# Patient Record
Sex: MALE | Race: WHITE | Employment: OTHER | URBAN - METROPOLITAN AREA
[De-identification: names, ages, dates, MRNs, and addresses within clinical notes are randomized per-mention and may not be internally consistent; named-entity substitution may affect disease eponyms.]

---

## 2017-01-04 ENCOUNTER — ANTICOAGULATION THERAPY VISIT (OUTPATIENT)
Dept: NURSING | Facility: CLINIC | Age: 72
End: 2017-01-04
Payer: COMMERCIAL

## 2017-01-04 LAB — INR POINT OF CARE: 2.7 (ref 2–2.5)

## 2017-01-04 PROCEDURE — 36416 COLLJ CAPILLARY BLOOD SPEC: CPT

## 2017-01-04 PROCEDURE — 85610 PROTHROMBIN TIME: CPT | Mod: QW

## 2017-01-04 PROCEDURE — 99207 ZZC NO CHARGE NURSE ONLY: CPT

## 2017-01-04 NOTE — PROGRESS NOTES
ANTICOAGULATION FOLLOW-UP CLINIC VISIT    Patient Name:  Tyrel Rene  Date:  1/4/2017  Contact Type:  Face to Face    SUBJECTIVE:     Patient Findings     Positives No Problem Findings           OBJECTIVE    INR PROTIME   Date Value Ref Range Status   01/04/2017 2.7* 2.0 - 2.5 Final       ASSESSMENT / PLAN  INR assessment SUPRA    Recheck INR In: 3 WEEKS    INR Location Clinic      Anticoagulation Summary as of 1/4/2017     INR goal 2.0-2.5   Selected INR 2.7! (1/4/2017)   Maintenance plan 2.5 mg (2.5 mg x 1) every day   Full instructions 1/9: 3.75 mg; 1/16: 3.75 mg; Otherwise 2.5 mg every day   Weekly total 17.5 mg   Plan last modified Caridad Cunningham RN (3/9/2016)   Next INR check 1/23/2017   Target end date Indefinite    Indications   Long-term (current) use of anticoagulants [Z79.01] [Z79.01]  Cerebral artery occlusion with cerebral infarction (HCC) [I63.50] [I63.50]  Cerebral infarction (H) [I63.9]         Anticoagulation Episode Summary     INR check location [facility]    Preferred lab     Send INR reminders to [facility] INR/PROTIME    Comments       Anticoagulation Care Providers     Provider Role Specialty Phone number    Dejon Downs MD [facility] [phone]            See the Encounter Report to view Anticoagulation Flowsheet and Dosing Calendar (Go to Encounters tab in chart review, and find the Anticoagulation Therapy Visit)        Caridad Cunningham RN

## 2017-01-04 NOTE — MR AVS SNAPSHOT
Tyrel Rene   1/4/2017 3:00 PM   Anticoagulation Therapy Visit    Description:  71 year old male   Provider:   ANTICOAGULATION CLINIC   Department:  Bx Nurse           INR as of 1/4/2017     Selected INR 2.7! (1/4/2017)      Anticoagulation Summary as of 1/4/2017     INR goal 2.0-2.5   Selected INR 2.7! (1/4/2017)   Full instructions 1/9: 3.75 mg; 1/16: 3.75 mg; Otherwise 2.5 mg every day   Next INR check 1/23/2017    Indications   Long-term (current) use of anticoagulants [Z79.01] [Z79.01]  Cerebral artery occlusion with cerebral infarction (HCC) [I63.50] [I63.50]  Cerebral infarction (H) [I63.9]         Your next Anticoagulation Clinic appointment(s)     Jan 23, 2017  2:00 PM   Anticoagulation Visit with  ANTICOAGULATION CLINIC   [facility]    [address] [place] [address] [phone]              Contact Numbers     [facility]  Please call  [phone] to cancel and/or reschedule your appointment   The direct line to the anticoagulant nurse is [phone] on Monday, Wednesday, and Friday. On Thursday, the anticoagulant nurse can be reached directly at [phone].         January 2017 Details    Sun Mon Tue Wed Thu Fri Sat     1               2               3               4      2.5 mg   See details      5      2.5 mg         6      2.5 mg         7      2.5 mg           8      2.5 mg         9      3.75 mg         10      2.5 mg         11      2.5 mg         12      2.5 mg         13      2.5 mg         14      2.5 mg           15      2.5 mg         16      3.75 mg         17      2.5 mg         18      2.5 mg         19      2.5 mg         20      2.5 mg         21      2.5 mg           22      2.5 mg         23            24               25               26               27               28                 29               30               31                    Date Details    01/04 This INR check       Date of next INR:  1/23/2017         How to take your warfarin dose     To take:  2.5 mg Take 1 of the 2.5 mg tablets.    To take:  3.75 mg Take 1.5 of the 2.5 mg tablets.

## 2017-01-17 ENCOUNTER — HOSPITAL ENCOUNTER (OUTPATIENT)
Dept: CARDIOLOGY | Facility: CLINIC | Age: 72
Discharge: HOME | End: 2017-01-17
Attending: INTERNAL MEDICINE | Admitting: INTERNAL MEDICINE
Payer: COMMERCIAL

## 2017-01-17 DIAGNOSIS — E78.00 HYPERCHOLESTEROLEMIA: ICD-10-CM

## 2017-01-17 DIAGNOSIS — I50.22 CHRONIC SYSTOLIC CONGESTIVE HEART FAILURE (H): ICD-10-CM

## 2017-01-17 LAB
ALT SERPL W P-5'-P-CCNC: <5 U/L (ref 5–30)
ANION GAP SERPL CALCULATED.3IONS-SCNC: 8.3 MMOL/L (ref 6–17)
BUN SERPL-MCNC: 20 MG/DL (ref 7–30)
CALCIUM SERPL-MCNC: 8.1 MG/DL (ref 8.5–10.5)
CHLORIDE SERPL-SCNC: 104 MMOL/L (ref 98–107)
CHOLEST SERPL-MCNC: 138 MG/DL
CO2 SERPL-SCNC: 29 MMOL/L (ref 23–29)
CREAT SERPL-MCNC: 1.46 MG/DL (ref 0.7–1.3)
GFR SERPL CREATININE-BSD FRML MDRD: 47 ML/MIN/1.7M2
GLUCOSE SERPL-MCNC: 98 MG/DL (ref 70–105)
HDLC SERPL-MCNC: 48 MG/DL
LDLC SERPL CALC-MCNC: 64 MG/DL
NONHDLC SERPL-MCNC: 90 MG/DL
POTASSIUM SERPL-SCNC: 4.3 MMOL/L (ref 3.5–5.1)
SODIUM SERPL-SCNC: 137 MMOL/L (ref 136–145)
TRIGL SERPL-MCNC: 129 MG/DL

## 2017-01-17 PROCEDURE — 36415 COLL VENOUS BLD VENIPUNCTURE: CPT | Performed by: INTERNAL MEDICINE

## 2017-01-17 PROCEDURE — 80048 BASIC METABOLIC PNL TOTAL CA: CPT | Performed by: INTERNAL MEDICINE

## 2017-01-17 PROCEDURE — 80061 LIPID PANEL: CPT | Performed by: INTERNAL MEDICINE

## 2017-01-17 PROCEDURE — 93306 TTE W/DOPPLER COMPLETE: CPT | Mod: 26 | Performed by: INTERNAL MEDICINE

## 2017-01-17 PROCEDURE — 84460 ALANINE AMINO (ALT) (SGPT): CPT | Performed by: INTERNAL MEDICINE

## 2017-01-17 PROCEDURE — 25500064 ZZH RX 255 OP 636: Performed by: INTERNAL MEDICINE

## 2017-01-17 PROCEDURE — 40000264 ECHO COMPLETE WITH LUMASON

## 2017-01-17 RX ADMIN — SULFUR HEXAFLUORIDE 2 ML: KIT at 10:13

## 2017-01-19 ENCOUNTER — OFFICE VISIT (OUTPATIENT)
Dept: CARDIOLOGY | Facility: CLINIC | Age: 72
End: 2017-01-19
Attending: INTERNAL MEDICINE
Payer: COMMERCIAL

## 2017-01-19 VITALS
HEART RATE: 65 BPM | WEIGHT: 183.1 LBS | HEIGHT: 73 IN | SYSTOLIC BLOOD PRESSURE: 124 MMHG | DIASTOLIC BLOOD PRESSURE: 72 MMHG | BODY MASS INDEX: 24.27 KG/M2

## 2017-01-19 DIAGNOSIS — I50.22 CHRONIC SYSTOLIC CONGESTIVE HEART FAILURE (H): Primary | ICD-10-CM

## 2017-01-19 DIAGNOSIS — E78.00 HYPERCHOLESTEROLEMIA: ICD-10-CM

## 2017-01-19 DIAGNOSIS — I47.10 SVT (SUPRAVENTRICULAR TACHYCARDIA) (H): ICD-10-CM

## 2017-01-19 DIAGNOSIS — I10 ESSENTIAL HYPERTENSION: ICD-10-CM

## 2017-01-19 DIAGNOSIS — I42.9 CARDIOMYOPATHY (H): ICD-10-CM

## 2017-01-19 DIAGNOSIS — Z95.810 ICD (IMPLANTABLE CARDIOVERTER-DEFIBRILLATOR) IN PLACE: ICD-10-CM

## 2017-01-19 DIAGNOSIS — I48.20 CHRONIC ATRIAL FIBRILLATION (H): ICD-10-CM

## 2017-01-19 PROCEDURE — 99214 OFFICE O/P EST MOD 30 MIN: CPT | Performed by: INTERNAL MEDICINE

## 2017-01-19 RX ORDER — ATORVASTATIN CALCIUM 80 MG/1
40 TABLET, FILM COATED ORAL DAILY
Qty: 90 TABLET | Refills: 3 | Status: SHIPPED | OUTPATIENT
Start: 2017-01-19 | End: 2017-02-08 | Stop reason: DRUGHIGH

## 2017-01-19 NOTE — PROGRESS NOTES
2017      Dejon Downs MD   [facility]   [address] [place]  [address]      RE: Tyrel Rene   MRN: [id number]   : 1945      Dear Dejon:      I had the opportunity to see Mr. Tyrel Rene in Cardiology Clinic today at the [facility] in [place] for reevaluation of chronic ischemic cardiomyopathy and congestive heart failure.  As you may recall, Mr. Rene has a stable ejection fraction of 30%-35% with chronic atrial fibrillation and a biventricular ICD.  He also has chronic kidney disease which has stabilized with a creatinine in the range of 1.4 to 1.5.  He has had some problems with worsening kidney failure when he has developed volume overload issues and required additional diuretic therapy.  Fortunately, since an exacerbation of heart failure last summer, his kidney function has stabilized and we have been able to eliminate the need for daily diuretic usage by eliminating sodium from his diet.  He last took a dose of torsemide in 2016 and has not developed any problems with worsening edema or shortness of breath since then.  He watches his weight carefully and has not had any problems with weight gain.  His breathing is stable with only very mild shortness of breath with more strenuous exertion.  He has occasional mild lightheadedness when he stands up too quickly, but no problems with syncope or near-syncope since his episode several years ago when he became dehydrated and fell while walking down the stairs resulting in a traumatic head injury.  Fortunately, he recovered from that issue.      Last summer, he was able to play golf without too much shortness of breath and has been walking in the mall recently without any difficulty.      On examination today, his blood pressure is 124/72, heart rate 65 and weight 183 pounds, which is unchanged since 2016.  His lungs are clear.  His heart rhythm is regular.  He has no  cardiac murmurs or carotid bruits and no edema.      IMPRESSION:  Mr. Artem Rene is a 71-year-old gentleman with chronic ischemic cardiomyopathy with an echocardiogram this year showing a stable ejection fraction of 30%-35%.  He has stage III chronic kidney disease with a stable creatinine of 1.46 today as well.  He has chronic atrial fibrillation and is on warfarin for stroke prevention.  His cholesterol numbers are excellent despite the fact that he did not tolerate atorvastatin 80 mg a day and required a dose reduction to 40 mg a day due to problems with loose stools.  He has occasional orthostatic mild lightheadedness indicating that his medications are appropriately adjusted.  I do not feel like I can be more aggressive with those at this time, especially considering his history of falling due to near-syncope.  He continues to do well without the use of torsemide on a regular basis.  In fact, the last dose he took was in September.  His ICD checks look good.  He has a biventricular device.      I will have him follow up with us again in 6 months in the [facility] and I will plan to have him see me again in 1 year with a repeat echocardiogram laboratory testing again at that time.      Sincerely,      Lynne Newman MD, FACC         LYNNE NEWMAN MD, FACC             D: 2017 09:38   T: 2017 13:02   MT: LAYNE      Name:     ARTEM RENE   MRN:      [id number]        Account:      [id number]   :      1945           Service Date: 2017      Document: [id number]

## 2017-01-19 NOTE — MR AVS SNAPSHOT
After Visit Summary   1/19/2017    Tyrel Rene    MRN: [id number]           Patient Information     Date Of Birth          1945        Visit Information        Provider Department      1/19/2017 8:45 AM Parish Newman MD [facility] [place]        Today's Diagnoses     Chronic systolic congestive heart failure (H)    -  1     SVT (supraventricular tachycardia) (H)         Cardiomyopathy (H)         ICD (implantable cardioverter-defibrillator) in place         Chronic atrial fibrillation (H)         Hypercholesterolemia         Essential hypertension            Follow-ups after your visit        Additional Services     Follow-Up with Cardiac Advanced Practice Provider           Follow-Up with Cardiologist                 Your next 10 appointments already scheduled     Jan 23, 2017  2:00 PM   Anticoagulation Visit with [facility] ANTICOAGULATION CLINIC   [facility]    [address]  [facility] [address]   [phone]            Mar 20, 2017  4:30 PM   Remote ICD Check with MARQUEZ DCR2   [facility] [place] ([facility])    [address]  [facility] [id number] [phone]           This appointment is for a remote check of your debrillator.  This is not an appointment at the office.              Future tests that were ordered for you today     Open Future Orders        Priority Expected Expires Ordered    Basic metabolic panel Routine 1/19/2018 2/23/2018 1/19/2017    Lipid Profile Routine 1/19/2018 2/23/2018 1/19/2017    ALT Routine 1/19/2018 2/23/2018 1/19/2017    Echocardiogram Routine 1/19/2018 2/23/2018 1/19/2017    Follow-Up with Cardiologist Routine 1/19/2018 6/3/2018 1/19/2017    Follow-Up with Cardiac Advanced Practice Provider Routine 7/19/2017 6/3/2018 1/19/2017            Who to contact     If you have  "questions or need follow up information about today's clinic visit or your schedule please contact [facility] [place] directly at [phone].  Normal or non-critical lab and imaging results will be communicated to you by MyChart, letter or phone within 4 business days after the clinic has received the results. If you do not hear from us within 7 days, please contact the clinic through TapTrackhart or phone. If you have a critical or abnormal lab result, we will notify you by phone as soon as possible.  Submit refill requests through TripFab or call your pharmacy and they will forward the refill request to us. Please allow 3 business days for your refill to be completed.          Additional Information About Your Visit        TapTrackharCuris Information     T[employer]Fab gives you secure access to your electronic health record. If you see a primary care provider, you can also send messages to your care team and make appointments. If you have questions, please call your primary care clinic.  If you do not have a primary care provider, please call [phone] and they will assist you.        Care EveryWhere ID     This is your Care EveryWhere ID. This could be used by other organizations to access your [place] medical records  [id number]        Your Vitals Were     Pulse Height BMI (Body Mass Index)             65 1.854 m (6' 1\") 24.16 kg/m2          Blood Pressure from Last 3 Encounters:   01/19/17 124/72   11/16/16 102/58   08/05/16 124/72    Weight from Last 3 Encounters:   01/19/17 83.054 kg (183 lb 1.6 oz)   11/16/16 82.781 kg (182 lb 8 oz)   08/05/16 84.369 kg (186 lb)              We Performed the Following     Follow-Up with Cardiologist          Today's Medication Changes          These changes are accurate as of: 1/19/17  9:33 AM.  If you have any questions, ask your nurse or doctor.               These medicines have changed or have updated prescriptions.        Dose/Directions    " atorvastatin 80 MG tablet   Commonly known as:  LIPITOR   This may have changed:  how much to take   Used for:  Hypercholesterolemia        Dose:  80 mg   Take 1 tablet (80 mg) by mouth daily   Quantity:  90 tablet   Refills:  3                Primary Care Provider Office Phone # Fax #    Dejon Downs [phone]       [facility] [address]  [facility] [address]        Thank you!     Thank you for choosing [facility] [place]  for your care. Our goal is always to provide you with excellent care. Hearing back from our patients is one way we can continue to improve our services. Please take a few minutes to complete the written survey that you may receive in the mail after your visit with us. Thank you!             Your Updated Medication List - Protect others around you: Learn how to safely use, store and throw away your medicines at www.disposemymeds.org.          This list is accurate as of: 1/19/17  9:33 AM.  Always use your most recent med list.                   Brand Name Dispense Instructions for use    ASPIRIN NOT PRESCRIBED    INTENTIONAL    0 each    Antiplatelet medication not prescribed intentionally due to {CHOOSE REASON BEFORE SIGNING!!!:022496}       atorvastatin 80 MG tablet    LIPITOR    90 tablet    Take 1 tablet (80 mg) by mouth daily       carvedilol 6.25 MG tablet    COREG    180 tablet    Take 1 tablet (6.25 mg) by mouth 2 times daily (with meals)       digoxin 125 MCG tablet    LANOXIN    90 tablet    Take 1 tablet (125 mcg) by mouth daily       lisinopril 5 MG tablet    PRINIVIL/ZESTRIL    180 tablet    Take 1 tablet (5 mg) by mouth 2 times daily       NITROSTAT 0.4 MG sublingual tablet   Generic drug:  nitroglycerin     75 tablet    Dissolve one tablet under tongue every 5 minutes as needed for chest pain       ranitidine 150 MG tablet    ZANTAC    180 tablet    Take 1 tablet (150 mg) by mouth 2 times daily        sildenafil 100 MG cap/tab    VIAGRA    16 tablet    Take 1 tablet (100 mg) by mouth daily as needed for erectile dysfunction Take 30 min to 4 hours before intercourse.  Never use with nitroglycerin, terazosin or doxazosin.       torsemide 20 MG tablet    DEMADEX     Take 10 mg by mouth daily As needed for wt >177#       warfarin 2.5 MG tablet    COUMADIN    60 tablet    TAKE ONE TABLET BY MOUTH ONE TIME DAILY

## 2017-01-19 NOTE — Clinical Note
2017      Dejon Downs MD   [facility]   [address] [place]  [address]      RE: Tyrel Rene   MRN: [id number]   : 1945      Dear Dejon:      I had the opportunity to see Mr. Tyrel Rene in Cardiology Clinic today at the [facility] in [place] for reevaluation of chronic ischemic cardiomyopathy and congestive heart failure.  As you may recall, Mr. Rene has a stable ejection fraction of 30%-35% with chronic atrial fibrillation and a biventricular ICD.  He also has chronic kidney disease which has stabilized with a creatinine in the range of 1.4 to 1.5.  He has had some problems with worsening kidney failure when he has developed volume overload issues and required additional diuretic therapy.  Fortunately, since an exacerbation of heart failure last summer, his kidney function has stabilized and we have been able to eliminate the need for daily diuretic usage by eliminating sodium from his diet.  He last took a dose of torsemide in 2016 and has not developed any problems with worsening edema or shortness of breath since then.  He watches his weight carefully and has not had any problems with weight gain.  His breathing is stable with only very mild shortness of breath with more strenuous exertion.  He has occasional mild lightheadedness when he stands up too quickly, but no problems with syncope or near-syncope since his episode several years ago when he became dehydrated and fell while walking down the stairs resulting in a traumatic head injury.  Fortunately, he recovered from that issue.      Last summer, he was able to play golf without too much shortness of breath and has been walking in the mall recently without any difficulty.      On examination today, his blood pressure is 124/72, heart rate 65 and weight 183 pounds, which is unchanged since 2016.  His lungs are clear.  His heart rhythm is regular.  He has no  cardiac murmurs or carotid bruits and no edema.      IMPRESSION:  Mr. Tyrel Rene is a 71-year-old gentleman with chronic ischemic cardiomyopathy with an echocardiogram this year showing a stable ejection fraction of 30%-35%.  He has stage III chronic kidney disease with a stable creatinine of 1.46 today as well.  He has chronic atrial fibrillation and is on warfarin for stroke prevention.  His cholesterol numbers are excellent despite the fact that he did not tolerate atorvastatin 80 mg a day and required a dose reduction to 40 mg a day due to problems with loose stools.  He has occasional orthostatic mild lightheadedness indicating that his medications are appropriately adjusted.  I do not feel like I can be more aggressive with those at this time, especially considering his history of falling due to near-syncope.  He continues to do well without the use of torsemide on a regular basis.  In fact, the last dose he took was in September.  His ICD checks look good.  He has a biventricular device.      I will have him follow up with us again in 6 months in the [facility] and I will plan to have him see me again in 1 year with a repeat echocardiogram laboratory testing again at that time.      Sincerely,      Parish Newman MD, [facility]

## 2017-01-19 NOTE — PROGRESS NOTES
HPI and Plan:   See dictation    Orders Placed This Encounter   Procedures     Basic metabolic panel     Lipid Profile     ALT     Follow-Up with Cardiologist     Follow-Up with Cardiac Advanced Practice Provider     Echocardiogram       Orders Placed This Encounter   Medications     atorvastatin (LIPITOR) 80 MG tablet     Sig: Take 0.5 tablets (40 mg) by mouth daily     Dispense:  90 tablet     Refill:  3       Medications Discontinued During This Encounter   Medication Reason     atorvastatin (LIPITOR) 80 MG tablet Reorder         Encounter Diagnoses   Name Primary?     Chronic systolic congestive heart failure (H) Yes     SVT (supraventricular tachycardia) (H)      Cardiomyopathy (H)      ICD (implantable cardioverter-defibrillator) in place      Chronic atrial fibrillation (H)      Hypercholesterolemia      Essential hypertension        CURRENT MEDICATIONS:  Current Outpatient Prescriptions   Medication Sig Dispense Refill     atorvastatin (LIPITOR) 80 MG tablet Take 0.5 tablets (40 mg) by mouth daily 90 tablet 3     carvedilol (COREG) 6.25 MG tablet Take 1 tablet (6.25 mg) by mouth 2 times daily (with meals) 180 tablet 3     digoxin (LANOXIN) 125 MCG tablet Take 1 tablet (125 mcg) by mouth daily 90 tablet 3     lisinopril (PRINIVIL,ZESTRIL) 5 MG tablet Take 1 tablet (5 mg) by mouth 2 times daily 180 tablet 3     warfarin (COUMADIN) 2.5 MG tablet TAKE ONE TABLET BY MOUTH ONE TIME DAILY 60 tablet 2     sildenafil (VIAGRA) 100 MG tablet Take 1 tablet (100 mg) by mouth daily as needed for erectile dysfunction Take 30 min to 4 hours before intercourse.  Never use with nitroglycerin, terazosin or doxazosin. 16 tablet 3     NITROSTAT 0.4 MG SL tablet Dissolve one tablet under tongue every 5 minutes as needed for chest pain 75 tablet 1     ranitidine (ZANTAC) 150 MG tablet Take 1 tablet (150 mg) by mouth 2 times daily 180 tablet      torsemide (DEMADEX) 20 MG tablet Take 10 mg by mouth daily As needed for wt >177#        [DISCONTINUED] atorvastatin (LIPITOR) 80 MG tablet Take 1 tablet (80 mg) by mouth daily (Patient taking differently: Take 40 mg by mouth daily ) 90 tablet 3     ASPIRIN NOT PRESCRIBED, INTENTIONAL, Antiplatelet medication not prescribed intentionally due to Current anticoagulant therapy (warfarin/enoxaparin) 0 each 0       ALLERGIES     Allergies   Allergen Reactions     Nystatin Other (See Comments)     Make his mouth numb & swelling       PAST MEDICAL HISTORY:  Past Medical History   Diagnosis Date     Atrial fibrillation (H)      s/p Cardioversion 3/14/2013     Hypertension      CVA (cerebral infarction)      residual right hand numbness     Hyperlipidemia      Palpitations      Atrial flutter (H)      S/p Aflutter ablation 1/11/2011     Syncope      CHF (congestive heart failure) (H)      Cardiomyopathy (H)      Neuropathy (H)      ED (erectile dysfunction)      CAD (coronary artery disease)      CABG x3 10/2012- LIMA to distal LAD, SVG to OM1 & OM3; cath 10/2012- PTCA to second diagonal and mid LAD, BMS to mid LAD     Cardiogenic shock (H)      SVT (supraventricular tachycardia) (H)      S/p dual chamber ICD 10/11/12- upgraded to BIV ICD 6/2013       PAST SURGICAL HISTORY:  Past Surgical History   Procedure Laterality Date     Hernia repair       Hand surgery       Bypass graft artery coronary  10/2/2012     Procedure: BYPASS GRAFT ARTERY CORONARY;  Coronary Artery Bypass Graft x3 (LAD, Diag, OM) with Endovein [place] (On-Pump);  Surgeon: Yeyo Lyman MD;  Location: [facility] OR     Relocate generator icd/pacemaker       Coronary artery bypass  10/2/12     LIMA to LAD, SVG to OM1 and OM3     Coronary angiography adult order  10/2/12     Heart cath, angioplasty  10/2/12     PTCA to second diagonal and mid LAD, BMS to mid LAD     H ablation atrial flutter  1/11/2011     Cardioversion  3/14/2013     Orthopedic surgery Right 2006     cut on table saw       FAMILY HISTORY:  Family History   Problem Relation  "Age of Onset     Alcohol/Drug Father      HEART DISEASE Father 71     Alzheimer Disease Sister      Alcohol/Drug Sister      Alcohol/Drug Sister      GASTROINTESTINAL DISEASE Sister      Obesity Sister      Hypertension Sister      HEART DISEASE Sister      Hypertension Sister      HEART DISEASE Daughter      afib     HEART DISEASE Daughter      afib     CANCER Daughter      lymphoma     Aneurysm Mother        SOCIAL HISTORY:  Social History     Social History     Marital Status:      Spouse Name: N/A     Number of Children: N/A     Years of Education: N/A     Social History Main Topics     Smoking status: Former Smoker     Quit date: 07/10/1972     Smokeless tobacco: Never Used     Alcohol Use: No     Drug Use: No     Sexual Activity: Not Asked     Other Topics Concern     Parent/Sibling W/ Cabg, Mi Or Angioplasty Before 65f 55m? No     Caffeine Concern Yes     4 cups coffee daily     Sleep Concern No     Stress Concern No     Weight Concern Yes     gain 2lbs     Special Diet Yes     low sodium     Exercise Yes     walking, doing sittups, played pickle ball in summer     Seat Belt Yes     Social History Narrative       Review of Systems:  Skin:  Negative       Eyes:  Positive for glasses    ENT:  Negative      Respiratory:  Negative       Cardiovascular:  Negative      Gastroenterology: Negative      Genitourinary:  not assessed      Musculoskeletal:  Negative      Neurologic:  Negative      Psychiatric:  Negative      Heme/Lymph/Imm:  Negative      Endocrine:  Negative        Physical Exam:  Vitals: /72 mmHg  Pulse 65  Ht 1.854 m (6' 1\")  Wt 83.054 kg (183 lb 1.6 oz)  BMI 24.16 kg/m2    Constitutional:  cooperative, alert and oriented, well developed, well nourished, in no acute distress        Skin:  warm and dry to the touch, no apparent skin lesions or masses noted        Head:  normocephalic, no masses or lesions        Eyes:  pupils equal and round, conjunctivae and lids unremarkable, sclera " white, no xanthalasma, EOMS intact, no nystagmus        ENT:  no pallor or cyanosis        Neck:  carotid pulses are full and equal bilaterally   JVP 6cm    Chest:  clear to auscultation   ICD incision in the left infraclavicular area was well-healed      Cardiac:   irregularly irregular rhythm                Abdomen:  abdomen soft        Vascular:                                          Extremities and Back:  no deformities, clubbing, cyanosis, erythema observed   bilateral LE edema;1+          Neurological:  affect appropriate, oriented to time, person and place;no gross motor deficits              CC  Parish Newman MD   [facility]  [address]  [place] [address]

## 2017-01-23 ENCOUNTER — ANTICOAGULATION THERAPY VISIT (OUTPATIENT)
Dept: NURSING | Facility: CLINIC | Age: 72
End: 2017-01-23
Payer: COMMERCIAL

## 2017-01-23 DIAGNOSIS — I63.50 CEREBRAL ARTERY OCCLUSION WITH CEREBRAL INFARCTION (H): ICD-10-CM

## 2017-01-23 DIAGNOSIS — Z79.01 LONG-TERM (CURRENT) USE OF ANTICOAGULANTS: Primary | ICD-10-CM

## 2017-01-23 LAB — INR POINT OF CARE: 2 (ref 0.86–1.14)

## 2017-01-23 PROCEDURE — 99207 ZZC NO CHARGE NURSE ONLY: CPT

## 2017-01-23 PROCEDURE — 36416 COLLJ CAPILLARY BLOOD SPEC: CPT

## 2017-01-23 PROCEDURE — 85610 PROTHROMBIN TIME: CPT | Mod: QW

## 2017-01-23 NOTE — PROGRESS NOTES
ANTICOAGULATION FOLLOW-UP CLINIC VISIT    Patient Name:  Tyrel Rene  Date:  1/23/2017  Contact Type:  Face to Face    SUBJECTIVE:     Patient Findings     Positives Other Complaints (pain in back)           OBJECTIVE    INR PROTIME   Date Value Ref Range Status   01/23/2017 2.0* 0.86 - 1.14 Final       ASSESSMENT / PLAN  INR assessment THER    Recheck INR In: 3 WEEKS    INR Location Clinic      Anticoagulation Summary as of 1/23/2017     INR goal 2.0-2.5   Selected INR 2.0 (1/23/2017)   Maintenance plan 3.75 mg (2.5 mg x 1.5) on Mon, Fri; 2.5 mg (2.5 mg x 1) all other days   Full instructions 3.75 mg on Mon, Fri; 2.5 mg all other days   Weekly total 20 mg   Plan last modified Doreen Finley RN (1/23/2017)   Next INR check 2/13/2017   Target end date Indefinite    Indications   Long-term (current) use of anticoagulants [Z79.01] [Z79.01]  Cerebral artery occlusion with cerebral infarction (HCC) [I63.50] [I63.50]  Cerebral infarction (H) [I63.9]         Anticoagulation Episode Summary     INR check location [facility]    Preferred lab     Send INR reminders to [facility] INR/PROTIME    Comments       Anticoagulation Care Providers     Provider Role Specialty Phone number    Dejon Downs MD [facility] [phone]            See the Encounter Report to view Anticoagulation Flowsheet and Dosing Calendar (Go to Encounters tab in chart review, and find the Anticoagulation Therapy Visit)        Doreen Finley RN

## 2017-01-23 NOTE — MR AVS SNAPSHOT
Tyrel Rene   1/23/2017 2:00 PM   Anticoagulation Therapy Visit    Description:  71 year old male   Provider:   ANTICOAGULATION CLINIC   Department:  Bx Nurse           INR as of 1/23/2017     Selected INR 2.0 (1/23/2017)      Anticoagulation Summary as of 1/23/2017     INR goal 2.0-2.5   Selected INR 2.0 (1/23/2017)   Full instructions 3.75 mg on Mon, Fri; 2.5 mg all other days   Next INR check 2/13/2017    Indications   Long-term (current) use of anticoagulants [Z79.01] [Z79.01]  Cerebral artery occlusion with cerebral infarction (HCC) [I63.50] [I63.50]  Cerebral infarction (H) [I63.9]         Your next Anticoagulation Clinic appointment(s)     Feb 13, 2017  2:15 PM   Anticoagulation Visit with  ANTICOAGULATION CLINIC   [facility] ([facility]    [address] [place] [address] [phone]              Contact Numbers     [facility]  Please call  [phone] to cancel and/or reschedule your appointment   The direct line to the anticoagulant nurse is [phone] on Monday, Wednesday, and Friday. On Thursday, the anticoagulant nurse can be reached directly at [phone].         January 2017 Details    Sun Mon Tue Wed Thu Fri Sat     1               2               3               4               5               6               7                 8               9               10               11               12               13               14                 15               16               17               18               19               20               21                 22               23      3.75 mg   See details      24      2.5 mg         25      2.5 mg         26      2.5 mg         27      3.75 mg         28      2.5 mg           29      2.5 mg         30      3.75 mg         31      2.5 mg              Date Details   01/23 This INR check               How to take your warfarin dose      To take:  2.5 mg Take 1 of the 2.5 mg tablets.    To take:  3.75 mg Take 1.5 of the 2.5 mg tablets.           February 2017 Details    Sun Mon Tue Wed Thu Fri Sat        1      2.5 mg         2      2.5 mg         3      3.75 mg         4      2.5 mg           5      2.5 mg         6      3.75 mg         7      2.5 mg         8      2.5 mg         9      2.5 mg         10      3.75 mg         11      2.5 mg           12      2.5 mg         13            14               15               16               17               18                 19               20               21               22               23               24               25                 26               27               28                    Date Details   No additional details    Date of next INR:  2/13/2017         How to take your warfarin dose     To take:  2.5 mg Take 1 of the 2.5 mg tablets.    To take:  3.75 mg Take 1.5 of the 2.5 mg tablets.

## 2017-02-08 DIAGNOSIS — I25.10 CAD (CORONARY ARTERY DISEASE): Primary | ICD-10-CM

## 2017-02-08 DIAGNOSIS — E78.5 HYPERLIPIDEMIA: ICD-10-CM

## 2017-02-08 RX ORDER — ATORVASTATIN CALCIUM 40 MG/1
40 TABLET, FILM COATED ORAL DAILY
Qty: 90 TABLET | Refills: 3 | Status: SHIPPED | OUTPATIENT
Start: 2017-02-08 | End: 2017-07-12

## 2017-02-13 ENCOUNTER — ANTICOAGULATION THERAPY VISIT (OUTPATIENT)
Dept: NURSING | Facility: CLINIC | Age: 72
End: 2017-02-13
Payer: COMMERCIAL

## 2017-02-13 DIAGNOSIS — I63.50 CEREBRAL ARTERY OCCLUSION WITH CEREBRAL INFARCTION (H): ICD-10-CM

## 2017-02-13 DIAGNOSIS — I63.9 CEREBRAL INFARCTION (H): ICD-10-CM

## 2017-02-13 DIAGNOSIS — Z79.01 LONG-TERM (CURRENT) USE OF ANTICOAGULANTS: ICD-10-CM

## 2017-02-13 LAB — INR POINT OF CARE: 2.6 (ref 0.86–1.14)

## 2017-02-13 PROCEDURE — 85610 PROTHROMBIN TIME: CPT | Mod: QW

## 2017-02-13 PROCEDURE — 36416 COLLJ CAPILLARY BLOOD SPEC: CPT

## 2017-02-13 PROCEDURE — 99207 ZZC NO CHARGE NURSE ONLY: CPT

## 2017-02-13 NOTE — PROGRESS NOTES
ANTICOAGULATION FOLLOW-UP CLINIC VISIT    Patient Name:  Tyrel Rene  Date:  2/13/2017  Contact Type:  Face to Face    SUBJECTIVE:     Patient Findings     Positives No Problem Findings           OBJECTIVE    INR Protime   Date Value Ref Range Status   02/13/2017 2.6 (A) 0.86 - 1.14 Final       ASSESSMENT / PLAN  INR assessment THER    Recheck INR In: 4 WEEKS    INR Location Clinic      Anticoagulation Summary as of 2/13/2017     INR goal 2.0-2.5   Today's INR 2.6!   Maintenance plan 3.75 mg (2.5 mg x 1.5) on Mon, Fri; 2.5 mg (2.5 mg x 1) all other days   Full instructions 3.75 mg on Mon, Fri; 2.5 mg all other days   Weekly total 20 mg   Plan last modified Doreen Finley RN (1/23/2017)   Next INR check 3/13/2017   Target end date Indefinite    Indications   Long-term (current) use of anticoagulants [Z79.01] [Z79.01]  Cerebral artery occlusion with cerebral infarction (HCC) [I63.50] [I63.50]  Cerebral infarction (H) [I63.9]         Anticoagulation Episode Summary     INR check location [facility]    Preferred lab     Send INR reminders to [facility] INR/PROTIME    Comments       Anticoagulation Care Providers     Provider Role Specialty Phone number    Dejon Downs MD [facility] [phone]            See the Encounter Report to view Anticoagulation Flowsheet and Dosing Calendar (Go to Encounters tab in chart review, and find the Anticoagulation Therapy Visit)        Doreen Finley RN

## 2017-02-13 NOTE — MR AVS SNAPSHOT
Tyrel Rene   2/13/2017 2:15 PM   Anticoagulation Therapy Visit    Description:  71 year old male   Provider:  [facility]   Department:  Bx Nurse           INR as of 2/13/2017     Today's INR 2.6!      Anticoagulation Summary as of 2/13/2017     INR goal 2.0-2.5   Today's INR 2.6!   Full instructions 3.75 mg on Mon, Fri; 2.5 mg all other days   Next INR check 3/13/2017    Indications   Long-term (current) use of anticoagulants [Z79.01] [Z79.01]  Cerebral artery occlusion with cerebral infarction (HCC) [I63.50] [I63.50]  Cerebral infarction (H) [I63.9]         Your next Anticoagulation Clinic appointment(s)     Mar 13, 2017  2:00 PM CDT   Anticoagulation Visit with  ANTICOAGULATION CLINIC   [facility] ([facility])    [address] [place] [id number] [phone]              Contact Numbers     [facility]  Please call  [phone] to cancel and/or reschedule your appointment   The direct line to the anticoagulant nurse is [phone] on Monday, Wednesday, and Friday. On Thursday, the anticoagulant nurse can be reached directly at [phone].         February 2017 Details    Sun Mon Tue Wed Thu Fri Sat        1               2               3               4                 5               6               7               8               9               10               11                 12               13      3.75 mg   See details      14      2.5 mg         15      2.5 mg         16      2.5 mg         17      3.75 mg         18      2.5 mg           19      2.5 mg         20      3.75 mg         21      2.5 mg         22      2.5 mg         23      2.5 mg         24      3.75 mg         25      2.5 mg           26      2.5 mg         27      3.75 mg         28      2.5 mg              Date Details   02/13 This INR check               How to take your warfarin dose     To take:  2.5 mg  Take 1 of the 2.5 mg tablets.    To take:  3.75 mg Take 1.5 of the 2.5 mg tablets.           March 2017 Details    Sun Mon Tue Wed Thu Fri Sat        1      2.5 mg         2      2.5 mg         3      3.75 mg         4      2.5 mg           5      2.5 mg         6      3.75 mg         7      2.5 mg         8      2.5 mg         9      2.5 mg         10      3.75 mg         11      2.5 mg           12      2.5 mg         13            14               15               16               17               18                 19               20               21               22               23               24               25                 26               27               28               29               30               31                 Date Details   No additional details    Date of next INR:  3/13/2017         How to take your warfarin dose     To take:  2.5 mg Take 1 of the 2.5 mg tablets.    To take:  3.75 mg Take 1.5 of the 2.5 mg tablets.

## 2017-03-13 ENCOUNTER — ANTICOAGULATION THERAPY VISIT (OUTPATIENT)
Dept: NURSING | Facility: CLINIC | Age: 72
End: 2017-03-13
Payer: COMMERCIAL

## 2017-03-13 DIAGNOSIS — I63.9 CEREBRAL INFARCTION (H): ICD-10-CM

## 2017-03-13 DIAGNOSIS — I63.50 CEREBRAL ARTERY OCCLUSION WITH CEREBRAL INFARCTION (H): ICD-10-CM

## 2017-03-13 DIAGNOSIS — Z79.01 LONG-TERM (CURRENT) USE OF ANTICOAGULANTS: ICD-10-CM

## 2017-03-13 LAB — INR POINT OF CARE: 2.5 (ref 0.86–1.14)

## 2017-03-13 PROCEDURE — 99207 ZZC NO CHARGE NURSE ONLY: CPT

## 2017-03-13 PROCEDURE — 85610 PROTHROMBIN TIME: CPT | Mod: QW

## 2017-03-13 PROCEDURE — 36416 COLLJ CAPILLARY BLOOD SPEC: CPT

## 2017-03-13 NOTE — PROGRESS NOTES
ANTICOAGULATION FOLLOW-UP CLINIC VISIT    Patient Name:  Tyrel Rene  Date:  3/13/2017  Contact Type:  Face to Face    SUBJECTIVE:     Patient Findings     Positives No Problem Findings           OBJECTIVE    INR Protime   Date Value Ref Range Status   03/13/2017 2.5 (A) 0.86 - 1.14 Final       ASSESSMENT / PLAN  INR assessment THER    Recheck INR In: 4 WEEKS    INR Location Clinic      Anticoagulation Summary as of 3/13/2017     INR goal 2.0-2.5   Today's INR 2.5   Maintenance plan 3.75 mg (2.5 mg x 1.5) on Mon, Fri; 2.5 mg (2.5 mg x 1) all other days   Full instructions 3.75 mg on Mon, Fri; 2.5 mg all other days   Weekly total 20 mg   Plan last modified Doreen Finley RN (1/23/2017)   Next INR check 4/10/2017   Target end date Indefinite    Indications   Long-term (current) use of anticoagulants [Z79.01] [Z79.01]  Cerebral artery occlusion with cerebral infarction (HCC) [I63.50] [I63.50]  Cerebral infarction (H) [I63.9]         Anticoagulation Episode Summary     INR check location [facility]    Preferred lab     Send INR reminders to [facility] INR/PROTIME    Comments       Anticoagulation Care Providers     Provider Role Specialty Phone number    Dejon Downs MD [facility] [phone]            See the Encounter Report to view Anticoagulation Flowsheet and Dosing Calendar (Go to Encounters tab in chart review, and find the Anticoagulation Therapy Visit)        Doreen Finley RN

## 2017-03-13 NOTE — MR AVS SNAPSHOT
Tyrel Rene   3/13/2017 2:00 PM   Anticoagulation Therapy Visit    Description:  71 year old male   Provider:  [facility]   Department:  Bx Nurse           INR as of 3/13/2017     Today's INR 2.5      Anticoagulation Summary as of 3/13/2017     INR goal 2.0-2.5   Today's INR 2.5   Full instructions 3.75 mg on Mon, Fri; 2.5 mg all other days   Next INR check 4/10/2017    Indications   Long-term (current) use of anticoagulants [Z79.01] [Z79.01]  Cerebral artery occlusion with cerebral infarction (HCC) [I63.50] [I63.50]  Cerebral infarction (H) [I63.9]         Your next Anticoagulation Clinic appointment(s)     Apr 10, 2017  2:00 PM CDT   Anticoagulation Visit with  ANTICOAGULATION CLINIC   [facility] ([facility])    [address] [place] [id number] [phone]              Contact Numbers     [facility]  Please call  [phone] to cancel and/or reschedule your appointment   The direct line to the anticoagulant nurse is [phone] on Monday, Wednesday, and Friday. On Thursday, the anticoagulant nurse can be reached directly at [phone].         March 2017 Details    Sun Mon Tue Wed Thu Fri Sat        1               2               3               4                 5               6               7               8               9               10               11                 12               13      3.75 mg   See details      14      2.5 mg         15      2.5 mg         16      2.5 mg         17      3.75 mg         18      2.5 mg           19      2.5 mg         20      3.75 mg         21      2.5 mg         22      2.5 mg         23      2.5 mg         24      3.75 mg         25      2.5 mg           26      2.5 mg         27      3.75 mg         28      2.5 mg         29      2.5 mg         30      2.5 mg         31      3.75 mg           Date Details   03/13 This INR check                How to take your warfarin dose     To take:  2.5 mg Take 1 of the 2.5 mg tablets.    To take:  3.75 mg Take 1.5 of the 2.5 mg tablets.           April 2017 Details    Sun Mon Tue Wed Thu Fri Sat           1      2.5 mg           2      2.5 mg         3      3.75 mg         4      2.5 mg         5      2.5 mg         6      2.5 mg         7      3.75 mg         8      2.5 mg           9      2.5 mg         10            11               12               13               14               15                 16               17               18               19               20               21               22                 23               24               25               26               27               28               29                 30                      Date Details   No additional details    Date of next INR:  4/10/2017         How to take your warfarin dose     To take:  2.5 mg Take 1 of the 2.5 mg tablets.    To take:  3.75 mg Take 1.5 of the 2.5 mg tablets.

## 2017-03-20 ENCOUNTER — ALLIED HEALTH/NURSE VISIT (OUTPATIENT)
Dept: CARDIOLOGY | Facility: CLINIC | Age: 72
End: 2017-03-20
Payer: COMMERCIAL

## 2017-03-20 DIAGNOSIS — Z95.810 ICD (IMPLANTABLE CARDIOVERTER-DEFIBRILLATOR) IN PLACE: Primary | ICD-10-CM

## 2017-03-20 DIAGNOSIS — I42.9 CARDIOMYOPATHY (H): ICD-10-CM

## 2017-03-20 PROCEDURE — 93296 REM INTERROG EVL PM/IDS: CPT | Performed by: INTERNAL MEDICINE

## 2017-03-20 PROCEDURE — 93295 DEV INTERROG REMOTE 1/2/MLT: CPT | Performed by: INTERNAL MEDICINE

## 2017-03-20 NOTE — MR AVS SNAPSHOT
After Visit Summary   3/20/2017    Tyrel Rene    MRN: [id number]           Patient Information     Date Of Birth          1945        Visit Information        Provider Department      3/20/2017 4:30 PM MAR[facility] DCR2 [facility]        Today's Diagnoses     ICD (implantable cardioverter-defibrillator) in place    -  1    Cardiomyopathy (H)           Follow-ups after your visit        Your next 10 appointments already scheduled     Mar 20, 2017  4:30 PM CDT   Remote ICD Check with MARQUEZ DCR2   [facility] ([facility])    [address]  [facility] [place] [address] [phone]           This appointment is for a remote check of your debrillator.  This is not an appointment at the office.            Apr 10, 2017  2:00 PM CDT   Anticoagulation [facility] with [facility]   [facility]    [address]  [place] [facility] [facility] [address]   [phone]            Jun 21, 2017  4:30 PM CDT   Remote ICD Check with MARQUEZ DCR2   [facility] ([facility])    [address]  [facility] [place] [address] [phone]           This appointment is for a remote check of your debrillator.  This is not an appointment at the office.              Who to contact     If you have questions or need follow up information about today's clinic visit or your schedule please contact [facility] directly at [phone].  Normal or non-critical lab and imaging results will be communicated to you by MyChart, letter or phone within 4 business days after the clinic has received the results. If you do not hear from us within 7 days, please contact the clinic through MyChart or phone. If you have a critical or abnormal lab result, we will notify  you by phone as soon as possible.  Submit refill requests through [employer] or call your pharmacy and they will forward the refill request to us. Please allow 3 business days for your refill to be completed.          Additional Information About Your Visit        [employer] [employer] [facility] Information     [employer] gives you secure access to your electronic health record. If you see a primary care provider, you can also send messages to your care team and make appointments. If you have questions, please call your primary care clinic.  If you do not have a primary care provider, please call [phone] and they will assist you.        Care EveryWhere ID     This is your Care EveryWhere ID. This could be used by other organizations to access your [facility] medical records  [id number]         Blood Pressure from Last 3 Encounters:   01/19/17 124/72   11/16/16 102/58   08/05/16 124/72    Weight from Last 3 Encounters:   01/19/17 83.1 kg (183 lb 1.6 oz)   11/16/16 82.8 kg (182 lb 8 oz)   08/05/16 84.4 kg (186 lb)              We Performed the Following     ICD DEVICE INTERROGAT REMOTE (30079)     INTERROGATION DEVICE EVAL REMOTE, PACER/ICD (79809)          Today's Medication Changes          These changes are accurate as of: 3/20/17  3:16 PM.  If you have any questions, ask your nurse or doctor.               These medicines have changed or have updated prescriptions.        Dose/Directions    warfarin 2.5 MG tablet   Commonly known as:  COUMADIN   This may have changed:  See the new instructions.   Used for:  Long-term (current) use of anticoagulants        TAKE ONE TABLET BY MOUTH ONE TIME DAILY   Quantity:  60 tablet   Refills:  2                Primary Care Provider Office Phone # Fax #    Dejon Downs [phone]       [facility] [address] [address]  [place] [address]        Thank you!     Thank you for choosing [facility]  for your care. Our goal is  always to provide you with excellent care. Hearing back from our patients is one way we can continue to improve our services. Please take a few minutes to complete the written survey that you may receive in the mail after your visit with us. Thank you!             Your Updated Medication List - Protect others around you: Learn how to safely use, store and throw away your medicines at www.disposemymeds.org.          This list is accurate as of: 3/20/17  3:16 PM.  Always use your most recent med list.                   Brand Name Dispense Instructions for use    ASPIRIN NOT PRESCRIBED    INTENTIONAL    0 each    Antiplatelet medication not prescribed intentionally due to {CHOOSE REASON BEFORE SIGNING!!!:960126}       atorvastatin 40 MG tablet    LIPITOR    90 tablet    Take 1 tablet (40 mg) by mouth daily       carvedilol 6.25 MG tablet    COREG    180 tablet    Take 1 tablet (6.25 mg) by mouth 2 times daily (with meals)       digoxin 125 MCG tablet    LANOXIN    90 tablet    Take 1 tablet (125 mcg) by mouth daily       lisinopril 5 MG tablet    PRINIVIL/ZESTRIL    180 tablet    Take 1 tablet (5 mg) by mouth 2 times daily       NITROSTAT 0.4 MG sublingual tablet   Generic drug:  nitroglycerin     75 tablet    Dissolve one tablet under tongue every 5 minutes as needed for chest pain       ranitidine 150 MG tablet    ZANTAC    180 tablet    Take 1 tablet (150 mg) by mouth 2 times daily       sildenafil 100 MG cap/tab    VIAGRA    16 tablet    Take 1 tablet (100 mg) by mouth daily as needed for erectile dysfunction Take 30 min to 4 hours before intercourse.  Never use with nitroglycerin, terazosin or doxazosin.       torsemide 20 MG tablet    DEMADEX     Take 10 mg by mouth daily As needed for wt >177#       warfarin 2.5 MG tablet    COUMADIN    60 tablet    TAKE ONE TABLET BY MOUTH ONE TIME DAILY

## 2017-03-20 NOTE — PROGRESS NOTES
[employer] CRT-D ICD Remote Device Check  AP: 0 % BVP: 97 %  Mode: VVIR         Presenting Rhythm: afib, BVP  Heart Rate: adequate variability   Sensing: WNL in A (dependant in V)    Pacing Threshold: not on auto capture    Impedance: WNL  Battery Status: 4.5 yrs estimated longevity, charge time of 9.9 seconds  Atrial Arrhythmia: chronic afib, on warfarin  Ventricular Arrhythmia: 77 events saved since last check 12/14/2016, 28 with EGMs. 21 EGMs show afib with RVR, irregular VS with morphology similar to underlying, longest RVR 1 minute, rates 134-189 bpm. 7 EGMs show NSVT, 4-11 beats, more regular VS with change in far field morphology, 144-186 bpm. Similar results to previous checks.   ATP: none    Shocks: none    Care Plan: remote in 3 months, scheduled.   Called pt with results and plan.   Radha

## 2017-04-04 DIAGNOSIS — Z79.01 LONG-TERM (CURRENT) USE OF ANTICOAGULANTS: ICD-10-CM

## 2017-04-04 NOTE — TELEPHONE ENCOUNTER
warfarin (COUMADIN) 2.5 MG tablet    Last Written Prescription Date: 10/24/2016  Last Fill Qty: 60, # refills: 2  Last Office Visit with G, P or [facility] prescribing provider: 2/20/2016       Date and Result of Last PT/INR:   Lab Results   Component Value Date    INR 2.5 03/13/2017    INR 2.6 02/13/2017    INR 1.05 12/24/2014    INR 1.10 12/23/2014

## 2017-04-05 ENCOUNTER — ANTICOAGULATION THERAPY VISIT (OUTPATIENT)
Dept: NURSING | Facility: CLINIC | Age: 72
End: 2017-04-05
Payer: COMMERCIAL

## 2017-04-05 DIAGNOSIS — I63.9 CEREBRAL INFARCTION (H): ICD-10-CM

## 2017-04-05 DIAGNOSIS — Z79.01 LONG-TERM (CURRENT) USE OF ANTICOAGULANTS: ICD-10-CM

## 2017-04-05 DIAGNOSIS — I63.50 CEREBRAL ARTERY OCCLUSION WITH CEREBRAL INFARCTION (H): ICD-10-CM

## 2017-04-05 LAB — INR POINT OF CARE: 2.3 (ref 0.86–1.14)

## 2017-04-05 PROCEDURE — 99207 ZZC NO CHARGE NURSE ONLY: CPT

## 2017-04-05 PROCEDURE — 36416 COLLJ CAPILLARY BLOOD SPEC: CPT

## 2017-04-05 PROCEDURE — 85610 PROTHROMBIN TIME: CPT | Mod: QW

## 2017-04-05 RX ORDER — WARFARIN SODIUM 2.5 MG/1
2.5 TABLET ORAL DAILY
Qty: 102 TABLET | Refills: 3 | Status: SHIPPED | OUTPATIENT
Start: 2017-04-05 | End: 2017-12-03

## 2017-04-05 NOTE — MR AVS SNAPSHOT
Tyrel Rene   4/5/2017 1:15 PM   Anticoagulation Therapy Visit    Description:  71 year old male   Provider:  [facility]   Department:  Bx Nurse           INR as of 4/5/2017     Today's INR 2.3      Anticoagulation Summary as of 4/5/2017     INR goal 2.0-2.5   Today's INR 2.3   Full instructions 3.75 mg on Mon, Fri; 2.5 mg all other days   Next INR check 5/5/2017    Indications   Long-term (current) use of anticoagulants [Z79.01] [Z79.01]  Cerebral artery occlusion with cerebral infarction (HCC) [I63.50] [I63.50]  Cerebral infarction (H) [I63.9]         Your next Anticoagulation Clinic appointment(s)     May 05, 2017  1:15 PM CDT   Anticoagulation Visit with  ANTICOAGULATION CLINIC   [facility] ([facility])    [address] [place] [id number] [phone]              Contact Numbers     [facility]  Please call  [phone] to cancel and/or reschedule your appointment   The direct line to the anticoagulant nurse is [phone] on Monday, Wednesday, and Friday. On Thursday, the anticoagulant nurse can be reached directly at [phone].         April 2017 Details    Sun Mon Tue Wed Thu Fri Sat           1                 2               3               4               5      2.5 mg   See details      6      2.5 mg         7      3.75 mg         8      2.5 mg           9      2.5 mg         10      3.75 mg         11      2.5 mg         12      2.5 mg         13      2.5 mg         14      3.75 mg         15      2.5 mg           16      2.5 mg         17      3.75 mg         18      2.5 mg         19      2.5 mg         20      2.5 mg         21      3.75 mg         22      2.5 mg           23      2.5 mg         24      3.75 mg         25      2.5 mg         26      2.5 mg         27      2.5 mg         28      3.75 mg         29      2.5 mg           30      2.5 mg                Date  Details   04/05 This INR check               How to take your warfarin dose     To take:  2.5 mg Take 1 of the 2.5 mg tablets.    To take:  3.75 mg Take 1.5 of the 2.5 mg tablets.           May 2017 Details    Sun Mon Tue Wed Thu Fri Sat      1      3.75 mg         2      2.5 mg         3      2.5 mg         4      2.5 mg         5            6                 7               8               9               10               11               12               13                 14               15               16               17               18               19               20                 21               22               23               24               25               26               27                 28               29               30               31                   Date Details   No additional details    Date of next INR:  5/5/2017         How to take your warfarin dose     To take:  2.5 mg Take 1 of the 2.5 mg tablets.    To take:  3.75 mg Take 1.5 of the 2.5 mg tablets.

## 2017-04-05 NOTE — PROGRESS NOTES
ANTICOAGULATION FOLLOW-UP CLINIC VISIT    Patient Name:  Tyrel Rene  Date:  4/5/2017  Contact Type:  Face to Face    SUBJECTIVE:     Patient Findings     Positives No Problem Findings           OBJECTIVE    INR Protime   Date Value Ref Range Status   04/05/2017 2.3 (A) 0.86 - 1.14 Final       ASSESSMENT / PLAN  INR assessment THER    Recheck INR In: 4 WEEKS    INR Location Clinic      Anticoagulation Summary as of 4/5/2017     INR goal 2.0-2.5   Today's INR 2.3   Maintenance plan 3.75 mg (2.5 mg x 1.5) on Mon, Fri; 2.5 mg (2.5 mg x 1) all other days   Full instructions 3.75 mg on Mon, Fri; 2.5 mg all other days   Weekly total 20 mg   Plan last modified Doreen Finley RN (1/23/2017)   Next INR check 5/5/2017   Target end date Indefinite    Indications   Long-term (current) use of anticoagulants [Z79.01] [Z79.01]  Cerebral artery occlusion with cerebral infarction (HCC) [I63.50] [I63.50]  Cerebral infarction (H) [I63.9]         Anticoagulation Episode Summary     IN[facility] check [facility]    Preferred lab     Send INR reminders to [facility] INR/PROTIME    Comments       Anticoagulation Care Providers     Provider Role Specialty Phone number    Dejon Downs MD [facility] [phone]            See the Encounter Report to view Anticoagulation Flowsheet and Dosing Calendar (Go to Encounters tab in chart review, and find the Anticoagulation Therapy Visit)        Doreen Finley RN

## 2017-05-05 ENCOUNTER — ANTICOAGULATION THERAPY VISIT (OUTPATIENT)
Dept: NURSING | Facility: CLINIC | Age: 72
End: 2017-05-05
Payer: COMMERCIAL

## 2017-05-05 DIAGNOSIS — I63.50 CEREBRAL ARTERY OCCLUSION WITH CEREBRAL INFARCTION (H): ICD-10-CM

## 2017-05-05 DIAGNOSIS — I63.9 CEREBRAL INFARCTION (H): ICD-10-CM

## 2017-05-05 DIAGNOSIS — Z79.01 LONG-TERM (CURRENT) USE OF ANTICOAGULANTS: ICD-10-CM

## 2017-05-05 LAB — INR POINT OF CARE: 3.9 (ref 0.86–1.14)

## 2017-05-05 PROCEDURE — 99207 ZZC NO CHARGE NURSE ONLY: CPT

## 2017-05-05 PROCEDURE — 85610 PROTHROMBIN TIME: CPT | Mod: QW

## 2017-05-05 PROCEDURE — 36416 COLLJ CAPILLARY BLOOD SPEC: CPT

## 2017-05-05 NOTE — MR AVS SNAPSHOT
Tyrel Rene   5/5/2017 1:15 PM   Anticoagulation Therapy Visit    Description:  71 year old male   Provider:  [facility]   Department:  Bx Nurse           INR as of 5/5/2017     Today's INR 3.9!      Anticoagulation Summary as of 5/5/2017     INR goal 2.0-2.5   Today's INR 3.9!   Full instructions 5/5: Hold; 5/8: 2.5 mg; 5/12: 2.5 mg; 5/15: 2.5 mg; Otherwise 3.75 mg on Mon, Fri; 2.5 mg all other days   Next INR check 5/19/2017    Indications   Long-term (current) use of anticoagulants [Z79.01] [Z79.01]  Cerebral artery occlusion with cerebral infarction (HCC) [I63.50] [I63.50]  Cerebral infarction (H) [I63.9]         Your next Anticoagulation Clinic appointment(s)     May 19, 2017  1:15 PM CDT   Anticoagulation Visit with  ANTICOAGULATION CLINIC   [facility] ([facility])    [address] [place] [id number] [phone]              Contact Numbers     [facility]  Please call  [phone] to cancel and/or reschedule your appointment   The direct line to the anticoagulant nurse is [phone] on Monday, Wednesday, and Friday. On Thursday, the anticoagulant nurse can be reached directly at [phone].         May 2017 Details    Sun Mon Tue Wed Thu Fri Sat      1               2               3               4               5      Hold   See details      6      2.5 mg           7      2.5 mg         8      2.5 mg         9      2.5 mg         10      2.5 mg         11      2.5 mg         12      2.5 mg         13      2.5 mg           14      2.5 mg         15      2.5 mg         16      2.5 mg         17      2.5 mg         18      2.5 mg         19            20                 21               22               23               24               25               26               27                 28               29               30               31                   Date Details   05/05  This INR check       Date of next INR:  5/19/2017         How to take your warfarin dose     To take:  2.5 mg Take 1 of the 2.5 mg tablets.    To take:  3.75 mg Take 1.5 of the 2.5 mg tablets.    Hold Do not take your warfarin dose. See the Details table to the right for additional instructions.

## 2017-05-05 NOTE — PROGRESS NOTES
ANTICOAGULATION FOLLOW-UP CLINIC VISIT    Patient Name:  Tyrel Rene  Date:  5/5/2017  Contact Type:  Face to Face    SUBJECTIVE:     Patient Findings     Positives Other complaints (cold)           OBJECTIVE    INR Protime   Date Value Ref Range Status   05/05/2017 3.9 (A) 0.86 - 1.14 Final       ASSESSMENT / PLAN  INR assessment SUPRA    Recheck INR In: 2 WEEKS    INR Location Clinic      Anticoagulation Summary as of 5/5/2017     INR goal 2.0-2.5   Today's INR 3.9!   Maintenance plan 3.75 mg (2.5 mg x 1.5) on Mon, Fri; 2.5 mg (2.5 mg x 1) all other days   Full instructions 5/5: Hold; 5/8: 2.5 mg; 5/12: 2.5 mg; 5/15: 2.5 mg; Otherwise 3.75 mg on Mon, Fri; 2.5 mg all other days   Weekly total 20 mg   Plan last modified Doreen Finley RN (1/23/2017)   Next INR check 5/19/2017   Target end date Indefinite    Indications   Long-term (current) use of anticoagulants [Z79.01] [Z79.01]  Cerebral artery occlusion with cerebral infarction (HCC) [I63.50] [I63.50]  Cerebral infarction (H) [I63.9]         Anticoagulation Episode Summary     INR check [facility]    Preferred lab     Send INR reminders to [facility] INR/PROTIME    Comments       Anticoagulation Care Providers     Provider Role Specialty Phone number    Dejon Downs MD [facility] [phone]            See the Encounter Report to view Anticoagulation Flowsheet and Dosing Calendar (Go to Encounters tab in chart review, and find the Anticoagulation Therapy Visit)        Doreen Finley, RN

## 2017-05-11 ENCOUNTER — CARE COORDINATION (OUTPATIENT)
Dept: CARDIOLOGY | Facility: CLINIC | Age: 72
End: 2017-05-11

## 2017-05-11 DIAGNOSIS — I50.22 CHRONIC SYSTOLIC CONGESTIVE HEART FAILURE (H): Primary | ICD-10-CM

## 2017-05-11 NOTE — PROGRESS NOTES
Pt's wife calls to make f/u appt w/ [facility]. She wonders if he needs f/u labs. Looks like we've been monitoring his BMP-will arrange.

## 2017-05-19 ENCOUNTER — ANTICOAGULATION THERAPY VISIT (OUTPATIENT)
Dept: NURSING | Facility: CLINIC | Age: 72
End: 2017-05-19
Payer: COMMERCIAL

## 2017-05-19 DIAGNOSIS — I63.9 CEREBRAL INFARCTION (H): ICD-10-CM

## 2017-05-19 DIAGNOSIS — I63.50 CEREBRAL ARTERY OCCLUSION WITH CEREBRAL INFARCTION (H): ICD-10-CM

## 2017-05-19 DIAGNOSIS — Z79.01 LONG-TERM (CURRENT) USE OF ANTICOAGULANTS: ICD-10-CM

## 2017-05-19 LAB — INR POINT OF CARE: 2.2 (ref 2–3)

## 2017-05-19 PROCEDURE — 85610 PROTHROMBIN TIME: CPT | Mod: QW

## 2017-05-19 PROCEDURE — 36416 COLLJ CAPILLARY BLOOD SPEC: CPT

## 2017-05-19 PROCEDURE — 99207 ZZC NO CHARGE NURSE ONLY: CPT

## 2017-05-19 NOTE — MR AVS SNAPSHOT
Tyrel Rene   5/19/2017 1:15 PM   Anticoagulation Therapy Visit    Description:  71 year old male   Provider:  [facility]   Department:  Bx Nurse           INR as of 5/19/2017     Today's INR 2.2      Anticoagulation Summary as of 5/19/2017     INR goal 2.0-2.5   Today's INR 2.2   Full instructions 3.75 mg on Mon, Fri; 2.5 mg all other days   Next INR check 6/2/2017    Indications   Long-term (current) use of anticoagulants [Z79.01] [Z79.01]  Cerebral artery occlusion with cerebral infarction (HCC) [I63.50] [I63.50]  Cerebral infarction (H) [I63.9]         Your next Anticoagulation Clinic appointment(s)     Jun 02, 2017  2:00 PM CDT   Anticoagulation Visit with  ANTICOAGULATION CLINIC   [facility] ([facility])    [address] [place] [id number] [phone]              Contact Numbers     [facility]  Please call  [phone] to cancel and/or reschedule your appointment   The direct line to the anticoagulant nurse is [phone] on Monday, Wednesday, and Friday. On Thursday, the anticoagulant nurse can be reached directly at [phone].         May 2017 Details    Sun Mon Tue Wed Thu Fri Sat      1               2               3               4               5               6                 7               8               9               10               11               12               13                 14               15               16               17               18               19      3.75 mg   See details      20      2.5 mg           21      2.5 mg         22      3.75 mg         23      2.5 mg         24      2.5 mg         25      2.5 mg         26      3.75 mg         27      2.5 mg           28      2.5 mg         29      3.75 mg         30      2.5 mg         31      2.5 mg             Date Details   05/19 This INR check               How to take your warfarin dose      To take:  2.5 mg Take 1 of the 2.5 mg tablets.    To take:  3.75 mg Take 1.5 of the 2.5 mg tablets.           June 2017 Details    Sun Mon Tue Wed Thu Fri Sat         1      2.5 mg         2            3                 4               5               6               7               8               9               10                 11               12               13               14               15               16               17                 18               19               20               21               22               23               24                 25               26               27               28               29               30                 Date Details   No additional details    Date of next INR:  6/2/2017         How to take your warfarin dose     To take:  2.5 mg Take 1 of the 2.5 mg tablets.    To take:  3.75 mg Take 1.5 of the 2.5 mg tablets.

## 2017-05-19 NOTE — PROGRESS NOTES
ANTICOAGULATION FOLLOW-UP CLINIC VISIT    Patient Name:  Tyrel Rene  Date:  5/19/2017  Contact Type:  Face to Face    SUBJECTIVE:     Patient Findings     Positives Missed doses, No Problem Findings    Comments Just getting over a cold.           OBJECTIVE    INR Protime   Date Value Ref Range Status   05/19/2017 2.2 2.0 - 3.0 Final       ASSESSMENT / PLAN  INR assessment THER    Recheck INR In: 2 WEEKS    INR Location Clinic      Anticoagulation Summary as of 5/19/2017     INR goal 2.0-2.5   Today's INR 2.2   Maintenance plan 3.75 mg (2.5 mg x 1.5) on Mon, Fri; 2.5 mg (2.5 mg x 1) all other days   Full instructions 3.75 mg on Mon, Fri; 2.5 mg all other days   Weekly total 20 mg   No change documented Caridad Cunningham RN   Plan last modified Doreen Finley RN (1/23/2017)   Next INR check 6/2/2017   Target end date Indefinite    Indications   Long-term (current) use of anticoagulants [Z79.01] [Z79.01]  Cerebral artery occlusion with cerebral infarction (HCC) [I63.50] [I63.50]  Cerebral infarction (H) [I63.9]         Anticoagulation Episode Summary     INR check location [facility]    Preferred lab     Send INR reminders to [facility] INR/PROTIME    Comments       Anticoagulation Care Providers     Provider Role Specialty Phone number    Dejon Downs MD [facility] [phone]            See the Encounter Report to view Anticoagulation Flowsheet and Dosing Calendar (Go to Encounters tab in chart review, and find the Anticoagulation Therapy Visit)        Caridad Cunningham RN

## 2017-06-09 ENCOUNTER — ANTICOAGULATION THERAPY VISIT (OUTPATIENT)
Dept: NURSING | Facility: CLINIC | Age: 72
End: 2017-06-09
Payer: COMMERCIAL

## 2017-06-09 DIAGNOSIS — Z79.01 LONG-TERM (CURRENT) USE OF ANTICOAGULANTS: ICD-10-CM

## 2017-06-09 DIAGNOSIS — I63.50 CEREBRAL ARTERY OCCLUSION WITH CEREBRAL INFARCTION (H): ICD-10-CM

## 2017-06-09 DIAGNOSIS — I63.9 CEREBRAL INFARCTION (H): ICD-10-CM

## 2017-06-09 LAB — INR POINT OF CARE: 2.8 (ref 0.86–1.14)

## 2017-06-09 PROCEDURE — 99207 ZZC NO CHARGE NURSE ONLY: CPT

## 2017-06-09 PROCEDURE — 85610 PROTHROMBIN TIME: CPT | Mod: QW

## 2017-06-09 PROCEDURE — 36416 COLLJ CAPILLARY BLOOD SPEC: CPT

## 2017-06-09 NOTE — PROGRESS NOTES
ANTICOAGULATION FOLLOW-UP CLINIC VISIT    Patient Name:  Tyrel Rene  Date:  6/9/2017  Contact Type:  Face to Face    SUBJECTIVE:     Patient Findings     Positives No Problem Findings           OBJECTIVE    INR Protime   Date Value Ref Range Status   06/09/2017 2.8 (A) 0.86 - 1.14 Final       ASSESSMENT / PLAN  INR assessment THER    Recheck INR In: 4 WEEKS    INR Location Clinic      Anticoagulation Summary as of 6/9/2017     INR goal 2.0-2.5   Today's INR 2.8!   Maintenance plan 3.75 mg (2.5 mg x 1.5) on Mon, Fri; 2.5 mg (2.5 mg x 1) all other days   Full instructions 3.75 mg on Mon, Fri; 2.5 mg all other days   Weekly total 20 mg   Plan last modified Doreen Finley RN (1/23/2017)   Next INR check 7/7/2017   Target end date Indefinite    Indications   Long-term (current) use of anticoagulants [Z79.01] [Z79.01]  Cerebral artery occlusion with cerebral infarction (HCC) [I63.50] [I63.50]  Cerebral infarction (H) [I63.9]         Anticoagulation Episode Summary     INR check location [facility]    Preferred lab     Send INR reminders to [facility] INR/PROTIME    Comments       Anticoagulation Care Providers     Provider Role Specialty Phone number    Dejon Downs MD [facility] [phone]            See the Encounter Report to view Anticoagulation Flowsheet and Dosing Calendar (Go to Encounters tab in chart review, and find the Anticoagulation Therapy Visit)        Doreen Finley RN

## 2017-06-09 NOTE — MR AVS SNAPSHOT
Tyrel Rene   6/9/2017 2:00 PM   Anticoagulation Therapy Visit    Description:  71 year old male   Provider:  [facility]   Department:  Bx Nurse           INR as of 6/9/2017     Today's INR 2.8!      Anticoagulation Summary as of 6/9/2017     INR goal 2.0-2.5   Today's INR 2.8!   Full instructions 3.75 mg on Mon, Fri; 2.5 mg all other days   Next INR check 7/7/2017    Indications   Long-term (current) use of anticoagulants [Z79.01] [Z79.01]  Cerebral artery occlusion with cerebral infarction (HCC) [I63.50] [I63.50]  Cerebral infarction (H) [I63.9]         Your next Anticoagulation Clinic appointment(s)     Jul 07, 2017  2:00 PM CDT   Anticoagulation Visit with  [facility]ULATION CLINIC   [facility]    [address] [place] [address] [id number] [phone]              Contact Numbers     [facility]  Please call  [phone] to cancel and/or reschedule your appointment   The direct line to the anticoagulant nurse is [phone] on Monday, Wednesday, and Friday. On Thursday, the anticoagulant nurse can be reached directly at [phone].         June 2017 Details    Sun Mon Tue Wed Thu Fri Sat         1               2               3                 4               5               6               7               8               9      3.75 mg   See details      10      2.5 mg           11      2.5 mg         12      3.75 mg         13      2.5 mg         14      2.5 mg         15      2.5 mg         16      3.75 mg         17      2.5 mg           18      2.5 mg         19      3.75 mg         20      2.5 mg         21      2.5 mg         22      2.5 mg         23      3.75 mg         24      2.5 mg           25      2.5 mg         26      3.75 mg         27      2.5 mg         28      2.5 mg         29      2.5 mg         30      3.75 mg           Date Details   06/09 This INR check                How to take your warfarin dose     To take:  2.5 mg Take 1 of the 2.5 mg tablets.    To take:  3.75 mg Take 1.5 of the 2.5 mg tablets.           July 2017 Details    Sun Mon Tue Wed Thu Fri Sat           1      2.5 mg           2      2.5 mg         3      3.75 mg         4      2.5 mg         5      2.5 mg         6      2.5 mg         7            8                 9               10               11               12               13               14               15                 16               17               18               19               20               21               22                 23               24               25               26               27               28               29                 30               31                     Date Details   No additional details    Date of next INR:  7/7/2017         How to take your warfarin dose     To take:  2.5 mg Take 1 of the 2.5 mg tablets.    To take:  3.75 mg Take 1.5 of the 2.5 mg tablets.

## 2017-06-12 ENCOUNTER — DOCUMENTATION ONLY (OUTPATIENT)
Dept: CARDIOLOGY | Facility: CLINIC | Age: 72
End: 2017-06-12

## 2017-06-12 NOTE — PROGRESS NOTES
Received call from pt's wife, Pamela, who reports that pt's eye doctor has prescribed a multi-vitamin, Preservision Areds 2, and would like to make sure pt's med list is updated with this information.  Med list updated in Epic.  AGAPITO Fernandez 4:42 PM 6/12/2017

## 2017-06-21 ENCOUNTER — ALLIED HEALTH/NURSE VISIT (OUTPATIENT)
Dept: CARDIOLOGY | Facility: CLINIC | Age: 72
End: 2017-06-21
Payer: COMMERCIAL

## 2017-06-21 DIAGNOSIS — I25.5 ISCHEMIC CARDIOMYOPATHY: ICD-10-CM

## 2017-06-21 DIAGNOSIS — Z95.810 ICD (IMPLANTABLE CARDIOVERTER-DEFIBRILLATOR) IN PLACE: Primary | ICD-10-CM

## 2017-06-21 PROCEDURE — 93296 REM INTERROG EVL PM/IDS: CPT | Performed by: INTERNAL MEDICINE

## 2017-06-21 PROCEDURE — 93295 DEV INTERROG REMOTE 1/2/MLT: CPT | Performed by: INTERNAL MEDICINE

## 2017-06-21 NOTE — PROGRESS NOTES
[employer] Energen CRT ICD Remote Device Check  AP: NA % RVP: 97 % LVP: 96 %  Mode: VVIR         Presenting Rhythm: BVP, pt in chronic afib  Heart Rate: stable, adequate variability  Sensing: WNL    Pacing Threshold: not obtained    Impedance: WNL  Battery Status: 4.5 yrs estimated longevity, charge time of 10.0 seconds.   Atrial Arrhythmia: NA  Ventricular Arrhythmia: 83 episodes saved since last remote, 27 with EGMs for review. 23 EGMs show afib with RVR, no change in far field morphology compared to underlying on previous clinic checks, irregular VS, -208 bpm, durations of RVR 8 seconds - 1 minute 17 seconds. 3 EGMs show NSVT, change in morphology, 4-6 beats in duration, -175 bpm. 1 EGM shows VT treated successfully with ATP on 5/6/2017 at 11am (more details: EGM starts out with afib with RVR at , morphology at baseline; then morphology changes for 30 beats with HR faster at 260bpm, then ATP x1 with capture; then returning to afib with RVR at -150 bpm)  ATP: 1    Shocks: none    Care Plan: remote in 3 months, scheduled.   OV with Prince NP scheduled in July   Called pt with results and plan. He does not remember having any symptoms of VT on 5/6/2017. Will continue to monitor.   EC RN

## 2017-06-21 NOTE — MR AVS SNAPSHOT
After Visit Summary   6/21/2017    Tyrel Rene    MRN: [id number]           Patient Information     Date Of Birth          1945        Visit Information        Provider Department      6/21/2017 4:30 PM [facility] DCR2 [facility]        Today's Diagnoses     ICD (implantable cardioverter-defibrillator) in place    -  1    Ischemic cardiomyopathy           Follow-ups after your visit        Your next 10 appointments already scheduled     Jun 21, 2017  4:30 PM CDT   Remote ICD Check with MARQUEZ DCR2   [facility] ([facility])    [address] [place] [address] [address]  [facility] [address]   [phone]           This appointment is for a remote check of your debrillator.  This is not an appointment at the office.            Jul 07, 2017  2:00 PM CDT   Anticoagulation Visit with [facility]IC   [facility] ([facility])    [address]  [facility] [address]   [phone]            Jul 12, 2017 12:50 PM CDT   LAB with MARQUEZ LAB   [facility] ([facility])    [address] [place] [address] [address]  [facility] [address]   [phone]           Patient must bring picture ID.  Patient should be prepared to give a urine specimen  Please do not eat 10-12 hours before your appointment if you are coming in fasting for labs on lipids, cholesterol, or glucose (sugar).  Pregnant women should follow their Care Team instructions. Water with medications is okay. Do not drink coffee or other fluids.   If you have concerns about taking  your medications, please ask at office or if scheduling via [employer], send a message by clicking on Secure Messaging, Message Your Care Team.            Jul 12, 2017  1:50 PM CDT   Core Return with JAMAL Flores CNP   [facility]  [place] ([facility])    [address]  [place] [address] [phone]            Oct 03, 2017  4:30 PM CDT   Remote ICD Check with MARQUEZ DCR2   [facility] [place] ([facility])    [address]  [place] [address] [phone]           This appointment is for a remote check of your debrillator.  This is not an appointment at the office.              Who to contact     If you have questions or need follow up information about today's clinic visit or your schedule please contact [facility] [place] directly at [phone].  Normal or non-critical lab and imaging results will be communicated to you by Every[employer], letter or phone within 4 business days after the clinic has received the results. If you do not hear from us within 7 days, please contact the clinic through [employer] or phone. If you have a critical or abnormal lab result, we will notify you by phone as soon as possible.  Submit refill requests through [employer] or call your pharmacy and they will forward the refill request to us. Please allow 3 business days for your refill to be completed.          Additional Information About Your Visit        EverySignal[employer]Ide[employer]ric Information     [employer] gives you secure access to your electronic health record. If you see a primary care provider, you can also send messages to your care team and make appointments. If you have questions, please call your primary care clinic.  If you do not have a primary care provider, please call [phone] and they will assist you.        Care EveryWhere ID     This is your Care EveryWhere ID. This could be used by other organizations to access your [place] medical records  [id number]         Blood Pressure from Last 3 Encounters:   01/19/17 124/72   11/16/16 102/58   08/05/16 124/72    Weight from Last 3 Encounters:   01/19/17 83.1 kg (183 lb 1.6 oz)   11/16/16 82.8 kg (182 lb  8 oz)   08/05/16 84.4 kg (186 lb)              We Performed the Following     ICD DEVICE INTERROGAT REMOTE (66768)     INTERROGATION DEVICE EVAL REMOTE, PACER/ICD (33505)        Primary Care Provider Office Phone # Fax #    Dejon Downs [phone]       [facility] [address]  [place] [address]        Equal Access to Services     JAVI OTERO : Hadii aad ku hadasho Soomaali, waaxda luqadaha, qaybta kaalmada adeegyada, waxay idiin hayaan adeeg kharash la'aan ah. So [facility] [phone].    ATENCIÓN: Si allyla espdelvin, tiene a hagen disposición servicios gratuitos de asistencia lingüística. Llame al [phone].    We comply with applicable federal civil rights laws and [place] laws. We do not discriminate on the basis of race, color, national origin, age, disability sex, sexual orientation or gender identity.            Thank you!     Thank you for choosing [facility] [place]  for your care. Our goal is always to provide you with excellent care. Hearing back from our patients is one way we can continue to improve our services. Please take a few minutes to complete the written survey that you may receive in the mail after your visit with us. Thank you!             Your Updated Medication List - Protect others around you: Learn how to safely use, store and throw away your medicines at www.disposemymeds.org.          This list is accurate as of: 6/21/17  1:56 PM.  Always use your most recent med list.                   Brand Name Dispense Instructions for use Diagnosis    ASPIRIN NOT PRESCRIBED    INTENTIONAL    0 each    Antiplatelet medication not prescribed intentionally due to {CHOOSE REASON BEFORE SIGNING!!!:553311}    ASHD (arteriosclerotic heart disease)       atorvastatin 40 MG tablet    LIPITOR    90 tablet    Take 1 tablet (40 mg) by mouth daily    CAD (coronary artery disease), Hyperlipidemia       carvedilol 6.25 MG tablet     COREG    180 tablet    Take 1 tablet (6.25 mg) by mouth 2 times daily (with meals)    Ventricular fibrillation and flutter (H)       digoxin 125 MCG tablet    LANOXIN    90 tablet    Take 1 tablet (125 mcg) by mouth daily    Atrial fibrillation (H)       lisinopril 5 MG tablet    PRINIVIL/ZESTRIL    180 tablet    Take 1 tablet (5 mg) by mouth 2 times daily    CHF (congestive heart failure) (H)       NITROSTAT 0.4 MG sublingual tablet   Generic drug:  nitroglycerin     75 tablet    Dissolve one tablet under tongue every 5 minutes as needed for chest pain    Postsurgical aortocoronary bypass status       PRESERVISION AREDS 2 PO      Take 1 capsule by mouth 2 times daily        ranitidine 150 MG tablet    ZANTAC    180 tablet    Take 1 tablet (150 mg) by mouth 2 times daily    S/P CABG (coronary artery bypass graft)       sildenafil 100 MG cap/tab    VIAGRA    16 tablet    Take 1 tablet (100 mg) by mouth daily as needed for erectile dysfunction Take 30 min to 4 hours before intercourse.  Never use with nitroglycerin, terazosin or doxazosin.    Erectile dysfunction, unspecified erectile dysfunction type       torsemide 20 MG tablet    DEMADEX     Take 10 mg by mouth daily As needed for wt >177#        warfarin 2.5 MG tablet    COUMADIN    102 tablet    Take 1 tablet (2.5 mg) by mouth daily Taking 3.75 mon, fri and 2.5 mg row    Long-term (current) use of anticoagulants

## 2017-07-07 ENCOUNTER — ANTICOAGULATION THERAPY VISIT (OUTPATIENT)
Dept: NURSING | Facility: CLINIC | Age: 72
End: 2017-07-07
Payer: COMMERCIAL

## 2017-07-07 DIAGNOSIS — Z79.01 LONG-TERM (CURRENT) USE OF ANTICOAGULANTS: ICD-10-CM

## 2017-07-07 DIAGNOSIS — I63.9 CEREBRAL INFARCTION (H): ICD-10-CM

## 2017-07-07 DIAGNOSIS — I63.50 CEREBRAL ARTERY OCCLUSION WITH CEREBRAL INFARCTION (H): ICD-10-CM

## 2017-07-07 LAB — INR POINT OF CARE: 2.3 (ref 0.86–1.14)

## 2017-07-07 PROCEDURE — 85610 PROTHROMBIN TIME: CPT | Mod: QW

## 2017-07-07 PROCEDURE — 99207 ZZC NO CHARGE NURSE ONLY: CPT

## 2017-07-07 PROCEDURE — 36416 COLLJ CAPILLARY BLOOD SPEC: CPT

## 2017-07-07 NOTE — MR AVS SNAPSHOT
Tyrel Rene   7/7/2017 2:00 PM   Anticoagulation Therapy Visit    Description:  71 year old male   Provider:  [facility]   Department:  Bx Nurse           INR as of 7/7/2017     Today's INR 2.3      Anticoagulation Summary as of 7/7/2017     INR goal 2.0-2.5   Today's INR 2.3   Full instructions 3.75 mg on Mon, Fri; 2.5 mg all other days   Next INR check 8/4/2017    Indications   Long-term (current) use of anticoagulants [Z79.01] [Z79.01]  Cerebral artery occlusion with cerebral infarction (HCC) [I63.50] [I63.50]  Cerebral infarction (H) [I63.9]         Your next Anticoagulation Clinic appointment(s)     Aug 11, 2017  2:00 PM CDT   Anticoagulation Visit with  ANTICOAGULATION CLINIC   [facility] ([facility])    [address] [place] [id number] [phone]              Contact Numbers     [facility]  Please call  [phone] to cancel and/or reschedule your appointment   The direct line to the anticoagulant nurse is [phone] on Monday, Wednesday, and Friday. On Thursday, the anticoagulant nurse can be reached directly at [phone].         July 2017 Details    Sun Mon Tue Wed Thu Fri Sat           1                 2               3               4               5               6               7      3.75 mg   See details      8      2.5 mg           9      2.5 mg         10      3.75 mg         11      2.5 mg         12      2.5 mg         13      2.5 mg         14      3.75 mg         15      2.5 mg           16      2.5 mg         17      3.75 mg         18      2.5 mg         19      2.5 mg         20      2.5 mg         21      3.75 mg         22      2.5 mg           23      2.5 mg         24      3.75 mg         25      2.5 mg         26      2.5 mg         27      2.5 mg         28      3.75 mg         29      2.5 mg           30      2.5 mg         31      3.75 mg                Date Details   07/07 This INR check               How to take your warfarin dose     To take:  2.5 mg Take 1 of the 2.5 mg tablets.    To take:  3.75 mg Take 1.5 of the 2.5 mg tablets.           August 2017 Details    Sun Mon Tue Wed Thu Fri Sat       1      2.5 mg         2      2.5 mg         3      2.5 mg         4            5                 6               7               8               9               10               11               12                 13               14               15               16               17               18               19                 20               21               22               23               24               25               26                 27               28               29               30               31                  Date Details   No additional details    Date of next INR:  8/4/2017         How to take your warfarin dose     To take:  2.5 mg Take 1 of the 2.5 mg tablets.    To take:  3.75 mg Take 1.5 of the 2.5 mg tablets.

## 2017-07-07 NOTE — PROGRESS NOTES
ANTICOAGULATION FOLLOW-UP CLINIC VISIT    Patient Name:  Tyrel Rene  Date:  7/7/2017  Contact Type:  Face to Face    SUBJECTIVE:     Patient Findings     Positives No Problem Findings           OBJECTIVE    INR Protime   Date Value Ref Range Status   07/07/2017 2.3 (A) 0.86 - 1.14 Final       ASSESSMENT / PLAN  INR assessment THER    Recheck INR In: 4 WEEKS    INR Location Clinic      Anticoagulation Summary as of 7/7/2017     INR goal 2.0-2.5   Today's INR 2.3   Maintenance plan 3.75 mg (2.5 mg x 1.5) on Mon, Fri; 2.5 mg (2.5 mg x 1) all other days   Full instructions 3.75 mg on Mon, Fri; 2.5 mg all other days   Weekly total 20 mg   No change documented Doreen Finley RN   Plan last modified Doreen Finley RN (1/23/2017)   Next INR check 8/11/2017   Target end date Indefinite    Indications   Long-term (current) use of anticoagulants [Z79.01] [Z79.01]  Cerebral artery occlusion with cerebral infarction (HCC) [I63.50] [I63.50]  Cerebral infarction (H) [I63.9]         Anticoagulation Episode Summary     INR check location [facility]    Preferred lab     Send INR reminders to [facility] INR/PROTIME    Comments       Anticoagulation Care Providers     Provider Role Specialty Phone number    Dejon Downs MD [facility] [phone]            See the Encounter Report to view Anticoagulation Flowsheet and Dosing Calendar (Go to Encounters tab in chart review, and find the Anticoagulation Therapy Visit)        Doreen Finley RN

## 2017-07-12 ENCOUNTER — OFFICE VISIT (OUTPATIENT)
Dept: CARDIOLOGY | Facility: CLINIC | Age: 72
End: 2017-07-12
Payer: COMMERCIAL

## 2017-07-12 VITALS
HEIGHT: 73 IN | SYSTOLIC BLOOD PRESSURE: 113 MMHG | HEART RATE: 66 BPM | DIASTOLIC BLOOD PRESSURE: 70 MMHG | WEIGHT: 178.8 LBS | BODY MASS INDEX: 23.7 KG/M2 | OXYGEN SATURATION: 98 %

## 2017-07-12 DIAGNOSIS — I50.22 CHRONIC SYSTOLIC CONGESTIVE HEART FAILURE (H): ICD-10-CM

## 2017-07-12 LAB
ANION GAP SERPL CALCULATED.3IONS-SCNC: 7.7 MMOL/L (ref 6–17)
BUN SERPL-MCNC: 20 MG/DL (ref 7–30)
CALCIUM SERPL-MCNC: 8.8 MG/DL (ref 8.5–10.5)
CHLORIDE SERPL-SCNC: 106 MMOL/L (ref 98–107)
CHOLEST SERPL-MCNC: 148 MG/DL
CO2 SERPL-SCNC: 29 MMOL/L (ref 23–29)
CREAT SERPL-MCNC: 1.43 MG/DL (ref 0.7–1.3)
GFR SERPL CREATININE-BSD FRML MDRD: 48 ML/MIN/1.7M2
GLUCOSE SERPL-MCNC: 90 MG/DL (ref 70–105)
HDLC SERPL-MCNC: 45 MG/DL
LDLC SERPL CALC-MCNC: 83 MG/DL
NONHDLC SERPL-MCNC: 103 MG/DL
POTASSIUM SERPL-SCNC: 4.7 MMOL/L (ref 3.5–5.1)
SODIUM SERPL-SCNC: 138 MMOL/L (ref 136–145)
TRIGL SERPL-MCNC: 100 MG/DL

## 2017-07-12 PROCEDURE — 80061 LIPID PANEL: CPT | Performed by: NURSE PRACTITIONER

## 2017-07-12 PROCEDURE — 80048 BASIC METABOLIC PNL TOTAL CA: CPT | Performed by: NURSE PRACTITIONER

## 2017-07-12 PROCEDURE — 36415 COLL VENOUS BLD VENIPUNCTURE: CPT | Performed by: NURSE PRACTITIONER

## 2017-07-12 PROCEDURE — 99214 OFFICE O/P EST MOD 30 MIN: CPT | Performed by: NURSE PRACTITIONER

## 2017-07-12 RX ORDER — MULTIVIT-MIN/IRON/FOLIC ACID/K 18-600-40
1000 CAPSULE ORAL DAILY
COMMUNITY
End: 2021-01-01

## 2017-07-12 NOTE — PATIENT INSTRUCTIONS
Call CORE nurse for any questions or concerns:  [phone]   *If you have concerns after hours, please call [phone], option 2 to speak with on call Cardiologist.    1. Medication changes from today:  No changes. Await lipid test. We'll call with the next plan.      2. Weigh yourself daily and write it down.     3. Call CORE nurse if your weight is up more than 2 pounds in one day or 5 pounds in one week.     4. Call CORE nurse if you feel more short of breath, have more abdominal bloating, or leg swelling.     5. Continue low sodium diet (less than 2000 mg daily). If you eat less salt, you will retain less fluid.     6. Alcohol can weaken your heart further. You should avoid alcohol or limit its use to special times, such as a holiday or birthday.      7. Do NOT take Aleve or ibuprofen without talking to your doctor first.      8. Lab Results:   Component      Latest Ref Rng & Units 7/12/2017   Sodium      136 - 145 mmol/L 138   Potassium      3.5 - 5.1 mmol/L 4.7   Chloride      98 - 107 mmol/L 106   Carbon Dioxide      23 - 29 mmol/L 29   Anion Gap      6 - 17 mmol/L 7.7   Glucose      70 - 105 mg/dL 90   Urea Nitrogen      7 - 30 mg/dL 20   Creatinine      0.70 - 1.30 mg/dL 1.43 (H)   GFR Estimate      >60 mL/min/1.7m2 48 (L)   GFR Estimate If Black      >60 mL/min/1.7m2 59 (L)   Calcium      8.5 - 10.5 mg/dL 8.8        [facility]: Cardiomyopathy, Optimization, Rehabilitation, Education  The [facility] is a heart failure specialty clinic within the [facility] where you will work with specialized nurse practitioners, physician assistants, doctors, and registered nurses. They are dedicated to helping patients with heart failure to carefully adjust medications, receive education, and learn who and when to call if symptoms develop. They specialize in helping you better understand your condition, slow the progression of your disease, improve the length and quality of your life, help you detect  future heart problems before they become life threatening, and avoid hospitalizations.

## 2017-07-12 NOTE — PROGRESS NOTES
I had the opportunity to see Mr. yTrel Rene in Cardiology Clinic today at the [facility] in [place] for reevaluation of chronic ischemic cardiomyopathy and congestive heart failure.      As you may recall, Mr. Rene has a stable ejection fraction of 30%-35% with chronic atrial fibrillation and a biventricular ICD.  He also has chronic kidney disease which has stabilized with a creatinine in the range of 1.4 to 1.5.  He has had some problems with worsening kidney failure when he has developed volume overload issues and required additional diuretic therapy.    Patient was last seen by Dr. Newman in January 2017. He has suffered with diarrhea for quite some time. In January he cut back his age atorvastatin to half a tablet every other day. He continue with diarrhea for days that he took atorvastatin. On the nights that he did not take a atorvastatin he did not have diarrhea. He stopped a atorvastatin last week and he has not had diarrhea since.    Prior to his bypass surgery he was on simvastatin with no difficulties. His last dose of torsemide was in April. Shortness of breath is about the same. It has not increased. He is fairly active. Plays [employer] quite often without any problems. A vacation to [place] a few times and did well symptom-wise.    His weight has been stable at around 174 pounds at home.     He denies any defibrillator shocks.  His last device check 6/21/2017 showed 83 episodes saved since last remote, 27 with EGM was for review. 23 EGM showed atrial fibrillation with RVR, no change infarct field morphology. 3 EGM's of an SVT. One EGM of a VT  treated successfully with ATP on 5/6/2017 at 11 AM.      On examination today, his blood pressure is 113/70, heart rate 66 and weight 178 pounds, which is unchanged since 1/2017.  His lungs are clear.  His heart rhythm is regular.  He has no cardiac murmurs or carotid bruits and no edema.     Laboratory values: Sodium 138, potassium 4.7,  BUN 20, creatinine 1.43, GFR 48      IMPRESSION:  Mr. Tyrel Rene is a 71-year-old gentleman with chronic ischemic cardiomyopathy with an echocardiogram this year showing a stable ejection fraction of 30%-35%.  He has stage III chronic kidney disease with a stable creatinine of 1.43 today as well.  He has chronic atrial fibrillation and is on warfarin for stroke prevention.  His cholesterol numbers were excellent on atorvastatin 80 mg a day, however the dose needed to be decreased to 40 mg a day due to loose stools. His loose stools progressed to diarrhea and he cut back on his atorvastatin to a half a tablet every other day. He stopped a atorvastatin completely one week ago. He asked for a fasting lipid profile today.     He has not had diarrhea for a week since stopping a atorvastatin. He does state that he was on simvastatin prior to his heart attack and he had no difficulties with that. I added on a fasting lipid profile to his blood work today therefore I had told him I would contact him tomorrow with the lab values and discussed the next plan of care.One thought would be to restart simvastatin.    The patient will follow-up with Dr. Newman in January 2018 with an echocardiogram, base metabolic panel and fasting lipid profile.    B-V -ICD checks as directed. Device check shows episodes of atrial fibrillation with RVR, SVT and one episode of VT successively treated with ATP. He is currently on warfarin therapy and his INRs have been excellent.      He will follow up with us again in 6 months in [facility] and I will plan to see him again in 1 year.     Hayde Galvez, CNP

## 2017-07-12 NOTE — MR AVS SNAPSHOT
After Visit Summary   7/12/2017    Tyrel Rene    MRN: [id number]           Patient Information     Date Of Birth          1945        Visit Information        Provider Department      7/12/2017 1:50 PM Hayde Galvez APRN CNP [facility] [place]        Today's Diagnoses     Chronic systolic congestive heart failure (H)          Care Instructions    Call CORE nurse for any questions or concerns:  [phone]   *If you have concerns after hours, please call [phone], option 2 to speak with on call Cardiologist.    1. Medication changes from today:  No changes. Await lipid test. We'll call with the next plan.      2. Weigh yourself daily and write it down.     3. Call CORE nurse if your weight is up more than 2 pounds in one day or 5 pounds in one week.     4. Call CORE nurse if you feel more short of breath, have more abdominal bloating, or leg swelling.     5. Continue low sodium diet (less than 2000 mg daily). If you eat less salt, you will retain less fluid.     6. Alcohol can weaken your heart further. You should avoid alcohol or limit its use to special times, such as a holiday or birthday.      7. Do NOT take Aleve or ibuprofen without talking to your doctor first.      8. Lab Results:   Component      Latest Ref Rng & Units 7/12/2017   Sodium      136 - 145 mmol/L 138   Potassium      3.5 - 5.1 mmol/L 4.7   Chloride      98 - 107 mmol/L 106   Carbon Dioxide      23 - 29 mmol/L 29   Anion Gap      6 - 17 mmol/L 7.7   Glucose      70 - 105 mg/dL 90   Urea Nitrogen      7 - 30 mg/dL 20   Creatinine      0.70 - 1.30 mg/dL 1.43 (H)   GFR Estimate      >60 mL/min/1.7m2 48 (L)   GFR Estimate If Black      >60 mL/min/1.7m2 59 (L)   Calcium      8.5 - 10.5 mg/dL 8.8        [facility]: Cardiomyopathy, Optimization, Rehabilitation, Education  The [facility] is a heart failure specialty clinic within the [facility] [place] [facility] where you will work with  specialized nurse practitioners, physician assistants, doctors, and registered nurses. They are dedicated to helping patients with heart failure to carefully adjust medications, receive education, and learn who and when to call if symptoms develop. They specialize in helping you better understand your condition, slow the progression of your disease, improve the length and quality of your life, help you detect future heart problems before they become life threatening, and avoid hospitalizations.                Follow-ups after your visit        Your next 10 appointments already scheduled     Aug 04, 2017  2:00 PM CDT   Anticoagulation Visit with [facility] ANTICOAGULATION CLINIC   [facility] ([facility])    [address]  [place] [facility] [address]   [phone]            Oct 03, 2017  4:30 PM CDT   Remote ICD Check with MARQUEZ DCR2   [facility] [place] ([facility])    [address]  [place] [id number] [phone]           This appointment is for a remote check of your debrillator.  This is not an appointment at the office.              Who to contact     If you have questions or need follow up information about today's clinic visit or your schedule please contact [facility] [place] directly at [phone].  Normal or non-critical lab and imaging results will be communicated to you by MyChart, letter or phone within 4 business days after the clinic has received the results. If you do not hear from us within 7 days, please contact the clinic through MyChart or phone. If you have a critical or abnormal lab result, we will notify you by phone as soon as possible.  Submit refill requests through [employer] or call your pharmacy and they will forward the refill request to us. Please allow 3 business days for your refill to be completed.          Additional  "Information About Your Visit        MyChart Information     [employer] gives you secure access to your electronic health record. If you see a primary care provider, you can also send messages to your care team and make appointments. If you have questions, please call your primary care clinic.  If you do not have a primary care provider, please call [phone] and they will assist you.        Care EveryWhere ID     This is your Care EveryWhere ID. This could be used by other organizations to access your [place] medical records  [id number]        Your Vitals Were     Pulse Height Pulse Oximetry BMI (Body Mass Index)          66 1.854 m (6' 1\") 98% 23.59 kg/m2         Blood Pressure from Last 3 Encounters:   07/12/17 113/70   01/19/17 124/72   11/16/16 102/58    Weight from Last 3 Encounters:   07/12/17 81.1 kg (178 lb 12.8 oz)   01/19/17 83.1 kg (183 lb 1.6 oz)   11/16/16 82.8 kg (182 lb 8 oz)              We Performed the Following     Follow-Up with Cardiac Advanced Practice Provider        Primary Care Provider Office Phone # Fax #    Dejon Downs [phone]       [facility] [address] [facility] [address]        Equal Access to Services     JAVI OTERO AH: Hadii aad ku hadasho Soomaali, waaxda luqadaha, qaybta kaalmada adeegyada, waxay idiin hayaan adesheron khchristy bird. So [facility] [phone].    ATENCIÓN: Si habla español, tiene a hagen disposición servicios gratuitos de asistencia lingüística. Llame al [phone].    We comply with applicable federal civil rights laws and [place] laws. We do not discriminate on the basis of race, color, national origin, age, disability sex, sexual orientation or gender identity.            Thank you!     Thank you for choosing [facility] [place]  for your care. Our goal is always to provide you with excellent care. Hearing back from our patients is one way we can continue to improve our " services. Please take a few minutes to complete the written survey that you may receive in the mail after your visit with us. Thank you!             Your Updated Medication List - Protect others around you: Learn how to safely use, store and throw away your medicines at www.disposemymeds.org.          This list is accurate as of: 7/12/17  2:26 PM.  Always use your most recent med list.                   Brand Name Dispense Instructions for use Diagnosis    ASPIRIN NOT PRESCRIBED    INTENTIONAL    0 each    Antiplatelet medication not prescribed intentionally due to {CHOOSE REASON BEFORE SIGNING!!!:559437}    ASHD (arteriosclerotic heart disease)       carvedilol 6.25 MG tablet    COREG    180 tablet    Take 1 tablet (6.25 mg) by mouth 2 times daily (with meals)    Ventricular fibrillation and flutter (H)       digoxin 125 MCG tablet    LANOXIN    90 tablet    Take 1 tablet (125 mcg) by mouth daily    Atrial fibrillation (H)       lisinopril 5 MG tablet    PRINIVIL/ZESTRIL    180 tablet    Take 1 tablet (5 mg) by mouth 2 times daily    CHF (congestive heart failure) (H)       NITROSTAT 0.4 MG sublingual tablet   Generic drug:  nitroGLYcerin     75 tablet    Dissolve one tablet under tongue every 5 minutes as needed for chest pain    Postsurgical aortocoronary bypass status       PRESERVISION AREDS 2 PO      Take 1 capsule by mouth 2 times daily        ranitidine 150 MG tablet    ZANTAC    180 tablet    Take 1 tablet (150 mg) by mouth 2 times daily    S/P CABG (coronary artery bypass graft)       sildenafil 100 MG cap/tab    VIAGRA    16 tablet    Take 1 tablet (100 mg) by mouth daily as needed for erectile dysfunction Take 30 min to 4 hours before intercourse.  Never use with nitroglycerin, terazosin or doxazosin.    Erectile dysfunction, unspecified erectile dysfunction type       torsemide 20 MG tablet    DEMADEX     Take 10 mg by mouth daily As needed for wt >177#        Vitamin D (Cholecalciferol) 1000 UNITS Tabs       Take 1,000 mg by mouth        warfarin 2.5 MG tablet    COUMADIN    102 tablet    Take 1 tablet (2.5 mg) by mouth daily Taking 3.75 mon, fri and 2.5 mg row    Long-term (current) use of anticoagulants

## 2017-07-13 ENCOUNTER — CARE COORDINATION (OUTPATIENT)
Dept: CARDIOLOGY | Facility: CLINIC | Age: 72
End: 2017-07-13

## 2017-07-13 DIAGNOSIS — E78.1 PURE HYPERGLYCERIDEMIA: Primary | ICD-10-CM

## 2017-07-13 RX ORDER — SIMVASTATIN 40 MG
40 TABLET ORAL AT BEDTIME
Qty: 90 TABLET | Refills: 3 | Status: SHIPPED | OUTPATIENT
Start: 2017-07-13 | End: 2017-10-17

## 2017-07-13 NOTE — PROGRESS NOTES
Called and spoke with pt. Reviewed Fernando's recommendations to continue to hold statin for another week to confirm that diarrhea is gone. Then start simvastatin 40mg daily at bedtime.  Pt verbalized understanding and requests simvastatin RX be sent to [facility] in [facility] in [place].  Rx sent as requested.  Also reviewed that FLP will need to be repeated in Jan. 2018, orders already in Epic.  Advised pt to call CORE if diarrhea returns with simvastatin. Pt verbalized understanding and agrees with this plan. AGAPITO Fernandez 3:50 PM 7/13/2017

## 2017-07-14 NOTE — PROGRESS NOTES
Received VM from Randi Almeida at [facility]. She needs to document that provider ok w/ pt being on simvastatin 40mg QHS, while also on digoxin 125 mcg and warfarin as simvastatin can alter those meds' levels.     Pt's next INR visit is 8/4. Last dig level as below, no recheck ordered. Called Randi, we reviewed pt has been on statin before, and gave ok to dispense med and let her know pt has INR f/u and will review w/ KMannchen re: dig level f/u. Pt's next f/u is planned for 1/2018.

## 2017-08-04 ENCOUNTER — ANTICOAGULATION THERAPY VISIT (OUTPATIENT)
Dept: NURSING | Facility: CLINIC | Age: 72
End: 2017-08-04
Payer: COMMERCIAL

## 2017-08-04 LAB — INR POINT OF CARE: 2.8 (ref 2–2.5)

## 2017-08-04 PROCEDURE — 36416 COLLJ CAPILLARY BLOOD SPEC: CPT

## 2017-08-04 PROCEDURE — 85610 PROTHROMBIN TIME: CPT | Mod: QW

## 2017-08-04 PROCEDURE — 99207 ZZC NO CHARGE NURSE ONLY: CPT

## 2017-09-01 ENCOUNTER — ANTICOAGULATION THERAPY VISIT (OUTPATIENT)
Dept: NURSING | Facility: CLINIC | Age: 72
End: 2017-09-01
Payer: COMMERCIAL

## 2017-09-01 DIAGNOSIS — I63.9 CEREBRAL INFARCTION (H): ICD-10-CM

## 2017-09-01 DIAGNOSIS — Z79.01 LONG-TERM (CURRENT) USE OF ANTICOAGULANTS: ICD-10-CM

## 2017-09-01 DIAGNOSIS — I63.50 CEREBRAL ARTERY OCCLUSION WITH CEREBRAL INFARCTION (H): ICD-10-CM

## 2017-09-01 LAB — INR POINT OF CARE: 2.6 (ref 0.86–1.14)

## 2017-09-01 PROCEDURE — 85610 PROTHROMBIN TIME: CPT | Mod: QW

## 2017-09-01 PROCEDURE — 36416 COLLJ CAPILLARY BLOOD SPEC: CPT

## 2017-09-01 PROCEDURE — 99207 ZZC NO CHARGE NURSE ONLY: CPT

## 2017-09-01 NOTE — MR AVS SNAPSHOT
Tyrel Rene   9/1/2017 2:00 PM   Anticoagulation Therapy Visit    Description:  71 year old male   Provider:  [facility]   Department:  Bx Nurse           INR as of 9/1/2017     Today's INR 2.6!      Anticoagulation Summary as of 9/1/2017     INR goal 2.0-2.5   Today's INR 2.6!   Full instructions 3.75 mg on Mon, Fri; 2.5 mg all other days   Next INR check 10/4/2017    Indications   Long-term (current) use of anticoagulants [Z79.01] [Z79.01]  Cerebral artery occlusion with cerebral infarction (HCC) [I63.50] [I63.50]  Cerebral infarction (H) [I63.9]         Your next Anticoagulation Clinic appointment(s)     Oct 04, 2017  1:30 PM CDT   Anticoagulation Visit with  ANTICOAGULATION CLINIC   [facility] ([facility])    [address] [id number] [phone]              Contact Numbers     [facility]  Please call  [phone] to cancel and/or reschedule your appointment   The direct line to the anticoagulant nurse is [phone] on Monday, Wednesday, and Friday. On Thursday, the anticoagulant nurse can be reached directly at [phone].         September 2017 Details    Sun Mon Tue Wed Thu Fri Sat          1      3.75 mg   See details      2      2.5 mg           3      2.5 mg         4      3.75 mg         5      2.5 mg         6      2.5 mg         7      2.5 mg         8      3.75 mg         9      2.5 mg           10      2.5 mg         11      3.75 mg         12      2.5 mg         13      2.5 mg         14      2.5 mg         15      3.75 mg         16      2.5 mg           17      2.5 mg         18      3.75 mg         19      2.5 mg         20      2.5 mg         21      2.5 mg         22      3.75 mg         23      2.5 mg           24      2.5 mg         25      3.75 mg         26      2.5 mg         27      2.5 mg         28      2.5 mg         29      3.75 mg         30       2.5 mg          Date Details   09/01 This INR check               How to take your warfarin dose     To take:  2.5 mg Take 1 of the 2.5 mg tablets.    To take:  3.75 mg Take 1.5 of the 2.5 mg tablets.           October 2017 Details    Sun Mon Tue Wed Thu Fri Sat     1      2.5 mg         2      3.75 mg         3      2.5 mg         4            5               6               7                 8               9               10               11               12               13               14                 15               16               17               18               19               20               21                 22               23               24               25               26               27               28                 29               30               31                    Date Details   No additional details    Date of next INR:  10/4/2017         How to take your warfarin dose     To take:  2.5 mg Take 1 of the 2.5 mg tablets.    To take:  3.75 mg Take 1.5 of the 2.5 mg tablets.

## 2017-09-01 NOTE — PROGRESS NOTES
ANTICOAGULATION FOLLOW-UP CLINIC VISIT    Patient Name:  Tyrel Rene  Date:  9/1/2017  Contact Type:  Face to Face    SUBJECTIVE:     Patient Findings     Positives No Problem Findings           OBJECTIVE    INR Protime   Date Value Ref Range Status   09/01/2017 2.6 (A) 0.86 - 1.14 Final       ASSESSMENT / PLAN  INR assessment THER    Recheck INR In: 4 WEEKS    INR Location Clinic      Anticoagulation Summary as of 9/1/2017     INR goal 2.0-2.5   Today's INR 2.6!   Maintenance plan 3.75 mg (2.5 mg x 1.5) on Mon, Fri; 2.5 mg (2.5 mg x 1) all other days   Full instructions 3.75 mg on Mon, Fri; 2.5 mg all other days   Weekly total 20 mg   Plan last modified Doreen Finley RN (1/23/2017)   Next INR check 10/4/2017   Target end date Indefinite    Indications   Long-term (current) use of anticoagulants [Z79.01] [Z79.01]  Cerebral artery occlusion with cerebral infarction (HCC) [I63.50] [I63.50]  Cerebral infarction (H) [I63.9]         Anticoagulation Episode Summary     INR check location [facility]    Preferred lab     Send INR reminders to [facility] INR/PROTIME    Comments       Anticoagulation Care Providers     Provider Role Specialty Phone number    Dejon Downs MD [facility] [phone]            See the Encounter Report to view Anticoagulation Flowsheet and Dosing Calendar (Go to Encounters tab in chart review, and find the Anticoagulation Therapy Visit)        Doreen Finley RN

## 2017-10-03 ENCOUNTER — ALLIED HEALTH/NURSE VISIT (OUTPATIENT)
Dept: CARDIOLOGY | Facility: CLINIC | Age: 72
End: 2017-10-03
Payer: COMMERCIAL

## 2017-10-03 DIAGNOSIS — Z95.810 ICD (IMPLANTABLE CARDIOVERTER-DEFIBRILLATOR) IN PLACE: Primary | ICD-10-CM

## 2017-10-03 PROCEDURE — 93296 REM INTERROG EVL PM/IDS: CPT | Performed by: INTERNAL MEDICINE

## 2017-10-03 PROCEDURE — 93295 DEV INTERROG REMOTE 1/2/MLT: CPT | Performed by: INTERNAL MEDICINE

## 2017-10-03 NOTE — MR AVS SNAPSHOT
After Visit Summary   10/3/2017    Tyrel Rene    MRN: [id number]           Patient Information     Date Of Birth          1945        Visit Information        Provider Department      10/3/2017 4:30 PM MARQUEZ ANDREW2 [facility]        Today's Diagnoses     ICD (implantable cardioverter-defibrillator) in place    -  1       Follow-ups after your visit        Your next 10 appointments already scheduled     Oct 03, 2017  4:30 PM CDT   Remote ICD Check with MARQUEZ ANDREW2   [facility] ([facility])    [address]  [place] [address] [phone]           This appointment is for a remote check of your debrillator.  This is not an appointment at the office.            Oct 04, 2017  1:30 PM CDT   Anticoagulation Visit with  ANTICOAGULATION CLINIC   [facility]    [address]  [place] [address]   [phone]            Oct 17, 2017  2:15 PM CDT   [facility] EP RETURN with Suellen Costello MD   [facility] ([facility])    [address]  [place] [address] [phone]            Jan 04, 2018  1:00 PM CST   ICD Check with MARQUEZ ANDREWN   [facility] ([facility])    [address]  [place] [address] [phone]              Who to contact     If you have questions or need follow up information about today's clinic visit or your schedule please contact [facility] directly at [phone].  Normal or non-critical lab and imaging results will be communicated to you by MyChart, letter or phone within 4 business days after the clinic has received the results. If you do not hear from us within 7 days, please contact the  clinic through [employer] or phone. If you have a critical or abnormal lab result, we will notify you by phone as soon as possible.  Submit refill requests through [employer] or call your pharmacy and they will forward the refill request to us. Please allow 3 business days for your refill to be completed.          Additional Information About Your Visit        [employer] Information     [employer] gives you secure access to your electronic health record. If you see a primary care provider, you can also send messages to your care team and make appointments. If you have questions, please call your primary care clinic.  If you do not have a primary care provider, please call [phone] and they will assist you.        Care EveryWhere ID     This is your Care EveryWhere ID. This could be used by other organizations to access your [place] medical records  [id number]         Blood Pressure from Last 3 Encounters:   07/12/17 113/70   01/19/17 124/72   11/16/16 102/58    Weight from Last 3 Encounters:   07/12/17 81.1 kg (178 lb 12.8 oz)   01/19/17 83.1 kg (183 lb 1.6 oz)   11/16/16 82.8 kg (182 lb 8 oz)              We Performed the Following     ICD DEVICE INTERROGAT REMOTE (58276)     INTERROGATION DEVICE EVAL REMOTE, PACER/ICD (37945)        Primary Care Provider Office Phone # Fax #    Dejon Ajay Downs [phone] [address] [address]        Equal Access to Services     JAVI OTERO : Hadii aad ku hadasho Soomaali, waaxda luqadaha, qaybta kaalmada adeegyada, deb fierro adesheron bird. So [facility] [phone].    ATENCIÓN: Si habla español, tiene a hagen disposición servicios gratuitos de asistencia lingüística. Llame al [phone].    We comply with applicable federal civil rights laws and [place] laws. We do not discriminate on the basis of race, color, national origin, age, disability, sex, sexual orientation, or gender identity.            Thank you!     Thank  you for choosing [facility]  for your care. Our goal is always to provide you with excellent care. Hearing back from our patients is one way we can continue to improve our services. Please take a few minutes to complete the written survey that you may receive in the mail after your visit with us. Thank you!             Your Updated Medication List - Protect others around you: Learn how to safely use, store and throw away your medicines at www.disposemymeds.org.          This list is accurate as of: 10/3/17 12:54 PM.  Always use your most recent med list.                   Brand Name Dispense Instructions for use Diagnosis    ASPIRIN NOT PRESCRIBED    INTENTIONAL    0 each    Antiplatelet medication not prescribed intentionally due to {CHOOSE REASON BEFORE SIGNING!!!:643826}    ASHD (arteriosclerotic heart disease)       carvedilol 6.25 MG tablet    COREG    180 tablet    Take 1 tablet (6.25 mg) by mouth 2 times daily (with meals)    Ventricular fibrillation and flutter (H)       digoxin 125 MCG tablet    LANOXIN    90 tablet    Take 1 tablet (125 mcg) by mouth daily    Atrial fibrillation (H)       lisinopril 5 MG tablet    PRINIVIL/ZESTRIL    180 tablet    Take 1 tablet (5 mg) by mouth 2 times daily    CHF (congestive heart failure) (H)       NITROSTAT 0.4 MG sublingual tablet   Generic drug:  nitroGLYcerin     25 tablet    DISSOLVE 1 TABLET UNDER THE TONGUE EVERY 5 MINUTES AS NEEDED FOR CHEST PAIN    Postsurgical aortocoronary bypass status       PRESERVISION AREDS 2 PO      Take 1 capsule by mouth 2 times daily        ranitidine 150 MG tablet    ZANTAC    180 tablet    Take 1 tablet (150 mg) by mouth 2 times daily    S/P CABG (coronary artery bypass graft)       sildenafil 100 MG tablet    VIAGRA    16 tablet    Take 1 tablet (100 mg) by mouth daily as needed for erectile dysfunction Take 30 min to 4 hours before intercourse.  Never use with nitroglycerin, terazosin or  doxazosin.    Erectile dysfunction, unspecified erectile dysfunction type       simvastatin 40 MG tablet    ZOCOR    90 tablet    Take 1 tablet (40 mg) by mouth At Bedtime    Pure hyperglyceridemia       torsemide 20 MG tablet    DEMADEX     Take 10 mg by mouth daily As needed for wt >177#        Vitamin D (Cholecalciferol) 1000 UNITS Tabs      Take 1,000 mg by mouth        warfarin 2.5 MG tablet    COUMADIN    102 tablet    Take 1 tablet (2.5 mg) by mouth daily Taking 3.75 mon, fri and 2.5 mg row    Long-term (current) use of anticoagulants

## 2017-10-03 NOTE — PROGRESS NOTES
"[employer] CRT-D Remote Device Check  AP: 0 % BIV Paced: 96 %  Mode: VVIR 65        Presenting Rhythm: Chronic A. Fib with BIV Pacing-takes Coumadin  Heart Rate: Stable with adequate variability  Sensing: Stable    Pacing Threshold: NA    Impedance: Stable  Battery Status: 4 yrs with charge time of 9.7 seconds  Atrial Arrhythmia: Chronic A. Fib  Ventricular Arrhythmia: 78 episodes detected and logged as NSVT. 25 EGM's for review and some show RVR, others NSVT lasting 4-16 beats, rates 140-160's bpm. One episode logged landed in VF zone. EGM reviewed and shows episode logged as lasting 2 minutes 19 seconds on 6/23/17 at 1225, rates 188 bpm with polymorphic morphology. One shock diverted secondary to \"unable to reconfirm.\" Followed by ATP x one burst, which slowed rhythm down to A. Fib with RVR, rates 100-110's bpm.  ATP: x 1    Shocks: None    Care Plan: Writer called pt and he has no known symptoms related to episode as above. States has been taking all medication as prescribed without missed dosages.  Annual office threshold scheduled for Jan. Reviewed with Dr. Costello and would like pt to see ISMAEL MARIN-agrees appears to be polymorphic VT Scheduled for Dr. Costello OV on 10-17/17.. JOSE Hoover RN.          "

## 2017-10-04 ENCOUNTER — ANTICOAGULATION THERAPY VISIT (OUTPATIENT)
Dept: NURSING | Facility: CLINIC | Age: 72
End: 2017-10-04
Payer: COMMERCIAL

## 2017-10-04 DIAGNOSIS — I63.9 CEREBRAL INFARCTION (H): ICD-10-CM

## 2017-10-04 DIAGNOSIS — Z79.01 LONG-TERM (CURRENT) USE OF ANTICOAGULANTS: ICD-10-CM

## 2017-10-04 DIAGNOSIS — I63.50 CEREBRAL ARTERY OCCLUSION WITH CEREBRAL INFARCTION (H): ICD-10-CM

## 2017-10-04 LAB — INR POINT OF CARE: 2.7 (ref 2–3)

## 2017-10-04 PROCEDURE — 99207 ZZC NO CHARGE NURSE ONLY: CPT

## 2017-10-04 PROCEDURE — 85610 PROTHROMBIN TIME: CPT | Mod: QW

## 2017-10-04 PROCEDURE — 36416 COLLJ CAPILLARY BLOOD SPEC: CPT

## 2017-10-04 NOTE — PROGRESS NOTES
ANTICOAGULATION FOLLOW-UP CLINIC VISIT    Patient Name:  Tyrel Rene  Date:  10/4/2017  Contact Type:  Face to Face    SUBJECTIVE:     Patient Findings     Positives No Problem Findings           OBJECTIVE    INR Protime   Date Value Ref Range Status   10/04/2017 2.7 2.0 - 3.0 Final       ASSESSMENT / PLAN  INR assessment THER    Recheck INR In: 4 WEEKS    INR Location Clinic      Anticoagulation Summary as of 10/4/2017     INR goal 2.0-2.5   Today's INR 2.7!   Maintenance plan 3.75 mg (2.5 mg x 1.5) on Mon, Fri; 2.5 mg (2.5 mg x 1) all other days   Full instructions 3.75 mg on Mon, Fri; 2.5 mg all other days   Weekly total 20 mg   No change documented Caridad Cunningham RN   Plan last modified Doreen Finley RN (1/23/2017)   Next INR check 11/1/2017   Target end date Indefinite    Indications   Long-term (current) use of anticoagulants [Z79.01] [Z79.01]  Cerebral artery occlusion with cerebral infarction (HCC) [I63.50] [I63.50]  Cerebral infarction (H) [I63.9]         Anticoagulation Episode Summary     INR check location [facility]    Preferred lab     Send INR reminders to [facility] INR/PROTIME    Comments       Anticoagulation Care Providers     Provider Role Specialty Phone number    Dejon Downs MD [facility] [phone]            See the Encounter Report to view Anticoagulation Flowsheet and Dosing Calendar (Go to Encounters tab in chart review, and find the Anticoagulation Therapy Visit)        Caridad Cunningham RN

## 2017-10-04 NOTE — MR AVS SNAPSHOT
Tyrel Rene   10/4/2017 1:30 PM   Anticoagulation Therapy Visit    Description:  71 year old male   Provider:  [facility]   Department:  Bx Nurse           INR as of 10/4/2017     Today's INR 2.7!      Anticoagulation Summary as of 10/4/2017     INR goal 2.0-2.5   Today's INR 2.7!   Full instructions 3.75 mg on Mon, Fri; 2.5 mg all other days   Next INR check 11/1/2017    Indications   Long-term (current) use of anticoagulants [Z79.01] [Z79.01]  Cerebral artery occlusion with cerebral infarction (HCC) [I63.50] [I63.50]  Cerebral infarction (H) [I63.9]         Your next Anticoagulation Clinic appointment(s)     Nov 01, 2017  2:00 PM CDT   Anticoagulation Visit with  ANTICOAGULATION CLINIC   [facility] ([facility])    [address] [place] [id number] [phone]              Contact Numbers     [facility]  Please call  [phone] to cancel and/or reschedule your appointment   The direct line to the anticoagulant nurse is [phone] on Monday, Wednesday, and Friday. On Thursday, the anticoagulant nurse can be reached directly at [phone].         October 2017 Details    Sun Mon Tue Wed Thu Fri Sat     1               2               3               4      2.5 mg   See details      5      2.5 mg         6      3.75 mg         7      2.5 mg           8      2.5 mg         9      3.75 mg         10      2.5 mg         11      2.5 mg         12      2.5 mg         13      3.75 mg         14      2.5 mg           15      2.5 mg         16      3.75 mg         17      2.5 mg         18      2.5 mg         19      2.5 mg         20      3.75 mg         21      2.5 mg           22      2.5 mg         23      3.75 mg         24      2.5 mg         25      2.5 mg         26      2.5 mg         27      3.75 mg         28      2.5 mg           29      2.5 mg         30      3.75 mg         31       2.5 mg              Date Details   10/04 This INR check               How to take your warfarin dose     To take:  2.5 mg Take 1 of the 2.5 mg tablets.    To take:  3.75 mg Take 1.5 of the 2.5 mg tablets.           November 2017 Details    Sun Mon Tue Wed Thu Fri Sat        1            2               3               4                 5               6               7               8               9               10               11                 12               13               14               15               16               17               18                 19               20               21               22               23               24               25                 26               27               28               29               30                  Date Details   No additional details    Date of next INR:  11/1/2017         How to take your warfarin dose     To take:  2.5 mg Take 1 of the 2.5 mg tablets.

## 2017-10-17 ENCOUNTER — TELEPHONE (OUTPATIENT)
Dept: CARDIOLOGY | Facility: CLINIC | Age: 72
End: 2017-10-17

## 2017-10-17 ENCOUNTER — OFFICE VISIT (OUTPATIENT)
Dept: CARDIOLOGY | Facility: CLINIC | Age: 72
End: 2017-10-17
Payer: COMMERCIAL

## 2017-10-17 VITALS
HEART RATE: 60 BPM | DIASTOLIC BLOOD PRESSURE: 60 MMHG | SYSTOLIC BLOOD PRESSURE: 106 MMHG | BODY MASS INDEX: 24.39 KG/M2 | WEIGHT: 184 LBS | HEIGHT: 73 IN

## 2017-10-17 DIAGNOSIS — I48.20 CHRONIC ATRIAL FIBRILLATION (H): ICD-10-CM

## 2017-10-17 DIAGNOSIS — I50.22 CHRONIC SYSTOLIC CONGESTIVE HEART FAILURE (H): Primary | ICD-10-CM

## 2017-10-17 DIAGNOSIS — I47.29 PAROXYSMAL VENTRICULAR TACHYCARDIA (H): ICD-10-CM

## 2017-10-17 DIAGNOSIS — I25.10 ASHD (ARTERIOSCLEROTIC HEART DISEASE): ICD-10-CM

## 2017-10-17 DIAGNOSIS — I47.29 PAROXYSMAL VENTRICULAR TACHYCARDIA (H): Primary | ICD-10-CM

## 2017-10-17 LAB
ANION GAP SERPL CALCULATED.3IONS-SCNC: 10.2 MMOL/L (ref 6–17)
BUN SERPL-MCNC: 27 MG/DL (ref 7–30)
CALCIUM SERPL-MCNC: 8.7 MG/DL (ref 8.5–10.5)
CHLORIDE SERPL-SCNC: 105 MMOL/L (ref 98–107)
CO2 SERPL-SCNC: 30 MMOL/L (ref 23–29)
CREAT SERPL-MCNC: 1.41 MG/DL (ref 0.7–1.3)
GFR SERPL CREATININE-BSD FRML MDRD: 49 ML/MIN/1.7M2
GLUCOSE SERPL-MCNC: 93 MG/DL (ref 70–105)
POTASSIUM SERPL-SCNC: 5.2 MMOL/L (ref 3.5–5.1)
SODIUM SERPL-SCNC: 140 MMOL/L (ref 136–145)
TSH SERPL DL<=0.005 MIU/L-ACNC: 1.03 MU/L (ref 0.4–4)

## 2017-10-17 PROCEDURE — 93000 ELECTROCARDIOGRAM COMPLETE: CPT | Performed by: INTERNAL MEDICINE

## 2017-10-17 PROCEDURE — 80048 BASIC METABOLIC PNL TOTAL CA: CPT | Performed by: INTERNAL MEDICINE

## 2017-10-17 PROCEDURE — 99215 OFFICE O/P EST HI 40 MIN: CPT | Performed by: INTERNAL MEDICINE

## 2017-10-17 PROCEDURE — 36415 COLL VENOUS BLD VENIPUNCTURE: CPT | Performed by: INTERNAL MEDICINE

## 2017-10-17 PROCEDURE — 84443 ASSAY THYROID STIM HORMONE: CPT | Performed by: INTERNAL MEDICINE

## 2017-10-17 RX ORDER — AMIODARONE HYDROCHLORIDE 200 MG/1
TABLET ORAL
Qty: 50 TABLET | Refills: 11 | Status: ON HOLD | OUTPATIENT
Start: 2017-10-17 | End: 2017-12-05

## 2017-10-17 RX ORDER — ROSUVASTATIN CALCIUM 20 MG/1
20 TABLET, COATED ORAL DAILY
Qty: 30 TABLET | Refills: 11 | Status: SHIPPED | OUTPATIENT
Start: 2017-10-17 | End: 2018-08-27

## 2017-10-17 NOTE — PROGRESS NOTES
HISTORY OF PRESENT ILLNESS:    It was my pleasure seeing Mr. Tyrel Rene, a delightful 72-year-old gentleman, for evaluation of ventricular tachycardia.  Marko has severe ischemic cardiomyopathy as a complication of a large myocardial infarction he suffered in 2012.  At that time he tells me he had a 58-day hospitalization.  He had episodes of ventricular tachycardia and was briefly treated with amiodarone.  However, we have been able to keep him off amiodarone for 5 years and he has had no shocks from his ICD during this period of time.  He did have an ICD upgrade to a biventricular device in 2014.  His EF currently is 30%-35%.  He sees Dr. Newman and Prince Galvez NP at the [facility].      The reason I am seeing him today is because of recent episodes of polymorphic ventricular tachycardia recorded by his device.  These were seen on his most recent remote download on 10/03/2017.  There were multiple episodes of nonsustained VT between 4 and 16 beats, but there was 1 episode of a much faster ventricular tachycardia that was polymorphic and went up to 2 minutes.  Because of rate irregularity and occasional undersensing with a few RR intervals that fell below VF detection, the patient did not receive treatment.  Interestingly, at one point the device charged, but the arrhythmia slowed down enough to prevent shock delivery.  On another occasion, the patient received ATP with resolution of arrhythmia.      In coming in today, Mr. Rene tells me he has had absolutely no episodes of sudden onset lightheadedness, syncope or near-syncope.  He confirms that has not received any shocks from his device.  He has had chronic issues with volume overload and runs a narrow margin between hypervolemia and orthostatic dizziness due to low BP.  He watches his salt intake carefully.      He has not had any chest pain with exertion.  He has not had orthopnea or PND lately      PHYSICAL EXAMINATION:   VITAL SIGNS:  Blood pressure  106/60, pulse 60 and regular, weight 83 kg.  Height 185 cm.   GENERAL:  He is a delightful gentleman who is accompanied by his very attentive spouse.   HEENT:  Normocephalic, atraumatic.   NECK:  Supple, without carotid bruits.   LUNGS:  Completely clear.  I cannot appreciate crackles or wheezes.   CARDIOVASCULAR:  Nicely healed left pectoral incision.  The rhythm is regular with no clear gallop.  There is a 1/6 systolic murmur at the left lower sternal border.   ABDOMEN:  Soft, nontender.  Negative HJR.   EXTREMITIES:  No edema.   SKIN:  No rash.      DIAGNOSTIC STUDIES:    ICD interrogation as above.      His 12-lead ECG today showed a ventricularly paced rhythm with a QRS duration of 131 milliseconds.  Underlying atrial rhythm is fibrillation.  QRS morphology is consistent with biventricular pacing.      Most recent echocardiogram was in 01/2017 showing an EF of 30%-35% with regional wall motion abnormalities and 1 to 2+ mitral regurgitation.        IMPRESSION:    1.  Episodes of polymorphic ventricular tachycardia with aborted ICD shock.  These arrhythmias are concerning, not only because the patient very nearly received an ICD shock, but because they appear capable of causing hemodynamic instability with syncope or near-syncope.  The patient is anticoagulated because of chronic atrial fibrillation and is at risk for injury, including intracerebral hemorrhage, should he have a fainting spell.       I had a lengthy discussion with Marko and his spouse in the clinic today and we made the following plan.      RECOMMENDATIONS:   1.  Start amiodarone 200 mg b.i.d. for 2 weeks followed by 200 mg daily.   2.  Stop simvastatin (because of interaction with amiodarone).   3.  Start rosuvastatin 20 mg daily.  I am aware that the patient developed diarrhea on atorvastatin and I asked him to call us if he developed diarrhea on the rosuvastatin.   4.  Because of the potential interaction of amiodarone and warfarin, Marko will  make arrangements for INR check in 1 week.   5.  A nuclear stress test has been requested to look for a large area of ischemia given the polymorphic nature of the VT.   6.  Chemistry panel, AST, ALT, and thyroid panel today as baseline for amiodarone.  In addition, we will look at the potassium level to make sure hypokalemia is not a culprit for his VT.   7.  Baseline spirometry with DLCO next week (baseline for amiodarone).   8.  Follow-up in the clinic with device interrogation in 3 months.      It was my pleasure seeing Mr. Elizalde.  Time spent 40 minutes with greater than 50% of the time spent in discussion and coordination of care.        ANN-MARIE FRANK MD, FACC          cc:      Dejon Downs MD    [facility]   [address] [place] [address]          D: 10/17/2017 16:06   T: 10/17/2017 17:13   MT: BERRY      Name:     ARTEM ELIZALDE   MRN:      [id number]        Account:      [id number]   :      1945           Service Date: 10/17/2017      Document: [id number]

## 2017-10-17 NOTE — PROGRESS NOTES
HPI and Plan:   See dictation    Orders Placed This Encounter   Procedures     NM Lexiscan stress test (nuc card)     Basic metabolic panel     TSH     Follow-Up with Cardiac Advanced Practice Provider     EKG 12-lead complete w/read - Clinics (performed today)     Pulmonary Function Test       Orders Placed This Encounter   Medications     amiodarone (PACERONE/CODARONE) 200 MG tablet     Sig: Start with 1 tab bid x 2 weeks and thereafter take 1 tab daily     Dispense:  50 tablet     Refill:  11     rosuvastatin (CRESTOR) 20 MG tablet     Sig: Take 1 tablet (20 mg) by mouth daily     Dispense:  30 tablet     Refill:  11     ASPIRIN NOT PRESCRIBED (INTENTIONAL)     Sig: Antiplatelet medication not prescribed intentionally due to Current anticoagulant therapy (warfarin/enoxaparin)     Dispense:  0 each     Refill:  0     ASPIRIN NOT PRESCRIBED (INTENTIONAL)     Sig: Please choose reason not prescribed, below     Dispense:  0 each     Refill:  0     Order Specific Question:   Reason ASA was Not Prescribed     Answer:   Current anticoagulant therapy (warfarin/enoxaparin)       Medications Discontinued During This Encounter   Medication Reason     simvastatin (ZOCOR) 40 MG tablet      ASPIRIN NOT PRESCRIBED, INTENTIONAL, Reorder     ASPIRIN NOT PRESCRIBED, INTENTIONAL, Reorder         Encounter Diagnoses   Name Primary?     Paroxysmal ventricular tachycardia (H)      Chronic systolic congestive heart failure (H) Yes     Chronic atrial fibrillation (H)      ASHD (arteriosclerotic heart disease)        CURRENT MEDICATIONS:      ALLERGIES     Allergies   Allergen Reactions     Nystatin Other (See Comments)     Make his mouth numb & swelling       PAST MEDICAL HISTORY:  Past Medical History:   Diagnosis Date     Atrial fibrillation (H)     s/p Cardioversion 3/14/2013     Atrial flutter (H)     S/p Aflutter ablation 1/11/2011     CAD (coronary artery disease)     CABG x3 10/2012- LIMA to distal LAD, SVG to OM1 & OM3; cath  10/2012- PTCA to second diagonal and mid LAD, BMS to mid LAD     Cardiogenic shock (H)      Cardiomyopathy (H)      CHF (congestive heart failure) (H)      CVA (cerebral infarction)     residual right hand numbness     ED (erectile dysfunction)      Hyperlipidemia      Hypertension      Neuropathy      Palpitations      SVT (supraventricular tachycardia) (H)     S/p dual chamber ICD 10/11/12- upgraded to BIV ICD 6/2013     Syncope        PAST SURGICAL HISTORY:  Past Surgical History:   Procedure Laterality Date     BYPASS GRAFT ARTERY CORONARY  10/2/2012    Procedure: BYPASS GRAFT ARTERY CORONARY;  Coronary Artery Bypass Graft x3 (LAD, Diag, OM) with Endovein [place] (On-Pump);  Surgeon: Yeyo Lyman MD;  Location: [facility] OR     CARDIOVERSION  3/14/2013     CORONARY ANGIOGRAPHY ADULT ORDER  10/2/12     CORONARY ARTERY BYPASS  10/2/12    LIMA to LAD, SVG to OM1 and OM3     H ABLATION ATRIAL FLUTTER  1/11/2011     HAND SURGERY       HEART CATH, ANGIOPLASTY  10/2/12    PTCA to second diagonal and mid LAD, BMS to mid LAD     HERNIA REPAIR       ORTHOPEDIC SURGERY Right 2006    cut on table saw     RELOCATE GENERATOR ICD/PACEMAKER         FAMILY HISTORY:  Family History   Problem Relation Age of Onset     Alcohol/Drug Father      HEART DISEASE Father 71     Alzheimer Disease Sister      Alcohol/Drug Sister      Alcohol/Drug Sister      GASTROINTESTINAL DISEASE Sister      Obesity Sister      Hypertension Sister      HEART DISEASE Sister      Hypertension Sister      HEART DISEASE Daughter      afib     HEART DISEASE Daughter      afib     CANCER Daughter      lymphoma     Aneurysm Mother        SOCIAL HISTORY:  Social History     Social History     Marital status:      Spouse name: N/A     Number of children: N/A     Years of education: N/A     Social History Main Topics     Smoking status: Former Smoker     Quit date: 7/10/1972     Smokeless tobacco: Never Used     Alcohol use No     Drug use: No     Sexual  activity: Not Asked     Other Topics Concern     Parent/Sibling W/ Cabg, Mi Or Angioplasty Before 65f 55m? No     Caffeine Concern Yes     4 cups coffee daily     Sleep Concern No     Stress Concern No     Weight Concern Yes     gain 2lbs     Special Diet Yes     low sodium     Exercise Yes     walking, doing sittups, played pickle ball in summer     Seat Belt Yes     Social History Narrative       Review of Systems:  Skin:  Negative       Eyes:  Positive for glasses    ENT:  Negative      Respiratory:  Positive for dyspnea on exertion     Cardiovascular:  Negative      Gastroenterology: Positive for heartburn;diarrhea heartburn managed with medication, diarrhea resolved after stopping statin   Genitourinary:  Negative      Musculoskeletal:  Negative      Neurologic:  Negative      Psychiatric:  Negative      Heme/Lymph/Imm:  Negative      Endocrine:  Negative        [id number]

## 2017-10-17 NOTE — TELEPHONE ENCOUNTER
Pharmacy calling to make sure Dr Costello is aware of drug interactions with amiodarone, simvastatin and comadin. Recalled and informed pharmacist that he is aware. Simvastatin changed to crestor and INR will be checked in one week.

## 2017-10-17 NOTE — MR AVS SNAPSHOT
After Visit Summary   10/17/2017    Tyrel Rene    MRN: [id number]           Patient Information     Date Of Birth          1945        Visit Information        Provider Department      10/17/2017 2:15 PM Suellen Costello MD [facility] [place]        Today's Diagnoses     SVT (supraventricular tachycardia) (H)    -  1    Paroxysmal ventricular tachycardia (H)          Care Instructions    1.  Stop simva-  2.  Check INR in 1 week  3.  Start amiodarone to suppress the 'bad rhythm' (VT).  4.  Nuclear stress test.  5.  Blood tests today.  6.  Spirometry next week (lung test).            Follow-ups after your visit        Additional Services     Follow-Up with Cardiac Advanced Practice Provider                 Your next 10 appointments already scheduled     Oct 18, 2017  2:15 PM CDT   [facility] Dejon Downs MD   [facility]    [address] [place] [address] [address]  [facility] [address]   [phone]            Oct 30, 2017  2:00 PM CDT   Anticoagulation Visit with [facility]   [facility]    [address] [place] [address] [address]  [facility] [address]   [phone]            Jan 04, 2018  1:00 PM CST   ICD Check with ALMA RUBIN   [facility] ([facility])    [address]  [facility] [address]   [phone]              Future tests that were ordered for you today     Open Future Orders        Priority Expected Expires Ordered    Follow-Up with Cardiac Advanced Practice Provider Routine 1/15/2018 10/17/2018 10/17/2017    Pulmonary Function Test Routine 10/24/2017 1/3/2027 10/17/2017    TSH Routine 10/17/2017 10/17/2018 10/17/2017    NM Lexiscan stress test (nuc card) Routine 10/24/2017 10/17/2018 10/17/2017    Basic  "metabolic panel Routine 10/17/2017 10/17/2018 10/17/2017            Who to contact     If you have questions or need follow up information about today's clinic visit or your schedule please contact [facility] [place] directly at [phone].  Normal or non-critical lab and imaging results will be communicated to you by MyChart, letter or phone within 4 business days after the clinic has received the results. If you do not hear from us within 7 days, please contact the clinic through [employer] or phone. If you have a critical or abnormal lab result, we will notify you by phone as soon as possible.  Submit refill requests through [employer] or call your pharmacy and they will forward the refill request to us. Please allow 3 business days for your refill to be completed.          Additional Information About Your Visit        [employer] Information     [employer] gives you secure access to your electronic health record. If you see a primary care provider, you can also send messages to your care team and make appointments. If you have questions, please call your primary care clinic.  If you do not have a primary care provider, please call [phone] and they will assist you.        Care EveryWhere ID     This is your Care EveryWhere ID. This could be used by other organizations to access your [facility] medical records  [id number]        Your Vitals Were     Pulse Height BMI (Body Mass Index)             60 1.854 m (6' 1\") 24.28 kg/m2          Blood Pressure from Last 3 Encounters:   10/17/17 106/60   07/12/17 113/70   01/19/17 124/72    Weight from Last 3 Encounters:   10/17/17 83.5 kg (184 lb)   07/12/17 81.1 kg (178 lb 12.8 oz)   01/19/17 83.1 kg (183 lb 1.6 oz)              We Performed the Following     EKG 12-lead complete w/read - Clinics (performed today)          Today's Medication Changes          These changes are accurate as of: 10/17/17  2:59 PM.  If you have any questions, ask " your nurse or doctor.               Start taking these medicines.        Dose/Directions    amiodarone 200 MG tablet   Commonly known as:  PACERONE/CODARONE   Used for:  Paroxysmal ventricular tachycardia (H)   Started by:  Suellen Costello MD        Start with 1 tab bid x 2 weeks and thereafter take 1 tab daily   Quantity:  50 tablet   Refills:  11       rosuvastatin 20 MG tablet   Commonly known as:  CRESTOR   Used for:  Paroxysmal ventricular tachycardia (H)   Started by:  Suellen Costello MD        Dose:  20 mg   Take 1 tablet (20 mg) by mouth daily   Quantity:  30 tablet   Refills:  11         Stop taking these medicines if you haven't already. Please contact your care team if you have questions.     simvastatin 40 MG tablet   Commonly known as:  ZOCOR   Stopped by:  Suellen Costello MD                Where to get your medicines      These medications were sent to [facility] [address] [address] [facility] [address]  [address]   [address] [facility] [address]     Phone:  [phone]     amiodarone 200 MG tablet    rosuvastatin 20 MG tablet                Primary Care Provider Office Phone # Fax #    Dejon Downs [phone] [address] [facility] [address]        Equal Access to Services     JAVI OTERO AH: Hadii aad ku hadasho Soomaali, waaxda luqadaha, qaybta kaalmada adeegyada, waxay idiin hayaan adesheron khchristy bird. So [facility] [phone].    ATENCIÓN: Si habla español, tiene a hagen disposición servicios gratuitos de asistencia lingüística. Llame al [phone].    We comply with applicable federal civil rights laws and [place] laws. We do not discriminate on the basis of race, color, national origin, age, disability, sex, sexual orientation, or gender identity.            Thank you!     Thank you for choosing [facility] [place]  for your care. Our goal is always to provide you with excellent care.  Hearing back from our patients is one way we can continue to improve our services. Please take a few minutes to complete the written survey that you may receive in the mail after your visit with us. Thank you!             Your Updated Medication List - Protect others around you: Learn how to safely use, store and throw away your medicines at www.disposemymeds.org.          This list is accurate as of: 10/17/17  2:59 PM.  Always use your most recent med list.                   Brand Name Dispense Instructions for use Diagnosis    amiodarone 200 MG tablet    PACERONE/CODARONE    50 tablet    Start with 1 tab bid x 2 weeks and thereafter take 1 tab daily    Paroxysmal ventricular tachycardia (H)       ASPIRIN NOT PRESCRIBED    INTENTIONAL    0 each    Antiplatelet medication not prescribed intentionally due to {CHOOSE REASON BEFORE SIGNING!!!:980532}    ASHD (arteriosclerotic heart disease)       carvedilol 6.25 MG tablet    COREG    180 tablet    Take 1 tablet (6.25 mg) by mouth 2 times daily (with meals)    Ventricular fibrillation and flutter (H)       digoxin 125 MCG tablet    LANOXIN    90 tablet    Take 1 tablet (125 mcg) by mouth daily    Atrial fibrillation (H)       lisinopril 5 MG tablet    PRINIVIL/ZESTRIL    180 tablet    Take 1 tablet (5 mg) by mouth 2 times daily    CHF (congestive heart failure) (H)       NITROSTAT 0.4 MG sublingual tablet   Generic drug:  nitroGLYcerin     25 tablet    DISSOLVE 1 TABLET UNDER THE TONGUE EVERY 5 MINUTES AS NEEDED FOR CHEST PAIN    Postsurgical aortocoronary bypass status       PRESERVISION AREDS 2 PO      Take 1 capsule by mouth 2 times daily        ranitidine 150 MG tablet    ZANTAC    180 tablet    Take 1 tablet (150 mg) by mouth 2 times daily    S/P CABG (coronary artery bypass graft)       rosuvastatin 20 MG tablet    CRESTOR    30 tablet    Take 1 tablet (20 mg) by mouth daily    Paroxysmal ventricular tachycardia (H)       sildenafil 100 MG tablet    VIAGRA    16  tablet    Take 1 tablet (100 mg) by mouth daily as needed for erectile dysfunction Take 30 min to 4 hours before intercourse.  Never use with nitroglycerin, terazosin or doxazosin.    Erectile dysfunction, unspecified erectile dysfunction type       torsemide 20 MG tablet    DEMADEX     Take 10 mg by mouth daily As needed for wt >177#        Vitamin D (Cholecalciferol) 1000 UNITS Tabs      Take 1,000 mg by mouth        warfarin 2.5 MG tablet    COUMADIN    102 tablet    Take 1 tablet (2.5 mg) by mouth daily Taking 3.75 mon, fri and 2.5 mg row    Long-term (current) use of anticoagulants

## 2017-10-17 NOTE — LETTER
10/17/2017    Dejon Downs MD  [address]  [facility] [address]    RE: Tyrel Rene       Dear Colleague,    I had the pleasure of seeing Tyrel Rene in the [facility].    HISTORY OF PRESENT ILLNESS:    It was my pleasure seeing Mr. Tyrel Rene, a delightful 72-year-old gentleman, for evaluation of ventricular tachycardia.  Marko has severe ischemic cardiomyopathy as a complication of a large myocardial infarction he suffered in 2012.  At that time he tells me he had a 58-day hospitalization.  He had episodes of ventricular tachycardia and was briefly treated with amiodarone.  However, we have been able to keep him off amiodarone for 5 years and he has had no shocks from his ICD during this period of time.  He did have an ICD upgrade to a biventricular device in 2014.  His EF currently is 30%-35%.  He sees Dr. Newman and Prince Galvez NP at [facility].      The reason I am seeing him today is because of recent episodes of polymorphic ventricular tachycardia recorded by his device.  These were seen on his most recent remote download on 10/03/2017.  There were multiple episodes of nonsustained VT between 4 and 16 beats, but there was 1 episode of a much faster ventricular tachycardia that was polymorphic and went up to 2 minutes.  Because of rate irregularity and occasional undersensing with a few RR intervals that fell below VF detection, the patient did not receive treatment.  Interestingly, at one point the device charged, but the arrhythmia slowed down enough to prevent shock delivery.  On another occasion, the patient received ATP with resolution of arrhythmia.      In coming in today, Mr. Rene tells me he has had absolutely no episodes of sudden onset lightheadedness, syncope or near-syncope.  He confirms that has not received any shocks from his device.  He has had chronic issues with volume overload and runs a narrow margin between hypervolemia and  orthostatic dizziness due to low BP.  He watches his salt intake carefully.      He has not had any chest pain with exertion.  He has not had orthopnea or PND lately      PHYSICAL EXAMINATION:   VITAL SIGNS:  Blood pressure 106/60, pulse 60 and regular, weight 83 kg.  Height 185 cm.   GENERAL:  He is a delightful gentleman who is accompanied by his very attentive spouse.   HEENT:  Normocephalic, atraumatic.   NECK:  Supple, without carotid bruits.   LUNGS:  Completely clear.  I cannot appreciate crackles or wheezes.   CARDIOVASCULAR:  Nicely healed left pectoral incision.  The rhythm is regular with no clear gallop.  There is a 1/6 systolic murmur at the left lower sternal border.   ABDOMEN:  Soft, nontender.  Negative HJR.   EXTREMITIES:  No edema.   SKIN:  No rash.      DIAGNOSTIC STUDIES:    ICD interrogation as above.      His 12-lead ECG today showed a ventricularly paced rhythm with a QRS duration of 131 milliseconds.  Underlying atrial rhythm is fibrillation.  QRS morphology is consistent with biventricular pacing.      Most recent echocardiogram was in 01/2017 showing an EF of 30%-35% with regional wall motion abnormalities and 1 to 2+ mitral regurgitation.      IMPRESSION:    1.  Episodes of polymorphic ventricular tachycardia with aborted ICD shock.  These arrhythmias are concerning, not only because the patient very nearly received an ICD shock, but because they appear capable of causing hemodynamic instability with syncope or near-syncope.  The patient is anticoagulated because of chronic atrial fibrillation and is at risk for injury, including intracerebral hemorrhage, should he have a fainting spell.       I had a lengthy discussion with Marko and his spouse in the clinic today and we made the following plan.      RECOMMENDATIONS:   1.  Start amiodarone 200 mg b.i.d. for 2 weeks followed by 200 mg daily.   2.  Stop simvastatin (because of interaction with amiodarone).   3.  Start rosuvastatin 20 mg  daily.  I am aware that the patient developed diarrhea on atorvastatin and I asked him to call us if he developed diarrhea on the rosuvastatin.   4.  Because of the potential interaction of amiodarone and warfarin, Marko will make arrangements for INR check in 1 week.   5.  A nuclear stress test has been requested to look for a large area of ischemia given the polymorphic nature of the VT.   6.  Chemistry panel, AST, ALT, and thyroid panel today as baseline for amiodarone.  In addition, we will look at the potassium level to make sure hypokalemia is not a culprit for his VT.   7.  Baseline spirometry with DLCO next week (baseline for amiodarone).   8.  Follow-up in the clinic with device interrogation in 3 months.      It was my pleasure seeing Mr. Rene.  Time spent 40 minutes with greater than 50% of the time spent in discussion and coordination of care.     Sincerely,    Suellen Costello MD     [facility]

## 2017-10-18 ENCOUNTER — OFFICE VISIT (OUTPATIENT)
Dept: FAMILY MEDICINE | Facility: CLINIC | Age: 72
End: 2017-10-18
Payer: COMMERCIAL

## 2017-10-18 ENCOUNTER — TELEPHONE (OUTPATIENT)
Dept: CARDIOLOGY | Facility: CLINIC | Age: 72
End: 2017-10-18

## 2017-10-18 VITALS
WEIGHT: 184 LBS | DIASTOLIC BLOOD PRESSURE: 70 MMHG | HEART RATE: 66 BPM | RESPIRATION RATE: 14 BRPM | OXYGEN SATURATION: 98 % | HEIGHT: 73 IN | BODY MASS INDEX: 24.39 KG/M2 | SYSTOLIC BLOOD PRESSURE: 110 MMHG | TEMPERATURE: 97 F

## 2017-10-18 DIAGNOSIS — I25.5 ISCHEMIC CARDIOMYOPATHY: ICD-10-CM

## 2017-10-18 DIAGNOSIS — E78.2 MIXED HYPERLIPIDEMIA: ICD-10-CM

## 2017-10-18 DIAGNOSIS — R94.31 ABNORMAL ELECTROCARDIOGRAM: ICD-10-CM

## 2017-10-18 DIAGNOSIS — I47.29 PAROXYSMAL VENTRICULAR TACHYCARDIA (H): Primary | ICD-10-CM

## 2017-10-18 DIAGNOSIS — I25.9 CHRONIC ISCHEMIC HEART DISEASE: ICD-10-CM

## 2017-10-18 DIAGNOSIS — I10 ESSENTIAL HYPERTENSION: Primary | ICD-10-CM

## 2017-10-18 DIAGNOSIS — I25.10 CORONARY ARTERY DISEASE INVOLVING NATIVE HEART WITHOUT ANGINA PECTORIS, UNSPECIFIED VESSEL OR LESION TYPE: ICD-10-CM

## 2017-10-18 DIAGNOSIS — I47.29 PAROXYSMAL VENTRICULAR TACHYCARDIA (H): ICD-10-CM

## 2017-10-18 LAB
BASOPHILS # BLD AUTO: 0 10E9/L (ref 0–0.2)
BASOPHILS NFR BLD AUTO: 0.6 %
DIFFERENTIAL METHOD BLD: ABNORMAL
EOSINOPHIL # BLD AUTO: 0.2 10E9/L (ref 0–0.7)
EOSINOPHIL NFR BLD AUTO: 2.8 %
ERYTHROCYTE [DISTWIDTH] IN BLOOD BY AUTOMATED COUNT: 13.5 % (ref 10–15)
HCT VFR BLD AUTO: 39.1 % (ref 40–53)
HGB BLD-MCNC: 13.2 G/DL (ref 13.3–17.7)
LYMPHOCYTES # BLD AUTO: 2.5 10E9/L (ref 0.8–5.3)
LYMPHOCYTES NFR BLD AUTO: 36.4 %
MCH RBC QN AUTO: 31.4 PG (ref 26.5–33)
MCHC RBC AUTO-ENTMCNC: 33.8 G/DL (ref 31.5–36.5)
MCV RBC AUTO: 93 FL (ref 78–100)
MONOCYTES # BLD AUTO: 0.9 10E9/L (ref 0–1.3)
MONOCYTES NFR BLD AUTO: 13.3 %
NEUTROPHILS # BLD AUTO: 3.2 10E9/L (ref 1.6–8.3)
NEUTROPHILS NFR BLD AUTO: 46.9 %
PLATELET # BLD AUTO: 153 10E9/L (ref 150–450)
RBC # BLD AUTO: 4.21 10E12/L (ref 4.4–5.9)
WBC # BLD AUTO: 6.8 10E9/L (ref 4–11)

## 2017-10-18 PROCEDURE — 36415 COLL VENOUS BLD VENIPUNCTURE: CPT | Performed by: FAMILY MEDICINE

## 2017-10-18 PROCEDURE — 99213 OFFICE O/P EST LOW 20 MIN: CPT | Performed by: FAMILY MEDICINE

## 2017-10-18 PROCEDURE — 85025 COMPLETE CBC W/AUTO DIFF WBC: CPT | Performed by: FAMILY MEDICINE

## 2017-10-18 NOTE — PROGRESS NOTES
SUBJECTIVE:   Tyrel Rene is a 72 year old male who presents to clinic today for the following health issues:      Hyperlipidemia Follow-Up      Rate your low fat/cholesterol diet?: good    Taking statin?  Yes, no muscle aches from statin    Other lipid medications/supplements?:  none    Hypertension Follow-up      Outpatient blood pressures are being checked at home.  Results are 110-120/70'S.    Low Salt Diet: no added salt          Amount of exercise or physical activity: 2-3 days/week for an average of 15-30 minutes    Problems taking medications regularly: No    Medication side effects: none  Diet: low salt          Problem list and histories reviewed & adjusted, as indicated.  Additional history: as documented    Patient Active Problem List   Diagnosis     STEMI (ST elevation myocardial infarction) (H)     Anemia     Health Care Home     Atrial fibrillation (H)     Hypertension     Cerebral infarction (H)     SVT (supraventricular tachycardia) (H)     Cardiogenic shock (H)     CAD (coronary artery disease)     Cardiomyopathy (H)     Atrial flutter (H)     Hyperlipidemia     Subdural hematoma (H)     Diplopia     Long-term (current) use of anticoagulants [Z79.01]     Cerebral artery occlusion with cerebral infarction (HCC) [I63.50]     Chronic systolic congestive heart failure (H)     Mixed hyperlipidemia     Past Surgical History:   Procedure Laterality Date     BYPASS GRAFT ARTERY CORONARY  10/2/2012    Procedure: BYPASS GRAFT ARTERY CORONARY;  Coronary Artery Bypass Graft x3 (LAD, Diag, OM) with Endovein [place] (On-Pump);  Surgeon: Yeyo Lyman MD;  Location: [facility] OR     CARDIOVERSION  3/14/2013     CORONARY ANGIOGRAPHY ADULT ORDER  10/2/12     CORONARY ARTERY BYPASS  10/2/12    LIMA to LAD, SVG to OM1 and OM3     H ABLATION ATRIAL FLUTTER  1/11/2011     HAND SURGERY       HEART CATH, ANGIOPLASTY  10/2/12    PTCA to second diagonal and mid LAD, BMS to mid LAD     HERNIA REPAIR       ORTHOPEDIC  "SURGERY Right 2006    cut on table saw     RELOCATE GENERATOR ICD/PACEMAKER         Social History   Substance Use Topics     Smoking status: Former Smoker     Quit date: 7/10/1972     Smokeless tobacco: Never Used     Alcohol use No     Family History   Problem Relation Age of Onset     Alcohol/Drug Father      HEART DISEASE Father 71     Alzheimer Disease Sister      Alcohol/Drug Sister      Alcohol/Drug Sister      GASTROINTESTINAL DISEASE Sister      Obesity Sister      Hypertension Sister      HEART DISEASE Sister      Hypertension Sister      HEART DISEASE Daughter      afib     HEART DISEASE Daughter      afib     CANCER Daughter      lymphoma     Aneurysm Mother              Reviewed and updated as needed this visit by clinical staffTobacco  Allergies  Meds  Med Hx  Surg Hx  Fam Hx  Soc Hx      Reviewed and updated as needed this visit by Provider         ROS:  Constitutional, HEENT, cardiovascular, pulmonary, gi and gu systems are negative, except as otherwise noted.  RESP:NEGATIVE for significant cough or SOB  CV: NEGATIVE for chest pain, palpitations or peripheral edema      OBJECTIVE:                                                    /70  Pulse 66  Temp 97  F (36.1  C) (Tympanic)  Resp 14  Ht 6' 1\" (1.854 m)  Wt 184 lb (83.5 kg)  SpO2 98%  BMI 24.28 kg/m2  Body mass index is 24.28 kg/(m^2).  GENERAL APPEARANCE: healthy, alert and no distress  RESP: lungs clear to auscultation - no rales, rhonchi or wheezes  CV: regular rates and rhythm, normal S1 S2, no S3 or S4 and no murmur, click or rub         ASSESSMENT/PLAN:                                                        ICD-10-CM    1. Essential hypertension I10 PAF COMPLETED     CBC with platelets differential   2. Mixed hyperlipidemia E78.2        Patient Instructions   Will see back in December for a full physical.      Dejon Downs MD  [facility]    "

## 2017-10-18 NOTE — TELEPHONE ENCOUNTER
otes Recorded by Suellen Costello MD on 10/17/2017 at 7:01 PM  Borderline elevation of K.  In the setting of recent VT, I think borderline K elevation is acceptable.  But should be rechecked in 2-3 weeks.  Please let him know.  DI  Called pt and informed him of lab results and need to have BMP rechecked in 3 weeks.

## 2017-10-18 NOTE — MR AVS SNAPSHOT
After Visit Summary   10/18/2017    Tyrel Rene    MRN: [id number]           Patient Information     Date Of Birth          1945        Visit Information        Provider Department      10/18/2017 2:15 PM Dejon Downs MD [facility]        Today's Diagnoses     Essential hypertension    -  1    Mixed hyperlipidemia          Care Instructions    Will see back in December for a full physical.          Follow-ups after your visit        Your next 10 appointments already scheduled     Oct 19, 2017  9:30 AM CDT   NM SH CV MPI WITH UNRULY 1 DAY with SCINM1   [facility] [place] [facility] (Cardiovascular Imaging at [facility])    [address]  [address]  [place] [address] [phone]           For a ONE day exam: Allow 3-4 hours for test. For a TWO day exam: Allow 2 hours PER day for test.  You may need to stop some medicines before the test. Follow your doctor s orders. - If you take a beta blocker: Follow your doctor s specific instructions on taking it prior to and on the day of your exam. - If you take Aggrenox or dipyridamole (Persantine, Permole), stop taking it 48 hours before your test. - If you take Viagra, Cialis or Levitra, stop taking it 48 hours before your test. - If you take theophylline or aminophylline, stop taking it 12 hours before your test.  For patients with diabetes: - If you take insulin, call your diabetes care team. Ask if you should take a 1/2 dose the morning of your test. - If you take diabetes medicine by mouth, don t take it on the morning of your test. Bring it with you to take after the test. (If you have questions, call your diabetes care team.)  Do not take nitrates on the day of your test. Do not wear your Nitro-Patch.  Stop all caffeine 12 hours before the test. This includes coffee, tea, soda pop, chocolate and certain medicines (such as Anacin, Excedrin and NoDoz). Also avoid decaf  coffee and tea, as these contain small amounts of caffeine.  No alcohol, smoking or other tobacco for 12 hours before the test.  Stop eating 3 hours before the test. You may drink water.  Please wear a loose two-piece outfit. If you will have an exercise test, bring rubber-soled walking shoes.  When you arrive, please tell us if you: - Have diabetes - Are breastfeeding - May be pregnant - Have a pacemaker of ICD (implantable defibrillator).  Please call your Imaging Department at your exam site with any questions.            Oct 24, 2017  2:00 PM CDT   Pulmonary Function with PFT LAB IN [facility] ([facility])    [address] [place] [address] [address]  [facility] [address] [phone]           No Inhalers for 6 hours prior to test No Smoking 2 hours prior to test            Oct 25, 2017  2:15 PM CDT   Anticoagulation Visit with [facility]   [facility]    [address]  [address]  [place] [address]   [phone]            Nov 08, 2017  1:20 PM CST   LAB with MARQUEZ LAB   [facility] [place] ([facility])    [address]  [facility] [address]   [phone]           Patient must bring picture ID. Patient should be prepared to give a urine specimen  Please do not eat 10-12 hours before your appointment if you are coming in fasting for labs on lipids, cholesterol, or glucose (sugar). Pregnant women should follow their Care Team instructions. Water with medications is okay. Do not drink coffee or other fluids. If you have concerns about taking  your medications, please ask at office or if scheduling via [email or web address], send a message by clicking on Secure Messaging, Message Your Care Team.            Dec 19, 2017 10:00 AM CST   PHYSICAL with Dejon Downs MD   [facility] ([facility])    [address]  [address]  [place] [address]   [phone]            Jan 04, 2018  1:00 PM CST   ICD Check with MARQUEZ ANDREWN   [facility] HEART AT [place] ([facility])    [address]  [place] [place] [facility] [address]   [phone]              Future tests that were ordered for you today     Open Future Orders        Priority Expected Expires Ordered    NM Lexiscan stress test (nuc card) Routine 10/25/2017 10/18/2018 10/18/2017    Basic metabolic panel Routine 11/8/2017 10/18/2018 10/18/2017    Follow-Up with [facility] Provider Routine 1/15/2018 10/17/2018 10/17/2017    Pulmonary Function Test Routine 10/24/2017 1/3/2027 10/17/2017    NM Lexiscan stress test (nuc card) Routine 10/24/2017 10/17/2018 10/17/2017            Who to contact     If you have questions or need follow up information about today's clinic visit or your schedule please contact [facility] directly at [phone].  Normal or non-critical lab and imaging results will be communicated to you by MyChart, letter or phone within 4 business days after the clinic has received the results. If you do not hear from us within 7 days, please contact the clinic through MyChart or phone. If you have a critical or abnormal lab result, we will notify you by phone as soon as possible.  Submit refill requests through [employer] or call your pharmacy and they will forward the refill request to us. Please allow 3 business days for your refill to be completed.          Additional Information About Your Visit        [employer] Information     [employer] gives you secure access to your electronic health record. If you see a primary care provider, you can also send messages to your care team and make appointments. If you have questions, please call your primary care clinic.  If you do not have a primary care provider, please call [phone]  "and they will assist you.        Care EveryWhere ID     This is your Care EveryWhere ID. This could be used by other organizations to access your [facility] medical records  [id number]        Your Vitals Were     Pulse Temperature Respirations Height Pulse Oximetry BMI (Body Mass Index)    66 97  F (36.1  C) (Tympanic) 14 6' 1\" (1.854 m) 98% 24.28 kg/m2       Blood Pressure from Last 3 Encounters:   10/18/17 110/70   10/17/17 106/60   07/12/17 113/70    Weight from Last 3 Encounters:   10/18/17 184 lb (83.5 kg)   10/17/17 184 lb (83.5 kg)   07/12/17 178 lb 12.8 oz (81.1 kg)              We Performed the Following     CBC with platelets differential     PAF COMPLETED        Primary Care Provider Office Phone # Fax #    Dejon Downs [phone]       [address] [address]        Equal Access to Services     SANDY OTERO : Hadii aad ku hadasho Soomaali, waaxda luqadaha, qaybta kaalmada adeegyada, waxay idiin hayaan mirellaeg connie starks . So [facility] [phone].    ATENCIÓN: Si habla español, tiene a hagen disposición servicios gratuitos de asistencia lingüística. Llame al [phone].    We comply with applicable federal civil rights laws and [place] laws. We do not discriminate on the basis of race, color, national origin, age, disability, sex, sexual orientation, or gender identity.            Thank you!     Thank you for choosing [facility]  for your care. Our goal is always to provide you with excellent care. Hearing back from our patients is one way we can continue to improve our services. Please take a few minutes to complete the written survey that you may receive in the mail after your visit with us. Thank you!             Your Updated Medication List - Protect others around you: Learn how to safely use, store and throw away your medicines at www.disposemymeds.org.          This list is accurate as of: 10/18/17  5:30 PM.  Always use your " most recent med list.                   Brand Name Dispense Instructions for use Diagnosis    amiodarone 200 MG tablet    PACERONE/CODARONE    50 tablet    Start with 1 tab bid x 2 weeks and thereafter take 1 tab daily        ASPIRIN NOT PRESCRIBED    INTENTIONAL    0 each    Antiplatelet medication not prescribed intentionally due to Current anticoagulant therapy (warfarin/enoxaparin)    ASHD (arteriosclerotic heart disease)       carvedilol 6.25 MG tablet    COREG    180 tablet    Take 1 tablet (6.25 mg) by mouth 2 times daily (with meals)    Ventricular fibrillation and flutter (H)       digoxin 125 MCG tablet    LANOXIN    90 tablet    Take 1 tablet (125 mcg) by mouth daily    Atrial fibrillation (H)       lisinopril 5 MG tablet    PRINIVIL/ZESTRIL    180 tablet    Take 1 tablet (5 mg) by mouth 2 times daily    CHF (congestive heart failure) (H)       NITROSTAT 0.4 MG sublingual tablet   Generic drug:  nitroGLYcerin     25 tablet    DISSOLVE 1 TABLET UNDER THE TONGUE EVERY 5 MINUTES AS NEEDED FOR CHEST PAIN    Postsurgical aortocoronary bypass status       PRESERVISION AREDS 2 PO      Take 1 capsule by mouth 2 times daily        ranitidine 150 MG tablet    ZANTAC    180 tablet    Take 1 tablet (150 mg) by mouth 2 times daily    S/P CABG (coronary artery bypass graft)       rosuvastatin 20 MG tablet    CRESTOR    30 tablet    Take 1 tablet (20 mg) by mouth daily        sildenafil 100 MG tablet    VIAGRA    16 tablet    Take 1 tablet (100 mg) by mouth daily as needed for erectile dysfunction Take 30 min to 4 hours before intercourse.  Never use with nitroglycerin, terazosin or doxazosin.    Erectile dysfunction, unspecified erectile dysfunction type       torsemide 20 MG tablet    DEMADEX     Take 10 mg by mouth daily As needed for wt >177#        Vitamin D (Cholecalciferol) 1000 UNITS Tabs      Take 1,000 mg by mouth        warfarin 2.5 MG tablet    COUMADIN    102 tablet    Take 1 tablet (2.5 mg) by mouth daily  Taking 3.75 mon, fri and 2.5 mg row    Long-term (current) use of anticoagulants

## 2017-10-18 NOTE — TELEPHONE ENCOUNTER
Patient's wife Pamela called. She said pt was in to see Dr. Costello yesterday and he started him on a new medication for VT - amiodarone. Pt and wife remember some more details after they got home and wanted to make sure it didn't change any thing. She said that pt played pickle ball 2-3 times a week in June and July, but then they got busy in August and he hurt his wrist, so he didn't play the rest of the summer. She wanted to know if playing pickle ball could have been too much for his heart since Dr. Costello mentioned VT happening in June and July.     Discussed with wife Pamela that VT is different from pt getting his heart rate up from exercise, and that exercise is good for his heart. They day pt had ATP for VT was 6/23/2017, and Pamela knows that he didn't actually play that day. Also told Pamela that pt has had non-sustained VT since then in August and September even though he wasn't playing pickle ball during these months. Told Pamela this would not change the treatment plan. Pamela was reassured to hear this.

## 2017-10-18 NOTE — NURSING NOTE
"Chief Complaint   Patient presents with     Recheck Medication       Initial /70  Pulse 66  Temp 97  F (36.1  C) (Tympanic)  Resp 14  Ht 6' 1\" (1.854 m)  Wt 184 lb (83.5 kg)  SpO2 98%  BMI 24.28 kg/m2 Estimated body mass index is 24.28 kg/(m^2) as calculated from the following:    Height as of this encounter: 6' 1\" (1.854 m).    Weight as of this encounter: 184 lb (83.5 kg).  Medication Reconciliation: complete     Jessica Bhatti CMA      "

## 2017-10-19 ENCOUNTER — HOSPITAL ENCOUNTER (OUTPATIENT)
Dept: CARDIOLOGY | Facility: CLINIC | Age: 72
Discharge: HOME | End: 2017-10-19
Attending: INTERNAL MEDICINE | Admitting: INTERNAL MEDICINE
Payer: COMMERCIAL

## 2017-10-19 PROCEDURE — 93018 CV STRESS TEST I&R ONLY: CPT | Performed by: INTERNAL MEDICINE

## 2017-10-19 PROCEDURE — 34300033 ZZH RX 343: Performed by: INTERNAL MEDICINE

## 2017-10-19 PROCEDURE — 93016 CV STRESS TEST SUPVJ ONLY: CPT | Performed by: INTERNAL MEDICINE

## 2017-10-19 PROCEDURE — 78452 HT MUSCLE IMAGE SPECT MULT: CPT | Mod: 26 | Performed by: INTERNAL MEDICINE

## 2017-10-19 PROCEDURE — A9502 TC99M TETROFOSMIN: HCPCS | Performed by: INTERNAL MEDICINE

## 2017-10-19 PROCEDURE — 25000128 H RX IP 250 OP 636: Performed by: INTERNAL MEDICINE

## 2017-10-19 PROCEDURE — 93017 CV STRESS TEST TRACING ONLY: CPT

## 2017-10-19 RX ORDER — ALBUTEROL SULFATE 90 UG/1
2 AEROSOL, METERED RESPIRATORY (INHALATION) EVERY 5 MIN PRN
Status: DISCONTINUED | OUTPATIENT
Start: 2017-10-19 | End: 2017-10-20 | Stop reason: HOSPADM

## 2017-10-19 RX ORDER — REGADENOSON 0.08 MG/ML
0.4 INJECTION, SOLUTION INTRAVENOUS ONCE
Status: COMPLETED | OUTPATIENT
Start: 2017-10-19 | End: 2017-10-19

## 2017-10-19 RX ORDER — ACYCLOVIR 200 MG/1
0-1 CAPSULE ORAL
Status: DISCONTINUED | OUTPATIENT
Start: 2017-10-19 | End: 2017-10-20 | Stop reason: HOSPADM

## 2017-10-19 RX ORDER — AMINOPHYLLINE 25 MG/ML
50-100 INJECTION, SOLUTION INTRAVENOUS
Status: DISCONTINUED | OUTPATIENT
Start: 2017-10-19 | End: 2017-10-20 | Stop reason: HOSPADM

## 2017-10-19 RX ADMIN — REGADENOSON 0.4 MG: 0.08 INJECTION, SOLUTION INTRAVENOUS at 11:01

## 2017-10-19 RX ADMIN — TETROFOSMIN 9.5 MCI.: 1.38 INJECTION, POWDER, LYOPHILIZED, FOR SOLUTION INTRAVENOUS at 10:52

## 2017-10-19 RX ADMIN — TETROFOSMIN 3.6 MCI.: 1.38 INJECTION, POWDER, LYOPHILIZED, FOR SOLUTION INTRAVENOUS at 09:32

## 2017-10-19 NOTE — PROGRESS NOTES
Dear Tyrel,    Your tests were all normal. A copy of your tests are available in My Chart.    Glad to see you at your appointment.  If you have any questions feel free to call.      Sincerely,      NAVDEEP Palmer.

## 2017-10-23 ENCOUNTER — TELEPHONE (OUTPATIENT)
Dept: CARDIOLOGY | Facility: CLINIC | Age: 72
End: 2017-10-23

## 2017-10-23 NOTE — TELEPHONE ENCOUNTER
Message  Received: Yesterday       Suellen Costello MD  P Saenz [facility] Heart Ep Nurse                     Mostly fixed defects (infarcts) and little ischemia.   Please let him know.  No need for cor angio at this point.   VIOLETTE Koenig       Writer called pt with results as above and pt verbalized understanding. JOSE Hoover RN.

## 2017-10-24 ENCOUNTER — HOSPITAL ENCOUNTER (OUTPATIENT)
Dept: RESPIRATORY THERAPY | Facility: CLINIC | Age: 72
End: 2017-10-24
Attending: INTERNAL MEDICINE
Payer: COMMERCIAL

## 2017-10-24 PROCEDURE — 94726 PLETHYSMOGRAPHY LUNG VOLUMES: CPT

## 2017-10-24 PROCEDURE — 94010 BREATHING CAPACITY TEST: CPT

## 2017-10-24 PROCEDURE — 94729 DIFFUSING CAPACITY: CPT

## 2017-10-25 ENCOUNTER — ANTICOAGULATION THERAPY VISIT (OUTPATIENT)
Dept: NURSING | Facility: CLINIC | Age: 72
End: 2017-10-25
Payer: COMMERCIAL

## 2017-10-25 DIAGNOSIS — Z79.01 LONG-TERM (CURRENT) USE OF ANTICOAGULANTS: ICD-10-CM

## 2017-10-25 DIAGNOSIS — I63.9 CEREBRAL INFARCTION (H): ICD-10-CM

## 2017-10-25 DIAGNOSIS — I63.50 CEREBRAL ARTERY OCCLUSION WITH CEREBRAL INFARCTION (H): ICD-10-CM

## 2017-10-25 LAB — INR POINT OF CARE: 3.8 (ref 2–2.5)

## 2017-10-25 PROCEDURE — 85610 PROTHROMBIN TIME: CPT | Mod: QW

## 2017-10-25 PROCEDURE — 99207 ZZC NO CHARGE NURSE ONLY: CPT

## 2017-10-25 PROCEDURE — 36416 COLLJ CAPILLARY BLOOD SPEC: CPT

## 2017-10-25 NOTE — MR AVS SNAPSHOT
Tyrel Rene   10/25/2017 2:15 PM   Anticoagulation Therapy Visit    Description:  72 year old male   Provider:  [facility]   Department:  Bx Nurse           INR as of 10/25/2017     Today's INR 3.8!      Anticoagulation Summary as of 10/25/2017     INR goal 2.0-2.5   Today's INR 3.8!   Full instructions 10/25: Hold; 10/27: 2.5 mg; Otherwise 3.75 mg on Mon, Fri; 2.5 mg all other days   Next INR check 11/1/2017    Indications   Long-term (current) use of anticoagulants [Z79.01] [Z79.01]  Cerebral artery occlusion with cerebral infarction (HCC) [I63.50] [I63.50]  Cerebral infarction (H) [I63.9]         Your next Anticoagulation Clinic appointment(s)     Oct 31, 2017  2:00 PM CDT   Anticoagulation Visit with  ANTICOAGULATION CLINIC   [facility]    [address] [place] [id number] [phone]              Contact Numbers     [facility]  Please call  [phone] to cancel and/or reschedule your appointment   The direct line to the anticoagulant nurse is [phone] on Monday, Wednesday, and Friday. On Thursday, the anticoagulant nurse can be reached directly at [phone].         October 2017 Details    Sun Mon Tue Wed Thu Fri Sat     1               2               3               4               5               6               7                 8               9               10               11               12               13               14                 15               16               17               18               19               20               21                 22               23               24               25      Hold   See details      26      2.5 mg         27      2.5 mg         28      2.5 mg           29      2.5 mg         30      3.75 mg         31      2.5 mg              Date Details   10/25 This INR check               How to take your warfarin  dose     To take:  2.5 mg Take 1 of the 2.5 mg tablets.    To take:  3.75 mg Take 1.5 of the 2.5 mg tablets.    Hold Do not take your warfarin dose. See the Details table to the right for additional instructions.                November 2017 Details    Sun Mon Tue Wed Thu Fri Sat        1            2               3               4                 5               6               7               8               9               10               11                 12               13               14               15               16               17               18                 19               20               21               22               23               24               25                 26               27               28               29               30                  Date Details   No additional details    Date of next INR:  11/1/2017         How to take your warfarin dose     To take:  2.5 mg Take 1 of the 2.5 mg tablets.

## 2017-10-25 NOTE — PROGRESS NOTES
ANTICOAGULATION FOLLOW-UP CLINIC VISIT    Patient Name:  Tyrel Rene  Date:  10/25/2017  Contact Type:  Face to Face    SUBJECTIVE:     Patient Findings     Positives Change in medications    Comments Patient now on amiodarone and will need INRs weekly.           OBJECTIVE    INR Protime   Date Value Ref Range Status   10/25/2017 3.8 (A) 2.0 - 2.5 Final       ASSESSMENT / PLAN  INR assessment SUPRA    Recheck INR In: 1 WEEK    INR Location Clinic      Anticoagulation Summary as of 10/25/2017     INR goal 2.0-2.5   Today's INR 3.8!   Maintenance plan 3.75 mg (2.5 mg x 1.5) on Mon, Fri; 2.5 mg (2.5 mg x 1) all other days   Full instructions 10/25: Hold; 10/27: 2.5 mg; Otherwise 3.75 mg on Mon, Fri; 2.5 mg all other days   Weekly total 20 mg   Plan last modified Doreen Finley RN (1/23/2017)   Next INR check 11/1/2017   Target end date Indefinite    Indications   Long-term (current) use of anticoagulants [Z79.01] [Z79.01]  Cerebral artery occlusion with cerebral infarction (HCC) [I63.50] [I63.50]  Cerebral infarction (H) [I63.9]         Anticoagulation Episode Summary     INR check location [facility]    Preferred lab     Send INR reminders to [facility] INR/PROTIME    Comments       Anticoagulation Care Providers     Provider Role Specialty Phone number    Dejon Downs MD [facility] [phone]            See the Encounter Report to view Anticoagulation Flowsheet and Dosing Calendar (Go to Encounters tab in chart review, and find the Anticoagulation Therapy Visit)        Caridad Cunningham RN

## 2017-10-31 ENCOUNTER — ANTICOAGULATION THERAPY VISIT (OUTPATIENT)
Dept: NURSING | Facility: CLINIC | Age: 72
End: 2017-10-31
Payer: COMMERCIAL

## 2017-10-31 DIAGNOSIS — I63.9 CEREBRAL INFARCTION (H): ICD-10-CM

## 2017-10-31 DIAGNOSIS — Z79.01 LONG-TERM (CURRENT) USE OF ANTICOAGULANTS: ICD-10-CM

## 2017-10-31 DIAGNOSIS — I63.50 CEREBRAL ARTERY OCCLUSION WITH CEREBRAL INFARCTION (H): ICD-10-CM

## 2017-10-31 LAB — INR POINT OF CARE: 4 (ref 0.86–1.14)

## 2017-10-31 PROCEDURE — 85610 PROTHROMBIN TIME: CPT | Mod: QW

## 2017-10-31 PROCEDURE — 99207 ZZC NO CHARGE NURSE ONLY: CPT

## 2017-10-31 PROCEDURE — 36416 COLLJ CAPILLARY BLOOD SPEC: CPT

## 2017-10-31 NOTE — PROGRESS NOTES
ANTICOAGULATION FOLLOW-UP CLINIC VISIT    Patient Name:  Tyrel Rene  Date:  10/31/2017  Contact Type:  Face to Face    SUBJECTIVE:     Patient Findings     Positives Change in medications (On amiodarone)           OBJECTIVE    INR Protime   Date Value Ref Range Status   10/31/2017 4.0 (A) 0.86 - 1.14 Final       ASSESSMENT / PLAN  INR assessment SUPRA    Recheck INR In: 5 DAYS    INR Location Clinic      Anticoagulation Summary as of 10/31/2017     INR goal 2.0-2.5   Today's INR 4.0!   Maintenance plan 3.75 mg (2.5 mg x 1.5) on Mon, Fri; 2.5 mg (2.5 mg x 1) all other days   Full instructions 10/31: Hold; 11/3: 2.5 mg; 11/10: 2.5 mg; Otherwise 3.75 mg on Mon, Fri; 2.5 mg all other days   Weekly total 20 mg   Plan last modified Doreen Finley RN (1/23/2017)   Next INR check 11/6/2017   Target end date Indefinite    Indications   Long-term (current) use of anticoagulants [Z79.01] [Z79.01]  Cerebral artery occlusion with cerebral infarction (HCC) [I63.50] [I63.50]  Cerebral infarction (H) [I63.9]         Anticoagulation Episode Summary     INR check location [facility]    Preferred lab     Send INR reminders to [facility] INR/PROTIME    Comments       Anticoagulation Care Providers     Provider Role Specialty Phone number    Dejon Downs MD [facility] [phone]            See the Encounter Report to view Anticoagulation Flowsheet and Dosing Calendar (Go to Encounters tab in chart review, and find the Anticoagulation Therapy Visit)        Doreen Finley, RN

## 2017-10-31 NOTE — MR AVS SNAPSHOT
Tyrel Rene   10/31/2017 2:00 PM   Anticoagulation Therapy Visit    Description:  72 year old male   Provider:   ANTICOAGULATION CLINIC   Department:  Bx Nurse           INR as of 10/31/2017     Today's INR 4.0!      Anticoagulation Summary as of 10/31/2017     INR goal 2.0-2.5   Today's INR 4.0!   Full instructions 11/10: 2.5 mg; Otherwise 3.75 mg on Mon, Fri; 2.5 mg all other days   Next INR check 11/6/2017    Indications   Long-term (current) use of anticoagulants [Z79.01] [Z79.01]  Cerebral artery occlusion with cerebral infarction (HCC) [I63.50] [I63.50]  Cerebral infarction (H) [I63.9]         Description     On amniodarone      Your next Anticoagulation Clinic appointment(s)     Nov 06, 2017  4:30 PM CST   Anticoagulation Visit with  ANTICOAGULATION CLINIC   [facility]    [address] [place] [address] [phone]              Contact Numbers     [facility]  Please call  [phone] to cancel and/or reschedule your appointment   The direct line to the anticoagulant nurse is [phone] on Monday, Wednesday, and Friday. On Thursday, the anticoagulant nurse can be reached directly at [phone].         October 2017 Details    Sun Mon Tue Wed Thu Fri Sat     1               2               3               4               5               6               7                 8               9               10               11               12               13               14                 15               16               17               18               19               20               21                 22               23               24               25               26               27               28                 29               30               31      2.5 mg   See details           Date Details   10/31 This INR check               How to take your warfarin dose     To  take:  2.5 mg Take 1 of the 2.5 mg tablets.           November 2017 Details    Sun Mon Tue Wed Thu Fri Sat        1      2.5 mg         2      2.5 mg         3      3.75 mg         4      2.5 mg           5      2.5 mg         6            7               8               9               10               11                 12               13               14               15               16               17               18                 19               20               21               22               23               24               25                 26               27               28               29               30                  Date Details   No additional details    Date of next INR:  11/6/2017         How to take your warfarin dose     To take:  2.5 mg Take 1 of the 2.5 mg tablets.    To take:  3.75 mg Take 1.5 of the 2.5 mg tablets.

## 2017-11-01 LAB
DLCOCOR-%PRED-PRE: 92 %
DLCOCOR-PRE: 24.14 ML/MIN/MMHG
DLCOUNC-%PRED-PRE: 88 %
DLCOUNC-PRE: 23.13 ML/MIN/MMHG
DLCOUNC-PRED: 26.05 ML/MIN/MMHG
ERV-%PRED-PRE: 177 %
ERV-PRE: 2.25 L
ERV-PRED: 1.27 L
EXPTIME-PRE: 9.47 SEC
FEF2575-%PRED-PRE: 105 %
FEF2575-PRE: 2.38 L/SEC
FEF2575-PRED: 2.26 L/SEC
FEFMAX-%PRED-PRE: 117 %
FEFMAX-PRE: 9.84 L/SEC
FEFMAX-PRED: 8.39 L/SEC
FEV1-%PRED-PRE: 128 %
FEV1-PRE: 3.92 L
FEV1FEV6-PRE: 74 %
FEV1FEV6-PRED: 77 %
FEV1FVC-PRE: 70 %
FEV1FVC-PRED: 76 %
FEV1SVC-PRE: 71 %
FEV1SVC-PRED: 61 %
FIFMAX-PRE: 7.14 L/SEC
FRCPLETH-%PRED-PRE: 127 %
FRCPLETH-PRE: 4.92 L
FRCPLETH-PRED: 3.86 L
FVC-%PRED-PRE: 138 %
FVC-PRE: 5.6 L
FVC-PRED: 4.04 L
IC-%PRED-PRE: 88 %
IC-PRE: 3.3 L
IC-PRED: 3.75 L
RVPLETH-%PRED-PRE: 95 %
RVPLETH-PRE: 2.67 L
RVPLETH-PRED: 2.79 L
TLCPLETH-%PRED-PRE: 109 %
TLCPLETH-PRE: 8.23 L
TLCPLETH-PRED: 7.53 L
VA-%PRED-PRE: 111 %
VA-PRE: 8.02 L
VC-%PRED-PRE: 110 %
VC-PRE: 5.55 L
VC-PRED: 5.02 L

## 2017-11-06 ENCOUNTER — ANTICOAGULATION THERAPY VISIT (OUTPATIENT)
Dept: NURSING | Facility: CLINIC | Age: 72
End: 2017-11-06
Payer: COMMERCIAL

## 2017-11-06 DIAGNOSIS — I63.50 CEREBRAL ARTERY OCCLUSION WITH CEREBRAL INFARCTION (H): ICD-10-CM

## 2017-11-06 DIAGNOSIS — I63.9 CEREBRAL INFARCTION (H): ICD-10-CM

## 2017-11-06 DIAGNOSIS — Z79.01 LONG-TERM (CURRENT) USE OF ANTICOAGULANTS: ICD-10-CM

## 2017-11-06 LAB — INR POINT OF CARE: 3.3 (ref 0.86–1.14)

## 2017-11-06 PROCEDURE — 99207 ZZC NO CHARGE NURSE ONLY: CPT

## 2017-11-06 PROCEDURE — 85610 PROTHROMBIN TIME: CPT | Mod: QW

## 2017-11-06 PROCEDURE — 36416 COLLJ CAPILLARY BLOOD SPEC: CPT

## 2017-11-06 NOTE — PROGRESS NOTES
ANTICOAGULATION FOLLOW-UP CLINIC VISIT    Patient Name:  Tyrel Rene  Date:  11/6/2017  Contact Type:  Face to Face    SUBJECTIVE:     Patient Findings     Positives No Problem Findings           OBJECTIVE    INR Protime   Date Value Ref Range Status   11/06/2017 3.3 (A) 0.86 - 1.14 Final       ASSESSMENT / PLAN  INR assessment SUPRA    Recheck INR In: 1 WEEK    INR Location Clinic      Anticoagulation Summary as of 11/6/2017     INR goal 2.0-2.5   Today's INR 3.3!   Maintenance plan 3.75 mg (2.5 mg x 1.5) on Mon, Fri; 2.5 mg (2.5 mg x 1) all other days   Full instructions 11/6: 2.5 mg; 11/10: 2.5 mg; Otherwise 3.75 mg on Mon, Fri; 2.5 mg all other days   Weekly total 20 mg   Plan last modified Doreen Finley RN (1/23/2017)   Next INR check 11/13/2017   Target end date Indefinite    Indications   Long-term (current) use of anticoagulants [Z79.01] [Z79.01]  Cerebral artery occlusion with cerebral infarction (HCC) [I63.50] [I63.50]  Cerebral infarction (H) [I63.9]         Anticoagulation Episode Summary     INR check location [facility]    Preferred lab     Send INR reminders to [facility] INR/PROTIME    Comments       Anticoagulation Care Providers     Provider Role Specialty Phone number    Dejon Downs MD [facility] [phone]            See the Encounter Report to view Anticoagulation Flowsheet and Dosing Calendar (Go to Encounters tab in chart review, and find the Anticoagulation Therapy Visit)        Doreen Finley, RN

## 2017-11-06 NOTE — MR AVS SNAPSHOT
Tyrel Rene   11/6/2017 4:30 PM   Anticoagulation Therapy Visit    Description:  72 year old male   Provider:  [facility]   Department:  Bx Nurse           INR as of 11/6/2017     Today's INR 3.3!      Anticoagulation Summary as of 11/6/2017     INR goal 2.0-2.5   Today's INR 3.3!   Full instructions 11/6: 2.5 mg; 11/10: 2.5 mg; Otherwise 3.75 mg on Mon, Fri; 2.5 mg all other days   Next INR check 11/13/2017    Indications   Long-term (current) use of anticoagulants [Z79.01] [Z79.01]  Cerebral artery occlusion with cerebral infarction (HCC) [I63.50] [I63.50]  Cerebral infarction (H) [I63.9]         Your next Anticoagulation Clinic appointment(s)     Nov 13, 2017  4:00 PM CST   Anticoagulation Visit with  [facility]ULATION CLINIC   [facility]    [address] [place] [id number] [phone]              Contact Numbers     [facility]  Please call  [phone] to cancel and/or reschedule your appointment   The direct line to the anticoagulant nurse is [phone] on Monday, Wednesday, and Friday. On Thursday, the anticoagulant nurse can be reached directly at [phone].         November 2017 Details    Sun Mon Tue Wed Thu Fri Sat        1               2               3               4                 5               6      2.5 mg   See details      7      2.5 mg         8      2.5 mg         9      2.5 mg         10      2.5 mg         11      2.5 mg           12      2.5 mg         13            14               15               16               17               18                 19               20               21               22               23               24               25                 26               27               28               29               30                  Date Details   11/06 This INR check       Date of next INR:  11/13/2017         How to take  your warfarin dose     To take:  2.5 mg Take 1 of the 2.5 mg tablets.    To take:  3.75 mg Take 1.5 of the 2.5 mg tablets.

## 2017-11-07 DIAGNOSIS — I47.29 PAROXYSMAL VENTRICULAR TACHYCARDIA (H): ICD-10-CM

## 2017-11-07 LAB
ANION GAP SERPL CALCULATED.3IONS-SCNC: 8.8 MMOL/L (ref 6–17)
BUN SERPL-MCNC: 20 MG/DL (ref 7–30)
CALCIUM SERPL-MCNC: 9 MG/DL (ref 8.5–10.5)
CHLORIDE SERPL-SCNC: 103 MMOL/L (ref 98–107)
CO2 SERPL-SCNC: 29 MMOL/L (ref 23–29)
CREAT SERPL-MCNC: 1.6 MG/DL (ref 0.7–1.3)
GFR SERPL CREATININE-BSD FRML MDRD: 42 ML/MIN/1.7M2
GLUCOSE SERPL-MCNC: 89 MG/DL (ref 70–105)
POTASSIUM SERPL-SCNC: 4.8 MMOL/L (ref 3.5–5.1)
SODIUM SERPL-SCNC: 136 MMOL/L (ref 136–145)

## 2017-11-07 PROCEDURE — 80048 BASIC METABOLIC PNL TOTAL CA: CPT | Performed by: INTERNAL MEDICINE

## 2017-11-07 PROCEDURE — 36415 COLL VENOUS BLD VENIPUNCTURE: CPT | Performed by: INTERNAL MEDICINE

## 2017-11-13 ENCOUNTER — ANTICOAGULATION THERAPY VISIT (OUTPATIENT)
Dept: NURSING | Facility: CLINIC | Age: 72
End: 2017-11-13
Payer: COMMERCIAL

## 2017-11-13 LAB — INR POINT OF CARE: 4.1 (ref 0.86–1.14)

## 2017-11-13 PROCEDURE — 36416 COLLJ CAPILLARY BLOOD SPEC: CPT

## 2017-11-13 PROCEDURE — 99207 ZZC NO CHARGE NURSE ONLY: CPT

## 2017-11-13 PROCEDURE — 85610 PROTHROMBIN TIME: CPT | Mod: QW

## 2017-11-13 NOTE — MR AVS SNAPSHOT
Tyrel Rene   11/13/2017 4:00 PM   Anticoagulation Therapy Visit    Description:  72 year old male   Provider:   [facility]   Department:  Bx Nurse           INR as of 11/13/2017     Today's INR 4.1!      Anticoagulation Summary as of 11/13/2017     INR goal 2.0-2.5   Today's INR 4.1!   Full instructions 11/13: Hold; 11/16: 1.25 mg; 11/17: 2.5 mg; Otherwise 3.75 mg on Mon, Fri; 2.5 mg all other days   Next INR check 11/20/2017    Indications   Long-term (current) use of anticoagulants [Z79.01] [Z79.01]  Cerebral artery occlusion with cerebral infarction (HCC) [I63.50] [I63.50]  Cerebral infarction (H) [I63.9]         Your next Anticoagulation Clinic appointment(s)     Nov 20, 2017  4:00 PM CST   Anticoagulation Visit with  ANTICOAGULATION CLINIC   [facility] ([facility])    [address] [place] [id number] [phone]              Contact Numbers     [facility]  Please call  [phone] to cancel and/or reschedule your appointment   The direct line to the anticoagulant nurse is [phone] on Monday, Wednesday, and Friday. On Thursday, the anticoagulant nurse can be reached directly at [phone].         November 2017 Details    Sun Mon Tue Wed Thu Fri Sat        1               2               3               4                 5               6               7               8               9               10               11                 12               13      Hold   See details      14      2.5 mg         15      2.5 mg         16      1.25 mg         17      2.5 mg         18      2.5 mg           19      2.5 mg         20            21               22               23               24               25                 26               27               28               29               30                  Date Details   11/13 This INR check       Date of next INR:  11/20/2017          How to take your warfarin dose     To take:  1.25 mg Take 0.5 of a 2.5 mg tablet.    To take:  2.5 mg Take 1 of the 2.5 mg tablets.    To take:  3.75 mg Take 1.5 of the 2.5 mg tablets.    Hold Do not take your warfarin dose. See the Details table to the right for additional instructions.

## 2017-11-13 NOTE — PROGRESS NOTES
ANTICOAGULATION FOLLOW-UP CLINIC VISIT    Patient Name:  Tyrel Rene  Date:  11/13/2017  Contact Type:  Face to Face    SUBJECTIVE:     Patient Findings     Positives Change in medications (amiodarone), Unexplained INR or factor level change           OBJECTIVE    INR Protime   Date Value Ref Range Status   11/13/2017 4.1 (A) 0.86 - 1.14 Final       ASSESSMENT / PLAN  INR assessment SUPRA    Recheck INR In: 1 WEEK    INR Location Clinic      Anticoagulation Summary as of 11/13/2017     INR goal 2.0-2.5   Today's INR 4.1!   Maintenance plan 3.75 mg (2.5 mg x 1.5) on Mon, Fri; 2.5 mg (2.5 mg x 1) all other days   Full instructions 11/13: Hold; 11/16: 1.25 mg; 11/17: 2.5 mg; Otherwise 3.75 mg on Mon, Fri; 2.5 mg all other days   Weekly total 20 mg   Plan last modified Doreen Finley RN (1/23/2017)   Next INR check 11/20/2017   Target end date Indefinite    Indications   Long-term (current) use of anticoagulants [Z79.01] [Z79.01]  Cerebral artery occlusion with cerebral infarction (HCC) [I63.50] [I63.50]  Cerebral infarction (H) [I63.9]         Anticoagulation Episode Summary     [facility] check [facility]    Preferred lab     Send INR reminders to [facility] INR/PROTIME    Comments       Anticoagulation Care Providers     Provider Role Specialty Phone number    Dejon Downs MD [facility] [phone]            See the Encounter Report to view Anticoagulation Flowsheet and Dosing Calendar (Go to Encounters tab in chart review, and find the Anticoagulation Therapy Visit)    Dosage adjustment made based on physician directed care plan. patient aware if signs of clotting (pain, tenderness, swelling, color change in leg or arm, SOB) and bleeding occur (blood in stool, urine, large bruising, bleeding gums, nosebleeds) to have INR check sooner. If sx severe report to ER or concerned for stroke call 911. If general questions or concerns arise, call clinic.         Lynn Alves,  RN

## 2017-11-20 ENCOUNTER — ANTICOAGULATION THERAPY VISIT (OUTPATIENT)
Dept: NURSING | Facility: CLINIC | Age: 72
End: 2017-11-20
Payer: COMMERCIAL

## 2017-11-20 DIAGNOSIS — I63.9 CEREBRAL INFARCTION (H): ICD-10-CM

## 2017-11-20 DIAGNOSIS — I63.50 CEREBRAL ARTERY OCCLUSION WITH CEREBRAL INFARCTION (H): ICD-10-CM

## 2017-11-20 DIAGNOSIS — Z79.01 LONG-TERM (CURRENT) USE OF ANTICOAGULANTS: ICD-10-CM

## 2017-11-20 LAB — INR POINT OF CARE: 3.1 (ref 0.86–1.14)

## 2017-11-20 PROCEDURE — 85610 PROTHROMBIN TIME: CPT | Mod: QW

## 2017-11-20 PROCEDURE — 36416 COLLJ CAPILLARY BLOOD SPEC: CPT

## 2017-11-20 PROCEDURE — 99207 ZZC NO CHARGE NURSE ONLY: CPT

## 2017-11-20 NOTE — PROGRESS NOTES
ANTICOAGULATION FOLLOW-UP CLINIC VISIT    Patient Name:  Tyrel Rene  Date:  11/20/2017  Contact Type:  Face to Face    SUBJECTIVE:     Patient Findings     Positives No Problem Findings           OBJECTIVE    INR Protime   Date Value Ref Range Status   11/20/2017 3.1 (A) 0.86 - 1.14 Final       ASSESSMENT / PLAN  INR assessment THER    Recheck INR In: 1 WEEK    INR Location Clinic      Anticoagulation Summary as of 11/20/2017     INR goal 2.0-2.5   Today's INR 3.1!   Maintenance plan 3.75 mg (2.5 mg x 1.5) on Mon, Fri; 2.5 mg (2.5 mg x 1) all other days   Full instructions 11/20: 1.25 mg; 11/24: 1.25 mg; Otherwise 3.75 mg on Mon, Fri; 2.5 mg all other days   Weekly total 20 mg   Plan last modified Doreen Finley RN (1/23/2017)   Next INR check 11/27/2017   Target end date Indefinite    Indications   Long-term (current) use of anticoagulants [Z79.01] [Z79.01]  Cerebral artery occlusion with cerebral infarction (HCC) [I63.50] [I63.50]  Cerebral infarction (H) [I63.9]         Anticoagulation Episode Summary     [facility]    Preferred lab     Send INR reminders to [facility] INR/PROTIME    Comments       Anticoagulation Care Providers     Provider Role Specialty Phone number    Dejon Downs MD [facility] [phone]            See the Encounter Report to view Anticoagulation Flowsheet and Dosing Calendar (Go to Encounters tab in chart review, and find the Anticoagulation Therapy Visit)        Doreen Finley, RN

## 2017-11-20 NOTE — MR AVS SNAPSHOT
Tyrel Rene   11/20/2017 4:00 PM   Anticoagulation Therapy Visit    Description:  72 year old male   Provider:  [facility]   Department:  Bx Nurse           INR as of 11/20/2017     Today's INR 3.1!      Anticoagulation Summary as of 11/20/2017     INR goal 2.0-2.5   Today's INR 3.1!   Full instructions 11/20: 1.25 mg; 11/24: 1.25 mg; Otherwise 3.75 mg on Mon, Fri; 2.5 mg all other days   Next INR check 11/27/2017    Indications   Long-term (current) use of anticoagulants [Z79.01] [Z79.01]  Cerebral artery occlusion with cerebral infarction (HCC) [I63.50] [I63.50]  Cerebral infarction (H) [I63.9]         Contact Numbers     [facility]  Please call  [phone] to cancel and/or reschedule your appointment   The direct line to the anticoagulant nurse is [phone] on Monday, Wednesday, and Friday. On Thursday, the anticoagulant nurse can be reached directly at [phone].         November 2017 Details    Sun Mon Tue Wed Thu Fri Sat        1               2               3               4                 5               6               7               8               9               10               11                 12               13               14               15               16               17               18                 19               20      1.25 mg   See details      21      2.5 mg         22      2.5 mg         23      2.5 mg         24      1.25 mg         25      2.5 mg           26      2.5 mg         27            28               29               30                  Date Details   11/20 This INR check       Date of next INR:  11/27/2017         How to take your warfarin dose     To take:  1.25 mg Take 0.5 of a 2.5 mg tablet.    To take:  2.5 mg Take 1 of the 2.5 mg tablets.    To take:  3.75 mg Take 1.5 of the 2.5 mg tablets.

## 2017-11-22 DIAGNOSIS — I48.91 ATRIAL FIBRILLATION (H): ICD-10-CM

## 2017-11-22 RX ORDER — DIGOXIN 125 MCG
125 TABLET ORAL DAILY
Qty: 90 TABLET | Refills: 3 | Status: SHIPPED | OUTPATIENT
Start: 2017-11-22 | End: 2018-11-19

## 2017-11-27 ENCOUNTER — ANTICOAGULATION THERAPY VISIT (OUTPATIENT)
Dept: NURSING | Facility: CLINIC | Age: 72
End: 2017-11-27
Payer: COMMERCIAL

## 2017-11-27 DIAGNOSIS — Z79.01 LONG-TERM (CURRENT) USE OF ANTICOAGULANTS: ICD-10-CM

## 2017-11-27 DIAGNOSIS — I63.9 CEREBRAL INFARCTION (H): ICD-10-CM

## 2017-11-27 DIAGNOSIS — I63.50 CEREBRAL ARTERY OCCLUSION WITH CEREBRAL INFARCTION (H): ICD-10-CM

## 2017-11-27 LAB — INR POINT OF CARE: 3.1 (ref 0.86–1.14)

## 2017-11-27 PROCEDURE — 85610 PROTHROMBIN TIME: CPT | Mod: QW

## 2017-11-27 PROCEDURE — 99207 ZZC NO CHARGE NURSE ONLY: CPT

## 2017-11-27 PROCEDURE — 36416 COLLJ CAPILLARY BLOOD SPEC: CPT

## 2017-11-27 NOTE — PROGRESS NOTES
ANTICOAGULATION FOLLOW-UP CLINIC VISIT    Patient Name:  Tyrel Rene  Date:  11/27/2017  Contact Type:  Face to Face    SUBJECTIVE:     Patient Findings     Positives No Problem Findings           OBJECTIVE    INR Protime   Date Value Ref Range Status   11/27/2017 3.1 (A) 0.86 - 1.14 Final       ASSESSMENT / PLAN  INR assessment THER    Recheck INR In: 2 WEEKS    INR Location Clinic      Anticoagulation Summary as of 11/27/2017     INR goal 2.0-2.5   Today's INR 3.1!   Maintenance plan 1.25 mg (2.5 mg x 0.5) on Mon, Fri; 2.5 mg (2.5 mg x 1) all other days   Full instructions 1.25 mg on Mon, Fri; 2.5 mg all other days   Weekly total 15 mg   Plan last modified Doreen Finley RN (11/27/2017)   Next INR check 12/11/2017   Target end date Indefinite    Indications   Long-term (current) use of anticoagulants [Z79.01] [Z79.01]  Cerebral artery occlusion with cerebral infarction (HCC) [I63.50] [I63.50]  Cerebral infarction (H) [I63.9]         Anticoagulation Episode Summary     [facility]    Preferred lab     Send INR reminders to [facility] INR/PROTIME    Comments       Anticoagulation Care Providers     Provider Role Specialty Phone number    Dejon Downs MD [facility] [phone]            See the Encounter Report to view Anticoagulation Flowsheet and Dosing Calendar (Go to Encounters tab in chart review, and find the Anticoagulation Therapy Visit)        Doreen Finley RN

## 2017-11-27 NOTE — MR AVS SNAPSHOT
Tyrel Rene   11/27/2017 3:45 PM   Anticoagulation Therapy Visit    Description:  72 year old male   Provider:  [facility]   Department:  Bx Nurse           INR as of 11/27/2017     Today's INR 3.1!      Anticoagulation Summary as of 11/27/2017     INR goal 2.0-2.5   Today's INR 3.1!   Full instructions 1.25 mg on Mon, Fri; 2.5 mg all other days   Next INR check 12/11/2017    Indications   Long-term (current) use of anticoagulants [Z79.01] [Z79.01]  Cerebral artery occlusion with cerebral infarction (HCC) [I63.50] [I63.50]  Cerebral infarction (H) [I63.9]         Your next Anticoagulation Clinic appointment(s)     Dec 11, 2017  4:00 PM CST   Anticoagulation Visit with  ANTICOAGULATION CLINIC   [facility] ([facility]    [address] [place] [id number] [phone]              Contact Numbers     [facility]  Please call  [phone] to cancel and/or reschedule your appointment   The direct line to the anticoagulant nurse is [phone] on Monday, Wednesday, and Friday. On Thursday, the anticoagulant nurse can be reached directly at [phone].         November 2017 Details    Sun Mon Tue Wed Thu Fri Sat        1               2               3               4                 5               6               7               8               9               10               11                 12               13               14               15               16               17               18                 19               20               21               22               23               24               25                 26               27      1.25 mg   See details      28      2.5 mg         29      2.5 mg         30      2.5 mg            Date Details   11/27 This INR check               How to take your warfarin dose     To take:  1.25 mg Take 0.5 of a 2.5 mg tablet.    To take:   2.5 mg Take 1 of the 2.5 mg tablets.           December 2017 Details    Sun Mon Tue Wed Thu Fri Sat          1      1.25 mg         2      2.5 mg           3      2.5 mg         4      1.25 mg         5      2.5 mg         6      2.5 mg         7      2.5 mg         8      1.25 mg         9      2.5 mg           10      2.5 mg         11            12               13               14               15               16                 17               18               19               20               21               22               23                 24               25               26               27               28               29               30                 31                      Date Details   No additional details    Date of next INR:  12/11/2017         How to take your warfarin dose     To take:  1.25 mg Take 0.5 of a 2.5 mg tablet.    To take:  2.5 mg Take 1 of the 2.5 mg tablets.

## 2017-12-01 DIAGNOSIS — I49.02 VENTRICULAR FIBRILLATION AND FLUTTER (H): ICD-10-CM

## 2017-12-01 DIAGNOSIS — I49.01 VENTRICULAR FIBRILLATION AND FLUTTER (H): ICD-10-CM

## 2017-12-01 RX ORDER — CARVEDILOL 6.25 MG/1
6.25 TABLET ORAL 2 TIMES DAILY WITH MEALS
Qty: 180 TABLET | Refills: 3 | Status: ON HOLD | OUTPATIENT
Start: 2017-12-01 | End: 2017-12-05

## 2017-12-03 ENCOUNTER — APPOINTMENT (OUTPATIENT)
Dept: CT IMAGING | Facility: CLINIC | Age: 72
DRG: 309 | End: 2017-12-03
Attending: EMERGENCY MEDICINE
Payer: COMMERCIAL

## 2017-12-03 ENCOUNTER — HOSPITAL ENCOUNTER (INPATIENT)
Facility: CLINIC | Age: 72
LOS: 2 days | Discharge: HOME | DRG: 309 | End: 2017-12-05
Attending: EMERGENCY MEDICINE | Admitting: INTERNAL MEDICINE
Payer: COMMERCIAL

## 2017-12-03 DIAGNOSIS — I47.20 VENTRICULAR TACHYCARDIA (H): ICD-10-CM

## 2017-12-03 LAB
ALBUMIN SERPL-MCNC: 3.1 G/DL (ref 3.4–5)
ALP SERPL-CCNC: 59 U/L (ref 40–150)
ALT SERPL W P-5'-P-CCNC: 27 U/L (ref 0–70)
ANION GAP SERPL CALCULATED.3IONS-SCNC: 7 MMOL/L (ref 3–14)
AST SERPL W P-5'-P-CCNC: 31 U/L (ref 0–45)
BASOPHILS # BLD AUTO: 0 10E9/L (ref 0–0.2)
BASOPHILS NFR BLD AUTO: 0.6 %
BILIRUB SERPL-MCNC: 0.4 MG/DL (ref 0.2–1.3)
BUN SERPL-MCNC: 25 MG/DL (ref 7–30)
CALCIUM SERPL-MCNC: 8 MG/DL (ref 8.5–10.1)
CHLORIDE SERPL-SCNC: 107 MMOL/L (ref 94–109)
CO2 SERPL-SCNC: 24 MMOL/L (ref 20–32)
CREAT SERPL-MCNC: 1.32 MG/DL (ref 0.66–1.25)
DIFFERENTIAL METHOD BLD: ABNORMAL
EOSINOPHIL # BLD AUTO: 0.2 10E9/L (ref 0–0.7)
EOSINOPHIL NFR BLD AUTO: 3.5 %
ERYTHROCYTE [DISTWIDTH] IN BLOOD BY AUTOMATED COUNT: 14.1 % (ref 10–15)
GFR SERPL CREATININE-BSD FRML MDRD: 53 ML/MIN/1.7M2
GLUCOSE SERPL-MCNC: 92 MG/DL (ref 70–99)
HCT VFR BLD AUTO: 37.2 % (ref 40–53)
HGB BLD-MCNC: 12.9 G/DL (ref 13.3–17.7)
IMM GRANULOCYTES # BLD: 0 10E9/L (ref 0–0.4)
IMM GRANULOCYTES NFR BLD: 0.3 %
INR PPP: 2.93 (ref 0.86–1.14)
LYMPHOCYTES # BLD AUTO: 2.3 10E9/L (ref 0.8–5.3)
LYMPHOCYTES NFR BLD AUTO: 35.4 %
MCH RBC QN AUTO: 31.2 PG (ref 26.5–33)
MCHC RBC AUTO-ENTMCNC: 34.7 G/DL (ref 31.5–36.5)
MCV RBC AUTO: 90 FL (ref 78–100)
MONOCYTES # BLD AUTO: 1 10E9/L (ref 0–1.3)
MONOCYTES NFR BLD AUTO: 15.7 %
NEUTROPHILS # BLD AUTO: 2.9 10E9/L (ref 1.6–8.3)
NEUTROPHILS NFR BLD AUTO: 44.5 %
NRBC # BLD AUTO: 0 10*3/UL
NRBC BLD AUTO-RTO: 0 /100
PLATELET # BLD AUTO: 155 10E9/L (ref 150–450)
POTASSIUM SERPL-SCNC: 4.6 MMOL/L (ref 3.4–5.3)
PROT SERPL-MCNC: 6.7 G/DL (ref 6.8–8.8)
RBC # BLD AUTO: 4.14 10E12/L (ref 4.4–5.9)
SODIUM SERPL-SCNC: 138 MMOL/L (ref 133–144)
TROPONIN I SERPL-MCNC: 0.05 UG/L (ref 0–0.04)
WBC # BLD AUTO: 6.6 10E9/L (ref 4–11)

## 2017-12-03 PROCEDURE — 70450 CT HEAD/BRAIN W/O DYE: CPT

## 2017-12-03 PROCEDURE — 96360 HYDRATION IV INFUSION INIT: CPT

## 2017-12-03 PROCEDURE — 99285 EMERGENCY DEPT VISIT HI MDM: CPT | Mod: 25

## 2017-12-03 PROCEDURE — 85610 PROTHROMBIN TIME: CPT | Performed by: EMERGENCY MEDICINE

## 2017-12-03 PROCEDURE — 25000128 H RX IP 250 OP 636: Performed by: EMERGENCY MEDICINE

## 2017-12-03 PROCEDURE — 85025 COMPLETE CBC W/AUTO DIFF WBC: CPT | Performed by: EMERGENCY MEDICINE

## 2017-12-03 PROCEDURE — 21000001 ZZH R&B HEART CARE

## 2017-12-03 PROCEDURE — 25000132 ZZH RX MED GY IP 250 OP 250 PS 637: Performed by: EMERGENCY MEDICINE

## 2017-12-03 PROCEDURE — 99223 1ST HOSP IP/OBS HIGH 75: CPT | Mod: AI | Performed by: INTERNAL MEDICINE

## 2017-12-03 PROCEDURE — 84484 ASSAY OF TROPONIN QUANT: CPT | Performed by: EMERGENCY MEDICINE

## 2017-12-03 PROCEDURE — 25000132 ZZH RX MED GY IP 250 OP 250 PS 637: Performed by: INTERNAL MEDICINE

## 2017-12-03 PROCEDURE — 96361 HYDRATE IV INFUSION ADD-ON: CPT

## 2017-12-03 PROCEDURE — 80053 COMPREHEN METABOLIC PANEL: CPT | Performed by: EMERGENCY MEDICINE

## 2017-12-03 PROCEDURE — 36415 COLL VENOUS BLD VENIPUNCTURE: CPT | Performed by: INTERNAL MEDICINE

## 2017-12-03 PROCEDURE — 84484 ASSAY OF TROPONIN QUANT: CPT | Performed by: INTERNAL MEDICINE

## 2017-12-03 PROCEDURE — 93005 ELECTROCARDIOGRAM TRACING: CPT

## 2017-12-03 RX ORDER — CARVEDILOL 6.25 MG/1
6.25 TABLET ORAL 2 TIMES DAILY WITH MEALS
Status: DISCONTINUED | OUTPATIENT
Start: 2017-12-04 | End: 2017-12-03

## 2017-12-03 RX ORDER — DIGOXIN 125 MCG
125 TABLET ORAL DAILY
Status: DISCONTINUED | OUTPATIENT
Start: 2017-12-04 | End: 2017-12-05 | Stop reason: HOSPADM

## 2017-12-03 RX ORDER — ONDANSETRON 2 MG/ML
4 INJECTION INTRAMUSCULAR; INTRAVENOUS EVERY 6 HOURS PRN
Status: DISCONTINUED | OUTPATIENT
Start: 2017-12-03 | End: 2017-12-05 | Stop reason: HOSPADM

## 2017-12-03 RX ORDER — WARFARIN SODIUM 2.5 MG/1
2.5 TABLET ORAL
Status: COMPLETED | OUTPATIENT
Start: 2017-12-03 | End: 2017-12-03

## 2017-12-03 RX ORDER — NALOXONE HYDROCHLORIDE 0.4 MG/ML
.1-.4 INJECTION, SOLUTION INTRAMUSCULAR; INTRAVENOUS; SUBCUTANEOUS
Status: DISCONTINUED | OUTPATIENT
Start: 2017-12-03 | End: 2017-12-05 | Stop reason: HOSPADM

## 2017-12-03 RX ORDER — ACETAMINOPHEN 325 MG/1
650 TABLET ORAL EVERY 4 HOURS PRN
Status: DISCONTINUED | OUTPATIENT
Start: 2017-12-03 | End: 2017-12-05 | Stop reason: HOSPADM

## 2017-12-03 RX ORDER — ROSUVASTATIN CALCIUM 20 MG/1
20 TABLET, COATED ORAL EVERY EVENING
Status: DISCONTINUED | OUTPATIENT
Start: 2017-12-03 | End: 2017-12-05 | Stop reason: HOSPADM

## 2017-12-03 RX ORDER — ONDANSETRON 4 MG/1
4 TABLET, ORALLY DISINTEGRATING ORAL EVERY 6 HOURS PRN
Status: DISCONTINUED | OUTPATIENT
Start: 2017-12-03 | End: 2017-12-05 | Stop reason: HOSPADM

## 2017-12-03 RX ORDER — TORSEMIDE 10 MG/1
10 TABLET ORAL DAILY
Status: DISCONTINUED | OUTPATIENT
Start: 2017-12-04 | End: 2017-12-05 | Stop reason: HOSPADM

## 2017-12-03 RX ORDER — ASPIRIN 325 MG
325 TABLET ORAL ONCE
Status: COMPLETED | OUTPATIENT
Start: 2017-12-03 | End: 2017-12-03

## 2017-12-03 RX ORDER — POTASSIUM CHLORIDE 29.8 MG/ML
20 INJECTION INTRAVENOUS
Status: DISCONTINUED | OUTPATIENT
Start: 2017-12-03 | End: 2017-12-03

## 2017-12-03 RX ORDER — AMIODARONE HYDROCHLORIDE 200 MG/1
200 TABLET ORAL EVERY EVENING
Status: DISCONTINUED | OUTPATIENT
Start: 2017-12-03 | End: 2017-12-04

## 2017-12-03 RX ORDER — LISINOPRIL 5 MG/1
5 TABLET ORAL
Status: DISCONTINUED | OUTPATIENT
Start: 2017-12-04 | End: 2017-12-03

## 2017-12-03 RX ORDER — AMIODARONE HYDROCHLORIDE 200 MG/1
200 TABLET ORAL DAILY
Status: DISCONTINUED | OUTPATIENT
Start: 2017-12-04 | End: 2017-12-03

## 2017-12-03 RX ORDER — POTASSIUM CL/LIDO/0.9 % NACL 10MEQ/0.1L
10 INTRAVENOUS SOLUTION, PIGGYBACK (ML) INTRAVENOUS
Status: DISCONTINUED | OUTPATIENT
Start: 2017-12-03 | End: 2017-12-05 | Stop reason: HOSPADM

## 2017-12-03 RX ORDER — CARVEDILOL 6.25 MG/1
6.25 TABLET ORAL 2 TIMES DAILY WITH MEALS
Status: DISCONTINUED | OUTPATIENT
Start: 2017-12-03 | End: 2017-12-04

## 2017-12-03 RX ORDER — POTASSIUM CHLORIDE 1500 MG/1
20-40 TABLET, EXTENDED RELEASE ORAL
Status: DISCONTINUED | OUTPATIENT
Start: 2017-12-03 | End: 2017-12-05 | Stop reason: HOSPADM

## 2017-12-03 RX ORDER — POTASSIUM CHLORIDE 1.5 G/1.58G
20-40 POWDER, FOR SOLUTION ORAL
Status: DISCONTINUED | OUTPATIENT
Start: 2017-12-03 | End: 2017-12-05 | Stop reason: HOSPADM

## 2017-12-03 RX ORDER — LISINOPRIL 5 MG/1
5 TABLET ORAL
Status: DISCONTINUED | OUTPATIENT
Start: 2017-12-03 | End: 2017-12-05 | Stop reason: HOSPADM

## 2017-12-03 RX ORDER — POTASSIUM CHLORIDE 7.45 MG/ML
10 INJECTION INTRAVENOUS
Status: DISCONTINUED | OUTPATIENT
Start: 2017-12-03 | End: 2017-12-05 | Stop reason: HOSPADM

## 2017-12-03 RX ADMIN — SODIUM CHLORIDE 500 ML: 9 INJECTION, SOLUTION INTRAVENOUS at 19:22

## 2017-12-03 RX ADMIN — ROSUVASTATIN CALCIUM 20 MG: 20 TABLET, FILM COATED ORAL at 23:07

## 2017-12-03 RX ADMIN — LISINOPRIL 5 MG: 5 TABLET ORAL at 23:07

## 2017-12-03 RX ADMIN — ASPIRIN 325 MG ORAL TABLET 325 MG: 325 PILL ORAL at 21:02

## 2017-12-03 RX ADMIN — RANITIDINE 150 MG: 150 TABLET ORAL at 23:07

## 2017-12-03 RX ADMIN — AMIODARONE HYDROCHLORIDE 200 MG: 200 TABLET ORAL at 23:07

## 2017-12-03 RX ADMIN — CARVEDILOL 6.25 MG: 6.25 TABLET, FILM COATED ORAL at 23:07

## 2017-12-03 RX ADMIN — WARFARIN SODIUM 2.5 MG: 2.5 TABLET ORAL at 23:07

## 2017-12-03 ASSESSMENT — ENCOUNTER SYMPTOMS
BACK PAIN: 0
NECK PAIN: 0
SHORTNESS OF BREATH: 0
LIGHT-HEADEDNESS: 0

## 2017-12-03 NOTE — IP AVS SNAPSHOT
MRN:[id number]                      After Visit Summary   12/3/2017    Tyrel Rene    MRN: [id number]           Thank you!     Thank you for choosing [facility] for your care. Our goal is always to provide you with excellent care. Hearing back from our patients is one way we can continue to improve our services. Please take a few minutes to complete the written survey that you may receive in the mail after you visit with us. Thank you!        Patient Information     Date Of Birth          1945        Designated Caregiver       Most Recent Value    Caregiver    Will someone help with your care after discharge? yes    Name of designated caregiver Noris Rene    Phone number of caregiver -    Caregiver address -      About your hospital stay     You were admitted on:  December 3, 2017 You last received care in the:  [facility]    You were discharged on:  December 5, 2017        Reason for your hospital stay       You were admitted with syncope and vt and being reloaded on amiodarone and coreg dose increased                  Who to Call     For medical emergencies, please call 911.  For non-urgent questions about your medical care, please call your primary care provider or clinic, [phone]          Attending Provider     Provider Specialty    Jacob Condon MD Emergency Medicine    Harinder Rosa MD Emergency Medicine    AxelDar rasheed MD Internal Medicine       Primary Care Provider Office Phone # Fax #    Dejon Downs [phone]      After Care Instructions     Activity       Your activity upon discharge: activity as tolerated, no driving for 3 months as per EP            Diet       Follow this diet upon discharge: Orders Placed This Encounter      Combination Diet Regular Diet Adult                  Follow-up Appointments     Follow-up and recommended labs and tests        Make INR appt for  Thursday or Friday of this week as you're now back on a higher dose of amiodarone  Get ICD interrogated on Thursday 12/14 @ 11 AM. See RENNY Moreno @ [facility] @ noon that day  Keep Dr. Downs's appt            Follow-up and recommended labs and tests        Follow up with PCP as directed, with INR at end of this week  Follow up with cardiology ANP on 12/14 as directed  Follow up with CORE clinic as directed                  Your next 10 appointments already scheduled     Dec 08, 2017  2:00 PM CST   Anticoagulation Visit with [facility]ULATION CLINIC   [facility] ([facility])    [address]  [facility] [address]   [phone]            Dec 11, 2017  4:00 PM CST   Anticoagulation Visit with BX ANTICOAGULATION CLINIC   [facility] ([facility])    [address] [place] [address] [address]  [facility] [address]   [phone]            Dec 14, 2017 11:00 AM CST   ICD Check with MARQUEZ DCR2   [facility] ([facility])    [address] [address]   [phone]            Dec 14, 2017 12:00 PM CST   Return Discharge with Joelle Rodríguez PA-C   [facility] ([facility])    [address] [address]   [phone]            Dec 20, 2017 11:30 AM CST   PHYSICAL with Dejon Downs MD   [facility] ([facility])    [address]  [address]  [facility] [address]   [phone]            Jan 04, 2018  1:00 PM CST   ICD Check with MARQUEZ ANDREWN   [facility] ([facility])    [address] [address]   [phone]            Aguilar 15, 2018  9:00 AM CST   LAB with MARQUEZ LAB    [facility] [place] ([facility])    [address]  [facility] [address]   [phone]           Please do not eat 10-12 hours before your appointment if you are coming in fasting for labs on lipids, cholesterol, or glucose (sugar). This does not apply to pregnant women. Water, hot tea and black coffee (with nothing added) are okay. Do not drink other fluids, diet soda or chew gum.            Aguilar 15, 2018  9:30 AM CST   Ech Complete with SHCVECHR2   [facility] CV Echocardiography ([facility]iovascular Imaging at [facility])    [address] [place] [address]  [address]  [facility] [address] [phone]           1. Please bring or wear a comfortable two-piece outfit. 2. You may eat, drink and take your normal medicines. 3. For any questions that cannot be answered, please contact the ordering physician            Jan 19, 2018  2:15 PM CST   Core MD Return with Parish Newman MD   [facility] ([facility])    [address] [place] [address] [address]  [facility] [address]   [phone]              Additional Services     Follow-Up with Cardiac Advanced Practice Provider           Follow-Up with Device Clinic                 Future tests that were ordered for you     Basic metabolic panel                 Warfarin Instruction     You have started taking a medicine called warfarin. This is a blood-thinning medicine (anticoagulant). It helps prevent and treat blood clots.      Before leaving the hospital, make sure you know how much to take and how long to take it.      You will need regular blood tests to make sure your blood is clotting safely. It is very important to see your doctor for regular blood tests.    Talk to your doctor before taking any new medicine (this includes over-the-counter drugs and herbal products). Many medicines can interact with warfarin. This may cause more bleeding or too much  "clotting.     Eating a lot of vitamin K--found in green, leafy vegetables--can change the way warfarin works in your body. Do NOT avoid these foods. Instead, try to eat the same amount each day.     Bleeding is the most common side-effect of warfarin. You may notice bleeding gums, a bloody nose, bruises and bleeding longer when you cut yourself. See a doctor at once if:   o You cough up blood  o You find blood in your stool (poop)  o You have a deep cut, or a cut that bleeds longer than 10 minutes   o You have a bad cut, hard fall, accident or hit your head (go to urgent care or the emergency room).    For women who can get pregnant: This medicine can harm an unborn baby. Be very careful not to get pregnant while taking this medicine. If you think you might be pregnant, call your doctor right away.    For more information, read \"Guide to Warfarin Therapy,  the booklet you received in the hospital.        Pending Results     No orders found from 12/1/2017 to 12/4/2017.            Statement of Approval     Ordered          12/05/17 1322  I have reviewed and agree with all the recommendations and orders detailed in this document.  EFFECTIVE NOW     Approved and electronically signed by:  Demetrio New MD             Admission Information     Date & Time Provider Department Dept. Phone    12/3/2017 Dar Reyna MD [facility] [phone]      Your Vitals Were     Blood Pressure Pulse Temperature Respirations Height Weight    122/71 (BP Location: Left arm) 65 97.6  F (36.4  C) (Oral) 16 1.854 m (6' 1\") 81.1 kg (178 lb 12.8 oz)    Pulse Oximetry BMI (Body Mass Index)                97% 23.59 kg/m2          MyChart Information     [facility] gives you secure access to your electronic health record. If you see a primary care provider, you can also send messages to your care team and make appointments. If you have questions, please call your primary care clinic.  If you do not have a " primary care provider, please call [phone] and they will assist you.        Care EveryWhere ID     This is your Care EveryWhere ID. This could be used by other organizations to access your [facility] medical records  [id number]        Equal Access to Services     JAVI OTERO : Hadnathalie cornelius garcia martin De Anda, wadonnada luqadaha, qadomenicota kaalmada aaliyah, deb geniein hayaamiesha uvsheron rosales tereza bird. So [facility] [phone].    ATENCIÓN: Si habla español, tiene a hagen disposición servicios gratuitos de asistencia lingüística. Llame al [phone].    We comply with applicable federal civil rights laws and [place] laws. We do not discriminate on the basis of race, color, national origin, age, disability, sex, sexual orientation, or gender identity.               Review of your medicines      CONTINUE these medicines which may have CHANGED, or have new prescriptions. If we are uncertain of the size of tablets/capsules you have at home, strength may be listed as something that might have changed.        Dose / Directions    * amiodarone 200 MG tablet   Commonly known as:  PACERONE/CODARONE   This may have changed:    - how much to take  - how to take this  - when to take this  - additional instructions        Dose:  200 mg   Take 1 tablet (200 mg) by mouth 2 times daily x 3 weeks, then 200 mg daily   Quantity:  42 tablet   Refills:  0       * amiodarone 200 MG tablet   Commonly known as:  PACERONE/CODARONE   This may have changed:  You were already taking a medication with the same name, and this prescription was added. Make sure you understand how and when to take each.        Dose:  200 mg   Start taking on:  12/26/2017   Take 1 tablet (200 mg) by mouth daily   Quantity:  30 tablet   Refills:  0       carvedilol 3.125 MG tablet   Commonly known as:  COREG   This may have changed:    - medication strength  - how much to take        Dose:  9.375 mg   Take 3 tablets (9.375 mg) by mouth 2 times daily (with meals)   Quantity:   180 tablet   Refills:  0       * Notice:  This list has 2 medication(s) that are the same as other medications prescribed for you. Read the directions carefully, and ask your doctor or other care provider to review them with you.      CONTINUE these medicines which have NOT CHANGED        Dose / Directions    ASPIRIN NOT PRESCRIBED   Commonly known as:  INTENTIONAL   Used for:  ASHD (arteriosclerotic heart disease)        Antiplatelet medication not prescribed intentionally due to Current anticoagulant therapy (warfarin/enoxaparin)   Quantity:  0 each   Refills:  0       digoxin 125 MCG tablet   Commonly known as:  LANOXIN   Used for:  Atrial fibrillation (H)        Dose:  125 mcg   Take 1 tablet (125 mcg) by mouth daily   Quantity:  90 tablet   Refills:  3       lisinopril 5 MG tablet   Commonly known as:  PRINIVIL/ZESTRIL   Used for:  CHF (congestive heart failure) (H)        Dose:  5 mg   Take 1 tablet (5 mg) by mouth 2 times daily   Quantity:  180 tablet   Refills:  3       NITROSTAT 0.4 MG sublingual tablet   Used for:  Postsurgical aortocoronary bypass status   Generic drug:  nitroGLYcerin        DISSOLVE 1 TABLET UNDER THE TONGUE EVERY 5 MINUTES AS NEEDED FOR CHEST PAIN   Quantity:  25 tablet   Refills:  0       PRESERVISION AREDS 2 PO        Dose:  1 capsule   Take 1 capsule by mouth 2 times daily   Refills:  0       ranitidine 150 MG tablet   Commonly known as:  ZANTAC   Used for:  S/P CABG (coronary artery bypass graft)        Dose:  150 mg   Take 1 tablet (150 mg) by mouth 2 times daily   Quantity:  180 tablet   Refills:  0       rosuvastatin 20 MG tablet   Commonly known as:  CRESTOR        Dose:  20 mg   Take 1 tablet (20 mg) by mouth daily   Quantity:  30 tablet   Refills:  11       sildenafil 100 MG tablet   Commonly known as:  VIAGRA   Used for:  Erectile dysfunction, unspecified erectile dysfunction type        Dose:  100 mg   Take 1 tablet (100 mg) by mouth daily as needed for erectile dysfunction  Take 30 min to 4 hours before intercourse.  Never use with nitroglycerin, terazosin or doxazosin.   Quantity:  16 tablet   Refills:  3       Vitamin D (Cholecalciferol) 1000 UNITS Tabs        Dose:  1000 mg   Take 1,000 mg by mouth daily   Refills:  0       * WARFARIN SODIUM PO        Dose:  2.5 mg   Take 2.5 mg by mouth daily On Sun, Tue, Wed, Thu, and Sat   Refills:  0       * WARFARIN SODIUM PO        Dose:  1.25 mg   Take 1.25 mg by mouth daily On Mon and Fri.   Refills:  0       * Notice:  This list has 2 medication(s) that are the same as other medications prescribed for you. Read the directions carefully, and ask your doctor or other care provider to review them with you.      STOP taking     torsemide 20 MG tablet   Commonly known as:  DEMADEX                Where to get your medicines      These medications were sent to [facility] [place] - [address]  [address], [facility] [address]     Phone:  [phone]     amiodarone 200 MG tablet    amiodarone 200 MG tablet    carvedilol 3.125 MG tablet                Protect others around you: Learn how to safely use, store and throw away your medicines at www.disposemymeds.org.             Medication List: This is a list of all your medications and when to take them. Check marks below indicate your daily home schedule. Keep this list as a reference.      Medications           Morning Afternoon Evening Bedtime As Needed    * amiodarone 200 MG tablet   Commonly known as:  PACERONE/CODARONE   Take 1 tablet (200 mg) by mouth 2 times daily x 3 weeks, then 200 mg daily   Last time this was given:  200 mg on 12/5/2017 10:55 AM                                      * amiodarone 200 MG tablet   Commonly known as:  PACERONE/CODARONE   Take 1 tablet (200 mg) by mouth daily   Start taking on:  12/26/2017   Last time this was given:  200 mg on 12/5/2017 10:55 AM                                ASPIRIN NOT PRESCRIBED   Commonly known as:   INTENTIONAL   Antiplatelet medication not prescribed intentionally due to Current anticoagulant therapy (warfarin/enoxaparin)                                carvedilol 3.125 MG tablet   Commonly known as:  COREG   Take 3 tablets (9.375 mg) by mouth 2 times daily (with meals)   Last time this was given:  9.375 mg on 12/5/2017  8:50 AM                                digoxin 125 MCG tablet   Commonly known as:  LANOXIN   Take 1 tablet (125 mcg) by mouth daily   Last time this was given:  125 mcg on 12/5/2017  8:49 AM                                   lisinopril 5 MG tablet   Commonly known as:  PRINIVIL/ZESTRIL   Take 1 tablet (5 mg) by mouth 2 times daily   Last time this was given:  5 mg on 12/5/2017  8:49 AM                                      NITROSTAT 0.4 MG sublingual tablet   DISSOLVE 1 TABLET UNDER THE TONGUE EVERY 5 MINUTES AS NEEDED FOR CHEST PAIN   Generic drug:  nitroGLYcerin                                PRESERVISION AREDS 2 PO   Take 1 capsule by mouth 2 times daily                                      ranitidine 150 MG tablet   Commonly known as:  ZANTAC   Take 1 tablet (150 mg) by mouth 2 times daily   Last time this was given:  150 mg on 12/5/2017  8:49 AM                                      rosuvastatin 20 MG tablet   Commonly known as:  CRESTOR   Take 1 tablet (20 mg) by mouth daily   Last time this was given:  20 mg on 12/4/2017  9:54 PM                                   sildenafil 100 MG tablet   Commonly known as:  VIAGRA   Take 1 tablet (100 mg) by mouth daily as needed for erectile dysfunction Take 30 min to 4 hours before intercourse.  Never use with nitroglycerin, terazosin or doxazosin.                                Vitamin D (Cholecalciferol) 1000 UNITS Tabs   Take 1,000 mg by mouth daily                                   * WARFARIN SODIUM PO   Take 2.5 mg by mouth daily On Sun, Tue, Wed, Thu, and Sat   Last time this was given:  1 mg on 12/4/2017  6:16 PM                                    * WARFARIN SODIUM PO   Take 1.25 mg by mouth daily On Mon and Fri.   Last time this was given:  1 mg on 12/4/2017  6:16 PM                                   * Notice:  This list has 4 medication(s) that are the same as other medications prescribed for you. Read the directions carefully, and ask your doctor or other care provider to review them with you.

## 2017-12-03 NOTE — IP AVS SNAPSHOT
[facility]    [address] [place] [address]., [address]    [place] [address]    Phone:  [phone]                                       After Visit Summary   12/3/2017    Tyrel Rene    MRN: [id number]           After Visit Summary Signature Page     I have received my discharge instructions, and my questions have been answered. I have discussed any challenges I see with this plan with the nurse or doctor.    ..........................................................................................................................................  Patient/Patient Representative Signature      ..........................................................................................................................................  Patient Representative Print Name and Relationship to Patient    ..................................................               ................................................  Date                                            Time    ..........................................................................................................................................  Reviewed by Signature/Title    ...................................................              ..............................................  Date                                                            Time

## 2017-12-04 ENCOUNTER — DOCUMENTATION ONLY (OUTPATIENT)
Dept: CARDIOLOGY | Facility: CLINIC | Age: 72
End: 2017-12-04

## 2017-12-04 LAB
ANION GAP SERPL CALCULATED.3IONS-SCNC: 10 MMOL/L (ref 3–14)
BASOPHILS # BLD AUTO: 0 10E9/L (ref 0–0.2)
BASOPHILS NFR BLD AUTO: 0.5 %
BUN SERPL-MCNC: 23 MG/DL (ref 7–30)
CALCIUM SERPL-MCNC: 8.2 MG/DL (ref 8.5–10.1)
CHLORIDE SERPL-SCNC: 108 MMOL/L (ref 94–109)
CO2 SERPL-SCNC: 23 MMOL/L (ref 20–32)
CREAT SERPL-MCNC: 1.25 MG/DL (ref 0.66–1.25)
DIFFERENTIAL METHOD BLD: ABNORMAL
EOSINOPHIL # BLD AUTO: 0.1 10E9/L (ref 0–0.7)
EOSINOPHIL NFR BLD AUTO: 1.5 %
ERYTHROCYTE [DISTWIDTH] IN BLOOD BY AUTOMATED COUNT: 14.2 % (ref 10–15)
GFR SERPL CREATININE-BSD FRML MDRD: 56 ML/MIN/1.7M2
GLUCOSE SERPL-MCNC: 91 MG/DL (ref 70–99)
HCT VFR BLD AUTO: 36.8 % (ref 40–53)
HGB BLD-MCNC: 12.7 G/DL (ref 13.3–17.7)
IMM GRANULOCYTES # BLD: 0 10E9/L (ref 0–0.4)
IMM GRANULOCYTES NFR BLD: 0.3 %
INR PPP: 3.04 (ref 0.86–1.14)
INTERPRETATION ECG - MUSE: NORMAL
LYMPHOCYTES # BLD AUTO: 2.3 10E9/L (ref 0.8–5.3)
LYMPHOCYTES NFR BLD AUTO: 30.7 %
MCH RBC QN AUTO: 31 PG (ref 26.5–33)
MCHC RBC AUTO-ENTMCNC: 34.5 G/DL (ref 31.5–36.5)
MCV RBC AUTO: 90 FL (ref 78–100)
MONOCYTES # BLD AUTO: 1 10E9/L (ref 0–1.3)
MONOCYTES NFR BLD AUTO: 13.4 %
NEUTROPHILS # BLD AUTO: 3.9 10E9/L (ref 1.6–8.3)
NEUTROPHILS NFR BLD AUTO: 53.6 %
NRBC # BLD AUTO: 0 10*3/UL
NRBC BLD AUTO-RTO: 0 /100
PLATELET # BLD AUTO: 147 10E9/L (ref 150–450)
POTASSIUM SERPL-SCNC: 4.3 MMOL/L (ref 3.4–5.3)
RBC # BLD AUTO: 4.1 10E12/L (ref 4.4–5.9)
SODIUM SERPL-SCNC: 141 MMOL/L (ref 133–144)
TROPONIN I SERPL-MCNC: 0.28 UG/L (ref 0–0.04)
TROPONIN I SERPL-MCNC: 0.31 UG/L (ref 0–0.04)
WBC # BLD AUTO: 7.3 10E9/L (ref 4–11)

## 2017-12-04 PROCEDURE — 25000132 ZZH RX MED GY IP 250 OP 250 PS 637: Performed by: INTERNAL MEDICINE

## 2017-12-04 PROCEDURE — 85610 PROTHROMBIN TIME: CPT | Performed by: INTERNAL MEDICINE

## 2017-12-04 PROCEDURE — 99232 SBSQ HOSP IP/OBS MODERATE 35: CPT | Performed by: INTERNAL MEDICINE

## 2017-12-04 PROCEDURE — 25000128 H RX IP 250 OP 636: Performed by: INTERNAL MEDICINE

## 2017-12-04 PROCEDURE — 85025 COMPLETE CBC W/AUTO DIFF WBC: CPT | Performed by: INTERNAL MEDICINE

## 2017-12-04 PROCEDURE — 80048 BASIC METABOLIC PNL TOTAL CA: CPT | Performed by: INTERNAL MEDICINE

## 2017-12-04 PROCEDURE — 21000001 ZZH R&B HEART CARE

## 2017-12-04 PROCEDURE — 99222 1ST HOSP IP/OBS MODERATE 55: CPT | Performed by: INTERNAL MEDICINE

## 2017-12-04 PROCEDURE — 36415 COLL VENOUS BLD VENIPUNCTURE: CPT | Performed by: INTERNAL MEDICINE

## 2017-12-04 PROCEDURE — 84484 ASSAY OF TROPONIN QUANT: CPT | Performed by: INTERNAL MEDICINE

## 2017-12-04 RX ORDER — WARFARIN SODIUM 1 MG/1
1 TABLET ORAL
Status: COMPLETED | OUTPATIENT
Start: 2017-12-04 | End: 2017-12-04

## 2017-12-04 RX ADMIN — AMIODARONE HYDROCHLORIDE 0.5 MG/MIN: 50 INJECTION, SOLUTION INTRAVENOUS at 15:54

## 2017-12-04 RX ADMIN — LISINOPRIL 5 MG: 5 TABLET ORAL at 21:54

## 2017-12-04 RX ADMIN — CARVEDILOL 9.38 MG: 6.25 TABLET, FILM COATED ORAL at 18:16

## 2017-12-04 RX ADMIN — LISINOPRIL 5 MG: 5 TABLET ORAL at 10:36

## 2017-12-04 RX ADMIN — CARVEDILOL 9.38 MG: 6.25 TABLET, FILM COATED ORAL at 10:47

## 2017-12-04 RX ADMIN — ROSUVASTATIN CALCIUM 20 MG: 20 TABLET, FILM COATED ORAL at 21:54

## 2017-12-04 RX ADMIN — RANITIDINE 150 MG: 150 TABLET ORAL at 10:37

## 2017-12-04 RX ADMIN — DIGOXIN 125 MCG: 125 TABLET ORAL at 10:36

## 2017-12-04 RX ADMIN — AMIODARONE HYDROCHLORIDE 1 MG/MIN: 50 INJECTION, SOLUTION INTRAVENOUS at 11:27

## 2017-12-04 RX ADMIN — TORSEMIDE 10 MG: 10 TABLET ORAL at 10:37

## 2017-12-04 RX ADMIN — WARFARIN SODIUM 1 MG: 1 TABLET ORAL at 18:16

## 2017-12-04 RX ADMIN — RANITIDINE 150 MG: 150 TABLET ORAL at 21:55

## 2017-12-04 NOTE — H&P
PRIMARY CARE PROVIDER:  Dejon Downs MD      CHIEF COMPLAINT:  Syncope.      HISTORY OF PRESENT ILLNESS:  Mr. Tyrel Rene is a pleasant 72-year-old male with history of severe ischemic cardiomyopathy, coronary artery disease, atrial fibrillation, status post ablation, AICD placement, ventricular tachycardia, hypertension, hyperlipidemia, chronic kidney disease stage III, subdural hematoma, who presents to the emergency room after he had an episode of syncope at home.  Reportedly, the patient has recently started exercising on his elliptical.  He was back on his elliptical around 7:00 p.m. this evening with a goal of 5 minutes.  Towards the end, he started getting tired.  His wife was also together with him at that time.  She was talking to him, concerned about him getting tired, and the next thing she saw was the patient passing out.  Per the wife, the patient got in between the wall and the elliptical, and also scraped his right forehead, and gently lowered to his knee in the process.  He did not slump over or hit his head.  Per the wife, he passed out for about 3 minutes.  EMS was called and he was brought to the emergency room.  On questioning now, the patient remembers getting tired, but he did not have any lightheadedness or dizziness.  No chest pain, shortness of breath or palpitations.  He does not remember ICD shocking him.  Denies any diarrhea or vomiting.  No recent illness.  No dysuria, urinary urgency or frequency.  His blood sugar at the scene was 102, per EMS.  He has been pretty active and in his normal state of health, and did not have any issues with presyncope or syncope lately.      The patient's electrophysiologist is Dr. Costello, whom he last saw on 10/17/2017.  At that time, he was found to have episodes of polymorphic ventricular tachycardia with aborted ICD shock.  He was started on amiodarone, which he is still taking.      In the emergency room, the patient was evaluated by Dr. Rosa.   Basic lab work showed a creatinine of 1.32 which is about his baseline, troponin was trivially elevated at 0.052, and INR was therapeutic at 2.93.      CT head was negative for acute process.      ICD was interrogated, and reportedly he had an episode of ventricular tachycardia right around the time when he syncopized.  The details of the report are not available to me at this time.  This was verbally relayed by the emergency room physician.      PAST MEDICAL HISTORY:   1.  Coronary artery disease with severe ischemic cardiomyopathy.   2.  Chronic anemia.   3.  Atrial fibrillation, status post ablation.   4.  Long-term anticoagulation use for atrial fibrillation.   5.  Hypertension.   6.  Cerebrovascular accident.   7.  Hyperlipidemia.      ALLERGIES:  Nystatin.      SOCIAL AND PERSONAL HISTORY:  Lives with his wife.  No tobacco, alcohol or recreational drug use.      FAMILY HISTORY:  Father with heart disease, and sister with Alzheimer's disease.      HOME MEDICATIONS:    Prior to Admission Medications   Prescriptions Last Dose Informant Patient Reported? Taking?   ASPIRIN NOT PRESCRIBED (INTENTIONAL)  Spouse/Significant Other No No   Sig: Antiplatelet medication not prescribed intentionally due to Current anticoagulant therapy (warfarin/enoxaparin)   Multiple Vitamins-Minerals (PRESERVISION AREDS 2 PO) 12/3/2017 at am Spouse/Significant Other Yes Yes   Sig: Take 1 capsule by mouth 2 times daily   NITROSTAT 0.4 MG sublingual tablet  at prn Spouse/Significant Other No Yes   Sig: DISSOLVE 1 TABLET UNDER THE TONGUE EVERY 5 MINUTES AS NEEDED FOR CHEST PAIN   Vitamin D, Cholecalciferol, 1000 UNITS TABS 12/3/2017 at am Spouse/Significant Other Yes Yes   Sig: Take 1,000 mg by mouth daily    WARFARIN SODIUM PO 12/2/2017 at pm Spouse/Significant Other Yes Yes   Sig: Take 2.5 mg by mouth daily On Sun, Tue, Wed, Thu, and Sat   WARFARIN SODIUM PO Past Week at Unknown time Spouse/Significant Other Yes Yes   Sig: Take 1.25 mg by  mouth daily On Mon and Fri.   amiodarone (PACERONE/CODARONE) 200 MG tablet 12/2/2017 at pm Spouse/Significant Other No No   Sig: Start with 1 tab bid x 2 weeks and thereafter take 1 tab daily   Patient taking differently: Take 200 mg by mouth daily    carvedilol (COREG) 6.25 MG tablet 12/3/2017 at am Spouse/Significant Other No No   Sig: Take 1 tablet (6.25 mg) by mouth 2 times daily (with meals)   digoxin (LANOXIN) 125 MCG tablet 12/3/2017 at am Spouse/Significant Other No Yes   Sig: Take 1 tablet (125 mcg) by mouth daily   lisinopril (PRINIVIL,ZESTRIL) 5 MG tablet 12/3/2017 at am Spouse/Significant Other No Yes   Sig: Take 1 tablet (5 mg) by mouth 2 times daily   ranitidine (ZANTAC) 150 MG tablet 12/3/2017 at am Spouse/Significant Other No Yes   Sig: Take 1 tablet (150 mg) by mouth 2 times daily   rosuvastatin (CRESTOR) 20 MG tablet 12/2/2017 at pm Spouse/Significant Other No Yes   Sig: Take 1 tablet (20 mg) by mouth daily   sildenafil (VIAGRA) 100 MG tablet  at PRN Spouse/Significant Other No Yes   Sig: Take 1 tablet (100 mg) by mouth daily as needed for erectile dysfunction Take 30 min to 4 hours before intercourse.  Never use with nitroglycerin, terazosin or doxazosin.   torsemide (DEMADEX) 20 MG tablet  at prn Spouse/Significant Other Yes No   Sig: Take 10 mg by mouth daily As needed for wt >177 lbs. Used for fluid overload.      Facility-Administered Medications: None        REVIEW OF SYSTEMS:  A complete review of systems was done and was negative for anything else other than that mentioned in the HPI.      PHYSICAL EXAM:   GENERAL:  Mr. Rene is a 72-year-old male.  He is not in any overt distress.   VITAL SIGNS:  Temperature 97.5, blood pressure 140/91, heart rate 78, respiratory rate 16, O2 sats 99% on room air.   HEENT:  Atraumatic except for minimal bruising on the right forehead area.   NECK:  Supple with good range of motion.   RESPIRATORY:  Good air entry bilaterally with normal effort of breathing.    CARDIOVASCULAR:  Regular rate and rhythm.  There is no murmur.   ABDOMEN:  Soft, nontender.   EXTREMITIES:  There is no edema, cyanosis or clubbing.   SKIN:  Warm and dry.   NEURO:  Awake, alert, oriented.  Cranial nerves II-XII intact.  Moving all four extremities without any problem.      LAB AND DIAGNOSTIC DATA:  Creatinine is 1.32; baseline creatinine appears to be between 1.4-1.6.  Troponin is 0.052.  Hemoglobin is 12.9.  INR is 2.93.      Twelve-lead EKG shows a ventricular paced rhythm.      CT head is negative for acute process.      ASSESSMENT AND PLAN:  Mr. Rene is a 72-year-old male with a complex cardiac history including coronary artery disease, ischemic cardiomyopathy, status post ICD placement, atrial fibrillation, status post pacemaker placement, on chronic anticoagulation, who presents to the emergency room with syncope.         1.  Syncope, most likely due to ventricular tachycardia:  The patient presents with syncope while he was on his elliptical today.  ICD interrogation is consistent with ventricular tachycardia, per verbal reports; I do not have the official report yet.  In any case, he was recently seen by Dr. Costello in October, and had concerns about polymorphic ventricular tachycardia at that time.  He was started on amiodarone, and reportedly has not had any problem in the interim.  At this time, Dr. Rosa from the emergency room spoke with Cardiology fellow on-call who recommended monitoring him, and EP evaluation in the morning; if he has more recurrence of ventricular tachycardia, then we will start him on IV amiodarone.  Otherwise, we will continue his oral amiodarone.  He had a minimal troponin elevation.  I will do serial troponins, although the patient denies any chest pain at this time.   2.  Atrial fibrillation:  On chronic anticoagulation.  The patient is on Coreg 6.25 mg twice a day, and digoxin 125 mcg daily, along with amiodarone.  He is also anticoagulated with Coumadin.   INR is therapeutic.  We will ask pharmacy to dose his Coumadin.   3.  Severe ischemic cardiomyopathy:  The patient's last echocardiogram was in 2017, and his ejection fraction was 30-35% at that time.  The patient normally takes torsemide 10 mg daily as needed, based on his weight.  He appears to be euvolemic at the moment.  Monitor his volume status closely while he is in the hospital.   4.  Hyperlipidemia:  Continue prior to admission Crestor 20 mg daily.   5.  Chronic kidney disease, stage III:  This appears to be at baseline.  Monitor intermittently.   6.  Chronic anemia:  Baseline hemoglobin appears to be between 11-12.  He appears at baseline at the moment.   7.  Anticipated length of stay more than two midnights.   8.  CODE STATUS:  FULL CODE.         RANJIT MONREAL MD             D: 2017 21:35   T: 2017 23:56   MT: EM#[id number]      Name:     ARTEM ELIZALDE   MRN:      [id number]        Account:      [id number]   :      1945           Admitted:     [id number]      Document: [id number]       cc: Suellen Downs MD
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
## 2017-12-04 NOTE — PROGRESS NOTES
[facility]    Hospitalist Progress Note  Kelton Hickman MD    Assessment & Plan     72-year-old male with PmHx of coronary artery disease, ischemic cardiomyopathy, status post ICD placement, atrial fibrillation, status post pacemaker placement, on chronic anticoagulation, who presented to the emergency room on 12/3/17 due to a syncopal episode while working out on his elliptical. Work up done on 12/3/17 revealed, CMP significant for creat 1.32, Alb 3.1. CBC revealed, Hb 12.9, WBC 6.6 and Plts 155.  INR is 2.93. CT Head on 12/3/17 revealed, no bleed or fractures are identified. Age related changes including diffuse brain atrophy.  White matter changes consistent with small vessel ischemic disease.  Multiple small chronic infarcts. Interrogation of his ICD revealed, an episode of VTach prior to his syncope.    1.  Syncope, due to ICD shock for ventricular tachycardia: For review by EP Cardiology. Pt has evidence of demand ischemia with Serial troponin was 0.052-->0.278-->0.309 on 12/4/17.     2.  Chronic Atrial fibrillation: Continue oral Amiodarone, digoxin, coreg and warfarin. INR was 3.04 on 12/4/17.    3.  Severe ischemic cardiomyopathy: ECHO done on 1/17/17 revealed, LVEF 30-35%, there is anterior, septal and apical wall akinesis. There is mild to mod MR. There is no thrombus in the LV.       4.  Hyperlipidemia: Continue crestor 20 mg daily.     5.  Chronic kidney disease, stage III: Creat was 1.32-->1.25 on 12/4/17.      6.  Anemia of chronic disorder: Hb was 12.7g/dl on 12/4/17.         DVT Prophylaxis: Warfarin  Code Status: Full Code    Disposition: Expected discharge in 1 day.      Interval History   The pt is asymptomatic since admission. He was commenced on IV Amiodarone drip.    -Data reviewed today: I reviewed all new labs and imaging results over the last 24 hours. I personally reviewed no images or EKG's today.    Physical Exam   Temp: 98  F (36.7  C) Temp src: Oral BP:  123/72 Pulse: 70 Heart Rate: 65 Resp: 18 SpO2: 96 % O2 Device: None (Room air)    Vitals:    12/03/17 2154 12/04/17 0200   Weight: 83.6 kg (184 lb 6.4 oz) 82.7 kg (182 lb 6.4 oz)     Vital Signs with Ranges  Temp:  [97.5  F (36.4  C)-98.2  F (36.8  C)] 98  F (36.7  C)  Pulse:  [70] 70  Heart Rate:  [65] 65  Resp:  [14-18] 18  BP: (123-149)/(72-91) 123/72  SpO2:  [95 %-99 %] 96 %  I/O last 3 completed shifts:  In: 123 [P.O.:120; I.V.:3]  Out: 750 [Urine:750]    Constitutional: Elderly white male, awake, cooperative, no apparent distress, O2 Sats 97% on RA  Respiratory: BS vesicular bilaterally, no crackles or wheezing  Cardiovascular: S1 and S2, reg, no murmur noted, ICD in situ+  GI: Soft, non-distended, non-tender, no masses, BS present+  Skin/Integumen: No rashes  Extremities: No pedal edema    Medications     Warfarin Therapy Reminder         digoxin  125 mcg Oral Daily     ranitidine  150 mg Oral BID     rosuvastatin  20 mg Oral QPM     torsemide  10 mg Oral Daily     lisinopril  5 mg Oral BID     amiodarone  200 mg Oral QPM     carvedilol  6.25 mg Oral BID w/meals     sodium chloride (PF)  3 mL Intracatheter Q8H       Data     Recent Labs  Lab 12/04/17  0405 12/03/17  2340 12/03/17  1910   WBC 7.3  --  6.6   HGB 12.7*  --  12.9*   MCV 90  --  90   *  --  155   INR 3.04*  --  2.93*     --  138   POTASSIUM 4.3  --  4.6   CHLORIDE 108  --  107   CO2 23  --  24   BUN 23  --  25   CR 1.25  --  1.32*   ANIONGAP 10  --  7   LORI 8.2*  --  8.0*   GLC 91  --  92   ALBUMIN  --   --  3.1*   PROTTOTAL  --   --  6.7*   BILITOTAL  --   --  0.4   ALKPHOS  --   --  59   ALT  --   --  27   AST  --   --  31   TROPI 0.309* 0.278* 0.052*       Recent Results (from the past 24 hour(s))   CT Head w/o Contrast    Narrative    CT HEAD W/O CONTRAST   12/3/2017 8:38 PM     HISTORY: Trauma, evaluate fracture, bleed, on coumadin;     TECHNIQUE: Axial images of the head without IV contrast material.  Radiation dose for this  scan was reduced using automated exposure  control, adjustment of the mA and/or kV according to patient size, or  iterative reconstruction technique.    COMPARISON: CT dated 4/20/2015    FINDINGS:  There is generalized atrophy of the brain.  Areas of low  attenuation are present in the white matter of the cerebral  hemispheres that are consistent with small vessel ischemic disease in  this age patient. Chronic cortical infarcts are seen in both the right  and left frontal regions and left parietal lobe. There is also a  chronic infarct in the left occipital region.. The frontal and  parietal infarcts were present on the previous CT. The left occipital  infarct is new since 2015. There is no evidence of intracranial  hemorrhage, mass, acute infarct or anomaly. The visualized portions of  the sinuses and mastoids appear normal. No intracranial hemorrhage or  skull fractures..      Impression    IMPRESSION:   1. No bleed or fractures are identified.  2. Age related changes including diffuse brain atrophy.  White matter  changes consistent with small vessel ischemic disease.   3. Multiple small chronic infarcts.    EMILY GILL MD

## 2017-12-04 NOTE — ED NOTES
Bed: ED24  Expected date:   Expected time:   Means of arrival:   Comments:  bertha [id number] - syngemini haider [id number]

## 2017-12-04 NOTE — PROVIDER NOTIFICATION
MD Notification    Notified Persons Name: Dr. Lyman, 12/4/17 0024    Purpose of Notification: Critical troponin 0.052-->0.278    Orders Received: No new orders.    Comments: VSS. Pt denies CP.

## 2017-12-04 NOTE — PHARMACY-ANTICOAGULATION SERVICE
Clinical Pharmacy - Warfarin Dosing Consult     Pharmacy has been consulted to manage this patient s warfarin therapy.  Indication: Atrial Fibrillation  Therapy Goal: INR 2-3  Warfarin Prior to Admission: Yes  Warfarin PTA Regimen: 1.25 mg M/F; 2.5 mg rest of the week.     INR   Date Value Ref Range Status   12/03/2017 2.93 (H) 0.86 - 1.14 Final     INR Protime   Date Value Ref Range Status   11/27/2017 3.1 (A) 0.86 - 1.14 Final       Recommend warfarin 2.5 mg today.  Pharmacy will monitor Tyrel RUSTY Sanchezla daily and order warfarin doses to achieve specified goal.      Please contact pharmacy as soon as possible if the warfarin needs to be held for a procedure or if the warfarin goals change.

## 2017-12-04 NOTE — ED PROVIDER NOTES
History     Chief Complaint:  Syncope    HPI   Tyrel Rene is a 72 year old male with a history of CHF and a has a pacemaker who is on Coumadin who presents to the emergency department today for evaluation of syncope. EMS reports the patient's wife witnessed the syncopal episode after the patient was working out and felt lightheaded and as he was trying to sit down, he fell down and he hit the side of his head on a nearby wall and lost consciousness. The wife reports the patient took a while to regain consciousness. The patient reports he was on the elliptical for a little more than 4 minutes, with a goal of 5 minutes, when he lost consciousness. He reports today is the second time he is working out in a long time, and 2 days ago he was on the elliptical for 4 minutes with no symptoms. He reports today he did not overexert himself and felt fine prior to syncope. EMS reports the basement was 78 degrees Farenheit. The patient's pacemaker was working en route, with sugar of 102 for EMS. The patient reports he does not drink a lot of water, but had coffee this morning and sprite in the afternoon. He reports he ate bunch at noon and had multiple fruits in the afternoon. He reports he has been decreasing his sodium intake with today being the third day of a new dietary regimen. He notes mild nausea en route. The patient had a similar syncopal episode several years ago, and since has been symptomatic. He reports in October of 2012, he had a heart attack while in the hospital for being sick, which require emergent surgery. He reports no chest pain with heart attack. His wife reports the patient is in constant atrial fibrillation so his pacemaker is always working, but does not signal to him when he is overexerting himself. She reports the patient had a skill fracture in 2014. The patient denies neck pain, chest pain, shortness of breath, back pain, or current lightheadedness.     Allergies:  Nystatin    Medications:   "  Coreg   Lanoxin   Amiodarone   Crestor  Aspirin   Nitrostat  Coumadin   Demadex  Lisinopril  Viagra   Zantac     Past Medical History:    Atrial fibrillation   Atrial flutter   CAD (coronary artery disease)   Cardiogenic shock   Cardiomyopathy   CHF (congestive heart failure)   CVA (cerebral infarction)   ED (erectile dysfunction)   Hyperlipidemia   Hypertension   Neuropathy   Palpitations   SVT (supraventricular tachycardia)    Syncope     Past Surgical History:    Bypass graft artery coronary   Cardioversion   Coronary angiography  Coronary artery bypass  Ablation atrial flutter  Hand surgery   Heart  Catheter, angioplasty  Hernia repair  Orthopedic surgery   Relocate generator ICD/Pacemaker    Family History:    Father: Alcohol/Drug, Heart Disease  Sister: Alzheimer Disease, Alcohol/Drug, Gastrointestinal Disease, Obesity, Hypertension, Heart Disease  Daughter: Heart Disease, Cancer  Mother: Aneurysm     Social History:  The patient was accompanied to the ED by wife.  Smoking Status: Former Smoker, Quit: 7/10/1972  Smokeless Tobacco: Never Used  Alcohol Use: Negative   Marital Status:   [2]     Review of Systems   Constitutional:        Dehydration   Respiratory: Negative for shortness of breath.    Cardiovascular: Negative for chest pain.   Musculoskeletal: Negative for back pain and neck pain.   Neurological: Positive for syncope (with mild head injury). Negative for light-headedness.   All other systems reviewed and are negative.    Physical Exam     Patient Vitals for the past 24 hrs:   BP Temp Temp src Pulse Resp SpO2 Height   12/03/17 2030 (!) 140/91 - - - - 99 % -   12/03/17 1903 - - - - - - 1.854 m (6' 1\")   12/03/17 1901 149/87 97.5  F (36.4  C) Oral 70 16 96 % -     Physical Exam  Eyes:  The pupils are equal and round    Conjunctivae and sclerae are normal  ENT:    The nose is normal    Pinnae are normal    The oropharynx is normal    Mouth is slightly dry   Neck:  Normal range of " motion    There is no midline cervical spine tenderness    Trachea is in the midline    No neck tenderness  CV:  Regular rate and rhythm     Midline sternotomy scar      Trace edema in the lower extremities     Resp:  Lungs are clear    Non-labored    No rales    No wheezing   GI:  Abdomen is soft, there is no rigidity    No distension    No rebound tenderness   MS:  Normal muscular tone    No asymmetric leg swelling  Skin:  No rash or acute skin lesions noted  Neuro:   Awake, alert.      Speech is normal and fluent.    Face is symmetric.     Moves all extremities    Emergency Department Course     ECG:  ECG taken at 1908, ECG read at 1914  Ventricular-paced rhythm  Abnormal ECG  Rate 65 bpm. DC interval * ms. QRS duration 160 ms. QT/QTc 440/457 ms. P-R-T axes 86 -74 97.    Imaging:  Radiology findings were communicated with the patient who voiced understanding of the findings.    CT Head w/o Contrast  1. No bleed or fractures are identified.  2. Age related changes including diffuse brain atrophy.  White matter changes consistent with small vessel ischemic disease.   3. Multiple small chronic infarcts.  EMILY ELENA MD  Reading per radiology    Laboratory:  Laboratory findings were communicated with the patient who voiced understanding of the findings.    CBC: WBC 6.6, HGB 12.9 (L),   CMP: Calcium: 8.0 (L), Albumin: 3.1 (L), Protein Total: 6.7 (L), GFR: 53 (L), Creatinine 1.32 (H)  Troponin (Collected 1910): 0.052 (H)  INR: 2.93 (H)    Interventions:  1922  ml IV  2102 Aspirin 325 mg PO    Emergency Department Course:    1901 Nursing notes and vitals reviewed.    1901 I performed an exam of the patient as documented above.     2005 I rechecked the patient.     2016 I reviewed laboratory findings with the patient.    2016 The patient was sent for a CT Head w/o Contrast while in the emergency department, results above.     2023 I consulted with Dr. Tripathi from Cardiology regarding the patient's presentation  and findings.     2045 I discussed the patient with Dr. Dar Reyna, who will admit the patient to a monitored bed for further evaluation and treatment.    2048 I personally reviewed the imaging, laboratory, and ECG results with the patient and answered all related questions prior to admission. I discussed the treatment plan with the patient. They expressed understanding of this plan and consented to admission.    Impression & Plan      Medical Decision Making:  Tyrel Rene is a 72 year old male who presents to the emergency department today for evaluation after syncopal event. He was on the elliptical when he became unconscious after nearing about 4 minutes of exercise. He was on the ground for a period of time and his wife noticed that he eventually regained consciousness. He did hit his head against the wall on the way down. He does not have a headache or neck pain. He is on blood thinners, Coumadin. He, here, has no complaints, but says he was exerting himself more today than he had in the past. He has a history of coronary artery disease, status post bypass surgery, as well as an ICD in place. Laboratory work up was obtained as well as a CMP, CBC, Troponin, and INR. INR is therapeutic and Troponin is elevated, but seems to be somewhat in line with prior levels. Creatinine is slightly less than baseline. CBC with normal white count and hemoglobin in line with previous. A ICD check was obtained through [facility] and did show an episode of ventricular tachycardia. We are waiting for a report to be faxed here. Given this, I did discuss the patient with Dr. Tripathi of Cardiology who recommended that he stay in the hospital for EP evaluation. No other antiarrhythmics are necessary at this time. CT scan of his head, again returned negative for any findings. I discussed the patient with Dr. Reyna, who agrees to accept him as admission in the cardiac special care unit.     Diagnosis:    ICD-10-CM    1.  Ventricular tachycardia (H) I47.2      Disposition:   The patient is admitted into the care of Dr. Dar Reyna.    Scribe Disclosure:  I, Júnior Wright, am serving as a scribe at 6:58 PM on 12/3/2017 to document services personally performed by Harinder Rosa MD based on my observations and the provider's statements to me.     EMERGENCY DEPARTMENT       Harinder Rosa MD  12/03/17 3347

## 2017-12-04 NOTE — PLAN OF CARE
Problem: Patient Care Overview  Goal: Plan of Care/Patient Progress Review  Outcome: No Change  A/O, VSS, O2sats 99% on RA. Tele 100% v paced. Denies pain, dizziness, lightheadedness. SBA, steady. Plan to trend troponins, NPO pending cardiology consult in AM.

## 2017-12-04 NOTE — PROGRESS NOTES
[facility] requested to go to hospital bedside to re program device from VVIR to DDDR after pt received a shock that converted him both out of VT and A fib. He has a device follow up coming in January- may need to put back in VVIR if he has converted back to A fib. No other changes made. AGAPITO Valiente

## 2017-12-04 NOTE — CONSULTS
DATE OF CONSULTATION:  12/04/2017      REASON FOR CONSULTATION:  Evaluation of monomorphic VT and ICD shocks.      HISTORY OF PRESENT ILLNESS:  Mr. Tyrel Rene is a pleasant 72-year-old gentleman with history of hypertension, hyperlipidemia, chronic kidney disease, persistent atrial fibrillation and heart failure secondary to ischemic cardiomyopathy (MI 2012, ICD implantation; BiV upgrade 2014) who was admitted with syncope in the setting of ICD shock.  EP was consulted to help with management.      The patient is followed by Dr. Costello and has been noted to have increased ventricular burden in the last couple months.  He had about 10 episodes of nonsustained VT between October and November.  One of the episodes requiring ATP therapy and he was almost shocked by the defibrillator.  At the time, a stress test done and showed a fixed anterior defect but no signs of ischemia.  Dr. Costello started him on amiodarone therapy.      He was admitted at this time due to an episode of syncope associated with ICD shock.  Apparently he was doing exercise on an elliptical machine and it was a strenuous for him.  During the exercise, he developed lightheadedness and passed out.  Device was interrogated and confirmed he had an episode of monomorphic VT with heart rate around 200 beats per minute.      At the moment he is back to normal sinus rhythm which is also unusual for him as he was in fibrillation for 5 years.  He denies any symptoms of chest pain, lightheadedness, near-syncope or syncopal episode.      EKG done yesterday showed sinus rhythm well with biventricular pacing.  He was in the asynchronous mode.  Of note, the device was programmed today to DDD.      Echocardiogram obtained in 01/2017 showed EF of 30% to 35% with regional wall motion abnormalities.      PLAN:   1.  Monomorphic VT.  In the setting of ischemic cardiomyopathy.  This is likely scar related VT.  He has no signs of ischemia and nuclear stress test was  "negative.  At this time, I recommend continued observation for 24 hours, initiation of re-bolus with amiodarone IV.  He probably will go home tomorrow with oral amiodarone.   2.  Persistent atrial fibrillation.  He is back in normal rhythm.  Device was reprogrammed to DDD.  We will continue therapy with amiodarone and Coreg.  We may go up on the Coreg as tolerated.   3.  Embolic prevention.  CHADS-VASc score of 3.  Continuation of Coumadin indefinitely, INR is therapeutic today (3).   4.  Hypertension.  Increase Coreg as tolerated.  Continue lisinopril at current dose.   5.  Hypertension.  Blood pressure is well controlled.     Taz Uriostegui MD    Physical Exam:  Vitals: /71 (BP Location: Left arm)  Pulse 65  Temp 97.6  F (36.4  C) (Oral)  Resp 16  Ht 1.854 m (6' 1\")  Wt 81.1 kg (178 lb 12.8 oz)  SpO2 97%  BMI 23.59 kg/m2      Intake/Output Summary (Last 24 hours) at 12/06/17 0848  Last data filed at 12/05/17 1300   Gross per 24 hour   Intake                0 ml   Output              925 ml   Net             -925 ml     Vitals:    12/03/17 2154 12/04/17 0200 12/05/17 0354   Weight: 83.6 kg (184 lb 6.4 oz) 82.7 kg (182 lb 6.4 oz) 81.1 kg (178 lb 12.8 oz)       Constitutional:  AAO x3.  Pt is in NAD.  HEAD: Normocephalic.  SKIN: Skin normal color, texture and turgor with no lesions or eruptions.  Eyes: PERRL, EOMI.  ENT:  Supple, normal JVP. No lymphadenopathy or thyroid enlargement.  Chest:  CTAB.  Cardiac:  RRR, normal  S1 and S2.  No murmurs rubs or gallop.   Abdomen:  Normal BS.  Soft, non-tender and non-distended.  No rebound or guarding.    Extremities:  Pedious pulses palpable B/L.  No LE edema noticed.   Neurological: Strength and sensation grossly symmetric and intact throughout.         Review of Systems:  Complete review of system is otherwise negative with the exception of what was described above.     CURRENT MEDICATIONS:    PRN Meds:     ALLERGIES     Allergies   Allergen Reactions     " Nystatin Other (See Comments)     Make his mouth numb & swelling       PAST MEDICAL HISTORY:  Past Medical History:   Diagnosis Date     Atrial fibrillation (H)     s/p Cardioversion 3/14/2013     Atrial flutter (H)     S/p Aflutter ablation 1/11/2011     CAD (coronary artery disease)     CABG x3 10/2012- LIMA to distal LAD, SVG to OM1 & OM3; cath 10/2012- PTCA to second diagonal and mid LAD, BMS to mid LAD     Cardiogenic shock (H)      Cardiomyopathy (H)      CHF (congestive heart failure) (H)      CVA (cerebral infarction)     residual right hand numbness     ED (erectile dysfunction)      Hyperlipidemia      Hypertension      Neuropathy      Palpitations      SVT (supraventricular tachycardia) (H)     S/p dual chamber ICD 10/11/12- upgraded to BIV ICD 6/2013     Syncope        PAST SURGICAL HISTORY:  Past Surgical History:   Procedure Laterality Date     BYPASS GRAFT ARTERY CORONARY  10/2/2012    Procedure: BYPASS GRAFT ARTERY CORONARY;  Coronary Artery Bypass Graft x3 (LAD, Diag, OM) with Endovein [place] (On-Pump);  Surgeon: Yeyo Lyman MD;  Location: [facility] OR     CARDIOVERSION  3/14/2013     CORONARY ANGIOGRAPHY ADULT ORDER  10/2/12     CORONARY ARTERY BYPASS  10/2/12    LIMA to LAD, SVG to OM1 and OM3     H ABLATION ATRIAL FLUTTER  1/11/2011     HAND SURGERY       HEART CATH, ANGIOPLASTY  10/2/12    PTCA to second diagonal and mid LAD, BMS to mid LAD     HERNIA REPAIR       ORTHOPEDIC SURGERY Right 2006    cut on table saw     RELOCATE GENERATOR ICD/PACEMAKER         FAMILY HISTORY:  Family History   Problem Relation Age of Onset     Alcohol/Drug Father      HEART DISEASE Father 71     Alzheimer Disease Sister      Alcohol/Drug Sister      Alcohol/Drug Sister      GASTROINTESTINAL DISEASE Sister      Obesity Sister      Hypertension Sister      HEART DISEASE Sister      Hypertension Sister      HEART DISEASE Daughter      afib     HEART DISEASE Daughter      afib     CANCER Daughter      lymphoma      Aneurysm Mother        SOCIAL HISTORY:  Social History     Social History     Marital status:      Spouse name: N/A     Number of children: N/A     Years of education: N/A     Social History Main Topics     Smoking status: Former Smoker     Quit date: 7/10/1972     Smokeless tobacco: Never Used     Alcohol use No     Drug use: No     Sexual activity: Not on file     Other Topics Concern     Parent/Sibling W/ Cabg, Mi Or Angioplasty Before 65f 55m? No     Caffeine Concern Yes     4 cups coffee daily     Sleep Concern No     Stress Concern No     Weight Concern Yes     gain 2lbs     Special Diet Yes     low sodium     Exercise Yes     walking, doing sittups, played pickle ball in summer     Seat Belt Yes     Social History Narrative         Recent Lab Results:    Recent Labs  Lab 17  0610 17  0405 17  2340 17  1910   WBC  --  7.3  --  6.6   HGB  --  12.7*  --  12.9*   MCV  --  90  --  90   PLT  --  147*  --  155   INR 3.26* 3.04*  --  2.93*   NA  --  141  --  138   POTASSIUM  --  4.3  --  4.6   CHLORIDE  --  108  --  107   CO2  --  23  --  24   BUN  --  23  --  25   CR  --  1.25  --  1.32*   ANIONGAP  --  10  --  7   LORI  --  8.2*  --  8.0*   GLC  --  91  --  92   ALBUMIN  --   --   --  3.1*   PROTTOTAL  --   --   --  6.7*   BILITOTAL  --   --   --  0.4   ALKPHOS  --   --   --  59   ALT  --   --   --  27   AST  --   --   --  31   TROPI  --  0.309* 0.278* 0.052*                 LU FORRESTER MD             D: 2017 09:58   T: 2017 10:28   MT: CD      Name:     ARTEM ELIZALDE   MRN:      [id number]        Account:       [id number]   :      1945           Consult Date:  2017      Document: [id number]

## 2017-12-04 NOTE — PLAN OF CARE
Problem: Arrhythmia/Dysrhythmia (Symptomatic) (Adult)  Goal: Signs and Symptoms of Listed Potential Problems Will be Absent, Minimized or Managed (Arrhythmia/Dysrhythmia)  Signs and symptoms of listed potential problems will be absent, minimized or managed by discharge/transition of care (reference Arrhythmia/Dysrhythmia (Symptomatic) (Adult) CPG).   Outcome: No Change  V-paced rhythm. No ectopy. Device interrogated and reprogrammed to DDD.  Pt. Is back in sinus rhythm.  Amiodarone gtt started at 1130.  VSS on Amio gtt  Coreg increased to 9.37 mg BID today.   No syncope today but up in room with assist.   Probably home tomorrow according to MD notes

## 2017-12-04 NOTE — ED NOTES
Brought in by EMS for a near syncopal episode at home while exercising.  Wife witnessed the event and said that hit head on wall prior to going to ground.  Did have a similar event several years ago.  Denies any chest pain/dyspnea.

## 2017-12-04 NOTE — PROGRESS NOTES
"[employer] CRT-D Remote transmission while in ED for Syncope (shocked for VT)  Patient presented to ED after a syncopal episode shortly after \"working out\", (4 minutes into routine) event was witnessed by his wife.   0% A-paced; 100 % BiV paced; rhythm at time of transmission shows AS/BiV pacing. Programmed VVIR 65 due to chronic AF. Device logged an episode of VT with ATP X 4 and One 41 J shock delivered. EGM showed AF with VT at 197 BPM, which ramped up to 222 BPM after ATP X 4; the shock converted patient out of AF as well as out of VT. (Still programmed VVIR) Pt has scheduled device check on 1-4-18. If he remains in sinus we will re program this to DDDR. (most likely he will return to persistent AF.) Patient is on amiodarone as well as warfarin. He was admitted to hospital. Will continue to follow. AGAPITO Valiente    "

## 2017-12-04 NOTE — PLAN OF CARE
Problem: Patient Care Overview  Goal: Plan of Care/Patient Progress Review  Outcome: No Change  A/O. VSS on RA. Tele: 100% v-paced, HR 60's. Pt denies pain/SOB. Plan for cards consult today.

## 2017-12-04 NOTE — ED NOTES
Interrogated pt pace maker ([employer] pace maker). Called [phone] to page Kunal Chan the rep for our hospital.

## 2017-12-04 NOTE — PROVIDER NOTIFICATION
Brief update:    Paged re: elevated trop (0.2 range)    Pt with ICD discharge 2/2 VT.     Anticoagulated for A fib, on ASA, CP free currently.    No changes to management at this time.     Jarred [facility]  12:25 AM

## 2017-12-04 NOTE — PROGRESS NOTES
Full consult dictated.  In brief, VT/ ICD shock.    Plan:  - Reload Amio IV  - Increase coreg  -Check digoxin levels tomorrow.    May go home tomorrow AM.    Taz Uriostegui MD

## 2017-12-05 VITALS
WEIGHT: 178.8 LBS | TEMPERATURE: 97.6 F | BODY MASS INDEX: 23.7 KG/M2 | HEIGHT: 73 IN | SYSTOLIC BLOOD PRESSURE: 122 MMHG | HEART RATE: 65 BPM | RESPIRATION RATE: 16 BRPM | DIASTOLIC BLOOD PRESSURE: 71 MMHG | OXYGEN SATURATION: 97 %

## 2017-12-05 LAB
DIGOXIN SERPL-MCNC: 1.2 UG/L (ref 0.5–2)
INR PPP: 3.26 (ref 0.86–1.14)

## 2017-12-05 PROCEDURE — 80162 ASSAY OF DIGOXIN TOTAL: CPT | Performed by: INTERNAL MEDICINE

## 2017-12-05 PROCEDURE — 36415 COLL VENOUS BLD VENIPUNCTURE: CPT | Performed by: INTERNAL MEDICINE

## 2017-12-05 PROCEDURE — 99232 SBSQ HOSP IP/OBS MODERATE 35: CPT | Performed by: INTERNAL MEDICINE

## 2017-12-05 PROCEDURE — 85610 PROTHROMBIN TIME: CPT | Performed by: INTERNAL MEDICINE

## 2017-12-05 PROCEDURE — 25000132 ZZH RX MED GY IP 250 OP 250 PS 637: Performed by: INTERNAL MEDICINE

## 2017-12-05 PROCEDURE — 25000132 ZZH RX MED GY IP 250 OP 250 PS 637: Performed by: PHYSICIAN ASSISTANT

## 2017-12-05 PROCEDURE — 99207 ZZC NON-BILLABLE SERV PER CHARTING: CPT | Performed by: INTERNAL MEDICINE

## 2017-12-05 PROCEDURE — 25000128 H RX IP 250 OP 636: Performed by: INTERNAL MEDICINE

## 2017-12-05 RX ORDER — CARVEDILOL 3.12 MG/1
9.38 TABLET ORAL 2 TIMES DAILY WITH MEALS
Qty: 180 TABLET | Refills: 0 | Status: SHIPPED | OUTPATIENT
Start: 2017-12-05 | End: 2017-12-14

## 2017-12-05 RX ORDER — AMIODARONE HYDROCHLORIDE 200 MG/1
200 TABLET ORAL 2 TIMES DAILY
Status: DISCONTINUED | OUTPATIENT
Start: 2017-12-26 | End: 2017-12-05

## 2017-12-05 RX ORDER — AMIODARONE HYDROCHLORIDE 200 MG/1
200 TABLET ORAL 2 TIMES DAILY
Qty: 42 TABLET | Refills: 0 | Status: SHIPPED | OUTPATIENT
Start: 2017-12-05 | End: 2017-12-22 | Stop reason: DRUGHIGH

## 2017-12-05 RX ORDER — AMIODARONE HYDROCHLORIDE 200 MG/1
200 TABLET ORAL 2 TIMES DAILY
Status: DISCONTINUED | OUTPATIENT
Start: 2017-12-05 | End: 2017-12-05 | Stop reason: HOSPADM

## 2017-12-05 RX ORDER — AMIODARONE HYDROCHLORIDE 200 MG/1
200 TABLET ORAL DAILY
Status: DISCONTINUED | OUTPATIENT
Start: 2017-12-27 | End: 2017-12-05

## 2017-12-05 RX ORDER — AMIODARONE HYDROCHLORIDE 200 MG/1
200 TABLET ORAL DAILY
Status: DISCONTINUED | OUTPATIENT
Start: 2017-12-26 | End: 2017-12-05 | Stop reason: HOSPADM

## 2017-12-05 RX ORDER — AMIODARONE HYDROCHLORIDE 200 MG/1
200 TABLET ORAL DAILY
Qty: 30 TABLET | Refills: 0 | Status: SHIPPED | OUTPATIENT
Start: 2017-12-26 | End: 2018-02-06

## 2017-12-05 RX ADMIN — RANITIDINE 150 MG: 150 TABLET ORAL at 08:49

## 2017-12-05 RX ADMIN — AMIODARONE HYDROCHLORIDE 200 MG: 200 TABLET ORAL at 10:55

## 2017-12-05 RX ADMIN — DIGOXIN 125 MCG: 125 TABLET ORAL at 08:49

## 2017-12-05 RX ADMIN — CARVEDILOL 9.38 MG: 6.25 TABLET, FILM COATED ORAL at 08:50

## 2017-12-05 RX ADMIN — TORSEMIDE 10 MG: 10 TABLET ORAL at 08:49

## 2017-12-05 RX ADMIN — LISINOPRIL 5 MG: 5 TABLET ORAL at 08:49

## 2017-12-05 RX ADMIN — AMIODARONE HYDROCHLORIDE 0.5 MG/MIN: 50 INJECTION, SOLUTION INTRAVENOUS at 00:07

## 2017-12-05 NOTE — PROGRESS NOTES
A follow-up appointment has been made for this patient.     Dec 08, 2017  2:00 PM CST   Anticoagulation Visit with [facility]   [facility] ([facility]    [address] [id number]   [phone]

## 2017-12-05 NOTE — DISCHARGE SUMMARY
[facility]  Discharge Summary        Tyrel Rene MRN# [id number]   YOB: 1945 Age: 72 year old     Date of Admission:  12/3/2017  Date of Discharge:  12/5/2017  Admitting Physician:  Dar Reyna MD  Discharge Physician: Demetrio New MD  Discharging Service: Hospitalist     Primary Provider:  Dejon Downs  Primary Care Physician Phone Number: [phone]         Discharge Diagnoses/Problem Oriented Hospital Course (Providers):    Tyrel Rene was admitted on 12/3/2017 by Dar Reyna MD and I would refer you to their history and physical.  The following problems were addressed during his hospitalization:    72-year-old male with PmHx of coronary artery disease, ischemic cardiomyopathy, status post ICD placement, atrial fibrillation, status post pacemaker placement, on chronic anticoagulation, who presented to the emergency room on 12/3/17 due to a syncopal episode while working out on his elliptical. Work up done on 12/3/17 revealed, CMP significant for creat 1.32, Alb 3.1. CBC revealed, Hb 12.9, WBC 6.6 and Plts 155.  INR is 2.93. CT Head on 12/3/17 revealed, no bleed or fractures are identified. Age related changes including diffuse brain atrophy.  White matter changes consistent with small vessel ischemic disease.  Multiple small chronic infarcts. Interrogation of his ICD revealed, an episode of VTach prior to his syncope.     1.  Syncope, due to ICD shock for ventricular tachycardia:   - seen by EP Cardiology. Being loaded with amiodarone.  - [facility] in 1 month so as to not interrupt anticoagulation.   - Serial troponin was 0.052-->0.278-->0.309 likely as a result of shock.  Will need future Left heart cath.  - device interrogation as outpatient.      2.  Chronic Atrial fibrillation:   - noted being reloaded with Amiodarone  - continue digoxin, coreg dose increased.   - continue warfarin. INR recheck with amiodarone dose increased.     3.  Severe ischemic  cardiomyopathy:   - ECHO done on 1/17/17 revealed, LVEF 30-35%, there is anterior, septal and apical wall akinesis. There is mild to mod MR. There is no thrombus in the LV.     - LHC in 1 month  - coreg dose increased.  - torsemide dose discontinued  - daily weights with call to [facility] clinic.     4.  Hyperlipidemia:   - Continue crestor 20 mg daily.      5.  Chronic kidney disease, stage III:   - at baseline Creat was 1.32-->1.25 on 12/4/17.       6.  Anemia of chronic disorder:   - Hb was 12.7g/dl on 12/4/17.             Code Status:      Full Code         Important Results:      See below         Pending Results:        Unresulted Labs Ordered in the Past 30 Days of this Admission     No orders found from 10/4/2017 to 12/4/2017.               Discharge Instructions and Follow-Up:      Follow-up Appointments     Follow-up and recommended labs and tests        Make INR appt for Thursday or Friday of this week as you're now back on a   higher dose of amiodarone  Get ICD interrogated on Thursday 12/14 @ 11 AM. See RENNY Moreno @ Heart   Clinic @ noon that day  Keep Dr. Downs's appt            Follow-up and recommended labs and tests        Follow up with PCP as directed, with INR at end of this week  Follow up with cardiology ANP on 12/14 as directed  Follow up with [facility] clinic as directed                         Discharge Disposition:      Discharged to home         Discharge Medications:        Current Discharge Medication List      CONTINUE these medications which have CHANGED    Details   !! amiodarone (PACERONE/CODARONE) 200 MG tablet Take 1 tablet (200 mg) by mouth 2 times daily x 3 weeks, then 200 mg daily  Qty: 42 tablet, Refills: 0    Associated Diagnoses: Ventricular tachycardia (H)      !! amiodarone (PACERONE/CODARONE) 200 MG tablet Take 1 tablet (200 mg) by mouth daily  Qty: 30 tablet, Refills: 0    Associated Diagnoses: Ventricular tachycardia (H)      carvedilol (COREG) 3.125 MG tablet Take 3  tablets (9.375 mg) by mouth 2 times daily (with meals)  Qty: 180 tablet, Refills: 0    Associated Diagnoses: Ventricular tachycardia (H)       !! - Potential duplicate medications found. Please discuss with provider.      CONTINUE these medications which have NOT CHANGED    Details   !! WARFARIN SODIUM PO Take 2.5 mg by mouth daily On Sun, Tue, Wed, Thu, and Sat      !! WARFARIN SODIUM PO Take 1.25 mg by mouth daily On Mon and Fri.      digoxin (LANOXIN) 125 MCG tablet Take 1 tablet (125 mcg) by mouth daily  Qty: 90 tablet, Refills: 3    Associated Diagnoses: Atrial fibrillation (H)      rosuvastatin (CRESTOR) 20 MG tablet Take 1 tablet (20 mg) by mouth daily  Qty: 30 tablet, Refills: 11      NITROSTAT 0.4 MG sublingual tablet DISSOLVE 1 TABLET UNDER THE TONGUE EVERY 5 MINUTES AS NEEDED FOR CHEST PAIN  Qty: 25 tablet, Refills: 0    Comments: Office visit due before more refills.  Associated Diagnoses: Postsurgical aortocoronary bypass status      Vitamin D, Cholecalciferol, 1000 UNITS TABS Take 1,000 mg by mouth daily       Multiple Vitamins-Minerals (PRESERVISION AREDS 2 PO) Take 1 capsule by mouth 2 times daily      lisinopril (PRINIVIL,ZESTRIL) 5 MG tablet Take 1 tablet (5 mg) by mouth 2 times daily  Qty: 180 tablet, Refills: 3    Associated Diagnoses: CHF (congestive heart failure) (H)      sildenafil (VIAGRA) 100 MG tablet Take 1 tablet (100 mg) by mouth daily as needed for erectile dysfunction Take 30 min to 4 hours before intercourse.  Never use with nitroglycerin, terazosin or doxazosin.  Qty: 16 tablet, Refills: 3    Comments: [facility]. #[phone]  Order Processing Center   [facility], [facility]  [id number], [place]  Associated Diagnoses: Erectile dysfunction, unspecified erectile dysfunction type      ranitidine (ZANTAC) 150 MG tablet Take 1 tablet (150 mg) by mouth 2 times daily  Qty: 180 tablet    Associated Diagnoses: S/P CABG (coronary artery bypass graft)      ASPIRIN  NOT PRESCRIBED (INTENTIONAL) Antiplatelet medication not prescribed intentionally due to Current anticoagulant therapy (warfarin/enoxaparin)  Qty: 0 each, Refills: 0    Associated Diagnoses: ASHD (arteriosclerotic heart disease)       !! - Potential duplicate medications found. Please discuss with provider.      STOP taking these medications       torsemide (DEMADEX) 20 MG tablet Comments:   Reason for Stopping:                    Allergies:         Allergies   Allergen Reactions     Nystatin Other (See Comments)     Make his mouth numb & swelling            Consultations This Hospital Stay:      Consultation during this admission received from cardiology          Discharge Orders for Skilled Facility (from Discharge Orders):        After Care Instructions     Activity       Your activity upon discharge: activity as tolerated, no driving for 3 months as per EP            Diet       Follow this diet upon discharge: Orders Placed This Encounter      Combination Diet Regular Diet Adult                    Future tests that were ordered for you     Basic metabolic panel                        Rehab orders for Skilled Facility (from Discharge Orders):      Referrals     Future Labs/Procedures    Follow-Up with Cardiac Advanced Practice Provider     Follow-Up with Device Clinic              Discharge Time:       Greater than 30 minutes.        Image Results From This Hospital Stay (For Non-EPIC Providers):        Results for orders placed or performed during the hospital encounter of 12/03/17   CT Head w/o Contrast    Narrative    CT HEAD W/O CONTRAST   12/3/2017 8:38 PM     HISTORY: Trauma, evaluate fracture, bleed, on coumadin;     TECHNIQUE: Axial images of the head without IV contrast material.  Radiation dose for this scan was reduced using automated exposure  control, adjustment of the mA and/or kV according to patient size, or  iterative reconstruction technique.    COMPARISON: CT dated 4/20/2015    FINDINGS:  There  is generalized atrophy of the brain.  Areas of low  attenuation are present in the white matter of the cerebral  hemispheres that are consistent with small vessel ischemic disease in  this age patient. Chronic cortical infarcts are seen in both the right  and left frontal regions and left parietal lobe. There is also a  chronic infarct in the left occipital region.. The frontal and  parietal infarcts were present on the previous CT. The left occipital  infarct is new since 2015. There is no evidence of intracranial  hemorrhage, mass, acute infarct or anomaly. The visualized portions of  the sinuses and mastoids appear normal. No intracranial hemorrhage or  skull fractures..      Impression    IMPRESSION:   1. No bleed or fractures are identified.  2. Age related changes including diffuse brain atrophy.  White matter  changes consistent with small vessel ischemic disease.   3. Multiple small chronic infarcts.    EMILY GILL MD           Most Recent Lab Results In EPIC (For Non-EPIC Providers):    Most Recent 3 CBC's:  Recent Labs   Lab Test  12/04/17   0405  12/03/17   1910  10/18/17   1448   WBC  7.3  6.6  6.8   HGB  12.7*  12.9*  13.2*   MCV  90  90  93   PLT  147*  155  153      Most Recent 3 BMP's:  Recent Labs   Lab Test  12/04/17   0405  12/03/17 1910 11/07/17   1312   NA  141  138  136   POTASSIUM  4.3  4.6  4.8   CHLORIDE  108  107  103   CO2  23  24  29   BUN  23  25  20   CR  1.25  1.32*  1.60*   ANIONGAP  10  7  8.8   LORI  8.2*  8.0*  9.0   GLC  91  92  89     Most Recent 3 Troponin's:  Recent Labs   Lab Test  12/04/17   0405  12/03/17   2340  12/03/17 1910   TROPI  0.309*  0.278*  0.052*     Most Recent 3 INR's:  Recent Labs   Lab Test  12/05/17   0610  12/04/17   0405  12/03/17 1910   INR  3.26*  3.04*  2.93*     Most Recent 2 LFT's:  Recent Labs   Lab Test  12/03/17 1910 01/17/17   0856  12/20/14   2129   AST  31   --   25   ALT  27  <5*  22   ALKPHOS  59   --   53   BILITOTAL  0.4   --   0.6      Most Recent Cholesterol Panel:  Recent Labs   Lab Test  07/12/17   1234   CHOL  148   LDL  83   HDL  45   TRIG  100     Most Recent 6 Bacteria Isolates From Any Culture (See EPIC Reports for Culture Details):  Recent Labs   Lab Test  10/17/12   1355  10/15/12   0845  10/14/12   1220  10/14/12   1130  10/14/12   1049  10/14/12   1043   CULT  No growth  Heavy growth Klebsiella pneumoniae Heavy growth Coagulase negative Staphylococcus Susceptibility testing not  routinely done  No growth  No growth  Duplicate request Charge credited RN DRAW  No growth     Most Recent TSH, T4 and HgbA1c:  Recent Labs   Lab Test  10/17/17   1509   10/09/12   0450   TSH  1.03   < >   --    A1C   --    --   5.7    < > = values in this interval not displayed.

## 2017-12-05 NOTE — PLAN OF CARE
Problem: Patient Care Overview  Goal: Plan of Care/Patient Progress Review  Outcome: No Change  AVSS; tele AV paced; pt denied pain overnight; continues on Amiodarone drip at 0.5 mg/min; no V-tach noted; voiding well; continue to monitor.

## 2017-12-05 NOTE — PROGRESS NOTES
[facility]    EP Progress Note    Date of Service (when I saw the patient): 12/05/2017     Assessment & Plan   Tyrel Rene is a 72 year old male with h/o HTN, dyslipidmeia, CAD s/p MI 2012, persistent AFb and ischemic CM s/p ICD with BiV upgrade 2014  who was admitted on 12/3/2017 after he received an appropriate ICD shock associated with syncope.    1. Recurrent VT -   * Dr. Costello saw him 10/2017 and noted increasing polymorphic VT burden, with one episode requiring ATP. He started amiodarone with load (200 mg BID x 10/17-10/31, then 200 mg daily starting early 11/1).    * Despite this, sustained recurrent episodes of VT and had ATP x 4 followed by 41J shock  12/3 @ 1908 for VT @ 200 bpm. He had a syncopal episode associated with using the elliptical at that time.    * Dr. Uriostegui started Amio gtt. We will re-load po Amiodarone therapy at this time.    * Concern for ischemia as a cause of this, especially as occurred with exercise.  Recent stress test to evaluate for this in 10/2017 showed no significant ischemia but as arrhythmia has persisted, would proceed with angiogram. INR >3.0 today AND device shock converted him from AFib to SR.  Therefore, would NOT reverse AC and he should continue with therapeutic INRs x 1 month after conversion to SR.    * Electrolytes stable.    * Coreg increased to 9.375 BId     PLAN:   * Reload amiodarone - 200 mg BID 12/5-12/26, then 200 mg daily 12/27.   * Device interrogation at FU appt   * Cath after 1/3 (1 month therapeutic INRs following conversion to SR).   * Continue higher dose of Coreg    2. AFib - had been believed to be permanent, but ICD shock converted him to SR 12/3.  * Remains on AC and should continue x 1 month before holding it for angiogram  * INR today 3.26 today  * Tele continues to show AV Pacing with underlying SR.  * Last echo 1/2017 showed EF 30-35% with WMA. Severe dilation of LA noted at that time.     PLAN:   * Continue AC.    * INR to be  "drawn end of this week given re-load of amiodarone. Pt knows to call to re-schedule INR appt at Dr. Downs's office.     3. Ischemic CM - EF 30/35% on last echo.   * Has BiV ICD ([employer])  * PTA on Coreg 6.25 BID, digoxin 125 mcg, lisinopril 5 BID and torsemide 10 PRN for weight >177#  * Coreg increased and placed on torsemide daily. Admit weight was 184#. At home, he was 178# that day and did NOT feel fluid overloaded.     PLAN:   * Continue increased Coreg 9.375 BID   * Continue CM meds.   * Would not send home on daily torsemide at this point, and have him watch weights at home   * I will alert [facility] clinic to his recent admission.    OK to DC home on amiodarone load and increased Coreg. Would not send home on torsemide.  Reschedule INR to Thursday/Friday (pt will do)  See me with device interrogation Thursday 12/14  Keep Dr. Downs's appt  I will contact [facility] clinic        Joelle Rodríguez PA-C    Interval History   Feeling \"good\" without lightheadedness, palpitations, chest pain, shortness of breathing    Physical Exam   Temp: 97.5  F (36.4  C) Temp src: Oral BP: 126/82 Pulse: 65 Heart Rate: 65 Resp: 16 SpO2: 97 % O2 Device: None (Room air)    Vitals:    12/03/17 2154 12/04/17 0200 12/05/17 0354   Weight: 83.6 kg (184 lb 6.4 oz) 82.7 kg (182 lb 6.4 oz) 81.1 kg (178 lb 12.8 oz)     Vital Signs with Ranges  Temp:  [97.5  F (36.4  C)-98.8  F (37.1  C)] 97.5  F (36.4  C)  Pulse:  [64-66] 65  Heart Rate:  [64-65] 65  Resp:  [16-18] 16  BP: ()/(48-82) 126/82  SpO2:  [95 %-97 %] 97 %  I/O last 3 completed shifts:  In: 2080 [P.O.:1140; I.V.:940]  Out: 3750 [Urine:3750]    Telemetry: AV Paced underlying Sr    Constitutional: awake, alert, cooperative, no apparent distress, and appears stated age  Respiratory: CTA B  Cardiovascular: Regular. No MRG  Musculoskeletal: No edema    Medications     amiodarone 0.5 mg/min (12/05/17 0007)     Warfarin Therapy Reminder         carvedilol  9.375 mg " Oral BID w/meals     digoxin  125 mcg Oral Daily     ranitidine  150 mg Oral BID     rosuvastatin  20 mg Oral QPM     torsemide  10 mg Oral Daily     lisinopril  5 mg Oral BID     sodium chloride (PF)  3 mL Intracatheter Q8H       Data   I personally reviewed no images or EKG's today.  Results for orders placed or performed during the hospital encounter of 12/03/17 (from the past 24 hour(s))   INR   Result Value Ref Range    INR 3.26 (H) 0.86 - 1.14   Digoxin level   Result Value Ref Range    Digoxin Level 1.2 0.5 - 2.0 ug/L

## 2017-12-05 NOTE — PLAN OF CARE
Problem: Patient Care Overview  Goal: Plan of Care/Patient Progress Review  Outcome: Adequate for Discharge Date Met: 12/05/17  Pt. Admitted with Vtach.  Today is % paced.  No VTach.  Pacer interrogated and post hospital appointment made.   Coumadin for a history of AFib.   Pt. Will go home on Amio and Coreg.with followup next week.   Discharge instructions and meds reviewed and understood with wife and patient.   Discharge to home with wife driving at 1645

## 2017-12-05 NOTE — PLAN OF CARE
Problem: Patient Care Overview  Goal: Plan of Care/Patient Progress Review  Outcome: Improving  VSS, Amiodarone drip now at 0.5, 100% A-V paced [employer]; intact. No pain A + O x 4. Wife here most of evening. Uses urinal, calls appropriately.

## 2017-12-06 ENCOUNTER — CARE COORDINATION (OUTPATIENT)
Dept: CARDIOLOGY | Facility: CLINIC | Age: 72
End: 2017-12-06

## 2017-12-06 ENCOUNTER — TELEPHONE (OUTPATIENT)
Dept: FAMILY MEDICINE | Facility: CLINIC | Age: 72
End: 2017-12-06

## 2017-12-06 NOTE — TELEPHONE ENCOUNTER
"Hospital/TCU/ED for chronic condition Discharge Protocol    \"Hi, my name is Elias Samson, a registered nurse, and I am calling from [facility].  I am calling to follow up and see how things are going for you after your recent emergency visit/hospital/TCU stay.\"    Tell me how you are doing now that you are home?\" fine      Discharge Instructions    \"Let's review your discharge instructions.  What is/are the follow-up recommendations?  Pt. Response: Patient has f/u up with cardiology, INR clinic and PCP    \"Has an appointment with your primary care provider been scheduled?\"   Yes. (confirm)    \"When you see the provider, I would recommend that you bring your medications with you.\"    Medications    \"Tell me what changed about your medicines when you discharged?\"    Changes to chronic meds?    2 or more - Epic MTM referral needed    \"What questions do you have about your medications?\"    None     New diagnoses of heart failure, COPD, diabetes, or MI?      Ventricular Tachycardia (H)                  Medication reconciliation completed? Yes  Was MTM referral placed (*Make sure to put transitions as reason for referral)?   No    Call Summary    \"What questions or concerns do you have about your recent visit and your follow-up care?\"     none    \"If you have questions or things don't continue to improve, we encourage you contact us through the main clinic number (give number).  Even if the clinic is not open, triage nurses are available 24/7 to help you.     We would like you to know that our clinic has extended hours (provide information).  We also have urgent care (provide details on closest location and hours/contact info)\"      \"Thank you for your time and take care!\"           "

## 2017-12-06 NOTE — PROGRESS NOTES
Patient was evaluated by cardiology while inpatient for appropriate ICD shock associated with syncope. Called patient to discuss any post hospital d/c questions laura have, review medication changes, and confirm f/u appts.Patient denied any questions regarding new medications or changes to some of his current medications that he was taking prior to admission. Patient denied any SOB, chest pain, or light headedness. RN confirmed with patient that he has an apt scheduled on 12/14/17 with DAVE Lina Rodríguez (11am device check and 12pm with DAVE). Patient advised to call clinic with any cardiac related questions or concerns prior to his apt on 12/14/17. Patient verbalized understanding and agreed with plan.

## 2017-12-08 ENCOUNTER — ANTICOAGULATION THERAPY VISIT (OUTPATIENT)
Dept: NURSING | Facility: CLINIC | Age: 72
End: 2017-12-08
Payer: COMMERCIAL

## 2017-12-08 DIAGNOSIS — I63.50 CEREBRAL ARTERY OCCLUSION WITH CEREBRAL INFARCTION (H): ICD-10-CM

## 2017-12-08 DIAGNOSIS — Z79.01 LONG-TERM (CURRENT) USE OF ANTICOAGULANTS: ICD-10-CM

## 2017-12-08 DIAGNOSIS — I63.9 CEREBRAL INFARCTION (H): ICD-10-CM

## 2017-12-08 LAB — INR POINT OF CARE: 3.2 (ref 0.86–1.14)

## 2017-12-08 PROCEDURE — 99207 ZZC NO CHARGE NURSE ONLY: CPT

## 2017-12-08 PROCEDURE — 36416 COLLJ CAPILLARY BLOOD SPEC: CPT

## 2017-12-08 PROCEDURE — 85610 PROTHROMBIN TIME: CPT | Mod: QW

## 2017-12-08 NOTE — PROGRESS NOTES
ANTICOAGULATION FOLLOW-UP CLINIC VISIT    Patient Name:  Tyrel Rene  Date:  12/8/2017  Contact Type:  Face to Face    SUBJECTIVE:     Patient Findings     Positives Change in medications           OBJECTIVE    INR Protime   Date Value Ref Range Status   12/08/2017 3.2 (A) 0.86 - 1.14 Final       ASSESSMENT / PLAN  INR assessment SUPRA    Recheck INR In: 1 WEEK    INR Location Clinic      Anticoagulation Summary as of 12/8/2017     INR goal 2.0-2.5   Today's INR 3.2!   Maintenance plan 1.25 mg (2.5 mg x 0.5) on Mon, Wed, Fri; 2.5 mg (2.5 mg x 1) all other days   Full instructions 1.25 mg on Mon, Wed, Fri; 2.5 mg all other days   Weekly total 13.75 mg   Plan last modified Doreen Finley RN (12/8/2017)   Next INR check 12/15/2017   Target end date Indefinite    Indications   Long-term (current) use of anticoagulants [Z79.01] [Z79.01]  Cerebral artery occlusion with cerebral infarction (HCC) [I63.50] [I63.50]  Cerebral infarction (H) [I63.9]         Anticoagulation Episode Summary     INR check location [facility]    Preferred lab     Send INR reminders to [facility] INR/PROTIME    Comments       Anticoagulation Care Providers     Provider Role Specialty Phone number    Dejon Downs MD [facility] [phone]            See the Encounter Report to view Anticoagulation Flowsheet and Dosing Calendar (Go to Encounters tab in chart review, and find the Anticoagulation Therapy Visit)        Doreen Finley RN

## 2017-12-08 NOTE — MR AVS SNAPSHOT
Tyrel OJEDA Anju   12/8/2017 2:00 PM   Anticoagulation Therapy Visit    Description:  72 year old male   Provider:   ANTICOAGULATION CLINIC   Department:  Bx Nurse           INR as of 12/8/2017     Today's INR 3.2!      Anticoagulation Summary as of 12/8/2017     INR goal 2.0-2.5   Today's INR 3.2!   Full instructions 1.25 mg on Mon, Wed, Fri; 2.5 mg all other days   Next INR check 12/15/2017    Indications   Long-term (current) use of anticoagulants [Z79.01] [Z79.01]  Cerebral artery occlusion with cerebral infarction (HCC) [I63.50] [I63.50]  Cerebral infarction (H) [I63.9]         Your next Anticoagulation Clinic appointment(s)     Dec 11, 2017  4:00 PM CST   Anticoagulation Visit with  [facility]ATION CLINIC   [facility]    [address] [place] [id number] [phone]            Dec 15, 2017  2:00 PM CST   Anticoagulation Visit with  ANTICOAGULATION CLINIC   [facility]    [address] [id number] [phone]              Contact Numbers     [facility]  Please call  [phone] to cancel and/or reschedule your appointment   The direct line to the anticoagulant nurse is [phone] on Monday, Wednesday, and Friday. On Thursday, the anticoagulant nurse can be reached directly at [phone].         December 2017 Details    Sun Mon Tue Wed Thu Fri Sat          1               2                 3               4               5               6               7               8      1.25 mg   See details      9      2.5 mg           10      2.5 mg         11      1.25 mg         12      2.5 mg         13      1.25 mg         14      2.5 mg         15            16                 17               18               19               20               21               22               23                  24               25               26               27               28               29               30                 31                      Date Details   12/08 This INR check       Date of next INR:  12/15/2017         How to take your warfarin dose     To take:  1.25 mg Take 0.5 of a 2.5 mg tablet.    To take:  2.5 mg Take 1 of the 2.5 mg tablets.

## 2017-12-14 ENCOUNTER — OFFICE VISIT (OUTPATIENT)
Dept: CARDIOLOGY | Facility: CLINIC | Age: 72
End: 2017-12-14
Payer: COMMERCIAL

## 2017-12-14 ENCOUNTER — ALLIED HEALTH/NURSE VISIT (OUTPATIENT)
Dept: CARDIOLOGY | Facility: CLINIC | Age: 72
End: 2017-12-14
Payer: COMMERCIAL

## 2017-12-14 VITALS
SYSTOLIC BLOOD PRESSURE: 110 MMHG | HEIGHT: 73 IN | DIASTOLIC BLOOD PRESSURE: 66 MMHG | WEIGHT: 184 LBS | HEART RATE: 65 BPM | BODY MASS INDEX: 24.39 KG/M2

## 2017-12-14 DIAGNOSIS — I47.20 VENTRICULAR TACHYCARDIA (H): ICD-10-CM

## 2017-12-14 DIAGNOSIS — I25.10 CORONARY ARTERY DISEASE INVOLVING NATIVE CORONARY ARTERY OF NATIVE HEART WITHOUT ANGINA PECTORIS: Primary | ICD-10-CM

## 2017-12-14 DIAGNOSIS — Z95.810 ICD (IMPLANTABLE CARDIOVERTER-DEFIBRILLATOR) IN PLACE: Primary | ICD-10-CM

## 2017-12-14 PROCEDURE — 93000 ELECTROCARDIOGRAM COMPLETE: CPT | Performed by: PHYSICIAN ASSISTANT

## 2017-12-14 PROCEDURE — 93284 PRGRMG EVAL IMPLANTABLE DFB: CPT | Performed by: INTERNAL MEDICINE

## 2017-12-14 PROCEDURE — 99215 OFFICE O/P EST HI 40 MIN: CPT | Mod: 25 | Performed by: PHYSICIAN ASSISTANT

## 2017-12-14 RX ORDER — CARVEDILOL 3.12 MG/1
6.25 TABLET ORAL 2 TIMES DAILY WITH MEALS
Start: 2017-12-14 | End: 2018-01-08

## 2017-12-14 NOTE — MR AVS SNAPSHOT
After Visit Summary   12/14/2017    Tyrel Rene    MRN: [id number]           Patient Information     Date Of Birth          1945        Visit Information        Provider Department      12/14/2017 11:00 AM [facility] D[facility]        Today's Diagnoses     ICD (implantable cardioverter-defibrillator) in place    -  1    Ventricular tachycardia (H)           Follow-ups after your visit        Your next 10 appointments already scheduled     Dec 15, 2017  2:00 PM CST   Antico[facility]    [address] [place] [address] [address]  [facility] [address]   [phone]            Dec 20, 2017 11:30 AM CST   PHYSICAL with Dejon Downs MD   [facility]    [address] [place] [address] [address]  [facility] [address]   [phone]            Aguilar 15, 2018  9:00 AM CST   LAB with [facility]   [facility] ([facility])    [address] [place] [address] [address]  [facility] [address]   [phone]           Please do not eat 10-12 hours before your appointment if you are coming in fasting for labs on lipids, cholesterol, or glucose (sugar). This does not apply to pregnant women. Water, hot tea and black coffee (with nothing added) are okay. Do not drink other fluids, diet soda or chew gum.            Aguilar 15, 2018  9:30 AM CST   Ech Complete with [id number]   [facility] CV [facility]chocardiography ([facility])    [address] [place] [address]  [address]  [facility] [address] [phone]           1. Please bring or wear a comfortable two-piece outfit. 2. You may eat, drink and take your normal medicines. 3. For any questions that cannot be answered, please contact the ordering physician            Aguilar  19, 2018  2:15 PM CST   Core MD Return with Parish Newman MD   [facility] ([facility])    [address]  [facility] [address] [phone]            Apr 05, 2018  4:30 PM CDT   Remote ICD Check with MARQUEZ DCR2   [facility] ([facility] [place])    [address]  [facility] [id number] [phone]           This appointment is for a remote check of your debrillator.  This is not an appointment at the office.              Who to contact     If you have questions or need follow up information about today's clinic visit or your schedule please contact [facility] directly at [phone].  Normal or non-critical lab and imaging results will be communicated to you by MyScreenhart, letter or phone within 4 business days after the clinic has received the results. If you do not hear from us within 7 days, please contact the clinic through Via Novust or phone. If you have a critical or abnormal lab result, we will notify you by phone as soon as possible.  Submit refill requests through minicabit or call your pharmacy and they will forward the refill request to us. Please allow 3 business days for your refill to be completed.          Additional Information About Your Visit        minicabit Information     minicabit gives you secure access to your electronic health record. If you see a primary care provider, you can also send messages to your care team and make appointments. If you have questions, please call your primary care clinic.  If you do not have a primary care provider, please call [phone] and they will assist you.        Care EveryWhere ID     This is your Care EveryWhere ID. This could be used by other organizations to access your [facility] medical records  [id number]         Blood Pressure from Last 3 Encounters:   12/14/17 110/66   12/05/17 122/71   10/18/17  110/70    Weight from Last 3 Encounters:   12/14/17 83.5 kg (184 lb)   12/05/17 81.1 kg (178 lb 12.8 oz)   10/18/17 83.5 kg (184 lb)              We Performed the Following     Follow-Up with Device Clinic     ICD DEVICE PROGRAMMING EVAL, MULTI LEAD ICD (16190)        Primary Care Provider Office Phone # Fax #    Dejon Downs [phone] [address] [facility] [address]        Equal Access to Services     [facility]: Hadii aad ku hadasho Soomaali, waaxda luqadaha, qaybta kaalmada adeegyada, waxay idiin hayaan adeeg kharash labhupinder . So [facility] [phone].    ATENCIÓN: Si habla español, tiene a hagen disposición servicios gratuitos de asistencia lingüística. Ll[facility] [phone].    We comply with applicable federal civil rights laws and [place] laws. We do not discriminate on the basis of race, color, national origin, age, disability, sex, sexual orientation, or gender identity.            Thank you!     Thank you for choosing [facility]  for your care. Our goal is always to provide you with excellent care. Hearing back from our patients is one way we can continue to improve our services. Please take a few minutes to complete the written survey that you may receive in the mail after your visit with us. Thank you!             Your Updated Medication List - Protect others around you: Learn how to safely use, store and throw away your medicines at www.disposemymeds.org.          This list is accurate as of: 12/14/17 12:01 PM.  Always use your most recent med list.                   Brand Name Dispense Instructions for use Diagnosis    * amiodarone 200 MG tablet    PACERONE/CODARONE    42 tablet    Take 1 tablet (200 mg) by mouth 2 times daily x 3 weeks, then 200 mg daily    Ventricular tachycardia (H)       * amiodarone 200 MG tablet   Start taking on:  12/26/2017    PACERONE/CODARONE    30 tablet    Take 1 tablet (200 mg) by mouth daily     Ventricular tachycardia (H)       ASPIRIN NOT PRESCRIBED    INTENTIONAL    0 each    Antiplatelet medication not prescribed intentionally due to Current anticoagulant therapy (warfarin/enoxaparin)    ASHD (arteriosclerotic heart disease)       carvedilol 3.125 MG tablet    COREG    180 tablet    Take 3 tablets (9.375 mg) by mouth 2 times daily (with meals)    Ventricular tachycardia (H)       digoxin 125 MCG tablet    LANOXIN    90 tablet    Take 1 tablet (125 mcg) by mouth daily    Atrial fibrillation (H)       lisinopril 5 MG tablet    PRINIVIL/ZESTRIL    180 tablet    Take 1 tablet (5 mg) by mouth 2 times daily    CHF (congestive heart failure) (H)       NITROSTAT 0.4 MG sublingual tablet   Generic drug:  nitroGLYcerin     25 tablet    DISSOLVE 1 TABLET UNDER THE TONGUE EVERY 5 MINUTES AS NEEDED FOR CHEST PAIN    Postsurgical aortocoronary bypass status       PRESERVISION AREDS 2 PO      Take 1 capsule by mouth 2 times daily        ranitidine 150 MG tablet    ZANTAC    180 tablet    Take 1 tablet (150 mg) by mouth 2 times daily    S/P CABG (coronary artery bypass graft)       rosuvastatin 20 MG tablet    CRESTOR    30 tablet    Take 1 tablet (20 mg) by mouth daily        sildenafil 100 MG tablet    VIAGRA    16 tablet    Take 1 tablet (100 mg) by mouth daily as needed for erectile dysfunction Take 30 min to 4 hours before intercourse.  Never use with nitroglycerin, terazosin or doxazosin.    Erectile dysfunction, unspecified erectile dysfunction type       Vitamin D (Cholecalciferol) 1000 UNITS Tabs      Take 1,000 mg by mouth daily        * WARFARIN SODIUM PO      Take 2.5 mg by mouth daily On Sun, Tue, Wed, Thu, and Sat        * WARFARIN SODIUM PO      Take 1.25 mg by mouth daily On Mon and Fri.        * Notice:  This list has 4 medication(s) that are the same as other medications prescribed for you. Read the directions carefully, and ask your doctor or other care provider to review them with you.

## 2017-12-14 NOTE — PROGRESS NOTES
[employer] (D) ICD Device Check  AP: 32 % BVP: 99 %  Mode: DDDR  (changed to VVIR today)        Underlying Rhythm: AF with V response in the 40's  Heart Rate: minimal variability, approx 90% of beats at LRL 65.   Sensing: WNL    Pacing Threshold: WNL    Impedance: WNL  Battery Status: 4 yrs estimated longevity, 9.7 second charge time  Device Site: [facility]  Atrial Arrhythmia: pt back in AF since 12/11/2017. Pt has history of permanent AF, on warfarin. When he got a shock on 12/3/2017 it converted him back to sinus, but he is back in AF now. Pt denies symptoms of AF.   Ventricular Arrhythmia: 2 episodes since shock on 12/3/2017. 1st EGM shows 7 seconds of VS beats while pt was still in sinus on 12/7/2017, HR started at 130 bpm and ramped up to 198 bpm. 2nd EGM from 12/11/2017 when pt was back in AF shows 4 beats of NSVT at  bpm followed by 10 seconds of VS beats at  bpm.   ATP: none    Shocks: none  Setting Change: changed Accelerometer Rate Response Factor from 10 to 11 due to flat histogram and reports of activity intolerance (SOB). Changed juan carlos mode from DDDR to VVIR to conserve battery as pt is now back in AF.     Care Plan: remote in 3 months, scheduled. OV with Lina LAWSON today.   TANG RN

## 2017-12-14 NOTE — PROGRESS NOTES
HPI and Plan:   See dictation #[id number]    Orders Placed This Encounter   Procedures     EKG 12-lead complete w/read - Clinics (performed today)       Orders Placed This Encounter   Medications     carvedilol (COREG) 3.125 MG tablet     Sig: Take 2 tablets (6.25 mg) by mouth 2 times daily (with meals)       Medications Discontinued During This Encounter   Medication Reason     carvedilol (COREG) 3.125 MG tablet Reorder         Encounter Diagnoses   Name Primary?     Ventricular tachycardia (H)      Coronary artery disease involving native coronary artery of native heart without angina pectoris Yes       CURRENT MEDICATIONS:  Current Outpatient Prescriptions   Medication Sig Dispense Refill     carvedilol (COREG) 3.125 MG tablet Take 2 tablets (6.25 mg) by mouth 2 times daily (with meals)       amiodarone (PACERONE/CODARONE) 200 MG tablet Take 1 tablet (200 mg) by mouth 2 times daily x 3 weeks, then 200 mg daily 42 tablet 0     [START ON 12/26/2017] amiodarone (PACERONE/CODARONE) 200 MG tablet Take 1 tablet (200 mg) by mouth daily 30 tablet 0     WARFARIN SODIUM PO Take 2.5 mg by mouth daily On Sun, Tue, Wed, Thu, and Sat       WARFARIN SODIUM PO Take 1.25 mg by mouth daily On Mon and Fri.       digoxin (LANOXIN) 125 MCG tablet Take 1 tablet (125 mcg) by mouth daily 90 tablet 3     rosuvastatin (CRESTOR) 20 MG tablet Take 1 tablet (20 mg) by mouth daily 30 tablet 11     ASPIRIN NOT PRESCRIBED (INTENTIONAL) Antiplatelet medication not prescribed intentionally due to Current anticoagulant therapy (warfarin/enoxaparin) 0 each 0     NITROSTAT 0.4 MG sublingual tablet DISSOLVE 1 TABLET UNDER THE TONGUE EVERY 5 MINUTES AS NEEDED FOR CHEST PAIN 25 tablet 0     Vitamin D, Cholecalciferol, 1000 UNITS TABS Take 1,000 mg by mouth daily        Multiple Vitamins-Minerals (PRESERVISION AREDS 2 PO) Take 1 capsule by mouth 2 times daily       lisinopril (PRINIVIL,ZESTRIL) 5 MG tablet Take 1 tablet (5 mg) by mouth 2 times daily 180  tablet 3     sildenafil (VIAGRA) 100 MG tablet Take 1 tablet (100 mg) by mouth daily as needed for erectile dysfunction Take 30 min to 4 hours before intercourse.  Never use with nitroglycerin, terazosin or doxazosin. 16 tablet 3     ranitidine (ZANTAC) 150 MG tablet Take 1 tablet (150 mg) by mouth 2 times daily 180 tablet      [DISCONTINUED] carvedilol (COREG) 3.125 MG tablet Take 3 tablets (9.375 mg) by mouth 2 times daily (with meals) 180 tablet 0       ALLERGIES     Allergies   Allergen Reactions     Nystatin Other (See Comments)     Make his mouth numb & swelling       PAST MEDICAL HISTORY:  Past Medical History:   Diagnosis Date     Atrial fibrillation (H)     s/p Cardioversion 3/14/2013     Atrial flutter (H)     S/p Aflutter ablation 1/11/2011     CAD (coronary artery disease)     CABG x3 10/2012- LIMA to distal LAD, SVG to OM1 & OM3; cath 10/2012- PTCA to second diagonal and mid LAD, BMS to mid LAD     Cardiogenic shock (H)      Cardiomyopathy (H)      CHF (congestive heart failure) (H)      CVA (cerebral infarction)     residual right hand numbness     ED (erectile dysfunction)      Hyperlipidemia      Hypertension      Neuropathy      Palpitations      SVT (supraventricular tachycardia) (H)     S/p dual chamber ICD 10/11/12- upgraded to BIV ICD 6/2013     Syncope        PAST SURGICAL HISTORY:  Past Surgical History:   Procedure Laterality Date     BYPASS GRAFT ARTERY CORONARY  10/2/2012    Procedure: BYPASS GRAFT ARTERY CORONARY;  Coronary Artery Bypass Graft x3 (LAD, Diag, OM) with Endovein [place] (On-Pump);  Surgeon: Yeyo Lyman MD;  Location: [facility] OR     CARDIOVERSION  3/14/2013     CORONARY ANGIOGRAPHY ADULT ORDER  10/2/12     CORONARY ARTERY BYPASS  10/2/12    LIMA to LAD, SVG to OM1 and OM3     H ABLATION ATRIAL FLUTTER  1/11/2011     HAND SURGERY       HEART CATH, ANGIOPLASTY  10/2/12    PTCA to second diagonal and mid LAD, BMS to mid LAD     HERNIA REPAIR       ORTHOPEDIC SURGERY Right  "2006    cut on table saw     RELOCATE GENERATOR ICD/PACEMAKER         FAMILY HISTORY:  Family History   Problem Relation Age of Onset     Alcohol/Drug Father      HEART DISEASE Father 71     Alzheimer Disease Sister      Alcohol/Drug Sister      Alcohol/Drug Sister      GASTROINTESTINAL DISEASE Sister      Obesity Sister      Hypertension Sister      HEART DISEASE Sister      Hypertension Sister      HEART DISEASE Daughter      afib     HEART DISEASE Daughter      afib     CANCER Daughter      lymphoma     Aneurysm Mother        SOCIAL HISTORY:  Social History     Social History     Marital status:      Spouse name: N/A     Number of children: N/A     Years of education: N/A     Social History Main Topics     Smoking status: Former Smoker     Quit date: 7/10/1972     Smokeless tobacco: Never Used     Alcohol use No     Drug use: No     Sexual activity: Not Asked     Other Topics Concern     Parent/Sibling W/ Cabg, Mi Or Angioplasty Before 65f 55m? No     Caffeine Concern Yes     4 cups coffee daily     Sleep Concern No     Stress Concern No     Weight Concern Yes     gain 2lbs     Special Diet Yes     low sodium     Exercise Yes     walking, doing sittups, played pickle ball in summer     Seat Belt Yes     Social History Narrative       Review of Systems:  Skin:  Negative       Eyes:  Positive for glasses    ENT:  Negative      Respiratory:  Positive for dyspnea on exertion     Cardiovascular:  Negative for;palpitations;chest pain;edema;syncope or near-syncope Positive for;lightheadedness since discharge  Gastroenterology: Negative for melena;hematochezia    Genitourinary:  Negative      Musculoskeletal:  Negative      Neurologic:  Negative      Psychiatric:  Negative      Heme/Lymph/Imm:  Negative      Endocrine:  Negative        Physical Exam:  Vitals: /66  Pulse 65  Ht 1.854 m (6' 1\")  Wt 83.5 kg (184 lb)  BMI 24.28 kg/m2    Constitutional:  cooperative, alert and oriented, well developed, well " nourished, in no acute distress        Skin:  warm and dry to the touch, no apparent skin lesions or masses noted   ICD incision in the left infraclavicular area was well-healed      Head:  normocephalic, no masses or lesions        Eyes:  pupils equal and round;conjunctivae and lids unremarkable;sclera white;no xanthalasma        Lymph:      ENT:  no pallor or cyanosis        Neck:  carotid pulses are full and equal bilaterally;JVP normal        Respiratory:  clear to auscultation;healed median sternotomy scar         Cardiac:   irregularly irregular rhythm              pulses full and equal                                        GI:  abdomen soft        Extremities and Muscular Skeletal:  no deformities, clubbing, cyanosis, erythema observed;no edema              Neurological:  no gross motor deficits        Psych:  Alert and Oriented x 3

## 2017-12-14 NOTE — LETTER
12/14/2017    Dejon Downs MD  [address]  [facility] [address]      RE: Tyrel OJEDA Anju       Dear Colleague,    I had the pleasure of seeing Tyrel Rene in the [facility].    HISTORY OF PRESENT ILLNESS:  I had the pleasure of seeing Marko today when he came accompanied by his wife for followup of his recent hospitalization.  He is a pleasant 72-year-old patient who sees Dr. Newman and Dr. Costello as well as Prince Galvez in the [facility].  He has a history of dyslipidemia, hypertension as well as coronary disease, sustaining an anterior wall myocardial infarction in 10/2012.  Unfortunately, stent in the LAD was unable to be deployed, and he underwent 3-vessel bypass surgery with a LIMA to the LAD, vein graft to the first obtuse marginal and vein graft to the third obtuse marginal.  He had multiple episodes of ventricular arrhythmias requiring defibrillation.  He had a resultant ischemic cardiomyopathy with an ejection fraction initially of 30%.  He underwent placement of a dual-chamber [employer] ICD and underwent upgrade to BiV device in 2014. He had a >50 day hospitalization.     He saw Dr. Costello 10/17/2017 after device interrogation indicated evidence of polymorphic ventricular tachycardia.  He was started on amiodarone 200 mg twice daily for 2 weeks, followed by 200 mg daily. A stress test was ordered to look for ischemic causes of VT and was negative for significant ischemia (10/2017).     Unfortunately, he was hospitalized 12/03-12/05 secondary to another appropriate ICD shock.  He had been on the elliptical machine and had a syncopal episode.  He was found to have an ICD shock for ventricular tachycardia.  He had ATP x4, followed by a 41-joule shock on 12/03 for ventricular tachycardia at 200 beats per minute.  His carvedilol was increased, and he was started back on amiodarone drip, and we reloaded oral amiodarone with 200 mg twice daily for 3 weeks  "(12/05-12/26), and he is to start amiodarone 200 mg daily on 12/27.  Interestingly, his shock also converted him from atrial fibrillation (which has been chronic and for which he has been maintained on warfarin) to sinus rhythm.  Given his conversion to sinus rhythm, we did not want to interrupt his warfarin therapy for angiogram, though Dr. Costello thought this should be done given the fact this happened while on the elliptical with exertion.  He was given torsemide 10 mg daily while in the hospital, though he typically only took it PRN for weights >177 pounds.     He comes back today telling me that overall he thinks he has done \"okay\" but does note some increased lightheadedness. He notices this when he first stands up but also when he's been standing for a period of time.  He feels he might be a little more short of breath than he previously had been but does not think it is \"too bad.\"  He denies edema, orthopnea or PND.  His weights at home have been stable.  He was not discharged home on daily torsemide but continues it just PRN for weight gain.     He has not had chest pain, pressure or tightness.  He has not had edema, orthopnea or PND.     He denies ryan syncope or palpitations, but does note lightheadedness when he is up.  He states that previously he only noticed this when he first stood up; however, he states that he is now lightheaded for a longer period of time.  He does still feel steady on his feet.      He is tolerating his medications otherwise without any issues.  He does not check his blood pressure routinely at home.      Device interrogation today revealed he was 32% A paced and 99% BiV paced.  He was back in atrial fibrillation since 12/11.  His heart rate was controlled with 90% of his heart rates being at lower rate of 65 beats per minute.  He has had 2 further episodes of ventricular tachycardia which have been short and not required treatment.  The first episode was 12/07 lasting 7 seconds. "  The second episode was 12/11 lasting 4 beats.  His device was reprogrammed to VVIR given that he was back in atrial fibrillation, and rate response was adjusted given his feeling a bit lightheaded.      Last echocardiogram in 01/2017 showed an EF of 30%-35%.  Last stress test in 10/2017 to work him up for an ischemic cause of VT was negative for significant ischemia.        ASSESSMENT AND PLAN:     1.  Recurrent ventricular tachycardia.  As above, he underwent successful ATP and shock on 12/03.  He was   hospitalized and reloaded on amiodarone.  He has been encouraged not to exercise until we can rule out an ischemic cause of this. He is not to drive x 3 months.     His lightheadedness may certainly be due to the increase in his amiodarone dosing, but given that I would like to continue this for ventricular tachycardia, I will start by decreasing his carvedilol back to 6.25 mg twice daily.      He will continue routine device checks through our office.  He is not to drive for at least 3 months (until 03/03/2018).      2.  Coronary artery disease.  As above, we need to check an angiogram given the fact that he had this ventricular tachycardia that was successfully resuscitated while on the elliptical.  He has had no chest pain, pressure or tightness, and prior to his heart attack in 2012 he was symptomatic.      At this time, we will plan to proceed with coronary angiography.  We discussed the risks, benefits and indications of this including but not limited to peripheral vessel injury, heart attack, death, stroke tachy or bradyarrhythmias, potential need for stent deployment (which would likely be done ad hoc), risk of dye nephropathy and dye allergy or even need for recurrent emergent bypass surgery.  He voiced understanding and is willing to proceed.      I will determine from the interventionalist when he needs to come off of anticoagulation with warfarin prior to his procedure.        He understands he will be  getting conscious sedation and will likely be discharged home the same day and would require a .      3.  Atrial fibrillation.  This has been permanent but sinus rhythm was briefly restored after his ICD successfully defibrillated him from ventricular tachycardia on 12/03.  He was in sinus rhythm until 12/11.  He remains oral anticoagulation with warfarin but will have him come off of this a few days prior to his angiogram.      4.  Ischemic cardiomyopathy.  Ejection fraction in January was 30%-35%.  He does not demonstrate evidence of fluid overload on exam despite his weight being a bit higher on the scale today.  He states at home his scale weights of less than 177 pounds, and therefore he has not taken any torsemide.  He himself does not feel fluid overloaded at all.      He will continue beta blocker (with dose reduction as above), as well as ACE inhibitor and p.r.n. torsemide.  [facility] routinely and is actually due to see Dr. Newman in January with a repeat echocardiogram and labs.      It has been a pleasure to see Marko in clinic today.       Outpatient Encounter Prescriptions as of 12/14/2017   Medication Sig Dispense Refill     carvedilol (COREG) 3.125 MG tablet Take 2 tablets (6.25 mg) by mouth 2 times daily (with meals)       amiodarone (PACERONE/CODARONE) 200 MG tablet Take 1 tablet (200 mg) by mouth 2 times daily x 3 weeks, then 200 mg daily 42 tablet 0     [START ON 12/26/2017] amiodarone (PACERONE/CODARONE) 200 MG tablet Take 1 tablet (200 mg) by mouth daily 30 tablet 0     WARFARIN SODIUM PO Take 2.5 mg by mouth daily On Sun, Tue, Wed, Thu, and Sat       WARFARIN SODIUM PO Take 1.25 mg by mouth daily On Mon and Fri.       digoxin (LANOXIN) 125 MCG tablet Take 1 tablet (125 mcg) by mouth daily 90 tablet 3     rosuvastatin (CRESTOR) 20 MG tablet Take 1 tablet (20 mg) by mouth daily 30 tablet 11     ASPIRIN NOT PRESCRIBED (INTENTIONAL) Antiplatelet medication not prescribed  intentionally due to Current anticoagulant therapy (warfarin/enoxaparin) 0 each 0     NITROSTAT 0.4 MG sublingual tablet DISSOLVE 1 TABLET UNDER THE TONGUE EVERY 5 MINUTES AS NEEDED FOR CHEST PAIN 25 tablet 0     Vitamin D, Cholecalciferol, 1000 UNITS TABS Take 1,000 mg by mouth daily        Multiple Vitamins-Minerals (PRESERVISION AREDS 2 PO) Take 1 capsule by mouth 2 times daily       lisinopril (PRINIVIL,ZESTRIL) 5 MG tablet Take 1 tablet (5 mg) by mouth 2 times daily 180 tablet 3     sildenafil (VIAGRA) 100 MG tablet Take 1 tablet (100 mg) by mouth daily as needed for erectile dysfunction Take 30 min to 4 hours before intercourse.  Never use with nitroglycerin, terazosin or doxazosin. 16 tablet 3     ranitidine (ZANTAC) 150 MG tablet Take 1 tablet (150 mg) by mouth 2 times daily 180 tablet      [DISCONTINUED] carvedilol (COREG) 3.125 MG tablet Take 3 tablets (9.375 mg) by mouth 2 times daily (with meals) 180 tablet 0     No facility-administered encounter medications on file as of 12/14/2017.        Again, thank you for allowing me to participate in the care of your patient.      Sincerely,    Joelle Rodríguez PA-C     [facility]

## 2017-12-14 NOTE — PATIENT INSTRUCTIONS
1. Reviewed device interrogation - back in AFib since 12/11 :-(  Heart rate is controlled in AFib.  No more shocks/ATP since hospital.    2. Due to lightheadedness that persists even after you're standing for awhile, DECREASE carvedilol back to 6.25 mg twice a day (take 2 tabs twice a day)    3. On Friday 12/22 call me in AM with update - my nurses are @ [phone]    4. Discussed proceeding with angiogram (looking at blood supply to heart) in case the bad rhythm was due to blockage.  You'll have to hold your warfarin beforehand (we will let you know when to start holding it) so needed to wait 1 month after the normalization of rhythm.

## 2017-12-14 NOTE — MR AVS SNAPSHOT
After Visit Summary   12/14/2017    Tyrel Rene    MRN: [id number]           Patient Information     Date Of Birth          1945        Visit Information        Provider Department      12/14/2017 12:00 PM Joelle Rodríguez PA-C [facility]   [place]        Today's Diagnoses     Coronary artery disease involving native coronary artery of native heart without angina pectoris    -  1    Ventricular tachycardia (H)          Care Instructions    1. Reviewed device interrogation - back in AFib since 12/11 :-(  Heart rate is controlled in AFib.  No more shocks/ATP since hospital.    2. Due to lightheadedness that persists even after you're standing for awhile, DECREASE carvedilol back to 6.25 mg twice a day (take 2 tabs twice a day)    3. On Friday 12/22 call me in AM with update - my nurses are @ [phone]    4. Discussed proceeding with angiogram (looking at blood supply to heart) in case the bad rhythm was due to blockage.  You'll have to hold your warfarin beforehand (we will let you know when to start holding it) so needed to wait 1 month after the normalization of rhythm.               Follow-ups after your visit        Your next 10 appointments already scheduled     Dec 15, 2017  2:00 PM CST   Anticoagulation Visit with [facility]   [facility]    [address] [place] [id number]   [phone]            Dec 20, 2017 11:30 AM CST   PHYSICAL with Dejon Downs MD   [facility]    [address] [place] [address] [address]  [facility] [address]   [phone]            Aguilar 15, 2018  9:00 AM CST   LAB with [facility]   [facility] [place] ([facility])    [address]  [facility] [address]   [phone]            Please do not eat 10-12 hours before your appointment if you are coming in fasting for labs on lipids, cholesterol, or glucose (sugar). This does not apply to pregnant women. Water, hot tea and black coffee (with nothing added) are okay. Do not drink other fluids, diet soda or chew gum.            Aguilar 15, 2018  9:30 AM CST   Ech Complete with [id number]   [facility] CV Echocardiography ([facility] at [facility])    [address]  [facility] [address] [phone]           1. Please bring or wear a comfortable two-piece outfit. 2. You may eat, drink and take your normal medicines. 3. For any questions that cannot be answered, please contact the ordering physician            Jan 19, 2018  2:15 PM CST   Core MD Return with Parish Newman MD   [facility] ([facility])    [address]  [facility] [address] [phone]            Apr 05, 2018  4:30 PM CDT   Remote ICD Check with MARQUEZ DCR2   [facility] ([facility])    [address]  [facility] [address] [phone]           This appointment is for a remote check of your debrillator.  This is not an appointment at the office.              Future tests that were ordered for you today     Open Future Orders        Priority Expected Expires Ordered    Cardiac Cath: Coronary Angiography Adult Order Routine 1/2/2018 12/14/2018 12/14/2017            Who to contact     If you have questions or need follow up information about today's clinic visit or your schedule please contact [facility] directly at [phone].  Normal or non-critical lab and imaging results will be communicated to you by MyChart, letter or phone within 4 business days after the clinic has received the results. If you do not hear from us within 7 days, please contact the clinic  "through [employer] or phone. If you have a critical or abnormal lab result, we will notify you by phone as soon as possible.  Submit refill requests through [employer] or call your pharmacy and they will forward the refill request to us. Please allow 3 business days for your refill to be completed.          Additional Information About Your Visit        [employer] Information     [employer] gives you secure access to your electronic health record. If you see a primary care provider, you can also send messages to your care team and make appointments. If you have questions, please call your primary care clinic.  If you do not have a primary care provider, please call [phone] and they will assist you.        Care EveryWhere ID     This is your Care EveryWhere ID. This could be used by other organizations to access your [facility] medical records  [id number]        Your Vitals Were     Pulse Height BMI (Body Mass Index)             65 1.854 m (6' 1\") 24.28 kg/m2          Blood Pressure from Last 3 Encounters:   12/14/17 110/66   12/05/17 122/71   10/18/17 110/70    Weight from Last 3 Encounters:   12/14/17 83.5 kg (184 lb)   12/05/17 81.1 kg (178 lb 12.8 oz)   10/18/17 83.5 kg (184 lb)              We Performed the Following     EKG 12-lead complete w/read - Clinics (performed today)     Follow-Up with Cardiac Advanced Practice Provider     Follow-Up with Cardiac Advanced Practice Provider          Today's Medication Changes          These changes are accurate as of: 12/14/17 12:28 PM.  If you have any questions, ask your nurse or doctor.               These medicines have changed or have updated prescriptions.        Dose/Directions    carvedilol 3.125 MG tablet   Commonly known as:  COREG   This may have changed:  how much to take   Used for:  Ventricular tachycardia (H)   Changed by:  Joelle Rodríguez PA-C        Dose:  6.25 mg   Take 2 tablets (6.25 mg) by mouth 2 times daily (with meals)   Refills:  0          "   Where to get your medicines      Some of these will need a paper prescription and others can be bought over the counter.  Ask your nurse if you have questions.     You don't need a prescription for these medications     carvedilol 3.125 MG tablet                Primary Care Provider Office Phone # Fax #    Dejon Downs [phone]       [address]  [facility] [address]        Equal Access to Services     JAVI OTERO : Hadii aad ku hadasho Soomaali, waaxda luqadaha, qaybta kaalmada adeegyada, waxay idiin hayaan adeeg kharash la'see . So [facility] [phone].    ATENCIÓN: Si habla español, tiene a hagen disposición servicios gratuitos de asistencia lingüística. Patienceame al [phone].    We comply with applicable federal civil rights laws and [place] laws. We do not discriminate on the basis of race, color, national origin, age, disability, sex, sexual orientation, or gender identity.            Thank you!     Thank you for choosing [facility]  for your care. Our goal is always to provide you with excellent care. Hearing back from our patients is one way we can continue to improve our services. Please take a few minutes to complete the written survey that you may receive in the mail after your visit with us. Thank you!             Your Updated Medication List - Protect others around you: Learn how to safely use, store and throw away your medicines at www.disposemymeds.org.          This list is accurate as of: 12/14/17 12:28 PM.  Always use your most recent med list.                   Brand Name Dispense Instructions for use Diagnosis    * amiodarone 200 MG tablet    PACERONE/CODARONE    42 tablet    Take 1 tablet (200 mg) by mouth 2 times daily x 3 weeks, then 200 mg daily    Ventricular tachycardia (H)       * amiodarone 200 MG tablet   Start taking on:  12/26/2017    PACERONE/CODARONE    30 tablet    Take 1 tablet (200 mg) by mouth daily     Ventricular tachycardia (H)       ASPIRIN NOT PRESCRIBED    INTENTIONAL    0 each    Antiplatelet medication not prescribed intentionally due to Current anticoagulant therapy (warfarin/enoxaparin)    ASHD (arteriosclerotic heart disease)       carvedilol 3.125 MG tablet    COREG     Take 2 tablets (6.25 mg) by mouth 2 times daily (with meals)    Ventricular tachycardia (H)       digoxin 125 MCG tablet    LANOXIN    90 tablet    Take 1 tablet (125 mcg) by mouth daily    Atrial fibrillation (H)       lisinopril 5 MG tablet    PRINIVIL/ZESTRIL    180 tablet    Take 1 tablet (5 mg) by mouth 2 times daily    CHF (congestive heart failure) (H)       NITROSTAT 0.4 MG sublingual tablet   Generic drug:  nitroGLYcerin     25 tablet    DISSOLVE 1 TABLET UNDER THE TONGUE EVERY 5 MINUTES AS NEEDED FOR CHEST PAIN    Postsurgical aortocoronary bypass status       PRESERVISION AREDS 2 PO      Take 1 capsule by mouth 2 times daily        ranitidine 150 MG tablet    ZANTAC    180 tablet    Take 1 tablet (150 mg) by mouth 2 times daily    S/P CABG (coronary artery bypass graft)       rosuvastatin 20 MG tablet    CRESTOR    30 tablet    Take 1 tablet (20 mg) by mouth daily        sildenafil 100 MG tablet    VIAGRA    16 tablet    Take 1 tablet (100 mg) by mouth daily as needed for erectile dysfunction Take 30 min to 4 hours before intercourse.  Never use with nitroglycerin, terazosin or doxazosin.    Erectile dysfunction, unspecified erectile dysfunction type       Vitamin D (Cholecalciferol) 1000 UNITS Tabs      Take 1,000 mg by mouth daily        * WARFARIN SODIUM PO      Take 2.5 mg by mouth daily On Sun, Tue, Wed, Thu, and Sat        * WARFARIN SODIUM PO      Take 1.25 mg by mouth daily On Mon and Fri.        * Notice:  This list has 4 medication(s) that are the same as other medications prescribed for you. Read the directions carefully, and ask your doctor or other care provider to review them with you.

## 2017-12-15 ENCOUNTER — DOCUMENTATION ONLY (OUTPATIENT)
Dept: CARDIOLOGY | Facility: CLINIC | Age: 72
End: 2017-12-15
Payer: COMMERCIAL

## 2017-12-15 ENCOUNTER — ANTICOAGULATION THERAPY VISIT (OUTPATIENT)
Dept: NURSING | Facility: CLINIC | Age: 72
End: 2017-12-15
Payer: COMMERCIAL

## 2017-12-15 DIAGNOSIS — I63.50 CEREBRAL ARTERY OCCLUSION WITH CEREBRAL INFARCTION (H): ICD-10-CM

## 2017-12-15 DIAGNOSIS — I63.9 CEREBRAL INFARCTION (H): ICD-10-CM

## 2017-12-15 DIAGNOSIS — Z79.01 LONG-TERM (CURRENT) USE OF ANTICOAGULANTS: ICD-10-CM

## 2017-12-15 DIAGNOSIS — Z53.9 ERRONEOUS ENCOUNTER--DISREGARD: Primary | ICD-10-CM

## 2017-12-15 LAB — INR POINT OF CARE: 3.1 (ref 0.86–1.14)

## 2017-12-15 PROCEDURE — 85610 PROTHROMBIN TIME: CPT | Mod: QW

## 2017-12-15 PROCEDURE — 36416 COLLJ CAPILLARY BLOOD SPEC: CPT

## 2017-12-15 PROCEDURE — 99207 ZZC NO CHARGE NURSE ONLY: CPT

## 2017-12-15 NOTE — PROGRESS NOTES
HISTORY OF PRESENT ILLNESS:  I had the pleasure of seeing Marko today when he came accompanied by his wife for followup of his recent hospitalization.  He is a pleasant 72-year-old patient who sees Dr. Newman and Dr. Costello as well as Prince Galvez in [facility].  He has a history of dyslipidemia, hypertension as well as coronary disease, sustaining an anterior wall myocardial infarction in 10/2012.  Unfortunately, stent in the LAD was unable to be deployed, and he underwent 3-vessel bypass surgery with a LIMA to the LAD, vein graft to the first obtuse marginal and vein graft to the third obtuse marginal.  He had multiple episodes of ventricular arrhythmias requiring defibrillation.  He had a resultant ischemic cardiomyopathy with an ejection fraction initially of 30%.  He underwent placement of a dual-chamber [employer] ICD and underwent upgrade to BiV device in 2014. He had a >50 day hospitalization.     He saw Dr. Costello 10/17/2017 after device interrogation indicated evidence of polymorphic ventricular tachycardia.  He was started on amiodarone 200 mg twice daily for 2 weeks, followed by 200 mg daily. A stress test was ordered to look for ischemic causes of VT and was negative for significant ischemia (10/2017).     Unfortunately, he was hospitalized 12/03-12/05 secondary to another appropriate ICD shock.  He had been on the elliptical machine and had a syncopal episode.  He was found to have an ICD shock for ventricular tachycardia.  He had ATP x4, followed by a 41-joule shock on 12/03 for ventricular tachycardia at 200 beats per minute.  His carvedilol was increased, and he was started back on amiodarone drip, and we reloaded oral amiodarone with 200 mg twice daily for 3 weeks (12/05-12/26), and he is to start amiodarone 200 mg daily on 12/27.  Interestingly, his shock also converted him from atrial fibrillation (which has been chronic and for which he has been maintained on warfarin) to sinus  "rhythm.  Given his conversion to sinus rhythm, we did not want to interrupt his warfarin therapy for angiogram, though Dr. Costello thought this should be done given the fact this happened while on the elliptical with exertion.  He was given torsemide 10 mg daily while in the hospital, though he typically only took it PRN for weights >177 pounds.     He comes back today telling me that overall he thinks he has done \"okay\" but does note some increased lightheadedness. He notices this when he first stands up but also when he's been standing for a period of time.  He feels he might be a little more short of breath than he previously had been but does not think it is \"too bad.\"  He denies edema, orthopnea or PND.  His weights at home have been stable.  He was not discharged home on daily torsemide but continues it just PRN for weight gain.     He has not had chest pain, pressure or tightness.  He has not had edema, orthopnea or PND.     He denies ryan syncope or palpitations, but does note lightheadedness when he is up.  He states that previously he only noticed this when he first stood up; however, he states that he is now lightheaded for a longer period of time.  He does still feel steady on his feet.      He is tolerating his medications otherwise without any issues.  He does not check his blood pressure routinely at home.      Device interrogation today revealed he was 32% A paced and 99% BiV paced.  He was back in atrial fibrillation since 12/11.  His heart rate was controlled with 90% of his heart rates being at lower rate of 65 beats per minute.  He has had 2 further episodes of ventricular tachycardia which have been short and not required treatment.  The first episode was 12/07 lasting 7 seconds.  The second episode was 12/11 lasting 4 beats.  His device was reprogrammed to VVIR given that he was back in atrial fibrillation, and rate response was adjusted given his feeling a bit lightheaded.      Last " echocardiogram in 01/2017 showed an EF of 30%-35%.  Last stress test in 10/2017 to work him up for an ischemic cause of VT was negative for significant ischemia.        ASSESSMENT AND PLAN:     1.  Recurrent ventricular tachycardia.  As above, he underwent successful ATP and shock on 12/03.  He was   hospitalized and reloaded on amiodarone.  He has been encouraged not to exercise until we can rule out an ischemic cause of this. He is not to drive x 3 months.     His lightheadedness may certainly be due to the increase in his amiodarone dosing, but given that I would like to continue this for ventricular tachycardia, I will start by decreasing his carvedilol back to 6.25 mg twice daily.      He will continue routine device checks through our office.  He is not to drive for at least 3 months (until 03/03/2018).      2.  Coronary artery disease.  As above, we need to check an angiogram given the fact that he had this ventricular tachycardia that was successfully resuscitated while on the elliptical.  He has had no chest pain, pressure or tightness, and prior to his heart attack in 2012 he was symptomatic.      At this time, we will plan to proceed with coronary angiography.  We discussed the risks, benefits and indications of this including but not limited to peripheral vessel injury, heart attack, death, stroke tachy or bradyarrhythmias, potential need for stent deployment (which would likely be done ad hoc), risk of dye nephropathy and dye allergy or even need for recurrent emergent bypass surgery.  He voiced understanding and is willing to proceed.      I will determine from the interventionalist when he needs to come off of anticoagulation with warfarin prior to his procedure.        He understands he will be getting conscious sedation and will likely be discharged home the same day and would require a .      3.  Atrial fibrillation.  This has been permanent but sinus rhythm was briefly restored after his ICD  successfully defibrillated him from ventricular tachycardia on .  He was in sinus rhythm until .  He remains oral anticoagulation with warfarin but will have him come off of this a few days prior to his angiogram.      4.  Ischemic cardiomyopathy.  Ejection fraction in January was 30%-35%.  He does not demonstrate evidence of fluid overload on exam despite his weight being a bit higher on the scale today.  He states at home his scale weights of less than 177 pounds, and therefore he has not taken any torsemide.  He himself does not feel fluid overloaded at all.      He will continue beta blocker (with dose reduction as above), as well as ACE inhibitor and p.r.n. torsemide.  [facility] and is actually due to see Dr. Newman in January with a repeat echocardiogram and labs.      It has been a pleasure to see Marko in clinic today.         NANCY WOODRUFF PA-C             D: 2017 16:26   T: 2017 20:40   MT: BRENT      Name:     ARTEM ELIZALDE   MRN:      [id number]        Account:      [id number]   :      1945           Service Date: 2017      Document: [id number]

## 2017-12-15 NOTE — MR AVS SNAPSHOT
Tyrel Rene   12/15/2017 2:00 PM   Anticoagulation Therapy Visit    Description:  72 year old male   Provider:  [facility]   Department:  Bx Nurse           INR as of 12/15/2017     Today's INR 3.1!      Anticoagulation Summary as of 12/15/2017     INR goal 2.0-2.5   Today's INR 3.1!   Full instructions 1.25 mg on Mon, Wed, Fri; 2.5 mg all other days   Next INR check 12/22/2017    Indications   Long-term (current) use of anticoagulants [Z79.01] [Z79.01]  Cerebral artery occlusion with cerebral infarction (HCC) [I63.50] [I63.50]  Cerebral infarction (H) [I63.9]         Your next Anticoagulation Clinic appointment(s)     Dec 22, 2017  2:00 PM CST   Anticoagulation Visit with  ANTICOAGULATION CLINIC   [facility]    [address] [place] [id number] [phone]              Contact Numbers     [facility]  Please call  [phone] to cancel and/or reschedule your appointment   The direct line to the anticoagulant nurse is [phone] on Monday, Wednesday, and Friday. On Thursday, the anticoagulant nurse can be reached directly at [phone].         December 2017 Details    Sun Mon Tue Wed Thu Fri Sat          1               2                 3               4               5               6               7               8               9                 10               11               12               13               14               15      1.25 mg   See details      16      2.5 mg           17      2.5 mg         18      1.25 mg         19      2.5 mg         20      1.25 mg         21      2.5 mg         22            23                 24               25               26               27               28               29               30                 31                      Date Details   12/15 This INR check       Date of next INR:  12/22/2017         How to take your  warfarin dose     To take:  1.25 mg Take 0.5 of a 2.5 mg tablet.    To take:  2.5 mg Take 1 of the 2.5 mg tablets.

## 2017-12-15 NOTE — PROGRESS NOTES
ANTICOAGULATION FOLLOW-UP CLINIC VISIT    Patient Name:  Tyrel Rene  Date:  12/15/2017  Contact Type:  Face to Face    SUBJECTIVE:     Patient Findings     Positives No Problem Findings           OBJECTIVE    INR Protime   Date Value Ref Range Status   12/15/2017 3.1 (A) 0.86 - 1.14 Final       ASSESSMENT / PLAN  INR assessment THER    Recheck INR In: 1 WEEK    INR Location Clinic      Anticoagulation Summary as of 12/15/2017     INR goal 2.0-2.5   Today's INR 3.1!   Maintenance plan 1.25 mg (2.5 mg x 0.5) on Mon, Wed, Fri; 2.5 mg (2.5 mg x 1) all other days   Full instructions 1.25 mg on Mon, Wed, Fri; 2.5 mg all other days   Weekly total 13.75 mg   Plan last modified Doreen Finley RN (12/8/2017)   Next INR check 12/22/2017   Target end date Indefinite    Indications   Long-term (current) use of anticoagulants [Z79.01] [Z79.01]  Cerebral artery occlusion with cerebral infarction (HCC) [I63.50] [I63.50]  Cerebral infarction (H) [I63.9]         Anticoagulation Episode Summary     INR check location [facility]    Preferred lab     Send INR reminders to [facility] INR/PROTIME    Comments       Anticoagulation Care Providers     Provider Role Specialty Phone number    Dejon Downs MD [facility] [phone]            See the Encounter Report to view Anticoagulation Flowsheet and Dosing Calendar (Go to Encounters tab in chart review, and find the Anticoagulation Therapy Visit)        Doreen Finley RN

## 2017-12-19 ENCOUNTER — TELEPHONE (OUTPATIENT)
Dept: FAMILY MEDICINE | Facility: CLINIC | Age: 72
End: 2017-12-19

## 2017-12-19 ENCOUNTER — TRANSFERRED RECORDS (OUTPATIENT)
Dept: HEALTH INFORMATION MANAGEMENT | Facility: CLINIC | Age: 72
End: 2017-12-19

## 2017-12-20 ENCOUNTER — OFFICE VISIT (OUTPATIENT)
Dept: FAMILY MEDICINE | Facility: CLINIC | Age: 72
End: 2017-12-20
Payer: COMMERCIAL

## 2017-12-20 VITALS
HEIGHT: 73 IN | BODY MASS INDEX: 24.52 KG/M2 | DIASTOLIC BLOOD PRESSURE: 84 MMHG | HEART RATE: 65 BPM | TEMPERATURE: 97 F | WEIGHT: 185 LBS | SYSTOLIC BLOOD PRESSURE: 128 MMHG | RESPIRATION RATE: 16 BRPM

## 2017-12-20 DIAGNOSIS — Z12.5 SCREENING FOR PROSTATE CANCER: ICD-10-CM

## 2017-12-20 DIAGNOSIS — D64.9 ANEMIA, UNSPECIFIED TYPE: ICD-10-CM

## 2017-12-20 DIAGNOSIS — E78.2 MIXED HYPERLIPIDEMIA: ICD-10-CM

## 2017-12-20 DIAGNOSIS — I47.10 SVT (SUPRAVENTRICULAR TACHYCARDIA) (H): ICD-10-CM

## 2017-12-20 DIAGNOSIS — Z00.00 ROUTINE MEDICAL EXAM: Primary | ICD-10-CM

## 2017-12-20 DIAGNOSIS — Z23 NEED FOR PROPHYLACTIC VACCINATION AGAINST STREPTOCOCCUS PNEUMONIAE (PNEUMOCOCCUS): ICD-10-CM

## 2017-12-20 DIAGNOSIS — I50.22 CHRONIC SYSTOLIC CONGESTIVE HEART FAILURE (H): ICD-10-CM

## 2017-12-20 DIAGNOSIS — Z79.01 LONG-TERM (CURRENT) USE OF ANTICOAGULANTS: ICD-10-CM

## 2017-12-20 DIAGNOSIS — I42.9 CARDIOMYOPATHY, UNSPECIFIED TYPE (H): ICD-10-CM

## 2017-12-20 DIAGNOSIS — I48.0 PAROXYSMAL ATRIAL FIBRILLATION (H): ICD-10-CM

## 2017-12-20 DIAGNOSIS — I10 ESSENTIAL HYPERTENSION: ICD-10-CM

## 2017-12-20 LAB
ALBUMIN UR-MCNC: 100 MG/DL
APPEARANCE UR: CLEAR
BASOPHILS # BLD AUTO: 0.1 10E9/L (ref 0–0.2)
BASOPHILS NFR BLD AUTO: 0.7 %
BILIRUB UR QL STRIP: NEGATIVE
COLOR UR AUTO: YELLOW
DIFFERENTIAL METHOD BLD: NORMAL
EOSINOPHIL # BLD AUTO: 0.2 10E9/L (ref 0–0.7)
EOSINOPHIL NFR BLD AUTO: 2.6 %
ERYTHROCYTE [DISTWIDTH] IN BLOOD BY AUTOMATED COUNT: 14.4 % (ref 10–15)
GLUCOSE UR STRIP-MCNC: NEGATIVE MG/DL
HCT VFR BLD AUTO: 41.6 % (ref 40–53)
HGB BLD-MCNC: 14.2 G/DL (ref 13.3–17.7)
HGB UR QL STRIP: ABNORMAL
KETONES UR STRIP-MCNC: NEGATIVE MG/DL
LEUKOCYTE ESTERASE UR QL STRIP: NEGATIVE
LYMPHOCYTES # BLD AUTO: 2.2 10E9/L (ref 0.8–5.3)
LYMPHOCYTES NFR BLD AUTO: 29.1 %
MCH RBC QN AUTO: 31.3 PG (ref 26.5–33)
MCHC RBC AUTO-ENTMCNC: 34.1 G/DL (ref 31.5–36.5)
MCV RBC AUTO: 92 FL (ref 78–100)
MONOCYTES # BLD AUTO: 1.1 10E9/L (ref 0–1.3)
MONOCYTES NFR BLD AUTO: 14.9 %
NEUTROPHILS # BLD AUTO: 4 10E9/L (ref 1.6–8.3)
NEUTROPHILS NFR BLD AUTO: 52.7 %
NITRATE UR QL: NEGATIVE
PH UR STRIP: 5.5 PH (ref 5–7)
PLATELET # BLD AUTO: 175 10E9/L (ref 150–450)
RBC # BLD AUTO: 4.54 10E12/L (ref 4.4–5.9)
RBC #/AREA URNS AUTO: ABNORMAL /HPF
SOURCE: ABNORMAL
SP GR UR STRIP: 1.01 (ref 1–1.03)
UROBILINOGEN UR STRIP-ACNC: 0.2 EU/DL (ref 0.2–1)
WBC # BLD AUTO: 7.6 10E9/L (ref 4–11)
WBC #/AREA URNS AUTO: ABNORMAL /HPF

## 2017-12-20 PROCEDURE — 80061 LIPID PANEL: CPT | Performed by: FAMILY MEDICINE

## 2017-12-20 PROCEDURE — 36415 COLL VENOUS BLD VENIPUNCTURE: CPT | Performed by: FAMILY MEDICINE

## 2017-12-20 PROCEDURE — 85025 COMPLETE CBC W/AUTO DIFF WBC: CPT | Performed by: FAMILY MEDICINE

## 2017-12-20 PROCEDURE — 81001 URINALYSIS AUTO W/SCOPE: CPT | Performed by: FAMILY MEDICINE

## 2017-12-20 PROCEDURE — 99397 PER PM REEVAL EST PAT 65+ YR: CPT | Mod: 25 | Performed by: FAMILY MEDICINE

## 2017-12-20 PROCEDURE — G0009 ADMIN PNEUMOCOCCAL VACCINE: HCPCS | Performed by: FAMILY MEDICINE

## 2017-12-20 PROCEDURE — G0103 PSA SCREENING: HCPCS | Performed by: FAMILY MEDICINE

## 2017-12-20 PROCEDURE — 90670 PCV13 VACCINE IM: CPT | Performed by: FAMILY MEDICINE

## 2017-12-20 ASSESSMENT — ACTIVITIES OF DAILY LIVING (ADL)
CURRENT_FUNCTION: NO ASSISTANCE NEEDED
I_NEED_ASSISTANCE_FOR_THE_FOLLOWING_DAILY_ACTIVITIES:: NO ASSISTANCE IS NEEDED

## 2017-12-20 NOTE — NURSING NOTE
"Chief Complaint   Patient presents with     Physical       Initial /84  Pulse 65  Temp 97  F (36.1  C) (Tympanic)  Resp 16  Ht 6' 1\" (1.854 m)  Wt 185 lb (83.9 kg)  BMI 24.41 kg/m2 Estimated body mass index is 24.41 kg/(m^2) as calculated from the following:    Height as of this encounter: 6' 1\" (1.854 m).    Weight as of this encounter: 185 lb (83.9 kg).  Medication Reconciliation: complete     Jessica Bhatti CMA      "

## 2017-12-20 NOTE — NURSING NOTE
Screening Questionnaire for Adult Immunization    Are you sick today?   No   Do you have allergies to medications, food, a vaccine component or latex?   No   Have you ever had a serious reaction after receiving a vaccination?   No   Do you have a long-term health problem with heart disease, lung disease, asthma, kidney disease, metabolic disease (e.g. diabetes), anemia, or other blood disorder?   Yes   Do you have cancer, leukemia, HIV/AIDS, or any other immune system problem?   No   In the past 3 months, have you taken medications that affect  your immune system, such as prednisone, other steroids, or anticancer drugs; drugs for the treatment of rheumatoid arthritis, Crohn s disease, or psoriasis; or have you had radiation treatments?   No   Have you had a seizure, or a brain or other nervous system problem?   No   During the past year, have you received a transfusion of blood or blood     products, or been given immune (gamma) globulin or antiviral drug?   No   For women: Are you pregnant or is there a chance you could become        pregnant during the next month?   No   Have you received any vaccinations in the past 4 weeks?   No     Immunization questionnaire answers were all negative.        Per orders of Dr. Downs, injection of prevnar given by Jessica Bhatti. Patient instructed to remain in clinic for 15 minutes afterwards, and to report any adverse reaction to me immediately.       Screening performed by Jessica Bhatti on 12/20/2017 at 12:26 PM.

## 2017-12-20 NOTE — MR AVS SNAPSHOT
After Visit Summary   12/20/2017    Tyrel Rene    MRN: [id number]           Patient Information     Date Of Birth          1945        Visit Information        Provider Department      12/20/2017 11:30 AM Dejon Downs MD [facility]        Today's Diagnoses     Routine medical exam    -  1    Need for prophylactic vaccination against Streptococcus pneumoniae (pneumococcus)        Mixed hyperlipidemia        Chronic systolic congestive heart failure (H)        Paroxysmal atrial fibrillation (H)        Essential hypertension        SVT (supraventricular tachycardia) (H)        Cardiomyopathy, unspecified type (H)        Anemia, unspecified type        Long-term (current) use of anticoagulants [Z79.01]        Screening for prostate cancer          Care Instructions      Preventive Health Recommendations:   Male Ages 65 and over    Yearly exam:             See your health care provider every year in order to  o   Review health changes.   o   Discuss preventive care.    o   Review your medicines if your doctor has prescribed any.    Talk with your health care provider about whether you should have a test to screen for prostate cancer (PSA).    Every 3 years, have a diabetes test (fasting glucose). If you are at risk for diabetes, you should have this test more often.    Every 5 years, have a cholesterol test. Have this test more often if you are at risk for high cholesterol or heart disease.     Every 10 years, have a colonoscopy. Or, have a yearly FIT test (stool test). These exams will check for colon cancer.    Talk to with your health care provider about screening for Abdominal Aortic Aneurysm if you have a family history of AAA or have a history of smoking.    Shots:     Get a flu shot each year.     Get a tetanus shot every 10 years.     Talk to your doctor about your pneumonia vaccines. There are now two you should receive - Pneumovax (PPSV 23) and  Prevnar (PCV 13).     Talk to your doctor about a shingles vaccine.     Talk to your doctor about the hepatitis B vaccine.  Nutrition:     Eat at least 5 servings of fruits and vegetables each day.     Eat whole-grain bread, whole-wheat pasta and brown rice instead of white grains and rice.     Talk to your provider about Calcium and Vitamin D.   Lifestyle    Exercise for at least 150 minutes a week (30 minutes a day, 5 days a week). This will help you control your weight and prevent disease.     Limit alcohol to one drink per day.     No smoking.     Wear sunscreen to prevent skin cancer.     See your dentist every six months for an exam and cleaning.     See your eye doctor every 1 to 2 years to screen for conditions such as glaucoma, macular degeneration, cataracts, etc           Follow-ups after your visit        Your next 10 appointments already scheduled     Dec 22, 2017  2:00 PM CST   Anticoagulation Visit with [facility]   [facility]    [address]  [facility] [address]   [phone]            Aguilar 10, 2018 11:00 AM CST   Cath 90 Minute with SHCVR2   [facility] ([facility])    [address] [place] [id number] [phone]            Aguilar 15, 2018  9:00 AM CST   LAB with [facility]   [facility] [place] ([facility])    [address] [place] [address] [address]  [facility] [address]   [phone]           Please do not eat 10-12 hours before your appointment if you are coming in fasting for labs on lipids, cholesterol, or glucose (sugar). This does not apply to pregnant women. Water, hot tea and black coffee (with nothing added) are okay. Do not drink other fluids, diet soda or chew gum.            Aguilar 15, 2018  9:30 AM CST   Ech Complete with SHCVEC2   [facility]chocardiography  ([facility])    [address]  [place] [address] [phone]           1. Please bring or wear a comfortable two-piece outfit. 2. You may eat, drink and take your normal medicines. 3. For any questions that cannot be answered, please contact the ordering physician            Jan 19, 2018  2:15 PM CST   Core MD Return with Parish Newman MD   [facility] ([facility])    [address]  [facility] [id number] [phone]            Apr 05, 2018  4:30 PM CDT   Remote ICD Check with MARQUEZ DCR2   [facility] ([facility])    [address]  [facility] [id number] [phone]           This appointment is for a remote check of your debrillator.  This is not an appointment at the office.              Who to contact     If you have questions or need follow up information about today's clinic visit or your schedule please contact [facility] directly at [phone].  Normal or non-critical lab and imaging results will be communicated to you by [facility], letter or phone within 4 business days after the clinic has received the results. If you do not hear from us within 7 days, please contact the clinic through [facility] or phone. If you have a critical or abnormal lab result, we will notify you by phone as soon as possible.  Submit refill requests through [employer] or call your pharmacy and they will forward the refill request to us. Please allow 3 business days for your refill to be completed.          Additional Information About Your Visit        [facility] Information     [employer] gives you secure access to your electronic health record. If you see a primary care provider, you can also send messages to your care team and make appointments. If you have questions, please call your primary care clinic.  If you do not  "have a primary care provider, please call [phone] and they will assist you.        Care EveryWhere ID     This is your Care EveryWhere ID. This could be used by other organizations to access your [place] medical records  [id number]        Your Vitals Were     Pulse Temperature Respirations Height BMI (Body Mass Index)       65 97  F (36.1  C) (Tympanic) 16 6' 1\" (1.854 m) 24.41 kg/m2        Blood Pressure from Last 3 Encounters:   12/20/17 128/84   12/14/17 110/66   12/05/17 122/71    Weight from Last 3 Encounters:   12/20/17 185 lb (83.9 kg)   12/14/17 184 lb (83.5 kg)   12/05/17 178 lb 12.8 oz (81.1 kg)              We Performed the Following     ADMIN MEDICARE: Pneumococcal Vaccine ()     CBC with platelets differential     Lipid Profile     Pneumococcal vaccine 13 valent PCV13 IM (Prevnar) [32336]     Prostate spec antigen screen     UA with Microscopic        Primary Care Provider Office Phone # Fax #    Dejon Downs [phone] [address] [address]        Equal Access to Services     JAVI OTERO AH: Hadii aad ku hadasho Soomaali, waaxda luqadaha, qaybta kaalmada adeegyada, deb galvinn niru bird. So [facility] [phone].    ATENCIÓN: Si habla español, tiene a hagen disposición servicios gratuitos de asistencia lingüística. [facility] [phone].    We comply with applicable federal civil rights laws and [place] laws. We do not discriminate on the basis of race, color, national origin, age, disability, sex, sexual orientation, or gender identity.            Thank you!     [facility] you for choosing [facility]  [facility]. Our goal is always to provide you with excellent care. Hearing back from our patients is one way we can continue to improve our services. Please take a few minutes to complete the written survey that you may receive in the mail after your visit with us. Thank you!             Your " Updated Medication List - Protect others around you: Learn how to safely use, store and throw away your medicines at www.disposemymeds.org.          This list is accurate as of: 12/20/17 12:35 PM.  Always use your most recent med list.                   Brand Name Dispense Instructions for use Diagnosis    * amiodarone 200 MG tablet    PACERONE/CODARONE    42 tablet    Take 1 tablet (200 mg) by mouth 2 times daily x 3 weeks, then 200 mg daily    Ventricular tachycardia (H)       * amiodarone 200 MG tablet   Start taking on:  12/26/2017    PACERONE/CODARONE    30 tablet    Take 1 tablet (200 mg) by mouth daily    Ventricular tachycardia (H)       ASPIRIN NOT PRESCRIBED    INTENTIONAL    0 each    Antiplatelet medication not prescribed intentionally due to Current anticoagulant therapy (warfarin/enoxaparin)    ASHD (arteriosclerotic heart disease)       carvedilol 3.125 MG tablet    COREG     Take 2 tablets (6.25 mg) by mouth 2 times daily (with meals)    Ventricular tachycardia (H)       digoxin 125 MCG tablet    LANOXIN    90 tablet    Take 1 tablet (125 mcg) by mouth daily    Atrial fibrillation (H)       lisinopril 5 MG tablet    PRINIVIL/ZESTRIL    180 tablet    Take 1 tablet (5 mg) by mouth 2 times daily    CHF (congestive heart failure) (H)       NITROSTAT 0.4 MG sublingual tablet   Generic drug:  nitroGLYcerin     25 tablet    DISSOLVE 1 TABLET UNDER THE TONGUE EVERY 5 MINUTES AS NEEDED FOR CHEST PAIN    Postsurgical aortocoronary bypass status       PRESERVISION AREDS 2 PO      Take 1 capsule by mouth 2 times daily        ranitidine 150 MG tablet    ZANTAC    180 tablet    Take 1 tablet (150 mg) by mouth 2 times daily    S/P CABG (coronary artery bypass graft)       rosuvastatin 20 MG tablet    CRESTOR    30 tablet    Take 1 tablet (20 mg) by mouth daily        sildenafil 100 MG tablet    VIAGRA    16 tablet    Take 1 tablet (100 mg) by mouth daily as needed for erectile dysfunction Take 30 min to 4 hours  before intercourse.  Never use with nitroglycerin, terazosin or doxazosin.    Erectile dysfunction, unspecified erectile dysfunction type       Vitamin D (Cholecalciferol) 1000 UNITS Tabs      Take 1,000 mg by mouth daily        * WARFARIN SODIUM PO      Take 2.5 mg by mouth daily On Sun, Tue, Wed, Thu, and Sat        * WARFARIN SODIUM PO      Take 1.25 mg by mouth daily On Mon and Fri.        * Notice:  This list has 4 medication(s) that are the same as other medications prescribed for you. Read the directions carefully, and ask your doctor or other care provider to review them with you.

## 2017-12-20 NOTE — PROGRESS NOTES
SUBJECTIVE:   Tyrel Rene is a 72 year old male who presents for Preventive Visit.  click delete button to remove this line now  click delete button to remove this line now  Are you in the first 12 months of your Medicare coverage?  No    Physical   Annual:     Getting at least 3 servings of Calcium per day::  Yes    Bi-annual eye exam::  Yes    Dental care twice a year::  Yes    Sleep apnea or symptoms of sleep apnea::  None    Diet::  Low salt    Taking medications regularly::  Yes    Medication side effects::  Lightheadedness    Additional concerns today::  No    Ability to successfully perform activities of daily living: no assistance needed  Home Safety:  No safety concerns identified  Hearing Impairment: no hearing concerns    Fall risk:  Fallen 2 or more times in the past year?: No  Any fall with injury in the past year?: No    click delete button to remove this line now  COGNITIVE SCREEN  1) Repeat 3 items (Banana, Sunrise, Chair)    2) Clock draw: NORMAL  3) 3 item recall: Recalls 3 objects  Results: 3 items recalled: COGNITIVE IMPAIRMENT LESS LIKELY    Mini-CogTM Copyright FERCHO Storm. Licensed by the author for use in [facility] [employer]; reprinted with permission (sokasandra@[facility]). All rights reserved.          Reviewed and updated as needed this visit by clinical staffTobacco  Allergies  Meds  Med Hx  Surg Hx  Fam Hx  Soc Hx        Reviewed and updated as needed this visit by Provider        Social History   Substance Use Topics     Smoking status: Former Smoker     Quit date: 7/10/1972     Smokeless tobacco: Never Used     Alcohol use No       Alcohol Use 12/20/2017   If you drink alcohol, do you typically have greater than 3 drinks per day OR greater than 7 drinks per week?   Not applicable   No flowsheet data found.          Today's PHQ-2 Score:   PHQ-2 ( 1999 [employer]) 12/20/2017   Q1: Little interest or pleasure in doing things 0   Q2: Feeling down, depressed or hopeless 0   PHQ-2 Score 0   Q1:  Little interest or pleasure in doing things Not at all   Q2: Feeling down, depressed or hopeless Not at all   PHQ-2 Score 0       Do you feel safe in your environment - Yes    Do you have a Health Care Directive?: Yes: Advance Directive has been received and scanned.    Current providers sharing in care for this patient include:   Patient Care Team:  Dejon Downs MD as PCP - General (Family Practice)    The following health maintenance items are reviewed in Epic and correct as of today:  Health Maintenance   Topic Date Due     COLON CANCER SCREEN (SYSTEM ASSIGNED)  10/14/1993     ADVANCE DIRECTIVE PLANNING Q5 YRS  10/14/1998     AORTIC ANEURYSM SCREENING (SYSTEM ASSIGNED)  10/14/2008     OP ANNUAL INR REFERRAL  05/29/2016     HF ACTION PLAN Q3 YR  08/28/2016     LIPID MONITORING Q6 MO  01/12/2018     BMP Q6 MOS  06/04/2018     FALL RISK ASSESSMENT  10/18/2018     ALT Q1 YR  12/03/2018     CBC Q1 YR  12/04/2018     DIGOXIN LEVEL Q1 YR  12/05/2018     TETANUS IMMUNIZATION (SYSTEM ASSIGNED)  10/01/2022     INFLUENZA VACCINE (SYSTEM ASSIGNED)  Completed     PNEUMOCOCCAL  Completed         Pneumonia Vaccine:prevnar    Review of Systems  C: NEGATIVE for fever, chills, change in weight  I: NEGATIVE for worrisome rashes, moles or lesions  E: NEGATIVE for vision changes or irritation  E/M: NEGATIVE for ear, mouth and throat problems  R: NEGATIVE for significant cough or SOB  B: NEGATIVE for masses, tenderness or discharge  CV: NEGATIVE for chest pain, palpitations or peripheral edema  GI: NEGATIVE for nausea, abdominal pain, heartburn, or change in bowel habits  : NEGATIVE for frequency, dysuria, or hematuria  M: NEGATIVE for significant arthralgias or myalgia  NEURO: dizziness/lightheadedness  E: NEGATIVE for temperature intolerance, skin/hair changes  H: NEGATIVE for bleeding problems  P: NEGATIVE for changes in mood or affect    OBJECTIVE:   /84  Pulse 65  Temp 97  F (36.1  C) (Tympanic)  Resp 16   "Ht 6' 1\" (1.854 m)  Wt 185 lb (83.9 kg)  BMI 24.41 kg/m2 Estimated body mass index is 24.41 kg/(m^2) as calculated from the following:    Height as of this encounter: 6' 1\" (1.854 m).    Weight as of this encounter: 185 lb (83.9 kg).  Physical Exam  GENERAL: healthy, alert and no distress  EYES: Eyes grossly normal to inspection, PERRL and conjunctivae and sclerae normal  HENT: ear canals and TM's normal, nose and mouth without ulcers or lesions  NECK: no adenopathy, no asymmetry, masses, or scars and thyroid normal to palpation  RESP: lungs clear to auscultation - no rales, rhonchi or wheezes  CV: regular rate and rhythm, normal S1 S2, no S3 or S4, no murmur, click or rub, no peripheral edema and peripheral pulses strong  ABDOMEN: soft, nontender, no hepatosplenomegaly, no masses and bowel sounds normal   (male): normal male genitalia without lesions or urethral discharge, no hernia  RECTAL: normal sphincter tone, no rectal masses, prostate normal size, smooth, nontender without nodules or masses  MS: no gross musculoskeletal defects noted, no edema  SKIN: no suspicious lesions or rashes  NEURO: Normal strength and tone, mentation intact and speech normal  PSYCH: mentation appears normal, affect normal/bright  LYMPH: no cervical, supraclavicular, axillary, or inguinal adenopathy    ASSESSMENT / PLAN:       ICD-10-CM    1. Routine medical exam Z00.00    2. Need for prophylactic vaccination against Streptococcus pneumoniae (pneumococcus) Z23 Pneumococcal vaccine 13 valent PCV13 IM (Prevnar) [54518]     ADMIN MEDICARE: Pneumococcal Vaccine ()   3. Mixed hyperlipidemia E78.2 Lipid Profile   4. Chronic systolic congestive heart failure (H) I50.22    5. Paroxysmal atrial fibrillation (H) I48.0    6. Essential hypertension I10 UA with Microscopic   7. SVT (supraventricular tachycardia) (H) I47.1    8. Cardiomyopathy, unspecified type (H) I42.9    9. Anemia, unspecified type D64.9 CBC with platelets differential " "  10. Long-term (current) use of anticoagulants [Z79.01] Z79.01    11. Screening for prostate cancer Z12.5 Prostate spec antigen screen       End of Life Planning:  Patient currently has an advanced directive: No.  I have verified the patient's ablity to prepare an advanced directive/make health care decisions.  Literature was provided to assist patient in preparing an advanced directive.    COUNSELING:  Reviewed preventive health counseling, as reflected in patient instructions       Regular exercise       Vision screening       Immunizations    Vaccinated for: Pneumococcal           Prostate cancer screening          Estimated body mass index is 24.41 kg/(m^2) as calculated from the following:    Height as of this encounter: 6' 1\" (1.854 m).    Weight as of this encounter: 185 lb (83.9 kg).     reports that he quit smoking about 45 years ago. He has never used smokeless tobacco.        Appropriate preventive services were discussed with this patient, including applicable screening as appropriate for cardiovascular disease, diabetes, osteopenia/osteoporosis, and glaucoma.  As appropriate for age/gender, discussed screening for colorectal cancer, prostate cancer, breast cancer, and cervical cancer. Checklist reviewing preventive services available has been given to the patient.    Reviewed patients plan of care and provided an AVS. The Basic Care Plan (routine screening as documented in Health Maintenance) for Tyrel meets the Care Plan requirement. This Care Plan has been established and reviewed with the Patient.    Counseling Resources:  ATP IV Guidelines  Pooled Cohorts Equation Calculator  Breast Cancer Risk Calculator  FRAX Risk Assessment  ICSI Preventive Guidelines  Dietary Guidelines for Americans, 2010  [employer]'s MyPlate  ASA Prophylaxis  Lung CA Screening    Dejon Downs MD  [facility] XERXESAnswers for HPI/ROS submitted by the patient on 12/20/2017   PHQ-2 Score: 0    "

## 2017-12-21 LAB
CHOLEST SERPL-MCNC: 124 MG/DL
HDLC SERPL-MCNC: 50 MG/DL
LDLC SERPL CALC-MCNC: 51 MG/DL
NONHDLC SERPL-MCNC: 74 MG/DL
PSA SERPL-ACNC: 1.48 UG/L (ref 0–4)
TRIGL SERPL-MCNC: 113 MG/DL

## 2017-12-22 ENCOUNTER — TELEPHONE (OUTPATIENT)
Dept: CARDIOLOGY | Facility: CLINIC | Age: 72
End: 2017-12-22

## 2017-12-22 ENCOUNTER — ANTICOAGULATION THERAPY VISIT (OUTPATIENT)
Dept: NURSING | Facility: CLINIC | Age: 72
End: 2017-12-22
Payer: COMMERCIAL

## 2017-12-22 DIAGNOSIS — I63.50 CEREBRAL ARTERY OCCLUSION WITH CEREBRAL INFARCTION (H): ICD-10-CM

## 2017-12-22 DIAGNOSIS — I63.9 CEREBRAL INFARCTION (H): ICD-10-CM

## 2017-12-22 DIAGNOSIS — Z79.01 LONG-TERM (CURRENT) USE OF ANTICOAGULANTS: ICD-10-CM

## 2017-12-22 LAB — INR POINT OF CARE: 3.5 (ref 0.86–1.14)

## 2017-12-22 PROCEDURE — 36416 COLLJ CAPILLARY BLOOD SPEC: CPT

## 2017-12-22 PROCEDURE — 99207 ZZC NO CHARGE NURSE ONLY: CPT

## 2017-12-22 PROCEDURE — 85610 PROTHROMBIN TIME: CPT | Mod: QW

## 2017-12-22 NOTE — TELEPHONE ENCOUNTER
Notified pt that he should begin taking Amiodarone once daily TODAY instead of waiting to reduce until after randolph. He verbalized understanding. Med list updated. AGAPITO Valiente

## 2017-12-22 NOTE — PROGRESS NOTES
ANTICOAGULATION FOLLOW-UP CLINIC VISIT    Patient Name:  Tyrel Rene  Date:  12/22/2017  Contact Type:  Face to Face    SUBJECTIVE:     Patient Findings     Positives No Problem Findings           OBJECTIVE    INR Protime   Date Value Ref Range Status   12/22/2017 3.5 (A) 0.86 - 1.14 Final       ASSESSMENT / PLAN  INR assessment SUPRA    Recheck INR In: 1 WEEK    INR Location Clinic      Anticoagulation Summary as of 12/22/2017     INR goal 2.0-2.5   Today's INR 3.5!   Maintenance plan 1.25 mg (2.5 mg x 0.5) on Mon, Wed, Fri; 2.5 mg (2.5 mg x 1) all other days   Full instructions 12/23: 1.25 mg; 12/26: 1.25 mg; Otherwise 1.25 mg on Mon, Wed, Fri; 2.5 mg all other days   Weekly total 13.75 mg   Plan last modified Doreen Finley RN (12/8/2017)   Next INR check 12/29/2017   Target end date Indefinite    Indications   Long-term (current) use of anticoagulants [Z79.01] [Z79.01]  Cerebral artery occlusion with cerebral infarction (HCC) [I63.50] [I63.50]  Cerebral infarction (H) [I63.9]         Anticoagulation Episode Summary     INR check location [facility]    Preferred lab     Send INR reminders to [facility] INR/PROTIME    Comments       Anticoagulation Care Providers     Provider Role Specialty Phone number    Dejon Downs MD [facility] [phone]            See the Encounter Report to view Anticoagulation Flowsheet and Dosing Calendar (Go to Encounters tab in chart review, and find the Anticoagulation Therapy Visit)        Doreen Finley RN

## 2017-12-22 NOTE — TELEPHONE ENCOUNTER
Patient calling in as instructed by Lina NAYLOR Following OV on 12-14-17. Patient had c/o lightheadedness. Coreg was reduced from 9.375 BID to 6.25 BID in hopes of alleviating lightheadedness.   Patient reports today that there has been NO change in his symptoms. Blood pressure are stable with lowest systolic reading of  111. I will notify Lina. AGAPITO Valiente

## 2017-12-22 NOTE — MR AVS SNAPSHOT
Tyrel Rene   12/22/2017 2:00 PM   Anticoagulation Therapy Visit    Description:  72 year old male   Provider:   ANTICOAGULATION CLINIC   Department:  Bx Nurse           INR as of 12/22/2017     Today's INR 3.5!      Anticoagulation Summary as of 12/22/2017     INR goal 2.0-2.5   Today's INR 3.5!   Full instructions 12/23: 1.25 mg; 12/26: 1.25 mg; Otherwise 1.25 mg on Mon, Wed, Fri; 2.5 mg all other days   Next INR check 12/29/2017    Indications   Long-term (current) use of anticoagulants [Z79.01] [Z79.01]  Cerebral artery occlusion with cerebral infarction (HCC) [I63.50] [I63.50]  Cerebral infarction (H) [I63.9]         Your next Anticoagulation Clinic appointment(s)     Dec 29, 2017  2:15 PM CST   Anticoagulation Visit with  ANTICOAGULATION CLINIC   [facility] ([facility])    [address] [place] [id number] [phone]              Contact Numbers     Critical access hospital  Please call  [phone] to cancel and/or reschedule your appointment   The direct line to the anticoagulant nurse is [phone] on Monday, Wednesday, and Friday. On Thursday, the anticoagulant nurse can be reached directly at [phone].         December 2017 Details    Sun Mon Tue Wed Thu Fri Sat          1               2                 3               4               5               6               7               8               9                 10               11               12               13               14               15               16                 17               18               19               20               21               22      1.25 mg   See details      23      1.25 mg           24      2.5 mg         25      1.25 mg         26      1.25 mg         27      1.25 mg         28      2.5 mg         29            30                 31                      Date Details   12/22 This INR check       Date of  next INR:  12/29/2017         How to take your warfarin dose     To take:  1.25 mg Take 0.5 of a 2.5 mg tablet.    To take:  2.5 mg Take 1 of the 2.5 mg tablets.

## 2017-12-22 NOTE — TELEPHONE ENCOUNTER
I spoke with Dr. Costello.  As BPs have been fine despite his lightheadedness, Dr. Costello agrees it might be the amiodarone.    Decrease Amiodarone from 200 mg BID to 200 mg daily a little early.  We were planning on loading him with Amiodarone BID until after Isabelle but we can decrease load a little early.    Pls update [facility] med list.  Thx

## 2017-12-24 ENCOUNTER — MYC MEDICAL ADVICE (OUTPATIENT)
Dept: FAMILY MEDICINE | Facility: CLINIC | Age: 72
End: 2017-12-24

## 2017-12-26 DIAGNOSIS — J31.0 CHRONIC RHINITIS, UNSPECIFIED TYPE: Primary | ICD-10-CM

## 2017-12-26 RX ORDER — IPRATROPIUM BROMIDE 21 UG/1
2 SPRAY, METERED NASAL EVERY 8 HOURS PRN
Qty: 30 ML | Refills: 11 | Status: SHIPPED | OUTPATIENT
Start: 2017-12-26 | End: 2020-01-07

## 2017-12-29 ENCOUNTER — ANTICOAGULATION THERAPY VISIT (OUTPATIENT)
Dept: NURSING | Facility: CLINIC | Age: 72
End: 2017-12-29
Payer: COMMERCIAL

## 2017-12-29 DIAGNOSIS — I63.50 CEREBRAL ARTERY OCCLUSION WITH CEREBRAL INFARCTION (H): ICD-10-CM

## 2017-12-29 DIAGNOSIS — Z79.01 LONG-TERM (CURRENT) USE OF ANTICOAGULANTS: ICD-10-CM

## 2017-12-29 DIAGNOSIS — I63.9 CEREBRAL INFARCTION (H): ICD-10-CM

## 2017-12-29 LAB — INR POINT OF CARE: 2.7 (ref 0.86–1.14)

## 2017-12-29 PROCEDURE — 85610 PROTHROMBIN TIME: CPT | Mod: QW

## 2017-12-29 PROCEDURE — 36416 COLLJ CAPILLARY BLOOD SPEC: CPT

## 2017-12-29 PROCEDURE — 99207 ZZC NO CHARGE NURSE ONLY: CPT

## 2017-12-29 NOTE — MR AVS SNAPSHOT
Tyrel Rene   12/29/2017 2:15 PM   Anticoagulation Therapy Visit    Description:  72 year old male   Provider:  [facility]   Department:  Bx Nurse           INR as of 12/29/2017     Today's INR 2.7!      Anticoagulation Summary as of 12/29/2017     INR goal 2.0-2.5   Today's INR 2.7!   Full instructions 2.5 mg on Mon, Wed, Fri; 1.25 mg all other days   Next INR check 1/8/2018    Indications   Long-term (current) use of anticoagulants [Z79.01] [Z79.01]  Cerebral artery occlusion with cerebral infarction (HCC) [I63.50] [I63.50]  Cerebral infarction (H) [I63.9]         Your next Anticoagulation Clinic appointment(s)     Jan 08, 2018  2:15 PM CST   Anticoagulation Visit with  ANTICOAGULATION CLINIC   [facility]    [address] [place] [id number] [phone]              Contact Numbers     [facility]  Please call  [phone] to cancel and/or reschedule your appointment   The direct line to the anticoagulant nurse is [phone] on Monday, Wednesday, and Friday. On Thursday, the anticoagulant nurse can be reached directly at [phone].         December 2017 Details    Sun Mon Tue Wed Thu Fri Sat          1               2                 3               4               5               6               7               8               9                 10               11               12               13               14               15               16                 17               18               19               20               21               22               23                 24               25               26               27               28               29      2.5 mg   See details      30      1.25 mg           31      1.25 mg                Date Details   12/29 This INR check               How to take your warfarin dose     To take:  1.25 mg Take 0.5 of a 2.5  mg tablet.    To take:  2.5 mg Take 1 of the 2.5 mg tablets.           January 2018 Details    Sun Mon Tue Wed Thu Fri Sat      1      2.5 mg         2      1.25 mg         3      2.5 mg         4      1.25 mg         5      2.5 mg         6      1.25 mg           7      1.25 mg         8            9               10               11               12               13                 14               15               16               17               18               19               20                 21               22               23               24               25               26               27                 28               29               30               31                   Date Details   No additional details    Date of next INR:  1/8/2018         How to take your warfarin dose     To take:  1.25 mg Take 0.5 of a 2.5 mg tablet.    To take:  2.5 mg Take 1 of the 2.5 mg tablets.

## 2017-12-29 NOTE — PROGRESS NOTES
ANTICOAGULATION FOLLOW-UP CLINIC VISIT    Patient Name:  Tyrel Rene  Date:  12/29/2017  Contact Type:  Face to Face    SUBJECTIVE:     Patient Findings     Positives No Problem Findings           OBJECTIVE    INR Protime   Date Value Ref Range Status   12/29/2017 2.7 (A) 0.86 - 1.14 Final       ASSESSMENT / PLAN  INR assessment THER    Recheck INR In: 9 DAYS    INR Location Clinic      Anticoagulation Summary as of 12/29/2017     INR goal 2.0-2.5   Today's INR 2.7!   Maintenance plan 2.5 mg (2.5 mg x 1) on Mon, Wed, Fri; 1.25 mg (2.5 mg x 0.5) all other days   Full instructions 2.5 mg on Mon, Wed, Fri; 1.25 mg all other days   Weekly total 12.5 mg   Plan last modified Doreen Finley RN (12/29/2017)   Next INR check 1/8/2018   Target end date Indefinite    Indications   Long-term (current) use of anticoagulants [Z79.01] [Z79.01]  Cerebral artery occlusion with cerebral infarction (HCC) [I63.50] [I63.50]  Cerebral infarction (H) [I63.9]         Anticoagulation Episode Summary     INR check location [facility]    Preferred lab     Send INR reminders to [facility] INR/PROTIME    Comments       Anticoagulation Care Providers     Provider Role Specialty Phone number    Dejon Downs MD [facility] [phone]            See the Encounter Report to view Anticoagulation Flowsheet and Dosing Calendar (Go to Encounters tab in chart review, and find the Anticoagulation Therapy Visit)        Doreen Finley RN

## 2018-01-04 ENCOUNTER — CARE COORDINATION (OUTPATIENT)
Dept: CARDIOLOGY | Facility: CLINIC | Age: 73
End: 2018-01-04

## 2018-01-04 NOTE — PROGRESS NOTES
Pt is scheduled for left heart cath for ventricular tachycardia on 1/10/18. Pt is on warfarin for atrial fibrillation, also has a hx of cerebral artery occlusion with cerebral infarction in 2012. Pt will need to hold warfarin 4-5 days prior to procedure. Please review and advise on warfarin hold. Bhavna Britton RN 3:48 PM 01/04/18

## 2018-01-05 NOTE — PROGRESS NOTES
Contacted patient to discuss holding warfarin 4 days prior to left heart cath scheduled for 1/10/18 for ventricular tachycardia. He will start holding his warfarin on 1/6/18. He will remove his warfarin from his pill box today for those days. I told him I will be calling him on Tuesday (1/9/18) to go over angiogram prep instructions. He verbalized understanding of instructions and had no questions. Message sent to CORE board to call patient on 1/9/1/8.

## 2018-01-08 DIAGNOSIS — I47.20 VENTRICULAR TACHYCARDIA (H): ICD-10-CM

## 2018-01-08 RX ORDER — CARVEDILOL 3.12 MG/1
6.25 TABLET ORAL 2 TIMES DAILY WITH MEALS
Qty: 360 TABLET | Refills: 3 | Status: SHIPPED | OUTPATIENT
Start: 2018-01-08 | End: 2019-01-16

## 2018-01-09 RX ORDER — POTASSIUM CHLORIDE 1500 MG/1
20 TABLET, EXTENDED RELEASE ORAL
Status: CANCELLED | OUTPATIENT
Start: 2018-01-09

## 2018-01-09 RX ORDER — LIDOCAINE 40 MG/G
CREAM TOPICAL
Status: CANCELLED | OUTPATIENT
Start: 2018-01-09

## 2018-01-09 RX ORDER — SODIUM CHLORIDE 9 MG/ML
INJECTION, SOLUTION INTRAVENOUS CONTINUOUS
Status: CANCELLED | OUTPATIENT
Start: 2018-01-09

## 2018-01-09 NOTE — PROGRESS NOTES
Contacted patient to go over angiogram prep instructions. He is scheduled for a [facility] on 1/10/18 for ventricular tachycardia. He will be NPO after midnight today. He will takes his medications, to include asa the day before and the morning of the procedure. He has been holding his warfarin since 1/6/18, 4 day prior per . He is not on any other anticoagulants. He is currently not on a diuretic and is not diabetic. He understands to hold his viagra and ntg the morning of the procedure. He denies any allergies to contrast dye. He has a family member that will take him to the procedure and remain with him 24 hours post procedure. I confirmed arrival/procedure times and location ([facility]). Pt knows to check in at information desk on first floor. All questions were answered. I gave him our call back number should he have any other questions. Orders entered in EPIC. Bhavna Britton RN 9:18 AM 01/09/18

## 2018-01-10 ENCOUNTER — APPOINTMENT (OUTPATIENT)
Dept: CARDIOLOGY | Facility: CLINIC | Age: 73
End: 2018-01-10
Attending: PHYSICIAN ASSISTANT
Payer: COMMERCIAL

## 2018-01-10 ENCOUNTER — HOSPITAL ENCOUNTER (OUTPATIENT)
Facility: CLINIC | Age: 73
Discharge: HOME | End: 2018-01-10
Attending: INTERNAL MEDICINE | Admitting: INTERNAL MEDICINE
Payer: COMMERCIAL

## 2018-01-10 VITALS
TEMPERATURE: 97.5 F | HEIGHT: 73 IN | DIASTOLIC BLOOD PRESSURE: 70 MMHG | SYSTOLIC BLOOD PRESSURE: 98 MMHG | RESPIRATION RATE: 15 BRPM | OXYGEN SATURATION: 99 % | HEART RATE: 65 BPM | WEIGHT: 180.2 LBS | BODY MASS INDEX: 23.88 KG/M2

## 2018-01-10 DIAGNOSIS — I47.20 VENTRICULAR TACHYCARDIA (H): ICD-10-CM

## 2018-01-10 DIAGNOSIS — I25.10 ATHEROSCLEROSIS OF NATIVE CORONARY ARTERY OF NATIVE HEART WITHOUT ANGINA PECTORIS: ICD-10-CM

## 2018-01-10 DIAGNOSIS — I25.10 CORONARY ARTERY DISEASE INVOLVING NATIVE CORONARY ARTERY OF NATIVE HEART WITHOUT ANGINA PECTORIS: ICD-10-CM

## 2018-01-10 DIAGNOSIS — Z98.890 STATUS POST CORONARY ANGIOGRAM: ICD-10-CM

## 2018-01-10 LAB
ANION GAP SERPL CALCULATED.3IONS-SCNC: 6 MMOL/L (ref 3–14)
APTT PPP: 32 SEC (ref 22–37)
BUN SERPL-MCNC: 29 MG/DL (ref 7–30)
CALCIUM SERPL-MCNC: 8.4 MG/DL (ref 8.5–10.1)
CHLORIDE SERPL-SCNC: 104 MMOL/L (ref 94–109)
CO2 SERPL-SCNC: 28 MMOL/L (ref 20–32)
CREAT SERPL-MCNC: 1.63 MG/DL (ref 0.66–1.25)
ERYTHROCYTE [DISTWIDTH] IN BLOOD BY AUTOMATED COUNT: 14.5 % (ref 10–15)
GFR SERPL CREATININE-BSD FRML MDRD: 42 ML/MIN/1.7M2
GLUCOSE SERPL-MCNC: 86 MG/DL (ref 70–99)
HCT VFR BLD AUTO: 41.6 % (ref 40–53)
HGB BLD-MCNC: 14.2 G/DL (ref 13.3–17.7)
INR PPP: 1.31 (ref 0.86–1.14)
MCH RBC QN AUTO: 31.1 PG (ref 26.5–33)
MCHC RBC AUTO-ENTMCNC: 34.1 G/DL (ref 31.5–36.5)
MCV RBC AUTO: 91 FL (ref 78–100)
PLATELET # BLD AUTO: 181 10E9/L (ref 150–450)
POTASSIUM SERPL-SCNC: 4.2 MMOL/L (ref 3.4–5.3)
RBC # BLD AUTO: 4.57 10E12/L (ref 4.4–5.9)
SODIUM SERPL-SCNC: 138 MMOL/L (ref 133–144)
WBC # BLD AUTO: 6.8 10E9/L (ref 4–11)

## 2018-01-10 PROCEDURE — 27210856 ZZH ACCESS HEART CATH CR2

## 2018-01-10 PROCEDURE — C1769 GUIDE WIRE: HCPCS

## 2018-01-10 PROCEDURE — 93005 ELECTROCARDIOGRAM TRACING: CPT

## 2018-01-10 PROCEDURE — 93571 IV DOP VEL&/PRESS C FLO 1ST: CPT

## 2018-01-10 PROCEDURE — 36415 COLL VENOUS BLD VENIPUNCTURE: CPT

## 2018-01-10 PROCEDURE — 93458 L HRT ARTERY/VENTRICLE ANGIO: CPT | Mod: 26 | Performed by: INTERNAL MEDICINE

## 2018-01-10 PROCEDURE — 25000128 H RX IP 250 OP 636: Performed by: INTERNAL MEDICINE

## 2018-01-10 PROCEDURE — 27210787 ZZH MANIFOLD CR2

## 2018-01-10 PROCEDURE — 40000235 ZZH STATISTIC TELEMETRY

## 2018-01-10 PROCEDURE — 40000852 ZZH STATISTIC HEART CATH LAB OR EP LAB

## 2018-01-10 PROCEDURE — 40000065 ZZH STATISTIC EKG NON-CHARGEABLE

## 2018-01-10 PROCEDURE — 99152 MOD SED SAME PHYS/QHP 5/>YRS: CPT

## 2018-01-10 PROCEDURE — 93459 L HRT ART/GRFT ANGIO: CPT

## 2018-01-10 PROCEDURE — 85730 THROMBOPLASTIN TIME PARTIAL: CPT | Performed by: INTERNAL MEDICINE

## 2018-01-10 PROCEDURE — 99153 MOD SED SAME PHYS/QHP EA: CPT

## 2018-01-10 PROCEDURE — 93010 ELECTROCARDIOGRAM REPORT: CPT | Performed by: INTERNAL MEDICINE

## 2018-01-10 PROCEDURE — 85027 COMPLETE CBC AUTOMATED: CPT | Performed by: INTERNAL MEDICINE

## 2018-01-10 PROCEDURE — 27210795 ZZH PAD DEFIB QUICK CR4

## 2018-01-10 PROCEDURE — 27210946 ZZH KIT HC TOTES DISP CR8

## 2018-01-10 PROCEDURE — 27210914 ZZH SHEATH CR8

## 2018-01-10 PROCEDURE — 99152 MOD SED SAME PHYS/QHP 5/>YRS: CPT | Performed by: INTERNAL MEDICINE

## 2018-01-10 PROCEDURE — 80048 BASIC METABOLIC PNL TOTAL CA: CPT | Performed by: INTERNAL MEDICINE

## 2018-01-10 PROCEDURE — 25000125 ZZHC RX 250: Performed by: INTERNAL MEDICINE

## 2018-01-10 PROCEDURE — 85610 PROTHROMBIN TIME: CPT | Performed by: INTERNAL MEDICINE

## 2018-01-10 PROCEDURE — 27211089 ZZH KIT ACIST INJECTOR CR3

## 2018-01-10 PROCEDURE — 85347 COAGULATION TIME ACTIVATED: CPT

## 2018-01-10 PROCEDURE — 93571 IV DOP VEL&/PRESS C FLO 1ST: CPT | Mod: 26 | Performed by: INTERNAL MEDICINE

## 2018-01-10 RX ORDER — ATROPINE SULFATE 0.1 MG/ML
0.5 INJECTION INTRAVENOUS EVERY 5 MIN PRN
Status: DISCONTINUED | OUTPATIENT
Start: 2018-01-10 | End: 2018-01-10 | Stop reason: HOSPADM

## 2018-01-10 RX ORDER — SODIUM NITROPRUSSIDE 25 MG/ML
100-200 INJECTION INTRAVENOUS
Status: DISCONTINUED | OUTPATIENT
Start: 2018-01-10 | End: 2018-01-10 | Stop reason: HOSPADM

## 2018-01-10 RX ORDER — MORPHINE SULFATE 2 MG/ML
1-2 INJECTION, SOLUTION INTRAMUSCULAR; INTRAVENOUS EVERY 5 MIN PRN
Status: DISCONTINUED | OUTPATIENT
Start: 2018-01-10 | End: 2018-01-10 | Stop reason: HOSPADM

## 2018-01-10 RX ORDER — CARVEDILOL 6.25 MG/1
6.25 TABLET ORAL 2 TIMES DAILY WITH MEALS
Status: DISCONTINUED | OUTPATIENT
Start: 2018-01-10 | End: 2018-01-10 | Stop reason: HOSPADM

## 2018-01-10 RX ORDER — ASPIRIN 81 MG/1
81-324 TABLET, CHEWABLE ORAL
Status: DISCONTINUED | OUTPATIENT
Start: 2018-01-10 | End: 2018-01-10 | Stop reason: HOSPADM

## 2018-01-10 RX ORDER — POTASSIUM CHLORIDE 1500 MG/1
20 TABLET, EXTENDED RELEASE ORAL
Status: DISCONTINUED | OUTPATIENT
Start: 2018-01-10 | End: 2018-01-10 | Stop reason: HOSPADM

## 2018-01-10 RX ORDER — LISINOPRIL 5 MG/1
5 TABLET ORAL
Status: DISCONTINUED | OUTPATIENT
Start: 2018-01-10 | End: 2018-01-10 | Stop reason: HOSPADM

## 2018-01-10 RX ORDER — PROTAMINE SULFATE 10 MG/ML
1-5 INJECTION, SOLUTION INTRAVENOUS
Status: DISCONTINUED | OUTPATIENT
Start: 2018-01-10 | End: 2018-01-10 | Stop reason: HOSPADM

## 2018-01-10 RX ORDER — VERAPAMIL HYDROCHLORIDE 2.5 MG/ML
1-2.5 INJECTION, SOLUTION INTRAVENOUS
Status: COMPLETED | OUTPATIENT
Start: 2018-01-10 | End: 2018-01-10

## 2018-01-10 RX ORDER — PROTAMINE SULFATE 10 MG/ML
25-100 INJECTION, SOLUTION INTRAVENOUS EVERY 5 MIN PRN
Status: DISCONTINUED | OUTPATIENT
Start: 2018-01-10 | End: 2018-01-10 | Stop reason: HOSPADM

## 2018-01-10 RX ORDER — IOPAMIDOL 755 MG/ML
173 INJECTION, SOLUTION INTRAVASCULAR ONCE
Status: COMPLETED | OUTPATIENT
Start: 2018-01-10 | End: 2018-01-10

## 2018-01-10 RX ORDER — PRASUGREL 10 MG/1
10-60 TABLET, FILM COATED ORAL
Status: DISCONTINUED | OUTPATIENT
Start: 2018-01-10 | End: 2018-01-10 | Stop reason: HOSPADM

## 2018-01-10 RX ORDER — POTASSIUM CHLORIDE 29.8 MG/ML
20 INJECTION INTRAVENOUS
Status: DISCONTINUED | OUTPATIENT
Start: 2018-01-10 | End: 2018-01-10 | Stop reason: HOSPADM

## 2018-01-10 RX ORDER — PROMETHAZINE HYDROCHLORIDE 25 MG/ML
6.25-25 INJECTION, SOLUTION INTRAMUSCULAR; INTRAVENOUS EVERY 4 HOURS PRN
Status: DISCONTINUED | OUTPATIENT
Start: 2018-01-10 | End: 2018-01-10 | Stop reason: HOSPADM

## 2018-01-10 RX ORDER — LIDOCAINE HYDROCHLORIDE 10 MG/ML
30 INJECTION, SOLUTION EPIDURAL; INFILTRATION; INTRACAUDAL; PERINEURAL
Status: DISCONTINUED | OUTPATIENT
Start: 2018-01-10 | End: 2018-01-10 | Stop reason: HOSPADM

## 2018-01-10 RX ORDER — LIDOCAINE HYDROCHLORIDE 10 MG/ML
1-10 INJECTION, SOLUTION EPIDURAL; INFILTRATION; INTRACAUDAL; PERINEURAL
Status: COMPLETED | OUTPATIENT
Start: 2018-01-10 | End: 2018-01-10

## 2018-01-10 RX ORDER — CLOPIDOGREL 300 MG/1
300-600 TABLET, FILM COATED ORAL
Status: DISCONTINUED | OUTPATIENT
Start: 2018-01-10 | End: 2018-01-10 | Stop reason: HOSPADM

## 2018-01-10 RX ORDER — DOPAMINE HYDROCHLORIDE 160 MG/100ML
2-20 INJECTION, SOLUTION INTRAVENOUS CONTINUOUS PRN
Status: DISCONTINUED | OUTPATIENT
Start: 2018-01-10 | End: 2018-01-10 | Stop reason: HOSPADM

## 2018-01-10 RX ORDER — DIPHENHYDRAMINE HYDROCHLORIDE 50 MG/ML
25-50 INJECTION INTRAMUSCULAR; INTRAVENOUS
Status: DISCONTINUED | OUTPATIENT
Start: 2018-01-10 | End: 2018-01-10 | Stop reason: HOSPADM

## 2018-01-10 RX ORDER — METOPROLOL TARTRATE 1 MG/ML
5 INJECTION, SOLUTION INTRAVENOUS EVERY 5 MIN PRN
Status: DISCONTINUED | OUTPATIENT
Start: 2018-01-10 | End: 2018-01-10 | Stop reason: HOSPADM

## 2018-01-10 RX ORDER — ACETAMINOPHEN 325 MG/1
325-650 TABLET ORAL EVERY 4 HOURS PRN
Status: DISCONTINUED | OUTPATIENT
Start: 2018-01-10 | End: 2018-01-10 | Stop reason: HOSPADM

## 2018-01-10 RX ORDER — LIDOCAINE 40 MG/G
CREAM TOPICAL
Status: DISCONTINUED | OUTPATIENT
Start: 2018-01-10 | End: 2018-01-10 | Stop reason: HOSPADM

## 2018-01-10 RX ORDER — FUROSEMIDE 10 MG/ML
20-100 INJECTION INTRAMUSCULAR; INTRAVENOUS
Status: DISCONTINUED | OUTPATIENT
Start: 2018-01-10 | End: 2018-01-10 | Stop reason: HOSPADM

## 2018-01-10 RX ORDER — ATROPINE SULFATE 0.1 MG/ML
.5-1 INJECTION INTRAVENOUS
Status: DISCONTINUED | OUTPATIENT
Start: 2018-01-10 | End: 2018-01-10 | Stop reason: HOSPADM

## 2018-01-10 RX ORDER — NITROGLYCERIN 20 MG/100ML
.07-2 INJECTION INTRAVENOUS CONTINUOUS PRN
Status: DISCONTINUED | OUTPATIENT
Start: 2018-01-10 | End: 2018-01-10 | Stop reason: HOSPADM

## 2018-01-10 RX ORDER — HEPARIN SODIUM 1000 [USP'U]/ML
1000-10000 INJECTION, SOLUTION INTRAVENOUS; SUBCUTANEOUS EVERY 5 MIN PRN
Status: DISCONTINUED | OUTPATIENT
Start: 2018-01-10 | End: 2018-01-10 | Stop reason: HOSPADM

## 2018-01-10 RX ORDER — NITROGLYCERIN 0.4 MG/1
0.4 TABLET SUBLINGUAL EVERY 5 MIN PRN
Status: DISCONTINUED | OUTPATIENT
Start: 2018-01-10 | End: 2018-01-10 | Stop reason: HOSPADM

## 2018-01-10 RX ORDER — DEXTROSE MONOHYDRATE 25 G/50ML
12.5-5 INJECTION, SOLUTION INTRAVENOUS EVERY 30 MIN PRN
Status: DISCONTINUED | OUTPATIENT
Start: 2018-01-10 | End: 2018-01-10 | Stop reason: HOSPADM

## 2018-01-10 RX ORDER — NICARDIPINE HYDROCHLORIDE 2.5 MG/ML
100 INJECTION INTRAVENOUS
Status: DISCONTINUED | OUTPATIENT
Start: 2018-01-10 | End: 2018-01-10 | Stop reason: HOSPADM

## 2018-01-10 RX ORDER — NALOXONE HYDROCHLORIDE 0.4 MG/ML
0.4 INJECTION, SOLUTION INTRAMUSCULAR; INTRAVENOUS; SUBCUTANEOUS EVERY 5 MIN PRN
Status: DISCONTINUED | OUTPATIENT
Start: 2018-01-10 | End: 2018-01-10 | Stop reason: HOSPADM

## 2018-01-10 RX ORDER — AMIODARONE HYDROCHLORIDE 200 MG/1
200 TABLET ORAL DAILY
Status: DISCONTINUED | OUTPATIENT
Start: 2018-01-10 | End: 2018-01-10 | Stop reason: HOSPADM

## 2018-01-10 RX ORDER — NIFEDIPINE 10 MG/1
10 CAPSULE ORAL
Status: DISCONTINUED | OUTPATIENT
Start: 2018-01-10 | End: 2018-01-10 | Stop reason: HOSPADM

## 2018-01-10 RX ORDER — FLUMAZENIL 0.1 MG/ML
0.2 INJECTION, SOLUTION INTRAVENOUS
Status: DISCONTINUED | OUTPATIENT
Start: 2018-01-10 | End: 2018-01-10 | Stop reason: HOSPADM

## 2018-01-10 RX ORDER — FENTANYL CITRATE 50 UG/ML
25-50 INJECTION, SOLUTION INTRAMUSCULAR; INTRAVENOUS
Status: DISCONTINUED | OUTPATIENT
Start: 2018-01-10 | End: 2018-01-10 | Stop reason: HOSPADM

## 2018-01-10 RX ORDER — SODIUM CHLORIDE 9 MG/ML
INJECTION, SOLUTION INTRAVENOUS CONTINUOUS
Status: DISCONTINUED | OUTPATIENT
Start: 2018-01-10 | End: 2018-01-10 | Stop reason: HOSPADM

## 2018-01-10 RX ORDER — ONDANSETRON 2 MG/ML
4 INJECTION INTRAMUSCULAR; INTRAVENOUS EVERY 4 HOURS PRN
Status: DISCONTINUED | OUTPATIENT
Start: 2018-01-10 | End: 2018-01-10 | Stop reason: HOSPADM

## 2018-01-10 RX ORDER — WARFARIN SODIUM 2.5 MG/1
2.5 TABLET ORAL DAILY
Status: DISCONTINUED | OUTPATIENT
Start: 2018-01-10 | End: 2018-01-10 | Stop reason: HOSPADM

## 2018-01-10 RX ORDER — HYDROCODONE BITARTRATE AND ACETAMINOPHEN 5; 325 MG/1; MG/1
1-2 TABLET ORAL EVERY 4 HOURS PRN
Status: DISCONTINUED | OUTPATIENT
Start: 2018-01-10 | End: 2018-01-10 | Stop reason: HOSPADM

## 2018-01-10 RX ORDER — ASPIRIN 325 MG
325 TABLET ORAL
Status: DISCONTINUED | OUTPATIENT
Start: 2018-01-10 | End: 2018-01-10 | Stop reason: HOSPADM

## 2018-01-10 RX ORDER — DIGOXIN 125 MCG
125 TABLET ORAL DAILY
Status: DISCONTINUED | OUTPATIENT
Start: 2018-01-10 | End: 2018-01-10 | Stop reason: HOSPADM

## 2018-01-10 RX ORDER — CLOPIDOGREL BISULFATE 75 MG/1
75 TABLET ORAL
Status: DISCONTINUED | OUTPATIENT
Start: 2018-01-10 | End: 2018-01-10 | Stop reason: HOSPADM

## 2018-01-10 RX ORDER — ADENOSINE 3 MG/ML
12-12000 INJECTION, SOLUTION INTRAVENOUS
Status: DISCONTINUED | OUTPATIENT
Start: 2018-01-10 | End: 2018-01-10 | Stop reason: HOSPADM

## 2018-01-10 RX ORDER — BUPIVACAINE HYDROCHLORIDE 2.5 MG/ML
1-10 INJECTION, SOLUTION EPIDURAL; INFILTRATION; INTRACAUDAL
Status: DISCONTINUED | OUTPATIENT
Start: 2018-01-10 | End: 2018-01-10 | Stop reason: HOSPADM

## 2018-01-10 RX ORDER — HYDRALAZINE HYDROCHLORIDE 20 MG/ML
10-20 INJECTION INTRAMUSCULAR; INTRAVENOUS
Status: DISCONTINUED | OUTPATIENT
Start: 2018-01-10 | End: 2018-01-10 | Stop reason: HOSPADM

## 2018-01-10 RX ORDER — EPINEPHRINE 1 MG/ML
0.3 INJECTION, SOLUTION, CONCENTRATE INTRAVENOUS
Status: DISCONTINUED | OUTPATIENT
Start: 2018-01-10 | End: 2018-01-10 | Stop reason: HOSPADM

## 2018-01-10 RX ORDER — PHENYLEPHRINE HCL IN 0.9% NACL 1 MG/10 ML
20-100 SYRINGE (ML) INTRAVENOUS
Status: DISCONTINUED | OUTPATIENT
Start: 2018-01-10 | End: 2018-01-10 | Stop reason: HOSPADM

## 2018-01-10 RX ORDER — NALOXONE HYDROCHLORIDE 0.4 MG/ML
.1-.4 INJECTION, SOLUTION INTRAMUSCULAR; INTRAVENOUS; SUBCUTANEOUS
Status: DISCONTINUED | OUTPATIENT
Start: 2018-01-10 | End: 2018-01-10 | Stop reason: HOSPADM

## 2018-01-10 RX ORDER — NITROGLYCERIN 5 MG/ML
100-500 VIAL (ML) INTRAVENOUS
Status: COMPLETED | OUTPATIENT
Start: 2018-01-10 | End: 2018-01-10

## 2018-01-10 RX ORDER — ROSUVASTATIN CALCIUM 20 MG/1
20 TABLET, COATED ORAL DAILY
Status: DISCONTINUED | OUTPATIENT
Start: 2018-01-10 | End: 2018-01-10 | Stop reason: HOSPADM

## 2018-01-10 RX ORDER — NALOXONE HYDROCHLORIDE 0.4 MG/ML
.2-.4 INJECTION, SOLUTION INTRAMUSCULAR; INTRAVENOUS; SUBCUTANEOUS
Status: DISCONTINUED | OUTPATIENT
Start: 2018-01-10 | End: 2018-01-10 | Stop reason: HOSPADM

## 2018-01-10 RX ORDER — POTASSIUM CHLORIDE 7.45 MG/ML
10 INJECTION INTRAVENOUS
Status: DISCONTINUED | OUTPATIENT
Start: 2018-01-10 | End: 2018-01-10 | Stop reason: HOSPADM

## 2018-01-10 RX ORDER — DOBUTAMINE HYDROCHLORIDE 200 MG/100ML
2-20 INJECTION INTRAVENOUS CONTINUOUS PRN
Status: DISCONTINUED | OUTPATIENT
Start: 2018-01-10 | End: 2018-01-10 | Stop reason: HOSPADM

## 2018-01-10 RX ORDER — METHYLPREDNISOLONE SODIUM SUCCINATE 125 MG/2ML
125 INJECTION, POWDER, LYOPHILIZED, FOR SOLUTION INTRAMUSCULAR; INTRAVENOUS
Status: DISCONTINUED | OUTPATIENT
Start: 2018-01-10 | End: 2018-01-10 | Stop reason: HOSPADM

## 2018-01-10 RX ORDER — ENALAPRILAT 1.25 MG/ML
1.25-2.5 INJECTION INTRAVENOUS
Status: DISCONTINUED | OUTPATIENT
Start: 2018-01-10 | End: 2018-01-10 | Stop reason: HOSPADM

## 2018-01-10 RX ORDER — LORAZEPAM 2 MG/ML
.5-2 INJECTION INTRAMUSCULAR EVERY 4 HOURS PRN
Status: DISCONTINUED | OUTPATIENT
Start: 2018-01-10 | End: 2018-01-10 | Stop reason: HOSPADM

## 2018-01-10 RX ORDER — IPRATROPIUM BROMIDE 21 UG/1
2 SPRAY, METERED NASAL EVERY 8 HOURS PRN
Status: DISCONTINUED | OUTPATIENT
Start: 2018-01-10 | End: 2018-01-10 | Stop reason: HOSPADM

## 2018-01-10 RX ORDER — NITROGLYCERIN 5 MG/ML
100-200 VIAL (ML) INTRAVENOUS
Status: DISCONTINUED | OUTPATIENT
Start: 2018-01-10 | End: 2018-01-10 | Stop reason: HOSPADM

## 2018-01-10 RX ADMIN — MIDAZOLAM 1 MG: 1 INJECTION INTRAMUSCULAR; INTRAVENOUS at 14:00

## 2018-01-10 RX ADMIN — LIDOCAINE HYDROCHLORIDE 10 MG: 10 INJECTION, SOLUTION EPIDURAL; INFILTRATION; INTRACAUDAL; PERINEURAL at 13:06

## 2018-01-10 RX ADMIN — IOPAMIDOL 173 ML: 755 INJECTION, SOLUTION INTRAVASCULAR at 14:15

## 2018-01-10 RX ADMIN — Medication 5000 UNITS: at 13:10

## 2018-01-10 RX ADMIN — ADENOSINE 240 MCG: 3 INJECTION, SOLUTION INTRAVENOUS at 13:51

## 2018-01-10 RX ADMIN — MIDAZOLAM 1 MG: 1 INJECTION INTRAMUSCULAR; INTRAVENOUS at 13:03

## 2018-01-10 RX ADMIN — FENTANYL CITRATE 50 MCG: 50 INJECTION, SOLUTION INTRAMUSCULAR; INTRAVENOUS at 13:02

## 2018-01-10 RX ADMIN — FENTANYL CITRATE 50 MCG: 50 INJECTION, SOLUTION INTRAMUSCULAR; INTRAVENOUS at 13:20

## 2018-01-10 RX ADMIN — MIDAZOLAM 1 MG: 1 INJECTION INTRAMUSCULAR; INTRAVENOUS at 13:40

## 2018-01-10 RX ADMIN — FENTANYL CITRATE 50 MCG: 50 INJECTION, SOLUTION INTRAMUSCULAR; INTRAVENOUS at 13:40

## 2018-01-10 RX ADMIN — MIDAZOLAM 1 MG: 1 INJECTION INTRAMUSCULAR; INTRAVENOUS at 13:20

## 2018-01-10 RX ADMIN — NITROGLYCERIN 200 MCG: 5 INJECTION, SOLUTION INTRAVENOUS at 13:09

## 2018-01-10 RX ADMIN — VERAPAMIL HYDROCHLORIDE 2.5 MG: 2.5 INJECTION, SOLUTION INTRAVENOUS at 13:10

## 2018-01-10 NOTE — PROGRESS NOTES
1415- Patient returned from Cath Lab. TR band intact to left wrist.  No oozing or hematoma noted. Left arm elevated on pillow. Patient denies pain. Patient instructed on activity restrictions with left wrist. Verbal understanding received from patient. Patient remains stable in vital signs and O2 sats. Patient tolerating food and fluids without issue. No complaint of pain. Pt's family at bedside. MD here to explain all findings with family.    1515- 3 cc air attempted to release from TR band as per protocol but bleeding occurred so 3 cc air returned, hemostasis was again achieved. Report given to Clarisa ALTMAN at bedside

## 2018-01-10 NOTE — PROGRESS NOTES
1645- DCI reviewed with patient and wife who verbalize understanding.  1700 TR band off and bandaid and armboard applied.  1715- Standing at side of bed. BP checked= 103/70  1725- After walking around, patient reports being slightly dizzy. BP= 98/70

## 2018-01-10 NOTE — IP AVS SNAPSHOT
MRN:[id number]                      After Visit Summary   1/10/2018    Tyrel Rene    MRN: [id number]           Visit Information        Department      1/10/2018  8:47 AM [facility]          Review of your medicines      UNREVIEWED medicines. Ask your doctor about these medicines        Dose / Directions    amiodarone 200 MG tablet   Commonly known as:  PACERONE/CODARONE   Used for:  Ventricular tachycardia (H)        Dose:  200 mg   Take 1 tablet (200 mg) by mouth daily   Quantity:  30 tablet   Refills:  0       ASPIRIN NOT PRESCRIBED   Commonly known as:  INTENTIONAL   Used for:  ASHD (arteriosclerotic heart disease)        Antiplatelet medication not prescribed intentionally due to Current anticoagulant therapy (warfarin/enoxaparin)   Quantity:  0 each   Refills:  0       carvedilol 3.125 MG tablet   Commonly known as:  COREG   Used for:  Ventricular tachycardia (H)        Dose:  6.25 mg   Take 2 tablets (6.25 mg) by mouth 2 times daily (with meals)   Quantity:  360 tablet   Refills:  3       digoxin 125 MCG tablet   Commonly known as:  LANOXIN   Used for:  Atrial fibrillation (H)        Dose:  125 mcg   Take 1 tablet (125 mcg) by mouth daily   Quantity:  90 tablet   Refills:  3       ipratropium 0.03 % spray   Commonly known as:  ATROVENT   Used for:  Chronic rhinitis, unspecified type        Dose:  2 spray   Spray 2 sprays into both nostrils every 8 hours as needed for rhinitis   Quantity:  30 mL   Refills:  11       lisinopril 5 MG tablet   Commonly known as:  PRINIVIL/ZESTRIL   Used for:  CHF (congestive heart failure) (H)        Dose:  5 mg   Take 1 tablet (5 mg) by mouth 2 times daily   Quantity:  180 tablet   Refills:  3       NITROSTAT 0.4 MG sublingual tablet   Used for:  Postsurgical aortocoronary bypass status   Generic drug:  nitroGLYcerin        DISSOLVE 1 TABLET UNDER THE TONGUE EVERY 5 MINUTES AS NEEDED FOR CHEST PAIN   Quantity:  25 tablet   Refills:  0        PRESERVISION AREDS 2 PO        Dose:  1 capsule   Take 1 capsule by mouth 2 times daily   Refills:  0       ranitidine 150 MG tablet   Commonly known as:  ZANTAC   Used for:  S/P CABG (coronary artery bypass graft)        Dose:  150 mg   Take 1 tablet (150 mg) by mouth 2 times daily   Quantity:  180 tablet   Refills:  0       rosuvastatin 20 MG tablet   Commonly known as:  CRESTOR        Dose:  20 mg   Take 1 tablet (20 mg) by mouth daily   Quantity:  30 tablet   Refills:  11       sildenafil 100 MG tablet   Commonly known as:  VIAGRA   Used for:  Erectile dysfunction, unspecified erectile dysfunction type        Dose:  100 mg   Take 1 tablet (100 mg) by mouth daily as needed for erectile dysfunction Take 30 min to 4 hours before intercourse.  Never use with nitroglycerin, terazosin or doxazosin.   Quantity:  16 tablet   Refills:  3       Vitamin D (Cholecalciferol) 1000 UNITS Tabs        Dose:  1000 mg   Take 1,000 mg by mouth daily   Refills:  0       * WARFARIN SODIUM PO        Dose:  2.5 mg   Take 2.5 mg by mouth daily 2.5 mg m.w.f & 1.25 row   Refills:  0       * WARFARIN SODIUM PO        Dose:  1.25 mg   Take 1.25 mg by mouth daily 2.5 mg m.w.f & 1.25 row   Refills:  0       * Notice:  This list has 2 medication(s) that are the same as other medications prescribed for you. Read the directions carefully, and ask your doctor or other care provider to review them with you.             Protect others around you: Learn how to safely use, store and throw away your medicines at www.disposemymeds.org.         Follow-ups after your visit        Your next 10 appointments already scheduled     Aguilar 15, 2018  9:00 AM CST   LAB with [facility]   [facility] ([facility])    [address] [place] [id number] [phone]           Please do not eat 10-12 hours before your appointment if you are coming in fasting for labs on lipids, cholesterol, or glucose  (sugar). This does not apply to pregnant women. Water, hot tea and black coffee (with nothing added) are okay. Do not drink other fluids, diet soda or chew gum.            Aguilar 15, 2018  9:30 AM CST   Ech Complete with [id number]   [facility] CV Echocardiography (Cardiovascular Imaging at [facility])    [address]  [place] [address]   [phone]           1. Please bring or wear a comfortable two-piece outfit. 2. You may eat, drink and take your normal medicines. 3. For any questions that cannot be answered, please contact the ordering physician            Jan 19, 2018  2:15 PM CST   Core MD Return with Parish Newman MD   [facility] ([facility])    [address]  [facility] [address]   [phone]            Apr 05, 2018  4:30 PM CDT   Remote ICD Check with [facility] DCR2   [facility] ([facility])    [address]  [place] [address]   [phone]           This appointment is for a remote check of your debrillator.  This is not an appointment at the office.               Care Instructions        After Care Instructions     Discharge Instructions - IF on Metformin (Glucophage or Glucovance) or Metformin containing medications       IF on Metformin (Glucophage or Glucovance) or Metformin containing medications , schedule a Basic Metabolic Panel at [facility] Heart or Primary Clinic in 48 - 72 hours post procedure and PRIOR TO resuming the Metformin or Metformin containing medications.  Hold Metformin (Glucophage or Glucovance) or Metformin containing medications until after the Basic Metabolic Panel on the 2nd or 3rd day following the procedure.  May resume after blood draw is complete.                  Further instructions from your care team       Cardiac Angiogram Discharge Instructions - Radial    After you go home:      Have an adult stay with  you until tomorrow.    Drink extra fluids for 2 days.    You may resume your normal diet.    No smoking       For 24 hours - due to the sedation you received:    Relax and take it easy.    Do NOT make any important or legal decisions.    Do NOT drive or operate machines at home or at work.    Do NOT drink alcohol.    Care of Wrist Puncture Site:      For the first 24 hrs - check the puncture site every 1-2 hours while awake.    It is normal to have soreness at the puncture site and mild tingling in your hand for up to 3 days.    Remove the bandaid after 24 hours. If there is minor oozing, apply another bandaid and remove it after 12 hours.    You may shower tomorrow.  Do NOT take a bath, or use a hot tub or pool for at least 3 days. Do NOT scrub the site. Do not use lotion or powder near the puncture site.           Activity:        For 2 days:     do not use your hand or arm to support your weight (such as rising from a chair)     do not bend your wrist (such as lifting a garage door).    do not lift more than 5 pounds or exercise your arm (such as tennis, golf or bowling).    Do NOT do any heavy activity such as exercise, lifting, or straining.     Bleeding:      If you start bleeding from the site in your wrist, sit down and press firmly on/above the site for 10 minutes.     Once bleeding stops, keep arm still for 2 hours.     Call [facility] as soon as you can.       Call 911 right away if you have heavy bleeding or bleeding that does not stop.      Medicines:      Take your medications, including blood thinners, unless your provider tells you not to.  If you take Coumadin (Warfarin), have your INR checked by your provider in  3-5 days. Call your clinic to schedule this.    If you have stopped any medicines, check with your provider about when to restart them.    Follow Up Appointments:      Follow up with [facility] Nurse Practitioner at [facility] of patient preference in 7-10 days.    Call the clinic  "if:      You have a large or growing hard lump around the site.    The site is red, swollen, hot or tender.    Blood or fluid is draining from the site.    You have chills or a fever greater than 101 F (38 C).    Your arm turns feels numb, cool or changes color.    You have hives, a rash or unusual itching.    Any questions or concerns.          [facility]:    [phone] [facility] (7 days a week)             Additional Information About Your Visit        [employer] Information     [employer] gives you secure access to your electronic health record. If you see a primary care provider, you can also send messages to your care team and make appointments. If you have questions, please call your primary care clinic.  If you do not have a primary care provider, please call [phone] and they will assist you.        Care EveryWhere ID     This is your Care EveryWhere ID. This could be used by other organizations to access your [facility] medical records  [id number]        Your Vitals Were     Blood Pressure Pulse Temperature Respirations Height Weight    124/73 65 97.5  F (36.4  C) (Oral) 10 1.854 m (6' 1\") 81.7 kg (180 lb 3.2 oz)    Pulse Oximetry BMI (Body Mass Index)                100% 23.77 kg/m2           Primary Care Provider Office Phone # Fax #    Dejon Dwons [phone]      Equal Access to Services     [facility] Campus[facility] AH: Hadii aad ku hadasho Soomaali, waaxda luqadaha, qaybta kaalmada adeegyada, deb starks . So [facility] [phone].    ATENCIÓN: Si habla español, tiene a hagen disposición servicios gratuitos de asistencia lingüística. Llame al [phone].    We comply with applicable federal civil rights laws and [place] laws. We do not discriminate on the basis of race, color, national origin, age, disability, sex, sexual orientation, or gender identity.            Thank you!     Thank you for choosing [facility] for your care. " Our goal is always to provide you with excellent care. Hearing back from our patients is one way we can continue to improve our services. Please take a few minutes to complete the written survey that you may receive in the mail after you visit with us. Thank you!             Medication List: This is a list of all your medications and when to take them. Check marks below indicate your daily home schedule. Keep this list as a reference.      Medications           Morning Afternoon Evening Bedtime As Needed    amiodarone 200 MG tablet   Commonly known as:  PACERONE/CODARONE   Take 1 tablet (200 mg) by mouth daily                                ASPIRIN NOT PRESCRIBED   Commonly known as:  INTENTIONAL   Antiplatelet medication not prescribed intentionally due to Current anticoagulant therapy (warfarin/enoxaparin)                                carvedilol 3.125 MG tablet   Commonly known as:  COREG   Take 2 tablets (6.25 mg) by mouth 2 times daily (with meals)                                digoxin 125 MCG tablet   Commonly known as:  LANOXIN   Take 1 tablet (125 mcg) by mouth daily                                ipratropium 0.03 % spray   Commonly known as:  ATROVENT   Spray 2 sprays into both nostrils every 8 hours as needed for rhinitis                                lisinopril 5 MG tablet   Commonly known as:  PRINIVIL/ZESTRIL   Take 1 tablet (5 mg) by mouth 2 times daily                                NITROSTAT 0.4 MG sublingual tablet   DISSOLVE 1 TABLET UNDER THE TONGUE EVERY 5 MINUTES AS NEEDED FOR CHEST PAIN   Generic drug:  nitroGLYcerin                                PRESERVISION AREDS 2 PO   Take 1 capsule by mouth 2 times daily                                ranitidine 150 MG tablet   Commonly known as:  ZANTAC   Take 1 tablet (150 mg) by mouth 2 times daily                                rosuvastatin 20 MG tablet   Commonly known as:  CRESTOR   Take 1 tablet (20 mg) by mouth daily                                 sildenafil 100 MG tablet   Commonly known as:  VIAGRA   Take 1 tablet (100 mg) by mouth daily as needed for erectile dysfunction Take 30 min to 4 hours before intercourse.  Never use with nitroglycerin, terazosin or doxazosin.                                Vitamin D (Cholecalciferol) 1000 UNITS Tabs   Take 1,000 mg by mouth daily                                * WARFARIN SODIUM PO   Take 2.5 mg by mouth daily 2.5 mg m.w.f & 1.25 row                                * WARFARIN SODIUM PO   Take 1.25 mg by mouth daily 2.5 mg m.w.f & 1.25 row                                * Notice:  This list has 2 medication(s) that are the same as other medications prescribed for you. Read the directions carefully, and ask your doctor or other care provider to review them with you.

## 2018-01-10 NOTE — IP AVS SNAPSHOT
[facility] [place] [address]    [place] [address]    Phone:  [phone]                                       After Visit Summary   1/10/2018    Tyrel Rene    MRN: [id number]           After Visit Summary Signature Page     I have received my discharge instructions, and my questions have been answered. I have discussed any challenges I see with this plan with the nurse or doctor.    ..........................................................................................................................................  Patient/Patient Representative Signature      ..........................................................................................................................................  Patient Representative Print Name and Relationship to Patient    ..................................................               ................................................  Date                                            Time    ..........................................................................................................................................  Reviewed by Signature/Title    ...................................................              ..............................................  Date                                                            Time

## 2018-01-10 NOTE — PROGRESS NOTES
[facility] Arrival    Patient arrived ambulatory to [facility] for coronary angiogram. He is accompanied by his wife and daughter. Patient is alert, oriented, cooperative and pleasant. Denies pain. PIV started in right forearm. Labs drawn. IV fluids started infusing. Patient denies skin issues. Contrast D/C instructions reviewed with pt with verbal understanding received. All questions & concerns addressed. Teaching video watched by pt & family. Pt resting in bed, denies additional needs at this time, call light within reach. Wife and daughters at bedside. 1230- Dr Pittman here to speak with pt & daughters. All questions answered. Consent signed at this time. Patient up to bathroom to void. Pt taken to cath lab ambulatory with RN

## 2018-01-10 NOTE — PROCEDURES
Occluded mid LAD well revascularized by widely patent LIMA.  Subtotal OM1 well revascularized by widely patent SVG.  70% OM2 FFR 0.81. No evidence of a bypass graft.  Dominant RCA without significant disease.    LVEDP 16mmHg  LVEF 25%    Rec med Rx  Consider Entresto if BP can tolerate.

## 2018-01-10 NOTE — DISCHARGE INSTRUCTIONS
Cardiac Angiogram Discharge Instructions - Radial    After you go home:      Have an adult stay with you until tomorrow.    Drink extra fluids for 2 days.    You may resume your normal diet.    No smoking       For 24 hours - due to the sedation you received:    Relax and take it easy.    Do NOT make any important or legal decisions.    Do NOT drive or operate machines at home or at work.    Do NOT drink alcohol.    Care of Wrist Puncture Site:      For the first 24 hrs - check the puncture site every 1-2 hours while awake.    It is normal to have soreness at the puncture site and mild tingling in your hand for up to 3 days.    Remove the bandaid after 24 hours. If there is minor oozing, apply another bandaid and remove it after 12 hours.    You may shower tomorrow.  Do NOT take a bath, or use a hot tub or pool for at least 3 days. Do NOT scrub the site. Do not use lotion or powder near the puncture site.           Activity:        For 2 days:     do not use your hand or arm to support your weight (such as rising from a chair)     do not bend your wrist (such as lifting a garage door).    do not lift more than 5 pounds or exercise your arm (such as tennis, golf or bowling).    Do NOT do any heavy activity such as exercise, lifting, or straining.     Bleeding:      If you start bleeding from the site in your wrist, sit down and press firmly on/above the site for 10 minutes.     Once bleeding stops, keep arm still for 2 hours.     Call [facility] as soon as you can.       Call 911 right away if you have heavy bleeding or bleeding that does not stop.      Medicines:      Take your medications, including blood thinners, unless your provider tells you not to.  If you take Coumadin (Warfarin), have your INR checked by your provider in  3-5 days. Call your clinic to schedule this.    If you have stopped any medicines, check with your provider about when to restart them.    Follow Up Appointments:      Follow up with [facility]  Heart Nurse Practitioner at [facility] of patient preference in 7-10 days.    Call the clinic if:      You have a large or growing hard lump around the site.    The site is red, swollen, hot or tender.    Blood or fluid is draining from the site.    You have chills or a fever greater than 101 F (38 C).    Your arm turns feels numb, cool or changes color.    You have hives, a rash or unusual itching.    Any questions or concerns.          [facility] at [place]:    [phone] [facility] (7 days a week)

## 2018-01-11 LAB — KCT BLD-ACNC: 206 SEC (ref 105–167)

## 2018-01-12 LAB — INTERPRETATION ECG - MUSE: NORMAL

## 2018-01-15 ENCOUNTER — CARE COORDINATION (OUTPATIENT)
Dept: CARDIOLOGY | Facility: CLINIC | Age: 73
End: 2018-01-15

## 2018-01-15 ENCOUNTER — ANTICOAGULATION THERAPY VISIT (OUTPATIENT)
Dept: NURSING | Facility: CLINIC | Age: 73
End: 2018-01-15
Payer: COMMERCIAL

## 2018-01-15 LAB — INR POINT OF CARE: 1.7 (ref 0.86–1.14)

## 2018-01-15 PROCEDURE — 85610 PROTHROMBIN TIME: CPT | Mod: QW

## 2018-01-15 PROCEDURE — 99207 ZZC NO CHARGE NURSE ONLY: CPT

## 2018-01-15 PROCEDURE — 36416 COLLJ CAPILLARY BLOOD SPEC: CPT

## 2018-01-15 NOTE — PROGRESS NOTES
Called pt/wife. LVM w/ detailed instructions per Lina Rodríguez PAC below. Gave RN number for them to call w/ questions/concerns.    Jadyn Paredes [facility] RN 12:04 PM 01/15/18  [phone]

## 2018-01-15 NOTE — PROGRESS NOTES
Agree with conservative measures for now - ice/heat/Tylenol. On warfarin with INR 1.31 the time of procedure, so not surprising he's a bit bruised/tender.     If discomfort gets worse, has temperature/sensation changes or just wants to have us evaluate it, happy to do so. If I don't have openings, any of the APPs can see for site check.    Also - I have message out to Dr. Costello (he was out all last week) to review cath as well.    Thx!

## 2018-01-15 NOTE — PROGRESS NOTES
ANTICOAGULATION FOLLOW-UP CLINIC VISIT    Patient Name:  Tyrel Rene  Date:  1/15/2018  Contact Type:  Face to Face    SUBJECTIVE:     Patient Findings     Positives Change in medications (Cardiology adjusting amiodarone to find the appropriate level ), Hospital admission (1/10/18 for ventricular tachycardia ), Intentional hold of therapy (4 day hold for heart angiogram on 1/10/18 )           OBJECTIVE    INR Protime   Date Value Ref Range Status   01/15/2018 1.7 (A) 0.86 - 1.14 Final       ASSESSMENT / PLAN  INR assessment SUB    Recheck INR In: 1 WEEK    INR Location Clinic      Anticoagulation Summary as of 1/15/2018     INR goal 2.0-2.5   Today's INR 1.7!   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 [phone]            See the Encounter Report to view Anticoagulation Flowsheet and Dosing Calendar (Go to Encounters tab in chart review, and find the Anticoagulation Therapy Visit)    Dosage adjustment made based on physician directed care plan. Patient plans to follow up with Cardiology on Friday.     Alexandrea Saha RN

## 2018-01-15 NOTE — MR AVS SNAPSHOT
Tyrel Rene   1/15/2018 2:15 PM   Anticoagulation Therapy Visit    Description:  72 year old male   Provider:   ANTICOAGULATION CLINIC   Department:  Bx Nurse           INR as of 1/15/2018     Today's INR 1.7!      Anticoagulation Summary as of 1/15/2018     INR goal 2.0-2.5   Today's INR 1.7!   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 On Thursday, the anticoagulant nurse can be reached directly at [phone].         January 2018 Details    Sun Mon Tue Wed Thu Fri Sat      1               2               3               4               5               6                 7               8               9               10               11               12               13                 14               15      3.75 mg   See details      16      1.25 mg         17      2.5 mg         18      1.25 mg         19      2.5 mg         20      1.25 mg           21      1.25 mg         22            23               24               25               26               27                 28               29               30               31                   Date Details   01/15 This INR check       Date of next INR:  1/22/2018         How  to take your warfarin dose     To take:  1.25 mg Take 0.5 of a 2.5 mg tablet.    To take:  2.5 mg Take 1 of the 2.5 mg tablets.    To take:  3.75 mg Take 1.5 of the 2.5 mg tablets.

## 2018-01-15 NOTE — PROGRESS NOTES
"Pt s/p C 1/10/18 for recurrent VT, w/o intervention, patent grafts, LVEF 25%, med rx recommended. Access at the L radial artery was used. Plan is for f/u w/ BMP, lipids, echo 1/18, OV Dr. Newman 1/19.     Pt and wife call. He had some bruising at access site after TR band removal. Now has bruising and tenderness to touch from wrist to elbow and a \"tiny\" amt of swelling. They deny any bleeding, redness, numbness, tingling. Tissue is soft and nml temp per wife.     I told them I did not think he needed to be seen urgently, can use tylenol and warm compresses for comfort. And we will ask Linacolt Rodríguez PAC to review if she has other recs.    Jadyn Paredes [facility] RN 9:22 AM 01/15/18  [phone]              "

## 2018-01-18 ENCOUNTER — HOSPITAL ENCOUNTER (OUTPATIENT)
Dept: CARDIOLOGY | Facility: CLINIC | Age: 73
Discharge: HOME | End: 2018-01-18
Attending: INTERNAL MEDICINE | Admitting: INTERNAL MEDICINE
Payer: COMMERCIAL

## 2018-01-18 DIAGNOSIS — E78.00 HYPERCHOLESTEROLEMIA: ICD-10-CM

## 2018-01-18 DIAGNOSIS — I50.22 CHRONIC SYSTOLIC CONGESTIVE HEART FAILURE (H): ICD-10-CM

## 2018-01-18 LAB
ALT SERPL W P-5'-P-CCNC: <5 U/L (ref 5–30)
ANION GAP SERPL CALCULATED.3IONS-SCNC: 10.7 MMOL/L (ref 6–17)
BUN SERPL-MCNC: 34 MG/DL (ref 7–30)
CALCIUM SERPL-MCNC: 9.1 MG/DL (ref 8.5–10.5)
CHLORIDE SERPL-SCNC: 104 MMOL/L (ref 98–107)
CHOLEST SERPL-MCNC: 130 MG/DL
CO2 SERPL-SCNC: 29 MMOL/L (ref 23–29)
CREAT SERPL-MCNC: 1.83 MG/DL (ref 0.7–1.3)
GFR SERPL CREATININE-BSD FRML MDRD: 36 ML/MIN/1.7M2
GLUCOSE SERPL-MCNC: 101 MG/DL (ref 70–105)
HDLC SERPL-MCNC: 46 MG/DL
LDLC SERPL CALC-MCNC: 58 MG/DL
NONHDLC SERPL-MCNC: 84 MG/DL
POTASSIUM SERPL-SCNC: 4.7 MMOL/L (ref 3.5–5.1)
SODIUM SERPL-SCNC: 139 MMOL/L (ref 136–145)
TRIGL SERPL-MCNC: 129 MG/DL

## 2018-01-18 PROCEDURE — 25500064 ZZH RX 255 OP 636: Performed by: INTERNAL MEDICINE

## 2018-01-18 PROCEDURE — 40000264 ECHO COMPLETE WITH LUMASON

## 2018-01-18 PROCEDURE — 80061 LIPID PANEL: CPT | Performed by: INTERNAL MEDICINE

## 2018-01-18 PROCEDURE — 93306 TTE W/DOPPLER COMPLETE: CPT | Mod: 26 | Performed by: INTERNAL MEDICINE

## 2018-01-18 PROCEDURE — 80048 BASIC METABOLIC PNL TOTAL CA: CPT | Performed by: INTERNAL MEDICINE

## 2018-01-18 PROCEDURE — 36415 COLL VENOUS BLD VENIPUNCTURE: CPT | Performed by: INTERNAL MEDICINE

## 2018-01-18 PROCEDURE — 84460 ALANINE AMINO (ALT) (SGPT): CPT | Performed by: INTERNAL MEDICINE

## 2018-01-18 RX ADMIN — SULFUR HEXAFLUORIDE 5 ML: KIT at 12:55

## 2018-01-19 ENCOUNTER — OFFICE VISIT (OUTPATIENT)
Dept: CARDIOLOGY | Facility: CLINIC | Age: 73
End: 2018-01-19
Attending: INTERNAL MEDICINE
Payer: COMMERCIAL

## 2018-01-19 VITALS
DIASTOLIC BLOOD PRESSURE: 80 MMHG | WEIGHT: 187.8 LBS | OXYGEN SATURATION: 98 % | BODY MASS INDEX: 24.89 KG/M2 | HEART RATE: 64 BPM | SYSTOLIC BLOOD PRESSURE: 103 MMHG | HEIGHT: 73 IN

## 2018-01-19 DIAGNOSIS — I25.10 CORONARY ARTERY DISEASE INVOLVING NATIVE CORONARY ARTERY OF NATIVE HEART WITHOUT ANGINA PECTORIS: ICD-10-CM

## 2018-01-19 DIAGNOSIS — I47.20 VENTRICULAR TACHYCARDIA (H): ICD-10-CM

## 2018-01-19 DIAGNOSIS — Z95.810 ICD (IMPLANTABLE CARDIOVERTER-DEFIBRILLATOR) IN PLACE: ICD-10-CM

## 2018-01-19 DIAGNOSIS — I50.22 CHRONIC SYSTOLIC CONGESTIVE HEART FAILURE (H): Primary | ICD-10-CM

## 2018-01-19 DIAGNOSIS — I48.20 CHRONIC ATRIAL FIBRILLATION (H): ICD-10-CM

## 2018-01-19 PROCEDURE — 99214 OFFICE O/P EST MOD 30 MIN: CPT | Performed by: INTERNAL MEDICINE

## 2018-01-19 RX ORDER — LISINOPRIL 5 MG/1
5 TABLET ORAL AT BEDTIME
Qty: 180 TABLET | Refills: 3 | Status: SHIPPED | OUTPATIENT
Start: 2018-01-19 | End: 2019-03-04

## 2018-01-19 NOTE — MR AVS SNAPSHOT
After Visit Summary   1/19/2018    Tyrel Rene    MRN: [id number]           Patient Information     Date Of Birth          1945        Visit Information        Provider Department      1/19/2018 2:15 PM Parish Newman MD [facility]        Today's Diagnoses     Chronic systolic congestive heart failure (H)    -  1    Ventricular tachycardia (H)        Coronary artery disease involving native coronary artery of native heart without angina pectoris        ICD (implantable cardioverter-defibrillator) in place        Chronic atrial fibrillation (H)          Care Instructions    Call CORE nurse for any questions or concerns:  [phone]   *If you have concerns after hours, please call [phone], option 2 to speak with on call Cardiologist.    1. Medication changes from today:  **     2. Weigh yourself daily and write it down.     3. Call CORE nurse if your weight is up more than 2 pounds in one day or 5 pounds in one week.     4. Call CORE nurse if you feel more short of breath, have more abdominal bloating, or leg swelling.     5. Continue low sodium diet (less than 2000 mg daily). If you eat less salt, you will retain less fluid.     6. Alcohol can weaken your heart further. You should avoid alcohol or limit its use to special times, such as a holiday or birthday.      7. Do NOT take Aleve or ibuprofen without talking to your doctor first.      8. Lab Results: **     Component      Latest Ref Rng & Units 1/10/2018 1/18/2018   Sodium      136 - 145 mmol/L 138 139   Potassium      3.5 - 5.1 mmol/L 4.2 4.7   Chloride      98 - 107 mmol/L 104 104   Carbon Dioxide      23 - 29 mmol/L 28 29   Anion Gap      6 - 17 mmol/L 6 10.7   Glucose      70 - 105 mg/dL 86 101   Urea Nitrogen      7 - 30 mg/dL 29 34 (H)   Creatinine      0.70 - 1.30 mg/dL 1.63 (H) 1.83 (H)   GFR Estimate      >60 mL/min/1.7m2 42 (L) 36 (L)   GFR Estimate If Black      >60 mL/min/1.7m2 50  (L) 44 (L)   Calcium      8.5 - 10.5 mg/dL 8.4 (L) 9.1   Cholesterol      <200 mg/dL  130   Triglycerides      <150 mg/dL  129   HDL Cholesterol      >39 mg/dL  46   LDL Cholesterol Calculated      <100 mg/dL  58   Non HDL Cholesterol      <130 mg/dL  84   ALT      5 - 30 U/L  <5 (L)     [facility]: Cardiomyopathy, Optimization, Rehabilitation, Education  The CORE Clinic is a heart failure specialty clinic within the [facility] where you will work with specialized nurse practitioners, physician assistants, doctors, and registered nurses. They are dedicated to helping patients with heart failure to carefully adjust medications, receive education, and learn who and when to call if symptoms develop. They specialize in helping you better understand your condition, slow the progression of your disease, improve the length and quality of your life, help you detect future heart problems before they become life threatening, and avoid hospitalizations.              Follow-ups after your visit        Additional Services     Follow-Up with Electrophysiologist           Follow-Up with Device Clinic           Follow-Up with CORE Clinic - Return MD visit                 Your next 10 appointments already scheduled     Jan 22, 2018  1:30 PM CST   Anticoagulation Visit with [facility]   [facility]    [address]  [facility] [id number] [phone]            Apr 05, 2018  4:30 PM CDT   Remote ICD Check with MARQUEZ DCR2   [facility] ([facility])    [address]  [place] [place] [facility] [id number] [phone]           This appointment is for a remote check of your debrillator.  This is not an appointment at the office.              Future tests that were ordered for you today     Open Future Orders        Priority Expected Expires Ordered    Follow-Up with  "Device Clinic Routine 2/19/2018 1/19/2019 1/19/2018    Basic metabolic panel Routine 2/2/2018 1/19/2019 1/19/2018    Basic metabolic panel Routine 4/19/2018 1/19/2019 1/19/2018    Follow-Up with CORE Clinic - Return MD visit Routine 4/19/2018 1/19/2019 1/19/2018    Follow-Up with Electrophysiologist Routine 2/18/2018 1/19/2019 1/19/2018    Echo Complete with Lumason Routine 1/19/2018 2/23/2018 1/19/2017            Who to contact     If you have questions or need follow up information about today's clinic visit or your schedule please contact [facility] directly at [phone].  Normal or non-critical lab and imaging results will be communicated to you by MyChart, letter or phone within 4 business days after the clinic has received the results. If you do not hear from us within 7 days, please contact the clinic through [employer] or phone. If you have a critical or abnormal lab result, we will notify you by phone as soon as possible.  Submit refill requests through [employer] or call your pharmacy and they will forward the refill request to us. Please allow 3 business days for your refill to be completed.          Additional Information About Your Visit        [employer] Information     [employer] gives you secure access to your electronic health record. If you see a primary care provider, you can also send messages to your care team and make appointments. If you have questions, please call your primary care clinic.  If you do not have a primary care provider, please call [phone] and they will assist you.        Care EveryWhere ID     This is your Care EveryWhere ID. This could be used by other organizations to access your [place] medical records  [id number]        Your Vitals Were     Pulse Height Pulse Oximetry BMI (Body Mass Index)          64 1.854 m (6' 1\") 98% 24.78 kg/m2         Blood Pressure from Last 3 Encounters:   01/19/18 103/80   01/10/18 98/70   12/20/17 128/84    " Weight from Last 3 Encounters:   01/19/18 85.2 kg (187 lb 12.8 oz)   01/10/18 81.7 kg (180 lb 3.2 oz)   12/20/17 83.9 kg (185 lb)              We Performed the Following     Follow-Up with Cardiologist          Today's Medication Changes          These changes are accurate as of: 1/19/18  3:09 PM.  If you have any questions, ask your nurse or doctor.               These medicines have changed or have updated prescriptions.        Dose/Directions    lisinopril 5 MG tablet   Commonly known as:  PRINIVIL/ZESTRIL   This may have changed:  when to take this   Used for:  Chronic systolic congestive heart failure (H)   Changed by:  Parish Newman MD        Dose:  5 mg   Take 1 tablet (5 mg) by mouth At Bedtime   Quantity:  180 tablet   Refills:  3            Where to get your medicines      These medications were sent to [facility] [address] [place] [facility] - [address] [address]     Phone:  [phone]     lisinopril 5 MG tablet                Primary Care Provider Office Phone # Fax #    Dejon Ajay Downs [phone] [address] [facility] [address]        Equal Access to Services     JAVI OTERO AH: Hadii cornelius ku hadasho Soomaali, waaxda luqadaha, qaybta kaalmada adeegyada, waxay idiin hayazn niru bird. So [facility] [phone].    ATENCIÓN: Si habla español, tiene a hagen disposición servicios gratuitos de asistencia lingüística. Llame al [phone].    We comply with applicable federal civil rights laws and [place] laws. We do not discriminate on the basis of race, color, national origin, age, disability, sex, sexual orientation, or gender identity.            Thank you!     Thank you for choosing [facility]  for your care. Our goal is always to provide you with excellent care. Hearing back from our patients is one way we can continue to improve our services. Please take a few minutes to complete the  written survey that you may receive in the mail after your visit with us. Thank you!             Your Updated Medication List - Protect others around you: Learn how to safely use, store and throw away your medicines at www.[employer].org.          This list is accurate as of: 1/19/18  3:09 PM.  Always use your most recent med list.                   Brand Name Dispense Instructions for use Diagnosis    amiodarone 200 MG tablet    PACERONE/CODARONE    30 tablet    Take 1 tablet (200 mg) by mouth daily    Ventricular tachycardia (H)       ASPIRIN NOT PRESCRIBED    INTENTIONAL     continuous prn for other Please choose reason not prescribed, below        carvedilol 3.125 MG tablet    COREG    360 tablet    Take 2 tablets (6.25 mg) by mouth 2 times daily (with meals)    Ventricular tachycardia (H)       digoxin 125 MCG tablet    LANOXIN    90 tablet    Take 1 tablet (125 mcg) by mouth daily    Atrial fibrillation (H)       ipratropium 0.03 % spray    ATROVENT    30 mL    Spray 2 sprays into both nostrils every 8 hours as needed for rhinitis    Chronic rhinitis, unspecified type       lisinopril 5 MG tablet    PRINIVIL/ZESTRIL    180 tablet    Take 1 tablet (5 mg) by mouth At Bedtime    Chronic systolic congestive heart failure (H)       NITROSTAT 0.4 MG sublingual tablet   Generic drug:  nitroGLYcerin     25 tablet    DISSOLVE 1 TABLET UNDER THE TONGUE EVERY 5 MINUTES AS NEEDED FOR CHEST PAIN    Postsurgical aortocoronary bypass status       PRESERVISION AREDS 2 PO      Take 1 capsule by mouth 2 times daily        ranitidine 150 MG tablet    ZANTAC    180 tablet    Take 1 tablet (150 mg) by mouth 2 times daily    S/P CABG (coronary artery bypass graft)       rosuvastatin 20 MG tablet    CRESTOR    30 tablet    Take 1 tablet (20 mg) by mouth daily        sildenafil 100 MG tablet    VIAGRA    16 tablet    Take 1 tablet (100 mg) by mouth daily as needed for erectile dysfunction Take 30 min to 4 hours before intercourse.   Never use with nitroglycerin, terazosin or doxazosin.    Erectile dysfunction, unspecified erectile dysfunction type       Vitamin D (Cholecalciferol) 1000 UNITS Tabs      Take 1,000 mg by mouth daily        * WARFARIN SODIUM PO      Take 2.5 mg by mouth daily 2.5 mg m.w.f & 1.25 row        * WARFARIN SODIUM PO      Take 1.25 mg by mouth daily 2.5 mg m.w.f & 1.25 row        * Notice:  This list has 2 medication(s) that are the same as other medications prescribed for you. Read the directions carefully, and ask your doctor or other care provider to review them with you.

## 2018-01-19 NOTE — PATIENT INSTRUCTIONS
Call CORE nurse for any questions or concerns:  [phone]   *If you have concerns after hours, please call [phone], option 2 to speak with on call Cardiologist.    1. Medication changes from today:  **     2. Weigh yourself daily and write it down.     3. Call CORE nurse if your weight is up more than 2 pounds in one day or 5 pounds in one week.     4. Call CORE nurse if you feel more short of breath, have more abdominal bloating, or leg swelling.     5. Continue low sodium diet (less than 2000 mg daily). If you eat less salt, you will retain less fluid.     6. Alcohol can weaken your heart further. You should avoid alcohol or limit its use to special times, such as a holiday or birthday.      7. Do NOT take Aleve or ibuprofen without talking to your doctor first.      8. Lab Results: **     Component      Latest Ref Rng & Units 1/10/2018 1/18/2018   Sodium      136 - 145 mmol/L 138 139   Potassium      3.5 - 5.1 mmol/L 4.2 4.7   Chloride      98 - 107 mmol/L 104 104   Carbon Dioxide      23 - 29 mmol/L 28 29   Anion Gap      6 - 17 mmol/L 6 10.7   Glucose      70 - 105 mg/dL 86 101   Urea Nitrogen      7 - 30 mg/dL 29 34 (H)   Creatinine      0.70 - 1.30 mg/dL 1.63 (H) 1.83 (H)   GFR Estimate      >60 mL/min/1.7m2 42 (L) 36 (L)   GFR Estimate If Black      >60 mL/min/1.7m2 50 (L) 44 (L)   Calcium      8.5 - 10.5 mg/dL 8.4 (L) 9.1   Cholesterol      <200 mg/dL  130   Triglycerides      <150 mg/dL  129   HDL Cholesterol      >39 mg/dL  46   LDL Cholesterol Calculated      <100 mg/dL  58   Non HDL Cholesterol      <130 mg/dL  84   ALT      5 - 30 U/L  <5 (L)     [facility]: Cardiomyopathy, Optimization, Rehabilitation, Education  The [facility]ORE Clinic is a heart failure specialty clinic within the [facility] where you will work with specialized nurse practitioners, physician assistants, doctors, and registered nurses. They are dedicated to helping patients with heart failure to carefully adjust  medications, receive education, and learn who and when to call if symptoms develop. They specialize in helping you better understand your condition, slow the progression of your disease, improve the length and quality of your life, help you detect future heart problems before they become life threatening, and avoid hospitalizations.

## 2018-01-19 NOTE — LETTER
1/19/2018      Dejon Downs MD  [address]  [facility] [address]      RE: Tyrel Rene       Dear Colleague,    I had the pleasure of seeing Tyrel Rene in the [facility].    HISTORY OF PRESENT ILLNESS:  I had the opportunity to see Mr. Tyrel Rene in Cardiology Clinic today for reevaluation in the [facility] Clinic regarding his ischemic cardiomyopathy and ventricular tachycardia issues.  As you recall, he is a 72-year-old gentleman with a stable ischemic cardiomyopathy, ejection fraction of about 30% with a biventricular ICD in place.  He has chronic atrial fibrillation.  He has a history of chronic kidney disease with a creatinine in the range of 1.4 to 1.5 at baseline.      Earlier this fall, he had some episodes of polymorphic ventricular tachycardia recorded on his ICD and met with Dr. Costello from Electrophysiology to discuss that.  Based on concerning the appearance of that arrhythmia, he was started on amiodarone.  However, despite being on amiodarone, he developed prolonged episode of sustained ventricular tachycardia causing syncope on 12/03/2017 while he was on the elliptical machine.  He tells me that he had been exercising on the elliptical machine for only about 4 minutes when he started to become tired and then fainted.  His device delivered 4 rounds of antitachycardia pacing, which were apparently unsuccessful.  He then got a shock which he did not feel and it converted him back to sinus rhythm.  It converted him out of atrial fibrillation at the same time.  He was hospitalized briefly, given additional amiodarone, and discharged.        When he followed up with Electrophysiology on an outpatient basis, he was referred for a coronary angiogram to understand whether this was worsening coronary disease causing ischemic VT.  His LIMA graft to the LAD and vein graft to the obtuse marginal were widely patent.  There was no new disease.  There was a mid  circumflex stenosis of about 70%, which had a normal FFR of 0.81.  Medical management was recommended.      His carvedilol was apparently decreased from 9.375 mg b.i.d. to 6.25 mg b.i.d. due to low blood pressures.  He was continued on lisinopril 5 mg b.i.d.  When he followed up for a device check 10 days later, his rhythm had returned to atrial fibrillation and he had only 1 brief episode of nonsustained VT identified.  His accelerometer was increased to try to help with his exercise tolerance.      At this point, his complaint now is only occasional mild lightheadedness.  It sounds like this is orthostatic in nature.  He still has shortness of breath and fatigue with moderate or heavy exertion but is able to walk around his home without any shortness of breath, difficulty.      PHYSICAL EXAMINATION:  His blood pressure is 103/80, heart rate 64 and weight 184-187 pounds.  His lungs are clear.  His heart rhythm is regular.  I do not hear any significant murmurs.      His creatinine is up a bit today to 1.83 and was 1.63 a week ago.  His cholesterol numbers remain excellent.      IMPRESSIONS:  Mr. Tyrel Rene is a 72-year-old gentleman with chronic severe ischemic cardiomyopathy with an ejection fraction of about 25%-30% based on echocardiogram today which is relatively stable.  He has mild mitral regurgitation, but no other significant changes in his echocardiogram.  He has had some issues recently with ventricular arrhythmias including polymorphic VT which necessitated initiation of amiodarone therapy.  Unfortunately, that did not prevent him from having ventricular tachycardia, syncope and receiving an ICD shock about 6 weeks later.  He did not have any new or significant coronary artery disease to explain this ventricular tachycardia episode and his medical therapy was continued.  Fortunately, he has not had any more episodes that we have identified.  He has not had a device check now for about a month.  I  suggested that he follow up with Dr. Costello in about a month for reevaluation of this issue and have a followup device check again at that time.      He has some low blood pressures and some lightheadedness symptoms, probably due to low blood pressure and orthostatic hypotension.  I will reduce his lisinopril from 5 mg b.i.d. to 5 mg p.o. each day at bedtime.  Hopefully this will help his renal function stabilizes as well.  I considered using Entresto, but I do not think that is possible at this time given his low blood pressures, lightheadedness, and renal dysfunction issues.  Once all those issues stabilize, we may wish to reconsider Entresto for him.      We will have him return to [facility].O.R.E. Clinic as well in about 3 months for reevaluation.  Fortunately, he is not having any decompensated heart failure issues.     Outpatient Encounter Prescriptions as of 1/19/2018   Medication Sig Dispense Refill     ASPIRIN NOT PRESCRIBED (INTENTIONAL) continuous prn for other Please choose reason not prescribed, below       lisinopril (PRINIVIL/ZESTRIL) 5 MG tablet Take 1 tablet (5 mg) by mouth At Bedtime 180 tablet 3     carvedilol (COREG) 3.125 MG tablet Take 2 tablets (6.25 mg) by mouth 2 times daily (with meals) 360 tablet 3     ipratropium (ATROVENT) 0.03 % spray Spray 2 sprays into both nostrils every 8 hours as needed for rhinitis 30 mL 11     amiodarone (PACERONE/CODARONE) 200 MG tablet Take 1 tablet (200 mg) by mouth daily 30 tablet 0     WARFARIN SODIUM PO Take 2.5 mg by mouth daily 2.5 mg m.w.f & 1.25 row       WARFARIN SODIUM PO Take 1.25 mg by mouth daily 2.5 mg m.w.f & 1.25 row       digoxin (LANOXIN) 125 MCG tablet Take 1 tablet (125 mcg) by mouth daily 90 tablet 3     rosuvastatin (CRESTOR) 20 MG tablet Take 1 tablet (20 mg) by mouth daily 30 tablet 11     NITROSTAT 0.4 MG sublingual tablet DISSOLVE 1 TABLET UNDER THE TONGUE EVERY 5 MINUTES AS NEEDED FOR CHEST PAIN 25 tablet 0     Vitamin D, Cholecalciferol, 1000  UNITS TABS Take 1,000 mg by mouth daily        Multiple Vitamins-Minerals (PRESERVISION AREDS 2 PO) Take 1 capsule by mouth 2 times daily       sildenafil (VIAGRA) 100 MG tablet Take 1 tablet (100 mg) by mouth daily as needed for erectile dysfunction Take 30 min to 4 hours before intercourse.  Never use with nitroglycerin, terazosin or doxazosin. 16 tablet 3     ranitidine (ZANTAC) 150 MG tablet Take 1 tablet (150 mg) by mouth 2 times daily 180 tablet      [DISCONTINUED] ASPIRIN NOT PRESCRIBED (INTENTIONAL) Antiplatelet medication not prescribed intentionally due to Current anticoagulant therapy (warfarin/enoxaparin) 0 each 0     [DISCONTINUED] lisinopril (PRINIVIL,ZESTRIL) 5 MG tablet Take 1 tablet (5 mg) by mouth 2 times daily 180 tablet 3     No facility-administered encounter medications on file as of 1/19/2018.      Again, thank you for allowing me to participate in the care of your patient.      Sincerely,    LYNNE KAUFMAN MD     [facility]

## 2018-01-19 NOTE — PROGRESS NOTES
HISTORY OF PRESENT ILLNESS:  I had the opportunity to see Mr. Tyrel Rene in Cardiology Clinic today for reevaluation in the [facility] regarding his ischemic cardiomyopathy and ventricular tachycardia issues.  As you recall, he is a 72-year-old gentleman with a stable ischemic cardiomyopathy, ejection fraction of about 30% with a biventricular ICD in place.  He has chronic atrial fibrillation.  He has a history of chronic kidney disease with a creatinine in the range of 1.4 to 1.5 at baseline.      Earlier this fall, he had some episodes of polymorphic ventricular tachycardia recorded on his ICD and met with Dr. Costello from Electrophysiology to discuss that.  Based on concerning the appearance of that arrhythmia, he was started on amiodarone.  However, despite being on amiodarone, he developed prolonged episode of sustained ventricular tachycardia causing syncope on 12/03/2017 while he was on the elliptical machine.  He tells me that he had been exercising on the elliptical machine for only about 4 minutes when he started to become tired and then fainted.  His device delivered 4 rounds of antitachycardia pacing, which were apparently unsuccessful.  He then got a shock which he did not feel and it converted him back to sinus rhythm.  It converted him out of atrial fibrillation at the same time.  He was hospitalized briefly, given additional amiodarone, and discharged.        When he followed up with Electrophysiology on an outpatient basis, he was referred for a coronary angiogram to understand whether this was worsening coronary disease causing ischemic VT.  His LIMA graft to the LAD and vein graft to the obtuse marginal were widely patent.  There was no new disease.  There was a mid circumflex stenosis of about 70%, which had a normal FFR of 0.81.  Medical management was recommended.      His carvedilol was apparently decreased from 9.375 mg b.i.d. to 6.25 mg b.i.d. due to low blood pressures.  He was  continued on lisinopril 5 mg b.i.d.  When he followed up for a device check 10 days later, his rhythm had returned to atrial fibrillation and he had only 1 brief episode of nonsustained VT identified.  His accelerometer was increased to try to help with his exercise tolerance.      At this point, his complaint now is only occasional mild lightheadedness.  It sounds like this is orthostatic in nature.  He still has shortness of breath and fatigue with moderate or heavy exertion but is able to walk around his home without any shortness of breath, difficulty.      PHYSICAL EXAMINATION:  His blood pressure is 103/80, heart rate 64 and weight 184-187 pounds.  His lungs are clear.  His heart rhythm is regular.  I do not hear any significant murmurs.      His creatinine is up a bit today to 1.83 and was 1.63 a week ago.  His cholesterol numbers remain excellent.      IMPRESSIONS:  Mr. Tyrel Rene is a 72-year-old gentleman with chronic severe ischemic cardiomyopathy with an ejection fraction of about 25%-30% based on echocardiogram today which is relatively stable.  He has mild mitral regurgitation, but no other significant changes in his echocardiogram.  He has had some issues recently with ventricular arrhythmias including polymorphic VT which necessitated initiation of amiodarone therapy.  Unfortunately, that did not prevent him from having ventricular tachycardia, syncope and receiving an ICD shock about 6 weeks later.  He did not have any new or significant coronary artery disease to explain this ventricular tachycardia episode and his medical therapy was continued.  Fortunately, he has not had any more episodes that we have identified.  He has not had a device check now for about a month.  I suggested that he follow up with Dr. Costello in about a month for reevaluation of this issue and have a followup device check again at that time.      He has some low blood pressures and some lightheadedness symptoms, probably due  to low blood pressure and orthostatic hypotension.  I will reduce his lisinopril from 5 mg b.i.d. to 5 mg p.o. each day at bedtime.  Hopefully this will help his renal function stabilizes as well.  I considered using Entresto, but I do not think that is possible at this time given his low blood pressures, lightheadedness, and renal dysfunction issues.  Once all those issues stabilize, we may wish to reconsider Entresto for him.      We will have him return to [facility] as well in about 3 months for reevaluation.  Fortunately, he is not having any decompensated heart failure issues.      cc:      Dejon Downs MD    [facility]   [address] [place] [address]         LYNNE KAUFMAN MD, [facility]             D: 2018 15:24   T: 2018 16:29   MT: BERRY      Name:     ARTEM ELIZALDE   MRN:      [id number]        Account:      [id number]   :      1945           Service Date: 2018      Document: [id number]

## 2018-01-24 ENCOUNTER — ANTICOAGULATION THERAPY VISIT (OUTPATIENT)
Dept: NURSING | Facility: CLINIC | Age: 73
End: 2018-01-24
Payer: COMMERCIAL

## 2018-01-24 DIAGNOSIS — Z79.01 LONG-TERM (CURRENT) USE OF ANTICOAGULANTS: ICD-10-CM

## 2018-01-24 DIAGNOSIS — I63.50 CEREBRAL ARTERY OCCLUSION WITH CEREBRAL INFARCTION (H): ICD-10-CM

## 2018-01-24 DIAGNOSIS — I63.9 CEREBRAL INFARCTION (H): ICD-10-CM

## 2018-01-24 LAB — INR POINT OF CARE: 2.8 (ref 2–3)

## 2018-01-24 PROCEDURE — 99207 ZZC NO CHARGE NURSE ONLY: CPT

## 2018-01-24 PROCEDURE — 85610 PROTHROMBIN TIME: CPT | Mod: QW

## 2018-01-24 PROCEDURE — 36416 COLLJ CAPILLARY BLOOD SPEC: CPT

## 2018-01-24 NOTE — MR AVS SNAPSHOT
Tyrel Rene   1/24/2018 2:30 PM   Anticoagulation Therapy Visit    Description:  72 year old male   Provider:  [facility]   Department:  Bx Nurse           INR as of 1/24/2018     Today's INR 2.8!      Anticoagulation Summary as of 1/24/2018     INR goal 2.0-2.5   Today's INR 2.8!   Full instructions 2.5 mg on Mon, Fri; 1.25 mg all other days   Next INR check 2/2/2018    Indications   Long-term (current) use of anticoagulants [Z79.01] [Z79.01]  Cerebral artery occlusion with cerebral infarction (HCC) [I63.50] [I63.50]  Cerebral infarction (H) [I63.9]         Your next Anticoagulation Clinic appointment(s)     Feb 02, 2018  2:15 PM CST   Anticoagulation Visit with  ANTICOAGULATION CLINIC   [facility] ([facility])    [address] [place] [address] [phone]              Contact Numbers     [facility]  Please call  [phone] to cancel and/or reschedule your appointment   The direct line to the anticoagulant nurse is [phone] on Monday, Wednesday, and Friday. On Thursday, the anticoagulant nurse can be reached directly at [phone].         January 2018 Details    Sun Mon Tue Wed Thu Fri Sat      1               2               3               4               5               6                 7               8               9               10               11               12               13                 14               15               16               17               18               19               20                 21               22               23               24      1.25 mg   See details      25      1.25 mg         26      2.5 mg         27      1.25 mg           28      1.25 mg         29      2.5 mg         30      1.25 mg         31      1.25 mg             Date Details   01/24 This INR check               How to take your warfarin dose     To take:  1.25 mg  Take 0.5 of a 2.5 mg tablet.    To take:  2.5 mg Take 1 of the 2.5 mg tablets.           February 2018 Details    Sun Mon Tue Wed Thu Fri Sat         1      1.25 mg         2            3                 4               5               6               7               8               9               10                 11               12               13               14               15               16               17                 18               19               20               21               22               23               24                 25               26               27               28                   Date Details   No additional details    Date of next INR:  2/2/2018         How to take your warfarin dose     To take:  1.25 mg Take 0.5 of a 2.5 mg tablet.    To take:  2.5 mg Take 1 of the 2.5 mg tablets.

## 2018-01-24 NOTE — PROGRESS NOTES
ANTICOAGULATION FOLLOW-UP CLINIC VISIT    Patient Name:  Tyrel Rene  Date:  1/24/2018  Contact Type:  Face to Face    SUBJECTIVE:     Patient Findings     Positives Change in medications           OBJECTIVE    INR Protime   Date Value Ref Range Status   01/24/2018 2.8 2.0 - 3.0 Final       ASSESSMENT / PLAN  INR assessment THER    Recheck INR In: 10 DAYS    INR Location Clinic      Anticoagulation Summary as of 1/24/2018     INR goal 2.0-2.5   Today's INR 2.8!   Maintenance plan 2.5 mg (2.5 mg x 1) on Mon, Fri; 1.25 mg (2.5 mg x 0.5) all other days   Full instructions 2.5 mg on Mon, Fri; 1.25 mg all other days   Weekly total 11.25 mg   Plan last modified Caridad Cunningham RN (1/24/2018)   Next INR check 2/2/2018   Target end date Indefinite    Indications   Long-term (current) use of anticoagulants [Z79.01] [Z79.01]  Cerebral artery occlusion with cerebral infarction (HCC) [I63.50] [I63.50]  Cerebral infarction (H) [I63.9]         Anticoagulation Episode Summary     INR check location [facility]    Preferred lab     Send INR reminders to [facility] INR/PROTIME    Comments       Anticoagulation Care Providers     Provider Role Specialty Phone number    Dejon Downs MD [facility] [phone]            See the Encounter Report to view Anticoagulation Flowsheet and Dosing Calendar (Go to Encounters tab in chart review, and find the Anticoagulation Therapy Visit)        Caridad Cunningham RN

## 2018-02-02 ENCOUNTER — ANTICOAGULATION THERAPY VISIT (OUTPATIENT)
Dept: NURSING | Facility: CLINIC | Age: 73
End: 2018-02-02
Payer: COMMERCIAL

## 2018-02-02 DIAGNOSIS — Z79.01 LONG-TERM (CURRENT) USE OF ANTICOAGULANTS: ICD-10-CM

## 2018-02-02 DIAGNOSIS — I63.9 CEREBRAL INFARCTION (H): ICD-10-CM

## 2018-02-02 DIAGNOSIS — I63.50 CEREBRAL ARTERY OCCLUSION WITH CEREBRAL INFARCTION (H): ICD-10-CM

## 2018-02-02 LAB — INR POINT OF CARE: 2.2 (ref 0.86–1.14)

## 2018-02-02 PROCEDURE — 36416 COLLJ CAPILLARY BLOOD SPEC: CPT

## 2018-02-02 PROCEDURE — 85610 PROTHROMBIN TIME: CPT | Mod: QW

## 2018-02-02 PROCEDURE — 99207 ZZC NO CHARGE NURSE ONLY: CPT

## 2018-02-02 NOTE — PROGRESS NOTES
ANTICOAGULATION FOLLOW-UP CLINIC VISIT    Patient Name:  Tyrel Reen  Date:  2/2/2018  Contact Type:  Face to Face    SUBJECTIVE:     Patient Findings     Positives No Problem Findings           OBJECTIVE    INR Protime   Date Value Ref Range Status   02/02/2018 2.2 (A) 0.86 - 1.14 Final       ASSESSMENT / PLAN  INR assessment THER    Recheck INR In: 3 WEEKS    INR Location Clinic      Anticoagulation Summary as of 2/2/2018     INR goal 2.0-2.5   Today's INR 2.2   Maintenance plan 2.5 mg (2.5 mg x 1) on Mon, Fri; 1.25 mg (2.5 mg x 0.5) all other days   Full instructions 2.5 mg on Mon, Fri; 1.25 mg all other days   Weekly total 11.25 mg   No change documented Doreen Finley RN   Plan last modified Caridad Cunningham RN (1/24/2018)   Next INR check 2/23/2018   Target end date Indefinite    Indications   Long-term (current) use of anticoagulants [Z79.01] [Z79.01]  Cerebral artery occlusion with cerebral infarction (HCC) [I63.50] [I63.50]  Cerebral infarction (H) [I63.9]         Anticoagulation Episode Summary     INR check location [facility]    Preferred lab     Send INR reminders to [facility] INR/PROTIME    Comments       Anticoagulation Care Providers     Provider Role Specialty Phone number    Dejon Downs MD [facility] [phone]            See the Encounter Report to view Anticoagulation Flowsheet and Dosing Calendar (Go to Encounters tab in chart review, and find the Anticoagulation Therapy Visit)        Doreen Finley, RN

## 2018-02-02 NOTE — MR AVS SNAPSHOT
Tyrel Rene   2/2/2018 2:15 PM   Anticoagulation Therapy Visit    Description:  72 year old male   Provider:  [facility]   Department:  Bx Nurse           INR as of 2/2/2018     Today's INR 2.2      Anticoagulation Summary as of 2/2/2018     INR goal 2.0-2.5   Today's INR 2.2   Full instructions 2.5 mg on Mon, Fri; 1.25 mg all other days   Next INR check 2/23/2018    Indications   Long-term (current) use of anticoagulants [Z79.01] [Z79.01]  Cerebral artery occlusion with cerebral infarction (HCC) [I63.50] [I63.50]  Cerebral infarction (H) [I63.9]         Your next Anticoagulation Clinic appointment(s)     Feb 23, 2018  2:00 PM CST   Anticoagulation Visit with  ANTICOAGULATION CLINIC   [facility] ([facility])    [address] [place] [phone]              Contact Numbers     [facility]  Please call  [phone] to cancel and/or reschedule your appointment   The direct line to the anticoagulant nurse is [phone] on Monday, Wednesday, and Friday. On Thursday, the anticoagulant nurse can be reached directly at [phone].         February 2018 Details    Sun Mon Tue Wed Thu Fri Sat         1               2      2.5 mg   See details      3      1.25 mg           4      1.25 mg         5      2.5 mg         6      1.25 mg         7      1.25 mg         8      1.25 mg         9      2.5 mg         10      1.25 mg           11      1.25 mg         12      2.5 mg         13      1.25 mg         14      1.25 mg         15      1.25 mg         16      2.5 mg         17      1.25 mg           18      1.25 mg         19      2.5 mg         20      1.25 mg         21      1.25 mg         22      1.25 mg         23            24                 25               26               27               28                   Date Details   02/02 This INR check       Date of next INR:  2/23/2018          How to take your warfarin dose     To take:  1.25 mg Take 0.5 of a 2.5 mg tablet.    To take:  2.5 mg Take 1 of the 2.5 mg tablets.

## 2018-02-06 ENCOUNTER — TRANSFERRED RECORDS (OUTPATIENT)
Dept: HEALTH INFORMATION MANAGEMENT | Facility: CLINIC | Age: 73
End: 2018-02-06

## 2018-02-06 DIAGNOSIS — I47.20 VENTRICULAR TACHYCARDIA (H): ICD-10-CM

## 2018-02-06 RX ORDER — AMIODARONE HYDROCHLORIDE 200 MG/1
200 TABLET ORAL DAILY
Qty: 30 TABLET | Refills: 3 | Status: SHIPPED | OUTPATIENT
Start: 2018-02-06 | End: 2018-02-09

## 2018-02-06 NOTE — TELEPHONE ENCOUNTER
Received message from patient's wife, Cherry saying patient will need a refill on his Amiodarone, he only has a few pills left. Pt was last seen by  on 1/19/18, no changes in Amiodarone dose, pt now taking 200 mg qd. Medication e-scripted [facility] [place]. Tried calling patient's wife, left message that refill was sent to his pharmacy. Bhavna Britton RN 11:07 AM 02/06/18

## 2018-02-09 RX ORDER — AMIODARONE HYDROCHLORIDE 200 MG/1
200 TABLET ORAL DAILY
Qty: 90 TABLET | Refills: 0 | Status: SHIPPED | OUTPATIENT
Start: 2018-02-09 | End: 2018-08-21

## 2018-02-23 ENCOUNTER — ANTICOAGULATION THERAPY VISIT (OUTPATIENT)
Dept: NURSING | Facility: CLINIC | Age: 73
End: 2018-02-23
Payer: COMMERCIAL

## 2018-02-23 DIAGNOSIS — I63.9 CEREBRAL INFARCTION (H): ICD-10-CM

## 2018-02-23 DIAGNOSIS — I63.50 CEREBRAL ARTERY OCCLUSION WITH CEREBRAL INFARCTION (H): ICD-10-CM

## 2018-02-23 DIAGNOSIS — Z79.01 LONG-TERM (CURRENT) USE OF ANTICOAGULANTS: ICD-10-CM

## 2018-02-23 LAB — INR POINT OF CARE: 2.4 (ref 0.86–1.14)

## 2018-02-23 PROCEDURE — 36416 COLLJ CAPILLARY BLOOD SPEC: CPT

## 2018-02-23 PROCEDURE — 85610 PROTHROMBIN TIME: CPT | Mod: QW

## 2018-02-23 PROCEDURE — 99207 ZZC NO CHARGE NURSE ONLY: CPT

## 2018-02-23 NOTE — PROGRESS NOTES
ANTICOAGULATION FOLLOW-UP CLINIC VISIT    Patient Name:  Tyrel Rene  Date:  2/23/2018  Contact Type:  Face to Face    SUBJECTIVE:     Patient Findings     Positives No Problem Findings           OBJECTIVE    INR Protime   Date Value Ref Range Status   02/23/2018 2.4 (A) 0.86 - 1.14 Final       ASSESSMENT / PLAN  INR assessment THER    Recheck INR In: 3 WEEKS    INR Location Clinic      Anticoagulation Summary as of 2/23/2018     INR goal 2.0-2.5   Today's INR 2.4   Maintenance plan 2.5 mg (2.5 mg x 1) on Mon, Fri; 1.25 mg (2.5 mg x 0.5) all other days   Full instructions 2.5 mg on Mon, Fri; 1.25 mg all other days   Weekly total 11.25 mg   No change documented Doreen Finley RN   Plan last modified Caridad Cunningham RN (1/24/2018)   Next INR check 3/16/2018   Target end date Indefinite    Indications   Long-term (current) use of anticoagulants [Z79.01] [Z79.01]  Cerebral artery occlusion with cerebral infarction (HCC) [I63.50] [I63.50]  Cerebral infarction (H) [I63.9]         Anticoagulation Episode Summary     INR check location [facility]    Preferred lab     Send INR reminders to [facility] INR/PROTIME    Comments       Anticoagulation Care Providers     Provider Role Specialty Phone number    Dejon Downs MD [facility] [phone]            See the Encounter Report to view Anticoagulation Flowsheet and Dosing Calendar (Go to Encounters tab in chart review, and find the Anticoagulation Therapy Visit)        Doreen Finley, RN

## 2018-02-23 NOTE — MR AVS SNAPSHOT
Tyrel RUSTY Anju   2/23/2018 10:15 AM   Anticoagulation Therapy Visit    Description:  72 year old male   Provider:  [facility]   Department:  Bx Nurse           INR as of 2/23/2018     Today's INR 2.4      Anticoagulation Summary as of 2/23/2018     INR goal 2.0-2.5   Today's INR 2.4   Full instructions 2.5 mg on Mon, Fri; 1.25 mg all other days   Next INR check 3/16/2018    Indications   Long-term (current) use of anticoagulants [Z79.01] [Z79.01]  Cerebral artery occlusion with cerebral infarction (HCC) [I63.50] [I63.50]  Cerebral infarction (H) [I63.9]         Your next Anticoagulation Clinic appointment(s)     Mar 16, 2018  1:30 PM CDT   Anticoagulation Visit with  ANTICOAGULATION CLINIC   [facility] ([facility]    [address] [place] [phone]              Contact Numbers     [facility]  Please call  [phone] to cancel and/or reschedule your appointment   The direct line to the anticoagulant nurse is [phone] on Monday, Wednesday, and Friday. On Thursday, the anticoagulant nurse can be reached directly at [phone].         February 2018 Details    Sun Mon Tue Wed Thu Fri Sat         1               2               3                 4               5               6               7               8               9               10                 11               12               13               14               15               16               17                 18               19               20               21               22               23      2.5 mg   See details      24      1.25 mg           25      1.25 mg         26      2.5 mg         27      1.25 mg         28      1.25 mg             Date Details   02/23 This INR check               How to take your warfarin dose     To take:  1.25 mg Take 0.5 of a 2.5 mg tablet.    To take:  2.5 mg Take 1 of the  2.5 mg tablets.           March 2018 Details    Sun Mon Tue Wed Thu Fri Sat         1      1.25 mg         2      2.5 mg         3      1.25 mg           4      1.25 mg         5      2.5 mg         6      1.25 mg         7      1.25 mg         8      1.25 mg         9      2.5 mg         10      1.25 mg           11      1.25 mg         12      2.5 mg         13      1.25 mg         14      1.25 mg         15      1.25 mg         16            17                 18               19               20               21               22               23               24                 25               26               27               28               29               30               31                Date Details   No additional details    Date of next INR:  3/16/2018         How to take your warfarin dose     To take:  1.25 mg Take 0.5 of a 2.5 mg tablet.    To take:  2.5 mg Take 1 of the 2.5 mg tablets.

## 2018-02-26 ENCOUNTER — OFFICE VISIT (OUTPATIENT)
Dept: CARDIOLOGY | Facility: CLINIC | Age: 73
End: 2018-02-26
Attending: INTERNAL MEDICINE
Payer: COMMERCIAL

## 2018-02-26 VITALS
HEART RATE: 66 BPM | OXYGEN SATURATION: 99 % | BODY MASS INDEX: 24.92 KG/M2 | HEIGHT: 73 IN | WEIGHT: 188 LBS | DIASTOLIC BLOOD PRESSURE: 74 MMHG | SYSTOLIC BLOOD PRESSURE: 120 MMHG

## 2018-02-26 DIAGNOSIS — I50.22 CHRONIC SYSTOLIC CONGESTIVE HEART FAILURE (H): ICD-10-CM

## 2018-02-26 DIAGNOSIS — Z95.810 ICD (IMPLANTABLE CARDIOVERTER-DEFIBRILLATOR) IN PLACE: ICD-10-CM

## 2018-02-26 DIAGNOSIS — I47.20 VENTRICULAR TACHYCARDIA (H): ICD-10-CM

## 2018-02-26 LAB
ANION GAP SERPL CALCULATED.3IONS-SCNC: 11.7 MMOL/L (ref 6–17)
BUN SERPL-MCNC: 21 MG/DL (ref 7–30)
CALCIUM SERPL-MCNC: 8.4 MG/DL (ref 8.5–10.5)
CHLORIDE SERPL-SCNC: 104 MMOL/L (ref 98–107)
CO2 SERPL-SCNC: 26 MMOL/L (ref 23–29)
CREAT SERPL-MCNC: 1.47 MG/DL (ref 0.7–1.3)
GFR SERPL CREATININE-BSD FRML MDRD: 47 ML/MIN/1.7M2
GLUCOSE SERPL-MCNC: 93 MG/DL (ref 70–105)
POTASSIUM SERPL-SCNC: 4.7 MMOL/L (ref 3.5–5.1)
SODIUM SERPL-SCNC: 137 MMOL/L (ref 136–145)

## 2018-02-26 PROCEDURE — 36415 COLL VENOUS BLD VENIPUNCTURE: CPT | Performed by: INTERNAL MEDICINE

## 2018-02-26 PROCEDURE — 93284 PRGRMG EVAL IMPLANTABLE DFB: CPT | Performed by: INTERNAL MEDICINE

## 2018-02-26 PROCEDURE — 99214 OFFICE O/P EST MOD 30 MIN: CPT | Mod: 25 | Performed by: INTERNAL MEDICINE

## 2018-02-26 PROCEDURE — 80048 BASIC METABOLIC PNL TOTAL CA: CPT | Performed by: INTERNAL MEDICINE

## 2018-02-26 NOTE — PROGRESS NOTES
[employer] CRT-D/ ICD Device Check  AP: na % BVP: 99 %  Mode: VVIR - 60        Underlying Rhythm: afib with vent rate 30s.   Heart Rate: Some variation per histogram.   Sensing: stable    Pacing Threshold: stable    Impedance: stable  Battery Status: 3.5 years  Device Site: well healed  Atrial Arrhythmia: afib : on warfarin.   Ventricular Arrhythmia: 1 NSVT : EGMs show a few PVCs and 2 episodes of 4 and 7 beats of VT. On amiodarone for VT.   ATP: none    Shocks: none  Setting Change: RV output changed to 2.5 V since he is dependent and LV output decreased sl per clinic protocol.     Care Plan: f/u 3 months remote ICD check and with Dr Costello today.

## 2018-02-26 NOTE — PROGRESS NOTES
HPI and Plan:   See dictation    Orders Placed This Encounter   Procedures     Follow-Up with Cardiac Advanced Practice Provider       No orders of the defined types were placed in this encounter.      Medications Discontinued During This Encounter   Medication Reason     WARFARIN SODIUM PO Medication Reconciliation Clean Up         Encounter Diagnoses   Name Primary?     Chronic systolic congestive heart failure (H)      Ventricular tachycardia (H)        CURRENT MEDICATIONS:  Current Outpatient Prescriptions   Medication Sig Dispense Refill     amiodarone (PACERONE/CODARONE) 200 MG tablet Take 1 tablet (200 mg) by mouth daily 90 tablet 0     lisinopril (PRINIVIL/ZESTRIL) 5 MG tablet Take 1 tablet (5 mg) by mouth At Bedtime 180 tablet 3     carvedilol (COREG) 3.125 MG tablet Take 2 tablets (6.25 mg) by mouth 2 times daily (with meals) 360 tablet 3     ipratropium (ATROVENT) 0.03 % spray Spray 2 sprays into both nostrils every 8 hours as needed for rhinitis 30 mL 11     WARFARIN SODIUM PO Take 2.5 mg by mouth daily 2.5 mg m.w.f & 1.25 row       digoxin (LANOXIN) 125 MCG tablet Take 1 tablet (125 mcg) by mouth daily 90 tablet 3     rosuvastatin (CRESTOR) 20 MG tablet Take 1 tablet (20 mg) by mouth daily 30 tablet 11     NITROSTAT 0.4 MG sublingual tablet DISSOLVE 1 TABLET UNDER THE TONGUE EVERY 5 MINUTES AS NEEDED FOR CHEST PAIN 25 tablet 0     Vitamin D, Cholecalciferol, 1000 UNITS TABS Take 1,000 mg by mouth daily        Multiple Vitamins-Minerals (PRESERVISION AREDS 2 PO) Take 1 capsule by mouth 2 times daily       sildenafil (VIAGRA) 100 MG tablet Take 1 tablet (100 mg) by mouth daily as needed for erectile dysfunction Take 30 min to 4 hours before intercourse.  Never use with nitroglycerin, terazosin or doxazosin. 16 tablet 3     ranitidine (ZANTAC) 150 MG tablet Take 1 tablet (150 mg) by mouth 2 times daily 180 tablet      ASPIRIN NOT PRESCRIBED (INTENTIONAL) continuous prn for other Please choose reason not  prescribed, below         ALLERGIES     Allergies   Allergen Reactions     Nystatin Other (See Comments)     Make his mouth numb & swelling       PAST MEDICAL HISTORY:  Past Medical History:   Diagnosis Date     Atrial fibrillation (H)     s/p Cardioversion 3/14/2013     Atrial flutter (H)     S/p Aflutter ablation 1/11/2011     CAD (coronary artery disease)     CABG x3 10/2012- LIMA to distal LAD, SVG to OM1 & OM3; cath 10/2012- PTCA to second diagonal and mid LAD, BMS to mid LAD     Cardiogenic shock (H)      Cardiomyopathy (H)      CHF (congestive heart failure) (H)      CVA (cerebral infarction)     residual right hand numbness     ED (erectile dysfunction)      Hyperlipidemia      Hypertension      Neuropathy      Palpitations      SVT (supraventricular tachycardia) (H)     S/p dual chamber ICD 10/11/12- upgraded to BIV ICD 6/2013     Syncope        PAST SURGICAL HISTORY:  Past Surgical History:   Procedure Laterality Date     BYPASS GRAFT ARTERY CORONARY  10/2/2012    Procedure: BYPASS GRAFT ARTERY CORONARY;  Coronary Artery Bypass Graft x3 (LAD, Diag, OM) with Endovein [place] (On-Pump);  Surgeon: Yeyo Lyman MD;  Location: [facility] OR     CARDIOVERSION  3/14/2013     CORONARY ANGIOGRAPHY ADULT ORDER  10/2/12     CORONARY ARTERY BYPASS  10/2/12    LIMA to LAD, SVG to OM1 and OM3     H ABLATION ATRIAL FLUTTER  1/11/2011     HAND SURGERY       HEART CATH, ANGIOPLASTY  10/2/12    PTCA to second diagonal and mid LAD, BMS to mid LAD     HERNIA REPAIR       ORTHOPEDIC SURGERY Right 2006    cut on table saw     RELOCATE GENERATOR ICD/PACEMAKER         FAMILY HISTORY:  Family History   Problem Relation Age of Onset     Alcohol/Drug Father      HEART DISEASE Father 71     Alzheimer Disease Sister      Alcohol/Drug Sister      Alcohol/Drug Sister      GASTROINTESTINAL DISEASE Sister      Obesity Sister      Hypertension Sister      HEART DISEASE Sister      Hypertension Sister      HEART DISEASE Daughter      afib      Arrhythmia Daughter      Multiple Sclerosis Daughter      HEART DISEASE Daughter      afib     Arrhythmia Daughter      CANCER Daughter      lymphoma     Aneurysm Mother        SOCIAL HISTORY:  Social History     Social History     Marital status:      Spouse name: N/A     Number of children: N/A     Years of education: N/A     Social History Main Topics     Smoking status: Former Smoker     Quit date: 7/10/1972     Smokeless tobacco: Never Used     Alcohol use No     Drug use: No     Sexual activity: Yes     Partners: Female     Other Topics Concern     Parent/Sibling W/ Cabg, Mi Or Angioplasty Before 65f 55m? No     Caffeine Concern Yes     4 cups coffee daily     Sleep Concern No     Stress Concern No     Weight Concern Yes     gain 2lbs     Special Diet Yes     low sodium     Exercise Yes     walking, doing sittups, played [place] ball in summer     Seat Belt Yes     Social History Narrative       Review of Systems:  Skin:  Negative       Eyes:  Positive for glasses    ENT:  Negative      Respiratory:  Positive for dyspnea on exertion SOB with some activity- no more than usual   Cardiovascular:  Negative for;palpitations;chest pain;edema;syncope or near-syncope dizziness;Positive for;lightheadedness worse since medication changes- per patient  Gastroenterology: Negative melena;hematochezia;heartburn heartburn managed with medication  Genitourinary:  Negative nocturia new diarrhea occasionally, maybe once a week- new in the last 3-4 months   Musculoskeletal:  Negative      Neurologic:  Positive for stroke history of stroke in 2011, head injury in 2014, right hand has issues with feeling/identification  Psychiatric:  Negative      Heme/Lymph/Imm:  Positive for easy bruising coumadin  Endocrine:  Negative thyroid disorder;diabetes      [id number]

## 2018-02-26 NOTE — MR AVS SNAPSHOT
After Visit Summary   2/26/2018    Tyrel Rene    MRN: [id number]           Patient Information     Date Of Birth          1945        Visit Information        Provider Department      2/26/2018 3:15 PM MARQUEZ ANDREWN [facility]        Today's Diagnoses     Ventricular tachycardia (H)        ICD (implantable cardioverter-defibrillator) in place           Follow-ups after your visit        Your next 10 appointments already scheduled     Feb 26, 2018  4:15 PM CST   [facility] EP RETURN with Suellen Costello MD   [facility] ([facility])    [address]  [facility] [address]   [phone]            Mar 16, 2018  1:30 PM CDT   Anticoagulation Visit with [facility]   [facility] ([facility])    [address]  [address]  [facility] [facility] [address]   [phone]            Apr 05, 2018  4:30 PM CDT   Remote ICD Check with ALMA MORALES2   [facility] ([facility])    [address]  [facility] [place] [facility] [address]   [phone]           This appointment is for a remote check of your debrillator.  This is not an appointment at the office.            May 23, 2018  1:20 PM CDT   LAB with [facility]   [facility] [place] ([facility])    [address]  [place] [place] [facility] [place] [address]   [phone]           Please do not eat 10-12 hours before your appointment if you are coming in fasting for labs on lipids, cholesterol, or glucose (sugar). This does not apply to pregnant women. Water, hot tea and black coffee (with nothing added) are okay. Do not drink other fluids, diet soda or chew gum.            May 23, 2018  2:15 PM CDT   Core MD Return with Parish Newman MD   [facility] ([facility]  [facility])    [address]  [place] [address] [phone]            Jun 14, 2018  4:30 PM CDT   Remote ICD Check with MARQUEZ DCR2   [facility] ([facility])    [address]  [place] [address] [phone]           This appointment is for a remote check of your debrillator.  This is not an appointment at the office.              Who to contact     If you have questions or need follow up information about today's clinic visit or your schedule please contact [facility] directly at [phone].  Normal or non-critical lab and imaging results will be communicated to you by FastBookinghart, letter or phone within 4 business days after the clinic has received the results. If you do not hear from us within 7 days, please contact the clinic through Chewset or phone. If you have a critical or abnormal lab result, we will notify you by phone as soon as possible.  Submit refill requests through LetMeGo or call your pharmacy and they will forward the refill request to us. Please allow 3 business days for your refill to be completed.          Additional Information About Your Visit        FastBookingharblabfeed Information     LetMeGo gives you secure access to your electronic health record. If you see a primary care provider, you can also send messages to your care team and make appointments. If you have questions, please call your primary care clinic.  If you do not have a primary care provider, please call [phone] and they will assist you.        Care EveryWhere ID     This is your Care EveryWhere ID. This could be used by other organizations to access your [facility] medical records  [id number]         Blood Pressure from Last 3 Encounters:   01/19/18 103/80   01/10/18 98/70   12/20/17 128/84    Weight from Last 3 Encounters:   01/19/18 85.2 kg (187 lb 12.8 oz)   01/10/18 81.7 kg (180 lb 3.2 oz)   12/20/17 83.9  kg (185 lb)              We Performed the Following     Follow-Up with Device Clinic     ICD DEVICE PROGRAMMING EVAL, MULTI LEAD ICD (29844)        Primary Care Provider Office Phone # Fax #    Dejon Downs [phone] [address]  [facility] [address]        Equal Access to Services     SANDY OTERO : Hadii aad ku hadasho Soomaali, waaxda luqadaha, qaybta kaalmada adeegyada, waxay idiin hayaan adeeg kharash la'aan . So [facility] [phone].    ATENCIÓN: Si habla español, tiene a hagen disposición servicios gratuitos de asistencia lingüística. Llame al [phone].    We comply with applicable federal civil rights laws and [place] laws. We do not discriminate on the basis of race, color, national origin, age, disability, sex, sexual orientation, or gender identity.            Thank you!     Thank you for choosing [facility]  for your care. Our goal is always to provide you with excellent care. Hearing back from our patients is one way we can continue to improve our services. Please take a few minutes to complete the written survey that you may receive in the mail after your visit with us. Thank you!             Your Updated Medication List - Protect others around you: Learn how to safely use, store and throw away your medicines at www.disposemymeds.org.          This list is accurate as of 2/26/18  4:02 PM.  Always use your most recent med list.                   Brand Name Dispense Instructions for use Diagnosis    amiodarone 200 MG tablet    PACERONE/CODARONE    90 tablet    Take 1 tablet (200 mg) by mouth daily    Ventricular tachycardia (H)       ASPIRIN NOT PRESCRIBED    INTENTIONAL     continuous prn for other Please choose reason not prescribed, below        carvedilol 3.125 MG tablet    COREG    360 tablet    Take 2 tablets (6.25 mg) by mouth 2 times daily (with meals)    Ventricular tachycardia (H)       digoxin 125 MCG tablet     LANOXIN    90 tablet    Take 1 tablet (125 mcg) by mouth daily    Atrial fibrillation (H)       ipratropium 0.03 % spray    ATROVENT    30 mL    Spray 2 sprays into both nostrils every 8 hours as needed for rhinitis    Chronic rhinitis, unspecified type       lisinopril 5 MG tablet    PRINIVIL/ZESTRIL    180 tablet    Take 1 tablet (5 mg) by mouth At Bedtime    Chronic systolic congestive heart failure (H)       NITROSTAT 0.4 MG sublingual tablet   Generic drug:  nitroGLYcerin     25 tablet    DISSOLVE 1 TABLET UNDER THE TONGUE EVERY 5 MINUTES AS NEEDED FOR CHEST PAIN    Postsurgical aortocoronary bypass status       PRESERVISION AREDS 2 PO      Take 1 capsule by mouth 2 times daily        ranitidine 150 MG tablet    ZANTAC    180 tablet    Take 1 tablet (150 mg) by mouth 2 times daily    S/P CABG (coronary artery bypass graft)       rosuvastatin 20 MG tablet    CRESTOR    30 tablet    Take 1 tablet (20 mg) by mouth daily        sildenafil 100 MG tablet    VIAGRA    16 tablet    Take 1 tablet (100 mg) by mouth daily as needed for erectile dysfunction Take 30 min to 4 hours before intercourse.  Never use with nitroglycerin, terazosin or doxazosin.    Erectile dysfunction, unspecified erectile dysfunction type       Vitamin D (Cholecalciferol) 1000 UNITS Tabs      Take 1,000 mg by mouth daily        WARFARIN SODIUM PO      Take 2.5 mg by mouth daily 2.5 mg m.w.f & 1.25 row

## 2018-02-26 NOTE — LETTER
2/26/2018    Dejon Downs MD  [address]  [facility] [address]    RE: Tyrel Rene       Dear Colleague,    I had the pleasure of seeing Tyrel Rene in the [facility].    HPI and Plan:   See dictation    Orders Placed This Encounter   Procedures     Follow-Up with Cardiac Advanced Practice Provider       No orders of the defined types were placed in this encounter.      Medications Discontinued During This Encounter   Medication Reason     WARFARIN SODIUM PO Medication Reconciliation Clean Up         Encounter Diagnoses   Name Primary?     Chronic systolic congestive heart failure (H)      Ventricular tachycardia (H)        CURRENT MEDICATIONS:  Current Outpatient Prescriptions   Medication Sig Dispense Refill     amiodarone (PACERONE/CODARONE) 200 MG tablet Take 1 tablet (200 mg) by mouth daily 90 tablet 0     lisinopril (PRINIVIL/ZESTRIL) 5 MG tablet Take 1 tablet (5 mg) by mouth At Bedtime 180 tablet 3     carvedilol (COREG) 3.125 MG tablet Take 2 tablets (6.25 mg) by mouth 2 times daily (with meals) 360 tablet 3     ipratropium (ATROVENT) 0.03 % spray Spray 2 sprays into both nostrils every 8 hours as needed for rhinitis 30 mL 11     WARFARIN SODIUM PO Take 2.5 mg by mouth daily 2.5 mg m.w.f & 1.25 row       digoxin (LANOXIN) 125 MCG tablet Take 1 tablet (125 mcg) by mouth daily 90 tablet 3     rosuvastatin (CRESTOR) 20 MG tablet Take 1 tablet (20 mg) by mouth daily 30 tablet 11     NITROSTAT 0.4 MG sublingual tablet DISSOLVE 1 TABLET UNDER THE TONGUE EVERY 5 MINUTES AS NEEDED FOR CHEST PAIN 25 tablet 0     Vitamin D, Cholecalciferol, 1000 UNITS TABS Take 1,000 mg by mouth daily        Multiple Vitamins-Minerals (PRESERVISION AREDS 2 PO) Take 1 capsule by mouth 2 times daily       sildenafil (VIAGRA) 100 MG tablet Take 1 tablet (100 mg) by mouth daily as needed for erectile dysfunction Take 30 min to 4 hours before intercourse.  Never use with nitroglycerin,  terazosin or doxazosin. 16 tablet 3     ranitidine (ZANTAC) 150 MG tablet Take 1 tablet (150 mg) by mouth 2 times daily 180 tablet      ASPIRIN NOT PRESCRIBED (INTENTIONAL) continuous prn for other Please choose reason not prescribed, below         ALLERGIES     Allergies   Allergen Reactions     Nystatin Other (See Comments)     Make his mouth numb & swelling       PAST MEDICAL HISTORY:  Past Medical History:   Diagnosis Date     Atrial fibrillation (H)     s/p Cardioversion 3/14/2013     Atrial flutter (H)     S/p Aflutter ablation 1/11/2011     CAD (coronary artery disease)     CABG x3 10/2012- LIMA to distal LAD, SVG to OM1 & OM3; cath 10/2012- PTCA to second diagonal and mid LAD, BMS to mid LAD     Cardiogenic shock (H)      Cardiomyopathy (H)      CHF (congestive heart failure) (H)      CVA (cerebral infarction)     residual right hand numbness     ED (erectile dysfunction)      Hyperlipidemia      Hypertension      Neuropathy      Palpitations      SVT (supraventricular tachycardia) (H)     S/p dual chamber ICD 10/11/12- upgraded to BIV ICD 6/2013     Syncope        PAST SURGICAL HISTORY:  Past Surgical History:   Procedure Laterality Date     BYPASS GRAFT ARTERY CORONARY  10/2/2012    Procedure: BYPASS GRAFT ARTERY CORONARY;  Coronary Artery Bypass Graft x3 (LAD, Diag, OM) with Endovein [place] (On-Pump);  Surgeon: Yeyo Lyman MD;  Location: [facility] OR     CARDIOVERSION  3/14/2013     CORONARY ANGIOGRAPHY ADULT ORDER  10/2/12     CORONARY ARTERY BYPASS  10/2/12    LIMA to LAD, SVG to OM1 and OM3     H ABLATION ATRIAL FLUTTER  1/11/2011     HAND SURGERY       HEART CATH, ANGIOPLASTY  10/2/12    PTCA to second diagonal and mid LAD, BMS to mid LAD     HERNIA REPAIR       ORTHOPEDIC SURGERY Right 2006    cut on table saw     RELOCATE GENERATOR ICD/PACEMAKER         FAMILY HISTORY:  Family History   Problem Relation Age of Onset     Alcohol/Drug Father      HEART DISEASE Father 71     Alzheimer Disease  Sister      Alcohol/Drug Sister      Alcohol/Drug Sister      GASTROINTESTINAL DISEASE Sister      Obesity Sister      Hypertension Sister      HEART DISEASE Sister      Hypertension Sister      HEART DISEASE Daughter      afib     Arrhythmia Daughter      Multiple Sclerosis Daughter      HEART DISEASE Daughter      afib     Arrhythmia Daughter      CANCER Daughter      lymphoma     Aneurysm Mother        SOCIAL HISTORY:  Social History     Social History     Marital status:      Spouse name: N/A     Number of children: N/A     Years of education: N/A     Social History Main Topics     Smoking status: Former Smoker     Quit date: 7/10/1972     Smokeless tobacco: Never Used     Alcohol use No     Drug use: No     Sexual activity: Yes     Partners: Female     Other Topics Concern     Parent/Sibling W/ Cabg, Mi Or Angioplasty Before 65f 55m? No     Caffeine Concern Yes     4 cups coffee daily     Sleep Concern No     Stress Concern No     Weight Concern Yes     gain 2lbs     Special Diet Yes     low sodium     Exercise Yes     walking, doing sittups, played [employer] ball in summer     Seat Belt Yes     Social History Narrative       Review of Systems:  Skin:  Negative       Eyes:  Positive for glasses    ENT:  Negative      Respiratory:  Positive for dyspnea on exertion SOB with some activity- no more than usual   Cardiovascular:  Negative for;palpitations;chest pain;edema;syncope or near-syncope dizziness;Positive for;lightheadedness worse since medication changes- per patient  Gastroenterology: Negative melena;hematochezia;heartburn heartburn managed with medication  Genitourinary:  Negative nocturia new diarrhea occasionally, maybe once a week- new in the last 3-4 months   Musculoskeletal:  Negative      Neurologic:  Positive for stroke history of stroke in 2011, head injury in 2014, right hand has issues with feeling/identification  Psychiatric:  Negative      Heme/Lymph/Imm:  Positive for easy bruising  coumadin  Endocrine:  Negative thyroid disorder;diabetes      [id number]              Thank you for allowing me to participate in the care of your patient.      Sincerely,     Suellen Costello MD     [facility]    cc:   Parish Newman MD  [address]  [place] [address]

## 2018-02-26 NOTE — LETTER
"2/26/2018      Dejon Downs MD  [address]  [facility] [address]      RE: Tyrel Rene       Dear Colleague,    I had the pleasure of seeing Tyrel Rene in the [facility].    Service Date: 02/26/2018      HISTORY OF PRESENT ILLNESS:  I had the pleasure of seeing Mr. Tyrel Rene, a delightful, 72-year-old male with ischemic cardiomyopathy and ventricular tachycardia.  Please see our previous notes for details of his history.      In mid 10/2017, Marko was started on amiodarone because of episodes of both polymorphic and monomorphic ventricular tachycardia.  Unfortunately, on 12/04/2017, he was admitted with ICD discharges.  This was 6 weeks after starting amiodarone, but he had only received a \"slow load.\"  While hospitalized, he was given more amiodarone.  Since then, he has fortunately done well.  Interrogation of his device today showed only a brief episode of nonsustained VT on 01/07, but no VT since then.      Marko is in permanent atrial fibrillation.  He did have a brief period of sinus rhythm after his ICD discharges in December.  He is on warfarin for anticoagulation.  He has had no chest pain, syncope or near-syncope.  Unfortunately, he has had issues with dizziness upon standing up, which persist despite reduction of both his carvedilol and lisinopril.      PHYSICAL EXAMINATION:   VITAL SIGNS:  Blood pressure 120/74, pulse 66 and irregular, weight 85 kg, height 185 cm.   GENERAL:  He is a very pleasant gentleman who appears healthy.  He is accompanied by his spouse.   NECK:  Supple with normal JVP.   LUNGS:  Clear.   CARDIOVASCULAR:  Irregular rhythm.  No gallop, murmur or rub.   ABDOMEN:  Soft, nontender.   EXTREMITIES:  No edema.   NEUROLOGIC:  Alert and oriented x3.      DIAGNOSTIC STUDIES:  He had a BMP today showing creatinine of 1.47 (significant improvement from previous of 1.8 in 01/2018), potassium 4.7, glucose 93.  INR 2.4.      Most recent " echocardiogram on 2018 showed EF of 25%-30% with wall motion abnormalities in the LAD distribution.      IMPRESSION:   1.  Ventricular tachycardia episodes requiring ICD therapies.  Marko's arrhythmias have settled down nicely on amiodarone.  He has had no apparent side effects from the medication.      I discussed with him the importance of using sunscreen especially during the summer months.  We also discussed that if he has any persistent cough that goes over 1-2 weeks, he should give us a call, as sometimes this can be an early sign of amiodarone lung toxicity.  His TSH and liver function tests were normal in 10/2017, and I will order a repeat test in April.  His baseline PFTs were normal as well, and he will need to repeat studies in the  of .   2.  Dyslipidemia.  Simvastatin was switched to rosuvastatin once amiodarone was introduced.  His most recent lipid panel was excellent with an LDL of 58 and HDL of 46.      RECOMMENDATIONS:   1.  Continue amiodarone 200 mg daily.   2.  LFTs and TSH with T4 in 2018.   3.  I have requested a followup with Lina in 6 months.  The patient will continue his routine followup with the Device Clinic as well.      It was my pleasure seeing Mr. Elizalde today.      Time spent was 25 minutes, greater than 50% of the time spent in discussion.      cc:   [place] [address]      Parish Newman MD, [facility] [place] [place] [address]         ANN-MARIE FRANK MD             D: 2018   T: 2018   MT: kelly      Name:     ARTEM ELIZALDE   MRN:      [id number]        Account:      [id number]   :      1945           Service Date: 2018      Document: [id number]         Outpatient Encounter Prescriptions as of 2018   Medication Sig Dispense Refill     amiodarone (PACERONE/CODARONE) 200 MG tablet Take 1  tablet (200 mg) by mouth daily 90 tablet 0     lisinopril (PRINIVIL/ZESTRIL) 5 MG tablet Take 1 tablet (5 mg) by mouth At Bedtime 180 tablet 3     carvedilol (COREG) 3.125 MG tablet Take 2 tablets (6.25 mg) by mouth 2 times daily (with meals) 360 tablet 3     ipratropium (ATROVENT) 0.03 % spray Spray 2 sprays into both nostrils every 8 hours as needed for rhinitis 30 mL 11     WARFARIN SODIUM PO Take 2.5 mg by mouth daily 2.5 mg m.w.f & 1.25 row       digoxin (LANOXIN) 125 MCG tablet Take 1 tablet (125 mcg) by mouth daily 90 tablet 3     rosuvastatin (CRESTOR) 20 MG tablet Take 1 tablet (20 mg) by mouth daily 30 tablet 11     NITROSTAT 0.4 MG sublingual tablet DISSOLVE 1 TABLET UNDER THE TONGUE EVERY 5 MINUTES AS NEEDED FOR CHEST PAIN 25 tablet 0     Vitamin D, Cholecalciferol, 1000 UNITS TABS Take 1,000 mg by mouth daily        Multiple Vitamins-Minerals (PRESERVISION AREDS 2 PO) Take 1 capsule by mouth 2 times daily       sildenafil (VIAGRA) 100 MG tablet Take 1 tablet (100 mg) by mouth daily as needed for erectile dysfunction Take 30 min to 4 hours before intercourse.  Never use with nitroglycerin, terazosin or doxazosin. 16 tablet 3     ranitidine (ZANTAC) 150 MG tablet Take 1 tablet (150 mg) by mouth 2 times daily 180 tablet      ASPIRIN NOT PRESCRIBED (INTENTIONAL) continuous prn for other Please choose reason not prescribed, below       [DISCONTINUED] WARFARIN SODIUM PO Take 1.25 mg by mouth daily 2.5 mg m.w.f & 1.25 row       No facility-administered encounter medications on file as of 2/26/2018.        Again, thank you for allowing me to participate in the care of your patient.      Sincerely,    Suellen Costello MD     [facility]

## 2018-02-26 NOTE — MR AVS SNAPSHOT
After Visit Summary   2/26/2018    Tyrel Rene    MRN: [id number]           Patient Information     Date Of Birth          1945        Visit Information        Provider Department      2/26/2018 4:15 PM Suellen Costello MD [facility]        Today's Diagnoses     Chronic systolic congestive heart failure (H)        Ventricular tachycardia (H)           Follow-ups after your visit        Additional Services     Follow-Up with Cardiac Advanced Practice Provider                 Your next 10 appointments already scheduled     Mar 16, 2018  1:30 PM CDT   Anticoagulation Visit with [facility] ANTICOAGULATION CLINIC   [facility] ([facility])    [address]  [address]  [facility] [address]   [phone]            Apr 05, 2018  4:30 PM CDT   Remote ICD Check with MARQUEZ DCR2   [facility] ([facility])    [address]  [facility] [id number] [phone]           This appointment is for a remote check of your debrillator.  This is not an appointment at the office.            May 23, 2018  1:20 PM CDT   LAB with MARQUEZ LAB   [facility] [place] ([facility])    [address]  [facility] [id number] [phone]           Please do not eat 10-12 hours before your appointment if you are coming in fasting for labs on lipids, cholesterol, or glucose (sugar). This does not apply to pregnant women. Water, hot tea and black coffee (with nothing added) are okay. Do not drink other fluids, diet soda or chew gum.            May 23, 2018  2:15 PM CDT   Core MD Return with Parish Newman MD   [facility] ([facility])    [address]  [facility] [address] [phone]            Jun 14, 2018  4:30 PM CDT   Remote  ICD Check with MARQUEZ DCR2   [facility]   [place] ([facility])    [address]  [place] [address] [phone]           This appointment is for a remote check of your debrillator.  This is not an appointment at the office.              Future tests that were ordered for you today     Open Future Orders        Priority Expected Expires Ordered    TSH with free T4 reflex Routine 4/27/2018 2/26/2019 2/26/2018    Hepatic panel Routine 4/27/2018 2/26/2019 2/26/2018    Follow-Up with Cardiac Advanced Practice Provider Routine 9/11/2018 2/26/2019 2/26/2018            Who to contact     If you have questions or need follow up information about today's clinic visit or your schedule please contact [facility] directly at [phone].  Normal or non-critical lab and imaging results will be communicated to you by MyChart, letter or phone within 4 business days after the clinic has received the results. If you do not hear from us within 7 days, please contact the clinic through [employer] or phone. If you have a critical or abnormal lab result, we will notify you by phone as soon as possible.  Submit refill requests through [employer] or call your pharmacy and they will forward the refill request to us. Please allow 3 business days for your refill to be completed.          Additional Information About Your Visit        MyChart Information     [employer] gives you secure access to your electronic health record. If you see a primary care provider, you can also send messages to your care team and make appointments. If you have questions, please call your primary care clinic.  If you do not have a primary care provider, please call [phone] and they will assist you.        Care EveryWhere ID     This is your Care EveryWhere ID. This could be used by other organizations to access your [facility] medical records  [id number]        Your Vitals  "Were     Pulse Height Pulse Oximetry BMI (Body Mass Index)          66 1.854 m (6' 1\") 99% 24.8 kg/m2         Blood Pressure from Last 3 Encounters:   02/26/18 120/74   01/19/18 103/80   01/10/18 98/70    Weight from Last 3 Encounters:   02/26/18 85.3 kg (188 lb)   01/19/18 85.2 kg (187 lb 12.8 oz)   01/10/18 81.7 kg (180 lb 3.2 oz)              We Performed the Following     Follow-Up with Electrophysiologist        Primary Care Provider Office Phone # Fax #    Dejon Downs [phone] [address] [facility] [address]        Equal Access to Services     JAVI OTERO : Hadii cornelius garcia hadasho Soomaali, waaxda luqadaha, qaybta kaalmada adeegyada, waxay idiin hayazn niru starks . So [facility] [phone].    ATENCIÓN: Si habla español, tiene a hagen disposición servicios gratuitos de asistencia lingüística. Llame al [phone].    We comply with applicable federal civil rights laws and [place] laws. We do not discriminate on the basis of race, color, national origin, age, disability, sex, sexual orientation, or gender identity.            Thank you!     Thank you for choosing [facility]  for your care. Our goal is always to provide you with excellent care. Hearing back from our patients is one way we can continue to improve our services. Please take a few minutes to complete the written survey that you may receive in the mail after your visit with us. Thank you!             Your Updated Medication List - Protect others around you: Learn how to safely use, store and throw away your medicines at www.disposemymeds.org.          This list is accurate as of 2/26/18  4:42 PM.  Always use your most recent med list.                   Brand Name Dispense Instructions for use Diagnosis    amiodarone 200 MG tablet    PACERONE/CODARONE    90 tablet    Take 1 tablet (200 mg) by mouth daily    Ventricular tachycardia (H)       ASPIRIN NOT PRESCRIBED "    INTENTIONAL     continuous prn for other Please choose reason not prescribed, below        carvedilol 3.125 MG tablet    COREG    360 tablet    Take 2 tablets (6.25 mg) by mouth 2 times daily (with meals)    Ventricular tachycardia (H)       digoxin 125 MCG tablet    LANOXIN    90 tablet    Take 1 tablet (125 mcg) by mouth daily    Atrial fibrillation (H)       ipratropium 0.03 % spray    ATROVENT    30 mL    Spray 2 sprays into both nostrils every 8 hours as needed for rhinitis    Chronic rhinitis, unspecified type       lisinopril 5 MG tablet    PRINIVIL/ZESTRIL    180 tablet    Take 1 tablet (5 mg) by mouth At Bedtime    Chronic systolic congestive heart failure (H)       NITROSTAT 0.4 MG sublingual tablet   Generic drug:  nitroGLYcerin     25 tablet    DISSOLVE 1 TABLET UNDER THE TONGUE EVERY 5 MINUTES AS NEEDED FOR CHEST PAIN    Postsurgical aortocoronary bypass status       PRESERVISION AREDS 2 PO      Take 1 capsule by mouth 2 times daily        ranitidine 150 MG tablet    ZANTAC    180 tablet    Take 1 tablet (150 mg) by mouth 2 times daily    S/P CABG (coronary artery bypass graft)       rosuvastatin 20 MG tablet    CRESTOR    30 tablet    Take 1 tablet (20 mg) by mouth daily        sildenafil 100 MG tablet    VIAGRA    16 tablet    Take 1 tablet (100 mg) by mouth daily as needed for erectile dysfunction Take 30 min to 4 hours before intercourse.  Never use with nitroglycerin, terazosin or doxazosin.    Erectile dysfunction, unspecified erectile dysfunction type       Vitamin D (Cholecalciferol) 1000 UNITS Tabs      Take 1,000 mg by mouth daily        WARFARIN SODIUM PO      Take 2.5 mg by mouth daily 2.5 mg m.w.f & 1.25 row

## 2018-02-27 NOTE — PROGRESS NOTES
"Service Date: 02/26/2018      HISTORY OF PRESENT ILLNESS:    I had the pleasure of seeing Mr. Tyrel Rene, a delightful, 72-year-old male with ischemic cardiomyopathy and ventricular tachycardia.  Please see our previous notes for details of his history.      In mid 10/2017, Marko was started on amiodarone because of episodes of both polymorphic and monomorphic ventricular tachycardia.  Unfortunately, on 12/04/2017, he was admitted with ICD discharges.  This was 6 weeks after starting amiodarone, though he had received a \"gentle load.\"  While hospitalized, he was given more amiodarone.  Since then, he has fortunately done well.  Interrogation of his device today showed only a brief episode of nonsustained VT on 01/07/18 and no VT since then.      Marko is in permanent atrial fibrillation.  He did have a brief period of sinus rhythm after his ICD discharges in December.  He is on warfarin for anticoagulation.  He has had no chest pain, syncope or near-syncope.  Unfortunately, he has had issues with dizziness upon standing up which persist despite reduction in both carvedilol and lisinopril.      PHYSICAL EXAMINATION:   VITAL SIGNS:  Blood pressure 120/74, pulse 66 and irregular, weight 85 kg, height 185 cm.   GENERAL:  He is a very pleasant gentleman who appears healthy.  He is accompanied by his spouse.   NECK:  Supple with normal JVP.   LUNGS:  Clear.   CARDIOVASCULAR:  Irregular rhythm.  No gallop, murmur or rub.   ABDOMEN:  Soft, nontender.   EXTREMITIES:  No edema.   NEUROLOGIC:  Alert and oriented x3.      DIAGNOSTIC STUDIES:    He had a BMP today showing creatinine of 1.47 (significant improvement from previous of 1.8 in 01/2018), potassium 4.7, glucose 93.  INR 2.4.      Most recent echocardiogram on 01/18/2018 showed EF of 25%-30% with wall motion abnormalities in the LAD distribution.        IMPRESSION:   1.  Ventricular tachycardia episodes requiring ICD therapies.  Marko's arrhythmias have settled down on " amiodarone.  So far he has had no apparent side-effects from the medication.         I discussed the importance of using SPF50 sunscreen before sun exposure.  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  His TSH and liver function tests were normal in 10/2017, and I will order a repeat test in April.  His baseline PFTs were normal as well, and he will need to repeat studies in the  of .     2.  Dyslipidemia.  Simvastatin was switched to rosuvastatin once amiodarone was introduced.  His most recent lipid panel was excellent with LDL of 58 and HDL of 46.      RECOMMENDATIONS:   1.  Continue amiodarone 200 mg daily.   2.  LFTs and TSH, T4 in 2018.   3.  I have requested a follow-up with Lina in 6 months.  The patient will continue his routine followup with the Device Clinic as well.      It was my pleasure seeing Mr. Elizalde today.      Time spent was 25 minutes, greater than 50% was discussion.         ANN-MARIE FRANK MD, FACC           cc:   Dejon Downs [place] [address]      Parish Newman MD, FACC   [facility] [place] [address]          D: 2018   T: 2018   MT: kelly      Name:     ARTEM ELIZALDE   MRN:      [id number]        Account:      [id number]   :      1945           Service Date: 2018      Document: [id number]

## 2018-03-19 ENCOUNTER — ANTICOAGULATION THERAPY VISIT (OUTPATIENT)
Dept: NURSING | Facility: CLINIC | Age: 73
End: 2018-03-19
Payer: COMMERCIAL

## 2018-03-19 DIAGNOSIS — I63.50 CEREBRAL ARTERY OCCLUSION WITH CEREBRAL INFARCTION (H): ICD-10-CM

## 2018-03-19 DIAGNOSIS — Z79.01 LONG-TERM (CURRENT) USE OF ANTICOAGULANTS: ICD-10-CM

## 2018-03-19 DIAGNOSIS — I63.9 CEREBRAL INFARCTION (H): ICD-10-CM

## 2018-03-19 LAB — INR POINT OF CARE: 2.2 (ref 0.86–1.14)

## 2018-03-19 PROCEDURE — 36416 COLLJ CAPILLARY BLOOD SPEC: CPT

## 2018-03-19 PROCEDURE — 99207 ZZC NO CHARGE NURSE ONLY: CPT

## 2018-03-19 PROCEDURE — 85610 PROTHROMBIN TIME: CPT | Mod: QW

## 2018-03-19 NOTE — PROGRESS NOTES
ANTICOAGULATION FOLLOW-UP CLINIC VISIT    Patient Name:  Tyrel Rene  Date:  3/19/2018  Contact Type:  Face to Face    SUBJECTIVE:     Patient Findings     Positives No Problem Findings           OBJECTIVE    INR Protime   Date Value Ref Range Status   03/19/2018 2.2 (A) 0.86 - 1.14 Final       ASSESSMENT / PLAN  INR assessment THER    Recheck INR In: 4 WEEKS    INR Location Clinic      Anticoagulation Summary as of 3/19/2018     INR goal 2.0-2.5   Today's INR 2.2   Maintenance plan 2.5 mg (2.5 mg x 1) on Mon, Fri; 1.25 mg (2.5 mg x 0.5) all other days   Full instructions 2.5 mg on Mon, Fri; 1.25 mg all other days   Weekly total 11.25 mg   No change documented Doreen Finley RN   Plan last modified aCridad Cunningham RN (1/24/2018)   Next INR check 4/4/2018   Target end date Indefinite    Indications   Long-term (current) use of anticoagulants [Z79.01] [Z79.01]  Cerebral artery occlusion with cerebral infarction (HCC) [I63.50] [I63.50]  Cerebral infarction (H) [I63.9]         Anticoagulation Episode Summary     INR check location [facility]    Preferred lab     Send INR reminders to [facility] INR/PROTIME    Comments       Anticoagulation Care Providers     Provider Role Specialty Phone number    Dejon Downs MD [facility] [phone]            See the Encounter Report to view Anticoagulation Flowsheet and Dosing Calendar (Go to Encounters tab in chart review, and find the Anticoagulation Therapy Visit)        Doreen Finley, RN

## 2018-03-19 NOTE — MR AVS SNAPSHOT
Tyrel Rene   3/19/2018 3:30 PM   Anticoagulation Therapy Visit    Description:  72 year old male   Provider:  [facility]   Department:  Bx Nurse           INR as of 3/19/2018     Today's INR 2.2      Anticoagulation Summary as of 3/19/2018     INR goal 2.0-2.5   Today's INR 2.2   Full instructions 2.5 mg on Mon, Fri; 1.25 mg all other days   Next INR check 4/4/2018    Indications   Long-term (current) use of anticoagulants [Z79.01] [Z79.01]  Cerebral artery occlusion with cerebral infarction (HCC) [I63.50] [I63.50]  Cerebral infarction (H) [I63.9]         Your next Anticoagulation Clinic appointment(s)     Apr 04, 2018  2:00 PM CDT   Anticoagulation Visit with  ANTICOAGULATION CLINIC   [facility] ([facility]    [address] [place] [id number] [phone]              Contact Numbers     [facility]  Please call  [phone] to cancel and/or reschedule your appointment   The direct line to the anticoagulant nurse is [phone] on Monday, Wednesday, and Friday. On Thursday, the anticoagulant nurse can be reached directly at [phone].         March 2018 Details    Sun Mon Tue Wed Thu Fri Sat         1               2               3                 4               5               6               7               8               9               10                 11               12               13               14               15               16               17                 18               19      2.5 mg   See details      20      1.25 mg         21      1.25 mg         22      1.25 mg         23      2.5 mg         24      1.25 mg           25      1.25 mg         26      2.5 mg         27      1.25 mg         28      1.25 mg         29      1.25 mg         30      2.5 mg         31      1.25 mg          Date Details   03/19 This INR check               How to take your warfarin  dose     To take:  1.25 mg Take 0.5 of a 2.5 mg tablet.    To take:  2.5 mg Take 1 of the 2.5 mg tablets.           April 2018 Details    Sun Mon Tue Wed Thu Fri Sat     1      1.25 mg         2      2.5 mg         3      1.25 mg         4            5               6               7                 8               9               10               11               12               13               14                 15               16               17               18               19               20               21                 22               23               24               25               26               27               28                 29               30                     Date Details   No additional details    Date of next INR:  4/4/2018         How to take your warfarin dose     To take:  1.25 mg Take 0.5 of a 2.5 mg tablet.    To take:  2.5 mg Take 1 of the 2.5 mg tablets.

## 2018-04-04 ENCOUNTER — ANTICOAGULATION THERAPY VISIT (OUTPATIENT)
Dept: NURSING | Facility: CLINIC | Age: 73
End: 2018-04-04
Payer: COMMERCIAL

## 2018-04-04 DIAGNOSIS — I63.9 CEREBRAL INFARCTION (H): ICD-10-CM

## 2018-04-04 DIAGNOSIS — Z79.01 LONG-TERM (CURRENT) USE OF ANTICOAGULANTS: ICD-10-CM

## 2018-04-04 DIAGNOSIS — I63.50 CEREBRAL ARTERY OCCLUSION WITH CEREBRAL INFARCTION (H): ICD-10-CM

## 2018-04-04 LAB — INR POINT OF CARE: 3.3 (ref 2–3)

## 2018-04-04 PROCEDURE — 36416 COLLJ CAPILLARY BLOOD SPEC: CPT

## 2018-04-04 PROCEDURE — 99207 ZZC NO CHARGE NURSE ONLY: CPT

## 2018-04-04 PROCEDURE — 85610 PROTHROMBIN TIME: CPT | Mod: QW

## 2018-04-04 NOTE — PROGRESS NOTES
ANTICOAGULATION FOLLOW-UP CLINIC VISIT    Patient Name:  Tyrel Rene  Date:  4/4/2018  Contact Type:  Face to Face    SUBJECTIVE:     Patient Findings     Positives No Problem Findings           OBJECTIVE    INR Protime   Date Value Ref Range Status   04/04/2018 3.3 (A) 2.0 - 3.0 Final       ASSESSMENT / PLAN  INR assessment SUPRA    Recheck INR In: 4 WEEKS    INR Location Clinic      Anticoagulation Summary as of 4/4/2018     INR goal 2.0-2.5   Today's INR 3.3!   Maintenance plan 2.5 mg (2.5 mg x 1) on Mon, Fri; 1.25 mg (2.5 mg x 0.5) all other days   Full instructions 4/4: Hold; Otherwise 2.5 mg on Mon, Fri; 1.25 mg all other days   Weekly total 11.25 mg   Plan last modified Caridad Cunningham RN (1/24/2018)   Next INR check 5/4/2018   Target end date Indefinite    Indications   Long-term (current) use of anticoagulants [Z79.01] [Z79.01]  Cerebral artery occlusion with cerebral infarction (HCC) [I63.50] [I63.50]  Cerebral infarction (H) [I63.9]         Anticoagulation Episode Summary     INR check location [facility]    Preferred lab     Send INR reminders to [facility] INR/PROTIME    Comments       Anticoagulation Care Providers     Provider Role Specialty Phone number    Dejon Downs MD [facility] [phone]            See the Encounter Report to view Anticoagulation Flowsheet and Dosing Calendar (Go to Encounters tab in chart review, and find the Anticoagulation Therapy Visit)        Caridad Cunningham RN

## 2018-04-04 NOTE — MR AVS SNAPSHOT
Tyrel Rene   4/4/2018 1:15 PM   Anticoagulation Therapy Visit    Description:  72 year old male   Provider:  [facility]   Department:  Bx Nurse           INR as of 4/4/2018     Today's INR 3.3!      Anticoagulation Summary as of 4/4/2018     INR goal 2.0-2.5   Today's INR 3.3!   Full instructions 4/4: Hold; Otherwise 2.5 mg on Mon, Fri; 1.25 mg all other days   Next INR check 5/4/2018    Indications   Long-term (current) use of anticoagulants [Z79.01] [Z79.01]  Cerebral artery occlusion with cerebral infarction (HCC) [I63.50] [I63.50]  Cerebral infarction (H) [I63.9]         Your next Anticoagulation Clinic appointment(s)     May 04, 2018  2:00 PM CDT   Anticoagulation Visit with  ANTICOAGULATION CLINIC   [facility]    [address] [place] [address] [phone]              Contact Numbers     [facility]  Please call  [phone] to cancel and/or reschedule your appointment   The direct line to the anticoagulant nurse is [phone] on Monday, Wednesday, and Friday. On Thursday, the anticoagulant nurse can be reached directly at [phone].         April 2018 Details    Sun Mon Tue Wed Thu Fri Sat     1               2               3               4      Hold   See details      5      1.25 mg         6      2.5 mg         7      1.25 mg           8      1.25 mg         9      2.5 mg         10      1.25 mg         11      1.25 mg         12      1.25 mg         13      2.5 mg         14      1.25 mg           15      1.25 mg         16      2.5 mg         17      1.25 mg         18      1.25 mg         19      1.25 mg         20      2.5 mg         21      1.25 mg           22      1.25 mg         23      2.5 mg         24      1.25 mg         25      1.25 mg         26      1.25 mg         27      2.5 mg         28      1.25 mg           29      1.25 mg         30       2.5 mg               Date Details   04/04 This INR check               How to take your warfarin dose     To take:  1.25 mg Take 0.5 of a 2.5 mg tablet.    To take:  2.5 mg Take 1 of the 2.5 mg tablets.    Hold Do not take your warfarin dose. See the Details table to the right for additional instructions.                May 2018 Details    Sun Mon Tue Wed Thu Fri Sat       1      1.25 mg         2      1.25 mg         3      1.25 mg         4            5                 6               7               8               9               10               11               12                 13               14               15               16               17               18               19                 20               21               22               23               24               25               26                 27               28               29               30               31                  Date Details   No additional details    Date of next INR:  5/4/2018         How to take your warfarin dose     To take:  1.25 mg Take 0.5 of a 2.5 mg tablet.    To take:  2.5 mg Take 1 of the 2.5 mg tablets.

## 2018-04-06 ENCOUNTER — DOCUMENTATION ONLY (OUTPATIENT)
Dept: CARDIOLOGY | Facility: CLINIC | Age: 73
End: 2018-04-06

## 2018-04-06 NOTE — PROGRESS NOTES
Patient's wife calling this morning to state that they are traveling to [place] for the next month and Marko will not have his latitude monitor with him. He has not had any longer runs of VT. Did let her know that because he will be without the monitor we won't know if he were to be shocked and she verbalized understanding. SP

## 2018-05-04 ENCOUNTER — ANTICOAGULATION THERAPY VISIT (OUTPATIENT)
Dept: NURSING | Facility: CLINIC | Age: 73
End: 2018-05-04
Payer: COMMERCIAL

## 2018-05-04 DIAGNOSIS — I63.50 CEREBRAL ARTERY OCCLUSION WITH CEREBRAL INFARCTION (H): ICD-10-CM

## 2018-05-04 DIAGNOSIS — Z79.01 LONG-TERM (CURRENT) USE OF ANTICOAGULANTS: ICD-10-CM

## 2018-05-04 DIAGNOSIS — I63.9 CEREBRAL INFARCTION (H): ICD-10-CM

## 2018-05-04 LAB — INR POINT OF CARE: 2.7 (ref 2–2.5)

## 2018-05-04 PROCEDURE — 36416 COLLJ CAPILLARY BLOOD SPEC: CPT

## 2018-05-04 PROCEDURE — 85610 PROTHROMBIN TIME: CPT | Mod: QW

## 2018-05-04 PROCEDURE — 99207 ZZC NO CHARGE NURSE ONLY: CPT

## 2018-05-04 NOTE — MR AVS SNAPSHOT
Tyrel Rene   5/4/2018 2:00 PM   Anticoagulation Therapy Visit    Description:  72 year old male   Provider:   [facility]   Department:  Bx Nurse           INR as of 5/4/2018     Today's INR 2.7!      Anticoagulation Summary as of 5/4/2018     INR goal 2.0-2.5   Today's INR 2.7!   Full instructions 2.5 mg on Mon, Fri; 1.25 mg all other days   Next INR check 5/30/2018    Indications   Long-term (current) use of anticoagulants [Z79.01] [Z79.01]  Cerebral artery occlusion with cerebral infarction (HCC) [I63.50] [I63.50]  Cerebral infarction (H) [I63.9]         Your next Anticoagulation Clinic appointment(s)     May 04, 2018  2:00 PM CDT   An[facility]agulation [facility] with  [facility] CLINIC   [facility]    [address] [id number] [phone]            May 30, 2018  2:00 PM CDT   Anticoagulation Visit with  [facility]ATION CLINIC   [facility]    [address] [id number] [phone]              Contact Numbers     [facility]  Please call  [phone] to cancel and/or reschedule your appointment   The direct line to the anticoagulant nurse is [phone] on Monday, Wednesday, and Friday. On Thursday, the anticoagulant nurse can be reached directly at [phone].         May 2018 Details    Sun Mon Tue Wed Thu Fri Sat       1               2               3               4      2.5 mg   See details      5      1.25 mg           6      1.25 mg         7      2.5 mg         8      1.25 mg         9      1.25 mg         10      1.25 mg         11      2.5 mg         12      1.25 mg           13      1.25 mg         14      2.5 mg         15      1.25 mg         16      1.25 mg         17      1.25 mg         18      2.5 mg         19      1.25 mg           20       1.25 mg         21      2.5 mg         22      1.25 mg         23      1.25 mg         24      1.25 mg         25      2.5 mg         26      1.25 mg           27      1.25 mg         28      2.5 mg         29      1.25 mg         30            31                  Date Details   05/04 This INR check       Date of next INR:  5/30/2018         How to take your warfarin dose     To take:  1.25 mg Take 0.5 of a 2.5 mg tablet.    To take:  2.5 mg Take 1 of the 2.5 mg tablets.

## 2018-05-04 NOTE — PROGRESS NOTES
ANTICOAGULATION FOLLOW-UP CLINIC VISIT    Patient Name:  Tyrel Rene  Date:  5/4/2018  Contact Type:  Face to Face    SUBJECTIVE:        OBJECTIVE    INR Protime   Date Value Ref Range Status   05/04/2018 2.7 (A) 2.0 - 2.5 Final       ASSESSMENT / PLAN  INR assessment SUPRA    Recheck INR In: 4 WEEKS    INR Location Clinic      Anticoagulation Summary as of 5/4/2018     INR goal 2.0-2.5   Today's INR 2.7!   Maintenance plan 2.5 mg (2.5 mg x 1) on Mon, Fri; 1.25 mg (2.5 mg x 0.5) all other days   Full instructions 2.5 mg on Mon, Fri; 1.25 mg all other days   Weekly total 11.25 mg   No change documented Lynn Alves RN   Plan last modified Caridad Cunningham RN (1/24/2018)   Next INR check 5/30/2018   Target end date Indefinite    Indications   Long-term (current) use of anticoagulants [Z79.01] [Z79.01]  Cerebral artery occlusion with cerebral infarction (HCC) [I63.50] [I63.50]  Cerebral infarction (H) [I63.9]         Anticoagulation Episode Summary     INR check location [facility]    Preferred lab     Send INR reminders to [facility] INR/PROTIME    Comments       Anticoagulation Care Providers     Provider Role Specialty Phone number    Dejon Downs MD [facility] [phone]            See the Encounter Report to view Anticoagulation Flowsheet and Dosing Calendar (Go to Encounters tab in chart review, and find the Anticoagulation Therapy Visit)    Supra therapeutic. Recheck in 4 weeks. patient aware if signs of clotting (pain, tenderness, swelling, color change in leg or arm, SOB) and bleeding occur (blood in stool, urine, large bruising, bleeding gums, nosebleeds) to have INR check sooner. If sx severe report to ER or concerned for stroke call 911. If general questions or concerns arise, call clinic.         Lynn Alves RN

## 2018-05-23 ENCOUNTER — OFFICE VISIT (OUTPATIENT)
Dept: CARDIOLOGY | Facility: CLINIC | Age: 73
End: 2018-05-23
Attending: INTERNAL MEDICINE
Payer: COMMERCIAL

## 2018-05-23 VITALS
HEART RATE: 64 BPM | SYSTOLIC BLOOD PRESSURE: 108 MMHG | DIASTOLIC BLOOD PRESSURE: 64 MMHG | BODY MASS INDEX: 24 KG/M2 | WEIGHT: 181.1 LBS | HEIGHT: 73 IN

## 2018-05-23 DIAGNOSIS — I25.10 CORONARY ARTERY DISEASE INVOLVING NATIVE HEART WITHOUT ANGINA PECTORIS, UNSPECIFIED VESSEL OR LESION TYPE: ICD-10-CM

## 2018-05-23 DIAGNOSIS — E78.2 MIXED HYPERLIPIDEMIA: ICD-10-CM

## 2018-05-23 DIAGNOSIS — I48.20 CHRONIC ATRIAL FIBRILLATION (H): ICD-10-CM

## 2018-05-23 DIAGNOSIS — I47.29 PAROXYSMAL VENTRICULAR TACHYCARDIA (H): ICD-10-CM

## 2018-05-23 DIAGNOSIS — I50.22 CHRONIC SYSTOLIC CONGESTIVE HEART FAILURE (H): ICD-10-CM

## 2018-05-23 DIAGNOSIS — I25.5 ISCHEMIC CARDIOMYOPATHY: ICD-10-CM

## 2018-05-23 DIAGNOSIS — I47.20 VENTRICULAR TACHYCARDIA (H): ICD-10-CM

## 2018-05-23 DIAGNOSIS — Z95.810 ICD (IMPLANTABLE CARDIOVERTER-DEFIBRILLATOR) IN PLACE: ICD-10-CM

## 2018-05-23 DIAGNOSIS — I50.22 CHRONIC SYSTOLIC CONGESTIVE HEART FAILURE (H): Primary | ICD-10-CM

## 2018-05-23 DIAGNOSIS — I10 ESSENTIAL HYPERTENSION: ICD-10-CM

## 2018-05-23 LAB
ALBUMIN SERPL-MCNC: 3.3 G/DL (ref 3.4–5)
ALP SERPL-CCNC: 49 U/L (ref 40–150)
ALT SERPL W P-5'-P-CCNC: 26 U/L (ref 0–70)
ANION GAP SERPL CALCULATED.3IONS-SCNC: 6 MMOL/L (ref 3–14)
AST SERPL W P-5'-P-CCNC: 22 U/L (ref 0–45)
BILIRUB DIRECT SERPL-MCNC: 0.2 MG/DL (ref 0–0.2)
BILIRUB SERPL-MCNC: 0.6 MG/DL (ref 0.2–1.3)
BUN SERPL-MCNC: 22 MG/DL (ref 7–30)
CALCIUM SERPL-MCNC: 8.6 MG/DL (ref 8.5–10.1)
CHLORIDE SERPL-SCNC: 108 MMOL/L (ref 94–109)
CO2 SERPL-SCNC: 28 MMOL/L (ref 20–32)
CREAT SERPL-MCNC: 1.52 MG/DL (ref 0.66–1.25)
GFR SERPL CREATININE-BSD FRML MDRD: 45 ML/MIN/1.7M2
GLUCOSE SERPL-MCNC: 77 MG/DL (ref 70–99)
POTASSIUM SERPL-SCNC: 4.7 MMOL/L (ref 3.4–5.3)
PROT SERPL-MCNC: 7.1 G/DL (ref 6.8–8.8)
SODIUM SERPL-SCNC: 142 MMOL/L (ref 133–144)
TSH SERPL DL<=0.005 MIU/L-ACNC: 1.73 MU/L (ref 0.4–4)

## 2018-05-23 PROCEDURE — 80076 HEPATIC FUNCTION PANEL: CPT | Performed by: INTERNAL MEDICINE

## 2018-05-23 PROCEDURE — 36415 COLL VENOUS BLD VENIPUNCTURE: CPT | Performed by: INTERNAL MEDICINE

## 2018-05-23 PROCEDURE — 84443 ASSAY THYROID STIM HORMONE: CPT | Performed by: INTERNAL MEDICINE

## 2018-05-23 PROCEDURE — 99214 OFFICE O/P EST MOD 30 MIN: CPT | Performed by: INTERNAL MEDICINE

## 2018-05-23 PROCEDURE — 80048 BASIC METABOLIC PNL TOTAL CA: CPT | Performed by: INTERNAL MEDICINE

## 2018-05-23 NOTE — PROGRESS NOTES
HPI and Plan:   See dictation    Orders Placed This Encounter   Procedures     Basic metabolic panel     Follow-Up with Cardiologist     Echocardiogram       No orders of the defined types were placed in this encounter.      There are no discontinued medications.      Encounter Diagnoses   Name Primary?     Chronic systolic congestive heart failure (H) Yes     Mixed hyperlipidemia      Coronary artery disease involving native heart without angina pectoris, unspecified vessel or lesion type      Essential hypertension      Chronic atrial fibrillation (H)      Ischemic cardiomyopathy      ICD (implantable cardioverter-defibrillator) in place      Paroxysmal ventricular tachycardia (H)        CURRENT MEDICATIONS:  Current Outpatient Prescriptions   Medication Sig Dispense Refill     amiodarone (PACERONE/CODARONE) 200 MG tablet Take 1 tablet (200 mg) by mouth daily 90 tablet 0     carvedilol (COREG) 3.125 MG tablet Take 2 tablets (6.25 mg) by mouth 2 times daily (with meals) 360 tablet 3     digoxin (LANOXIN) 125 MCG tablet Take 1 tablet (125 mcg) by mouth daily 90 tablet 3     ipratropium (ATROVENT) 0.03 % spray Spray 2 sprays into both nostrils every 8 hours as needed for rhinitis 30 mL 11     lisinopril (PRINIVIL/ZESTRIL) 5 MG tablet Take 1 tablet (5 mg) by mouth At Bedtime 180 tablet 3     Multiple Vitamins-Minerals (PRESERVISION AREDS 2 PO) Take 1 capsule by mouth 2 times daily       NITROSTAT 0.4 MG sublingual tablet DISSOLVE 1 TABLET UNDER THE TONGUE EVERY 5 MINUTES AS NEEDED FOR CHEST PAIN 25 tablet 0     ranitidine (ZANTAC) 150 MG tablet Take 1 tablet (150 mg) by mouth 2 times daily 180 tablet      rosuvastatin (CRESTOR) 20 MG tablet Take 1 tablet (20 mg) by mouth daily 30 tablet 11     sildenafil (VIAGRA) 100 MG tablet Take 1 tablet (100 mg) by mouth daily as needed for erectile dysfunction Take 30 min to 4 hours before intercourse.  Never use with nitroglycerin, terazosin or doxazosin. 16 tablet 3     Vitamin  D, Cholecalciferol, 1000 UNITS TABS Take 1,000 mg by mouth daily        WARFARIN SODIUM PO Take 2.5 mg by mouth daily 2.5 mg m.w.f & 1.25 row       ASPIRIN NOT PRESCRIBED (INTENTIONAL) continuous prn for other Please choose reason not prescribed, below         ALLERGIES     Allergies   Allergen Reactions     Nystatin Other (See Comments)     Make his mouth numb & swelling       PAST MEDICAL HISTORY:  Past Medical History:   Diagnosis Date     Atrial fibrillation (H)     s/p Cardioversion 3/14/2013     Atrial flutter (H)     S/p Aflutter ablation 1/11/2011     CAD (coronary artery disease)     CABG x3 10/2012- LIMA to distal LAD, SVG to OM1 & OM3; cath 10/2012- PTCA to second diagonal and mid LAD, BMS to mid LAD     Cardiogenic shock (H)      Cardiomyopathy (H)      CHF (congestive heart failure) (H)      CVA (cerebral infarction)     residual right hand numbness     ED (erectile dysfunction)      Hyperlipidemia      Hypertension      Neuropathy      Palpitations      SVT (supraventricular tachycardia) (H)     S/p dual chamber ICD 10/11/12- upgraded to BIV ICD 6/2013     Syncope        PAST SURGICAL HISTORY:  Past Surgical History:   Procedure Laterality Date     BYPASS GRAFT ARTERY CORONARY  10/2/2012    Procedure: BYPASS GRAFT ARTERY CORONARY;  Coronary Artery Bypass Graft x3 (LAD, Diag, OM) with Endovein [place] (On-Pump);  Surgeon: Yeyo Lyman MD;  Location: [facility] OR     CARDIOVERSION  3/14/2013     CORONARY ANGIOGRAPHY ADULT ORDER  10/2/12     CORONARY ARTERY BYPASS  10/2/12    LIMA to LAD, SVG to OM1 and OM3     H ABLATION ATRIAL FLUTTER  1/11/2011     HAND SURGERY       HEART CATH, ANGIOPLASTY  10/2/12    PTCA to second diagonal and mid LAD, BMS to mid LAD     HERNIA REPAIR       ORTHOPEDIC SURGERY Right 2006    cut on table saw     RELOCATE GENERATOR ICD/PACEMAKER         FAMILY HISTORY:  Family History   Problem Relation Age of Onset     Alcohol/Drug Father      HEART DISEASE Father 71     Alzheimer  "Disease Sister      Alcohol/Drug Sister      Alcohol/Drug Sister      GASTROINTESTINAL DISEASE Sister      Obesity Sister      Hypertension Sister      HEART DISEASE Sister      Hypertension Sister      HEART DISEASE Daughter      afib     Arrhythmia Daughter      Multiple Sclerosis Daughter      HEART DISEASE Daughter      afib     Arrhythmia Daughter      CANCER Daughter      lymphoma     Aneurysm Mother        SOCIAL HISTORY:  Social History     Social History     Marital status:      Spouse name: N/A     Number of children: N/A     Years of education: N/A     Social History Main Topics     Smoking status: Former Smoker     Packs/day: 1.00     Years: 14.00     Types: Cigarettes     Quit date: 7/10/1972     Smokeless tobacco: Never Used     Alcohol use No     Drug use: No     Sexual activity: Yes     Partners: Female     Other Topics Concern     Parent/Sibling W/ Cabg, Mi Or Angioplasty Before 65f 55m? No     Caffeine Concern Yes     4 cups coffee daily     Sleep Concern No     Stress Concern No     Weight Concern Yes     gain 2lbs     Special Diet Yes     low sodium     Exercise Yes     walking, doing sittups, played pickle ball in summer     Seat Belt Yes     Social History Narrative       Review of Systems:  Skin:  Negative       Eyes:  Positive for glasses    ENT:  Negative      Respiratory:  Positive for dyspnea on exertion     Cardiovascular:    dizziness;Positive for    Gastroenterology: Positive for heartburn treated  Genitourinary:         Musculoskeletal:  Negative      Neurologic:  Negative      Psychiatric:  Negative      Heme/Lymph/Imm:  Negative      Endocrine:  Negative        Physical Exam:  Vitals: /64  Pulse 64  Ht 1.854 m (6' 1\")  Wt 82.1 kg (181 lb 1.6 oz)  BMI 23.89 kg/m2    Constitutional:  cooperative, alert and oriented, well developed, well nourished, in no acute distress        Skin:  warm and dry to the touch, no apparent skin lesions or masses noted   ICD incision in " the left infraclavicular area was well-healed      Head:  normocephalic, no masses or lesions        Eyes:  pupils equal and round;conjunctivae and lids unremarkable;sclera white;no xanthalasma        Lymph:No Cervical lymphadenopathy present     ENT:  no pallor or cyanosis        Neck:  carotid pulses are full and equal bilaterally;JVP normal        Respiratory:  clear to auscultation;healed median sternotomy scar         Cardiac:   irregularly irregular rhythm              pulses full and equal                                        GI:  abdomen soft;BS normoactive        Extremities and Muscular Skeletal:  no deformities, clubbing, cyanosis, erythema observed;no edema              Neurological:  no gross motor deficits;affect appropriate        Psych:  Alert and Oriented x 3        CC  Parish Newman MD  [address]  [place] [address]

## 2018-05-23 NOTE — LETTER
5/23/2018    Dejon Downs MD  [address]  [place] [address]    RE: Tyrel Rene       Dear Colleague,    I had the pleasure of seeing Tyrel Rene in the [facility].    HPI and Plan:   See dictation    Orders Placed This Encounter   Procedures     Basic metabolic panel     Follow-Up with Cardiologist     Echocardiogram       No orders of the defined types were placed in this encounter.      There are no discontinued medications.      Encounter Diagnoses   Name Primary?     Chronic systolic congestive heart failure (H) Yes     Mixed hyperlipidemia      Coronary artery disease involving native heart without angina pectoris, unspecified vessel or lesion type      Essential hypertension      Chronic atrial fibrillation (H)      Ischemic cardiomyopathy      ICD (implantable cardioverter-defibrillator) in place      Paroxysmal ventricular tachycardia (H)        CURRENT MEDICATIONS:  Current Outpatient Prescriptions   Medication Sig Dispense Refill     amiodarone (PACERONE/CODARONE) 200 MG tablet Take 1 tablet (200 mg) by mouth daily 90 tablet 0     carvedilol (COREG) 3.125 MG tablet Take 2 tablets (6.25 mg) by mouth 2 times daily (with meals) 360 tablet 3     digoxin (LANOXIN) 125 MCG tablet Take 1 tablet (125 mcg) by mouth daily 90 tablet 3     ipratropium (ATROVENT) 0.03 % spray Spray 2 sprays into both nostrils every 8 hours as needed for rhinitis 30 mL 11     lisinopril (PRINIVIL/ZESTRIL) 5 MG tablet Take 1 tablet (5 mg) by mouth At Bedtime 180 tablet 3     Multiple Vitamins-Minerals (PRESERVISION AREDS 2 PO) Take 1 capsule by mouth 2 times daily       NITROSTAT 0.4 MG sublingual tablet DISSOLVE 1 TABLET UNDER THE TONGUE EVERY 5 MINUTES AS NEEDED FOR CHEST PAIN 25 tablet 0     ranitidine (ZANTAC) 150 MG tablet Take 1 tablet (150 mg) by mouth 2 times daily 180 tablet      rosuvastatin (CRESTOR) 20 MG tablet Take 1 tablet (20 mg) by mouth daily 30 tablet 11      sildenafil (VIAGRA) 100 MG tablet Take 1 tablet (100 mg) by mouth daily as needed for erectile dysfunction Take 30 min to 4 hours before intercourse.  Never use with nitroglycerin, terazosin or doxazosin. 16 tablet 3     Vitamin D, Cholecalciferol, 1000 UNITS TABS Take 1,000 mg by mouth daily        WARFARIN SODIUM PO Take 2.5 mg by mouth daily 2.5 mg m.w.f & 1.25 row       ASPIRIN NOT PRESCRIBED (INTENTIONAL) continuous prn for other Please choose reason not prescribed, below         ALLERGIES     Allergies   Allergen Reactions     Nystatin Other (See Comments)     Make his mouth numb & swelling       PAST MEDICAL HISTORY:  Past Medical History:   Diagnosis Date     Atrial fibrillation (H)     s/p Cardioversion 3/14/2013     Atrial flutter (H)     S/p Aflutter ablation 1/11/2011     CAD (coronary artery disease)     CABG x3 10/2012- LIMA to distal LAD, SVG to OM1 & OM3; cath 10/2012- PTCA to second diagonal and mid LAD, BMS to mid LAD     Cardiogenic shock (H)      Cardiomyopathy (H)      CHF (congestive heart failure) (H)      CVA (cerebral infarction)     residual right hand numbness     ED (erectile dysfunction)      Hyperlipidemia      Hypertension      Neuropathy      Palpitations      SVT (supraventricular tachycardia) (H)     S/p dual chamber ICD 10/11/12- upgraded to BIV ICD 6/2013     Syncope        PAST SURGICAL HISTORY:  Past Surgical History:   Procedure Laterality Date     BYPASS GRAFT ARTERY CORONARY  10/2/2012    Procedure: BYPASS GRAFT ARTERY CORONARY;  Coronary Artery Bypass Graft x3 (LAD, Diag, OM) with Endovein [place] (On-Pump);  Surgeon: Yeyo Lyman MD;  Location: [facility] OR     CARDIOVERSION  3/14/2013     CORONARY ANGIOGRAPHY ADULT ORDER  10/2/12     CORONARY ARTERY BYPASS  10/2/12    LIMA to LAD, SVG to OM1 and OM3     H ABLATION ATRIAL FLUTTER  1/11/2011     HAND SURGERY       HEART CATH, ANGIOPLASTY  10/2/12    PTCA to second diagonal and mid LAD, BMS to mid LAD     HERNIA REPAIR    "    ORTHOPEDIC SURGERY Right 2006    cut on table saw     RELOCATE GENERATOR ICD/PACEMAKER         FAMILY HISTORY:  Family History   Problem Relation Age of Onset     Alcohol/Drug Father      HEART DISEASE Father 71     Alzheimer Disease Sister      Alcohol/Drug Sister      Alcohol/Drug Sister      GASTROINTESTINAL DISEASE Sister      Obesity Sister      Hypertension Sister      HEART DISEASE Sister      Hypertension Sister      HEART DISEASE Daughter      afib     Arrhythmia Daughter      Multiple Sclerosis Daughter      HEART DISEASE Daughter      afib     Arrhythmia Daughter      CANCER Daughter      lymphoma     Aneurysm Mother        SOCIAL HISTORY:  Social History     Social History     Marital status:      Spouse name: N/A     Number of children: N/A     Years of education: N/A     Social History Main Topics     Smoking status: Former Smoker     Packs/day: 1.00     Years: 14.00     Types: Cigarettes     Quit date: 7/10/1972     Smokeless tobacco: Never Used     Alcohol use No     Drug use: No     Sexual activity: Yes     Partners: Female     Other Topics Concern     Parent/Sibling W/ Cabg, Mi Or Angioplasty Before 65f 55m? No     Caffeine Concern Yes     4 cups coffee daily     Sleep Concern No     Stress Concern No     Weight Concern Yes     gain 2lbs     Special Diet Yes     low sodium     Exercise Yes     walking, doing sittups, played pickle ball in summer     Seat Belt Yes     Social History Narrative       Review of Systems:  Skin:  Negative       Eyes:  Positive for glasses    ENT:  Negative      Respiratory:  Positive for dyspnea on exertion     Cardiovascular:    dizziness;Positive for    Gastroenterology: Positive for heartburn treated  Genitourinary:         Musculoskeletal:  Negative      Neurologic:  Negative      Psychiatric:  Negative      Heme/Lymph/Imm:  Negative      Endocrine:  Negative        Physical Exam:  Vitals: /64  Pulse 64  Ht 1.854 m (6' 1\")  Wt 82.1 kg (181 lb 1.6 " oz)  BMI 23.89 kg/m2    Constitutional:  cooperative, alert and oriented, well developed, well nourished, in no acute distress        Skin:  warm and dry to the touch, no apparent skin lesions or masses noted   ICD incision in the left infraclavicular area was well-healed      Head:  normocephalic, no masses or lesions        Eyes:  pupils equal and round;conjunctivae and lids unremarkable;sclera white;no xanthalasma        Lymph:No Cervical lymphadenopathy present     ENT:  no pallor or cyanosis        Neck:  carotid pulses are full and equal bilaterally;JVP normal        Respiratory:  clear to auscultation;healed median sternotomy scar         Cardiac:   irregularly irregular rhythm              pulses full and equal                                        GI:  abdomen soft;BS normoactive        Extremities and Muscular Skeletal:  no deformities, clubbing, cyanosis, erythema observed;no edema              Neurological:  no gross motor deficits;affect appropriate        Psych:  Alert and Oriented x 3        CC  Parish Newman MD  [address]  [place] [address]                Thank you for allowing me to participate in the care of your patient.      Sincerely,     PARISH NEWMAN MD     [facility]    cc:   Parish Newman MD  [address]  [place] [address]

## 2018-05-23 NOTE — LETTER
5/23/2018      Dejon Downs MD  [address]  [facility][1] [id number]      RE: Tyrel Rene       Dear Colleague,    I had the pleasure of seeing Tyrel Rene in the [facility].    Service Date: 05/23/2018      HISTORY OF PRESENT ILLNESS:  I had the opportunity to see Mr. Tyrel Rene in Cardiology Clinic today at the [facility] in [place] for reevaluation of severe ischemic cardiomyopathy, chronic systolic congestive heart failure, and chronic atrial fibrillation.  He suffered a significant myocardial infarction in the past and has had a fairly stable ejection fraction recently of 25%-30%.  He has had an ICD in place for a number of years and was noted to have episodes of polymorphic ventricular tachycardia last fall, prompting Dr. Costello from Electrophysiology to initiate oral amiodarone therapy.  However, despite being on amiodarone, he developed a prolonged episode of sustained ventricular tachycardia causing syncope on 12/03/2017 while he was on the elliptical machine.  His device delivered 4 rounds of antitachycardia pacing, which were unsuccessful, and then a shock which converted him back to sinus rhythm a few days later and has remained in atrial fibrillation since then.  He has also remained on amiodarone and has not had any recurrent episodes of significant ventricular arrhythmia or required any device therapy since then.  He did not have any problems with amiodarone.  He did undergo repeat coronary angiogram which did not show any significant new disease responsible for his ventricular tachycardia.  He had been experiencing some persistent dizziness, and this was thought to be due to low blood pressures, resulting in a decrease in his carvedilol dose.      Fortunately, now he seems to be doing relatively well.  He is not exercising as much due to some persistent dizziness.  It seems like he describes this mostly as lightheadedness, although  his blood pressures are really not too low.  He has no chest pain and has not had any recurrent syncope or felt any shocks.  He has not had any significant edema.  He has not been as active because of his balance problems.      PHYSICAL EXAMINATION:  On examination today his blood pressure is 108/64, heart rate 64 and weight 181 pounds, which is down 7 pounds since February.  His lungs are clear.  Heart rhythm is regular.  I do not hear any significant cardiac murmurs or carotid bruits, and he has no edema.      IMPRESSIONS:  Mr. Artem Elizalde is a 72-year-old gentleman with severe ischemic cardiomyopathy with ejection fraction of 25%-30%, paroxysmal ventricular tachycardia requiring an ICD shock in 2017, and chronic atrial fibrillation.  He has had coronary artery disease and previous bypass surgery but did not have any new coronary artery disease to explain his arrhythmias.  Fortunately, he seems to be doing well on amiodarone, although he does have persistent dizziness for unclear reasons.  I will not make any medication changes at this point.  He will continue to follow up with us on a regular basis in Electrophysiology and the [facility].  His congestive heart failure appears to be well compensated and controlled.      cc:   [place] [address]         LYNNE KAUFMAN MD, [facility]             D: 2018   T: 2018   MT: BRENT      Name:     ARTEM ELIZALDE   MRN:      [id number]        Account:      [id number]   :      1945           Service Date: 2018      Document: [id number]         Outpatient Encounter Prescriptions as of 2018   Medication Sig Dispense Refill     amiodarone (PACERONE/CODARONE) 200 MG tablet Take 1 tablet (200 mg) by mouth daily 90 tablet 0     carvedilol (COREG) 3.125 MG tablet Take 2 tablets (6.25 mg) by mouth 2 times daily (with meals) 360 tablet 3     digoxin (LANOXIN)  125 MCG tablet Take 1 tablet (125 mcg) by mouth daily 90 tablet 3     ipratropium (ATROVENT) 0.03 % spray Spray 2 sprays into both nostrils every 8 hours as needed for rhinitis 30 mL 11     lisinopril (PRINIVIL/ZESTRIL) 5 MG tablet Take 1 tablet (5 mg) by mouth At Bedtime 180 tablet 3     Multiple Vitamins-Minerals (PRESERVISION AREDS 2 PO) Take 1 capsule by mouth 2 times daily       NITROSTAT 0.4 MG sublingual tablet DISSOLVE 1 TABLET UNDER THE TONGUE EVERY 5 MINUTES AS NEEDED FOR CHEST PAIN 25 tablet 0     ranitidine (ZANTAC) 150 MG tablet Take 1 tablet (150 mg) by mouth 2 times daily 180 tablet      rosuvastatin (CRESTOR) 20 MG tablet Take 1 tablet (20 mg) by mouth daily 30 tablet 11     sildenafil (VIAGRA) 100 MG tablet Take 1 tablet (100 mg) by mouth daily as needed for erectile dysfunction Take 30 min to 4 hours before intercourse.  Never use with nitroglycerin, terazosin or doxazosin. 16 tablet 3     Vitamin D, Cholecalciferol, 1000 UNITS TABS Take 1,000 mg by mouth daily        WARFARIN SODIUM PO Take 2.5 mg by mouth daily 2.5 mg m.w.f & 1.25 row       ASPIRIN NOT PRESCRIBED (INTENTIONAL) continuous prn for other Please choose reason not prescribed, below       No facility-administered encounter medications on file as of 5/23/2018.        Again, thank you for allowing me to participate in the care of your patient.      Sincerely,    LYNNE KAUFMAN MD     [facility]

## 2018-05-23 NOTE — MR AVS SNAPSHOT
After Visit Summary   5/23/2018    Tyrel Rene    MRN: [id number]           Patient Information     Date Of Birth          1945        Visit Information        Provider Department      5/23/2018 2:15 PM Parish Newman MD [facility]        Today's Diagnoses     Chronic systolic congestive heart failure (H)    -  1    Mixed hyperlipidemia        Coronary artery disease involving native heart without angina pectoris, unspecified vessel or lesion type        Essential hypertension        Chronic atrial fibrillation (H)        Ischemic cardiomyopathy        ICD (implantable cardioverter-defibrillator) in place        Paroxysmal ventricular tachycardia (H)           Follow-ups after your visit        Additional Services     Follow-Up with Cardiologist                 Your next 10 appointments already scheduled     May 30, 2018  2:00 PM CDT   Anticoagulation Visit with [facility]   [facility] ([facility])    [address]  [facility] [address]   [phone]            Jun 14, 2018  4:30 PM CDT   Remote ICD Check with MARQUEZ DCR2   [facility])    [address] [place] [id number] [phone] OPT 2           This appointment is for a remote check of your debrillator.  This is not an appointment at the office.            Jul 18, 2018  1:50 PM CDT   Core Return with JAMAL Flores CNP   [facility] ([facility])    [address] [place] [id number] [phone] OPT 2            Sep 10, 2018 10:30 AM CDT   [facility] EP RETURN with Joelle Rodríguez PA-C   [facility] ([facility])    [address] [place] [id number] [phone] OPT 2          "     Future tests that were ordered for you today     Open Future Orders        Priority Expected Expires Ordered    Echocardiogram Routine 5/23/2019 5/24/2019 5/23/2018    Basic metabolic panel Routine 5/23/2019 5/24/2019 5/23/2018    Follow-Up with Cardiologist Routine 5/23/2019 5/24/2019 5/23/2018            Who to contact     If you have questions or need follow up information about today's clinic visit or your schedule please contact [facility] directly at [phone].  Normal or non-critical lab and imaging results will be communicated to you by IdeaSquareshart, letter or phone within 4 business days after the clinic has received the results. If you do not hear from us within 7 days, please contact the clinic through Life800t or phone. If you have a critical or abnormal lab result, we will notify you by phone as soon as possible.  Submit refill requests through InnoPad or call your pharmacy and they will forward the refill request to us. Please allow 3 business days for your refill to be completed.          Additional Information About Your Visit        InnoPad Information     InnoPad gives you secure access to your electronic health record. If you see a primary care provider, you can also send messages to your care team and make appointments. If you have questions, please call your primary care clinic.  If you do not have a primary care provider, please call [phone] and they will assist you.        Care EveryWhere ID     This is your Care EveryWhere ID. This could be used by other organizations to access your [place] medical records  [id number]        Your Vitals Were     Pulse Height BMI (Body Mass Index)             64 1.854 m (6' 1\") 23.89 kg/m2          Blood Pressure from Last 3 Encounters:   05/23/18 108/64   02/26/18 120/74   01/19/18 103/80    Weight from Last 3 Encounters:   05/23/18 82.1 kg (181 lb 1.6 oz)   02/26/18 85.3 kg (188 lb)   01/19/18 85.2 kg (187 lb " 12.8 oz)              We Performed the Following     Follow-Up with [facility] - Return MD visit        Primary Care Provider Office Phone # Fax #    Dejon Downs [phone] [address]  [facility][1] [id number]        Equal Access to Services     JAVI OTERO : Hadii aad ku hadasho Soomaali, waaxda luqadaha, qaybta kaalmada adeegyada, waxay idiin hayaan adesheron binghamsherradha labhupinder . So [facility] [phone].    ATENCIÓN: Si habla español, tiene a hagen disposición servicios gratuitos de asistencia lingüística. Liat al [phone].    We comply with applicable federal civil rights laws and [place] laws. We do not discriminate on the basis of race, color, national origin, age, disability, sex, sexual orientation, or gender identity.            Thank you!     Thank you for choosing [facility]  for your care. Our goal is always to provide you with excellent care. Hearing back from our patients is one way we can continue to improve our services. Please take a few minutes to complete the written survey that you may receive in the mail after your visit with us. Thank you!             Your Updated Medication List - Protect others around you: Learn how to safely use, store and throw away your medicines at www.disposemymeds.org.          This list is accurate as of 5/23/18  2:53 PM.  Always use your most recent med list.                   Brand Name Dispense Instructions for use Diagnosis    amiodarone 200 MG tablet    PACERONE/CODARONE    90 tablet    Take 1 tablet (200 mg) by mouth daily    Ventricular tachycardia (H)       ASPIRIN NOT PRESCRIBED    INTENTIONAL     continuous prn for other Please choose reason not prescribed, below        carvedilol 3.125 MG tablet    COREG    360 tablet    Take 2 tablets (6.25 mg) by mouth 2 times daily (with meals)    Ventricular tachycardia (H)       digoxin 125 MCG tablet    LANOXIN    90 tablet    Take 1 tablet (125  mcg) by mouth daily    Atrial fibrillation (H)       ipratropium 0.03 % spray    ATROVENT    30 mL    Spray 2 sprays into both nostrils every 8 hours as needed for rhinitis    Chronic rhinitis, unspecified type       lisinopril 5 MG tablet    PRINIVIL/ZESTRIL    180 tablet    Take 1 tablet (5 mg) by mouth At Bedtime    Chronic systolic congestive heart failure (H)       NITROSTAT 0.4 MG sublingual tablet   Generic drug:  nitroGLYcerin     25 tablet    DISSOLVE 1 TABLET UNDER THE TONGUE EVERY 5 MINUTES AS NEEDED FOR CHEST PAIN    Postsurgical aortocoronary bypass status       PRESERVISION AREDS 2 PO      Take 1 capsule by mouth 2 times daily        ranitidine 150 MG tablet    ZANTAC    180 tablet    Take 1 tablet (150 mg) by mouth 2 times daily    S/P CABG (coronary artery bypass graft)       rosuvastatin 20 MG tablet    CRESTOR    30 tablet    Take 1 tablet (20 mg) by mouth daily        sildenafil 100 MG tablet    VIAGRA    16 tablet    Take 1 tablet (100 mg) by mouth daily as needed for erectile dysfunction Take 30 min to 4 hours before intercourse.  Never use with nitroglycerin, terazosin or doxazosin.    Erectile dysfunction, unspecified erectile dysfunction type       Vitamin D (Cholecalciferol) 1000 units Tabs      Take 1,000 mg by mouth daily        WARFARIN SODIUM PO      Take 2.5 mg by mouth daily 2.5 mg m.w.f & 1.25 row

## 2018-05-24 NOTE — PROGRESS NOTES
Service Date: 05/23/2018      HISTORY OF PRESENT ILLNESS:  I had the opportunity to see Mr. Tyrel Rene in Cardiology Clinic today at the [facility] in [place] for reevaluation of severe ischemic cardiomyopathy, chronic systolic congestive heart failure, and chronic atrial fibrillation.  He suffered a significant myocardial infarction in the past and has had a fairly stable ejection fraction recently of 25%-30%.  He has had an ICD in place for a number of years and was noted to have episodes of polymorphic ventricular tachycardia last fall, prompting Dr. Costello from Electrophysiology to initiate oral amiodarone therapy.  However, despite being on amiodarone, he developed a prolonged episode of sustained ventricular tachycardia causing syncope on 12/03/2017 while he was on the elliptical machine.  His device delivered 4 rounds of antitachycardia pacing, which were unsuccessful, and then a shock which converted him back to sinus rhythm a few days later and has remained in atrial fibrillation since then.  He has also remained on amiodarone and has not had any recurrent episodes of significant ventricular arrhythmia or required any device therapy since then.  He did not have any problems with amiodarone.  He did undergo repeat coronary angiogram which did not show any significant new disease responsible for his ventricular tachycardia.  He had been experiencing some persistent dizziness, and this was thought to be due to low blood pressures, resulting in a decrease in his carvedilol dose.      Fortunately, now he seems to be doing relatively well.  He is not exercising as much due to some persistent dizziness.  It seems like he describes this mostly as lightheadedness, although his blood pressures are really not too low.  He has no chest pain and has not had any recurrent syncope or felt any shocks.  He has not had any significant edema.  He has not been as active because of his balance problems.       PHYSICAL EXAMINATION:  On examination today his blood pressure is 108/64, heart rate 64 and weight 181 pounds, which is down 7 pounds since February.  His lungs are clear.  Heart rhythm is regular.  I do not hear any significant cardiac murmurs or carotid bruits, and he has no edema.      IMPRESSIONS:  Mr. Artem Elizalde is a 72-year-old gentleman with severe ischemic cardiomyopathy with ejection fraction of 25%-30%, paroxysmal ventricular tachycardia requiring an ICD shock in 2017, and chronic atrial fibrillation.  He has had coronary artery disease and previous bypass surgery but did not have any new coronary artery disease to explain his arrhythmias.  Fortunately, he seems to be doing well on amiodarone, although he does have persistent dizziness for unclear reasons.  I will not make any medication changes at this point.  He will continue to follow up with us on a regular basis in Electrophysiology and the [facility].  His congestive heart failure appears to be well compensated and controlled.      cc:   [place] [address]         LYNNE KAUFAMN MD, [facility]             D: 2018   T: 2018   MT: BRENT      Name:     ARTEM ELIZALDE   MRN:      [id number]        Account:      [id number]   :      1945           Service Date: 2018      Document: [id number]

## 2018-05-25 DIAGNOSIS — N52.9 ERECTILE DYSFUNCTION, UNSPECIFIED ERECTILE DYSFUNCTION TYPE: ICD-10-CM

## 2018-05-25 NOTE — TELEPHONE ENCOUNTER
"Requested Prescriptions   Pending Prescriptions Disp Refills     sildenafil (VIAGRA) 100 MG tablet  Last Written Prescription Date:  7/28/2016  Last Fill Quantity: 16,  # refills: 3   Last office visit: 12/20/2017 with prescribing provider:  Dr Downs     Future Office Visit:     16 tablet 3     Sig: Take 1 tablet (100 mg) by mouth daily as needed Take 30 min to 4 hours before intercourse.  Never use with nitroglycerin, terazosin or doxazosin.    Erectile Dysfuction Protocol Failed    5/25/2018 11:35 AM       Failed - Absence of nitrates on medication list       Passed - Absence of Alpha Blockers on Med list       Passed - Recent (12 mo) or future (30 days) visit within the authorizing provider's specialty    Patient had office visit in the last 12 months or has a visit in the next 30 days with authorizing provider or within the authorizing provider's specialty.  See \"Patient Info\" tab in inbasket, or \"Choose Columns\" in Meds & Orders section of the refill encounter.           Passed - Patient is age 18 or older          "

## 2018-05-30 ENCOUNTER — ANTICOAGULATION THERAPY VISIT (OUTPATIENT)
Dept: NURSING | Facility: CLINIC | Age: 73
End: 2018-05-30
Payer: COMMERCIAL

## 2018-05-30 DIAGNOSIS — Z79.01 LONG-TERM (CURRENT) USE OF ANTICOAGULANTS: ICD-10-CM

## 2018-05-30 DIAGNOSIS — I63.9 CEREBRAL INFARCTION (H): ICD-10-CM

## 2018-05-30 DIAGNOSIS — I63.50 CEREBRAL ARTERY OCCLUSION WITH CEREBRAL INFARCTION (H): ICD-10-CM

## 2018-05-30 LAB — INR POINT OF CARE: 3 (ref 2–3)

## 2018-05-30 PROCEDURE — 85610 PROTHROMBIN TIME: CPT | Mod: QW

## 2018-05-30 PROCEDURE — 99207 ZZC NO CHARGE NURSE ONLY: CPT

## 2018-05-30 PROCEDURE — 36416 COLLJ CAPILLARY BLOOD SPEC: CPT

## 2018-05-30 RX ORDER — SILDENAFIL 100 MG/1
100 TABLET, FILM COATED ORAL DAILY PRN
Qty: 16 TABLET | Refills: 3 | Status: SHIPPED | OUTPATIENT
Start: 2018-05-30 | End: 2021-01-01

## 2018-05-30 NOTE — PROGRESS NOTES
ANTICOAGULATION FOLLOW-UP CLINIC VISIT    Patient Name:  Tyrel Rene  Date:  5/30/2018  Contact Type:  Face to Face    SUBJECTIVE:     Patient Findings     Positives No Problem Findings           OBJECTIVE    INR Protime   Date Value Ref Range Status   05/30/2018 3.0 2.0 - 3.0 Final       ASSESSMENT / PLAN  INR assessment THER    Recheck INR In: 4 WEEKS    INR Location Clinic      Anticoagulation Summary as of 5/30/2018     INR goal 2.0-2.5   Today's INR 3.0!   Warfarin maintenance plan 2.5 mg (2.5 mg x 1) on Mon, Fri; 1.25 mg (2.5 mg x 0.5) all other days   Full warfarin instructions 5/30: Hold; Otherwise 2.5 mg on Mon, Fri; 1.25 mg all other days   Weekly warfarin total 11.25 mg   Plan last modified Caridad Cunningham RN (1/24/2018)   Next INR check 6/27/2018   Target end date Indefinite    Indications   Long-term (current) use of anticoagulants [Z79.01] [Z79.01]  Cerebral artery occlusion with cerebral infarction (HCC) [I63.50] [I63.50]  Cerebral infarction (H) [I63.9]         Anticoagulation Episode Summary     INR check location [facility]    Preferred lab     Send INR reminders to [facility] INR/PROTIME    Comments       Anticoagulation Care Providers     Provider Role Specialty Phone number    Dejon Downs MD [facility] [phone]            See the Encounter Report to view Anticoagulation Flowsheet and Dosing Calendar (Go to Encounters tab in chart review, and find the Anticoagulation Therapy Visit)        Caridad Cunningham RN

## 2018-05-30 NOTE — MR AVS SNAPSHOT
Tyrel Rene   5/30/2018 2:00 PM   Anticoagulation Therapy Visit    Description:  72 year old male   Provider:  [facility]   Department:  Bx Nurse           INR as of 5/30/2018     Today's INR 3.0!      Anticoagulation Summary as of 5/30/2018     INR goal 2.0-2.5   Today's INR 3.0!   Full warfarin instructions 5/30: Hold; Otherwise 2.5 mg on Mon, Fri; 1.25 mg all other days   Next INR check 6/27/2018    Indications   Long-term (current) use of anticoagulants [Z79.01] [Z79.01]  Cerebral artery occlusion with cerebral infarction (HCC) [I63.50] [I63.50]  Cerebral infarction (H) [I63.9]         Your next Anticoagulation Clinic appointment(s)     Jun 27, 2018  2:00 PM CDT   Anticoagulation Visit with  ANTICOAGULATION CLINIC   [facility] ([facility])    [address] [place] [id number] [phone]              Contact Numbers     [facility]  Please call  [phone] to cancel and/or reschedule your appointment   The direct line to the anticoagulant nurse is [phone] on Monday, Wednesday, and Friday. On Thursday, the anticoagulant nurse can be reached directly at [phone].         May 2018 Details    Sun Mon Tue Wed Thu Fri Sat       1               2               3               4               5                 6               7               8               9               10               11               12                 13               14               15               16               17               18               19                 20               21               22               23               24               25               26                 27               28               29               30      Hold   See details      31      1.25 mg            Date Details   05/30 This INR check               How to take your warfarin dose     To take:  1.25 mg Take 0.5 of a  2.5 mg tablet.    Hold Do not take your warfarin dose. See the Details table to the right for additional instructions.                June 2018 Details    Sun Mon Tue Wed Thu Fri Sat          1      2.5 mg         2      1.25 mg           3      1.25 mg         4      2.5 mg         5      1.25 mg         6      1.25 mg         7      1.25 mg         8      2.5 mg         9      1.25 mg           10      1.25 mg         11      2.5 mg         12      1.25 mg         13      1.25 mg         14      1.25 mg         15      2.5 mg         16      1.25 mg           17      1.25 mg         18      2.5 mg         19      1.25 mg         20      1.25 mg         21      1.25 mg         22      2.5 mg         23      1.25 mg           24      1.25 mg         25      2.5 mg         26      1.25 mg         27            28               29               30                Date Details   No additional details    Date of next INR:  6/27/2018         How to take your warfarin dose     To take:  1.25 mg Take 0.5 of a 2.5 mg tablet.    To take:  2.5 mg Take 1 of the 2.5 mg tablets.

## 2018-06-14 ENCOUNTER — ALLIED HEALTH/NURSE VISIT (OUTPATIENT)
Dept: CARDIOLOGY | Facility: CLINIC | Age: 73
End: 2018-06-14
Payer: COMMERCIAL

## 2018-06-14 DIAGNOSIS — Z95.810 ICD (IMPLANTABLE CARDIOVERTER-DEFIBRILLATOR) IN PLACE: Primary | ICD-10-CM

## 2018-06-14 DIAGNOSIS — I25.5 ISCHEMIC CARDIOMYOPATHY: ICD-10-CM

## 2018-06-14 PROCEDURE — 93295 DEV INTERROG REMOTE 1/2/MLT: CPT | Performed by: INTERNAL MEDICINE

## 2018-06-14 PROCEDURE — 93296 REM INTERROG EVL PM/IDS: CPT | Performed by: INTERNAL MEDICINE

## 2018-06-14 NOTE — MR AVS SNAPSHOT
After Visit Summary   6/14/2018    Tyrel Rene    MRN: [id number]           Patient Information     Date Of Birth          1945        Visit Information        Provider Department      6/14/2018 4:30 PM MARQUEZ DCR2 [facility]        Today's Diagnoses     ICD (implantable cardioverter-defibrillator) in place    -  1    Ischemic cardiomyopathy           Follow-ups after your visit        Your next 10 appointments already scheduled     Jun 14, 2018  4:30 PM CDT   Remote ICD Check with MARQUEZ DCR2   [facility] ([facility])    [address] [address]   [phone] OPT 2           This appointment is for a remote check of your debrillator.  This is not an appointment at the office.            Jun 27, 2018  2:00 PM CDT   Anticoagulation Visit with [facility] ANT[facility]AGULATION CLINIC   [facility] ([facility]    [address]  [facility] [address]   [phone]            Jul 18, 2018  1:50 PM CDT   Core Return with JAMAL Flores CNP   [facility] ([facility])    [address] [address] [phone] OPT 2            Sep 10, 2018 10:30 AM CDT   [facility] EP RETURN with Joelle Rodríguez PA-C   [facility] ([facility])    [address] [address] [phone] OPT 2            Oct 10, 2018  4:30 PM CDT   Remote ICD Check with MARQUEZ DCR2   [facility] ([facility])    [address] [address] [phone] OPT 2           This appointment is for a remote check of your debrillator.  This is not an appointment at the office.              Who to contact     If you have questions or need  follow up information about today's clinic visit or your schedule please contact [facility] directly at [phone].  Normal or non-critical lab and imaging results will be communicated to you by MyChart, letter or phone within 4 business days after the clinic has received the results. If you do not hear from us within 7 days, please contact the clinic through MyChart or phone. If you have a critical or abnormal lab result, we will notify you by phone as soon as possible.  Submit refill requests through [employer] or call your pharmacy and they will forward the refill request to us. Please allow 3 business days for your refill to be completed.          Additional Information About Your Visit        [employer] Information     [employer] gives you secure access to your electronic health record. If you see a primary care provider, you can also send messages to your care team and make appointments. If you have questions, please call your primary care clinic.  If you do not have a primary care provider, please call [phone] and they will assist you.        Care EveryWhere ID     This is your Care EveryWhere ID. This could be used by other organizations to access your [facility] medical records  [id number]         Blood Pressure from Last 3 Encounters:   05/23/18 108/64   02/26/18 120/74   01/19/18 103/80    Weight from Last 3 Encounters:   05/23/18 82.1 kg (181 lb 1.6 oz)   02/26/18 85.3 kg (188 lb)   01/19/18 85.2 kg (187 lb 12.8 oz)              We Performed the Following     ICD DEVICE INTERROGAT REMOTE (18625)     INTERROGATION DEVICE EVAL REMOTE, PACER/ICD (14392)        Primary Care Provider Office Phone # Fax #    Dejon Downs [phone] [address] [facility] [address]        Equal Access to Services     JAVI OTERO : Hadii cornelius ku hadasho Soomaali, waaxda luqadaha, qaybta kaalmada adeegyada, deb bird. So  [facility] [phone].    ATENCIÓN: Si dwayne romeo, tiene a hagen disposición servicios gratuitos de asistencia lingüística. Liat sanchez [phone].    We comply with applicable federal civil rights laws and [place] laws. We do not discriminate on the basis of race, color, national origin, age, disability, sex, sexual orientation, or gender identity.            Thank you!     Thank you for choosing [facility]  for your care. Our goal is always to provide you with excellent care. Hearing back from our patients is one way we can continue to improve our services. Please take a few minutes to complete the written survey that you may receive in the mail after your visit with us. Thank you!             Your Updated Medication List - Protect others around you: Learn how to safely use, store and throw away your medicines at www.disposemymeds.org.          This list is accurate as of 6/14/18 10:58 AM.  Always use your most recent med list.                   Brand Name Dispense Instructions for use Diagnosis    amiodarone 200 MG tablet    PACERONE/CODARONE    90 tablet    Take 1 tablet (200 mg) by mouth daily    Ventricular tachycardia (H)       ASPIRIN NOT PRESCRIBED    INTENTIONAL     continuous prn for other Please choose reason not prescribed, below        carvedilol 3.125 MG tablet    COREG    360 tablet    Take 2 tablets (6.25 mg) by mouth 2 times daily (with meals)    Ventricular tachycardia (H)       digoxin 125 MCG tablet    LANOXIN    90 tablet    Take 1 tablet (125 mcg) by mouth daily    Atrial fibrillation (H)       ipratropium 0.03 % spray    ATROVENT    30 mL    Spray 2 sprays into both nostrils every 8 hours as needed for rhinitis    Chronic rhinitis, unspecified type       lisinopril 5 MG tablet    PRINIVIL/ZESTRIL    180 tablet    Take 1 tablet (5 mg) by mouth At Bedtime    Chronic systolic congestive heart failure (H)       NITROSTAT 0.4 MG sublingual tablet   Generic drug:   nitroGLYcerin     25 tablet    DISSOLVE 1 TABLET UNDER THE TONGUE EVERY 5 MINUTES AS NEEDED FOR CHEST PAIN    Postsurgical aortocoronary bypass status       PRESERVISION AREDS 2 PO      Take 1 capsule by mouth 2 times daily        ranitidine 150 MG tablet    ZANTAC    180 tablet    Take 1 tablet (150 mg) by mouth 2 times daily    S/P CABG (coronary artery bypass graft)       rosuvastatin 20 MG tablet    CRESTOR    30 tablet    Take 1 tablet (20 mg) by mouth daily        sildenafil 100 MG tablet    VIAGRA    16 tablet    Take 1 tablet (100 mg) by mouth daily as needed Take 30 min to 4 hours before intercourse.  Never use with nitroglycerin, terazosin or doxazosin.    Erectile dysfunction, unspecified erectile dysfunction type       Vitamin D (Cholecalciferol) 1000 units Tabs      Take 1,000 mg by mouth daily        WARFARIN SODIUM PO      Take 2.5 mg by mouth daily 2.5 mg m.w.f & 1.25 row

## 2018-06-14 NOTE — PROGRESS NOTES
[employer] CRT- ICD Remote Device Check    BiVP: 100 %  Mode: VVIR 65        Presenting Rhythm: AF/BiVP  Heart Rate: stable with adequate variability, he is active 1.1 hours/day  Sensing: not obtained    Pacing Threshold: not obtained    Impedance: WNL  Battery Status: estimated 3.5 years longevity remaining with a 10.2 second charge time   Ventricular Arrhythmia: none  ATP: 0    Shocks: 0    Care Plan: Next remote on 10/10/18. He is seeing both cardiology and EP in the coming months. SK

## 2018-06-27 ENCOUNTER — ANTICOAGULATION THERAPY VISIT (OUTPATIENT)
Dept: NURSING | Facility: CLINIC | Age: 73
End: 2018-06-27
Payer: COMMERCIAL

## 2018-06-27 DIAGNOSIS — I63.50 CEREBRAL ARTERY OCCLUSION WITH CEREBRAL INFARCTION (H): ICD-10-CM

## 2018-06-27 DIAGNOSIS — I63.9 CEREBRAL INFARCTION (H): ICD-10-CM

## 2018-06-27 DIAGNOSIS — Z79.01 LONG-TERM (CURRENT) USE OF ANTICOAGULANTS: ICD-10-CM

## 2018-06-27 LAB — INR POINT OF CARE: 3.2 (ref 2–3)

## 2018-06-27 PROCEDURE — 36416 COLLJ CAPILLARY BLOOD SPEC: CPT

## 2018-06-27 PROCEDURE — 85610 PROTHROMBIN TIME: CPT | Mod: QW

## 2018-06-27 PROCEDURE — 99207 ZZC NO CHARGE NURSE ONLY: CPT

## 2018-06-27 NOTE — MR AVS SNAPSHOT
Tyrel Rene   6/27/2018 2:00 PM   Anticoagulation Therapy Visit    Description:  72 year old male   Provider:   ANTICOAGULATION CLINIC   Department:  Bx Nurse           INR as of 6/27/2018     Today's INR 3.2!      Anticoagulation Summary as of 6/27/2018     INR goal 2.0-2.5   Today's INR 3.2!   Full warfarin instructions 6/27: Hold; Otherwise 2.5 mg on Mon; 1.25 mg all other days   Next INR check 7/13/2018    Indications   Long-term (current) use of anticoagulants [Z79.01] [Z79.01]  Cerebral artery occlusion with cerebral infarction (HCC) [I63.50] [I63.50]  Cerebral infarction (H) [I63.9]         Your next Anticoagulation Clinic appointment(s)     Jul 11, 2018  1:30 PM CDT   Anticoagulation Visit with  ANTICOAGULATION CLINIC   [facility]    [address] [place] [id number] [phone]              Contact Numbers     [facility]  Please call  [phone] to cancel and/or reschedule your appointment   The direct line to the anticoagulant nurse is [phone] on Monday, Wednesday, and Friday. On Thursday, the anticoagulant nurse can be reached directly at [phone].         June 2018 Details    Sun Mon Tue Wed Thu Fri Sat          1               2                 3               4               5               6               7               8               9                 10               11               12               13               14               15               16                 17               18               19               20               21               22               23                 24               25               26               27      Hold   See details      28      1.25 mg         29      1.25 mg         30      1.25 mg          Date Details   06/27 This INR check               How to take your warfarin dose     To take:  1.25 mg Take 0.5 of a 2.5 mg  tablet.    Hold Do not take your warfarin dose. See the Details table to the right for additional instructions.                July 2018 Details    Sun Mon Tue Wed Thu Fri Sat     1      1.25 mg         2      2.5 mg         3      1.25 mg         4      1.25 mg         5      1.25 mg         6      1.25 mg         7      1.25 mg           8      1.25 mg         9      2.5 mg         10      1.25 mg         11      1.25 mg         12      1.25 mg         13            14                 15               16               17               18               19               20               21                 22               23               24               25               26               27               28                 29               30               31                    Date Details   No additional details    Date of next INR:  7/13/2018         How to take your warfarin dose     To take:  1.25 mg Take 0.5 of a 2.5 mg tablet.    To take:  2.5 mg Take 1 of the 2.5 mg tablets.

## 2018-06-27 NOTE — PROGRESS NOTES
ANTICOAGULATION FOLLOW-UP CLINIC VISIT    Patient Name:  Tyrel Rene  Date:  6/27/2018  Contact Type:  Face to Face    SUBJECTIVE:     Patient Findings     Positives No Problem Findings           OBJECTIVE    INR Protime   Date Value Ref Range Status   06/27/2018 3.2 (A) 2.0 - 3.0 Final       ASSESSMENT / PLAN  INR assessment SUPRA    Recheck INR In: 2 WEEKS    INR Location Clinic      Anticoagulation Summary as of 6/27/2018     INR goal 2.0-2.5   Today's INR 3.2!   Warfarin maintenance plan 2.5 mg (2.5 mg x 1) on Mon; 1.25 mg (2.5 mg x 0.5) all other days   Full warfarin instructions 6/27: Hold; Otherwise 2.5 mg on Mon; 1.25 mg all other days   Weekly warfarin total 10 mg   Plan last modified Caridad Cunningham RN (6/27/2018)   Next INR check 7/13/2018   Target end date Indefinite    Indications   Long-term (current) use of anticoagulants [Z79.01] [Z79.01]  Cerebral artery occlusion with cerebral infarction (HCC) [I63.50] [I63.50]  Cerebral infarction (H) [I63.9]         Anticoagulation Episode Summary     INR check location [facility]    Preferred lab     Send INR reminders to [facility] INR/PROTIME    Comments       Anticoagulation Care Providers     Provider Role Specialty Phone number    Dejon Downs MD [facility] [phone]            See the Encounter Report to view Anticoagulation Flowsheet and Dosing Calendar (Go to Encounters tab in chart review, and find the Anticoagulation Therapy Visit)        Caridad Cunningham RN

## 2018-07-13 ENCOUNTER — ANTICOAGULATION THERAPY VISIT (OUTPATIENT)
Dept: NURSING | Facility: CLINIC | Age: 73
End: 2018-07-13
Payer: COMMERCIAL

## 2018-07-13 DIAGNOSIS — I63.50 CEREBRAL ARTERY OCCLUSION WITH CEREBRAL INFARCTION (H): ICD-10-CM

## 2018-07-13 DIAGNOSIS — I63.9 CEREBRAL INFARCTION (H): ICD-10-CM

## 2018-07-13 DIAGNOSIS — Z79.01 LONG-TERM (CURRENT) USE OF ANTICOAGULANTS: ICD-10-CM

## 2018-07-13 LAB — INR POINT OF CARE: 2.4 (ref 0.86–1.14)

## 2018-07-13 PROCEDURE — 99207 ZZC NO CHARGE NURSE ONLY: CPT

## 2018-07-13 PROCEDURE — 85610 PROTHROMBIN TIME: CPT | Mod: QW

## 2018-07-13 PROCEDURE — 36416 COLLJ CAPILLARY BLOOD SPEC: CPT

## 2018-07-13 NOTE — MR AVS SNAPSHOT
Tyrel Rene   7/13/2018 1:15 PM   Anticoagulation Therapy Visit    Description:  72 year old male   Provider:  [facility]   Department:  Bx Nurse           INR as of 7/13/2018     Today's INR 2.4      Anticoagulation Summary as of 7/13/2018     INR goal 2.0-2.5   Today's INR 2.4   Full warfarin instructions 2.5 mg on Mon; 1.25 mg all other days   Next INR check 8/6/2018    Indications   Long-term (current) use of anticoagulants [Z79.01] [Z79.01]  Cerebral artery occlusion with cerebral infarction (HCC) [I63.50] [I63.50]  Cerebral infarction (H) [I63.9]         Your next Anticoagulation Clinic appointment(s)     Aug 06, 2018  2:00 PM CDT   Anticoagulation Visit with  ANTICOAGULATION CLINIC   [facility]    [address] [place] [address]   [phone]              Contact Numbers     [facility]  Please call  [phone] to cancel and/or reschedule your appointment   The direct line to the anticoagulant nurse is [phone] on Monday, Wednesday, and Friday. On Thursday, the anticoagulant nurse can be reached directly at [phone].         July 2018 Details    Sun Mon Tue Wed Thu Fri Sat     1               2               3               4               5               6               7                 8               9               10               11               12               13      1.25 mg   See details      14      1.25 mg           15      1.25 mg         16      2.5 mg         17      1.25 mg         18      1.25 mg         19      1.25 mg         20      1.25 mg         21      1.25 mg           22      1.25 mg         23      2.5 mg         24      1.25 mg         25      1.25 mg         26      1.25 mg         27      1.25 mg         28      1.25 mg           29      1.25 mg         30      2.5 mg         31      1.25 mg              Date Details   07/13 This INR  check               How to take your warfarin dose     To take:  1.25 mg Take 0.5 of a 2.5 mg tablet.    To take:  2.5 mg Take 1 of the 2.5 mg tablets.           August 2018 Details    Sun Mon Tue Wed Thu Fri Sat        1      1.25 mg         2      1.25 mg         3      1.25 mg         4      1.25 mg           5      1.25 mg         6            7               8               9               10               11                 12               13               14               15               16               17               18                 19               20               21               22               23               24               25                 26               27               28               29               30               31                 Date Details   No additional details    Date of next INR:  8/6/2018         How to take your warfarin dose     To take:  1.25 mg Take 0.5 of a 2.5 mg tablet.    To take:  2.5 mg Take 1 of the 2.5 mg tablets.

## 2018-07-13 NOTE — PROGRESS NOTES
ANTICOAGULATION FOLLOW-UP CLINIC VISIT    Patient Name:  Tyrel Rene  Date:  7/13/2018  Contact Type:  Face to Face    SUBJECTIVE:     Patient Findings     Positives No Problem Findings           OBJECTIVE    INR Protime   Date Value Ref Range Status   07/13/2018 2.4 (A) 0.86 - 1.14 Final       ASSESSMENT / PLAN  INR assessment THER    Recheck INR In: 3 WEEKS    INR Location Clinic      Anticoagulation Summary as of 7/13/2018     INR goal 2.0-2.5   Today's INR 2.4   Warfarin maintenance plan 2.5 mg (2.5 mg x 1) on Mon; 1.25 mg (2.5 mg x 0.5) all other days   Full warfarin instructions 2.5 mg on Mon; 1.25 mg all other days   Weekly warfarin total 10 mg   No change documented Doreen Finley RN   Plan last modified Caridad Cunningham RN (6/27/2018)   Next INR check 8/6/2018   Target end date Indefinite    Indications   Long-term (current) use of anticoagulants [Z79.01] [Z79.01]  Cerebral artery occlusion with cerebral infarction (HCC) [I63.50] [I63.50]  Cerebral infarction (H) [I63.9]         Anticoagulation Episode Summary     INR check location [facility]    Preferred lab     Send INR reminders to [facility] INR/PROTIME    Comments       Anticoagulation Care Providers     Provider Role Specialty Phone number    Dejon Downs MD [facility] [phone]            See the Encounter Report to view Anticoagulation Flowsheet and Dosing Calendar (Go to Encounters tab in chart review, and find the Anticoagulation Therapy Visit)        Doreen Finley, RN

## 2018-07-18 ENCOUNTER — OFFICE VISIT (OUTPATIENT)
Dept: CARDIOLOGY | Facility: CLINIC | Age: 73
End: 2018-07-18
Attending: NURSE PRACTITIONER
Payer: COMMERCIAL

## 2018-07-18 VITALS
DIASTOLIC BLOOD PRESSURE: 60 MMHG | HEIGHT: 73 IN | WEIGHT: 181.2 LBS | SYSTOLIC BLOOD PRESSURE: 108 MMHG | BODY MASS INDEX: 24.01 KG/M2

## 2018-07-18 DIAGNOSIS — I50.22 CHRONIC SYSTOLIC CONGESTIVE HEART FAILURE (H): ICD-10-CM

## 2018-07-18 LAB
ANION GAP SERPL CALCULATED.3IONS-SCNC: 11.7 MMOL/L (ref 6–17)
BUN SERPL-MCNC: 20 MG/DL (ref 7–30)
CALCIUM SERPL-MCNC: 8.7 MG/DL (ref 8.5–10.5)
CHLORIDE SERPL-SCNC: 104 MMOL/L (ref 98–107)
CO2 SERPL-SCNC: 28 MMOL/L (ref 23–29)
CREAT SERPL-MCNC: 1.59 MG/DL (ref 0.7–1.3)
GFR SERPL CREATININE-BSD FRML MDRD: 43 ML/MIN/1.7M2
GLUCOSE SERPL-MCNC: 91 MG/DL (ref 70–105)
POTASSIUM SERPL-SCNC: 4.7 MMOL/L (ref 3.5–5.1)
SODIUM SERPL-SCNC: 139 MMOL/L (ref 136–145)

## 2018-07-18 PROCEDURE — 36415 COLL VENOUS BLD VENIPUNCTURE: CPT | Performed by: NURSE PRACTITIONER

## 2018-07-18 PROCEDURE — 80048 BASIC METABOLIC PNL TOTAL CA: CPT | Performed by: NURSE PRACTITIONER

## 2018-07-18 PROCEDURE — 99213 OFFICE O/P EST LOW 20 MIN: CPT | Performed by: NURSE PRACTITIONER

## 2018-07-18 NOTE — PROGRESS NOTES
HPI and Plan: [address]500[address]  See dictation    Orders Placed This Encounter   Procedures     Basic metabolic panel     Follow-Up with [facility]       No orders of the defined types were placed in this encounter.      There are no discontinued medications.      Encounter Diagnosis   Name Primary?     Chronic systolic congestive heart failure (H)        CURRENT MEDICATIONS:  Current Outpatient Prescriptions   Medication Sig Dispense Refill     amiodarone (PACERONE/CODARONE) 200 MG tablet Take 1 tablet (200 mg) by mouth daily 90 tablet 0     ASPIRIN NOT PRESCRIBED (INTENTIONAL) continuous prn for other Please choose reason not prescribed, below       carvedilol (COREG) 3.125 MG tablet Take 2 tablets (6.25 mg) by mouth 2 times daily (with meals) 360 tablet 3     digoxin (LANOXIN) 125 MCG tablet Take 1 tablet (125 mcg) by mouth daily 90 tablet 3     ipratropium (ATROVENT) 0.03 % spray Spray 2 sprays into both nostrils every 8 hours as needed for rhinitis 30 mL 11     lisinopril (PRINIVIL/ZESTRIL) 5 MG tablet Take 1 tablet (5 mg) by mouth At Bedtime 180 tablet 3     Multiple Vitamins-Minerals (PRESERVISION AREDS 2 PO) Take 1 capsule by mouth 2 times daily       NITROSTAT 0.4 MG sublingual tablet DISSOLVE 1 TABLET UNDER THE TONGUE EVERY 5 MINUTES AS NEEDED FOR CHEST PAIN 25 tablet 0     ranitidine (ZANTAC) 150 MG tablet Take 1 tablet (150 mg) by mouth 2 times daily 180 tablet      rosuvastatin (CRESTOR) 20 MG tablet Take 1 tablet (20 mg) by mouth daily 30 tablet 11     sildenafil (VIAGRA) 100 MG tablet Take 1 tablet (100 mg) by mouth daily as needed Take 30 min to 4 hours before intercourse.  Never use with nitroglycerin, terazosin or doxazosin. 16 tablet 3     Vitamin D, Cholecalciferol, 1000 UNITS TABS Take 1,000 mg by mouth daily        WARFARIN SODIUM PO Take 2.5 mg by mouth daily 2.5 mg m. & 1.25 row         ALLERGIES     Allergies   Allergen Reactions     Nystatin Other (See Comments)     Make his mouth numb & swelling        PAST MEDICAL HISTORY:  Past Medical History:   Diagnosis Date     Atrial fibrillation (H)     s/p Cardioversion 3/14/2013     Atrial flutter (H)     S/p Aflutter ablation 1/11/2011     CAD (coronary artery disease)     CABG x3 10/2012- LIMA to distal LAD, SVG to OM1 & OM3; cath 10/2012- PTCA to second diagonal and mid LAD, BMS to mid LAD     Cardiogenic shock (H)      Cardiomyopathy (H)      CHF (congestive heart failure) (H)      CVA (cerebral infarction)     residual right hand numbness     ED (erectile dysfunction)      Hyperlipidemia      Hypertension      Neuropathy      Palpitations      SVT (supraventricular tachycardia) (H)     S/p dual chamber ICD 10/11/12- upgraded to BIV ICD 6/2013     Syncope        PAST SURGICAL HISTORY:  Past Surgical History:   Procedure Laterality Date     BYPASS GRAFT ARTERY CORONARY  10/2/2012    Procedure: BYPASS GRAFT ARTERY CORONARY;  Coronary Artery Bypass Graft x3 (LAD, Diag, OM) with Endovein [place] (On-Pump);  Surgeon: Yeyo Lyman MD;  Location: [facility] OR     CARDIOVERSION  3/14/2013     CORONARY ANGIOGRAPHY ADULT ORDER  10/2/12     CORONARY ARTERY BYPASS  10/2/12    LIMA to LAD, SVG to OM1 and OM3     H ABLATION ATRIAL FLUTTER  1/11/2011     HAND SURGERY       HEART CATH, ANGIOPLASTY  10/2/12    PTCA to second diagonal and mid LAD, BMS to mid LAD     HERNIA REPAIR       ORTHOPEDIC SURGERY Right 2006    cut on table saw     RELOCATE GENERATOR ICD/PACEMAKER         FAMILY HISTORY:  Family History   Problem Relation Age of Onset     Alcohol/Drug Father      HEART DISEASE Father 71     Alzheimer Disease Sister      Alcohol/Drug Sister      Alcohol/Drug Sister      GASTROINTESTINAL DISEASE Sister      Obesity Sister      Hypertension Sister      HEART DISEASE Sister      Hypertension Sister      HEART DISEASE Daughter      afib     Arrhythmia Daughter      Multiple Sclerosis Daughter      HEART DISEASE Daughter      afib     Arrhythmia Daughter      Cancer Daughter  "     lymphoma     Aneurysm Mother        SOCIAL HISTORY:  Social History     Social History     Marital status:      Spouse name: N/A     Number of children: N/A     Years of education: N/A     Social History Main Topics     Smoking status: Former Smoker     Packs/day: 1.00     Years: 14.00     Types: Cigarettes     Quit date: 7/10/1972     Smokeless tobacco: Never Used     Alcohol use No     Drug use: No     Sexual activity: Yes     Partners: Female     Other Topics Concern     Parent/Sibling W/ Cabg, Mi Or Angioplasty Before 65f 55m? No     Caffeine Concern Yes     4 cups coffee daily     Sleep Concern No     Stress Concern No     Weight Concern Yes     gain 2lbs     Special Diet Yes     low sodium     Exercise Yes     walking, doing sittups, played pickle ball in summer     Seat Belt Yes     Social History Narrative       Review of Systems:  Skin:  Negative       Eyes:  Positive for glasses    ENT:  Negative      Respiratory:  Positive for dyspnea on exertion SOB with some activity- no more than usual   Cardiovascular:    dizziness;Positive for worse since medication changes- per patient. Same as last visit  Gastroenterology: Positive for heartburn treated  Genitourinary:  Negative      Musculoskeletal:  Negative      Neurologic:  Negative      Psychiatric:  Negative      Heme/Lymph/Imm:  Negative      Endocrine:  Negative        Physical Exam:  Vitals: /60  Ht 1.854 m (6' 1\")  Wt 82.2 kg (181 lb 3.2 oz)  BMI 23.91 kg/m2    Constitutional:  cooperative, alert and oriented, well developed, well nourished, in no acute distress        Skin:  warm and dry to the touch, no apparent skin lesions or masses noted   ICD incision in the left infraclavicular area was well-healed      Head:  normocephalic, no masses or lesions        Eyes:  pupils equal and round;conjunctivae and lids unremarkable;sclera white;no xanthalasma        Lymph:      ENT:  no pallor or cyanosis        Neck:  carotid pulses are full " and equal bilaterally;JVP normal        Respiratory:  clear to auscultation;healed median sternotomy scar         Cardiac: regular rhythm;no murmurs, gallops or rubs detected                pulses full and equal                                        GI:  abdomen soft;BS normoactive        Extremities and Muscular Skeletal:  no deformities, clubbing, cyanosis, erythema observed;no edema              Neurological:  no gross motor deficits;affect appropriate        Psych:  Alert and Oriented x 3        CC  Hayde Galvez, APRN CNP  [address]  [address]  [place] [address]

## 2018-07-18 NOTE — MR AVS SNAPSHOT
After Visit Summary   7/18/2018    Tyrel Rene    MRN: [id number]           Patient Information     Date Of Birth          1945        Visit Information        Provider Department      7/18/2018 1:50 PM Hayde Galvez APRN CNP [facility]        Today's Diagnoses     Chronic systolic congestive heart failure (H)          Care Instructions    Call CORE nurse for any questions or concerns:  [phone]   *If you have concerns after hours, please call [phone], option 2 to speak with on call Cardiologist.    1. Medication changes from today:  No changes      2. Weigh yourself daily and write it down.     3. Call CORE nurse if your weight is up more than 2 pounds in one day or 5 pounds in one week.     4. Call CORE nurse if you feel more short of breath, have more abdominal bloating, or leg swelling.     5. Continue low sodium diet (less than 2000 mg daily). If you eat less salt, you will retain less fluid.     6. Alcohol can weaken your heart further. You should avoid alcohol or limit its use to special times, such as a holiday or birthday.      7. Do NOT take Aleve or ibuprofen without talking to your doctor first.      8. Lab Results:      Component      Latest Ref Rng & Units 5/23/2018 7/18/2018   Sodium      136 - 145 mmol/L 142 139   Potassium      3.5 - 5.1 mmol/L 4.7 4.7   Chloride      98 - 107 mmol/L 108 104   Carbon Dioxide      23 - 29 mmol/L 28 28   Anion Gap      6 - 17 mmol/L 6 11.7   Glucose      70 - 105 mg/dL 77 91   Urea Nitrogen      7 - 30 mg/dL 22 20   Creatinine      0.70 - 1.30 mg/dL 1.52 (H) 1.59 (H)   GFR Estimate      >60 mL/min/1.7m2 45 (L) 43 (L)   GFR Estimate If Black      >60 mL/min/1.7m2 54 (L) 52 (L)   Calcium      8.5 - 10.5 mg/dL 8.6 8.7     [facility]: Cardiomyopathy, Optimization, Rehabilitation, Education  The [facility] is a heart failure specialty clinic within the [facility] where you will work  with specialized nurse practitioners, physician assistants, doctors, and registered nurses. They are dedicated to helping patients with heart failure to carefully adjust medications, receive education, and learn who and when to call if symptoms develop. They specialize in helping you better understand your condition, slow the progression of your disease, improve the length and quality of your life, help you detect future heart problems before they become life threatening, and avoid hospitalizations.              Follow-ups after your visit        Additional Services     Follow-Up with [facility]                 Your next 10 appointments already scheduled     Aug 06, 2018  2:00 PM CDT   Anticoagulation Visit with [facility]   [facility]    [address]  [facility] [address]   [phone]            Sep 10, 2018 10:30 AM CDT   [facility] EP RETURN with Joelle Rodríguez PA-C   [facility] ([facility])    [address] [place] [address] [address]  [facility] [address]   [phone] OPT 2            Oct 10, 2018  4:30 PM CDT   Remote ICD Check with MARQUEZ DCR2   [facility] ([facility])    [address] [place] [address] [address]  [facility] [address]   [phone] OPT 2           This appointment is for a remote check of your debrillator.  This is not an appointment at the office.              Future tests that were ordered for you today     Open Future Orders        Priority Expected Expires Ordered    Basic metabolic panel Routine 1/16/2019 7/18/2019 7/18/2018    Follow-Up with [facility] Routine 1/16/2019 7/18/2019 7/18/2018            Who to contact     If you have questions or need follow up information about today's clinic visit or your schedule please contact [facility] directly at  "[phone].  Normal or non-critical lab and imaging results will be communicated to you by MyChart, letter or phone within 4 business days after the clinic has received the results. If you do not hear from us within 7 days, please contact the clinic through [employer] or phone. If you have a critical or abnormal lab result, we will notify you by phone as soon as possible.  Submit refill requests through [employer] or call your pharmacy and they will forward the refill request to us. Please allow 3 business days for your refill to be completed.          Additional Information About Your Visit        [employer] Information     [employer] gives you secure access to your electronic health record. If you see a primary care provider, you can also send messages to your care team and make appointments. If you have questions, please call your primary care clinic.  If you do not have a primary care provider, please call [phone] and they will assist you.        Care EveryWhere ID     This is your Care EveryWhere ID. This could be used by other organizations to access your [place] medical records  [id number]        Your Vitals Were     Height BMI (Body Mass Index)                1.854 m (6' 1\") 23.91 kg/m2           Blood Pressure from Last 3 Encounters:   07/18/18 108/60   05/23/18 108/64   02/26/18 120/74    Weight from Last 3 Encounters:   07/18/18 82.2 kg (181 lb 3.2 oz)   05/23/18 82.1 kg (181 lb 1.6 oz)   02/26/18 85.3 kg (188 lb)              We Performed the Following     Follow-Up with [facility]        Primary Care Provider Office Phone # Fax #    Dejon Downs [phone]       [address] [facility] [address]        Equal Access to Services     SANDY OTERO : Hadii cornelius De Anda, wadonnada shirin, qawendy kaalmada aaliyah, deb bird. So [facility] [phone].    ATENCIÓN: Si habla español, tiene a hagen disposición servicios gratuitos de asistencia " lingüísticaArlene Josue al [phone].    We comply with applicable federal civil rights laws and [place] laws. We do not discriminate on the basis of race, color, national origin, age, disability, sex, sexual orientation, or gender identity.            Thank you!     Thank you for choosing [facility]  for your care. Our goal is always to provide you with excellent care. Hearing back from our patients is one way we can continue to improve our services. Please take a few minutes to complete the written survey that you may receive in the mail after your visit with us. Thank you!             Your Updated Medication List - Protect others around you: Learn how to safely use, store and throw away your medicines at www.disposemymeds.org.          This list is accurate as of 7/18/18  2:29 PM.  Always use your most recent med list.                   Brand Name Dispense Instructions for use Diagnosis    amiodarone 200 MG tablet    PACERONE/CODARONE    90 tablet    Take 1 tablet (200 mg) by mouth daily    Ventricular tachycardia (H)       ASPIRIN NOT PRESCRIBED    INTENTIONAL     continuous prn for other Please choose reason not prescribed, below        carvedilol 3.125 MG tablet    COREG    360 tablet    Take 2 tablets (6.25 mg) by mouth 2 times daily (with meals)    Ventricular tachycardia (H)       digoxin 125 MCG tablet    LANOXIN    90 tablet    Take 1 tablet (125 mcg) by mouth daily    Atrial fibrillation (H)       ipratropium 0.03 % spray    ATROVENT    30 mL    Spray 2 sprays into both nostrils every 8 hours as needed for rhinitis    Chronic rhinitis, unspecified type       lisinopril 5 MG tablet    PRINIVIL/ZESTRIL    180 tablet    Take 1 tablet (5 mg) by mouth At Bedtime    Chronic systolic congestive heart failure (H)       NITROSTAT 0.4 MG sublingual tablet   Generic drug:  nitroGLYcerin     25 tablet    DISSOLVE 1 TABLET UNDER THE TONGUE EVERY 5 MINUTES AS NEEDED FOR CHEST  PAIN    Postsurgical aortocoronary bypass status       PRESERVISION AREDS 2 PO      Take 1 capsule by mouth 2 times daily        ranitidine 150 MG tablet    ZANTAC    180 tablet    Take 1 tablet (150 mg) by mouth 2 times daily    S/P CABG (coronary artery bypass graft)       rosuvastatin 20 MG tablet    CRESTOR    30 tablet    Take 1 tablet (20 mg) by mouth daily        sildenafil 100 MG tablet    VIAGRA    16 tablet    Take 1 tablet (100 mg) by mouth daily as needed Take 30 min to 4 hours before intercourse.  Never use with nitroglycerin, terazosin or doxazosin.    Erectile dysfunction, unspecified erectile dysfunction type       Vitamin D (Cholecalciferol) 1000 units Tabs      Take 1,000 mg by mouth daily        WARFARIN SODIUM PO      Take 2.5 mg by mouth daily 2.5 mg m. & 1.25 row

## 2018-07-18 NOTE — PATIENT INSTRUCTIONS
Call CORE nurse for any questions or concerns:  [phone]   *If you have concerns after hours, please call [phone], option 2 to speak with on call Cardiologist.    1. Medication changes from today:  No changes      2. Weigh yourself daily and write it down.     3. Call CORE nurse if your weight is up more than 2 pounds in one day or 5 pounds in one week.     4. Call CORE nurse if you feel more short of breath, have more abdominal bloating, or leg swelling.     5. Continue low sodium diet (less than 2000 mg daily). If you eat less salt, you will retain less fluid.     6. Alcohol can weaken your heart further. You should avoid alcohol or limit its use to special times, such as a holiday or birthday.      7. Do NOT take Aleve or ibuprofen without talking to your doctor first.      8. Lab Results:      Component      Latest Ref Rng & Units 5/23/2018 7/18/2018   Sodium      136 - 145 mmol/L 142 139   Potassium      3.5 - 5.1 mmol/L 4.7 4.7   Chloride      98 - 107 mmol/L 108 104   Carbon Dioxide      23 - 29 mmol/L 28 28   Anion Gap      6 - 17 mmol/L 6 11.7   Glucose      70 - 105 mg/dL 77 91   Urea Nitrogen      7 - 30 mg/dL 22 20   Creatinine      0.70 - 1.30 mg/dL 1.52 (H) 1.59 (H)   GFR Estimate      >60 mL/min/1.7m2 45 (L) 43 (L)   GFR Estimate If Black      >60 mL/min/1.7m2 54 (L) 52 (L)   Calcium      8.5 - 10.5 mg/dL 8.6 8.7     [facility]: Cardiomyopathy, Optimization, Rehabilitation, Education  The [facility] is a heart failure specialty clinic within the [facility] where you will work with specialized nurse practitioners, physician assistants, doctors, and registered nurses. They are dedicated to helping patients with heart failure to carefully adjust medications, receive education, and learn who and when to call if symptoms develop. They specialize in helping you better understand your condition, slow the progression of your disease, improve the length and quality of your life, help you  detect future heart problems before they become life threatening, and avoid hospitalizations.

## 2018-07-18 NOTE — LETTER
7/18/2018    Dejon Downs MD  [address]  [facility] [address]    RE: Tyrel Rene       Dear Colleague,    I had the pleasure of seeing Tyrel Rene in the [facility].    HPI and Plan: [id number]  See dictation    Orders Placed This Encounter   Procedures     Basic metabolic panel     Follow-Up with [facility]       No orders of the defined types were placed in this encounter.      There are no discontinued medications.      Encounter Diagnosis   Name Primary?     Chronic systolic congestive heart failure (H)        CURRENT MEDICATIONS:  Current Outpatient Prescriptions   Medication Sig Dispense Refill     amiodarone (PACERONE/CODARONE) 200 MG tablet Take 1 tablet (200 mg) by mouth daily 90 tablet 0     ASPIRIN NOT PRESCRIBED (INTENTIONAL) continuous prn for other Please choose reason not prescribed, below       carvedilol (COREG) 3.125 MG tablet Take 2 tablets (6.25 mg) by mouth 2 times daily (with meals) 360 tablet 3     digoxin (LANOXIN) 125 MCG tablet Take 1 tablet (125 mcg) by mouth daily 90 tablet 3     ipratropium (ATROVENT) 0.03 % spray Spray 2 sprays into both nostrils every 8 hours as needed for rhinitis 30 mL 11     lisinopril (PRINIVIL/ZESTRIL) 5 MG tablet Take 1 tablet (5 mg) by mouth At Bedtime 180 tablet 3     Multiple Vitamins-Minerals (PRESERVISION AREDS 2 PO) Take 1 capsule by mouth 2 times daily       NITROSTAT 0.4 MG sublingual tablet DISSOLVE 1 TABLET UNDER THE TONGUE EVERY 5 MINUTES AS NEEDED FOR CHEST PAIN 25 tablet 0     ranitidine (ZANTAC) 150 MG tablet Take 1 tablet (150 mg) by mouth 2 times daily 180 tablet      rosuvastatin (CRESTOR) 20 MG tablet Take 1 tablet (20 mg) by mouth daily 30 tablet 11     sildenafil (VIAGRA) 100 MG tablet Take 1 tablet (100 mg) by mouth daily as needed Take 30 min to 4 hours before intercourse.  Never use with nitroglycerin, terazosin or doxazosin. 16 tablet 3     Vitamin D, Cholecalciferol, 1000 UNITS TABS Take  1,000 mg by mouth daily        WARFARIN SODIUM PO Take 2.5 mg by mouth daily 2.5 mg m. & 1.25 row         ALLERGIES     Allergies   Allergen Reactions     Nystatin Other (See Comments)     Make his mouth numb & swelling       PAST MEDICAL HISTORY:  Past Medical History:   Diagnosis Date     Atrial fibrillation (H)     s/p Cardioversion 3/14/2013     Atrial flutter (H)     S/p Aflutter ablation 1/11/2011     CAD (coronary artery disease)     CABG x3 10/2012- LIMA to distal LAD, SVG to OM1 & OM3; cath 10/2012- PTCA to second diagonal and mid LAD, BMS to mid LAD     Cardiogenic shock (H)      Cardiomyopathy (H)      CHF (congestive heart failure) (H)      CVA (cerebral infarction)     residual right hand numbness     ED (erectile dysfunction)      Hyperlipidemia      Hypertension      Neuropathy      Palpitations      SVT (supraventricular tachycardia) (H)     S/p dual chamber ICD 10/11/12- upgraded to BIV ICD 6/2013     Syncope        PAST SURGICAL HISTORY:  Past Surgical History:   Procedure Laterality Date     BYPASS GRAFT ARTERY CORONARY  10/2/2012    Procedure: BYPASS GRAFT ARTERY CORONARY;  Coronary Artery Bypass Graft x3 (LAD, Diag, OM) with Endovein [place] (On-Pump);  Surgeon: Yeyo Lyman MD;  Location: [facility] OR     CARDIOVERSION  3/14/2013     CORONARY ANGIOGRAPHY ADULT ORDER  10/2/12     CORONARY ARTERY BYPASS  10/2/12    LIMA to LAD, SVG to OM1 and OM3     H ABLATION ATRIAL FLUTTER  1/11/2011     HAND SURGERY       HEART CATH, ANGIOPLASTY  10/2/12    PTCA to second diagonal and mid LAD, BMS to mid LAD     HERNIA REPAIR       ORTHOPEDIC SURGERY Right 2006    cut on table saw     RELOCATE GENERATOR ICD/PACEMAKER         FAMILY HISTORY:  Family History   Problem Relation Age of Onset     Alcohol/Drug Father      HEART DISEASE Father 71     Alzheimer Disease Sister      Alcohol/Drug Sister      Alcohol/Drug Sister      GASTROINTESTINAL DISEASE Sister      Obesity Sister      Hypertension Sister       "HEART DISEASE Sister      Hypertension Sister      HEART DISEASE Daughter      afib     Arrhythmia Daughter      Multiple Sclerosis Daughter      HEART DISEASE Daughter      afib     Arrhythmia Daughter      Cancer Daughter      lymphoma     Aneurysm Mother        SOCIAL HISTORY:  Social History     Social History     Marital status:      Spouse name: N/A     Number of children: N/A     Years of education: N/A     Social History Main Topics     Smoking status: Former Smoker     Packs/day: 1.00     Years: 14.00     Types: Cigarettes     Quit date: 7/10/1972     Smokeless tobacco: Never Used     Alcohol use No     Drug use: No     Sexual activity: Yes     Partners: Female     Other Topics Concern     Parent/Sibling W/ Cabg, Mi Or Angioplasty Before 65f 55m? No     Caffeine Concern Yes     4 cups coffee daily     Sleep Concern No     Stress Concern No     Weight Concern Yes     gain 2lbs     Special Diet Yes     low sodium     Exercise Yes     walking, doing sittups, played [employer] ball in summer     Seat Belt Yes     Social History Narrative       Review of Systems:  Skin:  Negative       Eyes:  Positive for glasses    ENT:  Negative      Respiratory:  Positive for dyspnea on exertion SOB with some activity- no more than usual   Cardiovascular:    dizziness;Positive for worse since medication changes- per patient. Same as last visit  Gastroenterology: Positive for heartburn treated  Genitourinary:  Negative      Musculoskeletal:  Negative      Neurologic:  Negative      Psychiatric:  Negative      Heme/Lymph/Imm:  Negative      Endocrine:  Negative        Physical Exam:  Vitals: /60  Ht 1.854 m (6' 1\")  Wt 82.2 kg (181 lb 3.2 oz)  BMI 23.91 kg/m2    Constitutional:  cooperative, alert and oriented, well developed, well nourished, in no acute distress        Skin:  warm and dry to the touch, no apparent skin lesions or masses noted   ICD incision in the left infraclavicular area was well-healed  "     Head:  normocephalic, no masses or lesions        Eyes:  pupils equal and round;conjunctivae and lids unremarkable;sclera white;no xanthalasma        Lymph:      ENT:  no pallor or cyanosis        Neck:  carotid pulses are full and equal bilaterally;JVP normal        Respiratory:  clear to auscultation;healed median sternotomy scar         Cardiac: regular rhythm;no murmurs, gallops or rubs detected                pulses full and equal                                        GI:  abdomen soft;BS normoactive        Extremities and Muscular Skeletal:  no deformities, clubbing, cyanosis, erythema observed;no edema              Neurological:  no gross motor deficits;affect appropriate        Psych:  Alert and Oriented x 3        CC  JAMAL Flores CNP  [address]  [address]  [place] [address]                Thank you for allowing me to participate in the care of your patient.      Sincerely,     JAMAL [facility]    cc:   JAMAL Flores CNP  [address]  [address]  [place] [address]

## 2018-07-18 NOTE — LETTER
7/18/2018      Dejon Downs MD  [address]  [facility] [address]      RE: Tyrel Rene       Dear Colleague,    I had the pleasure of seeing Tyrel Rene in the [facility].    Service Date: 07/18/2018      HISTORY OF PRESENT ILLNESS:     I had the pleasure of seeing Tyrel Rene, a pleasant 72-year-old patient who returns to the [facility] regarding his severe ischemic cardiomyopathy, chronic systolic congestive heart failure, chronic atrial fibrillation and history of VT.    He suffered significant myocardial infarction in the past and has had a fairly stable ejection fraction of 25%-30%.      He has an ICD in place for a number of years and was noted to have episodes of polymorphic ventricular tachycardia last fall, prompting Dr. Costello from Electrophysiology to initiate oral amiodarone.  However, despite being on amiodarone he developed a prolonged episode of sustained ventricular tachycardia causing syncope on 12/03/2017 while he was on the elliptical machine.  His device delivered 4 rounds of antitachycardia pacing, which were unsuccessful, and then a shock which converted him back to normal sinus rhythm.  He remained in normal sinus rhythm for a few days and then converted back to atrial fibrillation.  He has remained on amiodarone and had no recurrent episodes of significant ventricular arrhythmia or required any device therapy since then.  He had dizziness symptoms in the past, and after starting amiodarone dizziness episodes increased.  He does not walk in the neighborhood anymore because he feels that he is not steady and he cannot walk a straight line very well due to dizziness.      He did undergo repeat coronary angiogram which did not show any significant new disease responsible for the ventricular tachycardia.  He had been experiencing some persistent dizziness, and this was thought to be due to low blood pressures, resulting in a decrease in his  carvedilol dose.  At that time his dizziness improved somewhat, but after adding amiodarone he states the dizziness has persisted.  It does not change with movement.  It does not change with exercise.  He did play Pickleball once this summer, and the dizziness did not change.      He denies any defibrillator shocks.  He denies increased shortness of breath, PND, syncope or near-syncope.  His blood pressure at home has been 120-130/60.  He works very hard on a strict low-salt diet.  He is not on a diuretic.      PHYSICAL EXAMINATION:    VITAL SIGNS:  His blood pressure is 108/60, heart rate is 64 and weight is 181 pounds.     LUNGS:  Clear.   HEART:  Rhythm is regular.  No cardiac murmur or carotid bruits noted.   EXTREMITIES:  No peripheral edema.      DIAGNOSTIC STUDIES:   Basic metabolic panel:  Sodium 139, potassium 4.7, BUN 20, creatinine 1.59, GFR 43.      01/18/2018 Fasting lipid profile:  Cholesterol 130, HDL 46, LDL 58, triglycerides 129.      His last TSH was 05/23/2018 which was 1.73.      His last device check was 06/14/2018 showing 100% BiV paced.  No ventricular arrhythmias noted.      His device check from 02/2018 showed 1 NSVT.  EGMs showed a few PVCs and 2 episodes of 4-7 beats of VT.      IMPRESSION AND PLAN:    Mr. Tyrel Rene is a pleasant 72-year-old gentleman with severe ischemic cardiomyopathy with an ejection fraction of 25%-30%, paroxysmal ventricular tachycardia requiring an ICD shock in 12/2017, chronic atrial fibrillation.  He has had coronary artery disease and previous bypass surgery but did not have any new coronary artery disease to explain his arrhythmias.  Fortunately, he seems to be doing well on amiodarone, although he has persistent dizziness for unclear reasons.  I have not made any medication change.  He will follow up with RENNY Moreno, in Electrophysiology in September for review of his VT and amiodarone.  He will follow up with me in 01/2019 or sooner if needed.  I reviewed  the [facility] direct number if needed.         JAMAL BOOTHE, CNP             D: 2018   T: 2018   MT: BRENT      Name:     ARTEM ELIZALDE   MRN:      [id number]        Account:      [id number]   :      1945           Service Date: 2018      Document: [id number]           Outpatient Encounter Prescriptions as of 2018   Medication Sig Dispense Refill     amiodarone (PACERONE/CODARONE) 200 MG tablet Take 1 tablet (200 mg) by mouth daily 90 tablet 0     ASPIRIN NOT PRESCRIBED (INTENTIONAL) continuous prn for other Please choose reason not prescribed, below       carvedilol (COREG) 3.125 MG tablet Take 2 tablets (6.25 mg) by mouth 2 times daily (with meals) 360 tablet 3     digoxin (LANOXIN) 125 MCG tablet Take 1 tablet (125 mcg) by mouth daily 90 tablet 3     ipratropium (ATROVENT) 0.03 % spray Spray 2 sprays into both nostrils every 8 hours as needed for rhinitis 30 mL 11     lisinopril (PRINIVIL/ZESTRIL) 5 MG tablet Take 1 tablet (5 mg) by mouth At Bedtime 180 tablet 3     Multiple Vitamins-Minerals (PRESERVISION AREDS 2 PO) Take 1 capsule by mouth 2 times daily       NITROSTAT 0.4 MG sublingual tablet DISSOLVE 1 TABLET UNDER THE TONGUE EVERY 5 MINUTES AS NEEDED FOR CHEST PAIN 25 tablet 0     ranitidine (ZANTAC) 150 MG tablet Take 1 tablet (150 mg) by mouth 2 times daily 180 tablet      rosuvastatin (CRESTOR) 20 MG tablet Take 1 tablet (20 mg) by mouth daily 30 tablet 11     sildenafil (VIAGRA) 100 MG tablet Take 1 tablet (100 mg) by mouth daily as needed Take 30 min to 4 hours before intercourse.  Never use with nitroglycerin, terazosin or doxazosin. 16 tablet 3     Vitamin D, Cholecalciferol, 1000 UNITS TABS Take 1,000 mg by mouth daily        WARFARIN SODIUM PO Take 2.5 mg by mouth daily 2.5 mg m. & 1.25 row       No facility-administered encounter medications on file as of 2018.      Again, thank you for allowing me to participate in the care of your patient.       Sincerely,    JAMAL [facility]

## 2018-07-19 NOTE — PROGRESS NOTES
Service Date: 07/18/2018      HISTORY OF PRESENT ILLNESS:     I had the pleasure of seeing Tyrel Rene, a pleasant 72-year-old patient who [facility] regarding his severe ischemic cardiomyopathy, chronic systolic congestive heart failure, chronic atrial fibrillation and history of VT.    He suffered significant myocardial infarction in the past and has had a fairly stable ejection fraction of 25%-30%.      He has an ICD in place for a number of years and was noted to have episodes of polymorphic ventricular tachycardia last fall, prompting Dr. Costello from Electrophysiology to initiate oral amiodarone.  However, despite being on amiodarone he developed a prolonged episode of sustained ventricular tachycardia causing syncope on 12/03/2017 while he was on the elliptical machine.  His device delivered 4 rounds of antitachycardia pacing, which were unsuccessful, and then a shock which converted him back to normal sinus rhythm.  He remained in normal sinus rhythm for a few days and then converted back to atrial fibrillation.  He has remained on amiodarone and had no recurrent episodes of significant ventricular arrhythmia or required any device therapy since then.  He had dizziness symptoms in the past, and after starting amiodarone dizziness episodes increased.  He does not walk in the neighborhood anymore because he feels that he is not steady and he cannot walk a straight line very well due to dizziness.      He did undergo repeat coronary angiogram which did not show any significant new disease responsible for the ventricular tachycardia.  He had been experiencing some persistent dizziness, and this was thought to be due to low blood pressures, resulting in a decrease in his carvedilol dose.  At that time his dizziness improved somewhat, but after adding amiodarone he states the dizziness has persisted.  It does not change with movement.  It does not change with exercise.  He did play Pickleball  once this summer, and the dizziness did not change.      He denies any defibrillator shocks.  He denies increased shortness of breath, PND, syncope or near-syncope.  His blood pressure at home has been 120-130/60.  He works very hard on a strict low-salt diet.  He is not on a diuretic.      PHYSICAL EXAMINATION:    VITAL SIGNS:  His blood pressure is 108/60, heart rate is 64 and weight is 181 pounds.     LUNGS:  Clear.   HEART:  Rhythm is regular.  No cardiac murmur or carotid bruits noted.   EXTREMITIES:  No peripheral edema.      DIAGNOSTIC STUDIES:   Basic metabolic panel:  Sodium 139, potassium 4.7, BUN 20, creatinine 1.59, GFR 43.      01/18/2018 Fasting lipid profile:  Cholesterol 130, HDL 46, LDL 58, triglycerides 129.      His last TSH was 05/23/2018 which was 1.73.      His last device check was 06/14/2018 showing 100% BiV paced.  No ventricular arrhythmias noted.      His device check from 02/2018 showed 1 NSVT.  EGMs showed a few PVCs and 2 episodes of 4-7 beats of VT.      IMPRESSION AND PLAN:    Mr. Artem Rene is a pleasant 72-year-old gentleman with severe ischemic cardiomyopathy with an ejection fraction of 25%-30%, paroxysmal ventricular tachycardia requiring an ICD shock in 12/2017, chronic atrial fibrillation.  He has had coronary artery disease and previous bypass surgery but did not have any new coronary artery disease to explain his arrhythmias.  Fortunately, he seems to be doing well on amiodarone, although he has persistent dizziness for unclear reasons.  I have not made any medication change.  He will follow up with RENNY Moreno, in Electrophysiology in September for review of his VT and amiodarone.  He will follow up with me in 01/2019 or sooner if needed.  I reviewed the [facility] direct number if needed.         JAMAL BOOTHE, CNP             D: 07/18/2018   T: 07/18/2018   MT: BRENT      Name:     ARTEM RENE   MRN:      [id number]        Account:      [id number]    :      1945           Service Date: 2018      Document: [id number]

## 2018-07-24 DIAGNOSIS — Z79.01 LONG-TERM (CURRENT) USE OF ANTICOAGULANTS: ICD-10-CM

## 2018-07-24 NOTE — TELEPHONE ENCOUNTER
"Requested Prescriptions   Pending Prescriptions Disp Refills     warfarin (COUMADIN) 2.5 MG tablet [Pharmacy Med Name: WARFARIN SODIUM 2.5 MG TABLET] 102 tablet 3     Sig: TAKE 1 TABLET (2.5 MG) BY MOUTH DAILY TAKING 3.75MG (1&1/2TABS) MON, FRI AND 2.5MG REST OF THE WEEK    Vitamin K Antagonists Failed    7/24/2018 12:00 PM       Failed - INR is within goal in the past 6 weeks    Confirm INR is within goal in the past 6 weeks.     Recent Labs   Lab Test 07/13/18   INR  2.4*                      Passed - Recent (12 mo) or future (30 days) visit within the authorizing provider's specialty    Patient had office visit in the last 12 months or has a visit in the next 30 days with authorizing provider or within the authorizing provider's specialty.  See \"Patient Info\" tab in inbasket, or \"Choose Columns\" in Meds & Orders section of the refill encounter.           Passed - Patient is 18 years of age or older      Warfarin 2.5mg      Last Written Prescription Date:  unknown  Last Fill Quantity: unknown,   # refills: unknown  Last Office Visit: 10/18/2017  Future Office visit: 8/6/2018, but only for INR          "

## 2018-07-25 RX ORDER — WARFARIN SODIUM 2.5 MG/1
TABLET ORAL
Qty: 102 TABLET | Refills: 3 | Status: SHIPPED | OUTPATIENT
Start: 2018-07-25 | End: 2018-11-20

## 2018-08-01 ENCOUNTER — TRANSFERRED RECORDS (OUTPATIENT)
Dept: HEALTH INFORMATION MANAGEMENT | Facility: CLINIC | Age: 73
End: 2018-08-01

## 2018-08-06 ENCOUNTER — ANTICOAGULATION THERAPY VISIT (OUTPATIENT)
Dept: NURSING | Facility: CLINIC | Age: 73
End: 2018-08-06
Payer: COMMERCIAL

## 2018-08-06 DIAGNOSIS — Z79.01 LONG-TERM (CURRENT) USE OF ANTICOAGULANTS: ICD-10-CM

## 2018-08-06 DIAGNOSIS — I63.9 CEREBRAL INFARCTION (H): ICD-10-CM

## 2018-08-06 DIAGNOSIS — I63.50 CEREBRAL ARTERY OCCLUSION WITH CEREBRAL INFARCTION (H): ICD-10-CM

## 2018-08-06 LAB — INR POINT OF CARE: 2.3 (ref 0.86–1.14)

## 2018-08-06 PROCEDURE — 85610 PROTHROMBIN TIME: CPT | Mod: QW

## 2018-08-06 PROCEDURE — 36416 COLLJ CAPILLARY BLOOD SPEC: CPT

## 2018-08-06 PROCEDURE — 99207 ZZC NO CHARGE NURSE ONLY: CPT

## 2018-08-06 NOTE — MR AVS SNAPSHOT
Tyrel Rene   8/6/2018 2:00 PM   Anticoagulation Therapy Visit    Description:  72 year old male   Provider:  [facility]   Department:  Bx Nurse           INR as of 8/6/2018     Today's INR 2.3      Anticoagulation Summary as of 8/6/2018     INR goal 2.0-2.5   Today's INR 2.3   Full warfarin instructions 2.5 mg on Mon; 1.25 mg all other days   Next INR check 9/5/2018    Indications   Long-term (current) use of anticoagulants [Z79.01] [Z79.01]  Cerebral artery occlusion with cerebral infarction (HCC) [I63.50] [I63.50]  Cerebral infarction (H) [I63.9]         Your next Anticoagulation Clinic appointment(s)     Sep 05, 2018  2:15 PM CDT   An[facility] with  [facility] CL[facility]    [address] [place] [id number] [phone]              Contact Numbers     [facility]  Please call  [phone] to cancel and/or reschedule your appointment   The direct line to the anticoagulant nurse is [phone] on Monday, Wednesday, and Friday. On Thursday, the anticoagulant nurse can be reached directly at [phone].         August 2018 Details    Sun Mon Tue Wed Thu Fri Sat        1               2               3               4                 5               6      2.5 mg   See details      7      1.25 mg         8      1.25 mg         9      1.25 mg         10      1.25 mg         11      1.25 mg           12      1.25 mg         13      2.5 mg         14      1.25 mg         15      1.25 mg         16      1.25 mg         17      1.25 mg         18      1.25 mg           19      1.25 mg         20      2.5 mg         21      1.25 mg         22      1.25 mg         23      1.25 mg         24      1.25 mg         25      1.25 mg           26      1.25 mg         27      2.5 mg         28      1.25 mg         29      1.25 mg         30      1.25 mg         31       1.25 mg           Date Details   08/06 This INR check               How to take your warfarin dose     To take:  1.25 mg Take 0.5 of a 2.5 mg tablet.    To take:  2.5 mg Take 1 of the 2.5 mg tablets.           September 2018 Details    Sun Mon Tue Wed Thu Fri Sat           1      1.25 mg           2      1.25 mg         3      2.5 mg         4      1.25 mg         5            6               7               8                 9               10               11               12               13               14               15                 16               17               18               19               20               21               22                 23               24               25               26               27               28               29                 30                      Date Details   No additional details    Date of next INR:  9/5/2018         How to take your warfarin dose     To take:  1.25 mg Take 0.5 of a 2.5 mg tablet.    To take:  2.5 mg Take 1 of the 2.5 mg tablets.

## 2018-08-06 NOTE — PROGRESS NOTES
ANTICOAGULATION FOLLOW-UP CLINIC VISIT    Patient Name:  Tyrel Rene  Date:  8/6/2018  Contact Type:  Face to Face    SUBJECTIVE:     Patient Findings     Positives No Problem Findings           OBJECTIVE    INR Protime   Date Value Ref Range Status   08/06/2018 2.3 (A) 0.86 - 1.14 Final       ASSESSMENT / PLAN  INR assessment THER    Recheck INR In: 4 WEEKS    INR Location Clinic      Anticoagulation Summary as of 8/6/2018     INR goal 2.0-2.5   Today's INR 2.3   Warfarin maintenance plan 2.5 mg (2.5 mg x 1) on Mon; 1.25 mg (2.5 mg x 0.5) all other days   Full warfarin instructions 2.5 mg on Mon; 1.25 mg all other days   Weekly warfarin total 10 mg   No change documented Doreen Finley RN   Plan last modified Caridad Cunningham RN (6/27/2018)   Next INR check 9/5/2018   Target end date Indefinite    Indications   Long-term (current) use of anticoagulants [Z79.01] [Z79.01]  Cerebral artery occlusion with cerebral infarction (HCC) [I63.50] [I63.50]  Cerebral infarction (H) [I63.9]         Anticoagulation Episode Summary     INR check location [facility]    Preferred lab     Send INR reminders to [facility] INR/PROTIME    Comments       Anticoagulation Care Providers     Provider Role Specialty Phone number    Dejon Downs MD [facility] [phone]            See the Encounter Report to view Anticoagulation Flowsheet and Dosing Calendar (Go to Encounters tab in chart review, and find the Anticoagulation Therapy Visit)        Doreen Finley RN

## 2018-08-20 ENCOUNTER — OFFICE VISIT (OUTPATIENT)
Dept: CARDIOLOGY | Facility: CLINIC | Age: 73
End: 2018-08-20
Attending: INTERNAL MEDICINE
Payer: COMMERCIAL

## 2018-08-20 VITALS
WEIGHT: 176.9 LBS | HEART RATE: 64 BPM | DIASTOLIC BLOOD PRESSURE: 73 MMHG | BODY MASS INDEX: 23.44 KG/M2 | SYSTOLIC BLOOD PRESSURE: 112 MMHG | HEIGHT: 73 IN

## 2018-08-20 DIAGNOSIS — I10 ESSENTIAL HYPERTENSION: ICD-10-CM

## 2018-08-20 DIAGNOSIS — I47.20 VENTRICULAR TACHYCARDIA (H): Primary | ICD-10-CM

## 2018-08-20 DIAGNOSIS — I48.20 CHRONIC ATRIAL FIBRILLATION (H): ICD-10-CM

## 2018-08-20 DIAGNOSIS — I47.10 SVT (SUPRAVENTRICULAR TACHYCARDIA) (H): ICD-10-CM

## 2018-08-20 DIAGNOSIS — Z79.899 ON AMIODARONE THERAPY: ICD-10-CM

## 2018-08-20 PROCEDURE — 99214 OFFICE O/P EST MOD 30 MIN: CPT | Performed by: PHYSICIAN ASSISTANT

## 2018-08-20 NOTE — MR AVS SNAPSHOT
After Visit Summary   8/20/2018    Tyrel Rene    MRN: [id number]           Patient Information     Date Of Birth          1945        Visit Information        Provider Department      8/20/2018 3:10 PM Joelle Rodríguez PA-C [facility]        Today's Diagnoses     Ventricular tachycardia (H)    -  1    Chronic atrial fibrillation (H)        SVT (supraventricular tachycardia) (H)        Essential hypertension        On amiodarone therapy          Care Instructions    1. Due for breathing tests this Fall due to the amiodarone. We'll get blood work as well (done every 6 months)    2. I'll talk to Dr. Costello about the amiodarone dose and driving    3. My nurses are [phone] (Device RNs)          Follow-ups after your visit        Your next 10 appointments already scheduled     Aug 31, 2018  3:00 PM CDT   [facility]    [address]  [facility] [id number] [phone]            Oct 10, 2018  4:30 PM CDT   Remote ICD Check with [facility]QUE[facility] DCR2   [facility] ([facility])    [address]  [facility] [id number] [phone] OPT 2           This appointment is for a remote check of your debrillator.  This is not an appointment at the office.              Future tests that were ordered for you today     Open Future Orders        Priority Expected Expires Ordered    TSH with free T4 reflex Routine 10/20/2018 8/20/2019 8/20/2018    Hepatic panel Routine 10/20/2018 8/20/2019 8/20/2018    Basic metabolic panel Routine 10/20/2018 8/20/2019 8/20/2018    XR Chest 1 View Routine 10/20/2018 8/20/2019 8/20/2018            Who to contact     If you have questions or need follow up information about today's clinic visit or your schedule please contact  "[facility]   RICHIE directly at [phone].  Normal or non-critical lab and imaging results will be communicated to you by MyChart, letter or phone within 4 business days after the clinic has received the results. If you do not hear from us within 7 days, please contact the clinic through [facility] or phone. If you have a critical or abnormal lab result, we will notify you by phone as soon as possible.  Submit refill requests through [facility] or call your pharmacy and they will forward the refill request to us. Please allow 3 business days for your refill to be completed.          Additional Information About Your Visit        [facility] Information     [facility] gives you secure access to your electronic health record. If you see a primary care provider, you can also send messages to your care team and make appointments. If you have questions, please call your primary care clinic.  If you do not have a primary care provider, please call [phone] and they will assist you.        Care EveryWhere ID     This is your Care EveryWhere ID. This could be used by other organizations to access your [facility] medical records  [id number]        Your Vitals Were     Pulse Height BMI (Body Mass Index)             64 1.854 m (6' 1\") 23.34 kg/m2          Blood Pressure from Last 3 Encounters:   08/20/18 112/73   07/18/18 108/60   05/23/18 108/64    Weight from Last 3 Encounters:   08/20/18 80.2 kg (176 lb 14.4 oz)   07/18/18 82.2 kg (181 lb 3.2 oz)   05/23/18 82.1 kg (181 lb 1.6 oz)              We Performed the Following     Follow-Up with Cardiac Advanced Practice Provider          Today's Medication Changes          These changes are accurate as of 8/20/18  3:40 PM.  If you have any questions, ask your nurse or doctor.               These medicines have changed or have updated prescriptions.        Dose/Directions    warfarin 2.5 MG tablet   Commonly known as:  COUMADIN   This may have changed:  See " the new instructions.   Used for:  Long-term (current) use of anticoagulants        TAKE 1 TABLET (2.5 MG) BY MOUTH DAILY TAKING 3.75MG (1&1/2TABS) MON, FRI AND 2.5MG REST OF THE WEEK   Quantity:  102 tablet   Refills:  3                Primary Care Provider Office Phone # Fax #    Dejon Downs [phone] [address] [facility] [address]        Equal Access to Services     JAVI OTERO AH: Hadii aad ku hadasho Soomaali, waaxda luqadaha, qaybta kaalmada adeegyada, waxay idiin hayaan adeeg kharash labhupinder . So [facility] [phone].    ATENCIÓN: Si habla español, tiene a hagen disposición servicios gratuitos de asistencia lingüística. [facility] [phone].    We comply with applicable federal civil rights laws and [place] laws. We do not discriminate on the basis of race, color, national origin, age, disability, sex, sexual orientation, or gender identity.            Thank you!     Thank you for choosing [facility]  for your care. Our goal is always to provide you with excellent care. Hearing back from our patients is one way we can continue to improve our services. Please take a few minutes to complete the written survey that you may receive in the mail after your visit with us. Thank you!             Your Updated Medication List - Protect others around you: Learn how to safely use, store and throw away your medicines at www.disposemymeds.org.          This list is accurate as of 8/20/18  3:40 PM.  Always use your most recent med list.                   Brand Name Dispense Instructions for use Diagnosis    amiodarone 200 MG tablet    PACERONE/CODARONE    90 tablet    Take 1 tablet (200 mg) by mouth daily    Ventricular tachycardia (H)       ASPIRIN NOT PRESCRIBED    INTENTIONAL     continuous prn for other Please choose reason not prescribed, below        carvedilol 3.125 MG tablet    COREG    360 tablet    Take 2 tablets (6.25 mg) by mouth 2  times daily (with meals)    Ventricular tachycardia (H)       digoxin 125 MCG tablet    LANOXIN    90 tablet    Take 1 tablet (125 mcg) by mouth daily    Atrial fibrillation (H)       ipratropium 0.03 % spray    ATROVENT    30 mL    Spray 2 sprays into both nostrils every 8 hours as needed for rhinitis    Chronic rhinitis, unspecified type       lisinopril 5 MG tablet    PRINIVIL/ZESTRIL    180 tablet    Take 1 tablet (5 mg) by mouth At Bedtime    Chronic systolic congestive heart failure (H)       NITROSTAT 0.4 MG sublingual tablet   Generic drug:  nitroGLYcerin     25 tablet    DISSOLVE 1 TABLET UNDER THE TONGUE EVERY 5 MINUTES AS NEEDED FOR CHEST PAIN    Postsurgical aortocoronary bypass status       PRESERVISION AREDS 2 PO      Take 1 capsule by mouth 2 times daily        ranitidine 150 MG tablet    ZANTAC    180 tablet    Take 1 tablet (150 mg) by mouth 2 times daily    S/P CABG (coronary artery bypass graft)       rosuvastatin 20 MG tablet    CRESTOR    30 tablet    Take 1 tablet (20 mg) by mouth daily        sildenafil 100 MG tablet    VIAGRA    16 tablet    Take 1 tablet (100 mg) by mouth daily as needed Take 30 min to 4 hours before intercourse.  Never use with nitroglycerin, terazosin or doxazosin.    Erectile dysfunction, unspecified erectile dysfunction type       Vitamin D (Cholecalciferol) 1000 units Tabs      Take 1,000 mg by mouth daily        warfarin 2.5 MG tablet    COUMADIN    102 tablet    TAKE 1 TABLET (2.5 MG) BY MOUTH DAILY TAKING 3.75MG (1&1/2TABS) MON, FRI AND 2.5MG REST OF THE WEEK    Long-term (current) use of anticoagulants

## 2018-08-20 NOTE — LETTER
8/20/2018    Dejon Downs MD  [address]  [facility] [address]    RE: Tyrel Rene       Dear Colleague,    I had the pleasure of seeing Tyrel Rene in the [facility].    HPI and Plan:   See dictation #[id number]    Orders Placed This Encounter   Procedures     XR Chest 1 View     Hepatic panel     Basic metabolic panel     TSH with free T4 reflex     Follow-Up with Electrophysiologist       No orders of the defined types were placed in this encounter.      There are no discontinued medications.      Encounter Diagnoses   Name Primary?     Ventricular tachycardia (H) Yes     Chronic atrial fibrillation (H)      SVT (supraventricular tachycardia) (H)      Essential hypertension      On amiodarone therapy        CURRENT MEDICATIONS:  Current Outpatient Prescriptions   Medication Sig Dispense Refill     amiodarone (PACERONE/CODARONE) 200 MG tablet Take 1 tablet (200 mg) by mouth daily 90 tablet 0     carvedilol (COREG) 3.125 MG tablet Take 2 tablets (6.25 mg) by mouth 2 times daily (with meals) 360 tablet 3     digoxin (LANOXIN) 125 MCG tablet Take 1 tablet (125 mcg) by mouth daily 90 tablet 3     ipratropium (ATROVENT) 0.03 % spray Spray 2 sprays into both nostrils every 8 hours as needed for rhinitis 30 mL 11     lisinopril (PRINIVIL/ZESTRIL) 5 MG tablet Take 1 tablet (5 mg) by mouth At Bedtime 180 tablet 3     Multiple Vitamins-Minerals (PRESERVISION AREDS 2 PO) Take 1 capsule by mouth 2 times daily       NITROSTAT 0.4 MG sublingual tablet DISSOLVE 1 TABLET UNDER THE TONGUE EVERY 5 MINUTES AS NEEDED FOR CHEST PAIN 25 tablet 0     ranitidine (ZANTAC) 150 MG tablet Take 1 tablet (150 mg) by mouth 2 times daily 180 tablet      rosuvastatin (CRESTOR) 20 MG tablet Take 1 tablet (20 mg) by mouth daily 30 tablet 11     sildenafil (VIAGRA) 100 MG tablet Take 1 tablet (100 mg) by mouth daily as needed Take 30 min to 4 hours before intercourse.  Never use with nitroglycerin,  terazosin or doxazosin. 16 tablet 3     Vitamin D, Cholecalciferol, 1000 UNITS TABS Take 1,000 mg by mouth daily        warfarin (COUMADIN) 2.5 MG tablet TAKE 1 TABLET (2.5 MG) BY MOUTH DAILY TAKING 3.75MG (1&1/2TABS) MON, FRI AND 2.5MG REST OF THE WEEK (Patient taking differently: 2.5 mg mon & 1.25 row) 102 tablet 3     ASPIRIN NOT PRESCRIBED (INTENTIONAL) continuous prn for other Please choose reason not prescribed, below         ALLERGIES     Allergies   Allergen Reactions     Nystatin Other (See Comments)     Make his mouth numb & swelling       PAST MEDICAL HISTORY:  Past Medical History:   Diagnosis Date     Atrial fibrillation (H)     s/p Cardioversion 3/14/2013     Atrial flutter (H)     S/p Aflutter ablation 1/11/2011     CAD (coronary artery disease)     CABG x3 10/2012- LIMA to distal LAD, SVG to OM1 & OM3; cath 10/2012- PTCA to second diagonal and mid LAD, BMS to mid LAD     Cardiogenic shock (H)      Cardiomyopathy (H)      CHF (congestive heart failure) (H)      CVA (cerebral infarction)     residual right hand numbness     ED (erectile dysfunction)      Hyperlipidemia      Hypertension      Neuropathy      Palpitations      SVT (supraventricular tachycardia) (H)     S/p dual chamber ICD 10/11/12- upgraded to BIV ICD 6/2013     Syncope        PAST SURGICAL HISTORY:  Past Surgical History:   Procedure Laterality Date     BYPASS GRAFT ARTERY CORONARY  10/2/2012    Procedure: BYPASS GRAFT ARTERY CORONARY;  Coronary Artery Bypass Graft x3 (LAD, Diag, OM) with Endovein [place] (On-Pump);  Surgeon: Yeyo Lyman MD;  Location: [facility] OR     CARDIOVERSION  3/14/2013     CORONARY ANGIOGRAPHY ADULT ORDER  10/2/12     CORONARY ARTERY BYPASS  10/2/12    LIMA to LAD, SVG to OM1 and OM3     H ABLATION ATRIAL FLUTTER  1/11/2011     HAND SURGERY       HEART CATH, ANGIOPLASTY  10/2/12    PTCA to second diagonal and mid LAD, BMS to mid LAD     HERNIA REPAIR       ORTHOPEDIC SURGERY Right 2006    cut on table saw      RELOCATE GENERATOR ICD/PACEMAKER         FAMILY HISTORY:  Family History   Problem Relation Age of Onset     Alcohol/Drug Father      HEART DISEASE Father 71     Alzheimer Disease Sister      Alcohol/Drug Sister      Alcohol/Drug Sister      GASTROINTESTINAL DISEASE Sister      Obesity Sister      Hypertension Sister      HEART DISEASE Sister      Hypertension Sister      HEART DISEASE Daughter      afib     Arrhythmia Daughter      Multiple Sclerosis Daughter      HEART DISEASE Daughter      afib     Arrhythmia Daughter      Cancer Daughter      lymphoma     Aneurysm Mother        SOCIAL HISTORY:  Social History     Social History     Marital status:      Spouse name: N/A     Number of children: N/A     Years of education: N/A     Social History Main Topics     Smoking status: Former Smoker     Packs/day: 1.00     Years: 14.00     Types: Cigarettes     Quit date: 7/10/1972     Smokeless tobacco: Never Used     Alcohol use No     Drug use: No     Sexual activity: Yes     Partners: Female     Other Topics Concern     Parent/Sibling W/ Cabg, Mi Or Angioplasty Before 65f 55m? No     Caffeine Concern Yes     4 cups coffee daily     Sleep Concern No     Stress Concern No     Weight Concern Yes     gain 2lbs     Special Diet Yes     low sodium     Exercise Yes     walking, doing sittups, played pickle ball in summer     Seat Belt Yes     Social History Narrative       Review of Systems:  Skin:  Negative       Eyes:  Positive for glasses    ENT:  Negative      Respiratory:  Negative for dyspnea on exertion;shortness of breath     Cardiovascular:  Negative for;palpitations;chest pain;edema;dizziness Positive for;lightheadedness    Gastroenterology: Positive for heartburn treated  Genitourinary:  Negative      Musculoskeletal:  Negative      Neurologic:  Negative      Psychiatric:  Negative      Heme/Lymph/Imm:  Negative      Endocrine:  Negative        Physical Exam:  Vitals: /73 (BP Location: Right arm,  "Cuff Size: Adult Large)  Pulse 64  Ht 1.854 m (6' 1\")  Wt 80.2 kg (176 lb 14.4 oz)  BMI 23.34 kg/m2    Constitutional:  cooperative, alert and oriented, well developed, well nourished, in no acute distress        Skin:  warm and dry to the touch, no apparent skin lesions or masses noted   ICD incision in the left infraclavicular area was well-healed      Head:  normocephalic, no masses or lesions        Eyes:  pupils equal and round;conjunctivae and lids unremarkable;sclera white;no xanthalasma        Lymph:      ENT:  no pallor or cyanosis        Neck:  carotid pulses are full and equal bilaterally;JVP normal        Respiratory:  clear to auscultation;healed median sternotomy scar         Cardiac: regular rhythm;no murmurs, gallops or rubs detected                pulses full and equal                                        GI:  abdomen soft;BS normoactive        Extremities and Muscular Skeletal:  no deformities, clubbing, cyanosis, erythema observed;no edema              Neurological:  no gross motor deficits;affect appropriate        Psych:  Alert and Oriented x 3        Thank you for allowing me to participate in the care of your patient.      Sincerely,     Joelle Rodríguez PA-C     [facility]    cc:   Suellen Costello MD  [address]  [place] [id number]        "

## 2018-08-20 NOTE — PROGRESS NOTES
HPI and Plan:   See dictation #[id number]    Orders Placed This Encounter   Procedures     XR Chest 1 View     Hepatic panel     Basic metabolic panel     TSH with free T4 reflex     Follow-Up with Electrophysiologist       No orders of the defined types were placed in this encounter.      There are no discontinued medications.      Encounter Diagnoses   Name Primary?     Ventricular tachycardia (H) Yes     Chronic atrial fibrillation (H)      SVT (supraventricular tachycardia) (H)      Essential hypertension      On amiodarone therapy        CURRENT MEDICATIONS:  Current Outpatient Prescriptions   Medication Sig Dispense Refill     amiodarone (PACERONE/CODARONE) 200 MG tablet Take 1 tablet (200 mg) by mouth daily 90 tablet 0     carvedilol (COREG) 3.125 MG tablet Take 2 tablets (6.25 mg) by mouth 2 times daily (with meals) 360 tablet 3     digoxin (LANOXIN) 125 MCG tablet Take 1 tablet (125 mcg) by mouth daily 90 tablet 3     ipratropium (ATROVENT) 0.03 % spray Spray 2 sprays into both nostrils every 8 hours as needed for rhinitis 30 mL 11     lisinopril (PRINIVIL/ZESTRIL) 5 MG tablet Take 1 tablet (5 mg) by mouth At Bedtime 180 tablet 3     Multiple Vitamins-Minerals (PRESERVISION AREDS 2 PO) Take 1 capsule by mouth 2 times daily       NITROSTAT 0.4 MG sublingual tablet DISSOLVE 1 TABLET UNDER THE TONGUE EVERY 5 MINUTES AS NEEDED FOR CHEST PAIN 25 tablet 0     ranitidine (ZANTAC) 150 MG tablet Take 1 tablet (150 mg) by mouth 2 times daily 180 tablet      rosuvastatin (CRESTOR) 20 MG tablet Take 1 tablet (20 mg) by mouth daily 30 tablet 11     sildenafil (VIAGRA) 100 MG tablet Take 1 tablet (100 mg) by mouth daily as needed Take 30 min to 4 hours before intercourse.  Never use with nitroglycerin, terazosin or doxazosin. 16 tablet 3     Vitamin D, Cholecalciferol, 1000 UNITS TABS Take 1,000 mg by mouth daily        warfarin (COUMADIN) 2.5 MG tablet TAKE 1 TABLET (2.5 MG) BY MOUTH DAILY TAKING 3.75MG (1&1/2TABS) MON, FRI  AND 2.5MG REST OF THE WEEK (Patient taking differently: 2.5 mg mon & 1.25 row) 102 tablet 3     ASPIRIN NOT PRESCRIBED (INTENTIONAL) continuous prn for other Please choose reason not prescribed, below         ALLERGIES     Allergies   Allergen Reactions     Nystatin Other (See Comments)     Make his mouth numb & swelling       PAST MEDICAL HISTORY:  Past Medical History:   Diagnosis Date     Atrial fibrillation (H)     s/p Cardioversion 3/14/2013     Atrial flutter (H)     S/p Aflutter ablation 1/11/2011     CAD (coronary artery disease)     CABG x3 10/2012- LIMA to distal LAD, SVG to OM1 & OM3; cath 10/2012- PTCA to second diagonal and mid LAD, BMS to mid LAD     Cardiogenic shock (H)      Cardiomyopathy (H)      CHF (congestive heart failure) (H)      CVA (cerebral infarction)     residual right hand numbness     ED (erectile dysfunction)      Hyperlipidemia      Hypertension      Neuropathy      Palpitations      SVT (supraventricular tachycardia) (H)     S/p dual chamber ICD 10/11/12- upgraded to BIV ICD 6/2013     Syncope        PAST SURGICAL HISTORY:  Past Surgical History:   Procedure Laterality Date     BYPASS GRAFT ARTERY CORONARY  10/2/2012    Procedure: BYPASS GRAFT ARTERY CORONARY;  Coronary Artery Bypass Graft x3 (LAD, Diag, OM) with Endovein [place] (On-Pump);  Surgeon: Yeyo Lyman MD;  Location: [facility]     CARDIOVERSION  3/14/2013     CORONARY ANGIOGRAPHY ADULT ORDER  10/2/12     CORONARY ARTERY BYPASS  10/2/12    LIMA to LAD, SVG to OM1 and OM3     H ABLATION ATRIAL FLUTTER  1/11/2011     HAND SURGERY       HEART CATH, ANGIOPLASTY  10/2/12    PTCA to second diagonal and mid LAD, BMS to mid LAD     HERNIA REPAIR       ORTHOPEDIC SURGERY Right 2006    cut on table saw     RELOCATE GENERATOR ICD/PACEMAKER         FAMILY HISTORY:  Family History   Problem Relation Age of Onset     Alcohol/Drug Father      HEART DISEASE Father 71     Alzheimer Disease Sister      Alcohol/Drug Sister       "Alcohol/Drug Sister      GASTROINTESTINAL DISEASE Sister      Obesity Sister      Hypertension Sister      HEART DISEASE Sister      Hypertension Sister      HEART DISEASE Daughter      afib     Arrhythmia Daughter      Multiple Sclerosis Daughter      HEART DISEASE Daughter      afib     Arrhythmia Daughter      Cancer Daughter      lymphoma     Aneurysm Mother        SOCIAL HISTORY:  Social History     Social History     Marital status:      Spouse name: N/A     Number of children: N/A     Years of education: N/A     Social History Main Topics     Smoking status: Former Smoker     Packs/day: 1.00     Years: 14.00     Types: Cigarettes     Quit date: 7/10/1972     Smokeless tobacco: Never Used     Alcohol use No     Drug use: No     Sexual activity: Yes     Partners: Female     Other Topics Concern     Parent/Sibling W/ Cabg, Mi Or Angioplasty Before 65f 55m? No     Caffeine Concern Yes     4 cups coffee daily     Sleep Concern No     Stress Concern No     Weight Concern Yes     gain 2lbs     Special Diet Yes     low sodium     Exercise Yes     walking, doing sittups, played [employer] ball in summer     Seat Belt Yes     Social History Narrative       Review of Systems:  Skin:  Negative       Eyes:  Positive for glasses    ENT:  Negative      Respiratory:  Negative for dyspnea on exertion;shortness of breath     Cardiovascular:  Negative for;palpitations;chest pain;edema;dizziness Positive for;lightheadedness    Gastroenterology: Positive for heartburn treated  Genitourinary:  Negative      Musculoskeletal:  Negative      Neurologic:  Negative      Psychiatric:  Negative      Heme/Lymph/Imm:  Negative      Endocrine:  Negative        Physical Exam:  Vitals: /73 (BP Location: Right arm, Cuff Size: Adult Large)  Pulse 64  Ht 1.854 m (6' 1\")  Wt 80.2 kg (176 lb 14.4 oz)  BMI 23.34 kg/m2    Constitutional:  cooperative, alert and oriented, well developed, well nourished, in no acute distress        Skin:  " warm and dry to the touch, no apparent skin lesions or masses noted   ICD incision in the left infraclavicular area was well-healed      Head:  normocephalic, no masses or lesions        Eyes:  pupils equal and round;conjunctivae and lids unremarkable;sclera white;no xanthalasma        Lymph:      ENT:  no pallor or cyanosis        Neck:  carotid pulses are full and equal bilaterally;JVP normal        Respiratory:  clear to auscultation;healed median sternotomy scar         Cardiac: regular rhythm;no murmurs, gallops or rubs detected                pulses full and equal                                        GI:  abdomen soft;BS normoactive        Extremities and Muscular Skeletal:  no deformities, clubbing, cyanosis, erythema observed;no edema              Neurological:  no gross motor deficits;affect appropriate        Psych:  Alert and Oriented x 3

## 2018-08-20 NOTE — LETTER
8/20/2018      Dejon Downs MD  [address]  [facility] [address]      RE: Tyrel Sanchezla       Dear Colleague,    I had the pleasure of seeing Tyrel Sanchezla in the [facility].    Service Date: 08/20/2018      HISTORY OF PRESENT ILLNESS:  I had the pleasure of seeing Marko today when he came accompanied by his wife, Noris, for routine followup.  He is a very pleasant 72-year-old who Prince Galvez, Dr. Newman, Dr. Costello and I have followed for:     1.  Coronary disease, status post anterior wall myocardial infarction in 10/2012.  Stent into the LAD was unable to be deployed, and he underwent 3-vessel bypass surgery with a LIMA to the LAD, vein graft to the OM1 and vein graft to the OM3.  Coronary angiography done 01/2018 in the setting of ventricular tachycardia with shock showed a patent LIMA to the LAD and patent vein graft to the OM1 but an occluded second vein graft that could not be found.     2.  Ischemic cardiomyopathy with a [employer] ICD implanted and upgraded to a biventricular device in 2014, followed by [facility].     3.  Ventricular tachycardia, noted in 10/2017 and again 12/2017 for which he was loaded on amiodarone and underwent coronary angiography as above.     4.  Chronic atrial fibrillation, on anticoagulation.  He briefly converted to sinus rhythm after his ICD shock in 12/2017 but subsequently was noted to be back in AFib.     5.  Hypertension and dyslipidemia.      Marko saw Dr. Costello 6 months ago, at which time things were going well.  His device interrogation had not shown any prolonged episodes of ventricular tachycardia, and he was continued on amiodarone 200 mg daily, with repeat Amiodarone testing scheduled.      He has subsequently seen Prince Galvez in the [facility] and has continued to do well, remaining euvolemic.  He is not having any problems with any of his medications and overall has no concerns.      He does think  "that amiodarone has caused worsening of some lightheadedness.  He just feels \"unsteady.\"  His wife does not notice this as much, but she does agree that he is not quite as steady on his feet since his hospitalization back in December when amiodarone was loaded.      He denies any tremors, skin changes or significant cough.  He denies chest pain, pressure or tightness.  Denies dizziness or palpitations.      Last device interrogation 06/14 showed he was 100% BiV paced in VVIR 65.  He has had no ventricular arrhythmias or any therapies.        ASSESSMENT AND PLAN:     1.  Ventricular tachycardia.  As above, this was first noted in October and he was started on a gentle load of amiodarone.  He then was exercising on the elliptical and had ventricular tachycardia requiring successful ICD shocks.  He was placed on a higher dose of amiodarone and is now on 200 mg daily.      He is worried that this could be contributing to his lightheadedness.  His most recent device interrogations have appeared pretty quiet.  I will speak with Dr. Costello about potentially decreasing his dose a little bit, as he thinks that it would be worth it even to try the lowest dose to see if it would improve his symptoms of lightheadedness, knowing that he could be at risk for recurrent ventricular tachycardia and shocks.  I explained that I would speak to Dr. Costello about this.      It turns out that he has not been driving.  Dr. Costello' notes back in December indicate that he should not drive for at least 3 months.  I will confirm with Dr. Costello that from a ventricular tachycardia/ICD therapy standpoint, it would be okay for him to drive again.      He is due for PFTs and routine labs this coming fall and will get these scheduled.      2.  Permanent atrial fibrillation.  He was briefly in sinus rhythm after his ICD shocks in December.  He remains on anticoagulation with warfarin and is BiV paced as above.      3.  Hypertension.  Blood pressure is " under excellent control.  He had his blood pressure monitor checked today, and it looks good.      At the current time, he is due to see the [facility] again in January.  He is due to see Dr. Newman with echocardiogram and blood work in 2019, and we will have him see Dr. Costello again in about 6 months, certainly earlier if we start seeing anything concerning on his device interrogations.      ADDENDUM: Per Dr. Costello, can gradually taper Amiodarone to 200 mg daily x 6 days per week. In 6 months, if device interrogation stable, could decrease 200 mg x 5 days per week.    OK to drive.     ANNCY WOODRUFF PA-C             D: 2018   T: 2018   MT: BRENT      Name:     ARTEM ELIZALDE   MRN:      [id number]        Account:      [id number]   :      1945           Service Date: 2018      Document: [id number]           Outpatient Encounter Prescriptions as of 2018   Medication Sig Dispense Refill     carvedilol (COREG) 3.125 MG tablet Take 2 tablets (6.25 mg) by mouth 2 times daily (with meals) 360 tablet 3     digoxin (LANOXIN) 125 MCG tablet Take 1 tablet (125 mcg) by mouth daily 90 tablet 3     ipratropium (ATROVENT) 0.03 % spray Spray 2 sprays into both nostrils every 8 hours as needed for rhinitis 30 mL 11     lisinopril (PRINIVIL/ZESTRIL) 5 MG tablet Take 1 tablet (5 mg) by mouth At Bedtime 180 tablet 3     Multiple Vitamins-Minerals (PRESERVISION AREDS 2 PO) Take 1 capsule by mouth 2 times daily       NITROSTAT 0.4 MG sublingual tablet DISSOLVE 1 TABLET UNDER THE TONGUE EVERY 5 MINUTES AS NEEDED FOR CHEST PAIN 25 tablet 0     ranitidine (ZANTAC) 150 MG tablet Take 1 tablet (150 mg) by mouth 2 times daily 180 tablet      rosuvastatin (CRESTOR) 20 MG tablet Take 1 tablet (20 mg) by mouth daily 30 tablet 11     sildenafil (VIAGRA) 100 MG tablet Take 1 tablet (100 mg) by mouth daily as needed Take 30 min to 4 hours before intercourse.  Never use with nitroglycerin, terazosin or  doxazosin. 16 tablet 3     Vitamin D, Cholecalciferol, 1000 UNITS TABS Take 1,000 mg by mouth daily        warfarin (COUMADIN) 2.5 MG tablet TAKE 1 TABLET (2.5 MG) BY MOUTH DAILY TAKING 3.75MG (1&1/2TABS) MON, FRI AND 2.5MG REST OF THE WEEK (Patient taking differently: 2.5 mg mon & 1.25 row) 102 tablet 3     [DISCONTINUED] amiodarone (PACERONE/CODARONE) 200 MG tablet Take 1 tablet (200 mg) by mouth daily 90 tablet 0     ASPIRIN NOT PRESCRIBED (INTENTIONAL) continuous prn for other Please choose reason not prescribed, below       No facility-administered encounter medications on file as of 8/20/2018.        Again, thank you for allowing me to participate in the care of your patient.      Sincerely,    Joelle Rodríguez PA-C     [facility]

## 2018-08-20 NOTE — PATIENT INSTRUCTIONS
1. Due for breathing tests this Fall due to the amiodarone. We'll get blood work as well (done every 6 months)    2. I'll talk to Dr. Costello about the amiodarone dose and driving    3. My nurses are [phone] (Device RNs)

## 2018-08-21 ENCOUNTER — DOCUMENTATION ONLY (OUTPATIENT)
Dept: CARDIOLOGY | Facility: CLINIC | Age: 73
End: 2018-08-21

## 2018-08-21 DIAGNOSIS — I47.20 VENTRICULAR TACHYCARDIA (H): ICD-10-CM

## 2018-08-21 RX ORDER — AMIODARONE HYDROCHLORIDE 200 MG/1
200 TABLET ORAL DAILY
Qty: 90 TABLET | Refills: 0 | Status: SHIPPED | OUTPATIENT
Start: 2018-08-21 | End: 2018-08-28

## 2018-08-21 NOTE — PROGRESS NOTES
Called pt, no answer, left voicemail with details from Lina LAWSON's note below. Asked pt to call back to confirm he got the message and so we know if he wants to try decreasing amio to 6 days a week. Awaiting return call from patient.     ADDENDUM 1:00pm. Pt returned call. Told him information from Lina LAWSON's note below. Told he he can drive now. Also discussed his amiodarone dosing, and that we recommend going down to 200mg just 6 days a week. Pt states understanding and agrees with this plan. He said he is traveling until 9/20/2018, so he will make the amiodarone change when he returns form his trip so he doesn't risk having an episode while traveling.     Epic med list updated.

## 2018-08-21 NOTE — PROGRESS NOTES
Pls let Marko know that I spoke with Dr. Costello -     * OK to start driving (last shocks were 12/2017)    * He agrees that the lightheadedness/unsteadiness may be related to Amiodarone but agrees that decreasing Amiodarone could make levels low enough to not be able to prevent further Ventricular Arrhythmias. We will try to use lowest successful dose.    Dr. Costello would be OK with decreasing Amiodarone to 200 mg daily SIX days per week (every day but Sunday) to see if this helps.  Will keep an eye on his device interrogations.  If device interrogations are stable in 6 months, may consider decreasing amiodarone to FIVE days per week.    If pt agreeable, pls update EPIC med list.

## 2018-08-21 NOTE — PROGRESS NOTES
"Service Date: 08/20/2018      HISTORY OF PRESENT ILLNESS:  I had the pleasure of seeing Marko today when he came accompanied by his wife, Noris, for routine followup.  He is a very pleasant 72-year-old who Prince Galvez, Dr. Newman, Dr. Costello and I have followed for:     1.  Coronary disease, status post anterior wall myocardial infarction in 10/2012.  Stent into the LAD was unable to be deployed, and he underwent 3-vessel bypass surgery with a LIMA to the LAD, vein graft to the OM1 and vein graft to the OM3.  Coronary angiography done 01/2018 in the setting of ventricular tachycardia with shock showed a patent LIMA to the LAD and patent vein graft to the OM1 but an occluded second vein graft that could not be found.     2.  Ischemic cardiomyopathy with a [employer] ICD implanted and upgraded to a biventricular device in 2014, [facility].     3.  Ventricular tachycardia, noted in 10/2017 and again 12/2017 for which he was loaded on amiodarone and underwent coronary angiography as above.     4.  Chronic atrial fibrillation, on anticoagulation.  He briefly converted to sinus rhythm after his ICD shock in 12/2017 but subsequently was noted to be back in AFib.     5.  Hypertension and dyslipidemia.      Marko saw Dr. Costello 6 months ago, at which time things were going well.  His device interrogation had not shown any prolonged episodes of ventricular tachycardia, and he was continued on amiodarone 200 mg daily, with repeat Amiodarone testing scheduled.      He has subsequently seen Prince Galvez in [facility] and has continued to do well, remaining euvolemic.  He is not having any problems with any of his medications and overall has no concerns.      He does think that amiodarone has caused worsening of some lightheadedness.  He just feels \"unsteady.\"  His wife does not notice this as much, but she does agree that he is not quite as steady on his feet since his hospitalization back in " December when amiodarone was loaded.      He denies any tremors, skin changes or significant cough.  He denies chest pain, pressure or tightness.  Denies dizziness or palpitations.      Last device interrogation 06/14 showed he was 100% BiV paced in VVIR 65.  He has had no ventricular arrhythmias or any therapies.        ASSESSMENT AND PLAN:     1.  Ventricular tachycardia.  As above, this was first noted in October and he was started on a gentle load of amiodarone.  He then was exercising on the elliptical and had ventricular tachycardia requiring successful ICD shocks.  He was placed on a higher dose of amiodarone and is now on 200 mg daily.      He is worried that this could be contributing to his lightheadedness.  His most recent device interrogations have appeared pretty quiet.  I will speak with Dr. Costello about potentially decreasing his dose a little bit, as he thinks that it would be worth it even to try the lowest dose to see if it would improve his symptoms of lightheadedness, knowing that he could be at risk for recurrent ventricular tachycardia and shocks.  I explained that I would speak to Dr. Costello about this.      It turns out that he has not been driving.  Dr. Costello' notes back in December indicate that he should not drive for at least 3 months.  I will confirm with Dr. Costello that from a ventricular tachycardia/ICD therapy standpoint, it would be okay for him to drive again.      He is due for PFTs and routine labs this coming fall and will get these scheduled.      2.  Permanent atrial fibrillation.  He was briefly in sinus rhythm after his ICD shocks in December.  He remains on anticoagulation with warfarin and is BiV paced as above.      3.  Hypertension.  Blood pressure is under excellent control.  He had his blood pressure monitor checked today, and it looks good.      At the current time, he is due to see [facility] again in January.  He is due to see Dr. Newman with echocardiogram and  blood work in 2019, and we will have him see Dr. Costello again in about 6 months, certainly earlier if we start seeing anything concerning on his device interrogations.      ADDENDUM: Per Dr. Costello, can gradually taper Amiodarone to 200 mg daily x 6 days per week. In 6 months, if device interrogation stable, could decrease 200 mg x 5 days per week.    OK to drive.     NANCY WOODRUFF PA-C             D: 2018   T: 2018   MT: BRENT      Name:     ARTEM ELIZALDE   MRN:      [id number]        Account:      [id number]   :      1945           Service Date: 2018      Document: [id number]

## 2018-08-27 DIAGNOSIS — E78.2 MIXED HYPERLIPIDEMIA: Primary | ICD-10-CM

## 2018-08-27 RX ORDER — ROSUVASTATIN CALCIUM 20 MG/1
20 TABLET, COATED ORAL DAILY
Qty: 90 TABLET | Refills: 1 | Status: SHIPPED | OUTPATIENT
Start: 2018-08-27 | End: 2019-03-04

## 2018-08-28 DIAGNOSIS — I47.20 VENTRICULAR TACHYCARDIA (H): ICD-10-CM

## 2018-08-28 RX ORDER — AMIODARONE HYDROCHLORIDE 200 MG/1
TABLET ORAL
Qty: 90 TABLET | Refills: 3 | Status: ON HOLD | OUTPATIENT
Start: 2018-08-28 | End: 2018-11-22

## 2018-08-31 ENCOUNTER — ANTICOAGULATION THERAPY VISIT (OUTPATIENT)
Dept: NURSING | Facility: CLINIC | Age: 73
End: 2018-08-31
Payer: COMMERCIAL

## 2018-08-31 DIAGNOSIS — Z79.01 LONG-TERM (CURRENT) USE OF ANTICOAGULANTS: ICD-10-CM

## 2018-08-31 DIAGNOSIS — I63.9 CEREBRAL INFARCTION (H): ICD-10-CM

## 2018-08-31 DIAGNOSIS — I63.50 CEREBRAL ARTERY OCCLUSION WITH CEREBRAL INFARCTION (H): ICD-10-CM

## 2018-08-31 LAB — INR POINT OF CARE: 2.6 (ref 2–2.5)

## 2018-08-31 PROCEDURE — 85610 PROTHROMBIN TIME: CPT | Mod: QW

## 2018-08-31 PROCEDURE — 99207 ZZC NO CHARGE NURSE ONLY: CPT

## 2018-08-31 PROCEDURE — 36416 COLLJ CAPILLARY BLOOD SPEC: CPT

## 2018-08-31 NOTE — MR AVS SNAPSHOT
Tyrel Rene   8/31/2018 3:00 PM   Anticoagulation Therapy Visit    Description:  72 year old male   Provider:  [facility]   Department:  Bx Nurse           INR as of 8/31/2018     Today's INR 2.6!      Anticoagulation Summary as of 8/31/2018     INR goal 2.0-2.5   Today's INR 2.6!   Full warfarin instructions 2.5 mg on Mon; 1.25 mg all other days   Next INR check 10/1/2018    Indications   Long-term (current) use of anticoagulants [Z79.01] [Z79.01]  Cerebral artery occlusion with cerebral infarction (HCC) [I63.50] [I63.50]  Cerebral infarction (H) [I63.9]         Your next Anticoagulation Clinic appointment(s)     Oct 01, 2018  3:00 PM CDT   Anticoagulation Visit with  ANTICOAGULATION CLINIC   [facility]    [address] [place] [id number] [phone]              Contact Numbers     [facility]  Please call  [phone] to cancel and/or reschedule your appointment   The direct line to the anticoagulant nurse is [phone] on Monday, Wednesday, and Friday. On Thursday, the anticoagulant nurse can be reached directly at [phone].         August 2018 Details    Sun Mon Tue Wed Thu Fri Sat        1               2               3               4                 5               6               7               8               9               10               11                 12               13               14               15               16               17               18                 19               20               21               22               23               24               25                 26               27               28               29               30               31      1.25 mg   See details        Date Details   08/31 This INR check               How to take your warfarin dose     To take:  1.25 mg Take 0.5 of a 2.5 mg tablet.            September 2018 Details    Sun Mon Tue Wed Thu Fri Sat           1      1.25 mg           2      1.25 mg         3      2.5 mg         4      1.25 mg         5      1.25 mg         6      1.25 mg         7      1.25 mg         8      1.25 mg           9      1.25 mg         10      2.5 mg         11      1.25 mg         12      1.25 mg         13      1.25 mg         14      1.25 mg         15      1.25 mg           16      1.25 mg         17      2.5 mg         18      1.25 mg         19      1.25 mg         20      1.25 mg         21      1.25 mg         22      1.25 mg           23      1.25 mg         24      2.5 mg         25      1.25 mg         26      1.25 mg         27      1.25 mg         28      1.25 mg         29      1.25 mg           30      1.25 mg                Date Details   No additional details            How to take your warfarin dose     To take:  1.25 mg Take 0.5 of a 2.5 mg tablet.    To take:  2.5 mg Take 1 of the 2.5 mg tablets.           October 2018 Details    Sun Mon Tue Wed Thu Fri Sat      1            2               3               4               5               6                 7               8               9               10               11               12               13                 14               15               16               17               18               19               20                 21               22               23               24               25               26               27                 28               29               30               31                   Date Details   No additional details    Date of next INR:  10/1/2018         How to take your warfarin dose     To take:  2.5 mg Take 1 of the 2.5 mg tablets.

## 2018-08-31 NOTE — PROGRESS NOTES
ANTICOAGULATION FOLLOW-UP CLINIC VISIT    Patient Name:  Tyrel Rene  Date:  8/31/2018  Contact Type:  Face to Face    SUBJECTIVE:     Patient Findings     Positives No Problem Findings           OBJECTIVE    INR Protime   Date Value Ref Range Status   08/31/2018 2.6 (A) 2 - 2.5 Final       ASSESSMENT / PLAN  INR assessment THER    Recheck INR In: 4 WEEKS    INR Location Clinic      Anticoagulation Summary as of 8/31/2018     INR goal 2.0-2.5   Today's INR 2.6!   Warfarin maintenance plan 2.5 mg (2.5 mg x 1) on Mon; 1.25 mg (2.5 mg x 0.5) all other days   Full warfarin instructions 2.5 mg on Mon; 1.25 mg all other days   Weekly warfarin total 10 mg   No change documented Lynn Alves RN   Plan last modified Caridad Cunningham RN (6/27/2018)   Next INR check 10/1/2018   Target end date Indefinite    Indications   Long-term (current) use of anticoagulants [Z79.01] [Z79.01]  Cerebral artery occlusion with cerebral infarction (HCC) [I63.50] [I63.50]  Cerebral infarction (H) [I63.9]         Anticoagulation Episode Summary     INR check location [facility]    Preferred lab     Send INR reminders to [facility] INR/PROTIME    Comments       Anticoagulation Care Providers     Provider Role Specialty Phone number    Dejon Downs MD [facility] [phone]            See the Encounter Report to view Anticoagulation Flowsheet and Dosing Calendar (Go to Encounters tab in chart review, and find the Anticoagulation Therapy Visit)    Therapeutic, recheck in 4 weeks. patient aware if signs of clotting (pain, tenderness, swelling, color change in leg or arm, SOB) and bleeding occur (blood in stool, urine, large bruising, bleeding gums, nosebleeds) to have INR check sooner. If sx severe report to ER or concerned for stroke call 911. If general questions or concerns arise, call clinic.         Lynn Alves RN

## 2018-10-01 ENCOUNTER — ANTICOAGULATION THERAPY VISIT (OUTPATIENT)
Dept: NURSING | Facility: CLINIC | Age: 73
End: 2018-10-01
Payer: COMMERCIAL

## 2018-10-01 DIAGNOSIS — I63.9 CEREBRAL INFARCTION (H): ICD-10-CM

## 2018-10-01 DIAGNOSIS — I63.50 CEREBRAL ARTERY OCCLUSION WITH CEREBRAL INFARCTION (H): ICD-10-CM

## 2018-10-01 LAB — INR POINT OF CARE: 2.2 (ref 0.86–1.14)

## 2018-10-01 PROCEDURE — 85610 PROTHROMBIN TIME: CPT | Mod: QW

## 2018-10-01 PROCEDURE — 99207 ZZC NO CHARGE NURSE ONLY: CPT

## 2018-10-01 PROCEDURE — 36416 COLLJ CAPILLARY BLOOD SPEC: CPT

## 2018-10-01 NOTE — MR AVS SNAPSHOT
Tyrel Rene   10/1/2018 3:00 PM   Anticoagulation Therapy Visit    Description:  72 year old male   Provider:  [facility]   Department:  Bx Nurse           INR as of 10/1/2018     Today's INR 2.2      Anticoagulation Summary as of 10/1/2018     INR goal 2.0-2.5   Today's INR 2.2   Full warfarin instructions 2.5 mg on Mon; 1.25 mg all other days   Next INR check 10/22/2018    Indications   Long-term (current) use of anticoagulants [Z79.01] [Z79.01]  Cerebral artery occlusion with cerebral infarction (HCC) [I63.50] [I63.50]  Cerebral infarction (H) [I63.9]         Your next Anticoagulation Clinic appointment(s)     Oct 22, 2018  1:30 PM CDT   Anticoagulation Visit with  ANTICOAGULATION CLINIC   [facility]    [address] [place] [id number] [phone]              Contact Numbers     [facility]  Please call  [phone] to cancel and/or reschedule your appointment   The direct line to the anticoagulant nurse is [phone] on Monday, Wednesday, and Friday. On Thursday, the anticoagulant nurse can be reached directly at [phone].         October 2018 Details    Sun Mon Tue Wed Thu Fri Sat      1      2.5 mg   See details      2      1.25 mg         3      1.25 mg         4      1.25 mg         5      1.25 mg         6      1.25 mg           7      1.25 mg         8      2.5 mg         9      1.25 mg         10      1.25 mg         11      1.25 mg         12      1.25 mg         13      1.25 mg           14      1.25 mg         15      2.5 mg         16      1.25 mg         17      1.25 mg         18      1.25 mg         19      1.25 mg         20      1.25 mg           21      1.25 mg         22            23               24               25               26               27                 28               29               30               31                   Date Details    10/01 This INR check       Date of next INR:  10/22/2018         How to take your warfarin dose     To take:  1.25 mg Take 0.5 of a 2.5 mg tablet.    To take:  2.5 mg Take 1 of the 2.5 mg tablets.

## 2018-10-01 NOTE — PROGRESS NOTES
ANTICOAGULATION FOLLOW-UP CLINIC VISIT    Patient Name:  Tyrel Rene  Date:  10/1/2018  Contact Type:  Face to Face    SUBJECTIVE:     Patient Findings     Positives Change in medications (reduction in amniodarone dose)           OBJECTIVE    INR Protime   Date Value Ref Range Status   10/01/2018 2.2 (A) 0.86 - 1.14 Final       ASSESSMENT / PLAN  INR assessment THER    Recheck INR In: 3 WEEKS    INR Location Clinic      Anticoagulation Summary as of 10/1/2018     INR goal 2.0-2.5   Today's INR 2.2   Warfarin maintenance plan 2.5 mg (2.5 mg x 1) on Mon; 1.25 mg (2.5 mg x 0.5) all other days   Full warfarin instructions 2.5 mg on Mon; 1.25 mg all other days   Weekly warfarin total 10 mg   No change documented Doreen Finley RN   Plan last modified Caridad Cunningham RN (6/27/2018)   Next INR check 10/22/2018   Target end date Indefinite    Indications   Long-term (current) use of anticoagulants [Z79.01] [Z79.01]  Cerebral artery occlusion with cerebral infarction (HCC) [I63.50] [I63.50]  Cerebral infarction (H) [I63.9]         Anticoagulation Episode Summary     INR check location [facility]    Preferred lab     Send INR reminders to [facility] INR/PROTIME    Comments       Anticoagulation Care Providers     Provider Role Specialty Phone number    Dejon Downs MD [facility] [phone]            See the Encounter Report to view Anticoagulation Flowsheet and Dosing Calendar (Go to Encounters tab in chart review, and find the Anticoagulation Therapy Visit)        Doreen Finley, RN

## 2018-10-10 ENCOUNTER — ALLIED HEALTH/NURSE VISIT (OUTPATIENT)
Dept: CARDIOLOGY | Facility: CLINIC | Age: 73
End: 2018-10-10
Payer: COMMERCIAL

## 2018-10-10 DIAGNOSIS — I25.5 ISCHEMIC CARDIOMYOPATHY: Primary | ICD-10-CM

## 2018-10-10 DIAGNOSIS — Z95.810 ICD (IMPLANTABLE CARDIOVERTER-DEFIBRILLATOR) IN PLACE: ICD-10-CM

## 2018-10-10 PROCEDURE — 93296 REM INTERROG EVL PM/IDS: CPT | Performed by: INTERNAL MEDICINE

## 2018-10-10 PROCEDURE — 93295 DEV INTERROG REMOTE 1/2/MLT: CPT | Performed by: INTERNAL MEDICINE

## 2018-10-10 NOTE — PROGRESS NOTES
[employer] CRT-D Remote Device Check  AP: N/A% : 100 % BiVpaced  Mode: VVIR 65/120        Presenting Rhythm: AF with BIV pacing  Heart Rate: Stable with adequate variability  Sensing: WNL    Pacing Threshold: Not on auto capute    Impedance: WNL  Battery Status: Estimated at 3 years remaining longevity  Atrial Arrhythmia: Permanent AF- on warfarin  Ventricular Arrhythmia: 3 logged as NSVT. Events lasted 5-7 beats at irregular rates- 140-169 BPM  ATP: 0    Shocks: 0    Care Plan: Next remote in 3 months. AGAPITO Valiente

## 2018-10-10 NOTE — MR AVS SNAPSHOT
After Visit Summary   10/10/2018    Tyrel Rene    MRN: [id number]           Patient Information     Date Of Birth          1945        Visit Information        Provider Department      10/10/2018 4:30 PM [facility] DCR2 [facility]        Today's Diagnoses     Ischemic cardiomyopathy    -  1    ICD (implantable cardioverter-defibrillator) in place           Follow-ups after your visit        Your next 10 appointments already scheduled     Oct 10, 2018  4:30 PM CDT   Remote ICD Check with MARQUEZ DCR2   [facility] ([facility])    [address] [place], [place] [id number]  [facility] [id number] [phone] OPT 2           This appointment is for a remote check of your debrillator.  This is not an appointment at the office.            Oct 22, 2018  1:30 PM CDT   Anticoagulation Visit with BX ANTICOAGULATION CLINIC   [facility] ([facility])    [address] [id number] [phone]            Oct 23, 2018  1:20 PM CDT   LAB with MARQUEZ LAB   [facility] ([facility])    [address] [place] [id number] [phone]           Please do not eat 10-12 hours before your appointment if you are coming in fasting for labs on lipids, cholesterol, or glucose (sugar). This does not apply to pregnant women. Water, hot tea and black coffee (with nothing added) are okay. Do not drink other fluids, diet soda or chew gum.            Oct 23, 2018  1:40 PM CDT   XR CHEST 1 VIEW with SHXR3   [facility] ([facility])    [address] [place] [id number] [phone]           How do I prepare for my exam? (Food and drink instructions) No Food and Drink Restrictions.  How do I prepare for my exam? (Other instructions) You do not need to do  anything special for this exam.  What should I wear: Wear comfortable clothes.  How long does the exam take: Most scans take less than 5 minutes.  What should I bring: Bring a list of your medicines, including vitamins, minerals and over-the-counter drugs. It is safest to leave personal items at home.  Do I need a :  No  is needed.  What do I need to tell my doctor: Tell your doctor if there s any chance you are pregnant.  What should I do after the exam: No restrictions, You may resume normal activities.  What is this test: An image of a specific body part shown in shades of black and white.  Who should I call with questions: If you have any questions, please call the Imaging Department where you will have your exam. Directions, parking instructions, and other information is available on our website, [email or web address].            Oct 23, 2018  2:00 PM CDT   Pulmonary Function with Sh Pulmonary Rehab 1   [facility] ([facility])    [address], [address]  [facility] [id number] [phone]           No Inhalers for 6 hours prior to test No Smoking 2 hours prior to test            Feb 04, 2019  4:30 PM CST   Remote ICD Check with MARQUEZ DCR2   [facility] ([facility])    [address]  [facility] [address] [phone] OPT 2           This appointment is for a remote check of your debrillator.  This is not an appointment at the office.              Who to contact     If you have questions or need follow up information about today's clinic visit or your schedule please contact [facility] directly at [phone].  Normal or non-critical lab and imaging results will be communicated to you by MyChart, letter or phone within 4 business days after the clinic has received the results. If you do not hear from us within 7 days, please contact the clinic through  [employer] or phone. If you have a critical or abnormal lab result, we will notify you by phone as soon as possible.  Submit refill requests through [employer] or call your pharmacy and they will forward the refill request to us. Please allow 3 business days for your refill to be completed.          Additional Information About Your Visit        [employer] Information     [employer] gives you secure access to your electronic health record. If you see a primary care provider, you can also send messages to your care team and make appointments. If you have questions, please call your primary care clinic.  If you do not have a primary care provider, please call [phone] and they will assist you.        Care EveryWhere ID     This is your Care EveryWhere ID. This could be used by other organizations to access your [place] medical records  [id number]         Blood Pressure from Last 3 Encounters:   08/20/18 112/73   07/18/18 108/60   05/23/18 108/64    Weight from Last 3 Encounters:   08/20/18 80.2 kg (176 lb 14.4 oz)   07/18/18 82.2 kg (181 lb 3.2 oz)   05/23/18 82.1 kg (181 lb 1.6 oz)              We Performed the Following     ICD DEVICE INTERROGAT REMOTE (91118)     INTERROGATION DEVICE EVAL REMOTE, PACER/ICD (96038)          Today's Medication Changes          These changes are accurate as of 10/10/18 10:22 AM.  If you have any questions, ask your nurse or doctor.               These medicines have changed or have updated prescriptions.        Dose/Directions    warfarin 2.5 MG tablet   Commonly known as:  COUMADIN   This may have changed:  See the new instructions.   Used for:  Long-term (current) use of anticoagulants        TAKE 1 TABLET (2.5 MG) BY MOUTH DAILY TAKING 3.75MG (1&1/2TABS) MON, FRI AND 2.5MG REST OF THE WEEK   Quantity:  102 tablet   Refills:  3                Primary Care Provider Office Phone # Fax #    Dejon Downs [phone] [address]  [facility] [address]         Equal Access to Services     [facility] : Hadii cornelius garcia sharleneo Porshaali, waaxda luqadaha, qaybta kaalmaabhijit fischer, deb bird. So [facility] [phone].    ATENCIÓN: Si habla español, tiene a hagen disposición servicios gratuitos de asistencia lingüística. Liat al [phone].    We comply with applicable federal civil rights laws and [place] laws. We do not discriminate on the basis of race, color, national origin, age, disability, sex, sexual orientation, or gender identity.            Thank you!     Thank you for choosing [facility]  for your care. Our goal is always to provide you with excellent care. Hearing back from our patients is one way we can continue to improve our services. Please take a few minutes to complete the written survey that you may receive in the mail after your visit with us. Thank you!             Your Updated Medication List - Protect others around you: Learn how to safely use, store and throw away your medicines at www.disposemymeds.org.          This list is accurate as of 10/10/18 10:22 AM.  Always use your most recent med list.                   Brand Name Dispense Instructions for use Diagnosis    amiodarone 200 MG tablet    PACERONE/CODARONE    90 tablet    Starting 9/20/2018 take 200mg daily six days a week (skip Sunday).    Ventricular tachycardia (H)       ASPIRIN NOT PRESCRIBED    INTENTIONAL     continuous prn for other Please choose reason not prescribed, below        carvedilol 3.125 MG tablet    COREG    360 tablet    Take 2 tablets (6.25 mg) by mouth 2 times daily (with meals)    Ventricular tachycardia (H)       digoxin 125 MCG tablet    LANOXIN    90 tablet    Take 1 tablet (125 mcg) by mouth daily    Atrial fibrillation (H)       ipratropium 0.03 % spray    ATROVENT    30 mL    Spray 2 sprays into both nostrils every 8 hours as needed for rhinitis    Chronic rhinitis, unspecified type        lisinopril 5 MG tablet    PRINIVIL/ZESTRIL    180 tablet    Take 1 tablet (5 mg) by mouth At Bedtime    Chronic systolic congestive heart failure (H)       NITROSTAT 0.4 MG sublingual tablet   Generic drug:  nitroGLYcerin     25 tablet    DISSOLVE 1 TABLET UNDER THE TONGUE EVERY 5 MINUTES AS NEEDED FOR CHEST PAIN    Postsurgical aortocoronary bypass status       PRESERVISION AREDS 2 PO      Take 1 capsule by mouth 2 times daily        ranitidine 150 MG tablet    ZANTAC    180 tablet    Take 1 tablet (150 mg) by mouth 2 times daily    S/P CABG (coronary artery bypass graft)       rosuvastatin 20 MG tablet    CRESTOR    90 tablet    Take 1 tablet (20 mg) by mouth daily    Mixed hyperlipidemia       sildenafil 100 MG tablet    VIAGRA    16 tablet    Take 1 tablet (100 mg) by mouth daily as needed Take 30 min to 4 hours before intercourse.  Never use with nitroglycerin, terazosin or doxazosin.    Erectile dysfunction, unspecified erectile dysfunction type       Vitamin D (Cholecalciferol) 1000 units Tabs      Take 1,000 mg by mouth daily        warfarin 2.5 MG tablet    COUMADIN    102 tablet    TAKE 1 TABLET (2.5 MG) BY MOUTH DAILY TAKING 3.75MG (1&1/2TABS) MON, FRI AND 2.5MG REST OF THE WEEK    Long-term (current) use of anticoagulants

## 2018-10-22 ENCOUNTER — ANTICOAGULATION THERAPY VISIT (OUTPATIENT)
Dept: NURSING | Facility: CLINIC | Age: 73
End: 2018-10-22
Payer: COMMERCIAL

## 2018-10-22 DIAGNOSIS — I63.9 CEREBRAL INFARCTION (H): ICD-10-CM

## 2018-10-22 DIAGNOSIS — I63.50 CEREBRAL ARTERY OCCLUSION WITH CEREBRAL INFARCTION (H): ICD-10-CM

## 2018-10-22 LAB — INR POINT OF CARE: 2.4 (ref 0.86–1.14)

## 2018-10-22 PROCEDURE — 85610 PROTHROMBIN TIME: CPT | Mod: QW

## 2018-10-22 PROCEDURE — 36416 COLLJ CAPILLARY BLOOD SPEC: CPT

## 2018-10-22 PROCEDURE — 99207 ZZC NO CHARGE NURSE ONLY: CPT

## 2018-10-22 NOTE — PROGRESS NOTES
ANTICOAGULATION FOLLOW-UP CLINIC VISIT    Patient Name:  Tyrel Rene  Date:  10/22/2018  Contact Type:  Face to Face    SUBJECTIVE:     Patient Findings     Positives No Problem Findings           OBJECTIVE    INR Protime   Date Value Ref Range Status   10/22/2018 2.4 (A) 0.86 - 1.14 Final       ASSESSMENT / PLAN  INR assessment THER    Recheck INR In: 4 WEEKS    INR Location Clinic      Anticoagulation Summary as of 10/22/2018     INR goal 2.0-2.5   Today's INR 2.4   Warfarin maintenance plan 2.5 mg (2.5 mg x 1) on Mon; 1.25 mg (2.5 mg x 0.5) all other days   Full warfarin instructions 2.5 mg on Mon; 1.25 mg all other days   Weekly warfarin total 10 mg   No change documented Doreen Finley RN   Plan last modified Caridad Cunningham RN (6/27/2018)   Next INR check 11/19/2018   Target end date Indefinite    Indications   Long-term (current) use of anticoagulants [Z79.01] [Z79.01]  Cerebral artery occlusion with cerebral infarction (HCC) [I63.50] [I63.50]  Cerebral infarction (H) [I63.9]         Anticoagulation Episode Summary     INR check location [facility]    Preferred lab     Send INR reminders to [facility] INR/PROTIME    Comments       Anticoagulation Care Providers     Provider Role Specialty Phone number    Dejon Downs MD [facility] [phone]            See the Encounter Report to view Anticoagulation Flowsheet and Dosing Calendar (Go to Encounters tab in chart review, and find the Anticoagulation Therapy Visit)        Doreen Finley, RN

## 2018-10-22 NOTE — MR AVS SNAPSHOT
Tyrel Rene   10/22/2018 1:30 PM   Anticoagulation Therapy Visit    Description:  73 year old male   Provider:  [facility]   Department:  Bx Nurse           INR as of 10/22/2018     Today's INR 2.4      Anticoagulation Summary as of 10/22/2018     INR goal 2.0-2.5   Today's INR 2.4   Full warfarin instructions 2.5 mg on Mon; 1.25 mg all other days   Next INR check 11/19/2018    Indications   Long-term (current) use of anticoagulants [Z79.01] [Z79.01]  Cerebral artery occlusion with cerebral infarction (HCC) [I63.50] [I63.50]  Cerebral infarction (H) [I63.9]         Your next Anticoagulation Clinic appointment(s)     Nov 19, 2018  2:00 PM CST   Anticoagulation Visit with  ANTICOAGULATION CLINIC   [facility]    [address] [place] [id number] [phone]              Contact Numbers     [facility]  Please call  [phone] to cancel and/or reschedule your appointment   The direct line to the anticoagulant nurse is [phone] on Monday, Wednesday, and Friday. On Thursday, the anticoagulant nurse can be reached directly at [phone].         October 2018 Details    Sun Mon Tue Wed Thu Fri Sat      1               2               3               4               5               6                 7               8               9               10               11               12               13                 14               15               16               17               18               19               20                 21               22      2.5 mg   See details      23      1.25 mg         24      1.25 mg         25      1.25 mg         26      1.25 mg         27      1.25 mg           28      1.25 mg         29      2.5 mg         30      1.25 mg         31      1.25 mg             Date Details   10/22 This INR check               How to take your warfarin dose      To take:  1.25 mg Take 0.5 of a 2.5 mg tablet.    To take:  2.5 mg Take 1 of the 2.5 mg tablets.           November 2018 Details    Sun Mon Tue Wed Thu Fri Sat         1      1.25 mg         2      1.25 mg         3      1.25 mg           4      1.25 mg         5      2.5 mg         6      1.25 mg         7      1.25 mg         8      1.25 mg         9      1.25 mg         10      1.25 mg           11      1.25 mg         12      2.5 mg         13      1.25 mg         14      1.25 mg         15      1.25 mg         16      1.25 mg         17      1.25 mg           18      1.25 mg         19            20               21               22               23               24                 25               26               27               28               29               30                 Date Details   No additional details    Date of next INR:  11/19/2018         How to take your warfarin dose     To take:  1.25 mg Take 0.5 of a 2.5 mg tablet.    To take:  2.5 mg Take 1 of the 2.5 mg tablets.

## 2018-10-23 ENCOUNTER — HOSPITAL ENCOUNTER (OUTPATIENT)
Dept: CARDIAC REHAB | Facility: CLINIC | Age: 73
End: 2018-10-23
Attending: INTERNAL MEDICINE
Payer: COMMERCIAL

## 2018-10-23 ENCOUNTER — HOSPITAL ENCOUNTER (OUTPATIENT)
Dept: GENERAL RADIOLOGY | Facility: CLINIC | Age: 73
Discharge: HOME | End: 2018-10-23
Attending: PHYSICIAN ASSISTANT | Admitting: PHYSICIAN ASSISTANT
Payer: COMMERCIAL

## 2018-10-23 DIAGNOSIS — I48.21 PERMANENT ATRIAL FIBRILLATION (H): ICD-10-CM

## 2018-10-23 DIAGNOSIS — Z79.899 ON AMIODARONE THERAPY: ICD-10-CM

## 2018-10-23 DIAGNOSIS — I47.20 VENTRICULAR TACHYCARDIA (H): ICD-10-CM

## 2018-10-23 DIAGNOSIS — I48.21 PERMANENT ATRIAL FIBRILLATION (H): Primary | ICD-10-CM

## 2018-10-23 LAB
ALBUMIN SERPL-MCNC: 3.3 G/DL (ref 3.4–5)
ALP SERPL-CCNC: 62 U/L (ref 40–150)
ALT SERPL W P-5'-P-CCNC: 32 U/L (ref 0–70)
ANION GAP SERPL CALCULATED.3IONS-SCNC: 10.7 MMOL/L (ref 6–17)
AST SERPL W P-5'-P-CCNC: 23 U/L (ref 0–45)
BILIRUB DIRECT SERPL-MCNC: 0.2 MG/DL (ref 0–0.2)
BILIRUB SERPL-MCNC: 0.6 MG/DL (ref 0.2–1.3)
BUN SERPL-MCNC: 17 MG/DL (ref 7–30)
CALCIUM SERPL-MCNC: 8.6 MG/DL (ref 8.5–10.5)
CHLORIDE SERPL-SCNC: 103 MMOL/L (ref 98–107)
CO2 SERPL-SCNC: 29 MMOL/L (ref 23–29)
CREAT SERPL-MCNC: 1.69 MG/DL (ref 0.7–1.3)
GFR SERPL CREATININE-BSD FRML MDRD: 40 ML/MIN/1.7M2
GLUCOSE SERPL-MCNC: 92 MG/DL (ref 70–105)
POTASSIUM SERPL-SCNC: 4.7 MMOL/L (ref 3.5–5.1)
PROT SERPL-MCNC: 7.1 G/DL (ref 6.8–8.8)
SODIUM SERPL-SCNC: 138 MMOL/L (ref 136–145)
TSH SERPL DL<=0.005 MIU/L-ACNC: 1.51 MU/L (ref 0.4–4)

## 2018-10-23 PROCEDURE — 36415 COLL VENOUS BLD VENIPUNCTURE: CPT | Performed by: PHYSICIAN ASSISTANT

## 2018-10-23 PROCEDURE — 40001038 ZZH STATISTIC RESPIRATORY TESTING VISIT

## 2018-10-23 PROCEDURE — 94729 DIFFUSING CAPACITY: CPT

## 2018-10-23 PROCEDURE — 80076 HEPATIC FUNCTION PANEL: CPT | Performed by: PHYSICIAN ASSISTANT

## 2018-10-23 PROCEDURE — 71045 X-RAY EXAM CHEST 1 VIEW: CPT

## 2018-10-23 PROCEDURE — 94375 RESPIRATORY FLOW VOLUME LOOP: CPT

## 2018-10-23 PROCEDURE — 80048 BASIC METABOLIC PNL TOTAL CA: CPT | Performed by: PHYSICIAN ASSISTANT

## 2018-10-23 PROCEDURE — 84443 ASSAY THYROID STIM HORMONE: CPT | Performed by: PHYSICIAN ASSISTANT

## 2018-10-23 PROCEDURE — 94726 PLETHYSMOGRAPHY LUNG VOLUMES: CPT

## 2018-10-25 ENCOUNTER — DOCUMENTATION ONLY (OUTPATIENT)
Dept: CARDIOLOGY | Facility: CLINIC | Age: 73
End: 2018-10-25

## 2018-10-25 DIAGNOSIS — Z79.899 ON AMIODARONE THERAPY: Primary | ICD-10-CM

## 2018-10-25 LAB
DLCOUNC-%PRED-PRE: 84 %
DLCOUNC-PRE: 21.54 ML/MIN/MMHG
DLCOUNC-PRED: 25.41 ML/MIN/MMHG
ERV-%PRED-PRE: 148 %
ERV-PRE: 1.78 L
ERV-PRED: 1.2 L
EXPTIME-PRE: 6.71 SEC
FEF2575-%PRED-PRE: 108 %
FEF2575-PRE: 2.34 L/SEC
FEF2575-PRED: 2.16 L/SEC
FEFMAX-%PRED-PRE: 117 %
FEFMAX-PRE: 9.45 L/SEC
FEFMAX-PRED: 8.05 L/SEC
FEV1-%PRED-PRE: 119 %
FEV1-PRE: 3.49 L
FEV1FEV6-PRE: 73 %
FEV1FEV6-PRED: 77 %
FEV1FVC-PRE: 72 %
FEV1FVC-PRED: 73 %
FEV1SVC-PRE: 67 %
FEV1SVC-PRED: 61 %
FIFMAX-PRE: 6.37 L/SEC
FRCPLETH-%PRED-PRE: 122 %
FRCPLETH-PRE: 4.68 L
FRCPLETH-PRED: 3.81 L
FVC-%PRED-PRE: 124 %
FVC-PRE: 4.84 L
FVC-PRED: 3.87 L
IC-%PRED-PRE: 93 %
IC-PRE: 3.4 L
IC-PRED: 3.62 L
RVPLETH-%PRED-PRE: 103 %
RVPLETH-PRE: 2.89 L
RVPLETH-PRED: 2.78 L
TLCPLETH-%PRED-PRE: 110 %
TLCPLETH-PRE: 8.08 L
TLCPLETH-PRED: 7.33 L
VA-%PRED-PRE: 110 %
VA-PRE: 7.74 L
VC-%PRED-PRE: 107 %
VC-PRE: 5.19 L
VC-PRED: 4.82 L

## 2018-10-26 NOTE — PROGRESS NOTES
Spoke to patient regarding results of amio testing per RENNY Moreno note dated 10/25.  Patient to continue amio as orderd.  Follow up with Dr Costello 2/2019 and Dr Newman 5/2019 with blood work and echo.  Patient provided verbal understanding of above.  AGAPITO Hastings

## 2018-10-26 NOTE — PROGRESS NOTES
Pls let pt know that Amio tests reviewed.    * PFTs are OK, with no change c/w previous and no evidence of amio-induced lung dz, restriction or obstruction.  * CXR is fine  * Blood work showed abnl Cr (slightly worse than 3 months ago, but similar to what he had in 1/2018). No evidence of amio-induced liver dz or thyroid dz    No changes needed - continue Amio for VT. See Dr. Costello 2/2019 and Dr. Newman with blood work (including amio tests) and echo 5/2019 (all ordered)    Component      Latest Ref Rng & Units 10/23/2018   TSH      0.40 - 4.00 mU/L 1.51     Component      Latest Ref Rng & Units 10/23/2018   Bilirubin Direct      0.0 - 0.2 mg/dL 0.2   Bilirubin Total      0.2 - 1.3 mg/dL 0.6   Albumin      3.4 - 5.0 g/dL 3.3 (L)   Protein Total      6.8 - 8.8 g/dL 7.1   Alkaline Phosphatase      40 - 150 U/L 62   ALT      0 - 70 U/L 32   AST      0 - 45 U/L 23     Component      Latest Ref Rng & Units 10/23/2018   Sodium      136 - 145 mmol/L 138   Potassium      3.5 - 5.1 mmol/L 4.7   Chloride      98 - 107 mmol/L 103   Carbon Dioxide      23 - 29 mmol/L 29   Anion Gap      6 - 17 mmol/L 10.7   Glucose      70 - 105 mg/dL 92   Urea Nitrogen      7 - 30 mg/dL 17   Creatinine      0.70 - 1.30 mg/dL 1.69 (H)   GFR Estimate      >60 mL/min/1.7m2 40 (L)   GFR Estimate If Black      >60 mL/min/1.7m2 48 (L)   Calcium      8.5 - 10.5 mg/dL 8.6

## 2018-11-19 ENCOUNTER — ANTICOAGULATION THERAPY VISIT (OUTPATIENT)
Dept: NURSING | Facility: CLINIC | Age: 73
End: 2018-11-19
Payer: COMMERCIAL

## 2018-11-19 DIAGNOSIS — I63.9 CEREBRAL INFARCTION (H): ICD-10-CM

## 2018-11-19 DIAGNOSIS — I63.50 CEREBRAL ARTERY OCCLUSION WITH CEREBRAL INFARCTION (H): ICD-10-CM

## 2018-11-19 DIAGNOSIS — I48.91 ATRIAL FIBRILLATION (H): ICD-10-CM

## 2018-11-19 LAB — INR POINT OF CARE: 1.9 (ref 0.86–1.14)

## 2018-11-19 PROCEDURE — 85610 PROTHROMBIN TIME: CPT | Mod: QW

## 2018-11-19 PROCEDURE — 99207 ZZC NO CHARGE NURSE ONLY: CPT

## 2018-11-19 PROCEDURE — 36416 COLLJ CAPILLARY BLOOD SPEC: CPT

## 2018-11-19 RX ORDER — DIGOXIN 125 MCG
125 TABLET ORAL DAILY
Qty: 90 TABLET | Refills: 2 | Status: SHIPPED | OUTPATIENT
Start: 2018-11-19 | End: 2019-08-07

## 2018-11-19 NOTE — PROGRESS NOTES
ANTICOAGULATION FOLLOW-UP CLINIC VISIT    Patient Name:  Tyrel Rene  Date:  11/19/2018  Contact Type:  Face to Face    SUBJECTIVE:     Patient Findings     Positives No Problem Findings           OBJECTIVE    INR Protime   Date Value Ref Range Status   11/19/2018 1.9 (A) 0.86 - 1.14 Final       ASSESSMENT / PLAN  INR assessment THER    Recheck INR In: 2 WEEKS    INR Location Clinic      Anticoagulation Summary as of 11/19/2018     INR goal 2.0-2.5   Today's INR 1.9!   Warfarin maintenance plan 2.5 mg (2.5 mg x 1) on Mon; 1.25 mg (2.5 mg x 0.5) all other days   Full warfarin instructions 11/19: 3.75 mg; Otherwise 2.5 mg on Mon; 1.25 mg all other days   Weekly warfarin total 10 mg   Plan last modified Caridad Cunningham RN (6/27/2018)   Next INR check 12/3/2018   Target end date Indefinite    Indications   Long-term (current) use of anticoagulants [Z79.01] [Z79.01]  Cerebral artery occlusion with cerebral infarction (HCC) [I63.50] [I63.50]  Cerebral infarction (H) [I63.9]         Anticoagulation Episode Summary     INR check location [facility]    Preferred lab     Send INR reminders to [facility] INR/PROTIME    Comments       Anticoagulation Care Providers     Provider Role Specialty Phone number    Dejon Downs MD [facility] [phone]            See the Encounter Report to view Anticoagulation Flowsheet and Dosing Calendar (Go to Encounters tab in chart review, and find the Anticoagulation Therapy Visit)        Doreen Finley RN

## 2018-11-19 NOTE — MR AVS SNAPSHOT
Tyrel Rene   11/19/2018 2:00 PM   Anticoagulation Therapy Visit    Description:  73 year old male   Provider:  [facility]   Department:  Bx Nurse           INR as of 11/19/2018     Today's INR 1.9!      Anticoagulation Summary as of 11/19/2018     INR goal 2.0-2.5   Today's INR 1.9!   Full warfarin instructions 11/19: 3.75 mg; Otherwise 2.5 mg on Mon; 1.25 mg all other days   Next INR check 12/3/2018    Indications   Long-term (current) use of anticoagulants [Z79.01] [Z79.01]  Cerebral artery occlusion with cerebral infarction (HCC) [I63.50] [I63.50]  Cerebral infarction (H) [I63.9]         Your next Anticoagulation Clinic appointment(s)     Dec 03, 2018  2:15 PM CST   Anticoagulation Visit with  ANTICOAGULATION CLINIC   [facility] ([facility])    [address] [place] [id number] [phone]              Contact Numbers     [facility]  Please call  [phone] to cancel and/or reschedule your appointment   The direct line to the anticoagulant nurse is [phone] on Monday, Wednesday, and Friday. On Thursday, the anticoagulant nurse can be reached directly at [phone].         November 2018 Details    Sun Mon Tue Wed Thu Fri Sat         1               2               3                 4               5               6               7               8               9               10                 11               12               13               14               15               16               17                 18               19      3.75 mg   See details      20      1.25 mg         21      1.25 mg         22      1.25 mg         23      1.25 mg         24      1.25 mg           25      1.25 mg         26      2.5 mg         27      1.25 mg         28      1.25 mg         29      1.25 mg         30      1.25 mg           Date Details   11/19 This INR check               How to  take your warfarin dose     To take:  1.25 mg Take 0.5 of a 2.5 mg tablet.    To take:  2.5 mg Take 1 of the 2.5 mg tablets.    To take:  3.75 mg Take 1.5 of the 2.5 mg tablets.           December 2018 Details    Sun Mon Tue Wed Thu Fri Sat           1      1.25 mg           2      1.25 mg         3            4               5               6               7               8                 9               10               11               12               13               14               15                 16               17               18               19               20               21               22                 23               24               25               26               27               28               29                 30               31                     Date Details   No additional details    Date of next INR:  12/3/2018         How to take your warfarin dose     To take:  1.25 mg Take 0.5 of a 2.5 mg tablet.    To take:  2.5 mg Take 1 of the 2.5 mg tablets.

## 2018-11-20 ENCOUNTER — HOSPITAL ENCOUNTER (INPATIENT)
Facility: CLINIC | Age: 73
LOS: 2 days | Discharge: HOME | DRG: 303 | End: 2018-11-22
Attending: EMERGENCY MEDICINE | Admitting: INTERNAL MEDICINE
Payer: COMMERCIAL

## 2018-11-20 DIAGNOSIS — I47.20 V-TACH (H): ICD-10-CM

## 2018-11-20 DIAGNOSIS — I47.20 VENTRICULAR TACHYCARDIA (H): ICD-10-CM

## 2018-11-20 LAB
ALBUMIN SERPL-MCNC: 3.1 G/DL (ref 3.4–5)
ALP SERPL-CCNC: 45 U/L (ref 40–150)
ALT SERPL W P-5'-P-CCNC: 32 U/L (ref 0–70)
ANION GAP SERPL CALCULATED.3IONS-SCNC: 7 MMOL/L (ref 3–14)
AST SERPL W P-5'-P-CCNC: 28 U/L (ref 0–45)
BASOPHILS # BLD AUTO: 0 10E9/L (ref 0–0.2)
BASOPHILS NFR BLD AUTO: 0.5 %
BILIRUB SERPL-MCNC: 0.7 MG/DL (ref 0.2–1.3)
BUN SERPL-MCNC: 20 MG/DL (ref 7–30)
CALCIUM SERPL-MCNC: 8.3 MG/DL (ref 8.5–10.1)
CHLORIDE SERPL-SCNC: 104 MMOL/L (ref 94–109)
CO2 SERPL-SCNC: 27 MMOL/L (ref 20–32)
CREAT SERPL-MCNC: 1.33 MG/DL (ref 0.66–1.25)
DIFFERENTIAL METHOD BLD: ABNORMAL
DIGOXIN SERPL-MCNC: 1.3 UG/L (ref 0.5–2)
EOSINOPHIL # BLD AUTO: 0.1 10E9/L (ref 0–0.7)
EOSINOPHIL NFR BLD AUTO: 1.7 %
ERYTHROCYTE [DISTWIDTH] IN BLOOD BY AUTOMATED COUNT: 14.2 % (ref 10–15)
GFR SERPL CREATININE-BSD FRML MDRD: 52 ML/MIN/1.7M2
GLUCOSE SERPL-MCNC: 98 MG/DL (ref 70–99)
HCT VFR BLD AUTO: 38.1 % (ref 40–53)
HGB BLD-MCNC: 13.1 G/DL (ref 13.3–17.7)
IMM GRANULOCYTES # BLD: 0 10E9/L (ref 0–0.4)
IMM GRANULOCYTES NFR BLD: 0.3 %
INR PPP: 1.95 (ref 0.86–1.14)
INTERPRETATION ECG - MUSE: NORMAL
LYMPHOCYTES # BLD AUTO: 1.7 10E9/L (ref 0.8–5.3)
LYMPHOCYTES NFR BLD AUTO: 25.3 %
MCH RBC QN AUTO: 31.1 PG (ref 26.5–33)
MCHC RBC AUTO-ENTMCNC: 34.4 G/DL (ref 31.5–36.5)
MCV RBC AUTO: 91 FL (ref 78–100)
MONOCYTES # BLD AUTO: 0.8 10E9/L (ref 0–1.3)
MONOCYTES NFR BLD AUTO: 12 %
NEUTROPHILS # BLD AUTO: 4 10E9/L (ref 1.6–8.3)
NEUTROPHILS NFR BLD AUTO: 60.2 %
NRBC # BLD AUTO: 0 10*3/UL
NRBC BLD AUTO-RTO: 0 /100
PLATELET # BLD AUTO: 156 10E9/L (ref 150–450)
POTASSIUM SERPL-SCNC: 4.3 MMOL/L (ref 3.4–5.3)
PROT SERPL-MCNC: 6.8 G/DL (ref 6.8–8.8)
RBC # BLD AUTO: 4.21 10E12/L (ref 4.4–5.9)
SODIUM SERPL-SCNC: 138 MMOL/L (ref 133–144)
WBC # BLD AUTO: 6.6 10E9/L (ref 4–11)

## 2018-11-20 PROCEDURE — 99223 1ST HOSP IP/OBS HIGH 75: CPT | Mod: AI | Performed by: INTERNAL MEDICINE

## 2018-11-20 PROCEDURE — 93005 ELECTROCARDIOGRAM TRACING: CPT

## 2018-11-20 PROCEDURE — 85025 COMPLETE CBC W/AUTO DIFF WBC: CPT | Performed by: EMERGENCY MEDICINE

## 2018-11-20 PROCEDURE — 25000128 H RX IP 250 OP 636: Performed by: INTERNAL MEDICINE

## 2018-11-20 PROCEDURE — 80162 ASSAY OF DIGOXIN TOTAL: CPT | Performed by: EMERGENCY MEDICINE

## 2018-11-20 PROCEDURE — 99221 1ST HOSP IP/OBS SF/LOW 40: CPT | Performed by: INTERNAL MEDICINE

## 2018-11-20 PROCEDURE — 85610 PROTHROMBIN TIME: CPT | Performed by: EMERGENCY MEDICINE

## 2018-11-20 PROCEDURE — 80053 COMPREHEN METABOLIC PANEL: CPT | Performed by: EMERGENCY MEDICINE

## 2018-11-20 PROCEDURE — 21000000 ZZH R&B IMCU HEART CARE

## 2018-11-20 PROCEDURE — 99291 CRITICAL CARE FIRST HOUR: CPT

## 2018-11-20 PROCEDURE — 25000132 ZZH RX MED GY IP 250 OP 250 PS 637: Performed by: INTERNAL MEDICINE

## 2018-11-20 PROCEDURE — 25000128 H RX IP 250 OP 636: Performed by: EMERGENCY MEDICINE

## 2018-11-20 PROCEDURE — 99292 CRITICAL CARE ADDL 30 MIN: CPT

## 2018-11-20 RX ORDER — AMOXICILLIN 250 MG
1 CAPSULE ORAL 2 TIMES DAILY PRN
Status: DISCONTINUED | OUTPATIENT
Start: 2018-11-20 | End: 2018-11-22 | Stop reason: HOSPADM

## 2018-11-20 RX ORDER — LISINOPRIL 5 MG/1
5 TABLET ORAL AT BEDTIME
Status: DISCONTINUED | OUTPATIENT
Start: 2018-11-20 | End: 2018-11-22 | Stop reason: HOSPADM

## 2018-11-20 RX ORDER — ONDANSETRON 4 MG/1
4 TABLET, ORALLY DISINTEGRATING ORAL EVERY 6 HOURS PRN
Status: DISCONTINUED | OUTPATIENT
Start: 2018-11-20 | End: 2018-11-22 | Stop reason: HOSPADM

## 2018-11-20 RX ORDER — NITROGLYCERIN 0.4 MG/1
0.4 TABLET SUBLINGUAL EVERY 5 MIN PRN
Status: DISCONTINUED | OUTPATIENT
Start: 2018-11-20 | End: 2018-11-22 | Stop reason: HOSPADM

## 2018-11-20 RX ORDER — ONDANSETRON 2 MG/ML
4 INJECTION INTRAMUSCULAR; INTRAVENOUS EVERY 6 HOURS PRN
Status: DISCONTINUED | OUTPATIENT
Start: 2018-11-20 | End: 2018-11-22 | Stop reason: HOSPADM

## 2018-11-20 RX ORDER — AMIODARONE HYDROCHLORIDE 200 MG/1
200 TABLET ORAL DAILY
Status: DISCONTINUED | OUTPATIENT
Start: 2018-11-21 | End: 2018-11-20

## 2018-11-20 RX ORDER — CARVEDILOL 6.25 MG/1
6.25 TABLET ORAL 2 TIMES DAILY WITH MEALS
Status: DISCONTINUED | OUTPATIENT
Start: 2018-11-20 | End: 2018-11-22 | Stop reason: HOSPADM

## 2018-11-20 RX ORDER — ACETAMINOPHEN 325 MG/1
650 TABLET ORAL EVERY 4 HOURS PRN
Status: DISCONTINUED | OUTPATIENT
Start: 2018-11-20 | End: 2018-11-22 | Stop reason: HOSPADM

## 2018-11-20 RX ORDER — ROSUVASTATIN CALCIUM 20 MG/1
20 TABLET, COATED ORAL AT BEDTIME
Status: DISCONTINUED | OUTPATIENT
Start: 2018-11-20 | End: 2018-11-22 | Stop reason: HOSPADM

## 2018-11-20 RX ORDER — WARFARIN SODIUM 2.5 MG/1
TABLET ORAL DAILY
COMMUNITY
End: 2019-09-22

## 2018-11-20 RX ORDER — NALOXONE HYDROCHLORIDE 0.4 MG/ML
.1-.4 INJECTION, SOLUTION INTRAMUSCULAR; INTRAVENOUS; SUBCUTANEOUS
Status: DISCONTINUED | OUTPATIENT
Start: 2018-11-20 | End: 2018-11-22 | Stop reason: HOSPADM

## 2018-11-20 RX ORDER — IPRATROPIUM BROMIDE 21 UG/1
2 SPRAY, METERED NASAL EVERY 8 HOURS PRN
Status: DISCONTINUED | OUTPATIENT
Start: 2018-11-20 | End: 2018-11-22 | Stop reason: HOSPADM

## 2018-11-20 RX ORDER — AMOXICILLIN 250 MG
2 CAPSULE ORAL 2 TIMES DAILY PRN
Status: DISCONTINUED | OUTPATIENT
Start: 2018-11-20 | End: 2018-11-22 | Stop reason: HOSPADM

## 2018-11-20 RX ORDER — ACETAMINOPHEN 650 MG/1
650 SUPPOSITORY RECTAL EVERY 4 HOURS PRN
Status: DISCONTINUED | OUTPATIENT
Start: 2018-11-20 | End: 2018-11-22 | Stop reason: HOSPADM

## 2018-11-20 RX ORDER — WARFARIN SODIUM 2.5 MG/1
2.5 TABLET ORAL DAILY
Status: CANCELLED | OUTPATIENT
Start: 2018-11-20

## 2018-11-20 RX ORDER — DIGOXIN 125 MCG
125 TABLET ORAL DAILY
Status: DISCONTINUED | OUTPATIENT
Start: 2018-11-21 | End: 2018-11-22 | Stop reason: HOSPADM

## 2018-11-20 RX ADMIN — ROSUVASTATIN CALCIUM 20 MG: 20 TABLET, FILM COATED ORAL at 21:16

## 2018-11-20 RX ADMIN — AMIODARONE HYDROCHLORIDE 1 MG/MIN: 50 INJECTION, SOLUTION INTRAVENOUS at 18:23

## 2018-11-20 RX ADMIN — SODIUM CHLORIDE 1000 ML: 9 INJECTION, SOLUTION INTRAVENOUS at 13:45

## 2018-11-20 RX ADMIN — AMIODARONE HYDROCHLORIDE 150 MG: 1.5 INJECTION, SOLUTION INTRAVENOUS at 17:53

## 2018-11-20 RX ADMIN — LISINOPRIL 5 MG: 5 TABLET ORAL at 21:16

## 2018-11-20 RX ADMIN — RANITIDINE 150 MG: 150 TABLET ORAL at 21:16

## 2018-11-20 RX ADMIN — AMIODARONE HYDROCHLORIDE 1 MG/MIN: 50 INJECTION, SOLUTION INTRAVENOUS at 22:44

## 2018-11-20 RX ADMIN — CARVEDILOL 6.25 MG: 6.25 TABLET, FILM COATED ORAL at 17:50

## 2018-11-20 RX ADMIN — Medication 1.25 MG: at 21:16

## 2018-11-20 ASSESSMENT — ENCOUNTER SYMPTOMS: SHORTNESS OF BREATH: 0

## 2018-11-20 NOTE — ED NOTES
[facility]  ED Nurse Handoff Report    ED Chief complaint: AICD Problem (defib went off possibly 7 times today , last time it went off was Dec 2017, had it placed 2012, heart bypass surg 2012 )      ED Diagnosis:   Final diagnoses:   V-tach (H)       Code Status: not addressed in ED     Allergies:   Allergies   Allergen Reactions     Nystatin Other (See Comments)     Make his mouth numb & swelling       Activity level - Baseline/Home:  Independent    Activity Level - Current:   Independent     Needed?: No    Isolation: No  Infection: Not Applicable  Bariatric?: No    Vital Signs:   Vitals:    11/20/18 1345 11/20/18 1400 11/20/18 1414 11/20/18 1415   BP: 130/70 125/70  128/70   Pulse:       Resp: 9 10 (!) 7    Temp:       TempSrc:       SpO2: 97% 99% 99%    Weight:       Height:           Cardiac Rhythm: ,        Pain level:      Is this patient confused?: No   [place] - Suicide Severity Rating Scale Completed?  Yes  If yes, what color did the patient score?  White    Patient Report: Initial Complaint: ICD fired at least 7 times in about 15 min at home   Focused Assessment: 100% paced since arrival, denies any sx currently   Tests Performed: labs, ECG, interrogated device   Abnormal Results: see results,   Treatments provided: NS one liter     Family Comments: wife here     OBS brochure/video discussed/provided to patient/family: No              Name of person given brochure if not patient: na              Relationship to patient: na    ED Medications:   Medications   0.9% sodium chloride BOLUS (0 mLs Intravenous Stopped 11/20/18 1413)       Drips infusing?:  No    For the majority of the shift this patient was Green.   Interventions performed were na.    Severe Sepsis OR Septic Shock Diagnosis Present: No    To be done/followed up on inpatient unit:  none at this time     ED NURSE PHONE NUMBER: [phone]

## 2018-11-20 NOTE — PLAN OF CARE
Problem: Patient Care Overview  Goal: Plan of Care/Patient Progress Review  Outcome: No Change  VSS, A&OX4, no c/o pain or SOB. Continuing to monitor telemetry and BP closely. Wife at the bedside. Plan for ECHO and med mgmt.

## 2018-11-20 NOTE — PLAN OF CARE
Problem: Cardiac: Heart Failure (Adult)  Goal: Signs and Symptoms of Listed Potential Problems Will be Absent, Minimized or Managed (Cardiac: Heart Failure)  Signs and symptoms of listed potential problems will be absent, minimized or managed by discharge/transition of care (reference Cardiac: Heart Failure (Adult) CPG).   Outcome: No Change  Heart Failure Care Pathway  GOALS TO BE MET BEFORE DISCHARGE:    1. Decrease congestion and/or edema with diuretic therapy to achieve near      optimal volume status.            Dyspnea improved:  NA            Edema improved:     NA        Net I/O and Weights since admission:          10/21 2300 - 11/20 2259  In: -   Out: 425 [Urine:425]  Net: -425            Vitals:    11/20/18 1340 11/20/18 1612   Weight: 78.5 kg (173 lb) 80.4 kg (177 lb 3.2 oz)       2.  O2 sats > 92% on RA or at prior home O2 therapy level.          Current oxygenation status:       SpO2: 100 %         O2 Device: None (Room air),            Able to wean O2 this shift to keep sats > 92%:  NA       Does patient use Home O2? No    3.  Tolerates ambulation and mobility near baseline: Yes        How many times did the patient ambulate with nursing staff this shift? 0, just transfers to bed and back at this time    Please review the Heart Failure Care Pathway for additional HF goal outcomes.    Shelanna M. Kreye RN  11/20/2018

## 2018-11-20 NOTE — ED NOTES
Pt stood at bedside to urinate into urinal and did well on his feet. Pt denies cp/dizziness or palpitations. RN assisted pt back to bed

## 2018-11-20 NOTE — IP AVS SNAPSHOT
[facility]    [address] [place] [address]., [address]    [place] [address]    Phone:  [phone]                                       After Visit Summary   11/20/2018    Tyrel Rene    MRN: [id number]           After Visit Summary Signature Page     I have received my discharge instructions, and my questions have been answered. I have discussed any challenges I see with this plan with the nurse or doctor.    ..........................................................................................................................................  Patient/Patient Representative Signature      ..........................................................................................................................................  Patient Representative Print Name and Relationship to Patient    ..................................................               ................................................  Date                                   Time    ..........................................................................................................................................  Reviewed by Signature/Title    ...................................................              ..............................................  Date                                               Time          22EPIC Rev 08/18

## 2018-11-20 NOTE — H&P
"Admitted:     11/20/2018      PRIMARY CARE PROVIDER:  Dejon Downs MD      CODE STATUS:  Full code.      CHIEF COMPLAINT:  \"Defibrillator going off numerous times.\"      HISTORY OF PRESENT ILLNESS:  Mr. Rene is a pleasant 73-year-old male who presented to the ED today because his defibrillator went off.  He estimates about 7 times within a 10-minute time period.  Per ED doctor report, the patient was having sex with his wife and it was during this increased activity that the episodes occurred. [employer] was called to interrogate the device and they said that there was some sustained ventricular tachycardia during a 10-minute time period but at the present time he is paced and in normal rhythm.  He did not have any other symptoms at that time such as shortness of breath, chest pain, nausea, vomiting or diaphoresis.  At present, he feels well but ED physician is requesting admission for further cardiac evaluation.  The patient sees Dr. Parish Newman as his primary cardiologist and Dr. Costello is his electrophysiologist who takes care of his device.        His laboratory workup shows elevated creatinine of 1.33, but this is actually below his baseline and normally it is 1.5-1.7.  The rest of his laboratory workup is not significant.  He will be coming in same patient to CICU for close observation for closer evaluation and monitoring.      ALLERGIES:  NYSTATIN CAUSES NUMBNESS AND SWELLING AROUND THE MOUTH.      HOME MEDICATIONS:   1.  Amiodarone 200 mg 6 days a week, do not take on Sundays.   2.  Coreg 6.25 mg b.i.d. with meals.     3.  Digoxin 125 mcg daily.   4.  Ipratropium spray, 2 sprays both nostrils q.8h.  for rhinitis.   5.  Lisinopril 5 mg at bedtime.   6.  PreserVision capsules 2 times 1 capsule 2 times daily.   7.  Nitroglycerin 0.4 mg sublingual q.5 minutes x3 p.r.n. p.r.n. for chest pain.   8.  Zantac 150 mg b.i.d.   9.  Crestor 20 mg daily.   10.  Viagra 100 mg daily as needed.   11.  Vitamin D 1000 " units daily.   12.  Warfarin 2.5 mg , 1.25 mg on every other day of the week.      PAST MEDICAL HISTORY:   1.  SVTs.   2.  Syncope.   3.  Hypertension.   4.  Hyperlipidemia.   5.  Erectile dysfunction.   6.  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    7.  Congestive heart failure.     8.  Cardiomyopathy.     9.  Cardiogenic shock.     10.  Coronary artery disease.   11.  Atrial flutter.     12.  Atrial fibrillation.      PAST SURGICAL HISTORY:     1.  Cardiac ablation for atrial flutter in .     2.  Cardioversion in  for atrial fibrillation.   3.  ICD pacemaker placement.   4.  3-vessel coronary artery bypass surgery in  with LIMA to distal LAD, SVG to OM1 and OM3.   4.  Catheterization in  with PCI to second diagonal.   5.  Hand surgery (date unknown).   6.  Hernia repair (date unknown).      FAMILY HISTORY:  Reviewed with the patient.  Mother  from a cerebral aneurysm, age unknown.  Father  at 71 from heart attack.  He has 2 sisters with alcohol and drug abuse, which his father from.  There is a history of hypertension in 2 of his 3 sisters as well.  One of his daughters has a history of atrial fibrillation and multiple sclerosis.  Another daughter has lymphoma.      SOCIAL HISTORY:  He is a former smoker, pack a day for 14 years, quit in .  No significant alcohol history.  No illicit drug history.  He is retired, .  He and his wife live independently.      REVIEW OF SYSTEMS:  10-system review conducted with the patient and other than the systems mentioned in the history of present illness, the rest are negative.      PHYSICAL EXAMINATION:   VITAL SIGNS:  Blood pressure is 148/81, O2 sat 96% on room air, respiratory rate 14, heart rate 65, temperature 98.4 degrees Fahrenheit taken orally.   GENERAL:  Well-nourished appearing elderly male in no apparent distress, alert and oriented x4.   HEENT:  Normocephalic, atraumatic.  No oropharyngeal erythema.   NECK:  No  cervical lymphadenopathy, no thyromegaly.   RESPIRATORY: Clear to auscultation bilaterally.  Normal work of breathing, no wheeze, rales, rhonchi.    CARDIOVASCULAR:  Regular rate and rhythm, no murmurs, rubs or gallops.  2+ pulses palpable in upper extremities.   ABDOMEN:  Normal bowel sounds.  Abdomen is soft, nontender, nondistended.  No hepatosplenomegaly is palpable.   EXTREMITIES:  No clubbing, cyanosis or edema.   NEUROLOGIC:  Cranial nerves II-XII are intact.  5/5 strength in all 4 extremities.  Sensation is intact diffusely.  Coordination:  Normal bilateral finger-nose test.      LABORATORY EVALUATION:  Creatinine is high at 1.33, but below baseline.  All other values on CMP are normal.  CBC:  Hemoglobin is low at 13.1, but all other values are normal.  Coagulations:  INR is 1.95.  No imaging studies were conducted.  EKG 12-lead shows ventricularly paced rhythm, biventricular pacemaker detected, no significant change found.  Rate is 65.      ASSESSMENT AND PLAN:  This is a 73-year-old male with a significant heart history to include CHF, atrial flutter, atrial fibrillation and 3-vessel coronary artery disease.  He is status post ablations, cardioversions and ultimately a defibrillator pacemaker placement.  He has been doing well for the past year, has not had any events until today.  He had 7 firings of his device during a 10-minute period, noted to have ventricular tachycardia for about a 10-minute period when device assessed. He is coming in for further electrophysiology workup.  The patient is hemodynamically stable right now, admit to CICU, put him on a monitor.  At this point, I am just going to continue his home complement of medications including amiodarone, Coreg, digoxin.  I will continue daily warfarin as well.   I tried to reach Dr. Costello,  his EP doctor, but he is in clinic this afternoon.  I did talk to Dr. Coleman from Cardiology and they will see the patient in consult to decide what further  workup needs to be done at this point.   2.  History of coronary disease, appears stable.  No signs of acute ischemia at this time, but I will defer any further cardiac workup to Cardiology.  I am going to continue him on his ACE inhibitor, his beta blocker and his Crestor at this time.   3.  History of an atrial fibrillation/atrial flutter, rate paced right now in the 60s.  Continue his rate control meds from home, amiodarone, and also continue his anticoagulation with warfarin.   4.  History of hypertension.  It is a little high this afternoon.  Continue his ACE inhibitor and we can have IV hydralazine or labetalol available if his systolic blood pressure starts to trend upward.   5.  Deep venous thrombosis prophylaxis.  Anticoagulated on warfarin.   6.  CODE STATUS:  The patient confirms full code status.      DISPOSITION:  I am unsure at this point what further workup will be done.  I am estimating at least 1 night's stay, if not 2.        Dejon Downs MD   [facility]    [address] [place] [address]         KARL YUSUF MD             D: 2018   T: 2018   MT: JELANI      Name:     ARTEM ELIZALDE   MRN:      [id number]        Account:      [id number]   :      1945        Admitted:     2018                   Document: [id number]

## 2018-11-20 NOTE — CONSULTS
[facility]    Cardiology Consultation     Date of Admission:  11/20/2018    Assessment & Plan   Tyrel Rene is a 73 year old male who was admitted on 11/20/2018.    1. VT storm  2. Chronic ischemic heart disease status post CABG  3. Ischemic cardiomyopathy with ejection fraction 25%  4. Biventricular pacer ICD  5. History of ventricular tachycardia on amiodarone  6. Chronic atrial fibrillation    Patient is asymptomatic at this point.  He appears to be stable.  Heart rate and blood pressure are fine.  Denies any cardiac symptoms.  Troponin elevation is likely related to the 7 shocks.  Given 7 episodes of recurrent VT that happened over a period of 10 minutes recommend intravenous amiodarone infusion which has now been ordered.  EP consultation tomorrow morning.  Continue anticoagulation for atrial fibrillation.  Echocardiogram tomorrow morning.  I will defer if ischemic workup is needed to EP tomorrow.  Continue beta-blockers.    Ronaldo Shelby MD    Primary Care Physician   Dejon Downs    Reason for Consult   Reason for consult: I was asked by medicine team to evaluate this patient for ventricular tachycardia.    History of Present Illness   Tyrel Rene is a 73 year old male who presents with ICD shocks.  The patient has been followed up in the heart failure and EP clinics as an outpatient.  He has a history of bypass surgery and ischemic cardiomyopathy.  His last ejection fraction in January of this year was 25%.  Patient is not on diuretic therapy at home and otherwise says that his heart failure has been stable.  He has New York Heart Association functional class II symptoms.  No angina at baseline.  Last year he was noted to have ventricular tachycardia and ICD shock.  He has been on amiodarone 200 mg which over the last couple of months has been reduced from 200 mg 7 times a week to 200 mg 6 times a week now. He has chronic AFib on anticoagulation.     He was in usual state of  health today when at around noon he experienced recurrent firings of his ICD.  Prior to that he had no symptoms.  Patient said that he had taken Viagra prior to that this am and was in the midst of sexual activity when this happened.  He denies lightheadedness palpitations syncope or presyncope related to that.  Denies edema orthopnea.    Patient Active Problem List   Diagnosis     STEMI (ST elevation myocardial infarction) (H)     Anemia     Health Care Home     Atrial fibrillation (H)     Hypertension     Cerebral infarction (H)     SVT (supraventricular tachycardia) (H)     Cardiogenic shock (H)     CAD (coronary artery disease)     Cardiomyopathy (H)     Atrial flutter (H)     Hyperlipidemia     Subdural hematoma (H)     Diplopia     Long-term (current) use of anticoagulants [Z79.01]     Cerebral artery occlusion with cerebral infarction (HCC) [I63.50]     Chronic systolic congestive heart failure (H)     Mixed hyperlipidemia     Ventricular tachycardia (H)     Screening for prostate cancer     Status post coronary angiogram     V-tach (H)       Past Medical History   I have reviewed this patient's medical history and updated it with pertinent information if needed.   Past Medical History:   Diagnosis Date     Atrial fibrillation (H)     s/p Cardioversion 3/14/2013     Atrial flutter (H)     S/p Aflutter ablation 1/11/2011     CAD (coronary artery disease)     CABG x3 10/2012- LIMA to distal LAD, SVG to OM1 & OM3; cath 10/2012- PTCA to second diagonal and mid LAD, BMS to mid LAD     Cardiogenic shock (H)      Cardiomyopathy (H)      CHF (congestive heart failure) (H)      CVA (cerebral infarction)     residual right hand numbness     ED (erectile dysfunction)      Hyperlipidemia      Hypertension      Neuropathy      Palpitations      SVT (supraventricular tachycardia) (H)     S/p dual chamber ICD 10/11/12- upgraded to BIV ICD 6/2013     Syncope        Past Surgical History   I have reviewed this patient's  surgical history and updated it with pertinent information if needed.  Past Surgical History:   Procedure Laterality Date     BYPASS GRAFT ARTERY CORONARY  10/2/2012    Procedure: BYPASS GRAFT ARTERY CORONARY;  Coronary Artery Bypass Graft x3 (LAD, Diag, OM) with Endovein [place] (On-Pump);  Surgeon: Yeyo Lyman MD;  Location: [facility] OR     CARDIOVERSION  3/14/2013     CORONARY ANGIOGRAPHY ADULT ORDER  10/2/12     CORONARY ARTERY BYPASS  10/2/12    LIMA to LAD, SVG to OM1 and OM3     H ABLATION ATRIAL FLUTTER  1/11/2011     HAND SURGERY       HEART CATH, ANGIOPLASTY  10/2/12    PTCA to second diagonal and mid LAD, BMS to mid LAD     HERNIA REPAIR       ORTHOPEDIC SURGERY Right 2006    cut on table saw     RELOCATE GENERATOR ICD/PACEMAKER         Prior to Admission Medications   Prior to Admission Medications   Prescriptions Last Dose Informant Patient Reported? Taking?   ASPIRIN NOT PRESCRIBED (INTENTIONAL)   Yes No   Sig: continuous prn for other Please choose reason not prescribed, below   Multiple Vitamins-Minerals (PRESERVISION AREDS 2 PO) 11/19/2018 at hs Spouse/Significant Other Yes Yes   Sig: Take 1 capsule by mouth 2 times daily   NITROSTAT 0.4 MG sublingual tablet prn Spouse/Significant Other No Yes   Sig: DISSOLVE 1 TABLET UNDER THE TONGUE EVERY 5 MINUTES AS NEEDED FOR CHEST PAIN   Vitamin D, Cholecalciferol, 1000 UNITS TABS 11/20/2018 at am Spouse/Significant Other Yes Yes   Sig: Take 1,000 mg by mouth daily    amiodarone (PACERONE/CODARONE) 200 MG tablet 11/19/2018 at hs  No Yes   Sig: Starting 9/20/2018 take 200mg daily six days a week (skip Sunday).   carvedilol (COREG) 3.125 MG tablet 11/19/2018 at hs  No Yes   Sig: Take 2 tablets (6.25 mg) by mouth 2 times daily (with meals)   digoxin (LANOXIN) 125 MCG tablet 11/19/2018 at am  No Yes   Sig: Take 1 tablet (125 mcg) by mouth daily   ipratropium (ATROVENT) 0.03 % spray prn  No Yes   Sig: Spray 2 sprays into both nostrils every 8 hours as needed  for rhinitis   lisinopril (PRINIVIL/ZESTRIL) 5 MG tablet 11/19/2018 at hs  No Yes   Sig: Take 1 tablet (5 mg) by mouth At Bedtime   ranitidine (ZANTAC) 150 MG tablet 11/19/2018 at hs Spouse/Significant Other No Yes   Sig: Take 1 tablet (150 mg) by mouth 2 times daily   rosuvastatin (CRESTOR) 20 MG tablet 11/19/2018 at hs  No Yes   Sig: Take 1 tablet (20 mg) by mouth daily   sildenafil (VIAGRA) 100 MG tablet 11/20/2018 at am  No Yes   Sig: Take 1 tablet (100 mg) by mouth daily as needed Take 30 min to 4 hours before intercourse.  Never use with nitroglycerin, terazosin or doxazosin.   warfarin (COUMADIN) 2.5 MG tablet 11/19/2018 at hs  Yes Yes   Sig: Take by mouth daily 11/19/18: 3.75 mg; Otherwise 2.5 mg on Mondays; 1.25 mg all other days of the week      Facility-Administered Medications: None     Current Facility-Administered Medications   Medication Dose Route Frequency     amiodarone  150 mg Intravenous Once     carvedilol  6.25 mg Oral BID w/meals     [START ON 11/21/2018] cholecalciferol  1,000 Units Oral Daily     [START ON 11/21/2018] digoxin  125 mcg Oral Daily     lisinopril  5 mg Oral At Bedtime     ranitidine  150 mg Oral BID     rosuvastatin  20 mg Oral At Bedtime     warfarin  1.25 mg Oral ONCE at 18:00     Current Facility-Administered Medications   Medication Last Rate     amiodarone       amiodarone       - MEDICATION INSTRUCTIONS -       Warfarin Therapy Reminder       Allergies   Allergies   Allergen Reactions     Nystatin Other (See Comments)     Make his mouth numb & swelling       Social History    reports that he quit smoking about 46 years ago. His smoking use included Cigarettes. He has a 14.00 pack-year smoking history. He has never used smokeless tobacco. He reports that he does not drink alcohol or use illicit drugs.    Family History   Family History   Problem Relation Age of Onset     Alcohol/Drug Father      HEART DISEASE Father 71     Alzheimer Disease Sister      Alcohol/Drug Sister   "    Alcohol/Drug Sister      GASTROINTESTINAL DISEASE Sister      Obesity Sister      Hypertension Sister      HEART DISEASE Sister      Hypertension Sister      HEART DISEASE Daughter      afib     Arrhythmia Daughter      Multiple Sclerosis Daughter      HEART DISEASE Daughter      afib     Arrhythmia Daughter      Cancer Daughter      lymphoma     Aneurysm Mother        Review of Systems   The comprehensive 10 point Review of Systems is negative other than noted in the HPI or here.     Physical Exam   Vital Signs with Ranges  Temp:  [98.3  F (36.8  C)-98.4  F (36.9  C)] 98.3  F (36.8  C)  Pulse:  [65] 65  Heart Rate:  [64-73] 65  Resp:  [7-31] 16  BP: (117-148)/(62-81) 117/63  SpO2:  [96 %-100 %] 100 %  Wt Readings from Last 4 Encounters:   11/20/18 80.4 kg (177 lb 3.2 oz)   08/20/18 80.2 kg (176 lb 14.4 oz)   07/18/18 82.2 kg (181 lb 3.2 oz)   05/23/18 82.1 kg (181 lb 1.6 oz)            Vitals: /63 (BP Location: Left arm)  Pulse 65  Temp 98.3  F (36.8  C) (Oral)  Resp 16  Ht 1.854 m (6' 1\")  Wt 80.4 kg (177 lb 3.2 oz)  SpO2 100%  BMI 23.38 kg/m2    General alert oriented x3 in no acute distress  JVP is normal  No lower extremity edema  Extremities are warm and well-perfused  Normal first and second heart sounds without murmur rubs or gallops  Abdomen is soft nontender nondistended  Lungs are clear to auscultation bilaterally  Limited neuro motor exam was nonfocal  Appropriate affect      No lab results found in last 7 days.    Invalid input(s): TROPONINIES      Recent Labs  Lab 11/20/18  1339 11/19/18   WBC 6.6  --    HGB 13.1*  --    MCV 91  --      --    INR 1.95* 1.9*     --    POTASSIUM 4.3  --    CHLORIDE 104  --    CO2 27  --    BUN 20  --    CR 1.33*  --    GFRESTIMATED 52*  --    GFRESTBLACK 63  --    ANIONGAP 7  --    LORI 8.3*  --    GLC 98  --    ALBUMIN 3.1*  --    PROTTOTAL 6.8  --    BILITOTAL 0.7  --    ALKPHOS 45  --    ALT 32  --    AST 28  --      Recent Labs   Lab Test "  01/18/18   1054  12/20/17   1230   12/17/14   1044  09/10/13   0953   CHOL  130  124   < >  136  121   HDL  46  50   < >  49  39*   LDL  58  51   < >  75  66   TRIG  129  113   < >  60  78   CHOLHDLRATIO   --    --    --   2.8  3.1    < > = values in this interval not displayed.       Recent Labs  Lab 11/20/18  1339   WBC 6.6   HGB 13.1*   HCT 38.1*   MCV 91        No results for input(s): PH, PHV, PO2, PO2V, SAT, PCO2, PCO2V, HCO3, HCO3V in the last 168 hours.  No results for input(s): NTBNPI, NTBNP in the last 168 hours.  No results for input(s): DD in the last 168 hours.  No results for input(s): SED, CRP in the last 168 hours.    Recent Labs  Lab 11/20/18  1339        No results for input(s): TSH in the last 168 hours.  No results for input(s): COLOR, APPEARANCE, URINEGLC, URINEBILI, URINEKETONE, SG, UBLD, URINEPH, PROTEIN, UROBILINOGEN, NITRITE, LEUKEST, RBCU, WBCU in the last 168 hours.    Imaging:  No results found for this or any previous visit (from the past 48 hour(s)).    Echo:  No results found for this or any previous visit (from the past 4320 hour(s)).

## 2018-11-20 NOTE — ED PROVIDER NOTES
History     Chief Complaint:  AICD Problem    HPI   Tyrel Rene is a 73 year old male with a history of CAD, CHF, CVA, STEMI and CABG x 3 on Coumadin, ICD/Pacemaker in place, who presents after his defibrillator going off. The patient s wife states that the patient wasn t feeling well 3 days ago, but this resolved after 1 day. Today, the patient's defibrillator went off 7 times in less than 10 minutes. The patient states he was exerting himself at that time as he wa shaving intercourse with his wife. The patient states that his defibrillator went off once on December 3rd, 2017, but never since then. Today EMS was called and reports the patient was laying in bed upon their arrival. The patient's blood sugar was 101, and his heart rate was in the 70's en route to the ED per EMS. The patient did not have any events while in the care of EMS. The patient denies any chest pain or shortness of breath currently.    Allergies:  Nystatin     Medications:    Pacerone/Codarone  Coreg  Lanoxin  Atrovent  Prinicil/Zestril  Preservision Areds 2 PO  Nitrostat 0.4 mg sublingual tablet  Zantac  Crestor   Viagra  Vitamin D, Cholecalciferol 1000 Units Tabs  Coumadin    Past Medical History:    Atrial fibrillation  Atrial flutter  CAD  Cardiogenic shock  Cardiomyopathy  CHF  CVA  ED  Hyperlipidemia  Hypertension  Neuropathy  SVT  STEMI  Cerebral infarction    Past Surgical History:    CABG x 3  Cardioversion  Coronary angiography adult order  Coronary artery bypass  H Ablation Atrial Flutter  Hand Surgery  Heart Cath Angioplasty  Hernia Repair  Orthopedic Surgery  Relocate Generator ICD/Pacemaker    Family History:    Alcohol/Drug Abuse  Heart Disease  Alzheimer Disease  Gastrointestinal Disease  Obesity  Hypertension  Arrhythmia  MS  Lymphoma  Aneurysm    Social History:  Smoking Status: Former Smoker  Alcohol Use: No  Patient presents via EMS with his wife.  Marital Status:       Review of Systems   Respiratory: Negative  "for shortness of breath.    Cardiovascular: Negative for chest pain.   10 point review of systems performed and is negative except as above and in HPI.      Physical Exam     Patient Vitals for the past 24 hrs:   BP Temp Temp src Pulse Heart Rate Resp SpO2 Height Weight   11/20/18 1612 - - - - - - - - 80.4 kg (177 lb 3.2 oz)   11/20/18 1611 117/63 98.3  F (36.8  C) Oral - 65 16 100 % - -   11/20/18 1609 - - - - - - - 1.854 m (6' 1\") -   11/20/18 1555 - - - - 64 14 99 % - -   11/20/18 1552 - - - - 65 12 - - -   11/20/18 1533 - - - - 65 14 99 % - -   11/20/18 1530 126/62 - - - - - - - -   11/20/18 1515 119/65 - - - 65 - 96 % - -   11/20/18 1503 - - - - 65 18 97 % - -   11/20/18 1500 118/67 - - - 65 - 96 % - -   11/20/18 1445 120/69 - - - 65 (!) 31 98 % - -   11/20/18 1430 125/68 - - - 65 9 - - -   11/20/18 1415 128/70 - - - - - - - -   11/20/18 1414 - - - - 65 (!) 7 99 % - -   11/20/18 1400 125/70 - - - 65 10 99 % - -   11/20/18 1345 130/70 - - - 73 9 97 % - -   11/20/18 1340 148/81 98.4  F (36.9  C) Oral 65 - 16 97 % 1.854 m (6' 1\") 78.5 kg (173 lb)       Physical Exam  General: Resting on the gurney, appears comfortable  Head:  The scalp, face, and head appear normal  Mouth/Throat: Mucus membranes are moist  CV:  Regular rate and rhythm.    Normal S1 and S2  No pathological murmur   Resp:  Lungs sound clear on auscultation bilaterally    Non-labored, no retractions or accessory muscle use    No coarseness    No wheezing   GI:  Abdomen is soft, no rigidity    No tenderness to palpation  MS:  Normal motor assessment of all extremities.    Good capillary refill noted.    No lower extremity edema, redness, swelling or excess warmth.  Skin:  No rash or lesions noted.  Neuro:   Speech is normal and fluent. No apparent deficit.  Psych: Awake. Alert.  Normal affect.      Appropriate interactions.    Emergency Department Course     ECG (13:38:51):  Rate 65 bpm. KS interval * ms. QRS duration 168 ms. QT/QTc 460/478 ms. P-R-T " axes 78 -76 98. Ventricular-paced rhythm.  Biventricular pacemaker detected.  Abnormal ECG.  Interpreted by Nereyda Lamas MD.    Laboratory:    CBC: HGB 13.1 (L), RBC 4.21 (L), HCT 38.1 (L), o/w AWNL (WBC 6.6, )    CMP: Creatinine 1.33 (H), GFR Estimate 52 (L), Calcium 8.3 (L), Albumin 3.1 (L), o/w AWNL    INR: 1.95 (H)    Digoxin level: 1.3    Interventions:    1345: NS 1L IV Bolus    Emergency Department Course:  The patient arrived in the emergency department via EMS.    Past medical records, nursing notes, and vitals reviewed.  1332: I performed an exam of the patient and obtained history, as documented above.    1353: I discussed the case with the [employer] Representative regarding the patient.    1425: I discussed the case with the [employer] Representative regarding the patient.    1442: Rechecked the patient, findings and plan explained to the patient, who consents to admission. Discussed the patient with Dr. Molnia, who will admit the patient to a monitored bed for further observation, evaluation, and treatment.    Impression & Plan      Medical Decision Making:  Tyrel Rene is a 73 year old male who presents for evaluation after his internal defibrillator went off.  He has had this happen once previously however this is the only episode that was sustained.  Patient does have a prior history of coronary artery bypass grafting and had an angioma within the last few years.  He currently denies any chest pain.  He has no shortness of breath.  His ventricular fibrillation occurred with exertion.  Interestingly his device provided 8 shocks however ceased providing shocks even though he was still in ventricular tachycardia.  He did resolve on his own and was in a paced rhythm when he presented to the emergency department.  As his symptoms were exertional and resolved without intervention I did not load him with amiodarone.  He is currently stable and will be admitted to  [facility].    Diagnosis:    ICD-10-CM    1. V-tach (H) I47.2 Digoxin level     Digoxin level     CANCELED: Digoxin level       Disposition:  Admitted to [facility].    Claudia Carr  11/20/2018    EMERGENCY DEPARTMENT  I, Claudia Carr, am serving as a scribe at 1:32 PM on 11/20/2018 to document services personally performed by Nereyda Lamas MD based on my observations and the provider's statements to me.        Nereyda Lamas MD  11/20/18 [id number]

## 2018-11-20 NOTE — IP AVS SNAPSHOT
MRN:[id number]                      After Visit Summary   11/20/2018    Tyrel Rene    MRN: [id number]           Thank you!     Thank you for choosing [facility] for your care. Our goal is always to provide you with excellent care. Hearing back from our patients is one way we can continue to improve our services. Please take a few minutes to complete the written survey that you may receive in the mail after you visit with us. Thank you!        Patient Information     Date Of Birth          1945        Designated Caregiver       Most Recent Value    Caregiver    Will someone help with your care after discharge? yes    Name of designated caregiver Sharel    Phone number of caregiver [phone], H [phone]    Caregiver address [place]      About your hospital stay     You were admitted on:  November 20, 2018 You last received care in the:  [facility]    You were discharged on:  November 22, 2018        Reason for your hospital stay       Ventricular tachycardia                  Who to Call     For medical emergencies, please call 911.  For non-urgent questions about your medical care, please call your primary care provider or clinic, [phone]          Attending Provider     Provider Specialty    Nereyda Lamas MD Emergency Medicine    Randall, Jacob Minor MD Internal Medicine       Primary Care Provider Office Phone # Fax #    Dejon Downs [phone]      After Care Instructions     Activity       Your activity upon discharge: activity as tolerated  No driving for 3 months until cleared by cardiology            Diet       Follow this diet upon discharge: Orders Placed This Encounter      Combination Diet Low Saturated Fat Na <2400mg Diet, No Caffeine Diet                  Follow-up Appointments     Follow-up and recommended labs and tests        Follow up with primary care provider, Dejon Downs, within 7 days for  hospital follow- up.  The following labs/tests are recommended: CBC/BMP/INR.  Follow-up with Dr Costello next available                  Your next 10 appointments already scheduled     Dec 03, 2018  2:15 PM CST   Anticoagulation Visit with [facility]   [facility]    [address] [facility] [address]  [facility] [address]   [phone]            Jan 16, 2019  2:50 PM CST   LAB with [facility]   [facility] [place] ([facility])    [address] [place] [id number] [phone]           Please do not eat 10-12 hours before your appointment if you are coming in fasting for labs on lipids, cholesterol, or glucose (sugar). This does not apply to pregnant women. Water, hot tea and black coffee (with nothing added) are okay. Do not drink other fluids, diet soda or chew gum.            Jan 16, 2019  3:50 PM CST   Core Return with JAMAL Flores CNP   [facility] ([facility])    [address] [place] [id number] [phone] OPT 2            Feb 04, 2019  4:30 PM CST   Remote ICD Check with MARQUEZ DCR2   [facility] ([facility])    [address] [place] [id number] [phone] OPT 2           This appointment is for a remote check of your debrillator.  This is not an appointment at the office.            Feb 12, 2019  2:15 PM CST   [facility] EP RETURN with Suellen Costello MD   [facility] ([facility])    [address] [place] [id number] [phone] OPT 2              Warfarin Instruction     You have started taking a medicine called warfarin. This is a blood-thinning medicine (anticoagulant). It helps prevent and treat blood clots.      Before leaving the  "hospital, make sure you know how much to take and how long to take it.      You will need regular blood tests to make sure your blood is clotting safely. It is very important to see your doctor for regular blood tests.    Talk to your doctor before taking any new medicine (this includes over-the-counter drugs and herbal products). Many medicines can interact with warfarin. This may cause more bleeding or too much clotting.     Eating a lot of vitamin K--found in green, leafy vegetables--can change the way warfarin works in your body. Do NOT avoid these foods. Instead, try to eat the same amount each day.     Bleeding is the most common side-effect of warfarin. You may notice bleeding gums, a bloody nose, bruises and bleeding longer when you cut yourself. See a doctor at once if:   o You cough up blood  o You find blood in your stool (poop)  o You have a deep cut, or a cut that bleeds longer than 10 minutes   o You have a bad cut, hard fall, accident or hit your head (go to urgent care or the emergency room).    For women who can get pregnant: This medicine can harm an unborn baby. Be very careful not to get pregnant while taking this medicine. If you think you might be pregnant, call your doctor right away.    For more information, read \"Guide to Warfarin Therapy,  the booklet you received in the hospital.        Pending Results     No orders found from 11/18/2018 to 11/21/2018.            Statement of Approval     Ordered          11/22/18 0909  I have reviewed and agree with all the recommendations and orders detailed in this document.  EFFECTIVE NOW     Approved and electronically signed by:  Lyric Calero MD             Admission Information     Date & Time Provider Department Dept. Phone    11/20/2018 Jacob Molina MD [facility] [phone]      Your Vitals Were     Blood Pressure Pulse Temperature Respirations Height Weight    119/65 (BP Location: Left arm) 64 97.7  F " "(36.5  C) (Oral) 16 1.854 m (6' 1\") 80 kg (176 lb 6.4 oz)    Pulse Oximetry BMI (Body Mass Index)                98% 23.27 kg/m2          Paymentus Information     [employer] gives you secure access to your electronic health record. If you see a primary care provider, you can also send messages to your care team and make appointments. If you have questions, please call your primary care clinic.  If you do not have a primary care provider, please call [phone] and they will assist you.        Care EveryWhere ID     This is your Care EveryWhere ID. This could be used by other organizations to access your [facility] medical records  [id number]        Equal Access to Services     JAVI OTERO : Jimy De Anda, palmer carpio, rm fischer, deb bird. So [facility] [phone].    ATENCIÓN: Si habla español, tiene a hagen disposición servicios gratuitos de asistencia lingüística. Llame al [phone].    We comply with applicable federal civil rights laws and [place] laws. We do not discriminate on the basis of race, color, national origin, age, disability, sex, sexual orientation, or gender identity.               Review of your medicines      CONTINUE these medicines which may have CHANGED, or have new prescriptions. If we are uncertain of the size of tablets/capsules you have at home, strength may be listed as something that might have changed.        Dose / Directions    amiodarone 200 MG tablet   Commonly known as:  PACERONE/CODARONE   This may have changed:    - how much to take  - how to take this  - when to take this  - additional instructions   Used for:  Ventricular tachycardia (H)        Dose:  200 mg   Take 1 tablet (200 mg) by mouth daily   Quantity:  90 tablet   Refills:  3         CONTINUE these medicines which have NOT CHANGED        Dose / Directions    ASPIRIN NOT PRESCRIBED   Commonly known as:  INTENTIONAL        continuous prn for other Please " choose reason not prescribed, below   Refills:  0       carvedilol 3.125 MG tablet   Commonly known as:  COREG   Used for:  Ventricular tachycardia (H)        Dose:  6.25 mg   Take 2 tablets (6.25 mg) by mouth 2 times daily (with meals)   Quantity:  360 tablet   Refills:  3       digoxin 125 MCG tablet   Commonly known as:  LANOXIN   Used for:  Atrial fibrillation (H)        Dose:  125 mcg   Take 1 tablet (125 mcg) by mouth daily   Quantity:  90 tablet   Refills:  2       ipratropium 0.03 % spray   Commonly known as:  ATROVENT   Used for:  Chronic rhinitis, unspecified type        Dose:  2 spray   Spray 2 sprays into both nostrils every 8 hours as needed for rhinitis   Quantity:  30 mL   Refills:  11       lisinopril 5 MG tablet   Commonly known as:  PRINIVIL/ZESTRIL   Used for:  Chronic systolic congestive heart failure (H)        Dose:  5 mg   Take 1 tablet (5 mg) by mouth At Bedtime   Quantity:  180 tablet   Refills:  3       NITROSTAT 0.4 MG sublingual tablet   Used for:  Postsurgical aortocoronary bypass status   Generic drug:  nitroGLYcerin        DISSOLVE 1 TABLET UNDER THE TONGUE EVERY 5 MINUTES AS NEEDED FOR CHEST PAIN   Quantity:  25 tablet   Refills:  0       PRESERVISION AREDS 2 PO        Dose:  1 capsule   Take 1 capsule by mouth 2 times daily   Refills:  0       ranitidine 150 MG tablet   Commonly known as:  ZANTAC   Used for:  S/P CABG (coronary artery bypass graft)        Dose:  150 mg   Take 1 tablet (150 mg) by mouth 2 times daily   Quantity:  180 tablet   Refills:  0       rosuvastatin 20 MG tablet   Commonly known as:  CRESTOR   Used for:  Mixed hyperlipidemia        Dose:  20 mg   Take 1 tablet (20 mg) by mouth daily   Quantity:  90 tablet   Refills:  1       sildenafil 100 MG tablet   Commonly known as:  VIAGRA   Used for:  Erectile dysfunction, unspecified erectile dysfunction type        Dose:  100 mg   Take 1 tablet (100 mg) by mouth daily as needed Take 30 min to 4 hours before intercourse.   Never use with nitroglycerin, terazosin or doxazosin.   Quantity:  16 tablet   Refills:  3       Vitamin D (Cholecalciferol) 1000 units Tabs        Dose:  1000 mg   Take 1,000 mg by mouth daily   Refills:  0       warfarin 2.5 MG tablet   Commonly known as:  COUMADIN        Take by mouth daily 11/19/18: 3.75 mg; Otherwise 2.5 mg on Mondays; 1.25 mg all other days of the week   Refills:  0            Where to get your medicines      Some of these will need a paper prescription and others can be bought over the counter. Ask your nurse if you have questions.     You don't need a prescription for these medications     amiodarone 200 MG tablet                Protect others around you: Learn how to safely use, store and throw away your medicines at www.disposemymeds.org.             Medication List: This is a list of all your medications and when to take them. Check marks below indicate your daily home schedule. Keep this list as a reference.      Medications           Morning Afternoon Evening Bedtime As Needed    amiodarone 200 MG tablet   Commonly known as:  PACERONE/CODARONE   Take 1 tablet (200 mg) by mouth daily   Last time this was given:  200 mg on 11/21/2018 10:28 AM   Next Dose Due:  Tonight                                     ASPIRIN NOT PRESCRIBED   Commonly known as:  INTENTIONAL   continuous prn for other Please choose reason not prescribed, below                                carvedilol 3.125 MG tablet   Commonly known as:  COREG   Take 2 tablets (6.25 mg) by mouth 2 times daily (with meals)   Last time this was given:  6.25 mg on 11/22/2018  9:33 AM   Next Dose Due:  Tonight                                        digoxin 125 MCG tablet   Commonly known as:  LANOXIN   Take 1 tablet (125 mcg) by mouth daily   Last time this was given:  125 mcg on 11/22/2018  9:36 AM   Next Dose Due:  11/23                                     ipratropium 0.03 % spray   Commonly known as:  ATROVENT   Spray 2 sprays into both  nostrils every 8 hours as needed for rhinitis                                   lisinopril 5 MG tablet   Commonly known as:  PRINIVIL/ZESTRIL   Take 1 tablet (5 mg) by mouth At Bedtime   Last time this was given:  5 mg on 11/21/2018  8:40 PM   Next Dose Due:  Tonight                                   NITROSTAT 0.4 MG sublingual tablet   DISSOLVE 1 TABLET UNDER THE TONGUE EVERY 5 MINUTES AS NEEDED FOR CHEST PAIN   Generic drug:  nitroGLYcerin                                   PRESERVISION AREDS 2 PO   Take 1 capsule by mouth 2 times daily                                      ranitidine 150 MG tablet   Commonly known as:  ZANTAC   Take 1 tablet (150 mg) by mouth 2 times daily   Last time this was given:  150 mg on 11/22/2018  9:33 AM   Next Dose Due:  Tonight                                      rosuvastatin 20 MG tablet   Commonly known as:  CRESTOR   Take 1 tablet (20 mg) by mouth daily   Last time this was given:  20 mg on 11/21/2018  8:40 PM   Next Dose Due:  Tonight                                   sildenafil 100 MG tablet   Commonly known as:  VIAGRA   Take 1 tablet (100 mg) by mouth daily as needed Take 30 min to 4 hours before intercourse.  Never use with nitroglycerin, terazosin or doxazosin.                            Do not use with nitro       Vitamin D (Cholecalciferol) 1000 units Tabs   Take 1,000 mg by mouth daily   Last time this was given:  1,000 Units on 11/22/2018  9:33 AM   Next Dose Due:  11/23                                   warfarin 2.5 MG tablet   Commonly known as:  COUMADIN   Take by mouth daily 11/19/18: 3.75 mg; Otherwise 2.5 mg on Mondays; 1.25 mg all other days of the week   Last time this was given:  1.25 mg on 11/21/2018  5:19 PM   Next Dose Due:  Tonight

## 2018-11-20 NOTE — PHARMACY-ANTICOAGULATION SERVICE
Clinical Pharmacy - Warfarin Dosing Consult     Pharmacy has been consulted to manage this patient s warfarin therapy.  Indication: Atrial Fibrillation  Therapy Goal: INR 2-3  Warfarin Prior to Admission: Yes  Warfarin PTA Regimen: 2.5 mg on Mondays, 1.25 mg all other days.  Significant drug interactions: amiodarone    INR   Date Value Ref Range Status   11/20/2018 1.95 (H) 0.86 - 1.14 Final     INR Protime   Date Value Ref Range Status   11/19/2018 1.9 (A) 0.86 - 1.14 Final       Recommend warfarin 1.25 mg today.  Pharmacy will monitor Tyrel Rene daily and order warfarin doses to achieve specified goal.      Please contact pharmacy as soon as possible if the warfarin needs to be held for a procedure or if the warfarin goals change.

## 2018-11-21 ENCOUNTER — APPOINTMENT (OUTPATIENT)
Dept: CARDIOLOGY | Facility: CLINIC | Age: 73
DRG: 303 | End: 2018-11-21
Attending: INTERNAL MEDICINE
Payer: COMMERCIAL

## 2018-11-21 ENCOUNTER — DOCUMENTATION ONLY (OUTPATIENT)
Dept: CARDIOLOGY | Facility: CLINIC | Age: 73
End: 2018-11-21

## 2018-11-21 LAB
ANION GAP SERPL CALCULATED.3IONS-SCNC: 6 MMOL/L (ref 3–14)
BUN SERPL-MCNC: 22 MG/DL (ref 7–30)
CALCIUM SERPL-MCNC: 8 MG/DL (ref 8.5–10.1)
CHLORIDE SERPL-SCNC: 109 MMOL/L (ref 94–109)
CO2 SERPL-SCNC: 26 MMOL/L (ref 20–32)
CREAT SERPL-MCNC: 1.32 MG/DL (ref 0.66–1.25)
GFR SERPL CREATININE-BSD FRML MDRD: 53 ML/MIN/1.7M2
GLUCOSE SERPL-MCNC: 86 MG/DL (ref 70–99)
INR PPP: 2.15 (ref 0.86–1.14)
POTASSIUM SERPL-SCNC: 4.1 MMOL/L (ref 3.4–5.3)
SODIUM SERPL-SCNC: 141 MMOL/L (ref 133–144)

## 2018-11-21 PROCEDURE — 40000264 ECHO COMPLETE WITH OPTISON

## 2018-11-21 PROCEDURE — 21000001 ZZH R&B HEART CARE

## 2018-11-21 PROCEDURE — 25000132 ZZH RX MED GY IP 250 OP 250 PS 637: Performed by: INTERNAL MEDICINE

## 2018-11-21 PROCEDURE — 36415 COLL VENOUS BLD VENIPUNCTURE: CPT | Performed by: INTERNAL MEDICINE

## 2018-11-21 PROCEDURE — 93306 TTE W/DOPPLER COMPLETE: CPT | Mod: 26 | Performed by: INTERNAL MEDICINE

## 2018-11-21 PROCEDURE — 99222 1ST HOSP IP/OBS MODERATE 55: CPT | Mod: 25 | Performed by: INTERNAL MEDICINE

## 2018-11-21 PROCEDURE — 25500064 ZZH RX 255 OP 636: Performed by: INTERNAL MEDICINE

## 2018-11-21 PROCEDURE — 85610 PROTHROMBIN TIME: CPT | Performed by: INTERNAL MEDICINE

## 2018-11-21 PROCEDURE — 99232 SBSQ HOSP IP/OBS MODERATE 35: CPT | Performed by: PHYSICIAN ASSISTANT

## 2018-11-21 PROCEDURE — 80048 BASIC METABOLIC PNL TOTAL CA: CPT | Performed by: INTERNAL MEDICINE

## 2018-11-21 PROCEDURE — 25000128 H RX IP 250 OP 636: Performed by: INTERNAL MEDICINE

## 2018-11-21 RX ORDER — AMIODARONE HYDROCHLORIDE 200 MG/1
200 TABLET ORAL DAILY
Status: DISCONTINUED | OUTPATIENT
Start: 2018-11-21 | End: 2018-11-22 | Stop reason: HOSPADM

## 2018-11-21 RX ADMIN — AMIODARONE HYDROCHLORIDE 0.5 MG/MIN: 50 INJECTION, SOLUTION INTRAVENOUS at 00:59

## 2018-11-21 RX ADMIN — RANITIDINE 150 MG: 150 TABLET ORAL at 20:40

## 2018-11-21 RX ADMIN — AMIODARONE HYDROCHLORIDE 200 MG: 200 TABLET ORAL at 10:28

## 2018-11-21 RX ADMIN — ROSUVASTATIN CALCIUM 20 MG: 20 TABLET, FILM COATED ORAL at 20:40

## 2018-11-21 RX ADMIN — CARVEDILOL 6.25 MG: 6.25 TABLET, FILM COATED ORAL at 08:14

## 2018-11-21 RX ADMIN — AMIODARONE HYDROCHLORIDE 0.5 MG/MIN: 50 INJECTION, SOLUTION INTRAVENOUS at 05:03

## 2018-11-21 RX ADMIN — RANITIDINE 150 MG: 150 TABLET ORAL at 08:15

## 2018-11-21 RX ADMIN — HUMAN ALBUMIN MICROSPHERES AND PERFLUTREN 4 ML: 10; .22 INJECTION, SOLUTION INTRAVENOUS at 14:00

## 2018-11-21 RX ADMIN — Medication 1.25 MG: at 17:19

## 2018-11-21 RX ADMIN — VITAMIN D, TAB 1000IU (100/BT) 1000 UNITS: 25 TAB at 08:15

## 2018-11-21 RX ADMIN — LISINOPRIL 5 MG: 5 TABLET ORAL at 20:40

## 2018-11-21 RX ADMIN — AMIODARONE HYDROCHLORIDE 0.5 MG/MIN: 50 INJECTION, SOLUTION INTRAVENOUS at 15:16

## 2018-11-21 RX ADMIN — DIGOXIN 125 MCG: 125 TABLET ORAL at 08:15

## 2018-11-21 RX ADMIN — CARVEDILOL 6.25 MG: 6.25 TABLET, FILM COATED ORAL at 17:20

## 2018-11-21 ASSESSMENT — ACTIVITIES OF DAILY LIVING (ADL)
ADLS_ACUITY_SCORE: 7

## 2018-11-21 NOTE — CONSULTS
Consult Date:  11/21/2018      CARDIAC ELECTROPHYSIOLOGY CONSULTATION       REQUESTING PHYSICIAN:  Dr. Shelby, cardiologist, and Dr. Jacob Molina, hospitalist.      REASON FOR CONSULTATION:  Ventricular tachycardia storm with 7 ICD discharges on 11/20/18.      HISTORY OF PRESENT ILLNESS:    It is my pleasure seeing Mr. Tyrel Rene, a delightful 73-year-old gentleman, for episodes of ventricular tachycardia with ICD discharge.  Marko is very well known to me.  I have been following him for years.  He also sees Dr. Parish Newman, for his cardiomyopathy, CHF.  Marko has a chronic ischemic cardiomyopathy with EF of 25-30%.  He suffered a large anterior wall MI in 2012 with unsuccessful attempt at PCI of a diffusely diseased mid-LAD lesion.  He later runderwent multivessel bypass surgery.  He is in permanent atrial fibrillation and has a biventricular ICD.  On warfarin.      Marko has been having issues with ventricular tachycardia over the past 1-2 years.  He has been maintained on amiodarone 200 mg daily after a flurry of VT activity 1 year ago.  In 8/2018, his maintenance dose of amiodarone was decreased from 200 mg daily to 200 mg 6 days per week because of feeling unsteady and off balance.  We suspected this to be a side-effect of amiodarone.      Yesterday, he developed VT during intercourse.  He received an ICD shock and over the course of few minutes he essentially had VT storm and had a total of 7 ICD shocks.  Shocks were successful in terminating VT, but VT would promptly restart, to be terminated again by another shock.  911 was called and Marko was brought to the emergency room.  Vital signs were normal at the time of his arrival to our emergency room.  His 12-lead ECG on admission showed sinus rhythm with dissociated atrial activity (the device is programmed to VVIR mode due to previous atrial fibrillation) with ventricular pacing.  QRS consistent with biventricular capture.      Marko had been feeling reasonably well  "over the past weeks with the exception of the past weekend where he thought he may be getting a cold, though he did not have a fever.  He has been modestly active without chest pain.  He has not had orthopnea, PND, and his weight has been stable.      DIAGNOSTIC STUDIES:    Sodium 141, potassium 4.3, creatinine 1.33, ALT 32, AST 28.  Hematocrit 38%.  INR 2.15.   ECG as above.  Echo- is ending.       IMPRESSION & PLAN:    1.  Ventricular tachycardia storm.  Marko suffered a large anterior MI in 2012.  He has anteroapical scar and has had both polymorphic and monomorphic ventricular arrhythmias in the past.  His VT this time was monomorphic, likely arising in the area of scar.  He has taken amiodarone 200 mg 6 days/week since August.  I do believe he has mild neurologic side-effects from amiodarone.  He tells me he can \"live with it\" but his quality of life is not ideal.      Given the intolerance to amiodarone it is not ideal to increase it, so we are stuck between a rock and a hard place.  I do agree with intravenous amiodarone for 48 hours to gently reload it.  Thereafter, would increase the maintenance amiodarone dose back to 200 mg daily for the time being.  If he remains stable, he can be discharged home tomorrow morning.  We will arrange outpatient follow-up in the near future.     Marko should be strongly considered for catheter ablation of ventricular tachycardia.  In fact, this is a reasonable option, even though the likelihood of success is only about 50% and there is risk involved.  I will further discuss the catheter ablation option with him at our next appointment.      I do not think we need to repeat coronary angiography, as he had full cardiac catheterization in 01/2018, and there is no reason to believe that his coronary artery disease has significantly progressed since then.      Finally, he should not drive for at least 3 months.      Thank you for the opportunity to be part of his care.       " "ANN-MARIE FRANK MD, [facility]           Physical Exam:  Vitals: /72 (BP Location: Left arm)  Pulse 65  Temp 97.7  F (36.5  C) (Oral)  Resp 16  Ht 1.854 m (6' 1\")  Wt 80.2 kg (176 lb 12.8 oz)  SpO2 97%  BMI 23.33 kg/m2  Intake/Output Summary (Last 24 hours) at 11/21/18 1105  Last data filed at 11/21/18 0800   Gross per 24 hour   Intake              730 ml   Output             1650 ml   Net             -920 ml     Vitals:    11/20/18 1340 11/20/18 1612 11/21/18 0500   Weight: 78.5 kg (173 lb) 80.4 kg (177 lb 3.2 oz) 80.2 kg (176 lb 12.8 oz)       Constitutional:  A+O x3.  Pt is in NAD.  HEAD: Normocephalic.  SKIN:  Skin normal color, texture and turgor with no lesions or eruptions.  Eyes:  PERRL, EOMI.  ENT:  Supple, normal JVP. No lymphadenopathy or thyroid enlargement.  Chest:   CTA bilat, no rales or wheezing.  Cardiac:  RRR (paced), no g/m/r.  Abdomen:  Normal BS.  Soft, non-tender and non-distended.  No rebound or guarding.    Extremities:  Pedious pulses palpable B/L.  No LE edema noticed.   Neurological:  Strength and sensation grossly symmetric and intact throughout.         Review of Systems:  Complete review of system is otherwise negative with the exception of what was described above.       CURRENT MEDICATIONS:    amiodarone  200 mg Oral Daily     carvedilol  6.25 mg Oral BID w/meals     cholecalciferol  1,000 Units Oral Daily     digoxin  125 mcg Oral Daily     lisinopril  5 mg Oral At Bedtime     ranitidine  150 mg Oral BID     rosuvastatin  20 mg Oral At Bedtime     warfarin  1.25 mg Oral ONCE at 18:00          Allergies   Allergen Reactions     Nystatin Other (See Comments)     Make his mouth numb & swelling       PAST MEDICAL HISTORY:  Past Medical History:   Diagnosis Date     Atrial fibrillation (H)     s/p Cardioversion 3/14/2013     Atrial flutter (H)     S/p Aflutter ablation 1/11/2011     CAD (coronary artery disease)     CABG x3 10/2012- LIMA to distal LAD, SVG to OM1 & OM3; cath " 10/2012- PTCA to second diagonal and mid LAD, BMS to mid LAD     Cardiogenic shock (H)      Cardiomyopathy (H)      CHF (congestive heart failure) (H)      CVA (cerebral infarction)     residual right hand numbness     ED (erectile dysfunction)      Hyperlipidemia      Hypertension      Neuropathy      Palpitations      SVT (supraventricular tachycardia) (H)     S/p dual chamber ICD 10/11/12- upgraded to BIV ICD 6/2013     Syncope        PAST SURGICAL HISTORY:  Past Surgical History:   Procedure Laterality Date     BYPASS GRAFT ARTERY CORONARY  10/2/2012    Procedure: BYPASS GRAFT ARTERY CORONARY;  Coronary Artery Bypass Graft x3 (LAD, Diag, OM) with Endovein [place] (On-Pump);  Surgeon: Yeyo Lyman MD;  Location: [facility] OR     CARDIOVERSION  3/14/2013     CORONARY ANGIOGRAPHY ADULT ORDER  10/2/12     CORONARY ARTERY BYPASS  10/2/12    LIMA to LAD, SVG to OM1 and OM3     H ABLATION ATRIAL FLUTTER  1/11/2011     HAND SURGERY       HEART CATH, ANGIOPLASTY  10/2/12    PTCA to second diagonal and mid LAD, BMS to mid LAD     HERNIA REPAIR       ORTHOPEDIC SURGERY Right 2006    cut on table saw     RELOCATE GENERATOR ICD/PACEMAKER         FAMILY HISTORY:  Family History   Problem Relation Age of Onset     Alcohol/Drug Father      HEART DISEASE Father 71     Alzheimer Disease Sister      Alcohol/Drug Sister      Alcohol/Drug Sister      GASTROINTESTINAL DISEASE Sister      Obesity Sister      Hypertension Sister      HEART DISEASE Sister      Hypertension Sister      HEART DISEASE Daughter      afib     Arrhythmia Daughter      Multiple Sclerosis Daughter      HEART DISEASE Daughter      afib     Arrhythmia Daughter      Cancer Daughter      lymphoma     Aneurysm Mother        SOCIAL HISTORY:  Social History     Social History     Marital status:      Spouse name: N/A     Number of children: N/A     Years of education: N/A     Social History Main Topics     Smoking status: Former Smoker     Packs/day: 1.00      Years: 14.00     Types: Cigarettes     Quit date: 7/10/1972     Smokeless tobacco: Never Used     Alcohol use No     Drug use: No     Sexual activity: Yes     Partners: Female     Other Topics Concern     Parent/Sibling W/ Cabg, Mi Or Angioplasty Before 65f 55m? No     Caffeine Concern Yes     4 cups coffee daily     Sleep Concern No     Stress Concern No     Weight Concern Yes     gain 2lbs     Special Diet Yes     low sodium     Exercise Yes     walking, doing sittups, played pickle ball in summer     Seat Belt Yes     Social History Narrative         Recent Labs  Lab 18  0545 18  1339 18   WBC  --  6.6  --    HGB  --  13.1*  --    MCV  --  91  --    PLT  --  156  --    INR 2.15* 1.95* 1.9*    138  --    POTASSIUM 4.1 4.3  --    CHLORIDE 109 104  --    CO2 26 27  --    BUN 22 20  --    CR 1.32* 1.33*  --    ANIONGAP 6 7  --    LORI 8.0* 8.3*  --    GLC 86 98  --    ALBUMIN  --  3.1*  --    PROTTOTAL  --  6.8  --    BILITOTAL  --  0.7  --    ALKPHOS  --  45  --    ALT  --  32  --    AST  --  28  --             D: 2018   T: 2018   MT: AARON      Name:     ARTEM ELIZALDE   MRN:      [id number]        Account:       [id number]   :      1945           Consult Date:  2018      Document: [id number]       cc: Dejon Shelby MD

## 2018-11-21 NOTE — PLAN OF CARE
Problem: Arrhythmia/Dysrhythmia (Symptomatic) (Adult)  Goal: Signs and Symptoms of Listed Potential Problems Will be Absent, Minimized or Managed (Arrhythmia/Dysrhythmia)  Signs and symptoms of listed potential problems will be absent, minimized or managed by discharge/transition of care (reference Arrhythmia/Dysrhythmia (Symptomatic) (Adult) CPG).   Outcome: No Change  A/Ox4. Up with SBA. Denies pain. Lung sounds are clear. Amio infusuing @ 30mL/hr. Tele 100% V paced. Will continue monitor. Plan for echo today and EP consult.

## 2018-11-21 NOTE — PROGRESS NOTES
[id number] Consult Interrogation    Patient was admitted to the hospital for VT storm. The device was interrogated with the [employer] Consult. No EGMs provided for this documentation. Normal ICD function. Battery and leads stable. Next remote scheduled for 2/4/2019.

## 2018-11-21 NOTE — PROGRESS NOTES
VT storm.  Ischemic CM.    Plan:  - continue iv amiodarone till tomorrow am.  If stable, discontinue home tomorrow am on amiodarone 200 mg daily.  - no driving x 3 months  - will probably need outpatient VT ablation

## 2018-11-21 NOTE — PROGRESS NOTES
[facility]    Hospitalist Progress Note      Assessment & Plan   Tyrel Rene is a 73 year old male who was admitted on 11/20/2018 for evaluation after his ICD discharged      Ventricular Tachycardia Storm s/p ICD discharges 11/20: Known history of ventricular tachycardia, followed by Dr. Costello. PTA admission maintained on amiodarone 200 mg 6 days week. His dose was cut slightly from 200 mg daily in 8/2018 due to AE. Presented on this occasion after he had 7 ICD discharges during intercourse  - Greatly appreciate Cardiology and Electrophysiology consultation  - Amiodarone drip started 11/20. Plan to continue through tomorrow. If stable will discharge on amiodarone 200 mg/d (increased from 200 mg 6 days per week) with close EP outpatient follow up.  - Echo pending. No further ischemic work-up indicated per EP    CAD s/p CABG 2012  Ischemic Cardiomyopathy EF 25-30%: Follows with Dr. Newman and [facility]  - Continue PTA coreg 6.25 mg bid, lisinopril 5 mg daily and Crestor 20 mg daily.  Not on ASA due to anticoagulation with warfarin    Permanent atrial fibrillation  -Continue prior to admission Coreg and digoxin  -Continue warfarin with pharmacy to dose    DVT Prophylaxis: Warfarin  Code Status: Full Code    Disposition: Expected discharge likely tomorrow if remains stable on amiodarone drip    Lili Tripathi Luis Fernando    Interval History   Evaluated with wife at bedside  Doing very well.  Discussed plan for continued amiodarone drip and possible discharge tomorrow.  No concerns or complaints.    -Data reviewed today: I reviewed all new labs and imaging results over the last 24 hours. I personally reviewed no images or EKG's today.    Physical Exam   Temp: 97.7  F (36.5  C) Temp src: Oral BP: 126/70 Pulse: 65 Heart Rate: 65 Resp: 16 SpO2: 97 % O2 Device: None (Room air)    Vitals:    11/20/18 1340 11/20/18 1612 11/21/18 0500   Weight: 78.5 kg (173 lb) 80.4 kg (177 lb 3.2 oz) 80.2 kg (176 lb 12.8 oz)      Vital Signs with Ranges  Temp:  [97.7  F (36.5  C)-98.9  F (37.2  C)] 97.7  F (36.5  C)  Pulse:  [65] 65  Heart Rate:  [60-73] 65  Resp:  [7-31] 16  BP: (102-148)/(53-81) 126/70  SpO2:  [96 %-100 %] 97 %  I/O last 3 completed shifts:  In: 640 [I.V.:640]  Out: 1650 [Urine:1650]    Constitutional: Alert, resting comfortably in NAD  Respiratory: Normal effort, symmetric expansion, no crackles or wheezing  Cardiovascular: RRR no murmurs   GI: Non distended, normal bowels sounds, no tenderness or guarding  MSK: LE without edema. Dorsalis pedis pulse palpated bilaterally.   Skin/Integumen: Clear  Neuro: CN II-XII grossly intact  Psych:  Alert and oriented x 3. Normal affect      Medications     amiodarone 0.5 mg/min (11/21/18 0953)     - MEDICATION INSTRUCTIONS -       Warfarin Therapy Reminder         amiodarone  200 mg Oral Daily     carvedilol  6.25 mg Oral BID w/meals     cholecalciferol  1,000 Units Oral Daily     digoxin  125 mcg Oral Daily     lisinopril  5 mg Oral At Bedtime     ranitidine  150 mg Oral BID     rosuvastatin  20 mg Oral At Bedtime     warfarin  1.25 mg Oral ONCE at 18:00       Data     Recent Labs  Lab 11/21/18  0545 11/20/18  1339 11/19/18   WBC  --  6.6  --    HGB  --  13.1*  --    MCV  --  91  --    PLT  --  156  --    INR 2.15* 1.95* 1.9*    138  --    POTASSIUM 4.1 4.3  --    CHLORIDE 109 104  --    CO2 26 27  --    BUN 22 20  --    CR 1.32* 1.33*  --    ANIONGAP 6 7  --    LORI 8.0* 8.3*  --    GLC 86 98  --    ALBUMIN  --  3.1*  --    PROTTOTAL  --  6.8  --    BILITOTAL  --  0.7  --    ALKPHOS  --  45  --    ALT  --  32  --    AST  --  28  --        No results found for this or any previous visit (from the past 24 hour(s)).

## 2018-11-21 NOTE — PROGRESS NOTES
Patient transferred to room 252 this AM. Report given to AGAPITO Haider. Patient belongings delivered to new room.

## 2018-11-22 VITALS
HEIGHT: 73 IN | WEIGHT: 176.4 LBS | RESPIRATION RATE: 16 BRPM | SYSTOLIC BLOOD PRESSURE: 119 MMHG | DIASTOLIC BLOOD PRESSURE: 65 MMHG | BODY MASS INDEX: 23.38 KG/M2 | HEART RATE: 64 BPM | OXYGEN SATURATION: 98 % | TEMPERATURE: 97.7 F

## 2018-11-22 LAB — INR PPP: 2.5 (ref 0.86–1.14)

## 2018-11-22 PROCEDURE — 99232 SBSQ HOSP IP/OBS MODERATE 35: CPT | Performed by: INTERNAL MEDICINE

## 2018-11-22 PROCEDURE — 25000132 ZZH RX MED GY IP 250 OP 250 PS 637: Performed by: INTERNAL MEDICINE

## 2018-11-22 PROCEDURE — 85610 PROTHROMBIN TIME: CPT | Performed by: INTERNAL MEDICINE

## 2018-11-22 PROCEDURE — 99239 HOSP IP/OBS DSCHRG MGMT >30: CPT | Performed by: INTERNAL MEDICINE

## 2018-11-22 PROCEDURE — 36415 COLL VENOUS BLD VENIPUNCTURE: CPT | Performed by: INTERNAL MEDICINE

## 2018-11-22 PROCEDURE — 25000128 H RX IP 250 OP 636: Performed by: INTERNAL MEDICINE

## 2018-11-22 RX ORDER — AMIODARONE HYDROCHLORIDE 200 MG/1
200 TABLET ORAL DAILY
Qty: 90 TABLET | Refills: 3
Start: 2018-11-22 | End: 2019-06-26

## 2018-11-22 RX ADMIN — RANITIDINE 150 MG: 150 TABLET ORAL at 09:33

## 2018-11-22 RX ADMIN — DIGOXIN 125 MCG: 125 TABLET ORAL at 09:36

## 2018-11-22 RX ADMIN — CARVEDILOL 6.25 MG: 6.25 TABLET, FILM COATED ORAL at 09:33

## 2018-11-22 RX ADMIN — AMIODARONE HYDROCHLORIDE 0.5 MG/MIN: 50 INJECTION, SOLUTION INTRAVENOUS at 00:02

## 2018-11-22 RX ADMIN — VITAMIN D, TAB 1000IU (100/BT) 1000 UNITS: 25 TAB at 09:33

## 2018-11-22 ASSESSMENT — ACTIVITIES OF DAILY LIVING (ADL)
ADLS_ACUITY_SCORE: 7

## 2018-11-22 NOTE — PLAN OF CARE
"Problem: Cardiac: Heart Failure (Adult)  Goal: Signs and Symptoms of Listed Potential Problems Will be Absent, Minimized or Managed (Cardiac: Heart Failure)  Signs and symptoms of listed potential problems will be absent, minimized or managed by discharge/transition of care (reference Cardiac: Heart Failure (Adult) CPG).   Outcome: No Change  Pt A/O. VSS. Up with sba. Tele shows vpaced. Pt denies any CP or SOB. amio gtt at 0.5 mg/hr. No episodes of vtach noted. Resting comfortably. Pt has no new complaints.    /75  Pulse 64  Temp 97.7  F (36.5  C) (Oral)  Resp 16  Ht 1.854 m (6' 1\")  Wt 80.2 kg (176 lb 12.8 oz)  SpO2 97%  BMI 23.33 kg/m2    Heart Failure Care Pathway  GOALS TO BE MET BEFORE DISCHARGE:    1. Decrease congestion and/or edema with diuretic therapy to achieve near      optimal volume status.            Dyspnea improved:  Yes            Edema improved:     Yes        Net I/O and Weights since admission:          10/22 2300 - 11/21 2259  In: 730 [I.V.:730]  Out: 2450 [Urine:2450]  Net: -1720            Vitals:    11/20/18 1340 11/20/18 1612 11/21/18 0500   Weight: 78.5 kg (173 lb) 80.4 kg (177 lb 3.2 oz) 80.2 kg (176 lb 12.8 oz)       2.  O2 sats > 92% on RA or at prior home O2 therapy level.          Current oxygenation status:       SpO2: 97 %         O2 Device: None (Room air),            Able to wean O2 this shift to keep sats > 92%:  Yes       Does patient use Home O2? No    3.  Tolerates ambulation and mobility near baseline: Yes        How many times did the patient ambulate with nursing staff this shift? 4    Please review the Heart Failure Care Pathway for additional HF goal outcomes.    Vitaly Del Rio RN  11/21/2018             "

## 2018-11-22 NOTE — PLAN OF CARE
Problem: Patient Care Overview  Goal: Plan of Care/Patient Progress Review  Outcome: Improving  A&Ox4. VSS on RA. Denies pain. No shocks. Denies chest pain or shortness of breath. TELE V paced 65. On amio gtt at 0.5mg/hr. Up SBA. Will use urinal at bedside independently. Plan for discharge 11-22 if no episodes of VT happen

## 2018-11-22 NOTE — PLAN OF CARE
Problem: Cardiac Disease Comorbidity  Goal: Cardiac Disease  Patient comorbidity will be monitored for signs and symptoms of Cardiac Disease.  Problems will be absent, minimized or managed by discharge/transition of care.   Outcome: Adequate for Discharge Date Met: 11/22/18  Pt A/O x4, discharged to home accompanied by wife with private transportation. Both verbalized understanding of discharge instructions including diet, activity, meds and follow up appts. Showered this am,  LS clear, 100% vpaced. Pt and family appreciative of cares.    Problem: Arrhythmia/Dysrhythmia (Symptomatic) (Adult)  Goal: Signs and Symptoms of Listed Potential Problems Will be Absent, Minimized or Managed (Arrhythmia/Dysrhythmia)  Signs and symptoms of listed potential problems will be absent, minimized or managed by discharge/transition of care (reference Arrhythmia/Dysrhythmia (Symptomatic) (Adult) CPG).   Outcome: Improving  Amio gtt off. 100% Vpaced.

## 2018-11-22 NOTE — PROGRESS NOTES
[facility]    Cardiology Progress Note     Assessment & Plan   Tyrel Rene is a 73 year old male with ischemic cardioyopathy EF 25-30% who was admitted on 11/20/2018 with VT storm and ICD discharge.    1. Recurrent VT, with ICD shock and VT storm on admission  2. Ischemic cardiomyopathy, EF 25-30%  3. Permanent AFib s/p BiV-ICD  4. Severe CAD s/p large anterior wall MI in 2012 and subsequent CABG    - Plan for discharge today on amiodarone 200mg daily  - no change to other home medications   - No driving for 3 months  - follow up with Dr. Costello as an outpatient for further discussion regarding VT ablation    Abena Albright MD  Text Page      Interval History   Stable overnight.  No VT noted on telemetry overnight.  Denies chest pain, shortness of breath, palpitations.     Physical Exam   Temp: 97.7  F (36.5  C) Temp src: Oral BP: 106/61 Pulse: 64 Heart Rate: 65 Resp: 16 SpO2: 98 % O2 Device: None (Room air)    Vitals:    11/20/18 1612 11/21/18 0500 11/22/18 0617   Weight: 80.4 kg (177 lb 3.2 oz) 80.2 kg (176 lb 12.8 oz) 80 kg (176 lb 6.4 oz)     Vital Signs with Ranges  Temp:  [97.5  F (36.4  C)-97.9  F (36.6  C)] 97.7  F (36.5  C)  Pulse:  [64-65] 64  Heart Rate:  [64-65] 65  Resp:  [16] 16  BP: (106-132)/(60-75) 106/61  SpO2:  [97 %-98 %] 98 %  I/O last 3 completed shifts:  In: 600 [P.O.:150; I.V.:450]  Out: 2050 [Urine:2050]    Constitutional: alert, oriented, no distress  Eyes: anicteric sclerae, no conjunctivitis   Respiratory: normal respiratory effort on room air, clear to auscultation bilaterally, no wheezing, no crackles noted.  Cardiovascular: regular rate and rhythm, normal S1 and S2, no third heart sounds, normal JVP  GI: soft, non-tender, non-distended, normal bowel sounds throughout  Extremities: No peripheral edema noted  Genitourinary: no bell catheter in place  Skin: no rashes or breakdown of examined skin areas  Neurologic: no focal deficits, grossly intact  Neuropsychiatric:  normal affect, and mood. Answers questions appropriately, interactive on exam    Medications     amiodarone 0.5 mg/min (11/22/18 0002)     - MEDICATION INSTRUCTIONS -       Warfarin Therapy Reminder         amiodarone  200 mg Oral Daily     carvedilol  6.25 mg Oral BID w/meals     cholecalciferol  1,000 Units Oral Daily     digoxin  125 mcg Oral Daily     lisinopril  5 mg Oral At Bedtime     ranitidine  150 mg Oral BID     rosuvastatin  20 mg Oral At Bedtime       Data   Results for ARTEM ELIZALDE (MRN [id number]) as of 11/22/2018 07:17   Ref. Range 11/22/2018 05:30   INR Latest Ref Range: 0.86 - 1.14  2.50 (H)

## 2018-11-22 NOTE — DISCHARGE SUMMARY
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  Please see the H&P for details    Hospital Course   Tyrel Rene was admitted on 11/20/2018.  The following problems were addressed during his hospitalization:    Ventricular Tachycardia Storm s/p ICD discharges 11/20: Known history of ventricular tachycardia, followed by Dr. Costello. PTA admission maintained on amiodarone 200 mg 6 days week. His dose was cut slightly from 200 mg daily in 8/2018 due to AE. Presented on this occasion after he had 7 ICD discharges during intercourse  -Cardiology and Electrophysiology followed patient in house  - Amiodarone drip started 11/20 and patient monitored for recurrence of his ventricular tachycardia.  He did not have any further ventricular tachycardia and was discharged on amiodarone 200 mg/d (increased from 200 mg 6 days per week) with close EP outpatient follow up.  - Echo with no change compared to his previous echo in January 2018. No further ischemic work-up indicated per EP     CAD s/p CABG 2012  Ischemic Cardiomyopathy EF 25-30%: Follows with Dr. Newman and [facility]  - Continue PTA coreg 6.25 mg bid, lisinopril 5 mg daily and Crestor 20 mg daily.  Not on ASA due to anticoagulation with warfarin     Permanent atrial fibrillation  -Continued on prior to admission Coreg and digoxin at the time of discharge with no changes made    # Discharge Pain Plan:   - Patient currently has NO PAIN and is not being prescribed pain medications on  discharge.    Active Problems:    V-tach (H)      Lyric Calero MD    Significant Results and Procedures   See below    Pending Results   These results will be followed up by none   Unresulted Labs Ordered in the Past 30 Days of this Admission     No orders found from 9/21/2018 to 11/21/2018.          Code Status   Full Code       Primary Care Physician   Dejon Downs    Physical Exam   Temp: 97.7  F (36.5  C) Temp src: Oral BP: 106/61 Pulse: 64 Heart Rate: 65 Resp: 16 SpO2: 98 % O2 Device: None (Room air)    Vitals:    11/20/18 1612 11/21/18 0500 11/22/18 0617   Weight: 80.4 kg (177 lb 3.2 oz) 80.2 kg (176 lb 12.8 oz) 80 kg (176 lb 6.4 oz)     Vital Signs with Ranges  Temp:  [97.5  F (36.4  C)-97.7  F (36.5  C)] 97.7  F (36.5  C)  Pulse:  [64-65] 64  Heart Rate:  [64-65] 65  Resp:  [16] 16  BP: (106-132)/(60-75) 106/61  SpO2:  [97 %-98 %] 98 %  I/O last 3 completed shifts:  In: 600 [P.O.:150; I.V.:450]  Out: 2050 [Urine:2050]    Exam:  Constitutional: Awake, alert and no distress. Appears comfortable  Head: Normocephalic. No masses, lesions, tenderness or abnormalities  ENT: ENT exam normal, no neck nodes or sinus tenderness  Cardiovascular: RRR.  2+ murmurs, no rubs or JVD  Respiratory: Normal WOB,b/l equal air entry, no wheezes or crackles   Gastrointestinal: Abdomen soft, non-tender. BS normal. No masses, organomegaly  : Deferred   extremities : No edema , no clubbing or cyanosis      Discharge Disposition   Discharged to home  Condition at discharge: Stable    Consultations This Hospital Stay   CARDIOLOGY IP CONSULT  PHARMACY TO DOSE WARFARIN  ELECTROPHYSIOLOGY IP CONSULT    Time Spent on this Encounter   Lyric MALDONADO, personally saw the patient today and spent greater than 30 minutes discharging this patient.    Discharge Orders     Reason for your hospital stay   Ventricular tachycardia     Follow-up and recommended labs and tests    Follow up with primary care provider, Dejon Downs,  within 7 days for hospital follow- up.  The following labs/tests are recommended: CBC/BMP/INR.  Follow-up with Dr Costello next available     Activity   Your activity upon discharge: activity as tolerated  No driving for 3 months until cleared by cardiology     Full Code   As per admission note     Diet   Follow this diet upon discharge: Orders Placed This Encounter     Combination Diet Low Saturated Fat Na <2400mg Diet, No Caffeine Diet       Discharge Medications   Current Discharge Medication List      CONTINUE these medications which have CHANGED    Details   amiodarone (PACERONE/CODARONE) 200 MG tablet Take 1 tablet (200 mg) by mouth daily  Qty: 90 tablet, Refills: 3    Associated Diagnoses: Ventricular tachycardia (H)         CONTINUE these medications which have NOT CHANGED    Details   carvedilol (COREG) 3.125 MG tablet Take 2 tablets (6.25 mg) by mouth 2 times daily (with meals)  Qty: 360 tablet, Refills: 3    Associated Diagnoses: Ventricular tachycardia (H)      digoxin (LANOXIN) 125 MCG tablet Take 1 tablet (125 mcg) by mouth daily  Qty: 90 tablet, Refills: 2    Associated Diagnoses: Atrial fibrillation (H)      ipratropium (ATROVENT) 0.03 % spray Spray 2 sprays into both nostrils every 8 hours as needed for rhinitis  Qty: 30 mL, Refills: 11    Associated Diagnoses: Chronic rhinitis, unspecified type      lisinopril (PRINIVIL/ZESTRIL) 5 MG tablet Take 1 tablet (5 mg) by mouth At Bedtime  Qty: 180 tablet, Refills: 3    Associated Diagnoses: Chronic systolic congestive heart failure (H)      Multiple Vitamins-Minerals (PRESERVISION AREDS 2 PO) Take 1 capsule by mouth 2 times daily      NITROSTAT 0.4 MG sublingual tablet DISSOLVE 1 TABLET UNDER THE TONGUE EVERY 5 MINUTES AS NEEDED FOR CHEST PAIN  Qty: 25 tablet, Refills: 0    Comments: Office visit due before more refills.  Associated Diagnoses: Postsurgical aortocoronary bypass status      ranitidine (ZANTAC) 150 MG tablet Take 1 tablet (150 mg) by mouth 2  times daily  Qty: 180 tablet    Associated Diagnoses: S/P CABG (coronary artery bypass graft)      rosuvastatin (CRESTOR) 20 MG tablet Take 1 tablet (20 mg) by mouth daily  Qty: 90 tablet, Refills: 1    Associated Diagnoses: Mixed hyperlipidemia      sildenafil (VIAGRA) 100 MG tablet Take 1 tablet (100 mg) by mouth daily as needed Take 30 min to 4 hours before intercourse.  Never use with nitroglycerin, terazosin or doxazosin.  Qty: 16 tablet, Refills: 3    Associated Diagnoses: Erectile dysfunction, unspecified erectile dysfunction type      Vitamin D, Cholecalciferol, 1000 UNITS TABS Take 1,000 mg by mouth daily       warfarin (COUMADIN) 2.5 MG tablet Take by mouth daily 11/19/18: 3.75 mg; Otherwise 2.5 mg on Mondays; 1.25 mg all other days of the week      ASPIRIN NOT PRESCRIBED (INTENTIONAL) continuous prn for other Please choose reason not prescribed, below           Allergies   Allergies   Allergen Reactions     Nystatin Other (See Comments)     Make his mouth numb & swelling     Data   Most Recent 3 CBC's:  Recent Labs   Lab Test  11/20/18   1339  01/10/18   0940  12/20/17   1230   WBC  6.6  6.8  7.6   HGB  13.1*  14.2  14.2   MCV  91  91  92   PLT  156  181  175      Most Recent 3 BMP's:  Recent Labs   Lab Test  11/21/18   0545  11/20/18   1339  10/23/18   1311   NA  141  138  138   POTASSIUM  4.1  4.3  4.7   CHLORIDE  109  104  103   CO2  26  27  29   BUN  22  20  17   CR  1.32*  1.33*  1.69*   ANIONGAP  6  7  10.7   LORI  8.0*  8.3*  8.6   GLC  86  98  92     Most Recent 2 LFT's:  Recent Labs   Lab Test  11/20/18   1339  10/23/18   1311   AST  28  23   ALT  32  32   ALKPHOS  45  62   BILITOTAL  0.7  0.6     Most Recent INR's and Anticoagulation Dosing History:  Anticoagulation Dose History     Recent Dosing and Labs Latest Ref Rng & Units 8/31/2018 10/1/2018 10/22/2018 11/19/2018 11/20/2018 11/21/2018 11/22/2018    Warfarin 1.25 mg - - - - - 1.25 mg 1.25 mg -    INR 0.86 - 1.14 - - - - 1.95(H) 2.15(H)  2.50(H)    INR 0.86 - 1.14 2.6(A) 2.2(A) 2.4(A) 1.9(A) - - -        Most Recent 3 Troponin's:  Recent Labs   Lab Test  12/04/17   0405  12/03/17   2340  12/03/17   1910   TROPI  0.309*  0.278*  0.052*     Most Recent Cholesterol Panel:  Recent Labs   Lab Test  01/18/18   1054   CHOL  130   LDL  58   HDL  46   TRIG  129     Most Recent 6 Bacteria Isolates From Any Culture (See EPIC Reports for Culture Details):  Recent Labs   Lab Test  10/17/12   1355  10/15/12   0845  10/14/12   1220  10/14/12   1130  10/14/12   1049  10/14/12   1043   CULT  No growth  Heavy growth Klebsiella pneumoniae Heavy growth Coagulase negative Staphylococcus Susceptibility testing not  routinely done  No growth  No growth  Duplicate request Charge credited RN DRAW  No growth     Most Recent TSH, T4 and A1c Labs:  Recent Labs   Lab Test  10/23/18   1311   10/09/12   0450   TSH  1.51   < >   --    A1C   --    --   5.7    < > = values in this interval not displayed.     Results for orders placed or performed during the hospital encounter of 10/23/18   XR Chest 1 View    Narrative    CHEST ONE VIEW  10/23/2018 1:43 PM     HISTORY:  Ventricular tachycardia (H). On amiodarone therapy.    COMPARISON: 6/5/2013.    FINDINGS: Sternal wires, mediastinal clips and a left subclavian  cardiac device are in place. No pneumothorax. The heart size is  normal. Thoracic aorta is calcified. The lungs are clear.       Impression    IMPRESSION: No acute abnormality.    RICHIE DOTSON MD

## 2018-11-23 ENCOUNTER — TELEPHONE (OUTPATIENT)
Dept: FAMILY MEDICINE | Facility: CLINIC | Age: 73
End: 2018-11-23

## 2018-11-23 NOTE — TELEPHONE ENCOUNTER
"Hospital/TCU/ED for chronic condition Discharge Protocol    \"Hi, my name is Lynn Alves, a registered nurse, and I am calling from [facility].  I am calling to follow up and see how things are going for you after your recent emergency visit/hospital/TCU stay.\"    Tell me how you are doing now that you are home?\" feeling good.       Discharge Instructions    \"Let's review your discharge instructions.  What is/are the follow-up recommendations?  Pt. Response: to follow up with PCP    \"Has an appointment with your primary care provider been scheduled?\"   No (schedule appointment)    \"When you see the provider, I would recommend that you bring your medications with you.\"    Medications    \"Tell me what changed about your medicines when you discharged?\"    Changes to chronic meds?    0-1    \"What questions do you have about your medications?\"    None     New diagnoses of heart failure, COPD, diabetes, or MI?    No          On warfarin: \"Were you given any recommendations for follow-up with the anticoagulation clinic?\" Yes - Anticoagulation clinic appointment is already scheduled at appropriate interval    Medication reconciliation completed? Yes  Was MTM referral placed (*Make sure to put transitions as reason for referral)?   No    Call Summary    \"What questions or concerns do you have about your recent visit and your follow-up care?\"        \"If you have questions or things don't continue to improve, we encourage you contact us through the main clinic number (give number).  Even if the clinic is not open, triage nurses are available 24/7 to help you.     We would like you to know that our clinic has extended hours (provide information).  We also have urgent care (provide details on closest location and hours/contact info)\"      \"Thank you for your time and take care!\"             "

## 2018-11-26 ENCOUNTER — DOCUMENTATION ONLY (OUTPATIENT)
Dept: CARDIOLOGY | Facility: CLINIC | Age: 73
End: 2018-11-26

## 2018-11-26 ENCOUNTER — OFFICE VISIT (OUTPATIENT)
Dept: FAMILY MEDICINE | Facility: CLINIC | Age: 73
End: 2018-11-26
Payer: COMMERCIAL

## 2018-11-26 VITALS
OXYGEN SATURATION: 99 % | DIASTOLIC BLOOD PRESSURE: 62 MMHG | TEMPERATURE: 96.2 F | HEART RATE: 65 BPM | SYSTOLIC BLOOD PRESSURE: 110 MMHG | WEIGHT: 176 LBS | RESPIRATION RATE: 14 BRPM | BODY MASS INDEX: 23.22 KG/M2

## 2018-11-26 DIAGNOSIS — E78.2 MIXED HYPERLIPIDEMIA: ICD-10-CM

## 2018-11-26 DIAGNOSIS — I25.5 ISCHEMIC CARDIOMYOPATHY: ICD-10-CM

## 2018-11-26 DIAGNOSIS — I48.20 CHRONIC ATRIAL FIBRILLATION (H): Primary | ICD-10-CM

## 2018-11-26 DIAGNOSIS — I50.22 CHRONIC SYSTOLIC CONGESTIVE HEART FAILURE (H): ICD-10-CM

## 2018-11-26 DIAGNOSIS — I47.29 PAROXYSMAL VENTRICULAR TACHYCARDIA (H): Primary | ICD-10-CM

## 2018-11-26 DIAGNOSIS — I10 ESSENTIAL HYPERTENSION: ICD-10-CM

## 2018-11-26 PROCEDURE — 99214 OFFICE O/P EST MOD 30 MIN: CPT | Performed by: FAMILY MEDICINE

## 2018-11-26 NOTE — PROGRESS NOTES
Entered orders for OV with Lina in 1-2 weeks and for VT ablation.     Spoke with Sabi in scheduling, she will call pt to set up OV with Lina.

## 2018-11-26 NOTE — PROGRESS NOTES
Marko was hospitalized for VT storm (with multiple ICD shocks despite amiodarone).  Went home on Thanksgiving Day.  He will need VT ablation under general anesthesia.  If we can't schedule in December, January would be OK.  ESTHERI, he had a CVA after Rob performed aflutter ablation in 2011 so Marko and his family are very sensitive to the risk of CVA.      Plan:  - see Lina in 1-2 weeks  - Lina, please hold ACE the night before and carvedilol the am of procedure  - stop warfarin 3 days prior and 24 hrs later start Lovenox 1 mg/kg q12 hrs, but hold on am of procedure (we may need to use femoral arterial access so we need INR<1.5.  He is in AF as well so he needs uninterrupted AC as a shock during procedure may convert to SR...)  - no interruption on his amio 200 mg daily    Arya DI

## 2018-11-26 NOTE — PATIENT INSTRUCTIONS
Patient will keep all of his upcoming appointments with specialists.  I will plan on seeing him back sometime after the dust settles with all of these other issues for his physical probably sometime in February.  He did not need any medications refilled today.  I spent 25 minutes with the patient and his wife today going over his recent hospitalization and his medications.

## 2018-11-26 NOTE — PROGRESS NOTES
SUBJECTIVE:   Tyrel Rene is a 73 year old male who presents to clinic today for the following health issues:          Hospital Follow-up Visit:    Hospital/Nursing Home/IP Rehab Facility: [facility]  Date of Admission: 11/20/2018  Date of Discharge: 11/22/2018  Reason(s) for Admission: V-tach with numerous discharges of his ICD.            Problems taking medications regularly:  None       Medication changes since discharge: None       Problems adhering to non-medication therapy:  None    Summary of hospitalization:  [facility] discharge summary reviewed  Diagnostic Tests/Treatments reviewed.  Follow up needed: none  Other Healthcare Providers Involved in Patient s Care:         Homecare and Specialist appointment - Has an upcoming appointment with nephrology and numerous appointments set up with cardiology.  Update since discharge: improved.     Post Discharge Medication Reconciliation: discharge medications reconciled, continue medications without change.  Plan of care communicated with patient and family     Coding guidelines for this visit:  Type of Medical   Decision Making Face-to-Face Visit       within 7 Days of discharge Face-to-Face Visit        within 14 days of discharge   Moderate Complexity [id number]   High Complexity 63668 [id number]                  Problem list and histories reviewed & adjusted, as indicated.  Additional history: as documented    Patient Active Problem List   Diagnosis     STEMI (ST elevation myocardial infarction) (H)     Anemia     Health Care Home     Atrial fibrillation (H)     Hypertension     Cerebral infarction (H)     SVT (supraventricular tachycardia) (H)     Cardiogenic shock (H)     CAD (coronary artery disease)     Cardiomyopathy (H)     Atrial flutter (H)     Hyperlipidemia     Subdural hematoma (H)     Diplopia     Long-term (current) use of anticoagulants [Z79.01]     Cerebral artery occlusion with cerebral infarction (HCC) [I63.50]      Chronic systolic congestive heart failure (H)     Mixed hyperlipidemia     Ventricular tachycardia (H)     Screening for prostate cancer     Status post coronary angiogram     V-tach (H)     Past Surgical History:   Procedure Laterality Date     BYPASS GRAFT ARTERY CORONARY  10/2/2012    Procedure: BYPASS GRAFT ARTERY CORONARY;  Coronary Artery Bypass Graft x3 (LAD, Diag, OM) with Endovein Fleming (On-Pump);  Surgeon: Yeyo Lyman MD;  Location: [facility] OR     CARDIOVERSION  3/14/2013     CORONARY ANGIOGRAPHY ADULT ORDER  10/2/12     CORONARY ARTERY BYPASS  10/2/12    LIMA to LAD, SVG to OM1 and OM3     H ABLATION ATRIAL FLUTTER  1/11/2011     HAND SURGERY       HEART CATH, ANGIOPLASTY  10/2/12    PTCA to second diagonal and mid LAD, BMS to mid LAD     HERNIA REPAIR       ORTHOPEDIC SURGERY Right 2006    cut on table saw     RELOCATE GENERATOR ICD/PACEMAKER         Social History   Substance Use Topics     Smoking status: Former Smoker     Packs/day: 1.00     Years: 14.00     Types: Cigarettes     Quit date: 7/10/1972     Smokeless tobacco: Never Used     Alcohol use No     Family History   Problem Relation Age of Onset     Alcohol/Drug Father      HEART DISEASE Father 71     Alzheimer Disease Sister      Alcohol/Drug Sister      Alcohol/Drug Sister      GASTROINTESTINAL DISEASE Sister      Obesity Sister      Hypertension Sister      HEART DISEASE Sister      Hypertension Sister      HEART DISEASE Daughter      afib     Arrhythmia Daughter      Multiple Sclerosis Daughter      HEART DISEASE Daughter      afib     Arrhythmia Daughter      Cancer Daughter      lymphoma     Aneurysm Mother            Reviewed and updated as needed this visit by clinical staff  Tobacco  Allergies  Meds       Reviewed and updated as needed this visit by Provider         ROS:  Constitutional, neuro, ENT, endocrine, pulmonary, cardiac, gastrointestinal, genitourinary, musculoskeletal, integument and psychiatric systems are  negative, except as otherwise noted.    OBJECTIVE:                                                    /62 (BP Location: Right arm, Patient Position: Sitting, Cuff Size: Adult Regular)  Pulse 65  Temp 96.2  F (35.7  C) (Tympanic)  Resp 14  Wt 176 lb (79.8 kg)  SpO2 99%  BMI 23.22 kg/m2  Body mass index is 23.22 kg/(m^2).  GENERAL APPEARANCE: healthy, alert and no distress  RESP: lungs clear to auscultation - no rales, rhonchi or wheezes  CV: regular rates and rhythm, normal S1 S2, no S3 or S4 and no murmur, click or rub         ASSESSMENT/PLAN:                                                        ICD-10-CM    1. Chronic atrial fibrillation (H) I48.2    2. Essential hypertension I10    3. Ischemic cardiomyopathy I25.5    4. Mixed hyperlipidemia E78.2    5. Chronic systolic congestive heart failure (H) I50.22      Return in about 3 months (around 2/26/2019) for Physical Exam.  Patient Instructions   Patient will keep all of his upcoming appointments with specialists.  I will plan on seeing him back sometime after the dust settles with all of these other issues for his physical probably sometime in February.  He did not need any medications refilled today.  I spent 25 minutes with the patient and his wife today going over his recent hospitalization and his medications.      Dejon Downs MD  [facility]

## 2018-11-26 NOTE — MR AVS SNAPSHOT
After Visit Summary   11/26/2018    Tyrel Rene    MRN: [id number]           Patient Information     Date Of Birth          1945        Visit Information        Provider Department      11/26/2018 2:45 PM Dejon Downs MD [facility]        Today's Diagnoses     Chronic atrial fibrillation (H)    -  1    Essential hypertension        Ischemic cardiomyopathy        Mixed hyperlipidemia        Chronic systolic congestive heart failure (H)          Care Instructions    Patient will keep all of his upcoming appointments with specialists.  I will plan on seeing him back sometime after the dust settles with all of these other issues for his physical probably sometime in February.  He did not need any medications refilled today.  I spent 25 minutes with the patient and his wife today going over his recent hospitalization and his medications.          Follow-ups after your visit        Follow-up notes from your care team     Return in about 3 months (around 2/26/2019) for Physical Exam.      Your next 10 appointments already scheduled     Dec 03, 2018  2:15 PM CST   Anticoagulation Visit with [facility]   [facility] ([facility])    [address] [address]   [phone]            Dec 04, 2018  8:30 AM CST   [facility] EP RETURN with Joelle Rodríguez PA-C   [facility] ([facility])    [address] [place] [id number] [phone] OPT 2            Jan 16, 2019  2:50 PM CST   LAB with [facility]   [facility] [place] ([facility])    [address] [place] [id number] [phone]           Please do not eat 10-12 hours before your appointment if you are coming in fasting for labs on lipids, cholesterol, or glucose (sugar). This  does not apply to pregnant women. Water, hot tea and black coffee (with nothing added) are okay. Do not drink other fluids, diet soda or chew gum.            Jan 16, 2019  3:50 PM CST   Core Return with JAMAL Flores CNP   [facility] ([facility])    [address]  [facility] [address]   [phone] OPT 2            Feb 04, 2019  4:30 PM CST   Remote ICD Check with MARQUEZ DCR2   [facility] ([facility])    [id number]  [facility] [address]   [phone] OPT 2           This appointment is for a remote check of your debrillator.  This is not an appointment at the office.            Feb 12, 2019  2:15 PM CST   [facility] EP RETURN with Suellen Costello MD   [facility] ([facility])    [id number]  [facility] [address]   [phone] OPT 2              Future tests that were ordered for you today     Open Future Orders        Priority Expected Expires Ordered    EP Ablation Procedures Routine 12/10/2018 11/26/2019 11/26/2018    Follow-Up with Cardiac Advanced Practice Provider Routine 12/3/2018 4/9/2020 11/26/2018            Who to contact     If you have questions or need follow up information about today's clinic visit or your schedule please contact [facility] directly at [phone].  Normal or non-critical lab and imaging results will be communicated to you by Zazumhart, letter or phone within 4 business days after the clinic has received the results. If you do not hear from us within 7 days, please contact the clinic through Zazumhart or phone. If you have a critical or abnormal lab result, we will notify you by phone as soon as possible.  Submit refill requests through [employer] or call your pharmacy and they will forward the refill request to us. Please allow 3 business days for  your refill to be completed.          Additional Information About Your Visit        MyChart Information     [employer] gives you secure access to your electronic health record. If you see a primary care provider, you can also send messages to your care team and make appointments. If you have questions, please call your primary care clinic.  If you do not have a primary care provider, please call [phone] and they will assist you.        Care EveryWhere ID     This is your Care EveryWhere ID. This could be used by other organizations to access your [place] medical records  [id number]        Your Vitals Were     Pulse Temperature Respirations Pulse Oximetry BMI (Body Mass Index)       65 96.2  F (35.7  C) (Tympanic) 14 99% 23.22 kg/m2        Blood Pressure from Last 3 Encounters:   11/26/18 110/62   11/22/18 119/65   08/20/18 112/73    Weight from Last 3 Encounters:   11/26/18 176 lb (79.8 kg)   11/22/18 176 lb 6.4 oz (80 kg)   08/20/18 176 lb 14.4 oz (80.2 kg)              Today, you had the following     No orders found for display       Primary Care Provider Office Phone # Fax #    Dejon Downs [phone]       [address] [facility] [facility] [address]        Equal Access to Services     JAVI OTERO AH: Hadii aad ku hadasho Soomaali, waaxda luqadaha, qaybta kaalmada adeegyada, waxay idiin hayaan adeeg khararadha labhupinder . So [facility] [phone].    ATENCIÓN: Si habla español, tiene a hagen disposición servicios gratuitos de asistencia lingüística. Llame al [phone].    We comply with applicable federal civil rights laws and [place] laws. We do not discriminate on the basis of race, color, national origin, age, disability, sex, sexual orientation, or gender identity.            Thank you!     Thank you for choosing [facility]  for your care. Our goal is always to provide you with excellent care. Hearing back from our patients is one way we can continue to  improve our services. Please take a few minutes to complete the written survey that you may receive in the mail after your visit with us. Thank you!             Your Updated Medication List - Protect others around you: Learn how to safely use, store and throw away your medicines at www.disposemymeds.org.          This list is accurate as of 11/26/18  5:51 PM.  Always use your most recent med list.                   Brand Name Dispense Instructions for use Diagnosis    amiodarone 200 MG tablet    PACERONE/CODARONE    90 tablet    Take 1 tablet (200 mg) by mouth daily    Ventricular tachycardia (H)       ASPIRIN NOT PRESCRIBED    INTENTIONAL     continuous prn for other Please choose reason not prescribed, below        carvedilol 3.125 MG tablet    COREG    360 tablet    Take 2 tablets (6.25 mg) by mouth 2 times daily (with meals)    Ventricular tachycardia (H)       digoxin 125 MCG tablet    LANOXIN    90 tablet    Take 1 tablet (125 mcg) by mouth daily    Atrial fibrillation (H)       ipratropium 0.03 % nasal spray    ATROVENT    30 mL    Spray 2 sprays into both nostrils every 8 hours as needed for rhinitis    Chronic rhinitis, unspecified type       lisinopril 5 MG tablet    PRINIVIL/ZESTRIL    180 tablet    Take 1 tablet (5 mg) by mouth At Bedtime    Chronic systolic congestive heart failure (H)       NITROSTAT 0.4 MG sublingual tablet   Generic drug:  nitroGLYcerin     25 tablet    DISSOLVE 1 TABLET UNDER THE TONGUE EVERY 5 MINUTES AS NEEDED FOR CHEST PAIN    Postsurgical aortocoronary bypass status       PRESERVISION AREDS 2 PO      Take 1 capsule by mouth 2 times daily        ranitidine 150 MG tablet    ZANTAC    180 tablet    Take 1 tablet (150 mg) by mouth 2 times daily    S/P CABG (coronary artery bypass graft)       rosuvastatin 20 MG tablet    CRESTOR    90 tablet    Take 1 tablet (20 mg) by mouth daily    Mixed hyperlipidemia       sildenafil 100 MG tablet    VIAGRA    16 tablet    Take 1 tablet (100 mg)  by mouth daily as needed Take 30 min to 4 hours before intercourse.  Never use with nitroglycerin, terazosin or doxazosin.    Erectile dysfunction, unspecified erectile dysfunction type       Vitamin D (Cholecalciferol) 1000 units Tabs      Take 1,000 mg by mouth daily        warfarin 2.5 MG tablet    COUMADIN     Take by mouth daily 11/19/18: 3.75 mg; Otherwise 2.5 mg on Mondays; 1.25 mg all other days of the week

## 2018-12-01 DIAGNOSIS — I47.29 PAROXYSMAL VENTRICULAR TACHYCARDIA (H): Primary | ICD-10-CM

## 2018-12-03 ENCOUNTER — ANTICOAGULATION THERAPY VISIT (OUTPATIENT)
Dept: NURSING | Facility: CLINIC | Age: 73
End: 2018-12-03
Payer: COMMERCIAL

## 2018-12-03 DIAGNOSIS — I63.50 CEREBRAL ARTERY OCCLUSION WITH CEREBRAL INFARCTION (H): ICD-10-CM

## 2018-12-03 DIAGNOSIS — I63.9 CEREBRAL INFARCTION (H): ICD-10-CM

## 2018-12-03 LAB — INR POINT OF CARE: 2.2 (ref 0.86–1.14)

## 2018-12-03 PROCEDURE — 85610 PROTHROMBIN TIME: CPT | Mod: QW

## 2018-12-03 PROCEDURE — 36416 COLLJ CAPILLARY BLOOD SPEC: CPT

## 2018-12-03 PROCEDURE — 99207 ZZC NO CHARGE NURSE ONLY: CPT

## 2018-12-03 NOTE — MR AVS SNAPSHOT
Tyrel Rene   12/3/2018 2:15 PM   Anticoagulation Therapy Visit    Description:  73 year old male   Provider:   ANTICOAGULATION CLINIC   Department:  Bx Nurse           INR as of 12/3/2018     Today's INR 2.2      Anticoagulation Summary as of 12/3/2018     INR goal 2.0-2.5   Today's INR 2.2   Full warfarin instructions 2.5 mg on Mon; 1.25 mg all other days   Next INR check 12/17/2018    Indications   Long-term (current) use of anticoagulants [Z79.01] [Z79.01]  Cerebral artery occlusion with cerebral infarction (HCC) [I63.50] [I63.50]  Cerebral infarction (H) [I63.9]         Your next Anticoagulation Clinic appointment(s)     Dec 03, 2018  2:15 PM CST   Anticoagulation Visit with  ANTICOAGULATION CLINIC   [facility] ([facility])    [address] [place] [address] [phone]            Dec 17, 2018  1:30 PM CST   Anticoagulation Visit with  ANTICOAGULATION CLINIC   [facility] ([facility])    [address] [place] [address] [phone]              Contact Numbers     [facility]  Please call  [phone] to cancel and/or reschedule your appointment   The direct line to the anticoagulant nurse is [phone] on Monday, Wednesday, and Friday. On Thursday, the anticoagulant nurse can be reached directly at [phone].         December 2018 Details    Sun Mon Tue Wed Thu Fri Sat           1                 2               3      2.5 mg   See details      4      1.25 mg         5      1.25 mg         6      1.25 mg         7      1.25 mg         8      1.25 mg           9      1.25 mg         10      2.5 mg         11      1.25 mg         12      1.25 mg         13      1.25 mg         14      1.25 mg         15      1.25 mg           16      1.25 mg         17            18               19               20                21               22                 23               24               25               26               27               28               29                 30               31                     Date Details   12/03 This INR check       Date of next INR:  12/17/2018         How to take your warfarin dose     To take:  1.25 mg Take 0.5 of a 2.5 mg tablet.    To take:  2.5 mg Take 1 of the 2.5 mg tablets.

## 2018-12-03 NOTE — PROGRESS NOTES
ANTICOAGULATION FOLLOW-UP CLINIC VISIT    Patient Name:  Tyrel Rene  Date:  12/3/2018  Contact Type:  Face to Face    SUBJECTIVE:     Patient Findings     Positives Change in medications (amiodarone increased), No Problem Findings           OBJECTIVE    INR Protime   Date Value Ref Range Status   12/03/2018 2.2 (A) 0.86 - 1.14 Final       ASSESSMENT / PLAN  INR assessment THER    Recheck INR In: 2 WEEKS    INR Location Clinic      Anticoagulation Summary as of 12/3/2018     INR goal 2.0-2.5   Today's INR 2.2   Warfarin maintenance plan 2.5 mg (2.5 mg x 1) on Mon; 1.25 mg (2.5 mg x 0.5) all other days   Full warfarin instructions 2.5 mg on Mon; 1.25 mg all other days   Weekly warfarin total 10 mg   No change documented Doreen Finley RN   Plan last modified Caridad Cunningham RN (6/27/2018)   Next INR check 12/17/2018   Target end date Indefinite    Indications   Long-term (current) use of anticoagulants [Z79.01] [Z79.01]  Cerebral artery occlusion with cerebral infarction (HCC) [I63.50] [I63.50]  Cerebral infarction (H) [I63.9]         Anticoagulation Episode Summary     INR check location [facility]    Preferred lab     Send INR reminders to [facility] INR/PROTIME    Comments       Anticoagulation Care Providers     Provider Role Specialty Phone number    Dejon Downs MD [facility] [phone]            See the Encounter Report to view Anticoagulation Flowsheet and Dosing Calendar (Go to Encounters tab in chart review, and find the Anticoagulation Therapy Visit)        Doreen Finley, RN

## 2018-12-04 ENCOUNTER — OFFICE VISIT (OUTPATIENT)
Dept: CARDIOLOGY | Facility: CLINIC | Age: 73
End: 2018-12-04
Attending: INTERNAL MEDICINE
Payer: COMMERCIAL

## 2018-12-04 VITALS
BODY MASS INDEX: 23.99 KG/M2 | HEIGHT: 73 IN | DIASTOLIC BLOOD PRESSURE: 65 MMHG | HEART RATE: 74 BPM | SYSTOLIC BLOOD PRESSURE: 107 MMHG | WEIGHT: 181 LBS

## 2018-12-04 DIAGNOSIS — I47.29 PAROXYSMAL VENTRICULAR TACHYCARDIA (H): ICD-10-CM

## 2018-12-04 PROCEDURE — 99214 OFFICE O/P EST MOD 30 MIN: CPT | Performed by: PHYSICIAN ASSISTANT

## 2018-12-04 NOTE — PATIENT INSTRUCTIONS
1. Discussed plan for VT ablation and EP study under general anesthesia   * Sabi will call to set this up ()   * Will hold lisinopril the night before the procedure   * Will hold carvedilol morning of procedure   * Will hold warfarin x 3 days before the procedure and start Lovenox (shots) twice a day 2 days before the procedure.   * Will continue digoxin and amiodarone without interruption.       Mary and Pat: [phone]

## 2018-12-04 NOTE — LETTER
12/4/2018    Dejon Downs MD  [address]  [facility] [address]    RE: Tyrel RUSTY Rene       Dear Colleague,    I had the pleasure of seeing Tyrel Rene in the [facility].    HPI:   I had the pleasure of seeing Tyrel when he came for follow up on ventricular tachycardia and chronic atrial fibrillation. He sees Dr. Costello and Dr. Newman for his history of:    1. Ventricular Tachycardia with recent VT storm, currently on amiodarone 200 mg daily. Recently hospitalized from 11/20-11/22/2018 for 7 episodes of VT over a 10 minute period requiring 7 shocks. Planning for VT ablation in December or January.   2. Chronic Ischemic Cardiomyopathy s/p CABG x 3 in 2012. Had a [employer] ICD implanted and upgraded to a biventricular device in 2014. He is followed by the [facility]. EF stable on echo 11/2018  3. Chronic atrial fibrillation currently on 2.5mg of warfarin daily  4. Coronary disease, s/p anterior wall myocardial infarction in 10/2012.     He was recently hospitalized from 11/20-11/22 following 7 episodes of VT over a 10 minute period that required 7 ICD shocks. During admission he received an IV amiodarone infusion. Per Dr. Costello, following discharge his amiodarone dose was increased from 200 mg 6x per week to 200 mg daily (he had decreased his dose to 6x per week in August because of complaints of lightheadedness and unsteadiness).     Echo in the hospital had shown a stable EF and therefore repeat invasive angiogram was not performed (had been done 1/2018). Dr Costello noted that his VT this time was monomorphic, likely arising from his anteroapical scar from his large anterior wall MI in 2012. Given his side effects likely due to amiodarone, Marko was very hesitant to increase his amiodarone.    Today, he reports some dizziness/unsteadiness after increasing his dose of amiodarone. He denies any chest pain, palpitations, shortness of breath, lower extremity edema or  recent falls. He is in agreement with Dr. Costello and would like to proceed with a VT ablation either in December or January.  He quoted a success rate of only about 50% but felt this was a viable option given his inability to increase amiodarone.    Echocardiogram 11/20/18 showed atrial fibrillation with paced rhythm, estimated ejection fraction of 25-30%, mild mitral regurgitation, and anterior, septal, and apical wall akinesis. This was stable.    Coronary angiography done 01/2018 in the setting of ventricular tachycardia with shock showed a patent LIMA to the LAD and patent vein graft to the OM1 but an occluded second vein graft that could not be found.     Last Device Interrogation 10/10/2018 showed 100% BiVpaced with permanent AF and 3 logged episodes of NSVT lasting 5-7 beats at irregular rates ranging from 140-169 BPM    Assessment & Plan:    1. Ventricular tachycardia    Recent hospitalization from 11/22-11/22 for 7 episodes of VT over a 10-minute period that required 7 ICD shocks. Prior episodes noted in 10/2017 and 12/2017. Likely scar-mediated.    Currently on amiodarone 200 mg daily-this was changed after his recent hospitalization. He was previously on 200 mg 6x a week because of complaints of lightheaded when the patient was taking amiodarone daily.      PLAN:    VT ablation in December or January. Will continue warfarin until 3 days prior to procedure and then start Lovenox 1 mg/kg q12 hours 2 days prior to the procedure but hold on the AM of procedure. INR goal for the day of the procedure is <1.5 because of possibility of needing to use femoral arterial access.     Continue with 200 mg amiodarone daily     Has BiV ICD in place. Battery status stable in hospital despite shocks    Due for Amiodarone labs 4/2019 (ordered)      2.  Chronic ischemic cardiomyopathy     [employer] ICD implanted and upgraded to a biventricular device in 2014    Echocardiogram on 11/20 showed an estimated ejection  fraction of 25-30% (stable)    Currently on lisinopril 5 mg daily, digoxin 125 mcg daily, and carvedilol 6.25 mg BID    Euvolemic on exam     PLAN:    Continue with lisinopril, digoxin, and carvedilol    Under the care of Dr. Newman in the [facility] clinic     Continue with routine device interrogations     3. Chronic Atrial Fibrillation    Currently on 2.5 mg of warfarin daily with a CHADs-VASC score of 7 (age, hypertension, CAD, hx of stroke, cardiomyopathy), and carvedilol 6.25 mg BID    PLAN:    Will continue with warfarin and carvedilol     3. Coronary Disease     S/p post anterior wall myocardial infarction in 10/2012    Underwent a heart cath and 3-vessel bypass surgery with a LIMA to the LAD, vein graft to the OM1 and vein graft to OM3. Coronary angiography done 1/2018 in the setting of ventricular tachycardia with shock showed a patent LIMA to the LAD and the patent vein graft to the OM1 but an occluded second vein graft that could not be found.     Currently on rosuvastatin 20 mg daily     EF stable    No aspirin due to warfarin    PLAN:    Will continue with rosuvastatin and carvedilol    DONNIE PughS for Lina Rodríguez PA-C  12/04/18    Encounter Diagnosis   Name Primary?     Paroxysmal ventricular tachycardia (H)        CURRENT MEDICATIONS:  Current Outpatient Prescriptions   Medication Sig Dispense Refill     amiodarone (PACERONE/CODARONE) 200 MG tablet Take 1 tablet (200 mg) by mouth daily 90 tablet 3     ASPIRIN NOT PRESCRIBED (INTENTIONAL) continuous prn for other Please choose reason not prescribed, below       carvedilol (COREG) 3.125 MG tablet Take 2 tablets (6.25 mg) by mouth 2 times daily (with meals) 360 tablet 3     digoxin (LANOXIN) 125 MCG tablet Take 1 tablet (125 mcg) by mouth daily 90 tablet 2     ipratropium (ATROVENT) 0.03 % spray Spray 2 sprays into both nostrils every 8 hours as needed for rhinitis 30 mL 11     lisinopril (PRINIVIL/ZESTRIL) 5 MG tablet Take 1 tablet (5 mg) by  mouth At Bedtime 180 tablet 3     Multiple Vitamins-Minerals (PRESERVISION AREDS 2 PO) Take 1 capsule by mouth 2 times daily       NITROSTAT 0.4 MG sublingual tablet DISSOLVE 1 TABLET UNDER THE TONGUE EVERY 5 MINUTES AS NEEDED FOR CHEST PAIN 25 tablet 0     ranitidine (ZANTAC) 150 MG tablet Take 1 tablet (150 mg) by mouth 2 times daily 180 tablet      rosuvastatin (CRESTOR) 20 MG tablet Take 1 tablet (20 mg) by mouth daily 90 tablet 1     sildenafil (VIAGRA) 100 MG tablet Take 1 tablet (100 mg) by mouth daily as needed Take 30 min to 4 hours before intercourse.  Never use with nitroglycerin, terazosin or doxazosin. 16 tablet 3     Vitamin D, Cholecalciferol, 1000 UNITS TABS Take 1,000 mg by mouth daily        warfarin (COUMADIN) 2.5 MG tablet Take by mouth daily 11/19/18: 3.75 mg; Otherwise 2.5 mg on Mondays; 1.25 mg all other days of the week         ALLERGIES     Allergies   Allergen Reactions     Nystatin Other (See Comments)     Make his mouth numb & swelling       PAST MEDICAL HISTORY:  Past Medical History:   Diagnosis Date     Atrial fibrillation (H)     s/p Cardioversion 3/14/2013     Atrial flutter (H)     S/p Aflutter ablation 1/11/2011     CAD (coronary artery disease)     CABG x3 10/2012- LIMA to distal LAD, SVG to OM1 & OM3; cath 10/2012- PTCA to second diagonal and mid LAD, BMS to mid LAD     Cardiogenic shock (H)      Cardiomyopathy (H)      CHF (congestive heart failure) (H)      CVA (cerebral infarction)     residual right hand numbness     ED (erectile dysfunction)      Hyperlipidemia      Hypertension      Neuropathy      Palpitations      SVT (supraventricular tachycardia) (H)     S/p dual chamber ICD 10/11/12- upgraded to BIV ICD 6/2013     Syncope        PAST SURGICAL HISTORY:  Past Surgical History:   Procedure Laterality Date     BYPASS GRAFT ARTERY CORONARY  10/2/2012    Procedure: BYPASS GRAFT ARTERY CORONARY;  Coronary Artery Bypass Graft x3 (LAD, Diag, OM) with [employer] [place] (On-Pump);   Surgeon: Yeyo Lyman MD;  Location: [facility] OR     CARDIOVERSION  3/14/2013     CORONARY ANGIOGRAPHY ADULT ORDER  10/2/12     CORONARY ARTERY BYPASS  10/2/12    LIMA to LAD, SVG to OM1 and OM3     H ABLATION ATRIAL FLUTTER  1/11/2011     HAND SURGERY       HEART CATH, ANGIOPLASTY  10/2/12    PTCA to second diagonal and mid LAD, BMS to mid LAD     HERNIA REPAIR       ORTHOPEDIC SURGERY Right 2006    cut on table saw     RELOCATE GENERATOR ICD/PACEMAKER         FAMILY HISTORY:  Family History   Problem Relation Age of Onset     Alcohol/Drug Father      Heart Disease Father 71     Alzheimer Disease Sister      Alcohol/Drug Sister      Alcohol/Drug Sister      GASTROINTESTINAL DISEASE Sister      Obesity Sister      Hypertension Sister      Heart Disease Sister      Hypertension Sister      Heart Disease Daughter      afib     Arrhythmia Daughter      Multiple Sclerosis Daughter      Heart Disease Daughter      afib     Arrhythmia Daughter      Cancer Daughter      lymphoma     Aneurysm Mother        SOCIAL HISTORY:  Social History     Social History     Marital status:      Spouse name: N/A     Number of children: N/A     Years of education: N/A     Social History Main Topics     Smoking status: Former Smoker     Packs/day: 1.00     Years: 14.00     Types: Cigarettes     Quit date: 7/10/1972     Smokeless tobacco: Never Used     Alcohol use No     Drug use: No     Sexual activity: Yes     Partners: Female     Other Topics Concern     Parent/Sibling W/ Cabg, Mi Or Angioplasty Before 65f 55m? No     Caffeine Concern Yes     4 cups coffee daily     Sleep Concern No     Stress Concern No     Weight Concern Yes     gain 2lbs     Special Diet Yes     low sodium     Exercise Yes     walking, doing sittups, played pickle ball in summer     Seat Belt Yes     Social History Narrative       Review of Systems:  Respiratory:  Negative for dyspnea on exertion;shortness of breath;cough  Cardiovascular:  Negative  "for;palpitations;chest pain;edema Positive for;lightheadedness  Gastroenterology: Negative for nausea;diarrhea, negative for post-operative nausea or confusion      Physical Exam:  Vitals: /65  Pulse 74  Ht 1.854 m (6' 1\")  Wt 82.1 kg (181 lb)  BMI 23.88 kg/m2    Constitutional:  cooperative, alert and oriented, well developed, well nourished, in no acute distress        Skin:  Warm and dry to the touch      Neck:  no carotid bruit        Chest:  clear to auscultation;healed median sternotomy scar        Cardiac: regular rhythm;no murmurs, gallops or rubs detected                  Abdomen:  abdomen soft;BS normoactive        Vascular: pulses full and equal                                   2+ femoral pulses and no bruits auscultated     Extremities and Back:  no edema        Neurological:  no gross motor deficits;affect appropriate          Recent Lab Results:  LIPID RESULTS:  Lab Results   Component Value Date    CHOL 130 01/18/2018    HDL 46 01/18/2018    LDL 58 01/18/2018    TRIG 129 01/18/2018    CHOLHDLRATIO 2.8 12/17/2014       LIVER ENZYME RESULTS:  Lab Results   Component Value Date    AST 28 11/20/2018    ALT 32 11/20/2018       CBC RESULTS:  Lab Results   Component Value Date    WBC 6.6 11/20/2018    RBC 4.21 (L) 11/20/2018    HGB 13.1 (L) 11/20/2018    HCT 38.1 (L) 11/20/2018    MCV 91 11/20/2018    MCH 31.1 11/20/2018    MCHC 34.4 11/20/2018    RDW 14.2 11/20/2018     11/20/2018       BMP RESULTS:  Lab Results   Component Value Date     11/21/2018    POTASSIUM 4.1 11/21/2018    CHLORIDE 109 11/21/2018    CO2 26 11/21/2018    ANIONGAP 6 11/21/2018    GLC 86 11/21/2018    BUN 22 11/21/2018    CR 1.32 (H) 11/21/2018    GFRESTIMATED 53 (L) 11/21/2018    GFRESTBLACK 64 11/21/2018    LORI 8.0 (L) 11/21/2018        A1C RESULTS:  Lab Results   Component Value Date    A1C 5.7 10/09/2012       INR RESULTS:  Lab Results   Component Value Date    INR 2.2 (A) 12/03/2018    INR 2.50 (H) " 11/22/2018    INR 2.15 (H) 11/21/2018           Thank you for allowing me to participate in the care of your patient.      Sincerely,     Joelle Rodríguez PA-C     [facility]    cc:   Suellen Costello MD  [address]  [place] [address]

## 2018-12-04 NOTE — MR AVS SNAPSHOT
After Visit Summary   12/4/2018    Tyrel Rene    MRN: [id number]           Patient Information     Date Of Birth          1945        Visit Information        Provider Department      12/4/2018 8:30 AM Joelle Rodríguez PA-C [facility]        Today's Diagnoses     Paroxysmal ventricular tachycardia (H)          Care Instructions    1. Discussed plan for VT ablation and EP study under general anesthesia   * Sabi will call to set this up ()   * Will hold lisinopril the night before the procedure   * Will hold carvedilol morning of procedure   * Will hold warfarin x 3 days before the procedure and start Lovenox (shots) twice a day 2 days before the procedure.   * Will continue digoxin and amiodarone without interruption.       Anna: [phone]          Follow-ups after your visit        Your next 10 appointments already scheduled     Dec 17, 2018  1:30 PM CST   Anticoagulation Visit with [facility]   [facility] ([facility])    [address]  [facility] [id number] [phone]            Jan 16, 2019  2:50 PM CST   LAB with [facility]   [facility] [place] ([facility])    [address] [place] [address] [address]  [facility] [address] [phone]           Please do not eat 10-12 hours before your appointment if you are coming in fasting for labs on lipids, cholesterol, or glucose (sugar). This does not apply to pregnant women. Water, hot tea and black coffee (with nothing added) are okay. Do not drink other fluids, diet soda or chew gum.            Jan 16, 2019  3:50 PM CST   Core Return with JAMAL Flores CNP   [facility] ([facility])    [address]  [facility] [address] [phone] OPT [address]            Feb 04, 2019  4:30  "PM CST   Remote ICD Check with MARQUEZ DCR2   [facility] ([facility])    [address]  [place] [address] [phone]           This appointment is for a remote check of your debrillator.  This is not an appointment at the office.            Feb 12, 2019  2:15 PM CST   [facility] EP RETURN with Suellen Costello MD   [facility] ([facility])    [address]  [place] [address] [phone] OPT 2              Who to contact     If you have questions or need follow up information about today's clinic visit or your schedule please contact [facility] directly at [phone].  Normal or non-critical lab and imaging results will be communicated to you by fundfindrhart, letter or phone within 4 business days after the clinic has received the results. If you do not hear from us within 7 days, please contact the clinic through fundfindrhart or phone. If you have a critical or abnormal lab result, we will notify you by phone as soon as possible.  Submit refill requests through Reedsy or call your pharmacy and they will forward the refill request to us. Please allow 3 business days for your refill to be completed.          Additional Information About Your Visit        Reedsy Information     Reedsy gives you secure access to your electronic health record. If you see a primary care provider, you can also send messages to your care team and make appointments. If you have questions, please call your primary care clinic.  If you do not have a primary care provider, please call [phone] and they will assist you.        Care EveryWhere ID     This is your Care EveryWhere ID. This could be used by other organizations to access your [place] medical records  [id number]        Your Vitals Were     Pulse Height BMI (Body Mass Index)             74 1.854 m (6' 1\") " 23.88 kg/m2          Blood Pressure from Last 3 Encounters:   12/04/18 107/65   11/26/18 110/62   11/22/18 119/65    Weight from Last 3 Encounters:   12/04/18 82.1 kg (181 lb)   11/26/18 79.8 kg (176 lb)   11/22/18 80 kg (176 lb 6.4 oz)              We Performed the Following     Follow-Up with Cardiac Advanced Practice Provider        Primary Care Provider Office Phone # Fax #    Dejon Downs [phone] [address] [address]        Equal Access to Services     [facility]: Hadii aad ku hadasho Soomaali, waaxda luqadaha, qaybta kaalmada adeegyada, waxvioleta prescottin hayaan niru starks . So [facility] [phone].    ATENCIÓN: Si habla español, tiene a hagen disposición servicios gratuitos de asistencia lingüística. Llame al [phone].    We comply with applicable federal civil rights laws and [place] laws. We do not discriminate on the basis of race, color, national origin, age, disability, sex, sexual orientation, or gender identity.            Thank you!     Thank you for choosing [facility]  for your care. Our goal is always to provide you with excellent care. Hearing back from our patients is one way we can continue to improve our services. Please take a few minutes to complete the written survey that you may receive in the mail after your visit with us. Thank you!             Your Updated Medication List - Protect others around you: Learn how to safely use, store and throw away your medicines at www.disposemymeds.org.          This list is accurate as of 12/4/18  9:03 AM.  Always use your most recent med list.                   Brand Name Dispense Instructions for use Diagnosis    amiodarone 200 MG tablet    PACERONE/CODARONE    90 tablet    Take 1 tablet (200 mg) by mouth daily    Ventricular tachycardia (H)       ASPIRIN NOT PRESCRIBED    INTENTIONAL     continuous prn for other Please choose reason not prescribed,  below        carvedilol 3.125 MG tablet    COREG    360 tablet    Take 2 tablets (6.25 mg) by mouth 2 times daily (with meals)    Ventricular tachycardia (H)       digoxin 125 MCG tablet    LANOXIN    90 tablet    Take 1 tablet (125 mcg) by mouth daily    Atrial fibrillation (H)       ipratropium 0.03 % nasal spray    ATROVENT    30 mL    Spray 2 sprays into both nostrils every 8 hours as needed for rhinitis    Chronic rhinitis, unspecified type       lisinopril 5 MG tablet    PRINIVIL/ZESTRIL    180 tablet    Take 1 tablet (5 mg) by mouth At Bedtime    Chronic systolic congestive heart failure (H)       NITROSTAT 0.4 MG sublingual tablet   Generic drug:  nitroGLYcerin     25 tablet    DISSOLVE 1 TABLET UNDER THE TONGUE EVERY 5 MINUTES AS NEEDED FOR CHEST PAIN    Postsurgical aortocoronary bypass status       PRESERVISION AREDS 2 PO      Take 1 capsule by mouth 2 times daily        ranitidine 150 MG tablet    ZANTAC    180 tablet    Take 1 tablet (150 mg) by mouth 2 times daily    S/P CABG (coronary artery bypass graft)       rosuvastatin 20 MG tablet    CRESTOR    90 tablet    Take 1 tablet (20 mg) by mouth daily    Mixed hyperlipidemia       sildenafil 100 MG tablet    VIAGRA    16 tablet    Take 1 tablet (100 mg) by mouth daily as needed Take 30 min to 4 hours before intercourse.  Never use with nitroglycerin, terazosin or doxazosin.    Erectile dysfunction, unspecified erectile dysfunction type       Vitamin D (Cholecalciferol) 1000 units Tabs      Take 1,000 mg by mouth daily        warfarin 2.5 MG tablet    COUMADIN     Take by mouth daily 11/19/18: 3.75 mg; Otherwise 2.5 mg on Mondays; 1.25 mg all other days of the week

## 2018-12-04 NOTE — PROGRESS NOTES
HPI:   I had the pleasure of seeing Tyrel when he came for follow up on ventricular tachycardia and chronic atrial fibrillation. He sees Dr. Costello and Dr. Newman for his history of:    1. Ventricular Tachycardia with recent VT storm, currently on amiodarone 200 mg daily. Recently hospitalized from 11/20-11/22/2018 for 7 episodes of VT over a 10 minute period requiring 7 shocks. Planning for VT ablation in December or January.   2. Chronic Ischemic Cardiomyopathy s/p CABG x 3 in 2012. Had a [employer] ICD implanted and upgraded to a biventricular device in 2014. He is followed by the [facility]. EF stable on echo 11/2018  3. Chronic atrial fibrillation currently on 2.5mg of warfarin daily  4. Coronary disease, s/p anterior wall myocardial infarction in 10/2012.     He was recently hospitalized from 11/20-11/22 following 7 episodes of VT over a 10 minute period that required 7 ICD shocks. During admission he received an IV amiodarone infusion. Per Dr. Costello, following discharge his amiodarone dose was increased from 200 mg 6x per week to 200 mg daily (he had decreased his dose to 6x per week in August because of complaints of lightheadedness and unsteadiness).     Echo in the hospital had shown a stable EF and therefore repeat invasive angiogram was not performed (had been done 1/2018). Dr Costello noted that his VT this time was monomorphic, likely arising from his anteroapical scar from his large anterior wall MI in 2012. Given his side effects likely due to amiodarone, Marko was very hesitant to increase his amiodarone.    Today, he reports some dizziness/unsteadiness after increasing his dose of amiodarone. He denies any chest pain, palpitations, shortness of breath, lower extremity edema or recent falls. He is in agreement with Dr. Costello and would like to proceed with a VT ablation either in December or January.  He quoted a success rate of only about 50% but felt this was a viable option given his inability  to increase amiodarone.    Echocardiogram 11/20/18 showed atrial fibrillation with paced rhythm, estimated ejection fraction of 25-30%, mild mitral regurgitation, and anterior, septal, and apical wall akinesis. This was stable.    Coronary angiography done 01/2018 in the setting of ventricular tachycardia with shock showed a patent LIMA to the LAD and patent vein graft to the OM1 but an occluded second vein graft that could not be found.     Last Device Interrogation 10/10/2018 showed 100% BiVpaced with permanent AF and 3 logged episodes of NSVT lasting 5-7 beats at irregular rates ranging from 140-169 BPM    Assessment & Plan:    1. Ventricular tachycardia    Recent hospitalization from 11/22-11/22 for 7 episodes of VT over a 10-minute period that required 7 ICD shocks. Prior episodes noted in 10/2017 and 12/2017. Likely scar-mediated.    Currently on amiodarone 200 mg daily-this was changed after his recent hospitalization. He was previously on 200 mg 6x a week because of complaints of lightheaded when the patient was taking amiodarone daily.      PLAN:    VT ablation in December or January. Will continue warfarin until 3 days prior to procedure and then start Lovenox 1 mg/kg q12 hours 2 days prior to the procedure but hold on the AM of procedure. INR goal for the day of the procedure is <1.5 because of possibility of needing to use femoral arterial access.     Continue with 200 mg amiodarone daily     Has BiV ICD in place. Battery status stable in hospital despite shocks    Due for Amiodarone labs 4/2019 (ordered)      2.  Chronic ischemic cardiomyopathy     [employer] ICD implanted and upgraded to a biventricular device in 2014    Echocardiogram on 11/20 showed an estimated ejection fraction of 25-30% (stable)    Currently on lisinopril 5 mg daily, digoxin 125 mcg daily, and carvedilol 6.25 mg BID    Euvolemic on exam     PLAN:    Continue with lisinopril, digoxin, and carvedilol    Under the care of   Trent in the [facility]     Continue with routine device interrogations     3. Chronic Atrial Fibrillation    Currently on 2.5 mg of warfarin daily with a CHADs-VASC score of 7 (age, hypertension, CAD, hx of stroke, cardiomyopathy), and carvedilol 6.25 mg BID    PLAN:    Will continue with warfarin and carvedilol     3. Coronary Disease     S/p post anterior wall myocardial infarction in 10/2012    Underwent a heart cath and 3-vessel bypass surgery with a LIMA to the LAD, vein graft to the OM1 and vein graft to OM3. Coronary angiography done 1/2018 in the setting of ventricular tachycardia with shock showed a patent LIMA to the LAD and the patent vein graft to the OM1 but an occluded second vein graft that could not be found.     Currently on rosuvastatin 20 mg daily     EF stable    No aspirin due to warfarin    PLAN:    Will continue with rosuvastatin and carvedilol    HANNA Pugh for Lina Rodríguez PA-C  12/04/18    Encounter Diagnosis   Name Primary?     Paroxysmal ventricular tachycardia (H)        CURRENT MEDICATIONS:  Current Outpatient Prescriptions   Medication Sig Dispense Refill     amiodarone (PACERONE/CODARONE) 200 MG tablet Take 1 tablet (200 mg) by mouth daily 90 tablet 3     ASPIRIN NOT PRESCRIBED (INTENTIONAL) continuous prn for other Please choose reason not prescribed, below       carvedilol (COREG) 3.125 MG tablet Take 2 tablets (6.25 mg) by mouth 2 times daily (with meals) 360 tablet 3     digoxin (LANOXIN) 125 MCG tablet Take 1 tablet (125 mcg) by mouth daily 90 tablet 2     ipratropium (ATROVENT) 0.03 % spray Spray 2 sprays into both nostrils every 8 hours as needed for rhinitis 30 mL 11     lisinopril (PRINIVIL/ZESTRIL) 5 MG tablet Take 1 tablet (5 mg) by mouth At Bedtime 180 tablet 3     Multiple Vitamins-Minerals (PRESERVISION AREDS 2 PO) Take 1 capsule by mouth 2 times daily       NITROSTAT 0.4 MG sublingual tablet DISSOLVE 1 TABLET UNDER THE TONGUE EVERY 5 MINUTES AS NEEDED  FOR CHEST PAIN 25 tablet 0     ranitidine (ZANTAC) 150 MG tablet Take 1 tablet (150 mg) by mouth 2 times daily 180 tablet      rosuvastatin (CRESTOR) 20 MG tablet Take 1 tablet (20 mg) by mouth daily 90 tablet 1     sildenafil (VIAGRA) 100 MG tablet Take 1 tablet (100 mg) by mouth daily as needed Take 30 min to 4 hours before intercourse.  Never use with nitroglycerin, terazosin or doxazosin. 16 tablet 3     Vitamin D, Cholecalciferol, 1000 UNITS TABS Take 1,000 mg by mouth daily        warfarin (COUMADIN) 2.5 MG tablet Take by mouth daily 11/19/18: 3.75 mg; Otherwise 2.5 mg on Mondays; 1.25 mg all other days of the week         ALLERGIES     Allergies   Allergen Reactions     Nystatin Other (See Comments)     Make his mouth numb & swelling       PAST MEDICAL HISTORY:  Past Medical History:   Diagnosis Date     Atrial fibrillation (H)     s/p Cardioversion 3/14/2013     Atrial flutter (H)     S/p Aflutter ablation 1/11/2011     CAD (coronary artery disease)     CABG x3 10/2012- LIMA to distal LAD, SVG to OM1 & OM3; cath 10/2012- PTCA to second diagonal and mid LAD, BMS to mid LAD     Cardiogenic shock (H)      Cardiomyopathy (H)      CHF (congestive heart failure) (H)      CVA (cerebral infarction)     residual right hand numbness     ED (erectile dysfunction)      Hyperlipidemia      Hypertension      Neuropathy      Palpitations      SVT (supraventricular tachycardia) (H)     S/p dual chamber ICD 10/11/12- upgraded to BIV ICD 6/2013     Syncope        PAST SURGICAL HISTORY:  Past Surgical History:   Procedure Laterality Date     BYPASS GRAFT ARTERY CORONARY  10/2/2012    Procedure: BYPASS GRAFT ARTERY CORONARY;  Coronary Artery Bypass Graft x3 (LAD, Diag, OM) with Endovein [place] (On-Pump);  Surgeon: Yeyo Lyman MD;  Location: [facility]     CARDIOVERSION  3/14/2013     CORONARY ANGIOGRAPHY ADULT ORDER  10/2/12     CORONARY ARTERY BYPASS  10/2/12    LIMA to LAD, SVG to OM1 and OM3     H ABLATION ATRIAL  FLUTTER  1/11/2011     HAND SURGERY       HEART CATH, ANGIOPLASTY  10/2/12    PTCA to second diagonal and mid LAD, BMS to mid LAD     HERNIA REPAIR       ORTHOPEDIC SURGERY Right 2006    cut on table saw     RELOCATE GENERATOR ICD/PACEMAKER         FAMILY HISTORY:  Family History   Problem Relation Age of Onset     Alcohol/Drug Father      Heart Disease Father 71     Alzheimer Disease Sister      Alcohol/Drug Sister      Alcohol/Drug Sister      GASTROINTESTINAL DISEASE Sister      Obesity Sister      Hypertension Sister      Heart Disease Sister      Hypertension Sister      Heart Disease Daughter      afib     Arrhythmia Daughter      Multiple Sclerosis Daughter      Heart Disease Daughter      afib     Arrhythmia Daughter      Cancer Daughter      lymphoma     Aneurysm Mother        SOCIAL HISTORY:  Social History     Social History     Marital status:      Spouse name: N/A     Number of children: N/A     Years of education: N/A     Social History Main Topics     Smoking status: Former Smoker     Packs/day: 1.00     Years: 14.00     Types: Cigarettes     Quit date: 7/10/1972     Smokeless tobacco: Never Used     Alcohol use No     Drug use: No     Sexual activity: Yes     Partners: Female     Other Topics Concern     Parent/Sibling W/ Cabg, Mi Or Angioplasty Before 65f 55m? 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     Seat Belt Yes     Social History Narrative       Review of Systems:  Respiratory:  Negative for dyspnea on exertion;shortness of breath;cough  Cardiovascular:  Negative for;palpitations;chest pain;edema Positive for;lightheadedness  Gastroenterology: Negative for nausea;diarrhea, negative for post-operative nausea or confusion      Physical Exam:  Vitals: /65  Pulse 74  Ht 1.854 m (6'  "1\")  Wt 82.1 kg (181 lb)  BMI 23.88 kg/m2    Constitutional:  cooperative, alert and oriented, well developed, well nourished, in no acute distress        Skin:  Warm and dry to the touch      Neck:  no carotid bruit        Chest:  clear to auscultation;healed median sternotomy scar        Cardiac: regular rhythm;no murmurs, gallops or rubs detected                  Abdomen:  abdomen soft;BS normoactive        Vascular: pulses full and equal                                   2+ femoral pulses and no bruits auscultated     Extremities and Back:  no edema        Neurological:  no gross motor deficits;affect appropriate          Recent Lab Results:  LIPID RESULTS:  Lab Results   Component Value Date    CHOL 130 01/18/2018    HDL 46 01/18/2018    LDL 58 01/18/2018    TRIG 129 01/18/2018    CHOLHDLRATIO 2.8 12/17/2014       LIVER ENZYME RESULTS:  Lab Results   Component Value Date    AST 28 11/20/2018    ALT 32 11/20/2018       CBC RESULTS:  Lab Results   Component Value Date    WBC 6.6 11/20/2018    RBC 4.21 (L) 11/20/2018    HGB 13.1 (L) 11/20/2018    HCT 38.1 (L) 11/20/2018    MCV 91 11/20/2018    MCH 31.1 11/20/2018    MCHC 34.4 11/20/2018    RDW 14.2 11/20/2018     11/20/2018       BMP RESULTS:  Lab Results   Component Value Date     11/21/2018    POTASSIUM 4.1 11/21/2018    CHLORIDE 109 11/21/2018    CO2 26 11/21/2018    ANIONGAP 6 11/21/2018    GLC 86 11/21/2018    BUN 22 11/21/2018    CR 1.32 (H) 11/21/2018    GFRESTIMATED 53 (L) 11/21/2018    GFRESTBLACK 64 11/21/2018    LORI 8.0 (L) 11/21/2018        A1C RESULTS:  Lab Results   Component Value Date    A1C 5.7 10/09/2012       INR RESULTS:  Lab Results   Component Value Date    INR 2.2 (A) 12/03/2018    INR 2.50 (H) 11/22/2018    INR 2.15 (H) 11/21/2018         "

## 2018-12-04 NOTE — LETTER
12/4/2018    Dejon Downs MD  [address]  [facility] [address]    RE: Tyrel RUSTY Rene       Dear Colleague,    I had the pleasure of seeing Tyrel Rene in the [facility].    HPI:   I had the pleasure of seeing Tyrel when he came for follow up on ventricular tachycardia and chronic atrial fibrillation. He sees Dr. Costello and Dr. Newman for his history of:    1. Ventricular Tachycardia with recent VT storm, currently on amiodarone 200 mg daily. Recently hospitalized from 11/20-11/22/2018 for 7 episodes of VT over a 10 minute period requiring 7 shocks. Planning for VT ablation in December or January.   2. Chronic Ischemic Cardiomyopathy s/p CABG x 3 in 2012. Had a [employer] ICD implanted and upgraded to a biventricular device in 2014. He is followed by the [facility]. EF stable on echo 11/2018  3. Chronic atrial fibrillation currently on 2.5mg of warfarin daily  4. Coronary disease, s/p anterior wall myocardial infarction in 10/2012.     He was recently hospitalized from 11/20-11/22 following 7 episodes of VT over a 10 minute period that required 7 ICD shocks. During admission he received an IV amiodarone infusion. Per Dr. Costello, following discharge his amiodarone dose was increased from 200 mg 6x per week to 200 mg daily (he had decreased his dose to 6x per week in August because of complaints of lightheadedness and unsteadiness).     Echo in the hospital had shown a stable EF and therefore repeat invasive angiogram was not performed (had been done 1/2018). Dr Costello noted that his VT this time was monomorphic, likely arising from his anteroapical scar from his large anterior wall MI in 2012. Given his side effects likely due to amiodarone, Marko was very hesitant to increase his amiodarone.    Today, he reports some dizziness/unsteadiness after increasing his dose of amiodarone. He denies any chest pain, palpitations, shortness of breath, lower extremity edema or  recent falls. He is in agreement with Dr. Costello and would like to proceed with a VT ablation either in December or January.  He quoted a success rate of only about 50% but felt this was a viable option given his inability to increase amiodarone.    Echocardiogram 11/20/18 showed atrial fibrillation with paced rhythm, estimated ejection fraction of 25-30%, mild mitral regurgitation, and anterior, septal, and apical wall akinesis. This was stable.    Coronary angiography done 01/2018 in the setting of ventricular tachycardia with shock showed a patent LIMA to the LAD and patent vein graft to the OM1 but an occluded second vein graft that could not be found.     Last Device Interrogation 10/10/2018 showed 100% BiVpaced with permanent AF and 3 logged episodes of NSVT lasting 5-7 beats at irregular rates ranging from 140-169 BPM    Assessment & Plan:    1. Ventricular tachycardia    Recent hospitalization from 11/22-11/22 for 7 episodes of VT over a 10-minute period that required 7 ICD shocks. Prior episodes noted in 10/2017 and 12/2017. Likely scar-mediated.    Currently on amiodarone 200 mg daily-this was changed after his recent hospitalization. He was previously on 200 mg 6x a week because of complaints of lightheaded when the patient was taking amiodarone daily.      PLAN:    VT ablation in December or January. Will continue warfarin until 3 days prior to procedure and then start Lovenox 1 mg/kg q12 hours 2 days prior to the procedure but hold on the AM of procedure. INR goal for the day of the procedure is <1.5 because of possibility of needing to use femoral arterial access.     Continue with 200 mg amiodarone daily     Has BiV ICD in place. Battery status stable in hospital despite shocks    Due for Amiodarone labs 4/2019 (ordered)      2.  Chronic ischemic cardiomyopathy     [employer] ICD implanted and upgraded to a biventricular device in 2014    Echocardiogram on 11/20 showed an estimated ejection  fraction of 25-30% (stable)    Currently on lisinopril 5 mg daily, digoxin 125 mcg daily, and carvedilol 6.25 mg BID    Euvolemic on exam     PLAN:    Continue with lisinopril, digoxin, and carvedilol    Under the care of Dr. Newman in the [facility]     Continue with routine device interrogations     3. Chronic Atrial Fibrillation    Currently on 2.5 mg of warfarin daily with a CHADs-VASC score of 7 (age, hypertension, CAD, hx of stroke, cardiomyopathy), and carvedilol 6.25 mg BID    PLAN:    Will continue with warfarin and carvedilol     3. Coronary Disease     S/p post anterior wall myocardial infarction in 10/2012    Underwent a heart cath and 3-vessel bypass surgery with a LIMA to the LAD, vein graft to the OM1 and vein graft to OM3. Coronary angiography done 1/2018 in the setting of ventricular tachycardia with shock showed a patent LIMA to the LAD and the patent vein graft to the OM1 but an occluded second vein graft that could not be found.     Currently on rosuvastatin 20 mg daily     EF stable    No aspirin due to warfarin    PLAN:    Will continue with rosuvastatin and carvedilol    DONNIE PughS for Lina Rodríguez PA-C  12/04/18    Encounter Diagnosis   Name Primary?     Paroxysmal ventricular tachycardia (H)        CURRENT MEDICATIONS:  Current Outpatient Prescriptions   Medication Sig Dispense Refill     amiodarone (PACERONE/CODARONE) 200 MG tablet Take 1 tablet (200 mg) by mouth daily 90 tablet 3     ASPIRIN NOT PRESCRIBED (INTENTIONAL) continuous prn for other Please choose reason not prescribed, below       carvedilol (COREG) 3.125 MG tablet Take 2 tablets (6.25 mg) by mouth 2 times daily (with meals) 360 tablet 3     digoxin (LANOXIN) 125 MCG tablet Take 1 tablet (125 mcg) by mouth daily 90 tablet 2     ipratropium (ATROVENT) 0.03 % spray Spray 2 sprays into both nostrils every 8 hours as needed for rhinitis 30 mL 11     lisinopril (PRINIVIL/ZESTRIL) 5 MG tablet Take 1 tablet (5 mg) by  mouth At Bedtime 180 tablet 3     Multiple Vitamins-Minerals (PRESERVISION AREDS 2 PO) Take 1 capsule by mouth 2 times daily       NITROSTAT 0.4 MG sublingual tablet DISSOLVE 1 TABLET UNDER THE TONGUE EVERY 5 MINUTES AS NEEDED FOR CHEST PAIN 25 tablet 0     ranitidine (ZANTAC) 150 MG tablet Take 1 tablet (150 mg) by mouth 2 times daily 180 tablet      rosuvastatin (CRESTOR) 20 MG tablet Take 1 tablet (20 mg) by mouth daily 90 tablet 1     sildenafil (VIAGRA) 100 MG tablet Take 1 tablet (100 mg) by mouth daily as needed Take 30 min to 4 hours before intercourse.  Never use with nitroglycerin, terazosin or doxazosin. 16 tablet 3     Vitamin D, Cholecalciferol, 1000 UNITS TABS Take 1,000 mg by mouth daily        warfarin (COUMADIN) 2.5 MG tablet Take by mouth daily 11/19/18: 3.75 mg; Otherwise 2.5 mg on Mondays; 1.25 mg all other days of the week         ALLERGIES     Allergies   Allergen Reactions     Nystatin Other (See Comments)     Make his mouth numb & swelling       PAST MEDICAL HISTORY:  Past Medical History:   Diagnosis Date     Atrial fibrillation (H)     s/p Cardioversion 3/14/2013     Atrial flutter (H)     S/p Aflutter ablation 1/11/2011     CAD (coronary artery disease)     CABG x3 10/2012- LIMA to distal LAD, SVG to OM1 & OM3; cath 10/2012- PTCA to second diagonal and mid LAD, BMS to mid LAD     Cardiogenic shock (H)      Cardiomyopathy (H)      CHF (congestive heart failure) (H)      CVA (cerebral infarction)     residual right hand numbness     ED (erectile dysfunction)      Hyperlipidemia      Hypertension      Neuropathy      Palpitations      SVT (supraventricular tachycardia) (H)     S/p dual chamber ICD 10/11/12- upgraded to BIV ICD 6/2013     Syncope        PAST SURGICAL HISTORY:  Past Surgical History:   Procedure Laterality Date     BYPASS GRAFT ARTERY CORONARY  10/2/2012    Procedure: BYPASS GRAFT ARTERY CORONARY;  Coronary Artery Bypass Graft x3 (LAD, Diag, OM) with Endovein [place] (On-Pump);   Surgeon: Yeyo Lyman MD;  Location: [facility] OR     CARDIOVERSION  3/14/2013     CORONARY ANGIOGRAPHY ADULT ORDER  10/2/12     CORONARY ARTERY BYPASS  10/2/12    LIMA to LAD, SVG to OM1 and OM3     H ABLATION ATRIAL FLUTTER  1/11/2011     HAND SURGERY       HEART CATH, ANGIOPLASTY  10/2/12    PTCA to second diagonal and mid LAD, BMS to mid LAD     HERNIA REPAIR       ORTHOPEDIC SURGERY Right 2006    cut on table saw     RELOCATE GENERATOR ICD/PACEMAKER         FAMILY HISTORY:  Family History   Problem Relation Age of Onset     Alcohol/Drug Father      Heart Disease Father 71     Alzheimer Disease Sister      Alcohol/Drug Sister      Alcohol/Drug Sister      GASTROINTESTINAL DISEASE Sister      Obesity Sister      Hypertension Sister      Heart Disease Sister      Hypertension Sister      Heart Disease Daughter      afib     Arrhythmia Daughter      Multiple Sclerosis Daughter      Heart Disease Daughter      afib     Arrhythmia Daughter      Cancer Daughter      lymphoma     Aneurysm Mother        SOCIAL HISTORY:  Social History     Social History     Marital status:      Spouse name: N/A     Number of children: N/A     Years of education: N/A     Social History Main Topics     Smoking status: Former Smoker     Packs/day: 1.00     Years: 14.00     Types: Cigarettes     Quit date: 7/10/1972     Smokeless tobacco: Never Used     Alcohol use No     Drug use: No     Sexual activity: Yes     Partners: Female     Other Topics Concern     Parent/Sibling W/ Cabg, Mi Or Angioplasty Before 65f 55m? No     Caffeine Concern Yes     4 cups coffee daily     Sleep Concern No     Stress Concern No     Weight Concern Yes     gain 2lbs     Special Diet Yes     low sodium     Exercise Yes     walking, doing sittups, played pickle ball in summer     Seat Belt Yes     Social History Narrative       Review of Systems:  Respiratory:  Negative for dyspnea on exertion;shortness of breath;cough  Cardiovascular:  Negative  "for;palpitations;chest pain;edema Positive for;lightheadedness  Gastroenterology: Negative for nausea;diarrhea, negative for post-operative nausea or confusion      Physical Exam:  Vitals: /65  Pulse 74  Ht 1.854 m (6' 1\")  Wt 82.1 kg (181 lb)  BMI 23.88 kg/m2    Constitutional:  cooperative, alert and oriented, well developed, well nourished, in no acute distress        Skin:  Warm and dry to the touch      Neck:  no carotid bruit        Chest:  clear to auscultation;healed median sternotomy scar        Cardiac: regular rhythm;no murmurs, gallops or rubs detected                  Abdomen:  abdomen soft;BS normoactive        Vascular: pulses full and equal                                   2+ femoral pulses and no bruits auscultated     Extremities and Back:  no edema        Neurological:  no gross motor deficits;affect appropriate          Recent Lab Results:  LIPID RESULTS:  Lab Results   Component Value Date    CHOL 130 01/18/2018    HDL 46 01/18/2018    LDL 58 01/18/2018    TRIG 129 01/18/2018    CHOLHDLRATIO 2.8 12/17/2014       LIVER ENZYME RESULTS:  Lab Results   Component Value Date    AST 28 11/20/2018    ALT 32 11/20/2018       CBC RESULTS:  Lab Results   Component Value Date    WBC 6.6 11/20/2018    RBC 4.21 (L) 11/20/2018    HGB 13.1 (L) 11/20/2018    HCT 38.1 (L) 11/20/2018    MCV 91 11/20/2018    MCH 31.1 11/20/2018    MCHC 34.4 11/20/2018    RDW 14.2 11/20/2018     11/20/2018       BMP RESULTS:  Lab Results   Component Value Date     11/21/2018    POTASSIUM 4.1 11/21/2018    CHLORIDE 109 11/21/2018    CO2 26 11/21/2018    ANIONGAP 6 11/21/2018    GLC 86 11/21/2018    BUN 22 11/21/2018    CR 1.32 (H) 11/21/2018    GFRESTIMATED 53 (L) 11/21/2018    GFRESTBLACK 64 11/21/2018    LORI 8.0 (L) 11/21/2018        A1C RESULTS:  Lab Results   Component Value Date    A1C 5.7 10/09/2012       INR RESULTS:  Lab Results   Component Value Date    INR 2.2 (A) 12/03/2018    INR 2.50 (H) " 11/22/2018    INR 2.15 (H) 11/21/2018           Thank you for allowing me to participate in the care of your patient.    Sincerely,     Joelle Rodríguez PA-C     [facility]

## 2018-12-12 ENCOUNTER — TELEPHONE (OUTPATIENT)
Dept: CARDIOLOGY | Facility: CLINIC | Age: 73
End: 2018-12-12

## 2018-12-12 NOTE — TELEPHONE ENCOUNTER
Pt wife called stating that she could not remember who would be calling the pt to get him set up for the Ablation in either December or January.  Explained that tereso will be calling as soon as she has the time and date figured out.  Explained that more than likely this will not happen in December. Pt wife Sharel stated understanding. Cassia

## 2018-12-14 PROBLEM — I47.29 PAROXYSMAL VENTRICULAR TACHYCARDIA (H): Status: ACTIVE | Noted: 2018-12-14

## 2018-12-17 ENCOUNTER — ANTICOAGULATION THERAPY VISIT (OUTPATIENT)
Dept: NURSING | Facility: CLINIC | Age: 73
End: 2018-12-17
Payer: COMMERCIAL

## 2018-12-17 DIAGNOSIS — I63.50 CEREBRAL ARTERY OCCLUSION WITH CEREBRAL INFARCTION (H): ICD-10-CM

## 2018-12-17 DIAGNOSIS — I63.9 CEREBRAL INFARCTION (H): ICD-10-CM

## 2018-12-17 LAB — INR POINT OF CARE: 2 (ref 0.86–1.14)

## 2018-12-17 PROCEDURE — 85610 PROTHROMBIN TIME: CPT | Mod: QW

## 2018-12-17 PROCEDURE — 99207 ZZC NO CHARGE NURSE ONLY: CPT

## 2018-12-17 PROCEDURE — 36416 COLLJ CAPILLARY BLOOD SPEC: CPT

## 2018-12-17 NOTE — PROGRESS NOTES
ANTICOAGULATION FOLLOW-UP CLINIC VISIT    Patient Name:  Tyrel Rene  Date:  2018  Contact Type:  Face to Face    SUBJECTIVE:     Patient Findings     Positives:   No Problem Findings           OBJECTIVE    INR Protime   Date Value Ref Range Status   2018 2.0 (A) 0.86 - 1.14 Final       ASSESSMENT / PLAN  INR assessment THER    Recheck INR In: 2 WEEKS    INR Location Clinic      Anticoagulation Summary  As of 2018    INR goal:   2.0-2.5   TTR:   45.6 % (2.7 y)   INR used for dosin.0 (2018)   Warfarin maintenance plan:   2.5 mg (2.5 mg x 1) every Mon, Thu; 1.25 mg (2.5 mg x 0.5) all other days   Full warfarin instructions:   2.5 mg every Mon, Thu; 1.25 mg all other days   Weekly warfarin total:   11.25 mg   Plan last modified:   Doreen Finley RN (2018)   Next INR check:   2018   Target end date:   Indefinite    Indications    Long-term (current) use of anticoagulants [Z79.01] [Z79.01]  Cerebral artery occlusion with cerebral infarction (HCC) [I63.50] [I63.50]  Cerebral infarction (H) [I63.9]             Anticoagulation Episode Summary     INR check location:   [facility]    Preferred lab:       Send INR reminders to:   [facility] INR/PROTIME    Comments:         Anticoagulation Care Providers     Provider Role Specialty Phone number    YareliDejon clancy MD [facility] [phone]            See the Encounter Report to view Anticoagulation Flowsheet and Dosing Calendar (Go to Encounters tab in chart review, and find the Anticoagulation Therapy Visit)        Doreen Finley RN               ANTICOAGULATION FOLLOW-UP CLINIC VISIT    Patient Name:  Tyrel Rene  Date:  2018  Contact Type:  Face to Face    SUBJECTIVE:     Patient Findings     Positives:   No Problem Findings           OBJECTIVE    INR Protime   Date Value Ref Range Status   2018 2.0 (A) 0.86 - 1.14 Final       ASSESSMENT / PLAN  INR assessment THER    Recheck INR In: 2  WEEKS    INR Location Clinic      Anticoagulation Summary  As of 2018    INR goal:   2.0-2.5   TTR:   45.6 % (2.7 y)   INR used for dosin.0 (2018)   Warfarin maintenance plan:   2.5 mg (2.5 mg x 1) every Mon, Thu; 1.25 mg (2.5 mg x 0.5) all other days   Full warfarin instructions:   2.5 mg every Mon, Thu; 1.25 mg all other days   Weekly warfarin total:   11.25 mg   Plan last modified:   Doreen Finley RN (2018)   Next INR check:   2018   Target end date:   Indefinite    Indications    Long-term (current) use of anticoagulants [Z79.01] [Z79.01]  Cerebral artery occlusion with cerebral infarction (HCC) [I63.50] [I63.50]  Cerebral infarction (H) [I63.9]             Anticoagulation Episode Summary     INR check location:   [facility]    Preferred lab:       Send INR reminders to:   [facility] INR/PROTIME    Comments:         Anticoagulation Care Providers     Provider Role Specialty Phone number    Dejon Downs MD [facility] [phone]            See the Encounter Report to view Anticoagulation Flowsheet and Dosing Calendar (Go to Encounters tab in chart review, and find the Anticoagulation Therapy Visit)        Doreen Finley, RN

## 2018-12-18 ENCOUNTER — TELEPHONE (OUTPATIENT)
Dept: CARDIOLOGY | Facility: CLINIC | Age: 73
End: 2018-12-18

## 2018-12-18 DIAGNOSIS — I47.29 PAROXYSMAL VT (H): Primary | ICD-10-CM

## 2018-12-18 NOTE — TELEPHONE ENCOUNTER
Orders placed for lovenox for scheduled VT ablation scheduled for 1/2/19.  Patient to administer 2 days prior to ablation every 12 hours.  No doses day of procedure.  Reviewed above with wife and patient.  AGAPITO Hastings

## 2018-12-30 ENCOUNTER — DOCUMENTATION ONLY (OUTPATIENT)
Dept: CARDIOLOGY | Facility: CLINIC | Age: 73
End: 2018-12-30

## 2018-12-30 NOTE — PROGRESS NOTES
Lola Benton and/or Mary:  I see that Marko is in the schedule for VT ablation on 01/02/19.  Just wanted to make sure it is scheduled WITH anesthesia.  It would be very nice if either Edward or Eric are in the Lab for CARTO.     DELMA Koenig

## 2018-12-31 ENCOUNTER — TELEPHONE (OUTPATIENT)
Dept: CARDIOLOGY | Facility: CLINIC | Age: 73
End: 2018-12-31

## 2018-12-31 RX ORDER — SODIUM CHLORIDE 450 MG/100ML
INJECTION, SOLUTION INTRAVENOUS CONTINUOUS
Status: CANCELLED | OUTPATIENT
Start: 2018-12-31

## 2018-12-31 RX ORDER — LIDOCAINE 40 MG/G
CREAM TOPICAL
Status: CANCELLED | OUTPATIENT
Start: 2018-12-31

## 2018-12-31 NOTE — TELEPHONE ENCOUNTER
Spoke to pt to see if there are any questions or concerns prior to VT ablation on Wednesday 1/2. Pt has started the Lovenox shots with last does tomorrow night. Warfarin was stopped yesterday.  Pt aware to hold Lisinopril that night before and Carvedilol the morning of procedure. Pt will have General anesthesia and will most likely stay overnight and will plan as if he is staying. Pt actually would like to stay overnight. Pt is aware of arrival time and procedure time and has a  scheduled on discharge. No questions or concerns at this time. Cassia

## 2019-01-01 ENCOUNTER — ANESTHESIA EVENT (OUTPATIENT)
Dept: CARDIOLOGY | Facility: CLINIC | Age: 74
End: 2019-01-01
Payer: COMMERCIAL

## 2019-01-02 ENCOUNTER — SURGERY (OUTPATIENT)
Age: 74
End: 2019-01-02
Payer: COMMERCIAL

## 2019-01-02 ENCOUNTER — ANESTHESIA (OUTPATIENT)
Dept: CARDIOLOGY | Facility: CLINIC | Age: 74
End: 2019-01-02
Payer: COMMERCIAL

## 2019-01-02 ENCOUNTER — HOSPITAL ENCOUNTER (OUTPATIENT)
Facility: CLINIC | Age: 74
Discharge: HOME | End: 2019-01-03
Attending: INTERNAL MEDICINE | Admitting: INTERNAL MEDICINE
Payer: COMMERCIAL

## 2019-01-02 DIAGNOSIS — I47.29 PAROXYSMAL VENTRICULAR TACHYCARDIA (H): ICD-10-CM

## 2019-01-02 LAB
ANION GAP SERPL CALCULATED.3IONS-SCNC: 6 MMOL/L (ref 3–14)
BUN SERPL-MCNC: 25 MG/DL (ref 7–30)
CALCIUM SERPL-MCNC: 8.3 MG/DL (ref 8.5–10.1)
CHLORIDE SERPL-SCNC: 106 MMOL/L (ref 94–109)
CO2 SERPL-SCNC: 29 MMOL/L (ref 20–32)
CREAT SERPL-MCNC: 1.39 MG/DL (ref 0.66–1.25)
ERYTHROCYTE [DISTWIDTH] IN BLOOD BY AUTOMATED COUNT: 14.6 % (ref 10–15)
GFR SERPL CREATININE-BSD FRML MDRD: 49 ML/MIN/{1.73_M2}
GLUCOSE SERPL-MCNC: 86 MG/DL (ref 70–99)
HCT VFR BLD AUTO: 42.5 % (ref 40–53)
HGB BLD-MCNC: 14.2 G/DL (ref 13.3–17.7)
INR BLD: 1.5 (ref 0.86–1.14)
MCH RBC QN AUTO: 30.8 PG (ref 26.5–33)
MCHC RBC AUTO-ENTMCNC: 33.4 G/DL (ref 31.5–36.5)
MCV RBC AUTO: 92 FL (ref 78–100)
PLATELET # BLD AUTO: 153 10E9/L (ref 150–450)
POTASSIUM SERPL-SCNC: 4.2 MMOL/L (ref 3.4–5.3)
RBC # BLD AUTO: 4.61 10E12/L (ref 4.4–5.9)
SODIUM SERPL-SCNC: 141 MMOL/L (ref 133–144)
WBC # BLD AUTO: 6.6 10E9/L (ref 4–11)

## 2019-01-02 PROCEDURE — 93654 COMPRE EP EVAL TX VT: CPT | Performed by: INTERNAL MEDICINE

## 2019-01-02 PROCEDURE — 80048 BASIC METABOLIC PNL TOTAL CA: CPT | Performed by: INTERNAL MEDICINE

## 2019-01-02 PROCEDURE — 40000852 ZZH STATISTIC HEART CATH LAB OR EP LAB

## 2019-01-02 PROCEDURE — 25000125 ZZHC RX 250: Performed by: NURSE ANESTHETIST, CERTIFIED REGISTERED

## 2019-01-02 PROCEDURE — 27210995 ZZH RX 272: Performed by: INTERNAL MEDICINE

## 2019-01-02 PROCEDURE — 27210794 ZZH OR GENERAL SUPPLY STERILE: Performed by: INTERNAL MEDICINE

## 2019-01-02 PROCEDURE — C1730 CATH, EP, 19 OR FEW ELECT: HCPCS | Performed by: INTERNAL MEDICINE

## 2019-01-02 PROCEDURE — C1733 CATH, EP, OTHR THAN COOL-TIP: HCPCS | Performed by: INTERNAL MEDICINE

## 2019-01-02 PROCEDURE — 93010 ELECTROCARDIOGRAM REPORT: CPT | Performed by: INTERNAL MEDICINE

## 2019-01-02 PROCEDURE — 25000128 H RX IP 250 OP 636: Performed by: INTERNAL MEDICINE

## 2019-01-02 PROCEDURE — 25000128 H RX IP 250 OP 636: Performed by: NURSE ANESTHETIST, CERTIFIED REGISTERED

## 2019-01-02 PROCEDURE — 37000008 ZZH ANESTHESIA TECHNICAL FEE, 1ST 30 MIN: Performed by: INTERNAL MEDICINE

## 2019-01-02 PROCEDURE — 85347 COAGULATION TIME ACTIVATED: CPT | Mod: 91

## 2019-01-02 PROCEDURE — 25000132 ZZH RX MED GY IP 250 OP 250 PS 637: Performed by: INTERNAL MEDICINE

## 2019-01-02 PROCEDURE — 40000235 ZZH STATISTIC TELEMETRY

## 2019-01-02 PROCEDURE — 93005 ELECTROCARDIOGRAM TRACING: CPT

## 2019-01-02 PROCEDURE — C1732 CATH, EP, DIAG/ABL, 3D/VECT: HCPCS | Performed by: INTERNAL MEDICINE

## 2019-01-02 PROCEDURE — 37000009 ZZH ANESTHESIA TECHNICAL FEE, EACH ADDTL 15 MIN: Performed by: INTERNAL MEDICINE

## 2019-01-02 PROCEDURE — 93662 INTRACARDIAC ECG (ICE): CPT | Mod: 26 | Performed by: INTERNAL MEDICINE

## 2019-01-02 PROCEDURE — 85027 COMPLETE CBC AUTOMATED: CPT | Performed by: INTERNAL MEDICINE

## 2019-01-02 PROCEDURE — 85610 PROTHROMBIN TIME: CPT | Mod: QW | Performed by: INTERNAL MEDICINE

## 2019-01-02 PROCEDURE — 40000065 ZZH STATISTIC EKG NON-CHARGEABLE

## 2019-01-02 PROCEDURE — 93462 L HRT CATH TRNSPTL PUNCTURE: CPT | Performed by: INTERNAL MEDICINE

## 2019-01-02 PROCEDURE — 36415 COLL VENOUS BLD VENIPUNCTURE: CPT | Performed by: INTERNAL MEDICINE

## 2019-01-02 PROCEDURE — 25000566 ZZH SEVOFLURANE, EA 15 MIN: Performed by: INTERNAL MEDICINE

## 2019-01-02 PROCEDURE — 93662 INTRACARDIAC ECG (ICE): CPT | Performed by: INTERNAL MEDICINE

## 2019-01-02 PROCEDURE — C1769 GUIDE WIRE: HCPCS | Performed by: INTERNAL MEDICINE

## 2019-01-02 PROCEDURE — 25000125 ZZHC RX 250: Performed by: INTERNAL MEDICINE

## 2019-01-02 PROCEDURE — C1894 INTRO/SHEATH, NON-LASER: HCPCS | Performed by: INTERNAL MEDICINE

## 2019-01-02 RX ORDER — ACETAMINOPHEN 325 MG/1
650 TABLET ORAL EVERY 4 HOURS PRN
Status: DISCONTINUED | OUTPATIENT
Start: 2019-01-02 | End: 2019-01-03 | Stop reason: HOSPADM

## 2019-01-02 RX ORDER — ATROPINE SULFATE 0.1 MG/ML
.5-1 INJECTION INTRAVENOUS
Status: DISCONTINUED | OUTPATIENT
Start: 2019-01-02 | End: 2019-01-02 | Stop reason: HOSPADM

## 2019-01-02 RX ORDER — BUPIVACAINE HYDROCHLORIDE 2.5 MG/ML
10-30 INJECTION, SOLUTION EPIDURAL; INFILTRATION; INTRACAUDAL
Status: DISCONTINUED | OUTPATIENT
Start: 2019-01-02 | End: 2019-01-02 | Stop reason: HOSPADM

## 2019-01-02 RX ORDER — SODIUM CHLORIDE 450 MG/100ML
INJECTION, SOLUTION INTRAVENOUS CONTINUOUS
Status: DISCONTINUED | OUTPATIENT
Start: 2019-01-02 | End: 2019-01-02 | Stop reason: HOSPADM

## 2019-01-02 RX ORDER — FUROSEMIDE 10 MG/ML
20-100 INJECTION INTRAMUSCULAR; INTRAVENOUS
Status: COMPLETED | OUTPATIENT
Start: 2019-01-02 | End: 2019-01-02

## 2019-01-02 RX ORDER — LIDOCAINE HYDROCHLORIDE 20 MG/ML
INJECTION, SOLUTION INFILTRATION; PERINEURAL PRN
Status: DISCONTINUED | OUTPATIENT
Start: 2019-01-02 | End: 2019-01-02

## 2019-01-02 RX ORDER — LORAZEPAM 2 MG/ML
.5-2 INJECTION INTRAMUSCULAR EVERY 10 MIN PRN
Status: DISCONTINUED | OUTPATIENT
Start: 2019-01-02 | End: 2019-01-02 | Stop reason: HOSPADM

## 2019-01-02 RX ORDER — KETOROLAC TROMETHAMINE 30 MG/ML
15 INJECTION, SOLUTION INTRAMUSCULAR; INTRAVENOUS
Status: DISCONTINUED | OUTPATIENT
Start: 2019-01-02 | End: 2019-01-02 | Stop reason: HOSPADM

## 2019-01-02 RX ORDER — ONDANSETRON 2 MG/ML
4 INJECTION INTRAMUSCULAR; INTRAVENOUS EVERY 4 HOURS PRN
Status: DISCONTINUED | OUTPATIENT
Start: 2019-01-02 | End: 2019-01-02 | Stop reason: HOSPADM

## 2019-01-02 RX ORDER — LIDOCAINE 40 MG/G
CREAM TOPICAL
Status: DISCONTINUED | OUTPATIENT
Start: 2019-01-02 | End: 2019-01-02 | Stop reason: HOSPADM

## 2019-01-02 RX ORDER — LIDOCAINE HYDROCHLORIDE AND EPINEPHRINE 10; 10 MG/ML; UG/ML
10-30 INJECTION, SOLUTION INFILTRATION; PERINEURAL
Status: DISCONTINUED | OUTPATIENT
Start: 2019-01-02 | End: 2019-01-02 | Stop reason: HOSPADM

## 2019-01-02 RX ORDER — PROTAMINE SULFATE 10 MG/ML
5-40 INJECTION, SOLUTION INTRAVENOUS EVERY 10 MIN PRN
Status: DISCONTINUED | OUTPATIENT
Start: 2019-01-02 | End: 2019-01-02 | Stop reason: HOSPADM

## 2019-01-02 RX ORDER — ONDANSETRON 2 MG/ML
INJECTION INTRAMUSCULAR; INTRAVENOUS PRN
Status: DISCONTINUED | OUTPATIENT
Start: 2019-01-02 | End: 2019-01-02

## 2019-01-02 RX ORDER — LIDOCAINE HYDROCHLORIDE 10 MG/ML
10-30 INJECTION, SOLUTION EPIDURAL; INFILTRATION; INTRACAUDAL; PERINEURAL
Status: COMPLETED | OUTPATIENT
Start: 2019-01-02 | End: 2019-01-02

## 2019-01-02 RX ORDER — LIDOCAINE HYDROCHLORIDE 10 MG/ML
10-30 INJECTION, SOLUTION EPIDURAL; INFILTRATION; INTRACAUDAL; PERINEURAL
Status: DISCONTINUED | OUTPATIENT
Start: 2019-01-02 | End: 2019-01-02 | Stop reason: HOSPADM

## 2019-01-02 RX ORDER — FENTANYL CITRATE 50 UG/ML
INJECTION, SOLUTION INTRAMUSCULAR; INTRAVENOUS PRN
Status: DISCONTINUED | OUTPATIENT
Start: 2019-01-02 | End: 2019-01-02

## 2019-01-02 RX ORDER — LIDOCAINE 40 MG/G
CREAM TOPICAL
Status: DISCONTINUED | OUTPATIENT
Start: 2019-01-02 | End: 2019-01-03 | Stop reason: HOSPADM

## 2019-01-02 RX ORDER — IBUTILIDE FUMARATE 1 MG/10ML
0.01 INJECTION, SOLUTION INTRAVENOUS
Status: DISCONTINUED | OUTPATIENT
Start: 2019-01-02 | End: 2019-01-02 | Stop reason: HOSPADM

## 2019-01-02 RX ORDER — AMIODARONE HYDROCHLORIDE 200 MG/1
200 TABLET ORAL DAILY
Status: DISCONTINUED | OUTPATIENT
Start: 2019-01-02 | End: 2019-01-03 | Stop reason: HOSPADM

## 2019-01-02 RX ORDER — FUROSEMIDE 10 MG/ML
40 INJECTION INTRAMUSCULAR; INTRAVENOUS ONCE
Status: COMPLETED | OUTPATIENT
Start: 2019-01-02 | End: 2019-01-02

## 2019-01-02 RX ORDER — IPRATROPIUM BROMIDE 21 UG/1
2 SPRAY, METERED NASAL EVERY 8 HOURS PRN
Status: DISCONTINUED | OUTPATIENT
Start: 2019-01-02 | End: 2019-01-03 | Stop reason: HOSPADM

## 2019-01-02 RX ORDER — HEPARIN SODIUM 1000 [USP'U]/ML
1000-10000 INJECTION, SOLUTION INTRAVENOUS; SUBCUTANEOUS EVERY 5 MIN PRN
Status: DISCONTINUED | OUTPATIENT
Start: 2019-01-02 | End: 2019-01-02 | Stop reason: HOSPADM

## 2019-01-02 RX ORDER — GLYCOPYRROLATE 0.2 MG/ML
INJECTION, SOLUTION INTRAMUSCULAR; INTRAVENOUS PRN
Status: DISCONTINUED | OUTPATIENT
Start: 2019-01-02 | End: 2019-01-02

## 2019-01-02 RX ORDER — DEXAMETHASONE SODIUM PHOSPHATE 4 MG/ML
INJECTION, SOLUTION INTRA-ARTICULAR; INTRALESIONAL; INTRAMUSCULAR; INTRAVENOUS; SOFT TISSUE PRN
Status: DISCONTINUED | OUTPATIENT
Start: 2019-01-02 | End: 2019-01-02

## 2019-01-02 RX ORDER — LIDOCAINE HYDROCHLORIDE 10 MG/ML
INJECTION, SOLUTION EPIDURAL; INFILTRATION; INTRACAUDAL; PERINEURAL
Status: DISCONTINUED | OUTPATIENT
Start: 2019-01-02 | End: 2019-01-02 | Stop reason: HOSPADM

## 2019-01-02 RX ORDER — NEOSTIGMINE METHYLSULFATE 1 MG/ML
VIAL (ML) INJECTION PRN
Status: DISCONTINUED | OUTPATIENT
Start: 2019-01-02 | End: 2019-01-02

## 2019-01-02 RX ORDER — PROPOFOL 10 MG/ML
INJECTION, EMULSION INTRAVENOUS PRN
Status: DISCONTINUED | OUTPATIENT
Start: 2019-01-02 | End: 2019-01-02

## 2019-01-02 RX ORDER — NALOXONE HYDROCHLORIDE 0.4 MG/ML
0.4 INJECTION, SOLUTION INTRAMUSCULAR; INTRAVENOUS; SUBCUTANEOUS EVERY 5 MIN PRN
Status: DISCONTINUED | OUTPATIENT
Start: 2019-01-02 | End: 2019-01-02 | Stop reason: HOSPADM

## 2019-01-02 RX ORDER — WARFARIN SODIUM 3 MG/1
3 TABLET ORAL ONCE
Status: COMPLETED | OUTPATIENT
Start: 2019-01-02 | End: 2019-01-02

## 2019-01-02 RX ORDER — DIPHENHYDRAMINE HYDROCHLORIDE 50 MG/ML
25-50 INJECTION INTRAMUSCULAR; INTRAVENOUS
Status: DISCONTINUED | OUTPATIENT
Start: 2019-01-02 | End: 2019-01-02 | Stop reason: HOSPADM

## 2019-01-02 RX ORDER — PROTAMINE SULFATE 10 MG/ML
1-5 INJECTION, SOLUTION INTRAVENOUS
Status: COMPLETED | OUTPATIENT
Start: 2019-01-02 | End: 2019-01-02

## 2019-01-02 RX ORDER — FENTANYL CITRATE 50 UG/ML
25-50 INJECTION, SOLUTION INTRAMUSCULAR; INTRAVENOUS
Status: DISCONTINUED | OUTPATIENT
Start: 2019-01-02 | End: 2019-01-02 | Stop reason: HOSPADM

## 2019-01-02 RX ORDER — IBUTILIDE FUMARATE 1 MG/10ML
1 INJECTION, SOLUTION INTRAVENOUS
Status: DISCONTINUED | OUTPATIENT
Start: 2019-01-02 | End: 2019-01-02 | Stop reason: HOSPADM

## 2019-01-02 RX ORDER — MORPHINE SULFATE 2 MG/ML
1-2 INJECTION, SOLUTION INTRAMUSCULAR; INTRAVENOUS EVERY 5 MIN PRN
Status: DISCONTINUED | OUTPATIENT
Start: 2019-01-02 | End: 2019-01-02 | Stop reason: HOSPADM

## 2019-01-02 RX ORDER — DIGOXIN 125 MCG
125 TABLET ORAL DAILY
Status: DISCONTINUED | OUTPATIENT
Start: 2019-01-03 | End: 2019-01-03 | Stop reason: HOSPADM

## 2019-01-02 RX ORDER — SODIUM CHLORIDE, SODIUM LACTATE, POTASSIUM CHLORIDE, CALCIUM CHLORIDE 600; 310; 30; 20 MG/100ML; MG/100ML; MG/100ML; MG/100ML
INJECTION, SOLUTION INTRAVENOUS CONTINUOUS PRN
Status: DISCONTINUED | OUTPATIENT
Start: 2019-01-02 | End: 2019-01-02

## 2019-01-02 RX ORDER — CARVEDILOL 6.25 MG/1
6.25 TABLET ORAL 2 TIMES DAILY WITH MEALS
Status: DISCONTINUED | OUTPATIENT
Start: 2019-01-02 | End: 2019-01-03 | Stop reason: HOSPADM

## 2019-01-02 RX ORDER — NALOXONE HYDROCHLORIDE 0.4 MG/ML
.1-.4 INJECTION, SOLUTION INTRAMUSCULAR; INTRAVENOUS; SUBCUTANEOUS
Status: DISCONTINUED | OUTPATIENT
Start: 2019-01-02 | End: 2019-01-03 | Stop reason: HOSPADM

## 2019-01-02 RX ORDER — LISINOPRIL 5 MG/1
5 TABLET ORAL AT BEDTIME
Status: DISCONTINUED | OUTPATIENT
Start: 2019-01-03 | End: 2019-01-03 | Stop reason: HOSPADM

## 2019-01-02 RX ORDER — OXYCODONE AND ACETAMINOPHEN 5; 325 MG/1; MG/1
1 TABLET ORAL EVERY 4 HOURS PRN
Status: DISCONTINUED | OUTPATIENT
Start: 2019-01-02 | End: 2019-01-03 | Stop reason: HOSPADM

## 2019-01-02 RX ORDER — FLUMAZENIL 0.1 MG/ML
0.2 INJECTION, SOLUTION INTRAVENOUS
Status: DISCONTINUED | OUTPATIENT
Start: 2019-01-02 | End: 2019-01-02 | Stop reason: HOSPADM

## 2019-01-02 RX ORDER — ADENOSINE 3 MG/ML
6-12 INJECTION, SOLUTION INTRAVENOUS EVERY 5 MIN PRN
Status: DISCONTINUED | OUTPATIENT
Start: 2019-01-02 | End: 2019-01-02 | Stop reason: HOSPADM

## 2019-01-02 RX ORDER — DOBUTAMINE HYDROCHLORIDE 200 MG/100ML
5-40 INJECTION INTRAVENOUS CONTINUOUS PRN
Status: DISCONTINUED | OUTPATIENT
Start: 2019-01-02 | End: 2019-01-02 | Stop reason: HOSPADM

## 2019-01-02 RX ADMIN — DEXAMETHASONE SODIUM PHOSPHATE 4 MG: 4 INJECTION, SOLUTION INTRA-ARTICULAR; INTRALESIONAL; INTRAMUSCULAR; INTRAVENOUS; SOFT TISSUE at 09:11

## 2019-01-02 RX ADMIN — FUROSEMIDE 40 MG: 10 INJECTION, SOLUTION INTRAMUSCULAR; INTRAVENOUS at 13:32

## 2019-01-02 RX ADMIN — FUROSEMIDE 40 MG: 10 INJECTION, SOLUTION INTRAMUSCULAR; INTRAVENOUS at 13:36

## 2019-01-02 RX ADMIN — PHENYLEPHRINE HYDROCHLORIDE 0.25 MCG/KG/MIN: 10 INJECTION, SOLUTION INTRAMUSCULAR; INTRAVENOUS; SUBCUTANEOUS at 08:59

## 2019-01-02 RX ADMIN — PROPOFOL 100 MG: 10 INJECTION, EMULSION INTRAVENOUS at 08:47

## 2019-01-02 RX ADMIN — FENTANYL CITRATE 25 MCG: 50 INJECTION, SOLUTION INTRAMUSCULAR; INTRAVENOUS at 12:33

## 2019-01-02 RX ADMIN — PROTAMINE SULFATE 1 MG: 10 INJECTION, SOLUTION INTRAVENOUS at 12:13

## 2019-01-02 RX ADMIN — ROCURONIUM BROMIDE 50 MG: 10 INJECTION INTRAVENOUS at 08:47

## 2019-01-02 RX ADMIN — RANITIDINE 150 MG: 150 TABLET ORAL at 20:11

## 2019-01-02 RX ADMIN — GLYCOPYRROLATE 0.6 MG: 0.2 INJECTION, SOLUTION INTRAMUSCULAR; INTRAVENOUS at 12:17

## 2019-01-02 RX ADMIN — FENTANYL CITRATE 50 MCG: 50 INJECTION, SOLUTION INTRAMUSCULAR; INTRAVENOUS at 08:47

## 2019-01-02 RX ADMIN — PROTAMINE SULFATE 34 MG: 10 INJECTION, SOLUTION INTRAVENOUS at 12:19

## 2019-01-02 RX ADMIN — LIDOCAINE HYDROCHLORIDE 6 ML: 10 INJECTION, SOLUTION EPIDURAL; INFILTRATION; INTRACAUDAL; PERINEURAL at 09:06

## 2019-01-02 RX ADMIN — PHENYLEPHRINE HYDROCHLORIDE 100 MCG: 10 INJECTION, SOLUTION INTRAMUSCULAR; INTRAVENOUS; SUBCUTANEOUS at 09:26

## 2019-01-02 RX ADMIN — HEPARIN SODIUM 10000 UNITS: 1000 INJECTION, SOLUTION INTRAVENOUS; SUBCUTANEOUS at 09:16

## 2019-01-02 RX ADMIN — KETOROLAC TROMETHAMINE 15 MG: 30 INJECTION, SOLUTION INTRAMUSCULAR at 12:14

## 2019-01-02 RX ADMIN — LIDOCAINE HYDROCHLORIDE 6 ML: 10 INJECTION, SOLUTION EPIDURAL; INFILTRATION; INTRACAUDAL; PERINEURAL at 09:02

## 2019-01-02 RX ADMIN — MIDAZOLAM 1 MG: 1 INJECTION INTRAMUSCULAR; INTRAVENOUS at 08:47

## 2019-01-02 RX ADMIN — PHENYLEPHRINE HYDROCHLORIDE 100 MCG: 10 INJECTION, SOLUTION INTRAMUSCULAR; INTRAVENOUS; SUBCUTANEOUS at 09:04

## 2019-01-02 RX ADMIN — PROPOFOL 30 MG: 10 INJECTION, EMULSION INTRAVENOUS at 08:53

## 2019-01-02 RX ADMIN — WARFARIN SODIUM 3 MG: 3 TABLET ORAL at 18:00

## 2019-01-02 RX ADMIN — ONDANSETRON 4 MG: 2 INJECTION INTRAMUSCULAR; INTRAVENOUS at 12:14

## 2019-01-02 RX ADMIN — NEOSTIGMINE METHYLSULFATE 3 MG: 1 INJECTION, SOLUTION INTRAVENOUS at 12:17

## 2019-01-02 RX ADMIN — PHENYLEPHRINE HYDROCHLORIDE 50 MCG: 10 INJECTION, SOLUTION INTRAMUSCULAR; INTRAVENOUS; SUBCUTANEOUS at 10:14

## 2019-01-02 RX ADMIN — LIDOCAINE HYDROCHLORIDE 100 MG: 20 INJECTION, SOLUTION INFILTRATION; PERINEURAL at 08:47

## 2019-01-02 RX ADMIN — HEPARIN SODIUM 2000 UNITS: 1000 INJECTION, SOLUTION INTRAVENOUS; SUBCUTANEOUS at 12:04

## 2019-01-02 RX ADMIN — AMIODARONE HYDROCHLORIDE 200 MG: 200 TABLET ORAL at 18:00

## 2019-01-02 RX ADMIN — SODIUM CHLORIDE: 4.5 INJECTION, SOLUTION INTRAVENOUS at 07:34

## 2019-01-02 RX ADMIN — SODIUM CHLORIDE, POTASSIUM CHLORIDE, SODIUM LACTATE AND CALCIUM CHLORIDE: 600; 310; 30; 20 INJECTION, SOLUTION INTRAVENOUS at 08:36

## 2019-01-02 RX ADMIN — HEPARIN SODIUM 5000 UNITS: 1000 INJECTION, SOLUTION INTRAVENOUS; SUBCUTANEOUS at 09:44

## 2019-01-02 RX ADMIN — CARVEDILOL 6.25 MG: 6.25 TABLET, FILM COATED ORAL at 18:01

## 2019-01-02 ASSESSMENT — ACTIVITIES OF DAILY LIVING (ADL)
RETIRED_EATING: 0-->INDEPENDENT
BATHING: 0-->INDEPENDENT
AMBULATION: 0-->INDEPENDENT
FALL_HISTORY_WITHIN_LAST_SIX_MONTHS: NO
TRANSFERRING: 0-->INDEPENDENT
RETIRED_COMMUNICATION: 0-->UNDERSTANDS/COMMUNICATES WITHOUT DIFFICULTY
COGNITION: 0 - NO COGNITION ISSUES REPORTED
TOILETING: 0-->INDEPENDENT

## 2019-01-02 ASSESSMENT — ENCOUNTER SYMPTOMS: DYSRHYTHMIAS: 1

## 2019-01-02 ASSESSMENT — MIFFLIN-ST. JEOR: SCORE: 1612.61

## 2019-01-02 NOTE — ANESTHESIA CARE TRANSFER NOTE
Patient: Tyrel Rene    Procedure(s):  EP Ablation VT  EP Comprehensive EP Study    Diagnosis: ventricular tachycardia  Diagnosis Additional Information: No value filed.    Anesthesia Type:   General, ETT     Note:  Airway :Face Mask  Patient transferred to:PACU  Comments: Neuromuscular blockade reversed after TOF 4/4, spontaneous respirations, adequate tidal volumes, followed commands to voice, oropharynx suctioned with soft flexible catheter, extubated atraumatically, extubated with suction, airway patent after extubation.  Oxygen via facemask at 8 liters per minute to PACU. Oxygen tubing connected to wall O2 in PACU, SpO2, NiBP, and EKG monitors and alarms on and functioning, report on patient's clinical status given to PACU RN, RN questions answered. Handoff Report: Identifed the Patient, Identified the Reponsible Provider, Reviewed the pertinent medical history, Discussed the surgical course, Reviewed Intra-OP anesthesia mangement and issues during anesthesia, Set expectations for post-procedure period and Allowed opportunity for questions and acknowledgement of understanding      Vitals: (Last set prior to Anesthesia Care Transfer)    CRNA VITALS  1/2/2019 1218 - 1/2/2019 1306      1/2/2019             Resp Rate (set):  10                Electronically Signed By: JAMAL Gabriel CRNA  January 2, 2019  1:06 PM

## 2019-01-02 NOTE — ANESTHESIA PREPROCEDURE EVALUATION
Anesthesia Pre-Procedure Evaluation    Patient: Tyrel Rene   MRN: [id number] : 1945          Preoperative Diagnosis: ventricular tachycardia    Procedure(s):  EP Ablation VT    Past Medical History:   Diagnosis Date     Atrial fibrillation (H)     s/p Cardioversion 3/14/2013     Atrial flutter (H)     S/p Aflutter ablation 2011     CAD (coronary artery disease)     CABG x3 10/2012- LIMA to distal LAD, SVG to OM1 & OM3; cath 10/2012- PTCA to second diagonal and mid LAD, BMS to mid LAD     Cardiogenic shock (H)      Cardiomyopathy (H)      CHF (congestive heart failure) (H)      CVA (cerebral infarction)     residual right hand numbness     ED (erectile dysfunction)      Hyperlipidemia      Hypertension      Neuropathy      Palpitations      SVT (supraventricular tachycardia) (H)     S/p dual chamber ICD 10/11/12- upgraded to BIV ICD 2013     Syncope      Past Surgical History:   Procedure Laterality Date     BYPASS GRAFT ARTERY CORONARY  10/2/2012    Procedure: BYPASS GRAFT ARTERY CORONARY;  Coronary Artery Bypass Graft x3 (LAD, Diag, OM) with Endovein Brusett (On-Pump);  Surgeon: Yeyo Lyman MD;  Location: [facility] OR     CARDIOVERSION  3/14/2013     CORONARY ANGIOGRAPHY ADULT ORDER  10/2/12     CORONARY ARTERY BYPASS  10/2/12    LIMA to LAD, SVG to OM1 and OM3     H ABLATION ATRIAL FLUTTER  2011     HAND SURGERY       HEART CATH, ANGIOPLASTY  10/2/12    PTCA to second diagonal and mid LAD, BMS to mid LAD     HERNIA REPAIR       ORTHOPEDIC SURGERY Right     cut on table saw     RELOCATE GENERATOR ICD/PACEMAKER       Echo 2018  Interpretation Summary     The rhythm was atrial fibrillation with paced rhythm.  Left ventricular systolic function is severely reduced.  The visual ejection fraction is estimated at 25-30%.  There is anterior, septal, and apical wall akinesis.  There is apical dyskinesis.  The left ventricle is mildly dilated.  There is a pacemaker lead in the right  ventricle.  There is mod-severe biatrial enlargement.  There is mild (1+) mitral regurgitation.  Right ventricular systolic pressure is elevated, consistent with mild to  moderate pulmonary hypertension.  Mild aortic root dilatation.     The anterior wall is thin and akinetic. There has been no signficant change  since 1/18/2018.  _____________________________________________________________________________  __        Left Ventricle  The left ventricle is mildly dilated. There is normal left ventricular wall  thickness. Left ventricular systolic function is severely reduced. The visual  ejection fraction is estimated at 25-30%. Left ventricular diastolic function  is indeterminate. There is anterior, septal, and apical wall akinesis. There  is apical dyskinesis. Septal wall motion abnormality may reflect pacemaker  activation. There is no thrombus seen in the left ventricle.     Right Ventricle  The right ventricle is mildly dilated. The right ventricular systolic function  is normal. There is a pacemaker lead in the right ventricle.     Atria  There is mod-severe biatrial enlargement. Pacer wire in right atrium. There is  no atrial shunt seen. The left atrial appendage is not well visualized.     Mitral Valve  The mitral valve leaflets appear normal. There is no evidence of stenosis,  fluttering, or prolapse. There is mild (1+) mitral regurgitation. There is no  mitral valve stenosis.        Tricuspid Valve  Normal tricuspid valve. The right ventricular systolic pressure is elevated at  35.8 mmHg. Right ventricular systolic pressure is elevated, consistent with  mild to moderate pulmonary hypertension. There is mild to moderate (1-2+)  tricuspid regurgitation. There is no tricuspid stenosis.     Aortic Valve  The aortic valve is trileaflet. There is trace aortic regurgitation. No aortic  stenosis is present.     Pulmonic Valve  Normal pulmonic valve. There is trace pulmonic valvular regurgitation. There  is no  pulmonic valvular stenosis.     Vessels  Mild aortic root dilatation. The ascending aorta is Mildly dilated. The IVC is  normal in size and reactivity with respiration, suggesting normal central  venous pressure. The pulmonary artery is normal size.     Pericardium  The pericardium appears normal. There is no pleural effusion.        Rhythm  The rhythm was atrial fibrillation with paced rhythm.    Cardiac Cath 1/2018  Results:     Hemodynamics:  Aortic blood pressure: 125/72 with a mean of 92.     Left ventricular pressure: 107 with end-diastolic of 16. No  significant gradient across the aortic valve upon pullback.     Rhythm: Paced rhythm at 65 bpm.     Angiographic Results LIMA graft is widely patent to the distal left  anterior descending artery. Mid left anterior descending artery is  severely and diffusely diseased. This does compromise backfilling into  a more-proximal septal . There appears to be a 70% stenosis  just proximal to the graft insertion site that potentially could  compromise backfilling into a distal diagonal.     Left main is short and appears to be normal.     Left anterior descending artery is occluded proximally.     There is a subtotal stenosis in the first obtuse marginal with  competitive flow seen distally. There is a 50-70% stenosis in the  second obtuse marginal. FFR across this is 0.81.     Right coronary artery is a large dominant vessel. It has only mild  disease with no stenosis greater than 25%.     Saphenous vein graft to the first obtuse marginal is widely patent.     Second saphenous vein graft cannot be found.     Ventriculography demonstrates akinesia of the mid and distal anterior  wall, including the apex. Basilar inferior wall contracts vigorously  as does the basilar anterior wall. Ejection fraction is estimated to  be 25-30%. There does appear to be mild-to-moderate mitral  regurgitation.     Conclusion:   1. Occluded proximal left anterior descending artery  with the distal  left anterior descending artery well revascularized by a widely patent  LIMA graft. Clearly the proximal left anterior descending artery,  proximal septals and diagonals are all potential areas of ischemia.  2. Subtotal first obtuse marginal well revascularized by a widely  patent saphenous vein graft.  3. 70% stenosis in the second obtuse marginal with an FFR of 0.81. I  cannot find the saphenous vein graft to this obtuse marginal which was  reportedly bypassed.  4. Mild disease in a dominant right coronary artery. No stenosis  greater than 25%.  5. Left ventricular end-diastolic pressure of 16 with an ejection  fraction of 25-30%. Akinesia involving mid distal left anterior  descending artery and apex. There appears to be mild-to-moderate  mitral regurgitation.     _____________________________________________________________________________  Anesthesia Evaluation     .             ROS/MED HX    ENT/Pulmonary:       Neurologic:     (+)neuropathy CVA with deficits- residual right hand numbness,    : 2011.   Cardiovascular: Comment: Ischemic cardiomyopathy  S/p aflutter ablation 2011    Hx of cardiogenic shock    Vtach - hospitalized 11/20 - 11/22/18 for V Tach - 7 episodes requiring ICD shock    (+) hypertension--CAD, -past MI (anterior MI 2012),CABG (3 vessel 2012)-. Taking blood thinners : . CHF . fainting (syncope). pacemaker :ICD . dysrhythmias (ventricular tachycardia) a-fib, a-flutter and Other, .       METS/Exercise Tolerance:     Hematologic:     (+) Anemia, -      Musculoskeletal:  - neg musculoskeletal ROS       GI/Hepatic:        (-) GERD   Renal/Genitourinary:  - ROS Renal section negative       Endo:      (-) Type II DM and thyroid disease   Psychiatric:  - neg psychiatric ROS       Infectious Disease:         Malignancy:         Other:                          Physical Exam  Normal systems: cardiovascular, pulmonary and dental    Airway   Mallampati: II  TM distance: >3 FB  Neck ROM:  "full    Dental     Cardiovascular   Rhythm and rate: irregular and normal      Pulmonary    breath sounds clear to auscultation            Lab Results   Component Value Date    WBC 6.6 01/02/2019    HGB 14.2 01/02/2019    HCT 42.5 01/02/2019     01/02/2019    CRP 2.4 01/12/2011    SED 8 01/12/2011     11/21/2018    POTASSIUM 4.1 11/21/2018    CHLORIDE 109 11/21/2018    CO2 26 11/21/2018    BUN 22 11/21/2018    CR 1.32 (H) 11/21/2018    GLC 86 11/21/2018    LORI 8.0 (L) 11/21/2018    PHOS 2.4 (L) 12/23/2014    MAG 2.1 12/23/2014    ALBUMIN 3.1 (L) 11/20/2018    PROTTOTAL 6.8 11/20/2018    ALT 32 11/20/2018    AST 28 11/20/2018    ALKPHOS 45 11/20/2018    BILITOTAL 0.7 11/20/2018    BILIDIRECT 0.2 03/19/2013    LIPASE 268 (H) 11/02/2012    PTT 32 01/10/2018    INR 2.0 (A) 12/17/2018    FIBR 694 (H) 10/05/2012    TSH 1.51 10/23/2018       Preop Vitals  BP Readings from Last 3 Encounters:   01/02/19 141/78   12/04/18 107/65   11/26/18 110/62    Pulse Readings from Last 3 Encounters:   01/02/19 65   12/04/18 74   11/26/18 65      Resp Readings from Last 3 Encounters:   01/02/19 16   11/26/18 14   11/22/18 16    SpO2 Readings from Last 3 Encounters:   01/02/19 98%   11/26/18 99%   11/22/18 98%      Temp Readings from Last 1 Encounters:   01/02/19 36.7  C (98  F) (Oral)    Ht Readings from Last 1 Encounters:   01/02/19 1.854 m (6' 1\")      Wt Readings from Last 1 Encounters:   01/02/19 82.4 kg (181 lb 9.6 oz)    Estimated body mass index is 23.96 kg/m  as calculated from the following:    Height as of this encounter: 1.854 m (6' 1\").    Weight as of this encounter: 82.4 kg (181 lb 9.6 oz).       Anesthesia Plan      History & Physical Review  History and physical reviewed and following examination; no interval change.    ASA Status:  3 .    NPO Status:  > 8 hours    Plan for General and ETT with Propofol induction. Maintenance will be Balanced.    PONV prophylaxis:  Ondansetron (or other 5HT-3)   "     Postoperative Care  Postoperative pain management:  IV analgesics.      Consents  Anesthetic plan, risks, benefits and alternatives discussed with:  Patient and Spouse..                 Jacob Benton MD

## 2019-01-02 NOTE — PROGRESS NOTES
Dictated.  Inducible RBBB, inferior axis VT, rate 210-220 bpm.  Same rate as clinical VT in November.   Only venous (RFV, LFV) access.  Extensive anterior wall, septal and apical scar.  92% pace map match while pacing from anterior base.  RF ablation delivered at border zone of anterior scar.  VT non-inducible with 3 ES at procedure end.  Pt was not shocked throughout the case.  Remains in AF.      Plan:  - give furosemide 40 mg iv in PACU  - Toradol 15 mg given  - Lovenox 1 mg/kg - start tonight  - restart warfarin  - continue amiodarone  - observe overnight ([facility])

## 2019-01-02 NOTE — ANESTHESIA POSTPROCEDURE EVALUATION
Patient: Tyrel Rene    Procedure(s):  EP Ablation VT  EP Comprehensive EP Study    Diagnosis:ventricular tachycardia  Diagnosis Additional Information: No value filed.    Anesthesia Type:  General, ETT    Note:  Anesthesia Post Evaluation    Patient location during evaluation: PACU  Patient participation: Able to fully participate in evaluation  Level of consciousness: awake  Pain management: adequate  Airway patency: patent  Cardiovascular status: acceptable  Respiratory status: acceptable  Hydration status: acceptable  PONV: none     Anesthetic complications: None          Last vitals:  Vitals:    01/02/19 1410 01/02/19 1436 01/02/19 1504   BP: 125/70     Pulse: 64 64    Resp: 15 16 16   Temp:  36.7  C (98  F) 37  C (98.6  F)   SpO2: 96%           Electronically Signed By: Jacbo Benton MD  January 2, 2019  3:51 PM

## 2019-01-02 NOTE — PROGRESS NOTES
Care Suites Arrival    Patient arrived ambulatory to [facility] for VT Ablation. Patient is alert, oriented, cooperative and pleasant. Denies pain. PIV started in right AC. Labs drawn. IV fluids infusing. Patient denies skin issues. Pt resting in bed, denies additional needs at this time, call light within reach. Wife and daughters at bedside. Dr Costello here to explain procedure to patient and wife and obtain consent. Pt up to void then taken ambulatory to cath lab with RN

## 2019-01-03 VITALS
HEART RATE: 65 BPM | SYSTOLIC BLOOD PRESSURE: 106 MMHG | HEIGHT: 73 IN | WEIGHT: 184.08 LBS | BODY MASS INDEX: 24.4 KG/M2 | RESPIRATION RATE: 16 BRPM | DIASTOLIC BLOOD PRESSURE: 64 MMHG | OXYGEN SATURATION: 97 % | TEMPERATURE: 98 F

## 2019-01-03 LAB
ANION GAP SERPL CALCULATED.3IONS-SCNC: 9 MMOL/L (ref 3–14)
BUN SERPL-MCNC: 25 MG/DL (ref 7–30)
CALCIUM SERPL-MCNC: 8 MG/DL (ref 8.5–10.1)
CHLORIDE SERPL-SCNC: 104 MMOL/L (ref 94–109)
CO2 SERPL-SCNC: 23 MMOL/L (ref 20–32)
CREAT SERPL-MCNC: 1.56 MG/DL (ref 0.66–1.25)
GFR SERPL CREATININE-BSD FRML MDRD: 43 ML/MIN/{1.73_M2}
GLUCOSE SERPL-MCNC: 94 MG/DL (ref 70–99)
HGB BLD-MCNC: 12 G/DL (ref 13.3–17.7)
INR PPP: 1.41 (ref 0.86–1.14)
INTERPRETATION ECG - MUSE: NORMAL
KCT BLD-ACNC: 176 SEC (ref 75–150)
POTASSIUM SERPL-SCNC: 4.3 MMOL/L (ref 3.4–5.3)
SODIUM SERPL-SCNC: 136 MMOL/L (ref 133–144)

## 2019-01-03 PROCEDURE — 36415 COLL VENOUS BLD VENIPUNCTURE: CPT | Performed by: INTERNAL MEDICINE

## 2019-01-03 PROCEDURE — 85018 HEMOGLOBIN: CPT | Performed by: INTERNAL MEDICINE

## 2019-01-03 PROCEDURE — 93005 ELECTROCARDIOGRAM TRACING: CPT

## 2019-01-03 PROCEDURE — 99214 OFFICE O/P EST MOD 30 MIN: CPT | Performed by: NURSE PRACTITIONER

## 2019-01-03 PROCEDURE — 93010 ELECTROCARDIOGRAM REPORT: CPT | Performed by: INTERNAL MEDICINE

## 2019-01-03 PROCEDURE — 80048 BASIC METABOLIC PNL TOTAL CA: CPT | Performed by: INTERNAL MEDICINE

## 2019-01-03 PROCEDURE — 25000132 ZZH RX MED GY IP 250 OP 250 PS 637: Performed by: INTERNAL MEDICINE

## 2019-01-03 PROCEDURE — 40000065 ZZH STATISTIC EKG NON-CHARGEABLE

## 2019-01-03 PROCEDURE — 85610 PROTHROMBIN TIME: CPT | Performed by: INTERNAL MEDICINE

## 2019-01-03 RX ORDER — WARFARIN SODIUM 3 MG/1
3 TABLET ORAL
Status: DISCONTINUED | OUTPATIENT
Start: 2019-01-03 | End: 2019-01-03 | Stop reason: HOSPADM

## 2019-01-03 RX ADMIN — AMIODARONE HYDROCHLORIDE 200 MG: 200 TABLET ORAL at 08:56

## 2019-01-03 RX ADMIN — CARVEDILOL 6.25 MG: 6.25 TABLET, FILM COATED ORAL at 08:56

## 2019-01-03 RX ADMIN — DIGOXIN 125 MCG: 0.12 TABLET ORAL at 08:56

## 2019-01-03 RX ADMIN — RANITIDINE 150 MG: 150 TABLET ORAL at 08:56

## 2019-01-03 ASSESSMENT — MIFFLIN-ST. JEOR: SCORE: 1623.88

## 2019-01-03 NOTE — PROGRESS NOTES
EP Progress Note          Assessment and Plan:   Tyrel Rene is a 73-year-old male with permanent atrial fibrillation, CVA after flutter ablation in 2011,severe ischemic cardiomyopathy related to a remote large anterior wall infarct.  He developed unstable ventricular tachyarrhythmias requiring ICD shocks in the fall 2017.  At that time the patient was started on amiodarone.  In November 2018 he was admitted with VT storm, after receiving 7 ICD discharges.  The patient could not tolerate higher dose of amiodarone because of neurologic side-effects. Given the failure of amiodarone to suppress ventricular tachycardia and the life-threatening nature of VT storm, catheter ablation of ventricular tachycardia was recommended by .    1. Ventricular tachycardia  EPS and ablation on anterior scar  The clinical VT rate was 220 bpm. EPS induced RBBB, inferior axis VT, rate 210-220 bpm (same as VT storm in November)  Extensive anterior wall septal, and apical scar noted.   VT non-inducible at the end of the procedure.  Pt was not shocked throughout the case.  Remains in AF.    Toradol 15 mg given last noc and 40 mg IV lasix    PLAN:  Wean off Femstop  Pt needs to ambulate without bleeding before bell can be d/c'd  Possible discharge later today,however, may need to stay one more night.  Continue amiodarone     2. Permanent atrial fibrillation  Warfarin was held x 3 days before procedure  Lovenox given last night  INR this am 1.41    PLAN:  Continue warfarin, but give 3 mg tonight  Stop lovenox r/t right femoral groin bleed    3.Chronic Ischemic Cardiomyopathy s/p CABG x 3 in 2012.   [employer] ICD implanted and upgraded to a biventricular device in 2014.   He is [facility] (Prince Galvez NP). EF stable 25-30% on echo 11/2018  PTA: coreg, lisinopril, and digoxin    Lauren Jorgensen CNP        Interval History:   [place] [facility] [place] femoral sites (venous) were bleeding post  "procedure. Left site good this morning. Small pressure dressing intact. Right femoral site has Femstop in place @ 14 mmHg.  Wt up 3 lbs. VSSA. 12 lead EKG shows no acute changes. 100% Bi-V paced.         Medications:       amiodarone  200 mg Oral Daily     carvedilol  6.25 mg Oral BID w/meals     digoxin  125 mcg Oral Daily     enoxaparin  80 mg Subcutaneous BID     lisinopril  5 mg Oral At Bedtime     ranitidine  150 mg Oral BID     sodium chloride (PF)  3 mL Intracatheter Q8H             Review of Systems: If done, described below  The Review of Systems is negative other than noted in the HPI             Physical Exam:   Blood pressure 118/68, pulse 65, temperature 97.9  F (36.6  C), temperature source Oral, resp. rate 16, height 1.854 m (6' 1\"), weight 83.5 kg (184 lb 1.4 oz), SpO2 97 %.        Vital Sign Ranges  Temperature Temp  Av.9  F (36.6  C)  Min: 96.7  F (35.9  C)  Max: 98.6  F (37  C)   Blood pressure Systolic (24hrs), Av , Min:112 , Max:140        Diastolic (24hrs), Av, Min:59, Max:80      Pulse Pulse  Av.9  Min: 63  Max: 71   Respirations Resp  Av  Min: 11  Max: 22   Pulse oximetry SpO2  Av.3 %  Min: 96 %  Max: 100 %         Intake/Output Summary (Last 24 hours) at 1/3/2019 0810  Last data filed at 1/3/2019 0600  Gross per 24 hour   Intake 1290 ml   Output 1875 ml   Net -585 ml       Constitutional:   in no apparent distress     Lungs:   symmetric, clear to auscultation     Cardiovascular:   regularly rhythm and no murmur noted     Extremities and Back:   No edema              Data:     Lab Results   Component Value Date    WBC 6.6 2019    HGB 12.0 (L) 2019    HCT 42.5 2019     2019     2019    POTASSIUM 4.3 2019    CHLORIDE 104 2019    CO2 23 2019    BUN 25 2019    CR 1.56 (H) 2019    GLC 94 2019    SED 8 2011    DD 0.4 2010    NTBNPI 25323 (H) 10/08/2012    NTBNP 5,862 (H) " 07/06/2016    TROPI 0.309 (HH) 12/04/2017    AST 28 11/20/2018    ALT 32 11/20/2018    ALKPHOS 45 11/20/2018    BILITOTAL 0.7 11/20/2018    BILIDIRECT 0.2 03/19/2013    INR 1.41 (H) 01/03/2019

## 2019-01-03 NOTE — PLAN OF CARE
A&O. VSS on room air. Tele: 100% v-paced, HR 65. Denies SOB and CP. Pt underwent ablation through R groin site yesterday, site continues to be leaking. Manual pressure applied multiple times throughout night w/ thrombix. MD paged w/ orders for femostop placed. No bruit/hematoma.CMS intact. No bleeding noted post femostop placement, oncoming RN informed of bleed and femostop. Bedrest until hemostasis achieved. Lovenox held at bedtime last night d/t bleed.

## 2019-01-03 NOTE — PROVIDER NOTIFICATION
MD Notification    Notified Person: MD    Notified Person Name:  March    Notification Date/Time: 1/3/18 @ 5:40 AM     Notification Interaction:    Purpose of Notification: R groin site continues to bleed post ablation from previous day w/ inability to achieve hemostasis after multiple attempts of manual pressure     Orders Received: orders for femostop placed     Comments: no ultrasound is needed at this time per MD

## 2019-01-03 NOTE — PROGRESS NOTES
Femstop removed 4 hours ago and patient ambulating without difficulty.  No bleeding is noted at access sites.    Resendiz catheter was removed 3 hours ago, but patient has not yet voided.  Anticipating a bladder scan within the next hour.  Discharge order placed and education completed if patient is able to void without difficulty.  If patient is unable to void may need to consider nephrology consult and stay another night.  Reviewed plan with Dixon Albarado) RN

## 2019-01-03 NOTE — PLAN OF CARE
VSS, no pain. A+Ox4. Difficulty with R groin site, continues to ooze; thrombex applies at 2200 with charge nurse. Pressure 20 min. Eating and voiding well; bell in. Neuros intact, pulses weak on L leg. A-V paced with ICD.

## 2019-01-03 NOTE — DISCHARGE INSTRUCTIONS
VT Ablation Discharge Instructions    After you go home:    Have an adult stay with you until tomorrow.    You may eat your normal diet, unless your doctor tells you otherwise.    RELAX and take it easy for 3 days.        For 24-48 hours (due to the sedation you received):    DO NOT DRIVE UNTIL YOUR PROVIDER GIVES YOU THE OKAY    Do NOT make any important or legal decisions.    Do NOT drive or operate machines at home or at work.    Do NOT drink alcohol.    Care of Puncture Site:    Check the puncture site every 1-2 hours while awake.    For 2-3 days, when you cough, sneeze, laugh or move your bowels, hold your hand over the puncture site and press firmly.    Change band aid daily for at least 3 days. If there is minor oozing, apply another band aid and remove it after 12 hours.     It is normal to have a small bruise or pea size lump at the site.    You may shower. Do NOT take a bath, or use a hot tub or pool until groin site heals, which may take up to a week.  Do NOT scrub the site. Do not use lotion or powder near the puncture site.    Activity:    Do NOT lift, push or pull more than 10 pounds (equal to a gallon of milk) for 3 days.    NO repetitive motions such as loading , vacuuming, raking, shoveling.     Bleeding:    If you start bleeding from the groin site, lie down flat and press firmly on the site for 10 minutes or until bleeding stops.     Once bleeding stops, lay flat for 1-2 hours.    Call your  nurse if bleeding does not stop or after hours will need to go to ER.       Go to ER or Call 911 right away if you have heavy bleeding or bleeding that does not stop.    Medications:    Take your medications, including blood thinners, unless your doctor tells you not to.    If you have stopped any other medicines, check with your nurse or provider about when to restart them.    If you have pain, you may takeTylenol (acetaminophen) and if this does not help may take Advil (ibuprofen-400 mg with  food).    Call the  RN if:    Chest pain not relieved by tylenol or ibuprofen    Difficulty swallowing and/or coughing up blood    Shortness of breath    increased groin pain or a large or growing hard lump around the site.    Groin site is red, swollen, hot or tender.    Blood or fluid is draining from the groin site.    You have chills or a fever greater than 101 F (38 C).    Your leg feels numb, cool or changes color.    If groin pain is not relieved by Tylenol or Ibuprofen.    Recurrent irregular or fast heart rate lasting over 2-3 hours.    Any questions or concerns.      Follow Up Appointments:    1/11/18 with Lauren Jorgensen, DAVE at 1:10pm in [place]    2/5/19 with Dr Costello at 2:15pm in [facility]: A [facility] clinic RN's Mary Davila [phone] (Mon-Fri, 8:00-5:00)                                                                                   [phone] (7 days a week) after hours for on call Cardiologist.

## 2019-01-03 NOTE — PROGRESS NOTES
Patient seen with Lauren Jorgensen NP.  I agree with her detailed note.  Marko has had oozing off and on from the R groin puncture site (RFV).  No apparent hematoma.  Still on bedrest with Fem-stop on.  Lovenox has been held.  INR 1.4 today.    Plan:  - as in Lauren's note  - possible discharge later this evening vs tomorrow am, depending on puncture site stability.

## 2019-01-03 NOTE — PROGRESS NOTES
Discharge instructions post VT Ablation reviewed with patient. Patient made aware that AVS provided by hospital staff will include all instructions, appointments and phone numbers.  Patient reminded that there is no lifting, pushing or pulling of more than 10 pounds for 3 days.  Driving restrictions for patient are restricted d/t recent ICD shocks will need MD approval for driving.   Patient instructed to call with any concerns or problems including, coughing up blood, difficulty swallowing, groin bleed or swelling, increased shortness of breath, fever greater than 101.   Follow up appointments have been made and reviewed with patient.  Pt having no problems with pain or shortness of breath at this time.  Fernstop removed and patient up ambulating. No bleeding noted from access sites.   Resendiz catheter removed around 12 noon.  Discharge pending, waiting for patient to void.    Pt has no further questions at this time.  AGAPITO Hastings

## 2019-01-03 NOTE — PROGRESS NOTES
Fem stop off   @ 1120 am, Resendiz Catheter d/cd at 1240 pm, Right groin site is CDI, with thrombix patch, 2x2 & Tegaderm drsg, no bleeding. Patient has gotten up to the bathroom x 1.

## 2019-01-04 ENCOUNTER — ANTICOAGULATION THERAPY VISIT (OUTPATIENT)
Dept: NURSING | Facility: CLINIC | Age: 74
End: 2019-01-04
Payer: COMMERCIAL

## 2019-01-04 ENCOUNTER — TELEPHONE (OUTPATIENT)
Dept: CARDIOLOGY | Facility: CLINIC | Age: 74
End: 2019-01-04

## 2019-01-04 DIAGNOSIS — I63.50 CEREBRAL ARTERY OCCLUSION WITH CEREBRAL INFARCTION (H): ICD-10-CM

## 2019-01-04 DIAGNOSIS — I47.29 PAROXYSMAL VENTRICULAR TACHYCARDIA (H): Primary | ICD-10-CM

## 2019-01-04 DIAGNOSIS — I63.9 CEREBRAL INFARCTION (H): ICD-10-CM

## 2019-01-04 LAB
INR POINT OF CARE: 1.5 (ref 0.86–1.14)
INTERPRETATION ECG - MUSE: NORMAL
KCT BLD-ACNC: 209 SEC (ref 75–150)
KCT BLD-ACNC: 282 SEC (ref 75–150)
KCT BLD-ACNC: 330 SEC (ref 75–150)
KCT BLD-ACNC: 388 SEC (ref 75–150)

## 2019-01-04 PROCEDURE — 36416 COLLJ CAPILLARY BLOOD SPEC: CPT

## 2019-01-04 PROCEDURE — 85610 PROTHROMBIN TIME: CPT | Mod: QW

## 2019-01-04 PROCEDURE — 99207 ZZC NO CHARGE NURSE ONLY: CPT

## 2019-01-04 NOTE — PROGRESS NOTES
NSG DISCHARGE NOTE    Patient discharged to home at 7:09 PM via wheel chair. Accompanied by spouse and staff. Pt voided x 2 before d/cing home.  Discharge instructions reviewed with patient and spouse, opportunity offered to ask questions. Prescriptions - None ordered for discharge. All belongings sent with patient.    Dixon Kumar

## 2019-01-04 NOTE — TELEPHONE ENCOUNTER
Patient had INR done today results 1.5.    Verbal orders per Dr Costello:  1. Warfarin 2.5mg po today (1/4) and (1/5)  2. Warfarin 1.25mg po (1/6)  3. Lovenox 80mg subcutaneous on 1/5 at 8am and 8pm and 1/6 at 8am--script sent to [facility] pharmacy  4. Recheck INR on 1/7--this is scheduled  Updated Yahaira INR nurse of orders and spoke to patients wife Sharel with above instructions.  She provided understanding of orders with read back request.  Patient doing well no bleeding from groin sites.  Wife reports he is doing well just fatigued from event.  AGAPITO Hastings

## 2019-01-07 ENCOUNTER — ANTICOAGULATION THERAPY VISIT (OUTPATIENT)
Dept: NURSING | Facility: CLINIC | Age: 74
End: 2019-01-07
Payer: COMMERCIAL

## 2019-01-07 DIAGNOSIS — I63.9 CEREBRAL INFARCTION (H): ICD-10-CM

## 2019-01-07 DIAGNOSIS — I63.50 CEREBRAL ARTERY OCCLUSION WITH CEREBRAL INFARCTION (H): ICD-10-CM

## 2019-01-07 LAB — INR POINT OF CARE: 2.1 (ref 0.86–1.14)

## 2019-01-07 PROCEDURE — 36416 COLLJ CAPILLARY BLOOD SPEC: CPT

## 2019-01-07 PROCEDURE — 85610 PROTHROMBIN TIME: CPT | Mod: QW

## 2019-01-07 PROCEDURE — 99207 ZZC NO CHARGE NURSE ONLY: CPT

## 2019-01-07 NOTE — PROGRESS NOTES
ANTICOAGULATION FOLLOW-UP CLINIC VISIT    Patient Name:  Tyrel Rene  Date:  1/7/2019  Contact Type:  Face to Face    SUBJECTIVE:        OBJECTIVE    INR Protime   Date Value Ref Range Status   01/07/2019 2.1 (A) 0.86 - 1.14 Final       ASSESSMENT / PLAN  INR assessment THER    Recheck INR In: 1 WEEK    INR Location Clinic            See the Encounter Report to view Anticoagulation Flowsheet and Dosing Calendar (Go to Encounters tab in chart review, and find the Anticoagulation Therapy Visit)        Doreen Finley RN

## 2019-01-07 NOTE — PROGRESS NOTES
ANTICOAGULATION FOLLOW-UP CLINIC VISIT    Patient Name:  Tyrel Rene  Date:  1/7/2019  Contact Type:      SUBJECTIVE:        OBJECTIVE    INR Protime   Date Value Ref Range Status   01/07/2019 2.1 (A) 0.86 - 1.14 Final       ASSESSMENT / PLAN  INR assessment THER    Recheck INR In: 1 WEEK    INR Location Clinic            See the Encounter Report to view Anticoagulation Flowsheet and Dosing Calendar (Go to Encounters tab in chart review, and find the Anticoagulation Therapy Visit)        Doreen Finley RN

## 2019-01-07 NOTE — ADDENDUM NOTE
Addended by: NICANOR ALEMAN on: 1/7/2019 03:20 PM     Modules accepted: Level of Service, SmartSet

## 2019-01-11 ENCOUNTER — OFFICE VISIT (OUTPATIENT)
Dept: CARDIOLOGY | Facility: CLINIC | Age: 74
End: 2019-01-11
Payer: COMMERCIAL

## 2019-01-11 ENCOUNTER — ANTICOAGULATION THERAPY VISIT (OUTPATIENT)
Dept: NURSING | Facility: CLINIC | Age: 74
End: 2019-01-11
Payer: COMMERCIAL

## 2019-01-11 VITALS
BODY MASS INDEX: 24.12 KG/M2 | OXYGEN SATURATION: 100 % | DIASTOLIC BLOOD PRESSURE: 70 MMHG | WEIGHT: 182 LBS | SYSTOLIC BLOOD PRESSURE: 114 MMHG | HEIGHT: 73 IN | HEART RATE: 64 BPM

## 2019-01-11 DIAGNOSIS — I48.19 PERSISTENT ATRIAL FIBRILLATION (H): ICD-10-CM

## 2019-01-11 DIAGNOSIS — I63.50 CEREBRAL ARTERY OCCLUSION WITH CEREBRAL INFARCTION (H): ICD-10-CM

## 2019-01-11 DIAGNOSIS — Z79.899 ON AMIODARONE THERAPY: ICD-10-CM

## 2019-01-11 DIAGNOSIS — I63.9 CEREBRAL INFARCTION (H): ICD-10-CM

## 2019-01-11 DIAGNOSIS — I47.10 SVT (SUPRAVENTRICULAR TACHYCARDIA) (H): Primary | ICD-10-CM

## 2019-01-11 LAB — INR POINT OF CARE: 2.6 (ref 0.86–1.14)

## 2019-01-11 PROCEDURE — 99207 ZZC NO CHARGE NURSE ONLY: CPT

## 2019-01-11 PROCEDURE — 36416 COLLJ CAPILLARY BLOOD SPEC: CPT

## 2019-01-11 PROCEDURE — 99213 OFFICE O/P EST LOW 20 MIN: CPT | Performed by: NURSE PRACTITIONER

## 2019-01-11 PROCEDURE — 85610 PROTHROMBIN TIME: CPT | Mod: QW

## 2019-01-11 PROCEDURE — 93000 ELECTROCARDIOGRAM COMPLETE: CPT | Performed by: NURSE PRACTITIONER

## 2019-01-11 ASSESSMENT — MIFFLIN-ST. JEOR: SCORE: 1614.43

## 2019-01-11 NOTE — PATIENT INSTRUCTIONS
Call my nurse with any questions or concerns:  [phone]  *If you have concerns after hours, please call [phone], option 2 to speak with on call Cardiologist.    1. Medication changes from today:  None

## 2019-01-11 NOTE — PROGRESS NOTES
ANTICOAGULATION FOLLOW-UP CLINIC VISIT    Patient Name:  Tyrel Rene  Date:  2019  Contact Type:  Face to Face    SUBJECTIVE:        OBJECTIVE    INR Protime   Date Value Ref Range Status   2019 2.6 (A) 0.86 - 1.14 Final       ASSESSMENT / PLAN  INR assessment THER    Recheck INR In: 2 WEEKS    INR Location Clinic      Anticoagulation Summary  As of 2019    INR goal:   2.0-2.5   TTR:   44.8 % (2.8 y)   INR used for dosin.6! (2019)   Warfarin maintenance plan:   2.5 mg (2.5 mg x 1) every Mon, Thu; 1.25 mg (2.5 mg x 0.5) all other days   Full warfarin instructions:   2.5 mg every Mon, Thu; 1.25 mg all other days   Weekly warfarin total:   11.25 mg   Plan last modified:   Doreen Finley RN (2018)   Next INR check:   2019   Target end date:   Indefinite    Indications    Long-term (current) use of anticoagulants [Z79.01] [Z79.01]  Cerebral artery occlusion with cerebral infarction (HCC) [I63.50] [I63.50]  Cerebral infarction (H) [I63.9]             Anticoagulation Episode Summary     INR check location:   [facility]    Preferred lab:       Send INR reminders to:   [facility] INR/PROTIME    Comments:         Anticoagulation Care Providers     Provider Role Specialty Phone number    Dejon Downs MD [facility] [phone]            See the Encounter Report to view Anticoagulation Flowsheet and Dosing Calendar (Go to Encounters tab in chart review, and find the Anticoagulation Therapy Visit)        Doreen Finley RN

## 2019-01-11 NOTE — LETTER
1/11/2019    Dejon Downs MD  [address]  [facility] [address]    RE: Tyrel Rene       Dear Colleague,    I had the pleasure of seeing Tyrel Rene in the [facility].    HPI and Plan: #[id number]  See dictation    Orders Placed This Encounter   Procedures     EKG 12-lead complete w/read - Clinics (performed today)       No orders of the defined types were placed in this encounter.      There are no discontinued medications.      Encounter Diagnosis   Name Primary?     SVT (supraventricular tachycardia) (H) Yes       CURRENT MEDICATIONS:  Current Outpatient Medications   Medication Sig Dispense Refill     amiodarone (PACERONE/CODARONE) 200 MG tablet Take 1 tablet (200 mg) by mouth daily 90 tablet 3     carvedilol (COREG) 3.125 MG tablet Take 2 tablets (6.25 mg) by mouth 2 times daily (with meals) 360 tablet 3     digoxin (LANOXIN) 125 MCG tablet Take 1 tablet (125 mcg) by mouth daily 90 tablet 2     ipratropium (ATROVENT) 0.03 % spray Spray 2 sprays into both nostrils every 8 hours as needed for rhinitis 30 mL 11     lisinopril (PRINIVIL/ZESTRIL) 5 MG tablet Take 1 tablet (5 mg) by mouth At Bedtime 180 tablet 3     Multiple Vitamins-Minerals (PRESERVISION AREDS 2 PO) Take 1 capsule by mouth 2 times daily       NITROSTAT 0.4 MG sublingual tablet DISSOLVE 1 TABLET UNDER THE TONGUE EVERY 5 MINUTES AS NEEDED FOR CHEST PAIN 25 tablet 0     ranitidine (ZANTAC) 150 MG tablet Take 1 tablet (150 mg) by mouth 2 times daily 180 tablet      rosuvastatin (CRESTOR) 20 MG tablet Take 1 tablet (20 mg) by mouth daily 90 tablet 1     sildenafil (VIAGRA) 100 MG tablet Take 1 tablet (100 mg) by mouth daily as needed Take 30 min to 4 hours before intercourse.  Never use with nitroglycerin, terazosin or doxazosin. 16 tablet 3     Vitamin D, Cholecalciferol, 1000 UNITS TABS Take 1,000 mg by mouth daily        warfarin (COUMADIN) 2.5 MG tablet Take by mouth daily 2.5 mg mon, thur and 1.25 mg  row       ASPIRIN NOT PRESCRIBED (INTENTIONAL) continuous prn for other Please choose reason not prescribed, below       enoxaparin (LOVENOX) 80 MG/0.8ML syringe Inject 0.8 mLs (80 mg) Subcutaneous See Admin Instructions for 3 doses 1/5 (8am and 8pm) and 1/6 (8am) (Patient not taking: Reported on 1/11/2019) 3 Syringe 0       ALLERGIES     Allergies   Allergen Reactions     Nystatin Other (See Comments)     Make his mouth numb & swelling       PAST MEDICAL HISTORY:  Past Medical History:   Diagnosis Date     Atrial fibrillation (H)     s/p Cardioversion 3/14/2013     Atrial flutter (H)     S/p Aflutter ablation 1/11/2011     CAD (coronary artery disease)     CABG x3 10/2012- LIMA to distal LAD, SVG to OM1 & OM3; cath 10/2012- PTCA to second diagonal and mid LAD, BMS to mid LAD     Cardiogenic shock (H)      Cardiomyopathy (H)      CHF (congestive heart failure) (H)      CVA (cerebral infarction)     residual right hand numbness     ED (erectile dysfunction)      Hyperlipidemia      Hypertension      Neuropathy      Palpitations      SVT (supraventricular tachycardia) (H)     S/p dual chamber ICD 10/11/12- upgraded to BIV ICD 6/2013     Syncope        PAST SURGICAL HISTORY:  Past Surgical History:   Procedure Laterality Date     BYPASS GRAFT ARTERY CORONARY  10/2/2012    Procedure: BYPASS GRAFT ARTERY CORONARY;  Coronary Artery Bypass Graft x3 (LAD, Diag, OM) with Endovein [place] (On-Pump);  Surgeon: Yeyo Lyman MD;  Location:  OR     CARDIOVERSION  3/14/2013     CORONARY ANGIOGRAPHY ADULT ORDER  10/2/12     CORONARY ARTERY BYPASS  10/2/12    LIMA to LAD, SVG to OM1 and OM3     EP ABLATION VT N/A 1/2/2019    Procedure: EP Ablation VT;  Surgeon: Suellen Costello MD;  Location:  HEART CARDIAC CATH LAB     EP COMPREHENSIVE EP STUDY N/A 1/2/2019    Procedure: EP Comprehensive EP Study;  Surgeon: Suellen Costello MD;  Location:  HEART CARDIAC CATH LAB     H ABLATION ATRIAL FLUTTER   2011     HAND SURGERY       HEART CATH, ANGIOPLASTY  10/2/12    PTCA to second diagonal and mid LAD, BMS to mid LAD     HERNIA REPAIR       ORTHOPEDIC SURGERY Right 2006    cut on table saw     RELOCATE GENERATOR ICD/PACEMAKER         FAMILY HISTORY:  Family History   Problem Relation Age of Onset     Alcohol/Drug Father      Heart Disease Father 71     Alzheimer Disease Sister      Alcohol/Drug Sister      Alcohol/Drug Sister      Gastrointestinal Disease Sister      Obesity Sister      Hypertension Sister      Heart Disease Sister      Hypertension Sister      Heart Disease Daughter         afib     Arrhythmia Daughter      Multiple Sclerosis Daughter      Heart Disease Daughter         afib     Arrhythmia Daughter      Cancer Daughter         lymphoma     Aneurysm Mother        SOCIAL HISTORY:  Social History     Socioeconomic History     Marital status:      Spouse name: None     Number of children: None     Years of education: None     Highest education level: None   Social Needs     Financial resource strain: None     Food insecurity - worry: None     Food insecurity - inability: None     Transportation needs - medical: None     Transportation needs - non-medical: None   Occupational History     None   Tobacco Use     Smoking status: Former Smoker     Packs/day: 1.00     Years: 14.00     Pack years: 14.00     Types: Cigarettes     Last attempt to quit: 7/10/1972     Years since quittin.5     Smokeless tobacco: Never Used   Substance and Sexual Activity     Alcohol use: No     Drug use: No     Sexual activity: Yes     Partners: Female   Other Topics Concern     Parent/sibling w/ CABG, MI or angioplasty before 65F 55M? No      Service Not Asked     Blood Transfusions Not Asked     Caffeine Concern Yes     Comment: 4 cups coffee daily     Occupational Exposure Not Asked     Hobby Hazards Not Asked     Sleep Concern No     Stress Concern No     Weight Concern Yes     Comment: gain 2lbs      "Special Diet Yes     Comment: low sodium     Back Care Not Asked     Exercise Yes     Comment: walking, doing sittups, played pickle ball in summer     Bike Helmet Not Asked     Seat Belt Yes     Self-Exams Not Asked   Social History Narrative     None       Review of Systems:  Skin:  Negative       Eyes:  Positive for glasses    ENT:  Negative hoarseness    Respiratory:  Negative for dyspnea on exertion;shortness of breath;cough SOB with some activity- no more than usual   Cardiovascular:  Negative for;palpitations;chest pain;edema lightheadedness;dizziness;Positive for Feels the dizziness is worse since the ablation  Gastroenterology: Negative for nausea;diarrhea treated  Genitourinary:  Negative nocturia new diarrhea occasionally, maybe once a week- new in the last 3-4 months   Musculoskeletal:  Negative      Neurologic:  Negative stroke history of stroke in 2011, head injury in 2014, right hand has issues with feeling/identification  Psychiatric:  Negative      Heme/Lymph/Imm:  Negative easy bruising coumadin  Endocrine:  Negative thyroid disorder;diabetes      Physical Exam:  Vitals: /70 (BP Location: Right arm, Patient Position: Chair, Cuff Size: Adult Regular)   Pulse 64   Ht 1.854 m (6' 1\")   Wt 82.6 kg (182 lb)   SpO2 100%   BMI 24.01 kg/m       Constitutional:  cooperative, alert and oriented, well developed, well nourished, in no acute distress        Skin:  warm and dry to the touch, no apparent skin lesions or masses noted   ICD incision in the left infraclavicular area was well-healed      Head:  normocephalic, no masses or lesions        Eyes:           Lymph:      ENT:  no pallor or cyanosis        Neck:  JVP normal        Respiratory:  clear to auscultation;healed median sternotomy scar         Cardiac: regular rhythm;no murmurs, gallops or rubs detected                                                         GI:  abdomen soft;BS normoactive        Extremities and Muscular Skeletal:  no " edema              Neurological:  no gross motor deficits;affect appropriate        Psych:  Alert and Oriented x 3;affect appropriate, oriented to time, person and place;oriented to time, person and place        CC  No referring provider defined for this encounter.                Thank you for allowing me to participate in the care of your patient.      Sincerely,     Lauren Jorgensen, NP, APRN [facility]    cc:   No referring provider defined for this encounter.

## 2019-01-11 NOTE — PROGRESS NOTES
HPI and Plan: #[id number]  See dictation    Orders Placed This Encounter   Procedures     EKG 12-lead complete w/read - Clinics (performed today)       No orders of the defined types were placed in this encounter.      There are no discontinued medications.      Encounter Diagnosis   Name Primary?     SVT (supraventricular tachycardia) (H) Yes       CURRENT MEDICATIONS:  Current Outpatient Medications   Medication Sig Dispense Refill     amiodarone (PACERONE/CODARONE) 200 MG tablet Take 1 tablet (200 mg) by mouth daily 90 tablet 3     carvedilol (COREG) 3.125 MG tablet Take 2 tablets (6.25 mg) by mouth 2 times daily (with meals) 360 tablet 3     digoxin (LANOXIN) 125 MCG tablet Take 1 tablet (125 mcg) by mouth daily 90 tablet 2     ipratropium (ATROVENT) 0.03 % spray Spray 2 sprays into both nostrils every 8 hours as needed for rhinitis 30 mL 11     lisinopril (PRINIVIL/ZESTRIL) 5 MG tablet Take 1 tablet (5 mg) by mouth At Bedtime 180 tablet 3     Multiple Vitamins-Minerals (PRESERVISION AREDS 2 PO) Take 1 capsule by mouth 2 times daily       NITROSTAT 0.4 MG sublingual tablet DISSOLVE 1 TABLET UNDER THE TONGUE EVERY 5 MINUTES AS NEEDED FOR CHEST PAIN 25 tablet 0     ranitidine (ZANTAC) 150 MG tablet Take 1 tablet (150 mg) by mouth 2 times daily 180 tablet      rosuvastatin (CRESTOR) 20 MG tablet Take 1 tablet (20 mg) by mouth daily 90 tablet 1     sildenafil (VIAGRA) 100 MG tablet Take 1 tablet (100 mg) by mouth daily as needed Take 30 min to 4 hours before intercourse.  Never use with nitroglycerin, terazosin or doxazosin. 16 tablet 3     Vitamin D, Cholecalciferol, 1000 UNITS TABS Take 1,000 mg by mouth daily        warfarin (COUMADIN) 2.5 MG tablet Take by mouth daily 2.5 mg mon, thur and 1.25 mg row       ASPIRIN NOT PRESCRIBED (INTENTIONAL) continuous prn for other Please choose reason not prescribed, below       enoxaparin (LOVENOX) 80 MG/0.8ML syringe Inject 0.8 mLs (80 mg) Subcutaneous See Admin Instructions  for 3 doses 1/5 (8am and 8pm) and 1/6 (8am) (Patient not taking: Reported on 1/11/2019) 3 Syringe 0       ALLERGIES     Allergies   Allergen Reactions     Nystatin Other (See Comments)     Make his mouth numb & swelling       PAST MEDICAL HISTORY:  Past Medical History:   Diagnosis Date     Atrial fibrillation (H)     s/p Cardioversion 3/14/2013     Atrial flutter (H)     S/p Aflutter ablation 1/11/2011     CAD (coronary artery disease)     CABG x3 10/2012- LIMA to distal LAD, SVG to OM1 & OM3; cath 10/2012- PTCA to second diagonal and mid LAD, BMS to mid LAD     Cardiogenic shock (H)      Cardiomyopathy (H)      CHF (congestive heart failure) (H)      CVA (cerebral infarction)     residual right hand numbness     ED (erectile dysfunction)      Hyperlipidemia      Hypertension      Neuropathy      Palpitations      SVT (supraventricular tachycardia) (H)     S/p dual chamber ICD 10/11/12- upgraded to BIV ICD 6/2013     Syncope        PAST SURGICAL HISTORY:  Past Surgical History:   Procedure Laterality Date     BYPASS GRAFT ARTERY CORONARY  10/2/2012    Procedure: BYPASS GRAFT ARTERY CORONARY;  Coronary Artery Bypass Graft x3 (LAD, Diag, OM) with Endovein [place] (On-Pump);  Surgeon: Yeyo Lyman MD;  Location:  OR     CARDIOVERSION  3/14/2013     CORONARY ANGIOGRAPHY ADULT ORDER  10/2/12     CORONARY ARTERY BYPASS  10/2/12    LIMA to LAD, SVG to OM1 and OM3     EP ABLATION VT N/A 1/2/2019    Procedure: EP Ablation VT;  Surgeon: Suellen Costello MD;  Location:  [facility]     EP COMPREHENSIVE EP STUDY N/A 1/2/2019    Procedure: EP Comprehensive EP Study;  Surgeon: Suellen Costello MD;  Location:  [facility]ATH [facility]     H ABLATION ATRIAL FLUTTER  1/11/2011     HAND SURGERY       HEART CATH, ANGIOPLASTY  10/2/12    PTCA to second diagonal and mid LAD, BMS to mid LAD     HERNIA REPAIR       ORTHOPEDIC SURGERY Right 2006    cut on table saw     RELOCATE GENERATOR  ICD/PACEMAKER         FAMILY HISTORY:  Family History   Problem Relation Age of Onset     Alcohol/Drug Father      Heart Disease Father 71     Alzheimer Disease Sister      Alcohol/Drug Sister      Alcohol/Drug Sister      Gastrointestinal Disease Sister      Obesity Sister      Hypertension Sister      Heart Disease Sister      Hypertension Sister      Heart Disease Daughter         afib     Arrhythmia Daughter      Multiple Sclerosis Daughter      Heart Disease Daughter         afib     Arrhythmia Daughter      Cancer Daughter         lymphoma     Aneurysm Mother        SOCIAL HISTORY:  Social History     Socioeconomic History     Marital status:      Spouse name: None     Number of children: None     Years of education: None     Highest education level: None   Social Needs     Financial resource strain: None     Food insecurity - worry: None     Food insecurity - inability: None     Transportation needs - medical: None     Transportation needs - non-medical: None   Occupational History     None   Tobacco Use     Smoking status: Former Smoker     Packs/day: 1.00     Years: 14.00     Pack years: 14.00     Types: Cigarettes     Last attempt to quit: 7/10/1972     Years since quittin.5     Smokeless tobacco: Never Used   Substance and Sexual Activity     Alcohol use: No     Drug use: No     Sexual activity: Yes     Partners: Female   Other Topics Concern     Parent/sibling w/ CABG, MI or angioplasty before 65F 55M? No      Service Not Asked     Blood Transfusions Not Asked     Caffeine Concern Yes     Comment: 4 cups coffee daily     Occupational Exposure Not Asked     Hobby Hazards Not Asked     Sleep Concern No     Stress Concern No     Weight Concern Yes     Comment: gain 2lbs     Special Diet Yes     Comment: low sodium     Back Care Not Asked     Exercise Yes     Comment: walking, doing sittups, played pickle ball in summer     Bike Helmet Not Asked     Seat Belt Yes     Self-Exams Not Asked  "  Social History Narrative     None       Review of Systems:  Skin:  Negative       Eyes:  Positive for glasses    ENT:  Negative hoarseness    Respiratory:  Negative for dyspnea on exertion;shortness of breath;cough SOB with some activity- no more than usual   Cardiovascular:  Negative for;palpitations;chest pain;edema lightheadedness;dizziness;Positive for Feels the dizziness is worse since the ablation  Gastroenterology: Negative for nausea;diarrhea treated  Genitourinary:  Negative nocturia new diarrhea occasionally, maybe once a week- new in the last 3-4 months   Musculoskeletal:  Negative      Neurologic:  Negative stroke history of stroke in 2011, head injury in 2014, right hand has issues with feeling/identification  Psychiatric:  Negative      Heme/Lymph/Imm:  Negative easy bruising coumadin  Endocrine:  Negative thyroid disorder;diabetes      Physical Exam:  Vitals: /70 (BP Location: Right arm, Patient Position: Chair, Cuff Size: Adult Regular)   Pulse 64   Ht 1.854 m (6' 1\")   Wt 82.6 kg (182 lb)   SpO2 100%   BMI 24.01 kg/m      Constitutional:  cooperative, alert and oriented, well developed, well nourished, in no acute distress        Skin:  warm and dry to the touch, no apparent skin lesions or masses noted   ICD incision in the left infraclavicular area was well-healed      Head:  normocephalic, no masses or lesions        Eyes:           Lymph:      ENT:  no pallor or cyanosis        Neck:  JVP normal        Respiratory:  clear to auscultation;healed median sternotomy scar         Cardiac: regular rhythm;no murmurs, gallops or rubs detected                                                         GI:  abdomen soft;BS normoactive        Extremities and Muscular Skeletal:  no edema              Neurological:  no gross motor deficits;affect appropriate        Psych:  Alert and Oriented x 3;affect appropriate, oriented to time, person and place;oriented to time, person and place        CC  No " referring provider defined for this encounter.

## 2019-01-11 NOTE — PROGRESS NOTES
Service Date: 01/11/2019      HISTORY OF PRESENT ILLNESS:  Mr. Rene is a delightful 73-year-old gentleman that I am having the pleasure of seeing for a 1-week followup today.  Briefly, he has a past medical history of:   1.  Permanent atrial fibrillation.   2.  CVA after flutter ablation in 2011.   3.  Severe ischemic cardiomyopathy related to a large anterior wall infarction in the past.   4.  Unstable ventricular tachyarrhythmias requiring ICD shocks in the fall of 2017.  Amiodarone initiated recurrent shock 11/2018.  Admitted with VT storm receiving 7 ICD discharges.      He is here today for a followup.  Briefly, he underwent an EP study and ablation of his anterior wall scar a week ago with Dr. Costello.  The clinical VT rate was noted to be 220 beats per minute.  EP study did induce right bundle branch block, inferior axis VT with a rate of 210-220 beats per minute, which is the same VT noted in the 11/2018 admission.  Extensive anterior wall, septal and apical scar was noted.  There was no inducible VT following the procedure.  The patient was not shocked during the case.  He remains in atrial fibrillation.  He did have some oozing from his femoral site.  He was placed on a FemoStop.  He was also noted to have a subtherapeutic INR of 1.4 and was bridged with Lovenox therapy.      The patient is here today for followup with his wife.  This site has healed nicely.  He still has occasional lightheadedness which is not new.  He states that this started after initiation with amiodarone in the past.  Blood pressure is stable at 114/70, pulse is 64 beats per minute.      A 12-lead EKG today shows atrial fibrillation, 100% BiV paced with occasional PVC.      All other review of systems, past medical history and physical exam are noted below.      ASSESSMENT AND PLAN:  Mr. Rene is a delightful 73-year-old gentleman here today for followup.   1.  Ventricular tachycardia.    Multiple hospital admissions, the last being   with 7 appropriate shocks for VT storm.  He recently underwent EP study with VT ablation as outlined above.  His access site has healed nicely.  He will have his device checked next month prior to his visit with Dr. Costello.  He will remain on amiodarone at 200 mg daily.  He is hopeful to have the medication weaned in the future, however, is nervous about doing this.  This can be discussed further at his next visit   .   2.  Chronic ischemic cardiomyopathy.    This is followed closely by Hayde Galvez CNP in [facility].  He does have a biventricular device that was upgraded in .  Echo shows EF to be 25%-30%, which is stable.    He is currently on low-dose carvedilol, lisinopril and digoxin.  He is euvolemic on exam.     3.  Chronic atrial fibrillation.    CHADS-VASc score is 7.  He is on warfarin.  He was bridged with Lovenox last week.  His INRs have been therapeutic. He does have a previous history of TIA following atrial flutter ablation as outlined above.     4.  Known coronary artery disease without complaints of angina.  He is status post anterior wall MI 10/2012.  He had a 3-vessel bypass with a LIMA to the LAD, vein graft to the OM1, vein graft to the OM3.  Angiogram 2018 showed a patent LIMA to the LAD, a patent graft to the OM1, but an occluded second vein graft.  The patient is on Crestor 20 mg daily.  He is not on aspirin due to warfarin therapy. No complaints of chest pain at this time.     As always, thank you for including us in his care.  Feel free to contact us if you have questions or concerns regarding his assessment and plan.         JIM DODGE NP             D: 2019   T: 2019   MT: BERRY      Name:     ARTEM ELIZADLE   MRN:      [id number]        Account:      [id number]   :      1945           Service Date: 2019      Document: [id number]

## 2019-01-11 NOTE — LETTER
1/11/2019      Dejon Downs MD  [address]  [facility] [address]      RE: Tyrel Rene       Dear Colleague,    I had the pleasure of seeing Tyrel Rene in the [facility].    Service Date: 01/11/2019      HISTORY OF PRESENT ILLNESS:  Mr. Rene is a delightful 73-year-old gentleman that I am having the pleasure of seeing for a 1-week followup today.  Briefly, he has a past medical history of:   1.  Permanent atrial fibrillation.   2.  CVA after flutter ablation in 2011.   3.  Severe ischemic cardiomyopathy related to a large anterior wall infarction in the past.   4.  Unstable ventricular tachyarrhythmias requiring ICD shocks in the fall of 2017.  Amiodarone initiated recurrent shock 11/2018.  Admitted with VT storm receiving 7 ICD discharges.      He is here today for a followup.  Briefly, he underwent an EP study and ablation of his anterior wall scar a week ago with Dr. Costello.  The clinical VT rate was noted to be 220 beats per minute.  EP study did induce right bundle branch block, inferior axis VT with a rate of 210-220 beats per minute, which is the same VT noted in the 11/2018 admission.  Extensive anterior wall, septal and apical scar was noted.  There was no inducible VT following the procedure.  The patient was not shocked during the case.  He remains in atrial fibrillation.  He did have some oozing from his femoral site.  He was placed on a FemoStop.  He was also noted to have a subtherapeutic INR of 1.4 and was bridged with Lovenox therapy.      The patient is here today for followup with his wife.  This site has healed nicely.  He still has occasional lightheadedness which is not new.  He states that this started after initiation with amiodarone in the past.  Blood pressure is stable at 114/70, pulse is 64 beats per minute.      A 12-lead EKG today shows atrial fibrillation, 100% BiV paced with occasional PVC.      All other review of systems, past  medical history and physical exam are noted below.      ASSESSMENT AND PLAN:  Mr. Elizalde is a delightful 73-year-old gentleman here today for followup.   1.  Ventricular tachycardia.  As mentioned above, he has had multiple hospital admissions, the last being 11/22 with 7 appropriate shocks for VT storm.  He recently underwent EP study with VT ablation as outlined above.  His access site has healed nicely.  He will have his device checked next month prior to his visit with Dr. Costello.  He will remain on amiodarone at 200 mg daily.  He is hopeful to have the medication weaned in the future, however, is nervous about doing this.  This can be discussed further at his visit then.   2.  Chronic ischemic cardiomyopathy.  This is followed closely by Hayde Galvez CNP in [facility].  He does have a biventricular device that was upgraded in 2014.  Echo shows EF to be 25%-30%, which is stable.  He is currently on low-dose carvedilol, lisinopril and digoxin.  He is euvolemic on exam.   3.  Chronic atrial fibrillation.  CHADS-VASc score is 7.  He is on warfarin.  He was bridged with Lovenox last week.  His INRs have been therapeutic.  He does have a previous history of TIA following atrial flutter ablation as outlined above.   4.  Known coronary artery disease without complaints of angina.  He is status post anterior wall MI 10/2012.  He had a 3-vessel bypass with a LIMA to the LAD, vein graft to the OM1, vein graft to the OM3.  Angiogram 01/2018 showed a patent LIMA to the LAD, a patent graft to the OM1, but an occluded second vein graft.  The patient is on Crestor 20 mg daily.  He is not on aspirin due to warfarin therapy.      As always, thank you for including us in his care.  Feel free to contact us if you have questions or concerns regarding his assessment and plan.         JIM DODGE NP             D: 01/11/2019   T: 01/11/2019   MT: BERRY      Name:     ARTEM ELIZALDE   MRN:      [id number]         Account:      [id number]   :      1945           Service Date: 2019      Document: [id number]         Outpatient Encounter Medications as of 2019   Medication Sig Dispense Refill     amiodarone (PACERONE/CODARONE) 200 MG tablet Take 1 tablet (200 mg) by mouth daily 90 tablet 3     carvedilol (COREG) 3.125 MG tablet Take 2 tablets (6.25 mg) by mouth 2 times daily (with meals) 360 tablet 3     digoxin (LANOXIN) 125 MCG tablet Take 1 tablet (125 mcg) by mouth daily 90 tablet 2     ipratropium (ATROVENT) 0.03 % spray Spray 2 sprays into both nostrils every 8 hours as needed for rhinitis 30 mL 11     lisinopril (PRINIVIL/ZESTRIL) 5 MG tablet Take 1 tablet (5 mg) by mouth At Bedtime 180 tablet 3     Multiple Vitamins-Minerals (PRESERVISION AREDS 2 PO) Take 1 capsule by mouth 2 times daily       NITROSTAT 0.4 MG sublingual tablet DISSOLVE 1 TABLET UNDER THE TONGUE EVERY 5 MINUTES AS NEEDED FOR CHEST PAIN 25 tablet 0     ranitidine (ZANTAC) 150 MG tablet Take 1 tablet (150 mg) by mouth 2 times daily 180 tablet      rosuvastatin (CRESTOR) 20 MG tablet Take 1 tablet (20 mg) by mouth daily 90 tablet 1     sildenafil (VIAGRA) 100 MG tablet Take 1 tablet (100 mg) by mouth daily as needed Take 30 min to 4 hours before intercourse.  Never use with nitroglycerin, terazosin or doxazosin. 16 tablet 3     Vitamin D, Cholecalciferol, 1000 UNITS TABS Take 1,000 mg by mouth daily        warfarin (COUMADIN) 2.5 MG tablet Take by mouth daily 2.5 mg mon, thur and 1.25 mg row       ASPIRIN NOT PRESCRIBED (INTENTIONAL) continuous prn for other Please choose reason not prescribed, below       enoxaparin (LOVENOX) 80 MG/0.8ML syringe Inject 0.8 mLs (80 mg) Subcutaneous See Admin Instructions for 3 doses /5 (8am and 8pm) and / (8am) (Patient not taking: Reported on 2019) 3 Syringe 0     No facility-administered encounter medications on file as of 2019.        Again, thank you for allowing me to participate in the  care of your patient.      Sincerely,    Lauren Jorgensen NP, APRN [facility]

## 2019-01-16 ENCOUNTER — OFFICE VISIT (OUTPATIENT)
Dept: CARDIOLOGY | Facility: CLINIC | Age: 74
End: 2019-01-16
Attending: NURSE PRACTITIONER
Payer: COMMERCIAL

## 2019-01-16 VITALS
BODY MASS INDEX: 24.08 KG/M2 | WEIGHT: 181.7 LBS | HEIGHT: 73 IN | DIASTOLIC BLOOD PRESSURE: 74 MMHG | SYSTOLIC BLOOD PRESSURE: 116 MMHG | HEART RATE: 68 BPM | OXYGEN SATURATION: 97 %

## 2019-01-16 DIAGNOSIS — I47.20 VENTRICULAR TACHYCARDIA (H): ICD-10-CM

## 2019-01-16 DIAGNOSIS — I50.9 CHF (CONGESTIVE HEART FAILURE) (H): Primary | ICD-10-CM

## 2019-01-16 DIAGNOSIS — I50.9 CHF (CONGESTIVE HEART FAILURE) (H): ICD-10-CM

## 2019-01-16 DIAGNOSIS — I50.22 CHRONIC SYSTOLIC CONGESTIVE HEART FAILURE (H): ICD-10-CM

## 2019-01-16 LAB
ANION GAP SERPL CALCULATED.3IONS-SCNC: 9.5 MMOL/L (ref 6–17)
BUN SERPL-MCNC: 17 MG/DL (ref 7–30)
CALCIUM SERPL-MCNC: 8.6 MG/DL (ref 8.5–10.5)
CHLORIDE SERPL-SCNC: 105 MMOL/L (ref 98–107)
CO2 SERPL-SCNC: 29 MMOL/L (ref 23–29)
CREAT SERPL-MCNC: 1.5 MG/DL (ref 0.7–1.3)
GFR SERPL CREATININE-BSD FRML MDRD: 46 ML/MIN/{1.73_M2}
GLUCOSE SERPL-MCNC: 81 MG/DL (ref 70–105)
NT-PROBNP SERPL-MCNC: 2003 PG/ML (ref 0–450)
POTASSIUM SERPL-SCNC: 4.5 MMOL/L (ref 3.5–5.1)
SODIUM SERPL-SCNC: 139 MMOL/L (ref 136–145)

## 2019-01-16 PROCEDURE — 36415 COLL VENOUS BLD VENIPUNCTURE: CPT | Performed by: NURSE PRACTITIONER

## 2019-01-16 PROCEDURE — 83880 ASSAY OF NATRIURETIC PEPTIDE: CPT | Performed by: NURSE PRACTITIONER

## 2019-01-16 PROCEDURE — 99214 OFFICE O/P EST MOD 30 MIN: CPT | Performed by: NURSE PRACTITIONER

## 2019-01-16 PROCEDURE — 80048 BASIC METABOLIC PNL TOTAL CA: CPT | Performed by: NURSE PRACTITIONER

## 2019-01-16 RX ORDER — CARVEDILOL 3.12 MG/1
6.25 TABLET ORAL 2 TIMES DAILY WITH MEALS
Qty: 360 TABLET | Refills: 3 | Status: SHIPPED | OUTPATIENT
Start: 2019-01-16 | End: 2019-08-08

## 2019-01-16 ASSESSMENT — MIFFLIN-ST. JEOR: SCORE: 1613.07

## 2019-01-16 NOTE — LETTER
1/16/2019    Dejon Downs MD  [address]  [facility] [id number]    RE: Tyrel Rene       Dear Colleague,    I had the pleasure of seeing Tyrel Rene in the [facility].    HPI and Plan:   Service Date: 01/16/2019      HISTORY OF PRESENT ILLNESS:  Mr. Rene is a delightful 73-year-old gentleman that I am having the pleasure of seeing for a 2-week followup today.  Briefly, he has a past medical history of:   1.  Permanent atrial fibrillation.   2.  CVA after flutter ablation in 2011.   3.  Severe ischemic cardiomyopathy related to a large anterior wall infarction in the past. LVEF 25%.  4.  Unstable ventricular tachyarrhythmias requiring ICD shocks in the fall of 2017.  Amiodarone initiated recurrent shock 11/2018.  Admitted with VT storm receiving 7 ICD discharges.      He is here today for a followup.  Briefly, he underwent an EP study and ablation of his anterior wall scar 2 weeks ago with Dr. Costello.      The clinical VT rate was noted to be 220 beats per minute.  EP study did induce right bundle branch block, inferior axis VT with a rate of 210-220 beats per minute, which is the same VT noted in the 11/2018 admission.  Extensive anterior wall, septal and apical scar was noted.  There was no inducible VT following the procedure.  The patient was not shocked during the case.  He remains in atrial fibrillation.  He did have some oozing from his femoral site.  He was placed on a FemoStop.  He was also noted to have a subtherapeutic INR of 1.4 and was bridged with Lovenox therapy.      The patient is here today for followup with his wife.  This site has healed nicely.  He still has occasional lightheadedness which is not new.  He states that this started after initiation with amiodarone in the past.  Blood pressure is stable at 116/74, pulse is 68 beats per minute.      A 12-lead EKG 1/11/19 showed atrial fibrillation, 100% BiV paced with occasional PVC.      All other  review of systems, past medical history and physical exam are noted below.      ASSESSMENT AND PLAN:  Mr. Rene is a delightful 73-year-old gentleman here today for followup.   1.  Ventricular tachycardia.   He has had multiple hospital admissions, the last being 11/22 with 7 appropriate shocks for VT storm.  He recently underwent EP study with VT ablation as outlined above.  His access site has healed nicely.  He will have his device checked next month prior to his visit with Dr. Costello.  He will remain on amiodarone at 200 mg daily.  He is hopeful to have the medication weaned in the future, however, is nervous about doing this.  This can be discussed further at his visit then.   2.  Chronic ischemic cardiomyopathy.  This is followed closely by Dr. Newman and myself in the [facility].  He does have a biventricular device that was upgraded in 2014.  Echo shows EF to be 25%-30%, which is stable.  He is currently on low-dose carvedilol, lisinopril and digoxin.  He is euvolemic on exam. Weight stable.  3.  Chronic atrial fibrillation.  CHADS-VASc score is 7.  He is on warfarin.  He was bridged with Lovenox last week.  His INRs have been therapeutic.  He does have a previous history of TIA following atrial flutter ablation as outlined above.   4.  Known coronary artery disease without complaints of angina.  He is status post anterior wall MI 10/2012.  He had a 3-vessel bypass with a LIMA to the LAD, vein graft to the OM1, vein graft to the OM3.  Angiogram 01/2018 showed a patent LIMA to the LAD, a patent graft to the OM1, but an occluded second vein graft.  The patient is on Crestor 20 mg daily.  He is not on aspirin due to warfarin therapy.      As always, thank you for including us in his care.  Feel free to contact us if you have questions or concerns regarding his assessment and plan.         Hayde Galvez CNP         No orders of the defined types were placed in this encounter.      No orders of the defined  types were placed in this encounter.      Medications Discontinued During This Encounter   Medication Reason     enoxaparin (LOVENOX) 80 MG/0.8ML syringe Discontinued by another Health Care Provider         Encounter Diagnosis   Name Primary?     Chronic systolic congestive heart failure (H)        CURRENT MEDICATIONS:  Current Outpatient Medications   Medication Sig Dispense Refill     amiodarone (PACERONE/CODARONE) 200 MG tablet Take 1 tablet (200 mg) by mouth daily 90 tablet 3     ASPIRIN NOT PRESCRIBED (INTENTIONAL) continuous prn for other Please choose reason not prescribed, below       carvedilol (COREG) 3.125 MG tablet Take 2 tablets (6.25 mg) by mouth 2 times daily (with meals) 360 tablet 3     digoxin (LANOXIN) 125 MCG tablet Take 1 tablet (125 mcg) by mouth daily 90 tablet 2     ipratropium (ATROVENT) 0.03 % spray Spray 2 sprays into both nostrils every 8 hours as needed for rhinitis 30 mL 11     lisinopril (PRINIVIL/ZESTRIL) 5 MG tablet Take 1 tablet (5 mg) by mouth At Bedtime 180 tablet 3     Multiple Vitamins-Minerals (PRESERVISION AREDS 2 PO) Take 1 capsule by mouth 2 times daily       NITROSTAT 0.4 MG sublingual tablet DISSOLVE 1 TABLET UNDER THE TONGUE EVERY 5 MINUTES AS NEEDED FOR CHEST PAIN 25 tablet 0     ranitidine (ZANTAC) 150 MG tablet Take 1 tablet (150 mg) by mouth 2 times daily 180 tablet      rosuvastatin (CRESTOR) 20 MG tablet Take 1 tablet (20 mg) by mouth daily 90 tablet 1     sildenafil (VIAGRA) 100 MG tablet Take 1 tablet (100 mg) by mouth daily as needed Take 30 min to 4 hours before intercourse.  Never use with nitroglycerin, terazosin or doxazosin. 16 tablet 3     Vitamin D, Cholecalciferol, 1000 UNITS TABS Take 1,000 mg by mouth daily        warfarin (COUMADIN) 2.5 MG tablet Take by mouth daily 2.5 mg mon, thur and 1.25 mg row         ALLERGIES     Allergies   Allergen Reactions     Nystatin Other (See Comments)     Make his mouth numb & swelling       PAST MEDICAL HISTORY:  Past  Medical History:   Diagnosis Date     Atrial fibrillation (H)     s/p Cardioversion 3/14/2013     Atrial flutter (H)     S/p Aflutter ablation 1/11/2011     CAD (coronary artery disease)     CABG x3 10/2012- LIMA to distal LAD, SVG to OM1 & OM3; cath 10/2012- PTCA to second diagonal and mid LAD, BMS to mid LAD     Cardiogenic shock (H)      Cardiomyopathy (H)      CHF (congestive heart failure) (H)      CVA (cerebral infarction)     residual right hand numbness     ED (erectile dysfunction)      Hyperlipidemia      Hypertension      Neuropathy      Palpitations      SVT (supraventricular tachycardia) (H)     S/p dual chamber ICD 10/11/12- upgraded to BIV ICD 6/2013     Syncope        PAST SURGICAL HISTORY:  Past Surgical History:   Procedure Laterality Date     BYPASS GRAFT ARTERY CORONARY  10/2/2012    Procedure: BYPASS GRAFT ARTERY CORONARY;  Coronary Artery Bypass Graft x3 (LAD, Diag, OM) with Endovein [place] (On-Pump);  Surgeon: Yeyo Lyman MD;  Location:  OR     CARDIOVERSION  3/14/2013     CORONARY ANGIOGRAPHY ADULT ORDER  10/2/12     CORONARY ARTERY BYPASS  10/2/12    LIMA to LAD, SVG to OM1 and OM3     EP ABLATION VT N/A 1/2/2019    Procedure: EP Ablation VT;  Surgeon: Suellen Costello MD;  Location:  HE[facility] C[facility] LAB     EP COMPREHENSIVE EP STUDY N/A 1/2/2019    Procedure: EP Comprehensive EP Study;  Surgeon: Suellen Costello MD;  Location:  HE[facility] CATH LAB     H ABLATION ATRIAL FLUTTER  1/11/2011     HAND SURGERY       HEART CATH, ANGIOPLASTY  10/2/12    PTCA to second diagonal and mid LAD, BMS to mid LAD     HERNIA REPAIR       ORTHOPEDIC SURGERY Right 2006    cut on table saw     RELOCATE GENERATOR ICD/PACEMAKER         FAMILY HISTORY:  Family History   Problem Relation Age of Onset     Alcohol/Drug Father      Heart Disease Father 71     Alzheimer Disease Sister      Alcohol/Drug Sister      Alcohol/Drug Sister      Gastrointestinal Disease Sister       Obesity Sister      Hypertension Sister      Heart Disease Sister      Hypertension Sister      Heart Disease Daughter         afib     Arrhythmia Daughter      Multiple Sclerosis Daughter      Heart Disease Daughter         afib     Arrhythmia Daughter      Cancer Daughter         lymphoma     Aneurysm Mother        SOCIAL HISTORY:  Social History     Socioeconomic History     Marital status:      Spouse name: None     Number of children: None     Years of education: None     Highest education level: None   Social Needs     Financial resource strain: None     Food insecurity - worry: None     Food insecurity - inability: None     Transportation needs - medical: None     Transportation needs - non-medical: None   Occupational History     None   Tobacco Use     Smoking status: Former Smoker     Packs/day: 1.00     Years: 14.00     Pack years: 14.00     Types: Cigarettes     Last attempt to quit: 7/10/1972     Years since quittin.5     Smokeless tobacco: Never Used   Substance and Sexual Activity     Alcohol use: No     Drug use: No     Sexual activity: Yes     Partners: Female   Other Topics Concern     Parent/sibling w/ CABG, MI or angioplasty before 65F 55M? No      Service Not Asked     Blood Transfusions Not Asked     Caffeine Concern Yes     Comment: 4 cups coffee daily     Occupational Exposure Not Asked     Hobby Hazards Not Asked     Sleep Concern No     Stress Concern No     Weight Concern Yes     Comment: gain 2lbs     Special Diet Yes     Comment: low sodium     Back Care Not Asked     Exercise Yes     Comment: walking, doing sittups, played pickle ball in summer     Bike Helmet Not Asked     Seat Belt Yes     Self-Exams Not Asked   Social History Narrative     None       Review of Systems:  Skin:  Negative       Eyes:  Positive for glasses    ENT:  Negative      Respiratory:  Negative       Cardiovascular:  Negative   Feels the dizziness is worse since the  "ablation  Gastroenterology: Negative      Genitourinary:  Positive for nocturia new diarrhea occasionally, maybe once a week- new in the last 3-4 months   Musculoskeletal:  Negative      Neurologic:  Positive for stroke history of stroke in 2011, head injury in 2014, right hand has issues with feeling/identification  Psychiatric:  Negative      Heme/Lymph/Imm:  Negative      Endocrine:  Negative        Physical Exam:  Vitals: /74   Pulse 68   Ht 1.854 m (6' 1\")   Wt 82.4 kg (181 lb 11.2 oz)   SpO2 97%   BMI 23.97 kg/m       Constitutional:  cooperative, alert and oriented, well developed, well nourished, in no acute distress        Skin:  warm and dry to the touch, no apparent skin lesions or masses noted   ICD incision in the left infraclavicular area was well-healed      Head:  normocephalic, no masses or lesions        Eyes:  pupils equal and round;conjunctivae and lids unremarkable;sclera white;no xanthalasma        Lymph:No Cervical lymphadenopathy present     ENT:  no pallor or cyanosis        Neck:  JVP normal        Respiratory:  clear to auscultation;healed median sternotomy scar         Cardiac: regular rhythm;no murmurs, gallops or rubs detected                pulses full and equal                                        GI:  abdomen soft;BS normoactive        Extremities and Muscular Skeletal:  no edema              Neurological:  no gross motor deficits;affect appropriate        Psych:  Alert and Oriented x 3;affect appropriate, oriented to time, person and place;oriented to time, person and place        CC  JAMAL Flores [facility]  [address]  [place] [address]                Thank you for allowing me to participate in the care of your patient.      Sincerely,     JAMAL Christie [facility]    cc:   JAMAL Flores [facility]  [address]  [address]  [place] [address]        "

## 2019-01-16 NOTE — PATIENT INSTRUCTIONS
Call [facility] nurse for any questions or concerns:  [phone]   *If you have concerns after hours, please call [phone], option 2 to speak with on call Cardiologist.    1. Medication changes from today:  No changes     2. Weigh yourself daily and write it down.     3. Call [facility] nurse if your weight is up more than 2 pounds in one day or 5 pounds in one week.     4. Call [facility] nurse if you feel more short of breath, have more abdominal bloating, or leg swelling.     5. Continue low sodium diet (less than 2000 mg daily). If you eat less salt, you will retain less fluid.     6. Alcohol can weaken your heart further. You should avoid alcohol or limit its use to special times, such as a holiday or birthday.      7. Do NOT take Aleve or ibuprofen without talking to your doctor first.      8. Lab Results: **     Component      Latest Ref Rng & Units 1/3/2019 1/16/2019   Sodium      136 - 145 mmol/L 136 139   Potassium      3.5 - 5.1 mmol/L 4.3 4.5   Chloride      98 - 107 mmol/L 104 105   Carbon Dioxide      23 - 29 mmol/L 23 29   Anion Gap      6 - 17 mmol/L 9 9.5   Glucose      70 - 105 mg/dL 94 81   Urea Nitrogen      7 - 30 mg/dL 25 17   Creatinine      0.70 - 1.30 mg/dL 1.56 (H) 1.50 (H)   GFR Estimate      >60 mL/min/1.73:m2 43 (L) 46 (L)   GFR Estimate If Black      >60 mL/min/1.73:m2 50 (L) 55 (L)   Calcium      8.5 - 10.5 mg/dL 8.0 (L) 8.6     [facility]: Cardiomyopathy, Optimization, Rehabilitation, Education  The [facility] is a heart failure specialty clinic within the [facility] where you will work with specialized nurse practitioners, physician assistants, doctors, and registered nurses. They are dedicated to helping patients with heart failure to carefully adjust medications, receive education, and learn who and when to call if symptoms develop. They specialize in helping you better understand your condition, slow the progression of your disease, improve the length and quality of your life,  help you detect future heart problems before they become life threatening, and avoid hospitalizations.

## 2019-01-16 NOTE — PROGRESS NOTES
HPI and Plan:   Service Date: 01/16/2019      HISTORY OF PRESENT ILLNESS:  Mr. Rene is a delightful 73-year-old gentleman that I am having the pleasure of seeing for a 2-week followup today.  Briefly, he has a past medical history of:   1.  Permanent atrial fibrillation.   2.  CVA after flutter ablation in 2011.   3.  Severe ischemic cardiomyopathy related to a large anterior wall infarction in the past. LVEF 25%.  4.  Unstable ventricular tachyarrhythmias requiring ICD shocks in the fall of 2017.  Amiodarone initiated recurrent shock 11/2018.  Admitted with VT storm receiving 7 ICD discharges.      He is here today for a followup.  Briefly, he underwent an EP study and ablation of his anterior wall scar 2 weeks ago with Dr. Costello.      The clinical VT rate was noted to be 220 beats per minute.  EP study did induce right bundle branch block, inferior axis VT with a rate of 210-220 beats per minute, which is the same VT noted in the 11/2018 admission.  Extensive anterior wall, septal and apical scar was noted.  There was no inducible VT following the procedure.  The patient was not shocked during the case.  He remains in atrial fibrillation.  He did have some oozing from his femoral site.  He was placed on a FemoStop.  He was also noted to have a subtherapeutic INR of 1.4 and was bridged with Lovenox therapy.      The patient is here today for followup with his wife.  This site has healed nicely.  He still has occasional lightheadedness which is not new.  He states that this started after initiation with amiodarone in the past.  Blood pressure is stable at 116/74, pulse is 68 beats per minute.      A 12-lead EKG 1/11/19 showed atrial fibrillation, 100% BiV paced with occasional PVC.      All other review of systems, past medical history and physical exam are noted below.      ASSESSMENT AND PLAN:  Mr. Rene is a delightful 73-year-old gentleman here today for followup.   1.  Ventricular tachycardia.  He has had  multiple hospital admissions, the last being 11/22 with 7 appropriate shocks for VT storm.  He recently underwent EP study with VT ablation as outlined above.  His access site has healed nicely.  He will have his device checked next month prior to his visit with Dr. Costello.  He will remain on amiodarone at 200 mg daily.  He is hopeful to have the medication weaned in the future, however, is nervous about doing this.  This can be discussed further at his visit then.   2.  Chronic ischemic cardiomyopathy.  This is followed closely by Dr. Newman and [facility].  He does have a biventricular device that was upgraded in 2014.  Echo shows EF to be 25%-30%, which is stable.  He is currently on low-dose carvedilol, lisinopril and digoxin.  He is euvolemic on exam. Weight stable.  3.  Chronic atrial fibrillation.  CHADS-VASc score is 7.  He is on warfarin.  He was bridged with Lovenox last week.  His INRs have been therapeutic.  He does have a previous history of TIA following atrial flutter ablation as outlined above.   4.  Known coronary artery disease without complaints of angina.  He is status post anterior wall MI 10/2012.  He had a 3-vessel bypass with a LIMA to the LAD, vein graft to the OM1, vein graft to the OM3.  Angiogram 01/2018 showed a patent LIMA to the LAD, a patent graft to the OM1, but an occluded second vein graft.  The patient is on Crestor 20 mg daily.  He is not on aspirin due to warfarin therapy.      As always, thank you for including us in his care.  Feel free to contact us if you have questions or concerns regarding his assessment and plan.         Hayde Galvez CNP         No orders of the defined types were placed in this encounter.      No orders of the defined types were placed in this encounter.      Medications Discontinued During This Encounter   Medication Reason     enoxaparin (LOVENOX) 80 MG/0.8ML syringe Discontinued by another Health Care Provider         Encounter  Diagnosis   Name Primary?     Chronic systolic congestive heart failure (H)        CURRENT MEDICATIONS:  Current Outpatient Medications   Medication Sig Dispense Refill     amiodarone (PACERONE/CODARONE) 200 MG tablet Take 1 tablet (200 mg) by mouth daily 90 tablet 3     ASPIRIN NOT PRESCRIBED (INTENTIONAL) continuous prn for other Please choose reason not prescribed, below       carvedilol (COREG) 3.125 MG tablet Take 2 tablets (6.25 mg) by mouth 2 times daily (with meals) 360 tablet 3     digoxin (LANOXIN) 125 MCG tablet Take 1 tablet (125 mcg) by mouth daily 90 tablet 2     ipratropium (ATROVENT) 0.03 % spray Spray 2 sprays into both nostrils every 8 hours as needed for rhinitis 30 mL 11     lisinopril (PRINIVIL/ZESTRIL) 5 MG tablet Take 1 tablet (5 mg) by mouth At Bedtime 180 tablet 3     Multiple Vitamins-Minerals (PRESERVISION AREDS 2 PO) Take 1 capsule by mouth 2 times daily       NITROSTAT 0.4 MG sublingual tablet DISSOLVE 1 TABLET UNDER THE TONGUE EVERY 5 MINUTES AS NEEDED FOR CHEST PAIN 25 tablet 0     ranitidine (ZANTAC) 150 MG tablet Take 1 tablet (150 mg) by mouth 2 times daily 180 tablet      rosuvastatin (CRESTOR) 20 MG tablet Take 1 tablet (20 mg) by mouth daily 90 tablet 1     sildenafil (VIAGRA) 100 MG tablet Take 1 tablet (100 mg) by mouth daily as needed Take 30 min to 4 hours before intercourse.  Never use with nitroglycerin, terazosin or doxazosin. 16 tablet 3     Vitamin D, Cholecalciferol, 1000 UNITS TABS Take 1,000 mg by mouth daily        warfarin (COUMADIN) 2.5 MG tablet Take by mouth daily 2.5 mg mon, thur and 1.25 mg row         ALLERGIES     Allergies   Allergen Reactions     Nystatin Other (See Comments)     Make his mouth numb & swelling       PAST MEDICAL HISTORY:  Past Medical History:   Diagnosis Date     Atrial fibrillation (H)     s/p Cardioversion 3/14/2013     Atrial flutter (H)     S/p Aflutter ablation 1/11/2011     CAD (coronary artery disease)     CABG x3 10/2012- LIMA to  distal LAD, SVG to OM1 & OM3; cath 10/2012- PTCA to second diagonal and mid LAD, BMS to mid LAD     Cardiogenic shock (H)      Cardiomyopathy (H)      CHF (congestive heart failure) (H)      CVA (cerebral infarction)     residual right hand numbness     ED (erectile dysfunction)      Hyperlipidemia      Hypertension      Neuropathy      Palpitations      SVT (supraventricular tachycardia) (H)     S/p dual chamber ICD 10/11/12- upgraded to BIV ICD 6/2013     Syncope        PAST SURGICAL HISTORY:  Past Surgical History:   Procedure Laterality Date     BYPASS GRAFT ARTERY CORONARY  10/2/2012    Procedure: BYPASS GRAFT ARTERY CORONARY;  Coronary Artery Bypass Graft x3 (LAD, Diag, OM) with Endovein [place]away (On-Pump);  Surgeon: Yeyo Lyman MD;  Location:  OR     CARDIOVERSION  3/14/2013     CORONARY ANGIOGRAPHY ADULT ORDER  10/2/12     CORONARY ARTERY BYPASS  10/2/12    LIMA to LAD, SVG to OM1 and OM3     EP ABLATION VT N/A 1/2/2019    Procedure: EP Ablation VT;  Surgeon: Suellen Costello MD;  Location:  [facility]     EP COMPREHENSIVE EP STUDY N/A 1/2/2019    Procedure: EP Comprehensive EP Study;  Surgeon: Suellen Costello MD;  Location:  [facility]     H ABLATION ATRIAL FLUTTER  1/11/2011     HAND SURGERY       HEART CATH, ANGIOPLASTY  10/2/12    PTCA to second diagonal and mid LAD, BMS to mid LAD     HERNIA REPAIR       ORTHOPEDIC SURGERY Right 2006    cut on table saw     RELOCATE GENERATOR ICD/PACEMAKER         FAMILY HISTORY:  Family History   Problem Relation Age of Onset     Alcohol/Drug Father      Heart Disease Father 71     Alzheimer Disease Sister      Alcohol/Drug Sister      Alcohol/Drug Sister      Gastrointestinal Disease Sister      Obesity Sister      Hypertension Sister      Heart Disease Sister      Hypertension Sister      Heart Disease Daughter         afib     Arrhythmia Daughter      Multiple Sclerosis Daughter      Heart Disease  Daughter         afib     Arrhythmia Daughter      Cancer Daughter         lymphoma     Aneurysm Mother        SOCIAL HISTORY:  Social History     Socioeconomic History     Marital status:      Spouse name: None     Number of children: None     Years of education: None     Highest education level: None   Social Needs     Financial resource strain: None     Food insecurity - worry: None     Food insecurity - inability: None     Transportation needs - medical: None     Transportation needs - non-medical: None   Occupational History     None   Tobacco Use     Smoking status: Former Smoker     Packs/day: 1.00     Years: 14.00     Pack years: 14.00     Types: Cigarettes     Last attempt to quit: 7/10/1972     Years since quittin.5     Smokeless tobacco: Never Used   Substance and Sexual Activity     Alcohol use: No     Drug use: No     Sexual activity: Yes     Partners: Female   Other Topics Concern     Parent/sibling w/ CABG, MI or angioplasty before 65F 55M? No      Service Not Asked     Blood Transfusions Not Asked     Caffeine Concern Yes     Comment: 4 cups coffee daily     Occupational Exposure Not Asked     Hobby Hazards Not Asked     Sleep Concern No     Stress Concern No     Weight Concern Yes     Comment: gain 2lbs     Special Diet Yes     Comment: low sodium     Back Care Not Asked     Exercise Yes     Comment: walking, doing sittups, played OptiNose ball in summer     Bike Helmet Not Asked     Seat Belt Yes     Self-Exams Not Asked   Social History Narrative     None       Review of Systems:  Skin:  Negative       Eyes:  Positive for glasses    ENT:  Negative      Respiratory:  Negative       Cardiovascular:  Negative   Feels the dizziness is worse since the ablation  Gastroenterology: Negative      Genitourinary:  Positive for nocturia new diarrhea occasionally, maybe once a week- new in the last 3-4 months   Musculoskeletal:  Negative      Neurologic:  Positive for stroke history of stroke  "in 2011, head injury in 2014, right hand has issues with feeling/identification  Psychiatric:  Negative      Heme/Lymph/Imm:  Negative      Endocrine:  Negative        Physical Exam:  Vitals: /74   Pulse 68   Ht 1.854 m (6' 1\")   Wt 82.4 kg (181 lb 11.2 oz)   SpO2 97%   BMI 23.97 kg/m      Constitutional:  cooperative, alert and oriented, well developed, well nourished, in no acute distress        Skin:  warm and dry to the touch, no apparent skin lesions or masses noted   ICD incision in the left infraclavicular area was well-healed      Head:  normocephalic, no masses or lesions        Eyes:  pupils equal and round;conjunctivae and lids unremarkable;sclera white;no xanthalasma        Lymph:No Cervical lymphadenopathy present     ENT:  no pallor or cyanosis        Neck:  JVP normal        Respiratory:  clear to auscultation;healed median sternotomy scar         Cardiac: regular rhythm;no murmurs, gallops or rubs detected                pulses full and equal                                        GI:  abdomen soft;BS normoactive        Extremities and Muscular Skeletal:  no edema              Neurological:  no gross motor deficits;affect appropriate        Psych:  Alert and Oriented x 3;affect appropriate, oriented to time, person and place;oriented to time, person and place        MARÍA ESPINOZA Mannkyle, APRN CNP  [address]  [id number]  [place] [id number]              "

## 2019-01-16 NOTE — LETTER
1/16/2019    Dejon Downs MD  [address]  [facility] [address]    RE: Tyrel Rene       Dear Colleague,    I had the pleasure of seeing Tyrel Rene in the [facility].    HPI and Plan:   Service Date: 01/16/2019      HISTORY OF PRESENT ILLNESS:  Mr. Rene is a delightful 73-year-old gentleman that I am having the pleasure of seeing for a 2-week followup today.  Briefly, he has a past medical history of:   1.  Permanent atrial fibrillation.   2.  CVA after flutter ablation in 2011.   3.  Severe ischemic cardiomyopathy related to a large anterior wall infarction in the past. LVEF 25%.  4.  Unstable ventricular tachyarrhythmias requiring ICD shocks in the fall of 2017.  Amiodarone initiated recurrent shock 11/2018.  Admitted with VT storm receiving 7 ICD discharges.      He is here today for a followup.  Briefly, he underwent an EP study and ablation of his anterior wall scar 2 weeks ago with Dr. Costello.      The clinical VT rate was noted to be 220 beats per minute.  EP study did induce right bundle branch block, inferior axis VT with a rate of 210-220 beats per minute, which is the same VT noted in the 11/2018 admission.  Extensive anterior wall, septal and apical scar was noted.  There was no inducible VT following the procedure.  The patient was not shocked during the case.  He remains in atrial fibrillation.  He did have some oozing from his femoral site.  He was placed on a FemoStop.  He was also noted to have a subtherapeutic INR of 1.4 and was bridged with Lovenox therapy.      The patient is here today for followup with his wife.  This site has healed nicely.  He still has occasional lightheadedness which is not new.  He states that this started after initiation with amiodarone in the past.  Blood pressure is stable at 116/74, pulse is 68 beats per minute.      A 12-lead EKG 1/11/19 showed atrial fibrillation, 100% BiV paced with occasional PVC.      All other  review of systems, past medical history and physical exam are noted below.      ASSESSMENT AND PLAN:  Mr. Rene is a delightful 73-year-old gentleman here today for followup.   1.  Ventricular tachycardia.   He has had multiple hospital admissions, the last being 11/22 with 7 appropriate shocks for VT storm.  He recently underwent EP study with VT ablation as outlined above.  His access site has healed nicely.  He will have his device checked next month prior to his visit with Dr. Costello.  He will remain on amiodarone at 200 mg daily.  He is hopeful to have the medication weaned in the future, however, is nervous about doing this.  This can be discussed further at his visit then.   2.  Chronic ischemic cardiomyopathy.  This is followed closely by Dr. Newman and myself in [facility].  He does have a biventricular device that was upgraded in 2014.  Echo shows EF to be 25%-30%, which is stable.  He is currently on low-dose carvedilol, lisinopril and digoxin.  He is euvolemic on exam. Weight stable.  3.  Chronic atrial fibrillation.  CHADS-VASc score is 7.  He is on warfarin.  He was bridged with Lovenox last week.  His INRs have been therapeutic.  He does have a previous history of TIA following atrial flutter ablation as outlined above.   4.  Known coronary artery disease without complaints of angina.  He is status post anterior wall MI 10/2012.  He had a 3-vessel bypass with a LIMA to the LAD, vein graft to the OM1, vein graft to the OM3.  Angiogram 01/2018 showed a patent LIMA to the LAD, a patent graft to the OM1, but an occluded second vein graft.  The patient is on Crestor 20 mg daily.  He is not on aspirin due to warfarin therapy.      As always, thank you for including us in his care.  Feel free to contact us if you have questions or concerns regarding his assessment and plan.         Hayde Galvez CNP         No orders of the defined types were placed in this encounter.      No orders of the defined  types were placed in this encounter.      Medications Discontinued During This Encounter   Medication Reason     enoxaparin (LOVENOX) 80 MG/0.8ML syringe Discontinued by another Health Care Provider         Encounter Diagnosis   Name Primary?     Chronic systolic congestive heart failure (H)        CURRENT MEDICATIONS:  Current Outpatient Medications   Medication Sig Dispense Refill     amiodarone (PACERONE/CODARONE) 200 MG tablet Take 1 tablet (200 mg) by mouth daily 90 tablet 3     ASPIRIN NOT PRESCRIBED (INTENTIONAL) continuous prn for other Please choose reason not prescribed, below       carvedilol (COREG) 3.125 MG tablet Take 2 tablets (6.25 mg) by mouth 2 times daily (with meals) 360 tablet 3     digoxin (LANOXIN) 125 MCG tablet Take 1 tablet (125 mcg) by mouth daily 90 tablet 2     ipratropium (ATROVENT) 0.03 % spray Spray 2 sprays into both nostrils every 8 hours as needed for rhinitis 30 mL 11     lisinopril (PRINIVIL/ZESTRIL) 5 MG tablet Take 1 tablet (5 mg) by mouth At Bedtime 180 tablet 3     Multiple Vitamins-Minerals (PRESERVISION AREDS 2 PO) Take 1 capsule by mouth 2 times daily       NITROSTAT 0.4 MG sublingual tablet DISSOLVE 1 TABLET UNDER THE TONGUE EVERY 5 MINUTES AS NEEDED FOR CHEST PAIN 25 tablet 0     ranitidine (ZANTAC) 150 MG tablet Take 1 tablet (150 mg) by mouth 2 times daily 180 tablet      rosuvastatin (CRESTOR) 20 MG tablet Take 1 tablet (20 mg) by mouth daily 90 tablet 1     sildenafil (VIAGRA) 100 MG tablet Take 1 tablet (100 mg) by mouth daily as needed Take 30 min to 4 hours before intercourse.  Never use with nitroglycerin, terazosin or doxazosin. 16 tablet 3     Vitamin D, Cholecalciferol, 1000 UNITS TABS Take 1,000 mg by mouth daily        warfarin (COUMADIN) 2.5 MG tablet Take by mouth daily 2.5 mg mon, thur and 1.25 mg row         ALLERGIES     Allergies   Allergen Reactions     Nystatin Other (See Comments)     Make his mouth numb & swelling       PAST MEDICAL HISTORY:  Past  Medical History:   Diagnosis Date     Atrial fibrillation (H)     s/p Cardioversion 3/14/2013     Atrial flutter (H)     S/p Aflutter ablation 1/11/2011     CAD (coronary artery disease)     CABG x3 10/2012- LIMA to distal LAD, SVG to OM1 & OM3; cath 10/2012- PTCA to second diagonal and mid LAD, BMS to mid LAD     Cardiogenic shock (H)      Cardiomyopathy (H)      CHF (congestive heart failure) (H)      CVA (cerebral infarction)     residual right hand numbness     ED (erectile dysfunction)      Hyperlipidemia      Hypertension      Neuropathy      Palpitations      SVT (supraventricular tachycardia) (H)     S/p dual chamber ICD 10/11/12- upgraded to BIV ICD 6/2013     Syncope        PAST SURGICAL HISTORY:  Past Surgical History:   Procedure Laterality Date     BYPASS GRAFT ARTERY CORONARY  10/2/2012    Procedure: BYPASS GRAFT ARTERY CORONARY;  Coronary Artery Bypass Graft x3 (LAD, Diag, OM) with Endovein [place] (On-Pump);  Surgeon: Yeyo Lyman MD;  Location:  OR     CARDIOVERSION  3/14/2013     CORONARY ANGIOGRAPHY ADULT ORDER  10/2/12     CORONARY ARTERY BYPASS  10/2/12    LIMA to LAD, SVG to OM1 and OM3     EP ABLATION VT N/A 1/2/2019    Procedure: EP Ablation VT;  Surgeon: Suellen Costello MD;  Location:  HEART CARDIAC CATH LAB     EP COMPREHENSIVE EP STUDY N/A 1/2/2019    Procedure: EP Comprehensive EP Study;  Surgeon: Suellen Costello MD;  Location:  HEART CARDIAC CATH LAB     H ABLATION ATRIAL FLUTTER  1/11/2011     HAND SURGERY       HEART CATH, ANGIOPLASTY  10/2/12    PTCA to second diagonal and mid LAD, BMS to mid LAD     HERNIA REPAIR       ORTHOPEDIC SURGERY Right 2006    cut on table saw     RELOCATE GENERATOR ICD/PACEMAKER         FAMILY HISTORY:  Family History   Problem Relation Age of Onset     Alcohol/Drug Father      Heart Disease Father 71     Alzheimer Disease Sister      Alcohol/Drug Sister      Alcohol/Drug Sister      Gastrointestinal Disease Sister       Obesity Sister      Hypertension Sister      Heart Disease Sister      Hypertension Sister      Heart Disease Daughter         afib     Arrhythmia Daughter      Multiple Sclerosis Daughter      Heart Disease Daughter         afib     Arrhythmia Daughter      Cancer Daughter         lymphoma     Aneurysm Mother        SOCIAL HISTORY:  Social History     Socioeconomic History     Marital status:      Spouse name: None     Number of children: None     Years of education: None     Highest education level: None   Social Needs     Financial resource strain: None     Food insecurity - worry: None     Food insecurity - inability: None     Transportation needs - medical: None     Transportation needs - non-medical: None   Occupational History     None   Tobacco Use     Smoking status: Former Smoker     Packs/day: 1.00     Years: 14.00     Pack years: 14.00     Types: Cigarettes     Last attempt to quit: 7/10/1972     Years since quittin.5     Smokeless tobacco: Never Used   Substance and Sexual Activity     Alcohol use: No     Drug use: No     Sexual activity: Yes     Partners: Female   Other Topics Concern     Parent/sibling w/ CABG, MI or angioplasty before 65F 55M? No      Service Not Asked     Blood Transfusions Not Asked     Caffeine Concern Yes     Comment: 4 cups coffee daily     Occupational Exposure Not Asked     Hobby Hazards Not Asked     Sleep Concern No     Stress Concern No     Weight Concern Yes     Comment: gain 2lbs     Special Diet Yes     Comment: low sodium     Back Care Not Asked     Exercise Yes     Comment: walking, doing sittups, played pickle ball in summer     Bike Helmet Not Asked     Seat Belt Yes     Self-Exams Not Asked   Social History Narrative     None       Review of Systems:  Skin:  Negative       Eyes:  Positive for glasses    ENT:  Negative      Respiratory:  Negative       Cardiovascular:  Negative   Feels the dizziness is worse since the  "ablation  Gastroenterology: Negative      Genitourinary:  Positive for nocturia new diarrhea occasionally, maybe once a week- new in the last 3-4 months   Musculoskeletal:  Negative      Neurologic:  Positive for stroke history of stroke in 2011, head injury in 2014, right hand has issues with feeling/identification  Psychiatric:  Negative      Heme/Lymph/Imm:  Negative      Endocrine:  Negative        Physical Exam:  Vitals: /74   Pulse 68   Ht 1.854 m (6' 1\")   Wt 82.4 kg (181 lb 11.2 oz)   SpO2 97%   BMI 23.97 kg/m       Constitutional:  cooperative, alert and oriented, well developed, well nourished, in no acute distress        Skin:  warm and dry to the touch, no apparent skin lesions or masses noted   ICD incision in the left infraclavicular area was well-healed      Head:  normocephalic, no masses or lesions        Eyes:  pupils equal and round;conjunctivae and lids unremarkable;sclera white;no xanthalasma        Lymph:No Cervical lymphadenopathy present     ENT:  no pallor or cyanosis        Neck:  JVP normal        Respiratory:  clear to auscultation;healed median sternotomy scar         Cardiac: regular rhythm;no murmurs, gallops or rubs detected                pulses full and equal                                        GI:  abdomen soft;BS normoactive        Extremities and Muscular Skeletal:  no edema              Neurological:  no gross motor deficits;affect appropriate        Psych:  Alert and Oriented x 3;affect appropriate, oriented to time, person and place;oriented to time, person and place          Thank you for allowing me to participate in the care of your patient.    Sincerely,     JAMAL Christie [facility]    "

## 2019-01-25 ENCOUNTER — ANTICOAGULATION THERAPY VISIT (OUTPATIENT)
Dept: NURSING | Facility: CLINIC | Age: 74
End: 2019-01-25
Payer: COMMERCIAL

## 2019-01-25 DIAGNOSIS — I63.9 CEREBRAL INFARCTION (H): ICD-10-CM

## 2019-01-25 DIAGNOSIS — I63.50 CEREBRAL ARTERY OCCLUSION WITH CEREBRAL INFARCTION (H): ICD-10-CM

## 2019-01-25 LAB — INR POINT OF CARE: 2.4 (ref 0.86–1.14)

## 2019-01-25 PROCEDURE — 36416 COLLJ CAPILLARY BLOOD SPEC: CPT

## 2019-01-25 PROCEDURE — 85610 PROTHROMBIN TIME: CPT | Mod: QW

## 2019-01-25 PROCEDURE — 99207 ZZC NO CHARGE NURSE ONLY: CPT

## 2019-01-25 NOTE — PROGRESS NOTES
ANTICOAGULATION FOLLOW-UP CLINIC VISIT    Patient Name:  Tyrel Rene  Date:  2019  Contact Type:  Face to Face    SUBJECTIVE:     Patient Findings     Positives:   No Problem Findings           OBJECTIVE    INR Protime   Date Value Ref Range Status   2019 2.4 (A) 0.86 - 1.14 Final       ASSESSMENT / PLAN  INR assessment THER    Recheck INR In: 3 WEEKS    INR Location Clinic      Anticoagulation Summary  As of 2019    INR goal:   2.0-2.5   TTR:   44.9 % (2.8 y)   INR used for dosin.4 (2019)   Warfarin maintenance plan:   2.5 mg (2.5 mg x 1) every Mon, Thu; 1.25 mg (2.5 mg x 0.5) all other days   Full warfarin instructions:   2.5 mg every Mon, Thu; 1.25 mg all other days   Weekly warfarin total:   11.25 mg   Plan last modified:   Doreen Finley RN (2018)   Next INR check:   2/15/2019   Target end date:   Indefinite    Indications    Long-term (current) use of anticoagulants [Z79.01] [Z79.01]  Cerebral artery occlusion with cerebral infarction (HCC) [I63.50] [I63.50]  Cerebral infarction (H) [I63.9]             Anticoagulation Episode Summary     INR check location:   [facility]    Preferred lab:       Send INR reminders to:   [facility] INR/PROTIME    Comments:         Anticoagulation Care Providers     Provider Role Specialty Phone number    Dejon Downs MD [facility] [phone]            See the Encounter Report to view Anticoagulation Flowsheet and Dosing Calendar (Go to Encounters tab in chart review, and find the Anticoagulation Therapy Visit)        Doreen Finley RN

## 2019-02-04 ENCOUNTER — ANCILLARY PROCEDURE (OUTPATIENT)
Dept: CARDIOLOGY | Facility: CLINIC | Age: 74
End: 2019-02-04
Payer: COMMERCIAL

## 2019-02-04 DIAGNOSIS — I47.20 VT (VENTRICULAR TACHYCARDIA) (H): ICD-10-CM

## 2019-02-05 ENCOUNTER — ANCILLARY PROCEDURE (OUTPATIENT)
Dept: CARDIOLOGY | Facility: CLINIC | Age: 74
End: 2019-02-05
Payer: COMMERCIAL

## 2019-02-05 ENCOUNTER — OFFICE VISIT (OUTPATIENT)
Dept: CARDIOLOGY | Facility: CLINIC | Age: 74
End: 2019-02-05
Attending: PHYSICIAN ASSISTANT
Payer: COMMERCIAL

## 2019-02-05 VITALS
WEIGHT: 183 LBS | HEIGHT: 73 IN | SYSTOLIC BLOOD PRESSURE: 116 MMHG | HEART RATE: 65 BPM | DIASTOLIC BLOOD PRESSURE: 76 MMHG | BODY MASS INDEX: 24.25 KG/M2

## 2019-02-05 DIAGNOSIS — Z95.810 ICD (IMPLANTABLE CARDIOVERTER-DEFIBRILLATOR) IN PLACE: ICD-10-CM

## 2019-02-05 DIAGNOSIS — Z79.899 ON AMIODARONE THERAPY: ICD-10-CM

## 2019-02-05 DIAGNOSIS — I48.20 CHRONIC ATRIAL FIBRILLATION (H): ICD-10-CM

## 2019-02-05 DIAGNOSIS — I47.20 VENTRICULAR TACHYCARDIA (H): Primary | ICD-10-CM

## 2019-02-05 PROCEDURE — 93000 ELECTROCARDIOGRAM COMPLETE: CPT | Performed by: INTERNAL MEDICINE

## 2019-02-05 PROCEDURE — 93284 PRGRMG EVAL IMPLANTABLE DFB: CPT | Mod: 26 | Performed by: INTERNAL MEDICINE

## 2019-02-05 PROCEDURE — 99214 OFFICE O/P EST MOD 30 MIN: CPT | Mod: 25 | Performed by: INTERNAL MEDICINE

## 2019-02-05 ASSESSMENT — MIFFLIN-ST. JEOR: SCORE: 1618.96

## 2019-02-05 NOTE — PROGRESS NOTES
HPI and Plan:   See dictation    Orders Placed This Encounter   Procedures     Follow-Up with Cardiac Advanced Practice Provider     EKG 12-lead complete w/read - Clinics (performed today)       No orders of the defined types were placed in this encounter.      There are no discontinued medications.      Encounter Diagnoses   Name Primary?     Ventricular tachycardia (H)      Chronic atrial fibrillation (H) Yes     On amiodarone therapy        CURRENT MEDICATIONS:  Current Outpatient Medications   Medication Sig Dispense Refill     amiodarone (PACERONE/CODARONE) 200 MG tablet Take 1 tablet (200 mg) by mouth daily 90 tablet 3     carvedilol (COREG) 3.125 MG tablet Take 2 tablets (6.25 mg) by mouth 2 times daily (with meals) 360 tablet 3     digoxin (LANOXIN) 125 MCG tablet Take 1 tablet (125 mcg) by mouth daily 90 tablet 2     ipratropium (ATROVENT) 0.03 % spray Spray 2 sprays into both nostrils every 8 hours as needed for rhinitis 30 mL 11     lisinopril (PRINIVIL/ZESTRIL) 5 MG tablet Take 1 tablet (5 mg) by mouth At Bedtime 180 tablet 3     Multiple Vitamins-Minerals (PRESERVISION AREDS 2 PO) Take 1 capsule by mouth 2 times daily       NITROSTAT 0.4 MG sublingual tablet DISSOLVE 1 TABLET UNDER THE TONGUE EVERY 5 MINUTES AS NEEDED FOR CHEST PAIN 25 tablet 0     ranitidine (ZANTAC) 150 MG tablet Take 1 tablet (150 mg) by mouth 2 times daily 180 tablet      rosuvastatin (CRESTOR) 20 MG tablet Take 1 tablet (20 mg) by mouth daily 90 tablet 1     sildenafil (VIAGRA) 100 MG tablet Take 1 tablet (100 mg) by mouth daily as needed Take 30 min to 4 hours before intercourse.  Never use with nitroglycerin, terazosin or doxazosin. 16 tablet 3     Vitamin D, Cholecalciferol, 1000 UNITS TABS Take 1,000 mg by mouth daily        warfarin (COUMADIN) 2.5 MG tablet Take by mouth daily 2.5 mg mon, thur and 1.25 mg row       ASPIRIN NOT PRESCRIBED (INTENTIONAL) continuous prn for other Please choose reason not prescribed, below          ALLERGIES     Allergies   Allergen Reactions     Nystatin Other (See Comments)     Make his mouth numb & swelling       PAST MEDICAL HISTORY:  Past Medical History:   Diagnosis Date     Atrial fibrillation (H)     s/p Cardioversion 3/14/2013     Atrial flutter (H)     S/p Aflutter ablation 1/11/2011     CAD (coronary artery disease)     CABG x3 10/2012- LIMA to distal LAD, SVG to OM1 & OM3; cath 10/2012- PTCA to second diagonal and mid LAD, BMS to mid LAD     Cardiogenic shock (H)      Cardiomyopathy (H)      CHF (congestive heart failure) (H)      CVA (cerebral infarction)     residual right hand numbness     ED (erectile dysfunction)      Hyperlipidemia      Hypertension      Neuropathy      Palpitations      SVT (supraventricular tachycardia) (H)     S/p dual chamber ICD 10/11/12- upgraded to BIV ICD 6/2013     Syncope        PAST SURGICAL HISTORY:  Past Surgical History:   Procedure Laterality Date     BYPASS GRAFT ARTERY CORONARY  10/2/2012    Procedure: BYPASS GRAFT ARTERY CORONARY;  Coronary Artery Bypass Graft x3 (LAD, Diag, OM) with Endovein [place] (On-Pump);  Surgeon: Yeyo Lyman MD;  Location:  OR     CARDIOVERSION  3/14/2013     CORONARY ANGIOGRAPHY ADULT ORDER  10/2/12     CORONARY ARTERY BYPASS  10/2/12    LIMA to LAD, SVG to OM1 and OM3     EP ABLATION VT N/A 1/2/2019    Procedure: EP Ablation VT;  Surgeon: Suellen Costello MD;  Location:  HEART [facility]ARDI[facility] CATH LAB     EP COMPREHENSIVE EP STUDY N/A 1/2/2019    Procedure: EP Comprehensive EP Study;  Surgeon: Suellen Costello MD;  Location:  HEART CARDIAC CATH LAB     H ABLATION ATRIAL FLUTTER  1/11/2011     HAND SURGERY       HEART CATH, ANGIOPLASTY  10/2/12    PTCA to second diagonal and mid LAD, BMS to mid LAD     HERNIA REPAIR       ORTHOPEDIC SURGERY Right 2006    cut on table saw     RELOCATE GENERATOR ICD/PACEMAKER         FAMILY HISTORY:  Family History   Problem Relation Age of Onset     Alcohol/Drug  Father      Heart Disease Father 71     Alzheimer Disease Sister      Alcohol/Drug Sister      Alcohol/Drug Sister      Gastrointestinal Disease Sister      Obesity Sister      Hypertension Sister      Heart Disease Sister      Hypertension Sister      Heart Disease Daughter         afib     Arrhythmia Daughter      Multiple Sclerosis Daughter      Heart Disease Daughter         afib     Arrhythmia Daughter      Cancer Daughter         lymphoma     Aneurysm Mother        SOCIAL HISTORY:  Social History     Socioeconomic History     Marital status:      Spouse name: None     Number of children: None     Years of education: None     Highest education level: None   Social Needs     Financial resource strain: None     Food insecurity - worry: None     Food insecurity - inability: None     Transportation needs - medical: None     Transportation needs - non-medical: None   Occupational History     None   Tobacco Use     Smoking status: Former Smoker     Packs/day: 1.00     Years: 14.00     Pack years: 14.00     Types: Cigarettes     Last attempt to quit: 7/10/1972     Years since quittin.6     Smokeless tobacco: Never Used   Substance and Sexual Activity     Alcohol use: No     Drug use: No     Sexual activity: Yes     Partners: Female   Other Topics Concern     Parent/sibling w/ CABG, MI or angioplasty before 65F 55M? No      Service Not Asked     Blood Transfusions Not Asked     Caffeine Concern Yes     Comment: 4 cups coffee daily     Occupational Exposure Not Asked     Hobby Hazards Not Asked     Sleep Concern No     Stress Concern No     Weight Concern Yes     Comment: gain 2lbs     Special Diet Yes     Comment: low sodium     Back Care Not Asked     Exercise Yes     Comment: walking, doing sittups, played pickle ball in summer     Bike Helmet Not Asked     Seat Belt Yes     Self-Exams Not Asked   Social History Narrative     None       Review of Systems:  Skin:  Negative       Eyes:  Positive for  glasses    ENT:  Negative hoarseness    Respiratory:  Negative       Cardiovascular:  Negative lightheadedness;dizziness;Positive for Feels the dizziness is worse since the ablation  Gastroenterology: Negative nausea;diarrhea treated  Genitourinary:  Positive for nocturia new diarrhea occasionally, maybe once a week- new in the last 3-4 months   Musculoskeletal:  Negative      Neurologic:  Positive for stroke history of stroke in 2011, head injury in 2014, right hand has issues with feeling/identification  Psychiatric:  Negative      Heme/Lymph/Imm:  Negative easy bruising coumadin  Endocrine:  Negative        [id number]

## 2019-02-05 NOTE — PROGRESS NOTES
Service Date: 02/05/2019      HISTORY OF PRESENT ILLNESS:    I had the pleasure of seeing Mr. Tyrel Rene, a delightful 73-year-old male with the following cardiovascular issues:   A.  Severe ischemic cardiomyopathy related to a large anterior MI several years ago.  LVEF is 25%.   B.  Unstable ventricular tachyarrhythmias requiring ICD shock in 11/2017.  Amiodarone was started at the time.  Recurrent VF storm with 7 ICD discharges in 11/2018.  Catheter ablation of ventricular tachycardia on 01/02/2019.   C.  Permanent atrial fibrillation and complete AV block.  On warfarin.   D.  CVA following atrial flutter ablation in 2011.      Marko is here for his 1-month followup after VT ablation on 01/02/19.  He had a single inducible VT morphology at 210-220 beats per minute.  He underwent extensive ablation at the border of his large anterior/septal/apical scar.  VT was not inducible at procedure end.      Since the procedure, he has remained on amiodarone 200 mg daily.  He continues to feel dizzy which is mostly positional, but it is a symptom he did not have before amiodarone, therefore I suspect the drug causes it.  This symptom got much worse on higher doses of amiodarone in the past.      So far he has not had any recurrent ventricular tachycardia.  He participates in cardiac rehab.      He has no chest pain with exertion.  He has had no orthopnea or PND.  He weighs himself daily.  He follows with Dr. Newman and Prince Galvez NP [facility].      PHYSICAL EXAMINATION:   VITAL SIGNS:  Blood pressure 116/76, pulse 65 and regular, weight 83 kg, height 185 cm.   GENERAL:  He is a very pleasant gentleman who appears healthy and is in no apparent distress.   NECK:  Supple without bruits.   LUNGS:  Clear.   CARDIOVASCULAR:  Normal JVP, regular rhythm.  No apparent gallop or murmur.   ABDOMEN:  Soft, nontender.  Negative HJR.   EXTREMITIES:  No edema.   SKIN:  No rashes.      DIAGNOSTIC STUDIES:    His 12-lead ECG  today showed atrial fibrillation with ventricular pacing and RBBB complex in V1 consistent with LV capture.      IMPRESSION:   1.  Ventricular tachyarrhythmias.  So far so good for Marko after VT ablation 1 month ago.  He remains on amiodarone and I do not think I would taper it any time soon.  If he is stable for 1 year after his procedure I may consider decreasing amiodarone to 6 days per week.   2.  Ischemic cardiomyopathy with chronic systolic CHF.  Stable weight and symptoms.  He is a compliant patient who takes his meds, watches his diet, exercises and does all the right things.  Continue follow-up with [facility]      RECOMMENDATIONS:    I have requested a follow-up visit in the EP Clinic with Lauren in 6 months.  We would of course see him sooner if problems arise.      Thank you for the opportunity to be part of his care.   Time spent was 25 minutes.  More than 50% of time was discussion and counseling.         ANN-MARIE FRANK MD, FACC           cc:   [place] [address]             D: 2019   T: 2019   MT: YESICA      Name:     ARTEM ELIZALDE   MRN:      [id number]        Account:      [id number]   :      1945           Service Date: 2019      Document: [id number]

## 2019-02-05 NOTE — LETTER
2/5/2019      Dejon Downs MD  [address]  [facility] [address]      RE: Tyrel Rene       Dear Colleague,    I had the pleasure of seeing Tyrel Rene in the [facility].    Service Date: 02/05/2019      HISTORY OF PRESENT ILLNESS:    I had the pleasure of seeing Mr. Tyrel Rene, a delightful 73-year-old male with the following cardiovascular issues:   A.  Severe ischemic cardiomyopathy related to a large anterior MI several years ago.  LVEF is 25%.   B.  Unstable ventricular tachyarrhythmias requiring ICD shock in 11/2017.  Amiodarone was started at the time.  Recurrent VF storm with 7 ICD discharges in 11/2018.  Catheter ablation of ventricular tachycardia on 01/02/2019.   C.  Permanent atrial fibrillation and complete AV block.  On warfarin.   D.  CVA following atrial flutter ablation in 2011.      Marko is here for his 1-month followup after VT ablation on 01/02/19.  He had a single inducible VT morphology at 210-220 beats per minute.  He underwent extensive ablation at the border of his large anterior/septal/apical scar.  VT was not inducible at procedure end.      Since the procedure, he has remained on amiodarone 200 mg daily.  He continues to feel dizzy which is mostly positional, but it is a symptom he did not have before amiodarone, therefore I suspect the drug causes it.  This symptom got much worse on higher doses of amiodarone in the past.      So far he has not had any recurrent ventricular tachycardia.  He participates in cardiac rehab.      He has no chest pain with exertion.  He has had no orthopnea or PND.  He weighs himself daily.  He follows with Dr. Newman and Prince Galvez NP at [facility].      PHYSICAL EXAMINATION:   VITAL SIGNS:  Blood pressure 116/76, pulse 65 and regular, weight 83 kg, height 185 cm.   GENERAL:  He is a very pleasant gentleman who appears healthy and is in no apparent distress.   NECK:  Supple without bruits.    LUNGS:  Clear.   CARDIOVASCULAR:  Normal JVP, regular rhythm.  No apparent gallop or murmur.   ABDOMEN:  Soft, nontender.  Negative HJR.   EXTREMITIES:  No edema.   SKIN:  No rashes.      DIAGNOSTIC STUDIES:    His 12-lead ECG today showed atrial fibrillation with ventricular pacing and RBBB complex in V1 consistent with LV capture.      IMPRESSION:   1.  Ventricular tachyarrhythmias.  So far so good for Marko after VT ablation 1 month ago.  He remains on amiodarone and I do not think I would taper it any time soon.  If he is stable for 1 year after his procedure I may consider decreasing amiodarone to 6 days per week.   2.  Ischemic cardiomyopathy with chronic systolic CHF.  Stable weight and symptoms.  He is a compliant patient who takes his meds, watches his diet, exercises and does all the right things.  Continue follow-up with [facility]      RECOMMENDATIONS:    I have requested a follow-up visit in the EP Clinic with Lauren in 6 months.  We would of course see him sooner if problems arise.      Thank you for the opportunity to be part of his care.   Time spent was 25 minutes.  More than 50% of time was discussion and counseling.         ANN-MARIE FRANK MD, FACC           cc:   [place] [address]             D: 2019   T: 2019   MT: YESICA      Name:     ARTEM ELIZALDE   MRN:      [id number]        Account:      [id number]   :      1945           Service Date: 2019      Document: [id number]           Outpatient Encounter Medications as of 2019   Medication Sig Dispense Refill     amiodarone (PACERONE/CODARONE) 200 MG tablet Take 1 tablet (200 mg) by mouth daily 90 tablet 3     carvedilol (COREG) 3.125 MG tablet Take 2 tablets (6.25 mg) by mouth 2 times daily (with meals) 360 tablet 3     digoxin (LANOXIN) 125 MCG tablet Take 1 tablet (125 mcg) by mouth daily 90 tablet 2     ipratropium  (ATROVENT) 0.03 % spray Spray 2 sprays into both nostrils every 8 hours as needed for rhinitis 30 mL 11     lisinopril (PRINIVIL/ZESTRIL) 5 MG tablet Take 1 tablet (5 mg) by mouth At Bedtime 180 tablet 3     Multiple Vitamins-Minerals (PRESERVISION AREDS 2 PO) Take 1 capsule by mouth 2 times daily       NITROSTAT 0.4 MG sublingual tablet DISSOLVE 1 TABLET UNDER THE TONGUE EVERY 5 MINUTES AS NEEDED FOR CHEST PAIN 25 tablet 0     ranitidine (ZANTAC) 150 MG tablet Take 1 tablet (150 mg) by mouth 2 times daily 180 tablet      rosuvastatin (CRESTOR) 20 MG tablet Take 1 tablet (20 mg) by mouth daily 90 tablet 1     sildenafil (VIAGRA) 100 MG tablet Take 1 tablet (100 mg) by mouth daily as needed Take 30 min to 4 hours before intercourse.  Never use with nitroglycerin, terazosin or doxazosin. 16 tablet 3     Vitamin D, Cholecalciferol, 1000 UNITS TABS Take 1,000 mg by mouth daily        warfarin (COUMADIN) 2.5 MG tablet Take by mouth daily 2.5 mg mon, thur and 1.25 mg row       ASPIRIN NOT PRESCRIBED (INTENTIONAL) continuous prn for other Please choose reason not prescribed, below       [] enoxaparin (LOVENOX) 80 MG/0.8ML syringe Inject 0.8 mLs (80 mg) Subcutaneous every 12 hours for 4 doses 3.2 mL 0     No facility-administered encounter medications on file as of 2019.              Again, thank you for allowing me to participate in the care of your patient.      Sincerely,    Suellen Costello MD     [facility]

## 2019-02-05 NOTE — LETTER
2/5/2019    Dejon Downs MD  [address]  [facility] [address]    RE: Tyrel Rene       Dear Colleague,    I had the pleasure of seeing Tyrel Rene in the [facility].    HPI and Plan:   See dictation    Orders Placed This Encounter   Procedures     Follow-Up with Cardiac Advanced Practice Provider     EKG 12-lead complete w/read - Clinics (performed today)       No orders of the defined types were placed in this encounter.      There are no discontinued medications.      Encounter Diagnoses   Name Primary?     Ventricular tachycardia (H)      Chronic atrial fibrillation (H) Yes     On amiodarone therapy        CURRENT MEDICATIONS:  Current Outpatient Medications   Medication Sig Dispense Refill     amiodarone (PACERONE/CODARONE) 200 MG tablet Take 1 tablet (200 mg) by mouth daily 90 tablet 3     carvedilol (COREG) 3.125 MG tablet Take 2 tablets (6.25 mg) by mouth 2 times daily (with meals) 360 tablet 3     digoxin (LANOXIN) 125 MCG tablet Take 1 tablet (125 mcg) by mouth daily 90 tablet 2     ipratropium (ATROVENT) 0.03 % spray Spray 2 sprays into both nostrils every 8 hours as needed for rhinitis 30 mL 11     lisinopril (PRINIVIL/ZESTRIL) 5 MG tablet Take 1 tablet (5 mg) by mouth At Bedtime 180 tablet 3     Multiple Vitamins-Minerals (PRESERVISION AREDS 2 PO) Take 1 capsule by mouth 2 times daily       NITROSTAT 0.4 MG sublingual tablet DISSOLVE 1 TABLET UNDER THE TONGUE EVERY 5 MINUTES AS NEEDED FOR CHEST PAIN 25 tablet 0     ranitidine (ZANTAC) 150 MG tablet Take 1 tablet (150 mg) by mouth 2 times daily 180 tablet      rosuvastatin (CRESTOR) 20 MG tablet Take 1 tablet (20 mg) by mouth daily 90 tablet 1     sildenafil (VIAGRA) 100 MG tablet Take 1 tablet (100 mg) by mouth daily as needed Take 30 min to 4 hours before intercourse.  Never use with nitroglycerin, terazosin or doxazosin. 16 tablet 3     Vitamin D, Cholecalciferol, 1000 UNITS TABS Take 1,000 mg by  mouth daily        warfarin (COUMADIN) 2.5 MG tablet Take by mouth daily 2.5 mg mon, thur and 1.25 mg row       ASPIRIN NOT PRESCRIBED (INTENTIONAL) continuous prn for other Please choose reason not prescribed, below         ALLERGIES     Allergies   Allergen Reactions     Nystatin Other (See Comments)     Make his mouth numb & swelling       PAST MEDICAL HISTORY:  Past Medical History:   Diagnosis Date     Atrial fibrillation (H)     s/p Cardioversion 3/14/2013     Atrial flutter (H)     S/p Aflutter ablation 1/11/2011     CAD (coronary artery disease)     CABG x3 10/2012- LIMA to distal LAD, SVG to OM1 & OM3; cath 10/2012- PTCA to second diagonal and mid LAD, BMS to mid LAD     Cardiogenic shock (H)      Cardiomyopathy (H)      CHF (congestive heart failure) (H)      CVA (cerebral infarction)     residual right hand numbness     ED (erectile dysfunction)      Hyperlipidemia      Hypertension      Neuropathy      Palpitations      SVT (supraventricular tachycardia) (H)     S/p dual chamber ICD 10/11/12- upgraded to BIV ICD 6/2013     Syncope        PAST SURGICAL HISTORY:  Past Surgical History:   Procedure Laterality Date     BYPASS GRAFT ARTERY CORONARY  10/2/2012    Procedure: BYPASS GRAFT ARTERY CORONARY;  Coronary Artery Bypass Graft x3 (LAD, Diag, OM) with Endovein [place] (On-Pump);  Surgeon: Yeyo Lyman MD;  Location:  OR     CARDIOVERSION  3/14/2013     CORONARY ANGIOGRAPHY ADULT ORDER  10/2/12     CORONARY ARTERY BYPASS  10/2/12    LIMA to LAD, SVG to OM1 and OM3     EP ABLATION VT N/A 1/2/2019    Procedure: EP Ablation VT;  Surgeon: Suellen Costello MD;  Location:  HEART CARDIAC CATH LAB     EP COMPREHENSIVE EP STUDY N/A 1/2/2019    Procedure: EP Comprehensive EP Study;  Surgeon: Suellen Costello MD;  Location:  HEART CARDIAC CATH LAB     H ABLATION ATRIAL FLUTTER  1/11/2011     HAND SURGERY       HEART CATH, ANGIOPLASTY  10/2/12    PTCA to second diagonal and mid  LAD, BMS to mid LAD     HERNIA REPAIR       ORTHOPEDIC SURGERY Right 2006    cut on table saw     RELOCATE GENERATOR ICD/PACEMAKER         FAMILY HISTORY:  Family History   Problem Relation Age of Onset     Alcohol/Drug Father      Heart Disease Father 71     Alzheimer Disease Sister      Alcohol/Drug Sister      Alcohol/Drug Sister      Gastrointestinal Disease Sister      Obesity Sister      Hypertension Sister      Heart Disease Sister      Hypertension Sister      Heart Disease Daughter         afib     Arrhythmia Daughter      Multiple Sclerosis Daughter      Heart Disease Daughter         afib     Arrhythmia Daughter      Cancer Daughter         lymphoma     Aneurysm Mother        SOCIAL HISTORY:  Social History     Socioeconomic History     Marital status:      Spouse name: None     Number of children: None     Years of education: None     Highest education level: None   Social Needs     Financial resource strain: None     Food insecurity - worry: None     Food insecurity - inability: None     Transportation needs - medical: None     Transportation needs - non-medical: None   Occupational History     None   Tobacco Use     Smoking status: Former Smoker     Packs/day: 1.00     Years: 14.00     Pack years: 14.00     Types: Cigarettes     Last attempt to quit: 7/10/1972     Years since quittin.6     Smokeless tobacco: Never Used   Substance and Sexual Activity     Alcohol use: No     Drug use: No     Sexual activity: Yes     Partners: Female   Other Topics Concern     Parent/sibling w/ CABG, MI or angioplasty before 65F 55M? No      Service Not Asked     Blood Transfusions Not Asked     Caffeine Concern Yes     Comment: 4 cups coffee daily     Occupational Exposure Not Asked     Hobby Hazards Not Asked     Sleep Concern No     Stress Concern No     Weight Concern Yes     Comment: gain 2lbs     Special Diet Yes     Comment: low sodium     Back Care Not Asked     Exercise Yes     Comment:  walking, doing sittups, played pickle ball in summer     Bike Helmet Not Asked     Seat Belt Yes     Self-Exams Not Asked   Social History Narrative     None       Review of Systems:  Skin:  Negative       Eyes:  Positive for glasses    ENT:  Negative hoarseness    Respiratory:  Negative       Cardiovascular:  Negative lightheadedness;dizziness;Positive for Feels the dizziness is worse since the ablation  Gastroenterology: Negative nausea;diarrhea treated  Genitourinary:  Positive for nocturia new diarrhea occasionally, maybe once a week- new in the last 3-4 months   Musculoskeletal:  Negative      Neurologic:  Positive for stroke history of stroke in 2011, head injury in 2014, right hand has issues with feeling/identification  Psychiatric:  Negative      Heme/Lymph/Imm:  Negative easy bruising coumadin  Endocrine:  Negative        [id number]          Thank you for allowing me to participate in the care of your patient.      Sincerely,     Sueleln Costello MD     [facility]    cc:   Joelle Rodríguez PA-C  [address]  [place] [address]

## 2019-02-06 LAB
MDC_IDC_EPISODE_DTM: NORMAL
MDC_IDC_EPISODE_ID: NORMAL
MDC_IDC_EPISODE_TYPE: NORMAL
MDC_IDC_LEAD_IMPLANT_DT: NORMAL
MDC_IDC_LEAD_LOCATION: NORMAL
MDC_IDC_LEAD_LOCATION_DETAIL_1: NORMAL
MDC_IDC_LEAD_MFG: NORMAL
MDC_IDC_LEAD_MODEL: NORMAL
MDC_IDC_LEAD_POLARITY_TYPE: NORMAL
MDC_IDC_LEAD_SERIAL: NORMAL
MDC_IDC_MSMT_BATTERY_STATUS: NORMAL
MDC_IDC_MSMT_CAP_CHARGE_DTM: NORMAL
MDC_IDC_MSMT_CAP_CHARGE_ENERGY: 41 J
MDC_IDC_MSMT_CAP_CHARGE_TIME: 10.46
MDC_IDC_MSMT_CAP_CHARGE_TIME: 8.2
MDC_IDC_MSMT_CAP_CHARGE_TYPE: NORMAL
MDC_IDC_MSMT_CAP_CHARGE_TYPE: NORMAL
MDC_IDC_MSMT_LEADCHNL_LV_IMPEDANCE_VALUE: 403 OHM
MDC_IDC_MSMT_LEADCHNL_LV_PACING_THRESHOLD_AMPLITUDE: 0.6 V
MDC_IDC_MSMT_LEADCHNL_LV_PACING_THRESHOLD_PULSEWIDTH: 0.5 MS
MDC_IDC_MSMT_LEADCHNL_LV_SENSING_INTR_AMPL: NORMAL
MDC_IDC_MSMT_LEADCHNL_RA_IMPEDANCE_VALUE: 556 OHM
MDC_IDC_MSMT_LEADCHNL_RA_PACING_THRESHOLD_AMPLITUDE: 0.4 V
MDC_IDC_MSMT_LEADCHNL_RA_PACING_THRESHOLD_PULSEWIDTH: 0.5 MS
MDC_IDC_MSMT_LEADCHNL_RA_SENSING_INTR_AMPL: 0.9 MV
MDC_IDC_MSMT_LEADCHNL_RV_IMPEDANCE_VALUE: 430 OHM
MDC_IDC_MSMT_LEADCHNL_RV_PACING_THRESHOLD_AMPLITUDE: 0.8 V
MDC_IDC_MSMT_LEADCHNL_RV_PACING_THRESHOLD_PULSEWIDTH: 0.5 MS
MDC_IDC_MSMT_LEADCHNL_RV_SENSING_INTR_AMPL: NORMAL
MDC_IDC_PG_IMPLANT_DTM: NORMAL
MDC_IDC_PG_MFG: NORMAL
MDC_IDC_PG_MODEL: NORMAL
MDC_IDC_PG_SERIAL: NORMAL
MDC_IDC_PG_TYPE: NORMAL
MDC_IDC_SESS_CLINIC_NAME: NORMAL
MDC_IDC_SESS_DTM: NORMAL
MDC_IDC_SESS_TYPE: NORMAL
MDC_IDC_SET_BRADY_AT_MODE_SWITCH_MODE: NORMAL
MDC_IDC_SET_BRADY_LOWRATE: 65 {BEATS}/MIN
MDC_IDC_SET_BRADY_MAX_SENSOR_RATE: 120 {BEATS}/MIN
MDC_IDC_SET_BRADY_MODE: NORMAL
MDC_IDC_SET_BRADY_PAV_DELAY_HIGH: 150 MS
MDC_IDC_SET_BRADY_PAV_DELAY_LOW: 150 MS
MDC_IDC_SET_BRADY_SAV_DELAY_LOW: 120 MS
MDC_IDC_SET_CRT_LVRV_DELAY: 0 MS
MDC_IDC_SET_CRT_PACED_CHAMBERS: NORMAL
MDC_IDC_SET_LEADCHNL_LV_PACING_AMPLITUDE: 1.5 V
MDC_IDC_SET_LEADCHNL_LV_PACING_ANODE_ELECTRODE_1: NORMAL
MDC_IDC_SET_LEADCHNL_LV_PACING_ANODE_LOCATION_1: NORMAL
MDC_IDC_SET_LEADCHNL_LV_PACING_CAPTURE_MODE: NORMAL
MDC_IDC_SET_LEADCHNL_LV_PACING_CATHODE_ELECTRODE_1: NORMAL
MDC_IDC_SET_LEADCHNL_LV_PACING_CATHODE_LOCATION_1: NORMAL
MDC_IDC_SET_LEADCHNL_LV_PACING_POLARITY: NORMAL
MDC_IDC_SET_LEADCHNL_LV_PACING_PULSEWIDTH: 0.5 MS
MDC_IDC_SET_LEADCHNL_LV_SENSING_ADAPTATION_MODE: NORMAL
MDC_IDC_SET_LEADCHNL_LV_SENSING_ANODE_ELECTRODE_1: NORMAL
MDC_IDC_SET_LEADCHNL_LV_SENSING_ANODE_LOCATION_1: NORMAL
MDC_IDC_SET_LEADCHNL_LV_SENSING_CATHODE_ELECTRODE_1: NORMAL
MDC_IDC_SET_LEADCHNL_LV_SENSING_CATHODE_LOCATION_1: NORMAL
MDC_IDC_SET_LEADCHNL_LV_SENSING_POLARITY: NORMAL
MDC_IDC_SET_LEADCHNL_LV_SENSING_SENSITIVITY: 1 MV
MDC_IDC_SET_LEADCHNL_RA_PACING_ANODE_ELECTRODE_1: NORMAL
MDC_IDC_SET_LEADCHNL_RA_PACING_ANODE_LOCATION_1: NORMAL
MDC_IDC_SET_LEADCHNL_RA_PACING_CATHODE_ELECTRODE_1: NORMAL
MDC_IDC_SET_LEADCHNL_RA_PACING_CATHODE_LOCATION_1: NORMAL
MDC_IDC_SET_LEADCHNL_RA_PACING_POLARITY: NORMAL
MDC_IDC_SET_LEADCHNL_RA_SENSING_ADAPTATION_MODE: NORMAL
MDC_IDC_SET_LEADCHNL_RA_SENSING_ANODE_ELECTRODE_1: NORMAL
MDC_IDC_SET_LEADCHNL_RA_SENSING_ANODE_LOCATION_1: NORMAL
MDC_IDC_SET_LEADCHNL_RA_SENSING_CATHODE_ELECTRODE_1: NORMAL
MDC_IDC_SET_LEADCHNL_RA_SENSING_CATHODE_LOCATION_1: NORMAL
MDC_IDC_SET_LEADCHNL_RA_SENSING_POLARITY: NORMAL
MDC_IDC_SET_LEADCHNL_RA_SENSING_SENSITIVITY: 0.15 MV
MDC_IDC_SET_LEADCHNL_RV_PACING_AMPLITUDE: 2.5 V
MDC_IDC_SET_LEADCHNL_RV_PACING_ANODE_ELECTRODE_1: NORMAL
MDC_IDC_SET_LEADCHNL_RV_PACING_ANODE_LOCATION_1: NORMAL
MDC_IDC_SET_LEADCHNL_RV_PACING_CAPTURE_MODE: NORMAL
MDC_IDC_SET_LEADCHNL_RV_PACING_CATHODE_ELECTRODE_1: NORMAL
MDC_IDC_SET_LEADCHNL_RV_PACING_CATHODE_LOCATION_1: NORMAL
MDC_IDC_SET_LEADCHNL_RV_PACING_POLARITY: NORMAL
MDC_IDC_SET_LEADCHNL_RV_PACING_PULSEWIDTH: 0.5 MS
MDC_IDC_SET_LEADCHNL_RV_SENSING_ADAPTATION_MODE: NORMAL
MDC_IDC_SET_LEADCHNL_RV_SENSING_ANODE_ELECTRODE_1: NORMAL
MDC_IDC_SET_LEADCHNL_RV_SENSING_ANODE_LOCATION_1: NORMAL
MDC_IDC_SET_LEADCHNL_RV_SENSING_CATHODE_ELECTRODE_1: NORMAL
MDC_IDC_SET_LEADCHNL_RV_SENSING_CATHODE_LOCATION_1: NORMAL
MDC_IDC_SET_LEADCHNL_RV_SENSING_POLARITY: NORMAL
MDC_IDC_SET_LEADCHNL_RV_SENSING_SENSITIVITY: 0.6 MV
MDC_IDC_SET_ZONE_DETECTION_INTERVAL: 273 MS
MDC_IDC_SET_ZONE_DETECTION_INTERVAL: 353 MS
MDC_IDC_SET_ZONE_DETECTION_INTERVAL: 400 MS
MDC_IDC_SET_ZONE_TYPE: NORMAL
MDC_IDC_SET_ZONE_VENDOR_TYPE: NORMAL
MDC_IDC_STAT_BRADY_RV_PERCENT_PACED: 99 %
MDC_IDC_STAT_CRT_LV_PERCENT_PACED: 99 %
MDC_IDC_STAT_EPISODE_RECENT_COUNT: 0
MDC_IDC_STAT_EPISODE_RECENT_COUNT: 0
MDC_IDC_STAT_EPISODE_RECENT_COUNT: 1
MDC_IDC_STAT_EPISODE_RECENT_COUNT_DTM_END: NORMAL
MDC_IDC_STAT_EPISODE_RECENT_COUNT_DTM_START: NORMAL
MDC_IDC_STAT_EPISODE_TOTAL_COUNT: 10
MDC_IDC_STAT_EPISODE_TOTAL_COUNT: 1566
MDC_IDC_STAT_EPISODE_TOTAL_COUNT: 6
MDC_IDC_STAT_EPISODE_TOTAL_COUNT_DTM_END: NORMAL
MDC_IDC_STAT_EPISODE_TYPE: NORMAL
MDC_IDC_STAT_EPISODE_VENDOR_TYPE: NORMAL
MDC_IDC_STAT_TACHYTHERAPY_ATP_DELIVERED_RECENT: 0
MDC_IDC_STAT_TACHYTHERAPY_ATP_DELIVERED_TOTAL: 6
MDC_IDC_STAT_TACHYTHERAPY_RECENT_DTM_END: NORMAL
MDC_IDC_STAT_TACHYTHERAPY_RECENT_DTM_START: NORMAL
MDC_IDC_STAT_TACHYTHERAPY_SHOCKS_ABORTED_RECENT: 0
MDC_IDC_STAT_TACHYTHERAPY_SHOCKS_ABORTED_TOTAL: 2
MDC_IDC_STAT_TACHYTHERAPY_SHOCKS_DELIVERED_RECENT: 0
MDC_IDC_STAT_TACHYTHERAPY_SHOCKS_DELIVERED_TOTAL: 9
MDC_IDC_STAT_TACHYTHERAPY_TOTAL_DTM_END: NORMAL

## 2019-02-15 ENCOUNTER — ANTICOAGULATION THERAPY VISIT (OUTPATIENT)
Dept: NURSING | Facility: CLINIC | Age: 74
End: 2019-02-15
Payer: COMMERCIAL

## 2019-02-15 DIAGNOSIS — I63.50 CEREBRAL ARTERY OCCLUSION WITH CEREBRAL INFARCTION (H): ICD-10-CM

## 2019-02-15 DIAGNOSIS — I63.9 CEREBRAL INFARCTION (H): ICD-10-CM

## 2019-02-15 LAB — INR POINT OF CARE: 2.7 (ref 0.86–1.14)

## 2019-02-15 PROCEDURE — 36416 COLLJ CAPILLARY BLOOD SPEC: CPT

## 2019-02-15 PROCEDURE — 85610 PROTHROMBIN TIME: CPT | Mod: QW

## 2019-02-15 PROCEDURE — 99207 ZZC NO CHARGE NURSE ONLY: CPT

## 2019-02-15 NOTE — PROGRESS NOTES
ANTICOAGULATION FOLLOW-UP CLINIC VISIT    Patient Name:  Tyrel Rene  Date:  2/15/2019  Contact Type:  Face to Face    SUBJECTIVE:     Patient Findings     Positives:   No Problem Findings           OBJECTIVE    INR Protime   Date Value Ref Range Status   02/15/2019 2.7 (A) 0.86 - 1.14 Final       ASSESSMENT / PLAN  INR assessment THER    Recheck INR In: 3 WEEKS    INR Location Clinic      Anticoagulation Summary  As of 2/15/2019    INR goal:   2.0-2.5   TTR:   44.6 % (2.9 y)   INR used for dosin.7! (2/15/2019)   Warfarin maintenance plan:   2.5 mg (2.5 mg x 1) every Mon, Thu; 1.25 mg (2.5 mg x 0.5) all other days   Full warfarin instructions:   2.5 mg every Mon, Thu; 1.25 mg all other days   Weekly warfarin total:   11.25 mg   Plan last modified:   Doreen Finley RN (2018)   Next INR check:   3/8/2019   Target end date:   Indefinite    Indications    Long-term (current) use of anticoagulants [Z79.01] [Z79.01]  Cerebral artery occlusion with cerebral infarction (HCC) [I63.50] [I63.50]  Cerebral infarction (H) [I63.9]             Anticoagulation Episode Summary     INR check location:   [facility]    Preferred lab:       Send INR reminders to:   [facility] INR/PROTIME    Comments:         Anticoagulation Care Providers     Provider Role Specialty Phone number    Dejon Downs MD [facility] [phone]            See the Encounter Report to view Anticoagulation Flowsheet and Dosing Calendar (Go to Encounters tab in chart review, and find the Anticoagulation Therapy Visit)        Doreen Finley RN

## 2019-02-19 ENCOUNTER — TRANSFERRED RECORDS (OUTPATIENT)
Dept: HEALTH INFORMATION MANAGEMENT | Facility: CLINIC | Age: 74
End: 2019-02-19

## 2019-03-04 DIAGNOSIS — E78.2 MIXED HYPERLIPIDEMIA: ICD-10-CM

## 2019-03-04 DIAGNOSIS — I50.22 CHRONIC SYSTOLIC CONGESTIVE HEART FAILURE (H): ICD-10-CM

## 2019-03-04 RX ORDER — LISINOPRIL 5 MG/1
5 TABLET ORAL AT BEDTIME
Qty: 180 TABLET | Refills: 3 | Status: SHIPPED | OUTPATIENT
Start: 2019-03-04 | End: 2019-08-08

## 2019-03-04 RX ORDER — ROSUVASTATIN CALCIUM 20 MG/1
20 TABLET, COATED ORAL DAILY
Qty: 90 TABLET | Refills: 0 | Status: SHIPPED | OUTPATIENT
Start: 2019-03-04 | End: 2019-06-04

## 2019-03-08 ENCOUNTER — ANTICOAGULATION THERAPY VISIT (OUTPATIENT)
Dept: NURSING | Facility: CLINIC | Age: 74
End: 2019-03-08
Payer: COMMERCIAL

## 2019-03-08 DIAGNOSIS — I63.9 CEREBRAL INFARCTION (H): ICD-10-CM

## 2019-03-08 DIAGNOSIS — I63.50 CEREBRAL ARTERY OCCLUSION WITH CEREBRAL INFARCTION (H): ICD-10-CM

## 2019-03-08 LAB — INR POINT OF CARE: 2.8 (ref 0.86–1.14)

## 2019-03-08 PROCEDURE — 99207 ZZC NO CHARGE NURSE ONLY: CPT

## 2019-03-08 PROCEDURE — 85610 PROTHROMBIN TIME: CPT | Mod: QW

## 2019-03-08 PROCEDURE — 36416 COLLJ CAPILLARY BLOOD SPEC: CPT

## 2019-03-08 NOTE — PROGRESS NOTES
ANTICOAGULATION FOLLOW-UP CLINIC VISIT    Patient Name:  Tyrel Rene  Date:  3/8/2019  Contact Type:  Face to Face    SUBJECTIVE:     Patient Findings     Positives:   No Problem Findings           OBJECTIVE    INR Protime   Date Value Ref Range Status   2019 2.8 (A) 0.86 - 1.14 Final       ASSESSMENT / PLAN  INR assessment THER    Recheck INR In: 2 WEEKS    INR Location Clinic      Anticoagulation Summary  As of 3/8/2019    INR goal:   2.0-2.5   TTR:   43.8 % (2.9 y)   INR used for dosin.8! (3/8/2019)   Warfarin maintenance plan:   2.5 mg (2.5 mg x 1) every Mon, Thu; 1.25 mg (2.5 mg x 0.5) all other days   Full warfarin instructions:   3/8: Hold; Otherwise 2.5 mg every Mon, Thu; 1.25 mg all other days   Weekly warfarin total:   11.25 mg   Plan last modified:   Doreen Finley RN (2018)   Next INR check:   3/22/2019   Target end date:   Indefinite    Indications    Long-term (current) use of anticoagulants [Z79.01] [Z79.01]  Cerebral artery occlusion with cerebral infarction (HCC) [I63.50] [I63.50]  Cerebral infarction (H) [I63.9]             Anticoagulation Episode Summary     [facility] check location[facility]   [facility]    Preferred lab:       Send INR reminders to:   [facility] INR/PROTIME    Comments:         Anticoagulation Care Providers     Provider Role Specialty Phone number    YareliDejon clancy MD [facility] [phone]            See the Encounter Report to view Anticoagulation Flowsheet and Dosing Calendar (Go to Encounters tab in chart review, and find the Anticoagulation Therapy Visit)        Doreen Finley, RN

## 2019-03-22 ENCOUNTER — ANTICOAGULATION THERAPY VISIT (OUTPATIENT)
Dept: NURSING | Facility: CLINIC | Age: 74
End: 2019-03-22
Payer: COMMERCIAL

## 2019-03-22 DIAGNOSIS — I63.9 CEREBRAL INFARCTION (H): ICD-10-CM

## 2019-03-22 DIAGNOSIS — I63.50 CEREBRAL ARTERY OCCLUSION WITH CEREBRAL INFARCTION (H): ICD-10-CM

## 2019-03-22 LAB — INR POINT OF CARE: 2.7 (ref 0.86–1.14)

## 2019-03-22 PROCEDURE — 99207 ZZC NO CHARGE NURSE ONLY: CPT

## 2019-03-22 PROCEDURE — 85610 PROTHROMBIN TIME: CPT | Mod: QW

## 2019-03-22 PROCEDURE — 36416 COLLJ CAPILLARY BLOOD SPEC: CPT

## 2019-03-22 NOTE — PROGRESS NOTES
ANTICOAGULATION FOLLOW-UP CLINIC VISIT    Patient Name:  Tyrel Rene  Date:  3/22/2019  Contact Type:  Face to Face    SUBJECTIVE:     Patient Findings     Comments:   The patient was assessed for diet, medication, and activity level changes, missed or extra doses, bruising or bleeding, with no problem findings.             OBJECTIVE    INR Protime   Date Value Ref Range Status   2019 2.7 (A) 0.86 - 1.14 Final       ASSESSMENT / PLAN  INR assessment THER    Recheck INR In: 4 WEEKS    INR Location Clinic      Anticoagulation Summary  As of 3/22/2019    INR goal:   2.0-2.5   TTR:   43.2 % (3 y)   INR used for dosin.7! (3/22/2019)   Warfarin maintenance plan:   2.5 mg (2.5 mg x 1) every Mon; 1.25 mg (2.5 mg x 0.5) all other days   Full warfarin instructions:   2.5 mg every Mon; 1.25 mg all other days   Weekly warfarin total:   10 mg   Plan last modified:   Doreen Finley RN (3/22/2019)   Next INR check:   2019   Target end date:   Indefinite    Indications    Long-term (current) use of anticoagulants [Z79.01] [Z79.01]  Cerebral artery occlusion with cerebral infarction (HCC) [I63.50] [I63.50]  Cerebral infarction (H) [I63.9]             Anticoagulation Episode Summary     [facility]   [facility]    Preferred lab:       Send INR reminders to:   [facility]/PROTIME    Comments:         Anticoagulation Care Providers     Provider Role Specialty Phone number    Dejon Downs MD [facility] [phone]            See the Encounter Report to view Anticoagulation Flowsheet and Dosing Calendar (Go to Encounters tab in chart review, and find the Anticoagulation Therapy Visit)        Doreen Finley, RN

## 2019-04-26 ENCOUNTER — ANTICOAGULATION THERAPY VISIT (OUTPATIENT)
Dept: NURSING | Facility: CLINIC | Age: 74
End: 2019-04-26
Payer: COMMERCIAL

## 2019-04-26 ENCOUNTER — TELEPHONE (OUTPATIENT)
Dept: FAMILY MEDICINE | Facility: CLINIC | Age: 74
End: 2019-04-26

## 2019-04-26 DIAGNOSIS — I63.50 CEREBRAL ARTERY OCCLUSION WITH CEREBRAL INFARCTION (H): ICD-10-CM

## 2019-04-26 DIAGNOSIS — I63.9 CEREBRAL INFARCTION (H): ICD-10-CM

## 2019-04-26 LAB — INR POINT OF CARE: 2.8 (ref 0.86–1.14)

## 2019-04-26 PROCEDURE — 85610 PROTHROMBIN TIME: CPT | Mod: QW

## 2019-04-26 PROCEDURE — 99207 ZZC NO CHARGE NURSE ONLY: CPT

## 2019-04-26 PROCEDURE — 36416 COLLJ CAPILLARY BLOOD SPEC: CPT

## 2019-04-26 NOTE — PROGRESS NOTES
ANTICOAGULATION FOLLOW-UP CLINIC VISIT    Patient Name:  Tyrel Rene  Date:  2019  Contact Type:  Face to Face    SUBJECTIVE:     Patient Findings     Comments:   The patient was assessed for diet, medication, and activity level changes, missed or extra doses, bruising or bleeding, with no problem findings.             OBJECTIVE    INR Protime   Date Value Ref Range Status   2019 2.8 (A) 0.86 - 1.14 Final       ASSESSMENT / PLAN  INR assessment THER    Recheck INR In: 3 WEEKS    INR Location Clinic      Anticoagulation Summary  As of 2019    INR goal:   2.0-2.5   TTR:   41.8 % (3.1 y)   INR used for dosin.8! (2019)   Warfarin maintenance plan:   2.5 mg (2.5 mg x 1) every Mon; 1.25 mg (2.5 mg x 0.5) all other days   Full warfarin instructions:   2.5 mg every Mon; 1.25 mg all other days   Weekly warfarin total:   10 mg   Plan last modified:   Doreen Finley RN (3/22/2019)   Next INR check:   2019   Target end date:   Indefinite    Indications    Long-term (current) use of anticoagulants [Z79.01] [Z79.01]  Cerebral artery occlusion with cerebral infarction (HCC) [I63.50] [I63.50]  Cerebral infarction (H) [I63.9]             Anticoagulation Episode Summary     INR check location:   [facility]    Preferred lab:       Send INR reminders to:   [facility] INR/PROTIME    Comments:         Anticoagulation Care Providers     Provider Role Specialty Phone number    Dejon Downs MD [facility] [phone]            See the Encounter Report to view Anticoagulation Flowsheet and Dosing Calendar (Go to Encounters tab in chart review, and find the Anticoagulation Therapy Visit)        Doreen Finley, RN

## 2019-04-26 NOTE — TELEPHONE ENCOUNTER
Hayley called from the hospital twice today. She is having anxious since her hip surgery. She states that she is having a lot of pain with movement. Also she is worried about her coumadin dosing. Feels that the hospital nurse does not believe her regular dosing. I left a message at [facility]  Nurse line (mary) [phone] with the normal dosing of 7.5 mg every day

## 2019-05-16 ENCOUNTER — ANCILLARY PROCEDURE (OUTPATIENT)
Dept: CARDIOLOGY | Facility: CLINIC | Age: 74
End: 2019-05-16
Attending: INTERNAL MEDICINE
Payer: COMMERCIAL

## 2019-05-16 DIAGNOSIS — Z95.810 ICD (IMPLANTABLE CARDIOVERTER-DEFIBRILLATOR) IN PLACE: ICD-10-CM

## 2019-05-16 PROCEDURE — 93296 REM INTERROG EVL PM/IDS: CPT | Performed by: INTERNAL MEDICINE

## 2019-05-16 PROCEDURE — 93295 DEV INTERROG REMOTE 1/2/MLT: CPT | Performed by: INTERNAL MEDICINE

## 2019-05-17 ENCOUNTER — ANTICOAGULATION THERAPY VISIT (OUTPATIENT)
Dept: NURSING | Facility: CLINIC | Age: 74
End: 2019-05-17
Payer: COMMERCIAL

## 2019-05-17 DIAGNOSIS — I63.9 CEREBRAL INFARCTION (H): ICD-10-CM

## 2019-05-17 DIAGNOSIS — I63.50 CEREBRAL ARTERY OCCLUSION WITH CEREBRAL INFARCTION (H): ICD-10-CM

## 2019-05-17 LAB
INR POINT OF CARE: 2.6 (ref 0.86–1.14)
MDC_IDC_EPISODE_DTM: NORMAL
MDC_IDC_EPISODE_ID: NORMAL
MDC_IDC_EPISODE_TYPE: NORMAL
MDC_IDC_LEAD_IMPLANT_DT: NORMAL
MDC_IDC_LEAD_LOCATION: NORMAL
MDC_IDC_LEAD_LOCATION_DETAIL_1: NORMAL
MDC_IDC_LEAD_MFG: NORMAL
MDC_IDC_LEAD_MODEL: NORMAL
MDC_IDC_LEAD_POLARITY_TYPE: NORMAL
MDC_IDC_LEAD_SERIAL: NORMAL
MDC_IDC_MSMT_BATTERY_DTM: NORMAL
MDC_IDC_MSMT_BATTERY_REMAINING_LONGEVITY: 30 MO
MDC_IDC_MSMT_BATTERY_REMAINING_PERCENTAGE: 45 %
MDC_IDC_MSMT_BATTERY_STATUS: NORMAL
MDC_IDC_MSMT_CAP_CHARGE_DTM: NORMAL
MDC_IDC_MSMT_CAP_CHARGE_DTM: NORMAL
MDC_IDC_MSMT_CAP_CHARGE_ENERGY: 41 J
MDC_IDC_MSMT_CAP_CHARGE_TIME: 10.5 S
MDC_IDC_MSMT_CAP_CHARGE_TIME: 8.2 S
MDC_IDC_MSMT_CAP_CHARGE_TYPE: NORMAL
MDC_IDC_MSMT_CAP_CHARGE_TYPE: NORMAL
MDC_IDC_MSMT_LEADCHNL_LV_IMPEDANCE_VALUE: 371 OHM
MDC_IDC_MSMT_LEADCHNL_LV_PACING_THRESHOLD_AMPLITUDE: 0.6 V
MDC_IDC_MSMT_LEADCHNL_LV_PACING_THRESHOLD_PULSEWIDTH: 0.5 MS
MDC_IDC_MSMT_LEADCHNL_RA_IMPEDANCE_VALUE: 535 OHM
MDC_IDC_MSMT_LEADCHNL_RA_PACING_THRESHOLD_AMPLITUDE: 0.4 V
MDC_IDC_MSMT_LEADCHNL_RA_PACING_THRESHOLD_PULSEWIDTH: 0.5 MS
MDC_IDC_MSMT_LEADCHNL_RV_IMPEDANCE_VALUE: 405 OHM
MDC_IDC_MSMT_LEADCHNL_RV_PACING_THRESHOLD_AMPLITUDE: 0.8 V
MDC_IDC_MSMT_LEADCHNL_RV_PACING_THRESHOLD_PULSEWIDTH: 0.5 MS
MDC_IDC_PG_IMPLANT_DTM: NORMAL
MDC_IDC_PG_MFG: NORMAL
MDC_IDC_PG_MODEL: NORMAL
MDC_IDC_PG_SERIAL: NORMAL
MDC_IDC_PG_TYPE: NORMAL
MDC_IDC_SESS_CLINIC_NAME: NORMAL
MDC_IDC_SESS_DTM: NORMAL
MDC_IDC_SESS_TYPE: NORMAL
MDC_IDC_SET_BRADY_AT_MODE_SWITCH_RATE: 170 {BEATS}/MIN
MDC_IDC_SET_BRADY_LOWRATE: 65 {BEATS}/MIN
MDC_IDC_SET_BRADY_MAX_SENSOR_RATE: 120 {BEATS}/MIN
MDC_IDC_SET_BRADY_MODE: NORMAL
MDC_IDC_SET_CRT_LVRV_DELAY: 0 MS
MDC_IDC_SET_CRT_PACED_CHAMBERS: NORMAL
MDC_IDC_SET_LEADCHNL_LV_PACING_AMPLITUDE: 1.5 V
MDC_IDC_SET_LEADCHNL_LV_PACING_ANODE_ELECTRODE_1: NORMAL
MDC_IDC_SET_LEADCHNL_LV_PACING_ANODE_LOCATION_1: NORMAL
MDC_IDC_SET_LEADCHNL_LV_PACING_CATHODE_ELECTRODE_1: NORMAL
MDC_IDC_SET_LEADCHNL_LV_PACING_CATHODE_LOCATION_1: NORMAL
MDC_IDC_SET_LEADCHNL_LV_PACING_PULSEWIDTH: 0.5 MS
MDC_IDC_SET_LEADCHNL_LV_SENSING_ADAPTATION_MODE: NORMAL
MDC_IDC_SET_LEADCHNL_LV_SENSING_ANODE_ELECTRODE_1: NORMAL
MDC_IDC_SET_LEADCHNL_LV_SENSING_ANODE_LOCATION_1: NORMAL
MDC_IDC_SET_LEADCHNL_LV_SENSING_CATHODE_ELECTRODE_1: NORMAL
MDC_IDC_SET_LEADCHNL_LV_SENSING_CATHODE_LOCATION_1: NORMAL
MDC_IDC_SET_LEADCHNL_LV_SENSING_SENSITIVITY: 1 MV
MDC_IDC_SET_LEADCHNL_RA_PACING_POLARITY: NORMAL
MDC_IDC_SET_LEADCHNL_RA_SENSING_ADAPTATION_MODE: NORMAL
MDC_IDC_SET_LEADCHNL_RA_SENSING_POLARITY: NORMAL
MDC_IDC_SET_LEADCHNL_RA_SENSING_SENSITIVITY: 0.15 MV
MDC_IDC_SET_LEADCHNL_RV_PACING_AMPLITUDE: 2.5 V
MDC_IDC_SET_LEADCHNL_RV_PACING_POLARITY: NORMAL
MDC_IDC_SET_LEADCHNL_RV_PACING_PULSEWIDTH: 0.5 MS
MDC_IDC_SET_LEADCHNL_RV_SENSING_ADAPTATION_MODE: NORMAL
MDC_IDC_SET_LEADCHNL_RV_SENSING_POLARITY: NORMAL
MDC_IDC_SET_LEADCHNL_RV_SENSING_SENSITIVITY: 0.6 MV
MDC_IDC_SET_ZONE_DETECTION_INTERVAL: 273 MS
MDC_IDC_SET_ZONE_DETECTION_INTERVAL: 353 MS
MDC_IDC_SET_ZONE_DETECTION_INTERVAL: 400 MS
MDC_IDC_SET_ZONE_TYPE: NORMAL
MDC_IDC_SET_ZONE_VENDOR_TYPE: NORMAL
MDC_IDC_STAT_BRADY_DTM_END: NORMAL
MDC_IDC_STAT_BRADY_DTM_START: NORMAL
MDC_IDC_STAT_BRADY_RA_PERCENT_PACED: 0 %
MDC_IDC_STAT_BRADY_RV_PERCENT_PACED: 100 %
MDC_IDC_STAT_CRT_DTM_END: NORMAL
MDC_IDC_STAT_CRT_DTM_START: NORMAL
MDC_IDC_STAT_CRT_LV_PERCENT_PACED: 100 %
MDC_IDC_STAT_EPISODE_RECENT_COUNT: 0
MDC_IDC_STAT_EPISODE_RECENT_COUNT: 34
MDC_IDC_STAT_EPISODE_RECENT_COUNT_DTM_END: NORMAL
MDC_IDC_STAT_EPISODE_RECENT_COUNT_DTM_START: NORMAL
MDC_IDC_STAT_EPISODE_TYPE: NORMAL
MDC_IDC_STAT_EPISODE_VENDOR_TYPE: NORMAL
MDC_IDC_STAT_TACHYTHERAPY_ATP_DELIVERED_RECENT: 0
MDC_IDC_STAT_TACHYTHERAPY_ATP_DELIVERED_TOTAL: 6
MDC_IDC_STAT_TACHYTHERAPY_RECENT_DTM_END: NORMAL
MDC_IDC_STAT_TACHYTHERAPY_RECENT_DTM_START: NORMAL
MDC_IDC_STAT_TACHYTHERAPY_SHOCKS_ABORTED_RECENT: 0
MDC_IDC_STAT_TACHYTHERAPY_SHOCKS_ABORTED_TOTAL: 2
MDC_IDC_STAT_TACHYTHERAPY_SHOCKS_DELIVERED_RECENT: 0
MDC_IDC_STAT_TACHYTHERAPY_SHOCKS_DELIVERED_TOTAL: 9
MDC_IDC_STAT_TACHYTHERAPY_TOTAL_DTM_END: NORMAL

## 2019-05-17 PROCEDURE — 85610 PROTHROMBIN TIME: CPT | Mod: QW

## 2019-05-17 PROCEDURE — 99207 ZZC NO CHARGE NURSE ONLY: CPT

## 2019-05-17 PROCEDURE — 36416 COLLJ CAPILLARY BLOOD SPEC: CPT

## 2019-05-17 NOTE — PROGRESS NOTES
ANTICOAGULATION FOLLOW-UP CLINIC VISIT    Patient Name:  Tyrel Rene  Date:  2019  Contact Type:  Face to Face    SUBJECTIVE:  Patient Findings     Comments:   The patient was assessed for diet, medication, and activity level changes, missed or extra doses, bruising or bleeding, with no problem findings.          Clinical Outcomes     Comments:   The patient was assessed for diet, medication, and activity level changes, missed or extra doses, bruising or bleeding, with no problem findings.             OBJECTIVE    INR Protime   Date Value Ref Range Status   2019 2.6 (A) 0.86 - 1.14 Final       ASSESSMENT / PLAN  INR assessment THER    Recheck INR In: 4 WEEKS    INR Location Clinic      Anticoagulation Summary  As of 2019    INR goal:   2.0-2.5   TTR:   41.1 % (3.1 y)   INR used for dosin.6! (2019)   Warfarin maintenance plan:   2.5 mg (2.5 mg x 1) every Mon; 1.25 mg (2.5 mg x 0.5) all other days   Full warfarin instructions:   2.5 mg every Mon; 1.25 mg all other days   Weekly warfarin total:   10 mg   Plan last modified:   Doreen Finley RN (3/22/2019)   Next INR check:   2019   Target end date:   Indefinite    Indications    Long-term (current) use of anticoagulants [Z79.01] [Z79.01]  Cerebral artery occlusion with cerebral infarction (HCC) [I63.50] [I63.50]  Cerebral infarction (H) [I63.9]             Anticoagulation Episode Summary     INR check location:   [facility]    Preferred lab:       Send INR reminders to:   [facility] INR/PROTIME    Comments:         Anticoagulation Care Providers     Provider Role Specialty Phone number    Dejon Downs MD [facility] [phone]            See the Encounter Report to view Anticoagulation Flowsheet and Dosing Calendar (Go to Encounters tab in chart review, and find the Anticoagulation Therapy Visit)        Doreen Finley, RN

## 2019-06-02 DIAGNOSIS — Z95.1 POSTSURGICAL AORTOCORONARY BYPASS STATUS: ICD-10-CM

## 2019-06-03 RX ORDER — NITROGLYCERIN 0.4 MG/1
TABLET SUBLINGUAL
Qty: 25 TABLET | Refills: 0 | Status: SHIPPED | OUTPATIENT
Start: 2019-06-03 | End: 2019-06-28

## 2019-06-03 NOTE — TELEPHONE ENCOUNTER
"Requested Prescriptions   Pending Prescriptions Disp Refills     nitroGLYcerin (NITROSTAT) 0.4 MG sublingual tablet [Pharmacy Med Name: NITROGLYCERIN 0.4 MG TABLET SL]  Last Written Prescription Date:  8/8/17  Last Fill Quantity: 25,  # refills: 0   Last office visit: 5/17/2019 with prescribing provider:  junie   Future Office Visit:   Next 5 appointments (look out 90 days)    Aug 08, 2019  9:45 AM CDT  Return Visit with Parish Newman MD  [facility] ([facility]) [address] [place] [id number] [phone] OPT 2          25 tablet 0     Sig: DISSOLVE 1 TABLET UNDER THE TONGUE EVERY 5 MINUTES AS NEEDED FOR CHEST PAIN       Nitrates Failed - 6/2/2019  4:47 PM        Failed - Pt is not on erectile dysfunction medications        Failed - Sublingual nitro order needs review     If refill exceeds 1 bottle per month, please forward request to provider.           Passed - Blood pressure under 140/90 in past 12 months     BP Readings from Last 3 Encounters:   02/05/19 116/76   01/16/19 116/74   01/11/19 114/70                 Passed - Recent (12 mo) or future (30 days) visit within the authorizing provider's specialty     Patient had office visit in the last 12 months or has a visit in the next 30 days with authorizing provider or within the authorizing provider's specialty.  See \"Patient Info\" tab in inbasket, or \"Choose Columns\" in Meds & Orders section of the refill encounter.              Passed - Medication is active on med list        Passed - Patient is age 18 or older          "

## 2019-06-04 DIAGNOSIS — E78.2 MIXED HYPERLIPIDEMIA: ICD-10-CM

## 2019-06-04 RX ORDER — ROSUVASTATIN CALCIUM 20 MG/1
20 TABLET, COATED ORAL DAILY
Qty: 90 TABLET | Refills: 0 | Status: SHIPPED | OUTPATIENT
Start: 2019-06-04 | End: 2019-08-08

## 2019-06-10 ENCOUNTER — DOCUMENTATION ONLY (OUTPATIENT)
Dept: CARDIOLOGY | Facility: CLINIC | Age: 74
End: 2019-06-10

## 2019-06-10 NOTE — TELEPHONE ENCOUNTER
Remote alert received for NSVT episodes. EGMs show dual arrhythmias- atrial fib with NSVT. One EGM shows 14 beats of NSVT with rates in 180s. The other shows patient going in/out of NSVT with the longest run lasting 6 beats with rate in 160s. Patient had previous VT ablation and is already on amio. Patient is scheduled to follow up with Lauren in July.

## 2019-06-12 ENCOUNTER — ANTICOAGULATION THERAPY VISIT (OUTPATIENT)
Dept: NURSING | Facility: CLINIC | Age: 74
End: 2019-06-12
Payer: COMMERCIAL

## 2019-06-12 DIAGNOSIS — I63.50 CEREBRAL ARTERY OCCLUSION WITH CEREBRAL INFARCTION (H): ICD-10-CM

## 2019-06-12 DIAGNOSIS — I63.9 CEREBRAL INFARCTION (H): ICD-10-CM

## 2019-06-12 LAB — INR POINT OF CARE: 3.3 (ref 0.86–1.14)

## 2019-06-12 PROCEDURE — 36416 COLLJ CAPILLARY BLOOD SPEC: CPT

## 2019-06-12 PROCEDURE — 99207 ZZC NO CHARGE NURSE ONLY: CPT

## 2019-06-12 PROCEDURE — 85610 PROTHROMBIN TIME: CPT | Mod: QW

## 2019-06-12 NOTE — PROGRESS NOTES
ANTICOAGULATION FOLLOW-UP CLINIC VISIT    Patient Name:  Tyrel Rene  Date:  6/12/2019  Contact Type:  Face to Face    SUBJECTIVE:  Patient Findings     Comments:   The patient was assessed for diet, medication, and activity level changes, missed or extra doses, bruising or bleeding, with no problem findings.          Clinical Outcomes     Comments:   The patient was assessed for diet, medication, and activity level changes, missed or extra doses, bruising or bleeding, with no problem findings.             OBJECTIVE    INR Protime   Date Value Ref Range Status   06/12/2019 3.3 (A) 0.86 - 1.14 Final       ASSESSMENT / PLAN  INR assessment SUPRA    Recheck INR In: 2 WEEKS    INR Location Clinic      Anticoagulation Summary  As of 6/12/2019    INR goal:   2.0-2.5   TTR:   40.1 % (3.2 y)   INR used for dosing:   3.3! (6/12/2019)   Warfarin maintenance plan:   2.5 mg (2.5 mg x 1) every Mon; 1.25 mg (2.5 mg x 0.5) all other days   Full warfarin instructions:   6/12: Hold; Otherwise 2.5 mg every Mon; 1.25 mg all other days   Weekly warfarin total:   10 mg   Plan last modified:   Doreen Finley RN (3/22/2019)   Next INR check:   6/26/2019   Target end date:   Indefinite    Indications    Long-term (current) use of anticoagulants [Z79.01] [Z79.01]  Cerebral artery occlusion with cerebral infarction (HCC) [I63.50] [I63.50]  Cerebral infarction (H) [I63.9]             Anticoagulation Episode Summary     INR check location:   [facility]    Preferred lab:       Send INR reminders to:   [facility] INR/PROTIME    Comments:         Anticoagulation Care Providers     Provider Role Specialty Phone number    YareliDejon clancy MD [facility] [phone]            See the Encounter Report to view Anticoagulation Flowsheet and Dosing Calendar (Go to Encounters tab in chart review, and find the Anticoagulation Therapy Visit)        Doreen Finley RN

## 2019-06-26 ENCOUNTER — TELEPHONE (OUTPATIENT)
Dept: CARDIOLOGY | Facility: CLINIC | Age: 74
End: 2019-06-26

## 2019-06-26 ENCOUNTER — ANTICOAGULATION THERAPY VISIT (OUTPATIENT)
Dept: NURSING | Facility: CLINIC | Age: 74
End: 2019-06-26
Payer: COMMERCIAL

## 2019-06-26 DIAGNOSIS — I47.20 VENTRICULAR TACHYCARDIA (H): ICD-10-CM

## 2019-06-26 DIAGNOSIS — Z79.01 LONG TERM CURRENT USE OF ANTICOAGULANT THERAPY: ICD-10-CM

## 2019-06-26 DIAGNOSIS — I63.9 CEREBRAL INFARCTION (H): ICD-10-CM

## 2019-06-26 DIAGNOSIS — I63.50 CEREBRAL ARTERY OCCLUSION WITH CEREBRAL INFARCTION (H): ICD-10-CM

## 2019-06-26 LAB — INR POINT OF CARE: 2.1 (ref 0.86–1.14)

## 2019-06-26 PROCEDURE — 36416 COLLJ CAPILLARY BLOOD SPEC: CPT

## 2019-06-26 PROCEDURE — 99207 ZZC NO CHARGE NURSE ONLY: CPT

## 2019-06-26 PROCEDURE — 85610 PROTHROMBIN TIME: CPT | Mod: QW

## 2019-06-26 NOTE — PROGRESS NOTES
ANTICOAGULATION FOLLOW-UP CLINIC VISIT    Patient Name:  Tyrel Rene  Date:  2019  Contact Type:  Face to Face    SUBJECTIVE:  Patient Findings     Comments:   The patient was assessed for diet, medication, and activity level changes, missed or extra doses, bruising or bleeding, with no problem findings.          Clinical Outcomes     Comments:   The patient was assessed for diet, medication, and activity level changes, missed or extra doses, bruising or bleeding, with no problem findings.             OBJECTIVE    INR Protime   Date Value Ref Range Status   2019 2.1 (A) 0.86 - 1.14 Final       ASSESSMENT / PLAN  INR assessment THER    Recheck INR In: 2 WEEKS    INR Location Clinic      Anticoagulation Summary  As of 2019    INR goal:   2.0-2.5   TTR:   40.1 % (3.2 y)   INR used for dosin.1 (2019)   Warfarin maintenance plan:   2.5 mg (2.5 mg x 1) every Mon; 1.25 mg (2.5 mg x 0.5) all other days   Full warfarin instructions:   2.5 mg every Mon; 1.25 mg all other days   Weekly warfarin total:   10 mg   Plan last modified:   Doreen Finley RN (3/22/2019)   Next INR check:   7/10/2019   Target end date:   Indefinite    Indications    Long-term (current) use of anticoagulants [Z79.01] [Z79.01]  Cerebral artery occlusion with cerebral infarction (HCC) [I63.50] [I63.50]  Cerebral infarction (H) [I63.9]             Anticoagulation Episode Summary     INR check location:   [facility]    Preferred lab:       Send INR reminders to:   [facility]    Comments:         Anticoagulation Care Providers     Provider Role Specialty Phone number    Dejon Downs MD [facility] [phone]            See the Encounter Report to view Anticoagulation Flowsheet and Dosing Calendar (Go to Encounters tab in chart review, and find the Anticoagulation Therapy Visit)        Doreen Finley RN

## 2019-06-27 RX ORDER — AMIODARONE HYDROCHLORIDE 200 MG/1
200 TABLET ORAL DAILY
Qty: 90 TABLET | Refills: 3 | Status: SHIPPED | OUTPATIENT
Start: 2019-06-27 | End: 2019-08-08

## 2019-06-27 NOTE — TELEPHONE ENCOUNTER
Refill for amiodarone sent to [facility] - 3 months supply per patient request. SK

## 2019-06-28 DIAGNOSIS — Z95.1 POSTSURGICAL AORTOCORONARY BYPASS STATUS: ICD-10-CM

## 2019-06-28 NOTE — TELEPHONE ENCOUNTER
"Requested Prescriptions   Pending Prescriptions Disp Refills     nitroGLYcerin (NITROSTAT) 0.4 MG sublingual tablet [Pharmacy Med Name: NITROGLYCERIN 0.4 MG TABLET SL]  Last Written Prescription Date:  6/3/2019  Last Fill Quantity: 25 tablet,  # refills: 0   Last office visit: 11/26/2018 with prescribing provider:  Yareli   Future Office Visit:   Next 5 appointments (look out 90 days)    Aug 08, 2019  9:45 AM CDT  Return Visit with Parish Newman MD  [facility] ([facility]) [address] [place] [id number] [phone] OPT 2          25 tablet 0     Sig: DISSOLVE 1 TABLET UNDER THE TONGUE EVERY 5 MINUTES AS NEEDED FOR CHEST PAIN       Nitrates Failed - 6/28/2019 12:31 PM        Failed - Pt is not on erectile dysfunction medications        Failed - Sublingual nitro order needs review     If refill exceeds 1 bottle per month, please forward request to provider.           Passed - Blood pressure under 140/90 in past 12 months     BP Readings from Last 3 Encounters:   02/05/19 116/76   01/16/19 116/74   01/11/19 114/70                 Passed - Recent (12 mo) or future (30 days) visit within the authorizing provider's specialty     Patient had office visit in the last 12 months or has a visit in the next 30 days with authorizing provider or within the authorizing provider's specialty.  See \"Patient Info\" tab in inbasket, or \"Choose Columns\" in Meds & Orders section of the refill encounter.              Passed - Medication is active on med list        Passed - Patient is age 18 or older           "

## 2019-06-28 NOTE — TELEPHONE ENCOUNTER
Routing refill request to provider for review/approval because:       Nitrates Failed - 6/28/2019 12:31 PM           Failed - Pt is not on erectile dysfunction medications           Failed - Sublingual nitro order needs review

## 2019-06-30 RX ORDER — NITROGLYCERIN 0.4 MG/1
TABLET SUBLINGUAL
Qty: 25 TABLET | Refills: 0 | Status: SHIPPED | OUTPATIENT
Start: 2019-06-30 | End: 2021-01-01

## 2019-07-01 ENCOUNTER — DOCUMENTATION ONLY (OUTPATIENT)
Dept: CARDIOLOGY | Facility: CLINIC | Age: 74
End: 2019-07-01

## 2019-07-01 NOTE — TELEPHONE ENCOUNTER
[facility] alert -     10 more NSVT episodes since 6/24 - 2 EGMs- both show him going in and out of NSVT (longest run 6 beats), usually 2-3 beats, rates in the 150s. He is seeing Lauren in a few weeks. SK

## 2019-07-08 ENCOUNTER — DOCUMENTATION ONLY (OUTPATIENT)
Dept: CARDIOLOGY | Facility: CLINIC | Age: 74
End: 2019-07-08

## 2019-07-08 NOTE — TELEPHONE ENCOUNTER
[place] [facility] CRT-D    Remote alert for NSVT. 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 He is on Amiodarone. 10 more NSVT logged in past 1 week. Only 2 EGMs available for review, these both showed short bursts 4 & 6 beats of probable VT. Will continue to monitor for longer events. AGAPITO Valiente

## 2019-07-10 ENCOUNTER — ANTICOAGULATION THERAPY VISIT (OUTPATIENT)
Dept: NURSING | Facility: CLINIC | Age: 74
End: 2019-07-10
Payer: COMMERCIAL

## 2019-07-10 DIAGNOSIS — I63.9 CEREBRAL INFARCTION (H): ICD-10-CM

## 2019-07-10 DIAGNOSIS — I63.50 CEREBRAL ARTERY OCCLUSION WITH CEREBRAL INFARCTION (H): ICD-10-CM

## 2019-07-10 DIAGNOSIS — Z79.01 LONG TERM CURRENT USE OF ANTICOAGULANT THERAPY: ICD-10-CM

## 2019-07-10 LAB — INR POINT OF CARE: 2.3 (ref 0.86–1.14)

## 2019-07-10 PROCEDURE — 36416 COLLJ CAPILLARY BLOOD SPEC: CPT

## 2019-07-10 PROCEDURE — 85610 PROTHROMBIN TIME: CPT | Mod: QW

## 2019-07-10 PROCEDURE — 99207 ZZC NO CHARGE NURSE ONLY: CPT

## 2019-07-10 NOTE — PROGRESS NOTES
ANTICOAGULATION FOLLOW-UP CLINIC VISIT    Patient Name:  Tyrel Rene  Date:  7/10/2019  Contact Type:  Face to Face    SUBJECTIVE:  Patient Findings     Comments:   The patient was assessed for diet, medication, and activity level changes, missed or extra doses, bruising or bleeding, with no problem findings.          Clinical Outcomes     Comments:   The patient was assessed for diet, medication, and activity level changes, missed or extra doses, bruising or bleeding, with no problem findings.             OBJECTIVE    INR Protime   Date Value Ref Range Status   07/10/2019 2.3 (A) 0.86 - 1.14 Final       ASSESSMENT / PLAN  INR assessment THER    Recheck INR In: 3 WEEKS    INR Location Clinic      Anticoagulation Summary  As of 7/10/2019    INR goal:   2.0-2.5   TTR:   40.8 % (3.3 y)   INR used for dosin.3 (7/10/2019)   Warfarin maintenance plan:   2.5 mg (2.5 mg x 1) every Mon; 1.25 mg (2.5 mg x 0.5) all other days   Full warfarin instructions:   2.5 mg every Mon; 1.25 mg all other days   Weekly warfarin total:   10 mg   Plan last modified:   Doreen Finley RN (3/22/2019)   Next INR check:   2019   Target end date:   Indefinite    Indications    Long-term (current) use of anticoagulants [Z79.01] [Z79.01]  Cerebral artery occlusion with cerebral infarction (HCC) [I63.50] [I63.50]  Cerebral infarction (H) [I63.9]             Anticoagulation Episode Summary     INR check location:   Anticoagulation Clinic    Preferred lab:       Send INR reminders to:   [facility]    Comments:         Anticoagulation Care Providers     Provider Role Specialty Phone number    Dejon Downs MD [facility] [phone]            See the Encounter Report to view Anticoagulation Flowsheet and Dosing Calendar (Go to Encounters tab in chart review, and find the Anticoagulation Therapy Visit)        Doreen Finley RN

## 2019-07-31 ENCOUNTER — ANTICOAGULATION THERAPY VISIT (OUTPATIENT)
Dept: NURSING | Facility: CLINIC | Age: 74
End: 2019-07-31
Payer: COMMERCIAL

## 2019-07-31 ENCOUNTER — OFFICE VISIT (OUTPATIENT)
Dept: CARDIOLOGY | Facility: CLINIC | Age: 74
End: 2019-07-31
Attending: INTERNAL MEDICINE
Payer: COMMERCIAL

## 2019-07-31 VITALS
BODY MASS INDEX: 24.39 KG/M2 | SYSTOLIC BLOOD PRESSURE: 113 MMHG | DIASTOLIC BLOOD PRESSURE: 69 MMHG | HEIGHT: 73 IN | HEART RATE: 65 BPM | WEIGHT: 184 LBS

## 2019-07-31 DIAGNOSIS — I25.5 ISCHEMIC CARDIOMYOPATHY: ICD-10-CM

## 2019-07-31 DIAGNOSIS — I63.50 CEREBRAL ARTERY OCCLUSION WITH CEREBRAL INFARCTION (H): ICD-10-CM

## 2019-07-31 DIAGNOSIS — Z79.899 ON AMIODARONE THERAPY: ICD-10-CM

## 2019-07-31 DIAGNOSIS — I63.9 CEREBRAL INFARCTION (H): ICD-10-CM

## 2019-07-31 DIAGNOSIS — Z79.01 LONG TERM CURRENT USE OF ANTICOAGULANT THERAPY: ICD-10-CM

## 2019-07-31 DIAGNOSIS — I47.20 VENTRICULAR TACHYCARDIA (H): Primary | ICD-10-CM

## 2019-07-31 LAB — INR POINT OF CARE: 2.5 (ref 0.86–1.14)

## 2019-07-31 PROCEDURE — 36416 COLLJ CAPILLARY BLOOD SPEC: CPT

## 2019-07-31 PROCEDURE — 85610 PROTHROMBIN TIME: CPT | Mod: QW

## 2019-07-31 PROCEDURE — 99213 OFFICE O/P EST LOW 20 MIN: CPT | Performed by: NURSE PRACTITIONER

## 2019-07-31 ASSESSMENT — MIFFLIN-ST. JEOR: SCORE: 1623.5

## 2019-07-31 NOTE — LETTER
7/31/2019    Dejon Downs MD  [address]  [facility] [address]    RE: Tyrel Rene       Dear Colleague,    I had the pleasure of seeing Tyrel Rene in the [facility].    HPI and Plan: [id number]  See dictation    Orders Placed This Encounter   Procedures     Follow-Up with Electrophysiologist       No orders of the defined types were placed in this encounter.      There are no discontinued medications.      Encounter Diagnosis   Name Primary?     Ventricular tachycardia (H)        CURRENT MEDICATIONS:  Current Outpatient Medications   Medication Sig Dispense Refill     amiodarone (PACERONE/CODARONE) 200 MG tablet Take 1 tablet (200 mg) by mouth daily 90 tablet 3     ASPIRIN NOT PRESCRIBED (INTENTIONAL) continuous prn for other Please choose reason not prescribed, below       carvedilol (COREG) 3.125 MG tablet Take 2 tablets (6.25 mg) by mouth 2 times daily (with meals) 360 tablet 3     digoxin (LANOXIN) 125 MCG tablet Take 1 tablet (125 mcg) by mouth daily 90 tablet 2     ipratropium (ATROVENT) 0.03 % spray Spray 2 sprays into both nostrils every 8 hours as needed for rhinitis 30 mL 11     lisinopril (PRINIVIL/ZESTRIL) 5 MG tablet Take 1 tablet (5 mg) by mouth At Bedtime 180 tablet 3     Multiple Vitamins-Minerals (PRESERVISION AREDS 2 PO) Take 1 capsule by mouth 2 times daily       nitroGLYcerin (NITROSTAT) 0.4 MG sublingual tablet DISSOLVE 1 TABLET UNDER THE TONGUE EVERY 5 MINUTES AS NEEDED FOR CHEST PAIN 25 tablet 0     ranitidine (ZANTAC) 150 MG tablet Take 1 tablet (150 mg) by mouth 2 times daily 180 tablet      rosuvastatin (CRESTOR) 20 MG tablet Take 1 tablet (20 mg) by mouth daily 90 tablet 0     sildenafil (VIAGRA) 100 MG tablet Take 1 tablet (100 mg) by mouth daily as needed Take 30 min to 4 hours before intercourse.  Never use with nitroglycerin, terazosin or doxazosin. 16 tablet 3     Vitamin D, Cholecalciferol, 1000 UNITS TABS Take 1,000 mg by mouth daily         warfarin (COUMADIN) 2.5 MG tablet Take by mouth daily 2.5 mg mon, and 1.25 mg row         ALLERGIES     Allergies   Allergen Reactions     Nystatin Other (See Comments)     Make his mouth numb & swelling       PAST MEDICAL HISTORY:  Past Medical History:   Diagnosis Date     Atrial fibrillation (H)     s/p Cardioversion 3/14/2013     Atrial flutter (H)     S/p Aflutter ablation 1/11/2011     CAD (coronary artery disease)     CABG x3 10/2012- LIMA to distal LAD, SVG to OM1 & OM3; cath 10/2012- PTCA to second diagonal and mid LAD, BMS to mid LAD     Cardiogenic shock (H)      Cardiomyopathy (H)      CHF (congestive heart failure) (H)      CVA (cerebral infarction)     residual right hand numbness     ED (erectile dysfunction)      Hyperlipidemia      Hypertension      Neuropathy      Palpitations      SVT (supraventricular tachycardia) (H)     S/p dual chamber ICD 10/11/12- upgraded to BIV ICD 6/2013     Syncope        PAST SURGICAL HISTORY:  Past Surgical History:   Procedure Laterality Date     BYPASS GRAFT ARTERY CORONARY  10/2/2012    Procedure: BYPASS GRAFT ARTERY CORONARY;  Coronary Artery Bypass Graft x3 (LAD, Diag, OM) with Endovein [place] (On-Pump);  Surgeon: Yeyo Lyman MD;  Location:  OR     CARDIOVERSION  3/14/2013     CORONARY ANGIOGRAPHY ADULT ORDER  10/2/12     CORONARY ARTERY BYPASS  10/2/12    LIMA to LAD, SVG to OM1 and OM3     EP ABLATION VT N/A 1/2/2019    Procedure: EP Ablation VT;  Surgeon: Suellen Costello MD;  Location:  HEART CARDIAC CATH LAB     EP COMPREHENSIVE EP STUDY N/A 1/2/2019    Procedure: EP Comprehensive EP Study;  Surgeon: Suellen Costello MD;  Location:  HEART CARDIAC CATH LAB     H ABLATION ATRIAL FLUTTER  1/11/2011     HAND SURGERY       HEART CATH, ANGIOPLASTY  10/2/12    PTCA to second diagonal and mid LAD, BMS to mid LAD     HERNIA REPAIR       ORTHOPEDIC SURGERY Right 2006    cut on table saw     RELOCATE GENERATOR  ICD/PACEMAKER         FAMILY HISTORY:  Family History   Problem Relation Age of Onset     Alcohol/Drug Father      Heart Disease Father 71     Alzheimer Disease Sister      Alcohol/Drug Sister      Alcohol/Drug Sister      Gastrointestinal Disease Sister      Obesity Sister      Hypertension Sister      Heart Disease Sister      Hypertension Sister      Heart Disease Daughter         afib     Arrhythmia Daughter      Multiple Sclerosis Daughter      Heart Disease Daughter         afib     Arrhythmia Daughter      Cancer Daughter         lymphoma     Aneurysm Mother        SOCIAL HISTORY:  Social History     Socioeconomic History     Marital status:      Spouse name: None     Number of children: None     Years of education: None     Highest education level: None   Occupational History     None   Social Needs     Financial resource strain: None     Food insecurity:     Worry: None     Inability: None     Transportation needs:     Medical: None     Non-medical: None   Tobacco Use     Smoking status: Former Smoker     Packs/day: 1.00     Years: 14.00     Pack years: 14.00     Types: Cigarettes     Last attempt to quit: 7/10/1972     Years since quittin.0     Smokeless tobacco: Never Used   Substance and Sexual Activity     Alcohol use: No     Drug use: No     Sexual activity: Yes     Partners: Female   Lifestyle     Physical activity:     Days per week: None     Minutes per session: None     Stress: None   Relationships     Social connections:     Talks on phone: None     Gets together: None     Attends Druze service: None     Active member of club or organization: None     Attends meetings of clubs or organizations: None     Relationship status: None     Intimate partner violence:     Fear of current or ex partner: None     Emotionally abused: None     Physically abused: None     Forced sexual activity: None   Other Topics Concern     Parent/sibling w/ CABG, MI or angioplasty before 65F 55M? No      " Service Not Asked     Blood Transfusions Not Asked     Caffeine Concern Yes     Comment: 4 cups coffee daily     Occupational Exposure Not Asked     Hobby Hazards Not Asked     Sleep Concern No     Stress Concern No     Weight Concern Yes     Comment: gain 2lbs     Special Diet Yes     Comment: low sodium     Back Care Not Asked     Exercise Yes     Comment: walking, doing sittups, played pickle ball in summer     Bike Helmet Not Asked     Seat Belt Yes     Self-Exams Not Asked   Social History Narrative     None       Review of Systems:  Skin:  Negative       Eyes:  Positive for glasses    ENT:  Negative hoarseness    Respiratory:  Negative       Cardiovascular:  Negative lightheadedness;dizziness;Positive for Feels the dizziness is worse since the amiodarone  Gastroenterology: Negative nausea;diarrhea treated  Genitourinary:  Positive for nocturia new diarrhea occasionally, maybe once a week- new in the last 3-4 months   Musculoskeletal:  Negative      Neurologic:  Positive for stroke history of stroke in 2011, head injury in 2014, right hand has issues with feeling/identification  Psychiatric:  Negative      Heme/Lymph/Imm:  Negative easy bruising coumadin  Endocrine:  Negative        Physical Exam:  Vitals: /69   Pulse 65   Ht 1.854 m (6' 1\")   Wt 83.5 kg (184 lb)   BMI 24.28 kg/m       Constitutional:  cooperative, alert and oriented, well developed, well nourished, in no acute distress        Skin:  warm and dry to the touch, no apparent skin lesions or masses noted   ICD incision in the left infraclavicular area was well-healed      Head:  normocephalic, no masses or lesions        Eyes:  pupils equal and round;conjunctivae and lids unremarkable;sclera white;no xanthalasma        Lymph:No Cervical lymphadenopathy present     ENT:  no pallor or cyanosis        Neck:  JVP normal        Respiratory:  clear to auscultation;healed median sternotomy scar         Cardiac: regular rhythm;no " murmurs, gallops or rubs detected                not assessed this visit                                        GI:  not assessed this visit        Extremities and Muscular Skeletal:  no edema              Neurological:  no gross motor deficits;affect appropriate        Psych:  Alert and Oriented x 3;affect appropriate, oriented to time, person and place        CC  Suellen Costello MD  [address]  [place] [address]                Thank you for allowing me to participate in the care of your patient.      Sincerely,     Lauren Jorgensen, NP, APRN CNP     [facility]    cc:   Suellen Costello MD  [address]  [place] [address]

## 2019-07-31 NOTE — PATIENT INSTRUCTIONS
Call my nurse with any questions or concerns:  [phone]  *If you have concerns after hours, please call [phone], option 2 to speak with on call Cardiologist.      Consider the dizzy clinic

## 2019-07-31 NOTE — PROGRESS NOTES
HPI and Plan: [id number]  See dictation    Orders Placed This Encounter   Procedures     Follow-Up with Electrophysiologist       No orders of the defined types were placed in this encounter.      There are no discontinued medications.      Encounter Diagnosis   Name Primary?     Ventricular tachycardia (H)        CURRENT MEDICATIONS:  Current Outpatient Medications   Medication Sig Dispense Refill     amiodarone (PACERONE/CODARONE) 200 MG tablet Take 1 tablet (200 mg) by mouth daily 90 tablet 3     ASPIRIN NOT PRESCRIBED (INTENTIONAL) continuous prn for other Please choose reason not prescribed, below       carvedilol (COREG) 3.125 MG tablet Take 2 tablets (6.25 mg) by mouth 2 times daily (with meals) 360 tablet 3     digoxin (LANOXIN) 125 MCG tablet Take 1 tablet (125 mcg) by mouth daily 90 tablet 2     ipratropium (ATROVENT) 0.03 % spray Spray 2 sprays into both nostrils every 8 hours as needed for rhinitis 30 mL 11     lisinopril (PRINIVIL/ZESTRIL) 5 MG tablet Take 1 tablet (5 mg) by mouth At Bedtime 180 tablet 3     Multiple Vitamins-Minerals (PRESERVISION AREDS 2 PO) Take 1 capsule by mouth 2 times daily       nitroGLYcerin (NITROSTAT) 0.4 MG sublingual tablet DISSOLVE 1 TABLET UNDER THE TONGUE EVERY 5 MINUTES AS NEEDED FOR CHEST PAIN 25 tablet 0     ranitidine (ZANTAC) 150 MG tablet Take 1 tablet (150 mg) by mouth 2 times daily 180 tablet      rosuvastatin (CRESTOR) 20 MG tablet Take 1 tablet (20 mg) by mouth daily 90 tablet 0     sildenafil (VIAGRA) 100 MG tablet Take 1 tablet (100 mg) by mouth daily as needed Take 30 min to 4 hours before intercourse.  Never use with nitroglycerin, terazosin or doxazosin. 16 tablet 3     Vitamin D, Cholecalciferol, 1000 UNITS TABS Take 1,000 mg by mouth daily        warfarin (COUMADIN) 2.5 MG tablet Take by mouth daily 2.5 mg mon, and 1.25 mg row         ALLERGIES     Allergies   Allergen Reactions     Nystatin Other (See Comments)     Make his mouth numb & swelling       PAST  MEDICAL HISTORY:  Past Medical History:   Diagnosis Date     Atrial fibrillation (H)     s/p Cardioversion 3/14/2013     Atrial flutter (H)     S/p Aflutter ablation 1/11/2011     CAD (coronary artery disease)     CABG x3 10/2012- LIMA to distal LAD, SVG to OM1 & OM3; cath 10/2012- PTCA to second diagonal and mid LAD, BMS to mid LAD     Cardiogenic shock (H)      Cardiomyopathy (H)      CHF (congestive heart failure) (H)      CVA (cerebral infarction)     residual right hand numbness     ED (erectile dysfunction)      Hyperlipidemia      Hypertension      Neuropathy      Palpitations      SVT (supraventricular tachycardia) (H)     S/p dual chamber ICD 10/11/12- upgraded to BIV ICD 6/2013     Syncope        PAST SURGICAL HISTORY:  Past Surgical History:   Procedure Laterality Date     BYPASS GRAFT ARTERY CORONARY  10/2/2012    Procedure: BYPASS GRAFT ARTERY CORONARY;  Coronary Artery Bypass Graft x3 (LAD, Diag, OM) with Endovein [place] (On-Pump);  Surgeon: Yeyo Lyman MD;  Location:  OR     CARDIOVERSION  3/14/2013     CORONARY ANGIOGRAPHY ADULT ORDER  10/2/12     CORONARY ARTERY BYPASS  10/2/12    LIMA to LAD, SVG to OM1 and OM3     EP ABLATION VT N/A 1/2/2019    Procedure: EP Ablation VT;  Surgeon: Suellen Costello MD;  Location:  HEART CARDIAC CATH LAB     EP COMPREHENSIVE EP STUDY N/A 1/2/2019    Procedure: EP Comprehensive EP Study;  Surgeon: Suellen Costello MD;  Location:  HEART CARDIAC CATH LAB     H ABLATION ATRIAL FLUTTER  1/11/2011     HAND SURGERY       HEART CATH, ANGIOPLASTY  10/2/12    PTCA to second diagonal and mid LAD, BMS to mid LAD     HERNIA REPAIR       ORTHOPEDIC SURGERY Right 2006    cut on table saw     RELOCATE GENERATOR ICD/PACEMAKER         FAMILY HISTORY:  Family History   Problem Relation Age of Onset     Alcohol/Drug Father      Heart Disease Father 71     Alzheimer Disease Sister      Alcohol/Drug Sister      Alcohol/Drug Sister       Gastrointestinal Disease Sister      Obesity Sister      Hypertension Sister      Heart Disease Sister      Hypertension Sister      Heart Disease Daughter         afib     Arrhythmia Daughter      Multiple Sclerosis Daughter      Heart Disease Daughter         afib     Arrhythmia Daughter      Cancer Daughter         lymphoma     Aneurysm Mother        SOCIAL HISTORY:  Social History     Socioeconomic History     Marital status:      Spouse name: None     Number of children: None     Years of education: None     Highest education level: None   Occupational History     None   Social Needs     Financial resource strain: None     Food insecurity:     Worry: None     Inability: None     Transportation needs:     Medical: None     Non-medical: None   Tobacco Use     Smoking status: Former Smoker     Packs/day: 1.00     Years: 14.00     Pack years: 14.00     Types: Cigarettes     Last attempt to quit: 7/10/1972     Years since quittin.0     Smokeless tobacco: Never Used   Substance and Sexual Activity     Alcohol use: No     Drug use: No     Sexual activity: Yes     Partners: Female   Lifestyle     Physical activity:     Days per week: None     Minutes per session: None     Stress: None   Relationships     Social connections:     Talks on phone: None     Gets together: None     Attends Jainism service: None     Active member of club or organization: None     Attends meetings of clubs or organizations: None     Relationship status: None     Intimate partner violence:     Fear of current or ex partner: None     Emotionally abused: None     Physically abused: None     Forced sexual activity: None   Other Topics Concern     Parent/sibling w/ CABG, MI or angioplasty before 65F 55M? No      Service Not Asked     Blood Transfusions Not Asked     Caffeine Concern Yes     Comment: 4 cups coffee daily     Occupational Exposure Not Asked     Hobby Hazards Not Asked     Sleep Concern No     Stress Concern No  "    Weight Concern Yes     Comment: gain 2lbs     Special Diet Yes     Comment: low sodium     Back Care Not Asked     Exercise Yes     Comment: walking, doing sittups, played pickle ball in summer     Bike Helmet Not Asked     Seat Belt Yes     Self-Exams Not Asked   Social History Narrative     None       Review of Systems:  Skin:  Negative       Eyes:  Positive for glasses    ENT:  Negative hoarseness    Respiratory:  Negative       Cardiovascular:  Negative lightheadedness;dizziness;Positive for Feels the dizziness is worse since the amiodarone  Gastroenterology: Negative nausea;diarrhea treated  Genitourinary:  Positive for nocturia new diarrhea occasionally, maybe once a week- new in the last 3-4 months   Musculoskeletal:  Negative      Neurologic:  Positive for stroke history of stroke in 2011, head injury in 2014, right hand has issues with feeling/identification  Psychiatric:  Negative      Heme/Lymph/Imm:  Negative easy bruising coumadin  Endocrine:  Negative        Physical Exam:  Vitals: /69   Pulse 65   Ht 1.854 m (6' 1\")   Wt 83.5 kg (184 lb)   BMI 24.28 kg/m      Constitutional:  cooperative, alert and oriented, well developed, well nourished, in no acute distress        Skin:  warm and dry to the touch, no apparent skin lesions or masses noted   ICD incision in the left infraclavicular area was well-healed      Head:  normocephalic, no masses or lesions        Eyes:  pupils equal and round;conjunctivae and lids unremarkable;sclera white;no xanthalasma        Lymph:No Cervical lymphadenopathy present     ENT:  no pallor or cyanosis        Neck:  JVP normal        Respiratory:  clear to auscultation;healed median sternotomy scar         Cardiac: regular rhythm;no murmurs, gallops or rubs detected                not assessed this visit                                        GI:  not assessed this visit        Extremities and Muscular Skeletal:  no edema              Neurological:  no gross " motor deficits;affect appropriate        Psych:  Alert and Oriented x 3;affect appropriate, oriented to time, person and place        CC  Suellen Costello MD  [address]  [place] [address]

## 2019-07-31 NOTE — LETTER
7/31/2019      Dejon Downs MD  [address]  [facility] [address]      RE: Tyrel Rene       Dear Colleague,    I had the pleasure of seeing Tyrel Rene in the [facility].    Service Date: 07/31/2019      HISTORY OF PRESENT ILLNESS:  Mr. Rene is a delightful 73-year-old gentleman that I am having the pleasure of seeing today in followup.  He is an established patient of Dr. Newman and is also followed by Dr. Costello for his electrophysiology needs.      PAST MEDICAL HISTORY:   1.  Severe ischemic cardiomyopathy secondary to a large anterior myocardial infarction.  EF 25%.   2.  Unstable ventricular tachyarrhythmias requiring ICD shock 11/2017.  Amiodarone was started at that time.  He had recurrent VF storm with 7 ICD discharges 11/2018.  He underwent catheter ablation for VT 01/02/2019 with Dr. Costello.   3.  Permanent atrial fibrillation with complete AV block on warfarin therapy.   4.  CVA following atrial flutter ablation in 2011.   5.  History of head trauma and bleed in 2014 secondary to mechanical fall.      At today's visit, Mr. Rene is doing well.  He denies any chest pain, shortness of breath, lightheadedness.  He does get dizziness which he feels is secondary to the amiodarone.  He appears to be euvolemic on exam and his weight is stable.      His last device interrogation on 05/16 showed 100% BiV paced.  Intrinsic rhythm was AFib.  Battery longevity is 2.5 years.  He had 34 episodes of nonsustained VT since his 02/05/2019 interrogation.  Fourteen of the EGMs were reviewed.  All showed nonsustained VT, 4-10 beats in duration, with heart rates ranging from 180-200 beats per minute.  He remains on amiodarone at 200 mg daily.  He is also on chronic anticoagulation with warfarin.      As mentioned above, dizziness is an issue for him.  He states it is not while he is sitting or laying down.  When he stands up, he is initially dizzy, but states that it  intermittently happens through the day, mostly with ambulation.  His wife stated that in  he suffered from a fall and did suffer from a head injury.  He was sent to a [facility] at that time.  She states that they recommended some exercises which they have not done for years.      I reviewed his device interrogation with him today.  He underwent an extensive ablation at the border of his large anterior septal apical scar.  The VT was not inducible at the end of the procedure.  All other review of systems are noted below.      ASSESSMENT AND PLAN:  Mr. Elizalde is a delightful 73-year-old gentleman here today for followup.  He is stable from a cardiac standpoint.      1.  Ventricular tachyarrhythmias.  He was a little disappointed to hear that he has had some nonsustained episodes, albeit very short in duration.  He will remain on amiodarone at this time.  Dr. Costello states that he would consider decreasing the daily amiodarone 6 times a week after the 1 year anniversary of his ablation.  The patient understands this is if he continues to remain stable.  His next device check will be completed in the fall.      2.  Ischemic cardiomyopathy with reduced ejection fraction.  His weight is stable.  He has been compliant with his diet as well as taking his medications.  He is followed by Dr. Newman in the [facility].  He is scheduled to have lab work, an echo, and a visit with Dr. Newman to review the results next week.      I have placed an order for him to follow up with Dr. Costello for his annual EP appointment in February.  If there are questions or concerns in the interim, I have encouraged him to please contact us.  No medication changes were warranted at today's visit.      Thank you for including us in his care.         JIM DODGE NP             D: 2019   T: 2019   MT: YESICA      Name:     ARTEM ELIZALDE   MRN:      [id number]        Account:      [id number]   :      1945            Service Date: 07/31/2019      Document: [id number]         Outpatient Encounter Medications as of 7/31/2019   Medication Sig Dispense Refill     amiodarone (PACERONE/CODARONE) 200 MG tablet Take 1 tablet (200 mg) by mouth daily 90 tablet 3     ASPIRIN NOT PRESCRIBED (INTENTIONAL) continuous prn for other Please choose reason not prescribed, below       carvedilol (COREG) 3.125 MG tablet Take 2 tablets (6.25 mg) by mouth 2 times daily (with meals) 360 tablet 3     digoxin (LANOXIN) 125 MCG tablet Take 1 tablet (125 mcg) by mouth daily 90 tablet 2     ipratropium (ATROVENT) 0.03 % spray Spray 2 sprays into both nostrils every 8 hours as needed for rhinitis 30 mL 11     lisinopril (PRINIVIL/ZESTRIL) 5 MG tablet Take 1 tablet (5 mg) by mouth At Bedtime 180 tablet 3     Multiple Vitamins-Minerals (PRESERVISION AREDS 2 PO) Take 1 capsule by mouth 2 times daily       nitroGLYcerin (NITROSTAT) 0.4 MG sublingual tablet DISSOLVE 1 TABLET UNDER THE TONGUE EVERY 5 MINUTES AS NEEDED FOR CHEST PAIN 25 tablet 0     ranitidine (ZANTAC) 150 MG tablet Take 1 tablet (150 mg) by mouth 2 times daily 180 tablet      rosuvastatin (CRESTOR) 20 MG tablet Take 1 tablet (20 mg) by mouth daily 90 tablet 0     sildenafil (VIAGRA) 100 MG tablet Take 1 tablet (100 mg) by mouth daily as needed Take 30 min to 4 hours before intercourse.  Never use with nitroglycerin, terazosin or doxazosin. 16 tablet 3     Vitamin D, Cholecalciferol, 1000 UNITS TABS Take 1,000 mg by mouth daily        warfarin (COUMADIN) 2.5 MG tablet Take by mouth daily 2.5 mg mon, and 1.25 mg row       No facility-administered encounter medications on file as of 7/31/2019.        Again, thank you for allowing me to participate in the care of your patient.      Sincerely,    Lauren Jorgensen NP, APRN [facility]

## 2019-07-31 NOTE — PROGRESS NOTES
Service Date: 07/31/2019      HISTORY OF PRESENT ILLNESS:  Mr. Rene is a delightful 73-year-old gentleman that I am having the pleasure of seeing today in followup.  He is an established patient of Dr. Newman and is also followed by Dr. Costello for his electrophysiology needs.      PAST MEDICAL HISTORY:   1.  Severe ischemic cardiomyopathy secondary to a large anterior myocardial infarction.  EF 25%.   2.  Unstable ventricular tachyarrhythmias requiring ICD shock 11/2017.  Amiodarone was started at that time.  He had recurrent VF storm with 7 ICD discharges 11/2018.  He underwent catheter ablation for VT 01/02/2019 with Dr. Costello.   3.  Permanent atrial fibrillation with complete AV block on warfarin therapy.   4.  CVA following atrial flutter ablation in 2011.   5.  History of head trauma and bleed in 2014 secondary to mechanical fall.      At today's visit, Mr. Rene is doing well.  He denies any chest pain, shortness of breath, lightheadedness.  He does get dizziness which he feels is secondary to the amiodarone.  He appears to be euvolemic on exam and his weight is stable.      His last device interrogation on 05/16 showed 100% BiV paced.  Intrinsic rhythm was AFib.  Battery longevity is 2.5 years.  He had 34 episodes of nonsustained VT since his 02/05/2019 interrogation.  Fourteen of the EGMs were reviewed.  All showed nonsustained VT, 4-10 beats in duration, with heart rates ranging from 180-200 beats per minute.  He remains on amiodarone at 200 mg daily.  He is also on chronic anticoagulation with warfarin.      As mentioned above, dizziness is an issue for him.  He states it is not while he is sitting or laying down.  When he stands up, he is initially dizzy, but states that it intermittently happens through the day, mostly with ambulation.  His wife stated that in 2014 he suffered from a fall and did suffer from a head injury.  He was sent to a [facility] at that time.  She states that they recommended some  exercises which they have not done for years.      I reviewed his device interrogation with him today.  He underwent an extensive ablation at the border of his large anterior septal apical scar.  The VT was not inducible at the end of the procedure.  All other review of systems are noted below.      ASSESSMENT AND PLAN:  Mr. Elizalde is a delightful 73-year-old gentleman here today for followup.  He is stable from a cardiac standpoint.      1.  Ventricular tachyarrhythmias.  He was a little disappointed to hear that he has had some nonsustained episodes, albeit very short in duration.  He will remain on amiodarone at this time.  Dr. Costello states that he would consider decreasing the daily amiodarone 6 times a week after the 1 year anniversary of his ablation.  The patient understands this is if he continues to remain stable.  His next device check will be completed in the fall.      2.  Ischemic cardiomyopathy with reduced ejection fraction.  His weight is stable.  He has been compliant with his diet as well as taking his medications.  He is followed by Dr. Newman in [facility].  He is scheduled to have lab work, an echo, and a visit with Dr. Newman to review the results next week.      I have placed an order for him to follow up with Dr. Costello for his annual EP appointment in February.  If there are questions or concerns in the interim, I have encouraged him to please contact us.  No medication changes were warranted at today's visit.      Thank you for including us in his care.         JIM DODGE NP             D: 2019   T: 2019   MT: YESICA      Name:     ARTEM ELIZALDE   MRN:      [id number]        Account:      [id number]   :      1945           Service Date: 2019      Document: [id number]

## 2019-07-31 NOTE — PROGRESS NOTES
ANTICOAGULATION FOLLOW-UP CLINIC VISIT    Patient Name:  Tyrel Rene  Date:  2019  Contact Type:  Face to Face    SUBJECTIVE:  Patient Findings     Comments:   Assessed for S/S clots, bleeds, medication, changes in health, diet and alcohol.  All negative        Clinical Outcomes     Negatives:   Major bleeding event, Thromboembolic event, Anticoagulation-related hospital admission, Anticoagulation-related ED visit, Anticoagulation-related fatality    Comments:   Assessed for S/S clots, bleeds, medication, changes in health, diet and alcohol.  All negative           OBJECTIVE    INR Protime   Date Value Ref Range Status   2019 2.5 (A) 0.86 - 1.14 Final       ASSESSMENT / PLAN  INR assessment THER    Recheck INR In: 3 WEEKS    INR Location Clinic      Anticoagulation Summary  As of 2019    INR goal:   2.0-2.5   TTR:   41.8 % (3.3 y)   INR used for dosin.5 (2019)   Warfarin maintenance plan:   2.5 mg (2.5 mg x 1) every Mon; 1.25 mg (2.5 mg x 0.5) all other days   Full warfarin instructions:   2.5 mg every Mon; 1.25 mg all other days   Weekly warfarin total:   10 mg   No change documented:   Sharon Ureña [facility]   Plan last modified:   Doreen Finley RN (3/22/2019)   Next INR check:   2019   Target end date:   Indefinite    Indications    Long-term (current) use of anticoagulants [Z79.01] [Z79.01]  Cerebral artery occlusion with cerebral infarction (HCC) [I63.50] [I63.50]  Cerebral infarction (H) [I63.9]             Anticoagulation Episode Summary     INR check location:   Anticoagulation Clinic    Preferred lab:       Send INR reminders to:   [facility]    Comments:         Anticoagulation Care Providers     Provider Role Specialty Phone number    Dejon Downs MD [facility] [phone]            See the Encounter Report to view Anticoagulation Flowsheet and Dosing Calendar (Go to Encounters tab in chart review, and find the  Anticoagulation Therapy Visit)    Planning trip to [place] in September, prefers recheck in 3 weeks to address anything if needed before leaving for several weeks in September.  Seeing cardiology today.    Sharon Ureña RP

## 2019-08-05 ENCOUNTER — HOSPITAL ENCOUNTER (OUTPATIENT)
Dept: CARDIOLOGY | Facility: CLINIC | Age: 74
Discharge: HOME | End: 2019-08-05
Attending: INTERNAL MEDICINE | Admitting: INTERNAL MEDICINE
Payer: COMMERCIAL

## 2019-08-05 DIAGNOSIS — I50.22 CHRONIC SYSTOLIC CONGESTIVE HEART FAILURE (H): ICD-10-CM

## 2019-08-05 DIAGNOSIS — Z79.899 ON AMIODARONE THERAPY: ICD-10-CM

## 2019-08-05 LAB
ALBUMIN SERPL-MCNC: 3.2 G/DL (ref 3.4–5)
ALP SERPL-CCNC: 54 U/L (ref 40–150)
ALT SERPL W P-5'-P-CCNC: 27 U/L (ref 0–70)
ANION GAP SERPL CALCULATED.3IONS-SCNC: 8.6 MMOL/L (ref 6–17)
AST SERPL W P-5'-P-CCNC: 22 U/L (ref 0–45)
BILIRUB DIRECT SERPL-MCNC: 0.2 MG/DL (ref 0–0.2)
BILIRUB SERPL-MCNC: 0.6 MG/DL (ref 0.2–1.3)
BUN SERPL-MCNC: 18 MG/DL (ref 7–30)
CALCIUM SERPL-MCNC: 8.7 MG/DL (ref 8.5–10.5)
CHLORIDE SERPL-SCNC: 106 MMOL/L (ref 98–107)
CO2 SERPL-SCNC: 27 MMOL/L (ref 23–29)
CREAT SERPL-MCNC: 1.48 MG/DL (ref 0.7–1.3)
GFR SERPL CREATININE-BSD FRML MDRD: 46 ML/MIN/{1.73_M2}
GLUCOSE SERPL-MCNC: 92 MG/DL (ref 70–105)
POTASSIUM SERPL-SCNC: 4.6 MMOL/L (ref 3.5–5.1)
PROT SERPL-MCNC: 6.7 G/DL (ref 6.8–8.8)
SODIUM SERPL-SCNC: 137 MMOL/L (ref 136–145)
TSH SERPL DL<=0.005 MIU/L-ACNC: 1.49 MU/L (ref 0.4–4)

## 2019-08-05 PROCEDURE — 25500064 ZZH RX 255 OP 636: Performed by: INTERNAL MEDICINE

## 2019-08-05 PROCEDURE — 80076 HEPATIC FUNCTION PANEL: CPT | Performed by: INTERNAL MEDICINE

## 2019-08-05 PROCEDURE — 80048 BASIC METABOLIC PNL TOTAL CA: CPT | Performed by: INTERNAL MEDICINE

## 2019-08-05 PROCEDURE — 84443 ASSAY THYROID STIM HORMONE: CPT | Performed by: INTERNAL MEDICINE

## 2019-08-05 PROCEDURE — 36415 COLL VENOUS BLD VENIPUNCTURE: CPT | Performed by: INTERNAL MEDICINE

## 2019-08-05 PROCEDURE — 40000264 ECHOCARDIOGRAM COMPLETE

## 2019-08-05 PROCEDURE — 93306 TTE W/DOPPLER COMPLETE: CPT | Mod: 26 | Performed by: INTERNAL MEDICINE

## 2019-08-05 RX ADMIN — HUMAN ALBUMIN MICROSPHERES AND PERFLUTREN 9 ML: 10; .22 INJECTION, SOLUTION INTRAVENOUS at 11:30

## 2019-08-07 DIAGNOSIS — I48.91 ATRIAL FIBRILLATION (H): ICD-10-CM

## 2019-08-07 RX ORDER — DIGOXIN 125 MCG
125 TABLET ORAL DAILY
Qty: 90 TABLET | Refills: 1 | Status: SHIPPED | OUTPATIENT
Start: 2019-08-07 | End: 2019-08-08

## 2019-08-08 ENCOUNTER — OFFICE VISIT (OUTPATIENT)
Dept: CARDIOLOGY | Facility: CLINIC | Age: 74
End: 2019-08-08
Payer: COMMERCIAL

## 2019-08-08 VITALS
HEART RATE: 68 BPM | DIASTOLIC BLOOD PRESSURE: 68 MMHG | HEIGHT: 73 IN | WEIGHT: 182.4 LBS | BODY MASS INDEX: 24.18 KG/M2 | SYSTOLIC BLOOD PRESSURE: 128 MMHG

## 2019-08-08 DIAGNOSIS — I48.0 PAROXYSMAL ATRIAL FIBRILLATION (H): ICD-10-CM

## 2019-08-08 DIAGNOSIS — I47.20 VT (VENTRICULAR TACHYCARDIA) (H): ICD-10-CM

## 2019-08-08 DIAGNOSIS — I25.5 ISCHEMIC CARDIOMYOPATHY: Primary | ICD-10-CM

## 2019-08-08 DIAGNOSIS — I50.22 CHRONIC SYSTOLIC CONGESTIVE HEART FAILURE (H): ICD-10-CM

## 2019-08-08 DIAGNOSIS — E78.2 MIXED HYPERLIPIDEMIA: ICD-10-CM

## 2019-08-08 DIAGNOSIS — I47.20 VENTRICULAR TACHYCARDIA (H): ICD-10-CM

## 2019-08-08 PROCEDURE — 99214 OFFICE O/P EST MOD 30 MIN: CPT | Performed by: INTERNAL MEDICINE

## 2019-08-08 RX ORDER — CARVEDILOL 3.12 MG/1
6.25 TABLET ORAL 2 TIMES DAILY WITH MEALS
Qty: 360 TABLET | Refills: 3 | Status: SHIPPED | OUTPATIENT
Start: 2019-08-08 | End: 2019-08-08

## 2019-08-08 RX ORDER — AMIODARONE HYDROCHLORIDE 200 MG/1
200 TABLET ORAL DAILY
Qty: 90 TABLET | Refills: 3 | Status: SHIPPED | OUTPATIENT
Start: 2019-08-08 | End: 2020-02-04

## 2019-08-08 RX ORDER — ROSUVASTATIN CALCIUM 20 MG/1
20 TABLET, COATED ORAL DAILY
Qty: 90 TABLET | Refills: 3 | Status: SHIPPED | OUTPATIENT
Start: 2019-08-08 | End: 2020-01-01

## 2019-08-08 RX ORDER — DIGOXIN 125 MCG
125 TABLET ORAL DAILY
Qty: 90 TABLET | Refills: 3 | Status: ON HOLD | OUTPATIENT
Start: 2019-08-08 | End: 2020-01-01

## 2019-08-08 RX ORDER — LISINOPRIL 5 MG/1
5 TABLET ORAL AT BEDTIME
Qty: 90 TABLET | Refills: 3 | Status: SHIPPED | OUTPATIENT
Start: 2019-08-08 | End: 2020-01-01

## 2019-08-08 RX ORDER — CARVEDILOL 6.25 MG/1
6.25 TABLET ORAL 2 TIMES DAILY WITH MEALS
Qty: 180 TABLET | Refills: 3 | Status: SHIPPED | OUTPATIENT
Start: 2019-08-08 | End: 2020-01-01

## 2019-08-08 ASSESSMENT — MIFFLIN-ST. JEOR: SCORE: 1616.24

## 2019-08-08 NOTE — LETTER
8/8/2019    Dejon Downs MD  [address]  [facility] [address]    RE: Tyrel Rene       Dear Colleague,    I had the pleasure of seeing Tyrel Rene in the [facility].    HPI and Plan:   See dictation    Orders Placed This Encounter   Procedures     Basic metabolic panel     Basic metabolic panel     Hemoglobin     CARDIAC REHAB REFERRAL     Follow-Up with [facility] - DAVE visit     Follow-Up with [facility] - Return MD visit     Echocardiogram Complete       Orders Placed This Encounter   Medications     amiodarone (PACERONE/CODARONE) 200 MG tablet     Sig: Take 1 tablet (200 mg) by mouth daily     Dispense:  90 tablet     Refill:  3     DISCONTD: carvedilol (COREG) 3.125 MG tablet     Sig: Take 2 tablets (6.25 mg) by mouth 2 times daily (with meals)     Dispense:  360 tablet     Refill:  3     digoxin (LANOXIN) 125 MCG tablet     Sig: Take 1 tablet (125 mcg) by mouth daily     Dispense:  90 tablet     Refill:  3     lisinopril (PRINIVIL/ZESTRIL) 5 MG tablet     Sig: Take 1 tablet (5 mg) by mouth At Bedtime     Dispense:  90 tablet     Refill:  3     rosuvastatin (CRESTOR) 20 MG tablet     Sig: Take 1 tablet (20 mg) by mouth daily     Dispense:  90 tablet     Refill:  3     carvedilol (COREG) 6.25 MG tablet     Sig: Take 1 tablet (6.25 mg) by mouth 2 times daily (with meals)     Dispense:  180 tablet     Refill:  3       Medications Discontinued During This Encounter   Medication Reason     amiodarone (PACERONE/CODARONE) 200 MG tablet Reorder     carvedilol (COREG) 3.125 MG tablet Reorder     digoxin (LANOXIN) 125 MCG tablet Reorder     lisinopril (PRINIVIL/ZESTRIL) 5 MG tablet Reorder     rosuvastatin (CRESTOR) 20 MG tablet Reorder     carvedilol (COREG) 3.125 MG tablet          Encounter Diagnoses   Name Primary?     Ischemic cardiomyopathy Yes     Mixed hyperlipidemia      VT (ventricular tachycardia) (H)      Chronic systolic congestive heart failure (H)       Ventricular tachycardia (H)      Paroxysmal atrial fibrillation (H)        CURRENT MEDICATIONS:  Current Outpatient Medications   Medication Sig Dispense Refill     amiodarone (PACERONE/CODARONE) 200 MG tablet Take 1 tablet (200 mg) by mouth daily 90 tablet 3     carvedilol (COREG) 6.25 MG tablet Take 1 tablet (6.25 mg) by mouth 2 times daily (with meals) 180 tablet 3     digoxin (LANOXIN) 125 MCG tablet Take 1 tablet (125 mcg) by mouth daily 90 tablet 3     ipratropium (ATROVENT) 0.03 % spray Spray 2 sprays into both nostrils every 8 hours as needed for rhinitis 30 mL 11     lisinopril (PRINIVIL/ZESTRIL) 5 MG tablet Take 1 tablet (5 mg) by mouth At Bedtime 90 tablet 3     Multiple Vitamins-Minerals (PRESERVISION AREDS 2 PO) Take 1 capsule by mouth 2 times daily       nitroGLYcerin (NITROSTAT) 0.4 MG sublingual tablet DISSOLVE 1 TABLET UNDER THE TONGUE EVERY 5 MINUTES AS NEEDED FOR CHEST PAIN 25 tablet 0     ranitidine (ZANTAC) 150 MG tablet Take 1 tablet (150 mg) by mouth 2 times daily 180 tablet      rosuvastatin (CRESTOR) 20 MG tablet Take 1 tablet (20 mg) by mouth daily 90 tablet 3     sildenafil (VIAGRA) 100 MG tablet Take 1 tablet (100 mg) by mouth daily as needed Take 30 min to 4 hours before intercourse.  Never use with nitroglycerin, terazosin or doxazosin. 16 tablet 3     Vitamin D, Cholecalciferol, 1000 UNITS TABS Take 1,000 mg by mouth daily        warfarin (COUMADIN) 2.5 MG tablet Take by mouth daily 2.5 mg mon, and 1.25 mg row       ASPIRIN NOT PRESCRIBED (INTENTIONAL) continuous prn for other Please choose reason not prescribed, below         ALLERGIES     Allergies   Allergen Reactions     Nystatin Other (See Comments)     Make his mouth numb & swelling       PAST MEDICAL HISTORY:  Past Medical History:   Diagnosis Date     Atrial fibrillation (H)     s/p Cardioversion 3/14/2013     Atrial flutter (H)     S/p Aflutter ablation 1/11/2011     CAD (coronary artery disease)     CABG x3 10/2012- LIMA to  distal LAD, SVG to OM1 & OM3; cath 10/2012- PTCA to second diagonal and mid LAD, BMS to mid LAD     Cardiogenic shock (H)      Cardiomyopathy (H)      CHF (congestive heart failure) (H)      CVA (cerebral infarction)     residual right hand numbness     ED (erectile dysfunction)      Hyperlipidemia      Hypertension      Neuropathy      Palpitations      SVT (supraventricular tachycardia) (H)     S/p dual chamber ICD 10/11/12- upgraded to BIV ICD 6/2013     Syncope        PAST SURGICAL HISTORY:  Past Surgical History:   Procedure Laterality Date     BYPASS GRAFT ARTERY CORONARY  10/2/2012    Procedure: BYPASS GRAFT ARTERY CORONARY;  Coronary Artery Bypass Graft x3 (LAD, Diag, OM) with Endovein [place] (On-Pump);  Surgeon: Yeyo Lyman MD;  Location:  OR     CARDIOVERSION  3/14/2013     CORONARY ANGIOGRAPHY ADULT ORDER  10/2/12     CORONARY ARTERY BYPASS  10/2/12    LIMA to LAD, SVG to OM1 and OM3     EP ABLATION VT N/A 1/2/2019    Procedure: EP Ablation VT;  Surgeon: Suellen Costello MD;  Location:  HEART CARDIAC CATH LAB     EP COMPREHENSIVE EP STUDY N/A 1/2/2019    Procedure: EP Comprehensive EP Study;  Surgeon: Suellen Costello MD;  Location:  HEART CARDIAC CATH LAB     H ABLATION ATRIAL FLUTTER  1/11/2011     HAND SURGERY       HEART CATH, ANGIOPLASTY  10/2/12    PTCA to second diagonal and mid LAD, BMS to mid LAD     HERNIA REPAIR       ORTHOPEDIC SURGERY Right 2006    cut on table saw     RELOCATE GENERATOR ICD/PACEMAKER         FAMILY HISTORY:  Family History   Problem Relation Age of Onset     Alcohol/Drug Father      Heart Disease Father 71     Alzheimer Disease Sister      Alcohol/Drug Sister      Alcohol/Drug Sister      Gastrointestinal Disease Sister      Obesity Sister      Hypertension Sister      Heart Disease Sister      Hypertension Sister      Heart Disease Daughter         afib     Arrhythmia Daughter      Multiple Sclerosis Daughter      Heart Disease  Daughter         afib     Arrhythmia Daughter      Cancer Daughter         lymphoma     Aneurysm Mother        SOCIAL HISTORY:  Social History     Socioeconomic History     Marital status:      Spouse name: None     Number of children: None     Years of education: None     Highest education level: None   Occupational History     None   Social Needs     Financial resource strain: None     Food insecurity:     Worry: None     Inability: None     Transportation needs:     Medical: None     Non-medical: None   Tobacco Use     Smoking status: Former Smoker     Packs/day: 1.00     Years: 14.00     Pack years: 14.00     Types: Cigarettes     Start date:      Last attempt to quit: 7/10/1972     Years since quittin.1     Smokeless tobacco: Never Used   Substance and Sexual Activity     Alcohol use: No     Drug use: No     Sexual activity: Yes     Partners: Female   Lifestyle     Physical activity:     Days per week: None     Minutes per session: None     Stress: None   Relationships     Social connections:     Talks on phone: None     Gets together: None     Attends Yazidism service: None     Active member of club or organization: None     Attends meetings of clubs or organizations: None     Relationship status: None     Intimate partner violence:     Fear of current or ex partner: None     Emotionally abused: None     Physically abused: None     Forced sexual activity: None   Other Topics Concern     Parent/sibling w/ CABG, MI or angioplasty before 65F 55M? No      Service Not Asked     Blood Transfusions Not Asked     Caffeine Concern Yes     Comment: 4 cups coffee daily     Occupational Exposure Not Asked     Hobby Hazards Not Asked     Sleep Concern No     Stress Concern No     Weight Concern Yes     Comment: gain 2lbs     Special Diet Yes     Comment: low sodium     Back Care Not Asked     Exercise Yes     Comment: walking, doing sittups, played pickle ball in summer     Bike Helmet Not Asked  "    Seat Belt Yes     Self-Exams Not Asked   Social History Narrative     None       Review of Systems:  Skin:  Negative       Eyes:  Positive for glasses    ENT:  Negative      Respiratory:  Positive for dyspnea on exertion     Cardiovascular:    dizziness;Positive for    Gastroenterology: Negative      Genitourinary:  Negative      Musculoskeletal:  Negative      Neurologic:  Negative for      Psychiatric:  Negative      Heme/Lymph/Imm:  Negative      Endocrine:  Negative        Physical Exam:  Vitals: /68   Pulse 68   Ht 1.854 m (6' 1\")   Wt 82.7 kg (182 lb 6.4 oz)   BMI 24.06 kg/m       Constitutional:  cooperative, alert and oriented, well developed, well nourished, in no acute distress        Skin:  warm and dry to the touch, no apparent skin lesions or masses noted   ICD incision in the left infraclavicular area was well-healed      Head:  normocephalic, no masses or lesions        Eyes:  pupils equal and round;conjunctivae and lids unremarkable;sclera white;no xanthalasma        Lymph:No Cervical lymphadenopathy present     ENT:  no pallor or cyanosis        Neck:  JVP normal        Respiratory:  clear to auscultation;healed median sternotomy scar         Cardiac: regular rhythm;no murmurs, gallops or rubs detected                not assessed this visit                                        GI:  not assessed this visit        Extremities and Muscular Skeletal:  no edema              Neurological:  no gross motor deficits;affect appropriate        Psych:  Alert and Oriented x 3;affect appropriate, oriented to time, person and place        CC  Dejon Downs MD  [address] [place] [address]                Thank you for allowing me to participate in the care of your patient.      Sincerely,     LYNNE KAUFMAN MD     [facility]    cc:   Dejon Downs MD  [address] [place] [address]        "

## 2019-08-08 NOTE — PROGRESS NOTES
HPI and Plan:   See dictation    Orders Placed This Encounter   Procedures     Basic metabolic panel     Basic metabolic panel     Hemoglobin     CARDIAC REHAB REFERRAL     Follow-[facility] with [facility] - DAVE visit     Follow-Up with [facility] - Return MD visit     Echocardiogram Complete       Orders Placed This Encounter   Medications     amiodarone (PACERONE/CODARONE) 200 MG tablet     Sig: Take 1 tablet (200 mg) by mouth daily     Dispense:  90 tablet     Refill:  3     DISCONTD: carvedilol (COREG) 3.125 MG tablet     Sig: Take 2 tablets (6.25 mg) by mouth 2 times daily (with meals)     Dispense:  360 tablet     Refill:  3     digoxin (LANOXIN) 125 MCG tablet     Sig: Take 1 tablet (125 mcg) by mouth daily     Dispense:  90 tablet     Refill:  3     lisinopril (PRINIVIL/ZESTRIL) 5 MG tablet     Sig: Take 1 tablet (5 mg) by mouth At Bedtime     Dispense:  90 tablet     Refill:  3     rosuvastatin (CRESTOR) 20 MG tablet     Sig: Take 1 tablet (20 mg) by mouth daily     Dispense:  90 tablet     Refill:  3     carvedilol (COREG) 6.25 MG tablet     Sig: Take 1 tablet (6.25 mg) by mouth 2 times daily (with meals)     Dispense:  180 tablet     Refill:  3       Medications Discontinued During This Encounter   Medication Reason     amiodarone (PACERONE/CODARONE) 200 MG tablet Reorder     carvedilol (COREG) 3.125 MG tablet Reorder     digoxin (LANOXIN) 125 MCG tablet Reorder     lisinopril (PRINIVIL/ZESTRIL) 5 MG tablet Reorder     rosuvastatin (CRESTOR) 20 MG tablet Reorder     carvedilol (COREG) 3.125 MG tablet          Encounter Diagnoses   Name Primary?     Ischemic cardiomyopathy Yes     Mixed hyperlipidemia      VT (ventricular tachycardia) (H)      Chronic systolic congestive heart failure (H)      Ventricular tachycardia (H)      Paroxysmal atrial fibrillation (H)        CURRENT MEDICATIONS:  Current Outpatient Medications   Medication Sig Dispense Refill     amiodarone (PACERONE/CODARONE) 200 MG tablet Take 1  tablet (200 mg) by mouth daily 90 tablet 3     carvedilol (COREG) 6.25 MG tablet Take 1 tablet (6.25 mg) by mouth 2 times daily (with meals) 180 tablet 3     digoxin (LANOXIN) 125 MCG tablet Take 1 tablet (125 mcg) by mouth daily 90 tablet 3     ipratropium (ATROVENT) 0.03 % spray Spray 2 sprays into both nostrils every 8 hours as needed for rhinitis 30 mL 11     lisinopril (PRINIVIL/ZESTRIL) 5 MG tablet Take 1 tablet (5 mg) by mouth At Bedtime 90 tablet 3     Multiple Vitamins-Minerals (PRESERVISION AREDS 2 PO) Take 1 capsule by mouth 2 times daily       nitroGLYcerin (NITROSTAT) 0.4 MG sublingual tablet DISSOLVE 1 TABLET UNDER THE TONGUE EVERY 5 MINUTES AS NEEDED FOR CHEST PAIN 25 tablet 0     ranitidine (ZANTAC) 150 MG tablet Take 1 tablet (150 mg) by mouth 2 times daily 180 tablet      rosuvastatin (CRESTOR) 20 MG tablet Take 1 tablet (20 mg) by mouth daily 90 tablet 3     sildenafil (VIAGRA) 100 MG tablet Take 1 tablet (100 mg) by mouth daily as needed Take 30 min to 4 hours before intercourse.  Never use with nitroglycerin, terazosin or doxazosin. 16 tablet 3     Vitamin D, Cholecalciferol, 1000 UNITS TABS Take 1,000 mg by mouth daily        warfarin (COUMADIN) 2.5 MG tablet Take by mouth daily 2.5 mg mon, and 1.25 mg row       ASPIRIN NOT PRESCRIBED (INTENTIONAL) continuous prn for other Please choose reason not prescribed, below         ALLERGIES     Allergies   Allergen Reactions     Nystatin Other (See Comments)     Make his mouth numb & swelling       PAST MEDICAL HISTORY:  Past Medical History:   Diagnosis Date     Atrial fibrillation (H)     s/p Cardioversion 3/14/2013     Atrial flutter (H)     S/p Aflutter ablation 1/11/2011     CAD (coronary artery disease)     CABG x3 10/2012- LIMA to distal LAD, SVG to OM1 & OM3; cath 10/2012- PTCA to second diagonal and mid LAD, BMS to mid LAD     Cardiogenic shock (H)      Cardiomyopathy (H)      CHF (congestive heart failure) (H)      CVA (cerebral infarction)      residual right hand numbness     ED (erectile dysfunction)      Hyperlipidemia      Hypertension      Neuropathy      Palpitations      SVT (supraventricular tachycardia) (H)     S/p dual chamber ICD 10/11/12- upgraded to BIV ICD 6/2013     Syncope        PAST SURGICAL HISTORY:  Past Surgical History:   Procedure Laterality Date     BYPASS GRAFT ARTERY CORONARY  10/2/2012    Procedure: BYPASS GRAFT ARTERY CORONARY;  Coronary Artery Bypass Graft x3 (LAD, Diag, OM) with Endovein [place] (On-Pump);  Surgeon: Yeyo Lyman MD;  Location:  OR     CARDIOVERSION  3/14/2013     CORONARY ANGIOGRAPHY ADULT ORDER  10/2/12     CORONARY ARTERY BYPASS  10/2/12    LIMA to LAD, SVG to OM1 and OM3     EP ABLATION VT N/A 1/2/2019    Procedure: EP Ablation VT;  Surgeon: Suellen Costello MD;  Location:  HEART CARDIAC CATH LAB     EP COMPREHENSIVE EP STUDY N/A 1/2/2019    Procedure: EP Comprehensive EP Study;  Surgeon: Suellen Costello MD;  Location:  HEART CARDIAC CATH LAB     H ABLATION ATRIAL FLUTTER  1/11/2011     HAND SURGERY       HEART CATH, ANGIOPLASTY  10/2/12    PTCA to second diagonal and mid LAD, BMS to mid LAD     HERNIA REPAIR       ORTHOPEDIC SURGERY Right 2006    cut on table saw     RELOCATE GENERATOR ICD/PACEMAKER         FAMILY HISTORY:  Family History   Problem Relation Age of Onset     Alcohol/Drug Father      Heart Disease Father 71     Alzheimer Disease Sister      Alcohol/Drug Sister      Alcohol/Drug Sister      Gastrointestinal Disease Sister      Obesity Sister      Hypertension Sister      Heart Disease Sister      Hypertension Sister      Heart Disease Daughter         afib     Arrhythmia Daughter      Multiple Sclerosis Daughter      Heart Disease Daughter         afib     Arrhythmia Daughter      Cancer Daughter         lymphoma     Aneurysm Mother        SOCIAL HISTORY:  Social History     Socioeconomic History     Marital status:      Spouse name: None      Number of children: None     Years of education: None     Highest education level: None   Occupational History     None   Social Needs     Financial resource strain: None     Food insecurity:     Worry: None     Inability: None     Transportation needs:     Medical: None     Non-medical: None   Tobacco Use     Smoking status: Former Smoker     Packs/day: 1.00     Years: 14.00     Pack years: 14.00     Types: Cigarettes     Start date:      Last attempt to quit: 7/10/1972     Years since quittin.1     Smokeless tobacco: Never Used   Substance and Sexual Activity     Alcohol use: No     Drug use: No     Sexual activity: Yes     Partners: Female   Lifestyle     Physical activity:     Days per week: None     Minutes per session: None     Stress: None   Relationships     Social connections:     Talks on phone: None     Gets together: None     Attends [employer] service: None     Active member of club or organization: None     Attends meetings of clubs or organizations: None     Relationship status: None     Intimate partner violence:     Fear of current or ex partner: None     Emotionally abused: None     Physically abused: None     Forced sexual activity: None   Other Topics Concern     Parent/sibling w/ CABG, MI or angioplasty before 65F 55M? No      Service Not Asked     Blood Transfusions Not Asked     Caffeine Concern Yes     Comment: 4 cups coffee daily     Occupational Exposure Not Asked     Hobby Hazards Not Asked     Sleep Concern No     Stress Concern No     Weight Concern Yes     Comment: gain 2lbs     Special Diet Yes     Comment: low sodium     Back Care Not Asked     Exercise Yes     Comment: walking, doing sittups, played pickle ball in summer     Bike Helmet Not Asked     Seat Belt Yes     Self-Exams Not Asked   Social History Narrative     None       Review of Systems:  Skin:  Negative       Eyes:  Positive for glasses    ENT:  Negative      Respiratory:  Positive for dyspnea on exertion   "   Cardiovascular:    dizziness;Positive for    Gastroenterology: Negative      Genitourinary:  Negative      Musculoskeletal:  Negative      Neurologic:  Negative for      Psychiatric:  Negative      Heme/Lymph/Imm:  Negative      Endocrine:  Negative        Physical Exam:  Vitals: /68   Pulse 68   Ht 1.854 m (6' 1\")   Wt 82.7 kg (182 lb 6.4 oz)   BMI 24.06 kg/m      Constitutional:  cooperative, alert and oriented, well developed, well nourished, in no acute distress        Skin:  warm and dry to the touch, no apparent skin lesions or masses noted   ICD incision in the left infraclavicular area was well-healed      Head:  normocephalic, no masses or lesions        Eyes:  pupils equal and round;conjunctivae and lids unremarkable;sclera white;no xanthalasma        Lymph:No Cervical lymphadenopathy present     ENT:  no pallor or cyanosis        Neck:  JVP normal        Respiratory:  clear to auscultation;healed median sternotomy scar         Cardiac: regular rhythm;no murmurs, gallops or rubs detected                not assessed this visit                                        GI:  not assessed this visit        Extremities and Muscular Skeletal:  no edema              Neurological:  no gross motor deficits;affect appropriate        Psych:  Alert and Oriented x 3;affect appropriate, oriented to time, person and place        CC  Dejon Downs MD  [address] [place] [address]              "

## 2019-08-08 NOTE — LETTER
8/8/2019      Dejon Downs MD  [address]  [facility] [address]      RE: Tyrel Rene       Dear Colleague,    I had the pleasure of seeing Tyrel Rene in the [facility].    Service Date: 08/08/2019      HISTORY OF PRESENT ILLNESS:  I had the opportunity to see Mr. Tyrel Rene in Cardiology Clinic today at the [facility] in [place] for re-evaluation of chronic severe ischemic cardiomyopathy, chronic systolic heart failure and chronic atrial fibrillation.  He suffered an extensive anterior wall myocardial infarction in the past with a relatively stable ejection fraction over the last several years of 25%-30%.  He has a biventricular ICD in place and chronic atrial fibrillation.  He has had episodes of recurrent ventricular tachycardia, initially treated with oral amiodarone, but unfortunately had breakthrough episodes of sustained ventricular tachycardia causing syncope and ICD shocks.  Eventually, he underwent ablation of his VT in 01/2019.  Since that time, he has not had any recurrent sustained episodes of VT and has received no further shocks.  He remains on amiodarone 200 mg a day, which he feels makes him a little lightheaded at times.  Otherwise, he seems to be doing relatively well.  He has no PND, orthopnea or edema problems.  He denies shortness of breath.  He is not walking or exercising quite as much as he used to because of a feeling of instability with balance problems mostly.  He refuses to use any kind of an assist device to help him walk.      He denies chest pain symptoms and is not limited by shortness of breath with activity.  His cholesterol numbers have been excellent and his blood pressure has been well controlled.      PHYSICAL EXAMINATION:  His blood pressure is 128/68, heart rate 68 and weight 182 pounds.  His lungs are clear.  Heart rhythm is regular.  He has no significant cardiac murmurs.  I reviewed his  echocardiogram with him from 2019 which shows a stable degree of severe left ventricular dysfunction with extensive anterior, anteroseptal and apical wall motion abnormality, borderline left ventricular dilation, and ejection fraction of 25%-30% with mild mitral and aortic valve regurgitation.      IMPRESSIONS:  Mr. Artem Elizalde is a 73-year-old gentleman with severe ischemic cardiomyopathy with stable ejection fraction of 25%-30%, but mild congestive heart failure symptoms at most.  Lately, he has had issues with recurrent ventricular tachycardia causing syncope and ICD shocks.  Fortunately, since his VT ablation in 2019 he has been doing much better without any recurrent episodes.  He remains on a low dose of oral amiodarone.  He has chronic atrial fibrillation and is on warfarin.  He has had previous bypass surgery and has a biventricular ICD in place with 100% biventricular pacing.      His medication program is very appropriate.  I did not make any medication changes today.  However, he might benefit from changing his lisinopril over to Entresto, but I was a little concerned about his lightheadedness right now and decided to delay that possible medication change at this point.  I will have him follow up with us in the [facility] in 6 months to review these issues and see me again in 1 year.      cc:   [place] [address]         LYNNE KAUFMAN MD, [facility]             D: 2019   T: 2019   MT: YESICA      Name:     ARTEM ELIZALDE   MRN:      [id number]        Account:      [id number]   :      1945           Service Date: 2019      Document: [id number]         Outpatient Encounter Medications as of 2019   Medication Sig Dispense Refill     amiodarone (PACERONE/CODARONE) 200 MG tablet Take 1 tablet (200 mg) by mouth daily 90 tablet 3     carvedilol (COREG) 6.25 MG tablet Take 1 tablet  (6.25 mg) by mouth 2 times daily (with meals) 180 tablet 3     digoxin (LANOXIN) 125 MCG tablet Take 1 tablet (125 mcg) by mouth daily 90 tablet 3     ipratropium (ATROVENT) 0.03 % spray Spray 2 sprays into both nostrils every 8 hours as needed for rhinitis 30 mL 11     lisinopril (PRINIVIL/ZESTRIL) 5 MG tablet Take 1 tablet (5 mg) by mouth At Bedtime 90 tablet 3     Multiple Vitamins-Minerals (PRESERVISION AREDS 2 PO) Take 1 capsule by mouth 2 times daily       nitroGLYcerin (NITROSTAT) 0.4 MG sublingual tablet DISSOLVE 1 TABLET UNDER THE TONGUE EVERY 5 MINUTES AS NEEDED FOR CHEST PAIN 25 tablet 0     ranitidine (ZANTAC) 150 MG tablet Take 1 tablet (150 mg) by mouth 2 times daily 180 tablet      rosuvastatin (CRESTOR) 20 MG tablet Take 1 tablet (20 mg) by mouth daily 90 tablet 3     sildenafil (VIAGRA) 100 MG tablet Take 1 tablet (100 mg) by mouth daily as needed Take 30 min to 4 hours before intercourse.  Never use with nitroglycerin, terazosin or doxazosin. 16 tablet 3     Vitamin D, Cholecalciferol, 1000 UNITS TABS Take 1,000 mg by mouth daily        warfarin (COUMADIN) 2.5 MG tablet Take by mouth daily 2.5 mg mon, and 1.25 mg row       ASPIRIN NOT PRESCRIBED (INTENTIONAL) continuous prn for other Please choose reason not prescribed, below       [DISCONTINUED] amiodarone (PACERONE/CODARONE) 200 MG tablet Take 1 tablet (200 mg) by mouth daily 90 tablet 3     [DISCONTINUED] carvedilol (COREG) 3.125 MG tablet Take 2 tablets (6.25 mg) by mouth 2 times daily (with meals) 360 tablet 3     [DISCONTINUED] carvedilol (COREG) 3.125 MG tablet Take 2 tablets (6.25 mg) by mouth 2 times daily (with meals) 360 tablet 3     [DISCONTINUED] digoxin (LANOXIN) 125 MCG tablet Take 1 tablet (125 mcg) by mouth daily 90 tablet 1     [DISCONTINUED] lisinopril (PRINIVIL/ZESTRIL) 5 MG tablet Take 1 tablet (5 mg) by mouth At Bedtime 180 tablet 3     [DISCONTINUED] rosuvastatin (CRESTOR) 20 MG tablet Take 1 tablet (20 mg) by mouth daily 90  tablet 0     No facility-administered encounter medications on file as of 8/8/2019.      Again, thank you for allowing me to participate in the care of your patient.      Sincerely,    LYNNE KAUFMAN MD     [facility]

## 2019-08-08 NOTE — PROGRESS NOTES
Service Date: 08/08/2019      HISTORY OF PRESENT ILLNESS:  I had the opportunity to see Mr. Tyrel Rene in Cardiology Clinic today at the [facility] in [place] for re-evaluation of chronic severe ischemic cardiomyopathy, chronic systolic heart failure and chronic atrial fibrillation.  He suffered an extensive anterior wall myocardial infarction in the past with a relatively stable ejection fraction over the last several years of 25%-30%.  He has a biventricular ICD in place and chronic atrial fibrillation.  He has had episodes of recurrent ventricular tachycardia, initially treated with oral amiodarone, but unfortunately had breakthrough episodes of sustained ventricular tachycardia causing syncope and ICD shocks.  Eventually, he underwent ablation of his VT in 01/2019.  Since that time, he has not had any recurrent sustained episodes of VT and has received no further shocks.  He remains on amiodarone 200 mg a day, which he feels makes him a little lightheaded at times.  Otherwise, he seems to be doing relatively well.  He has no PND, orthopnea or edema problems.  He denies shortness of breath.  He is not walking or exercising quite as much as he used to because of a feeling of instability with balance problems mostly.  He refuses to use any kind of an assist device to help him walk.      He denies chest pain symptoms and is not limited by shortness of breath with activity.  His cholesterol numbers have been excellent and his blood pressure has been well controlled.      PHYSICAL EXAMINATION:  His blood pressure is 128/68, heart rate 68 and weight 182 pounds.  His lungs are clear.  Heart rhythm is regular.  He has no significant cardiac murmurs.  I reviewed his echocardiogram with him from 08/05/2019 which shows a stable degree of severe left ventricular dysfunction with extensive anterior, anteroseptal and apical wall motion abnormality, borderline left ventricular dilation, and ejection  fraction of 25%-30% with mild mitral and aortic valve regurgitation.      IMPRESSIONS:  Mr. Artem Elizalde is a 73-year-old gentleman with severe ischemic cardiomyopathy with stable ejection fraction of 25%-30%, but mild congestive heart failure symptoms at most.  Lately, he has had issues with recurrent ventricular tachycardia causing syncope and ICD shocks.  Fortunately, since his VT ablation in 2019 he has been doing much better without any recurrent episodes.  He remains on a low dose of oral amiodarone.  He has chronic atrial fibrillation and is on warfarin.  He has had previous bypass surgery and has a biventricular ICD in place with 100% biventricular pacing.      His medication program is very appropriate.  I did not make any medication changes today.  However, he might benefit from changing his lisinopril over to Entresto, but I was a little concerned about his lightheadedness right now and decided to delay that possible medication change at this point.  I will have him follow up with us in the [facility] in 6 months to review these issues and see me again in 1 year.      cc:   [place] [address]         LYNEN KAUFMAN MD, [facility]             D: 2019   T: 2019   MT: YESICA      Name:     ARTEM ELIZALDE   MRN:      [id number]        Account:      [id number]   :      1945           Service Date: 2019      Document: [id number]

## 2019-08-12 ENCOUNTER — TELEPHONE (OUTPATIENT)
Dept: CARDIOLOGY | Facility: CLINIC | Age: 74
End: 2019-08-12

## 2019-08-12 NOTE — TELEPHONE ENCOUNTER
----- Message from Parish Newman MD sent at 8/9/2019  5:42 PM CDT -----  Dr. Costello gives Mr. Rene clearance to drive. Please let him know. Thanks. EE    ----- Message -----  From: Suellen Costello MD  Sent: 8/8/2019  10:55 AM  To: Parish Newman MD    He can drive.  We typically recommend 3-6 months no diving after VT.  D      ----- Message -----  From: Parish Newman MD  Sent: 8/8/2019  10:46 AM  To: Suellen Costello MD    Mr. Rene wonders whether he can drive a car again. VT ablation last January. Only brief NSVT since then. No shocks. EE  ------------------------------------------------    Call to patient and wife informed per DR Costello and DR Newman he can drive. Reviewed above.  Patient and wife state understanding.  Morena Deng RN 08/12/19 11:01 AM

## 2019-08-12 NOTE — TELEPHONE ENCOUNTER
Call back to wife and patient - left message last week on cardiac rehab. Wife states [facility] asked them to have provider or provider representative to call [phone] for diagnosis on coverage of Cardiac Rehab.   Called to [facility] - [phone] - Dr Newman did order cardiac rehab under qualifying diagnosis for medicare EF<35%. Billing codes 27202, 74124.  Called to [facility] [phone] and reviewed - they state no need for provider to call as they follow medicare guidelines - they advised patient call to member services for potential cost.  Called back to wife and patient and informed.  Morena Deng RN 08/12/19 10:11 AM

## 2019-08-15 ENCOUNTER — TELEPHONE (OUTPATIENT)
Dept: CARDIOLOGY | Facility: CLINIC | Age: 74
End: 2019-08-15

## 2019-08-15 NOTE — TELEPHONE ENCOUNTER
Lauren Jorgensen N, APRN CNP  [facility]             Can you let pt know he can resume driving per . Thank you      LM for pt to call back to discuss. Cassia

## 2019-08-16 NOTE — TELEPHONE ENCOUNTER
8/16/19 Pt called per recommendations per Lauren Jorgensen:    Lauren Jorgensen N, APRN CNP  [facility]             Can you let pt know he can resume driving per . Thank you      Pt voiced understanding. [id number] pm

## 2019-08-19 ENCOUNTER — DOCUMENTATION ONLY (OUTPATIENT)
Dept: CARDIOLOGY | Facility: CLINIC | Age: 74
End: 2019-08-19

## 2019-08-19 NOTE — TELEPHONE ENCOUNTER
Remote alert for NSVT. 10 new episodes logged since 8/05/19. 2 available EGMs show NSVT lasting 4 beats with rates in the 140s-150s. Patient is S/P VT ablation and on amio.

## 2019-08-21 ENCOUNTER — ANTICOAGULATION THERAPY VISIT (OUTPATIENT)
Dept: NURSING | Facility: CLINIC | Age: 74
End: 2019-08-21
Payer: COMMERCIAL

## 2019-08-21 DIAGNOSIS — Z79.01 LONG TERM CURRENT USE OF ANTICOAGULANT THERAPY: ICD-10-CM

## 2019-08-21 DIAGNOSIS — I63.9 CEREBRAL INFARCTION (H): ICD-10-CM

## 2019-08-21 DIAGNOSIS — I63.50 CEREBRAL ARTERY OCCLUSION WITH CEREBRAL INFARCTION (H): ICD-10-CM

## 2019-08-21 LAB — INR POINT OF CARE: 3 (ref 0.86–1.14)

## 2019-08-21 PROCEDURE — 85610 PROTHROMBIN TIME: CPT | Mod: QW

## 2019-08-21 PROCEDURE — 99207 ZZC NO CHARGE NURSE ONLY: CPT

## 2019-08-21 PROCEDURE — 36416 COLLJ CAPILLARY BLOOD SPEC: CPT

## 2019-08-21 NOTE — PROGRESS NOTES
ANTICOAGULATION FOLLOW-UP CLINIC VISIT    Patient Name:  Tyrel Rene  Date:  8/21/2019  Contact Type:  Face to Face    SUBJECTIVE:  Patient Findings     Comments:   The patient was assessed for diet, medication, and activity level changes, missed or extra doses, bruising or bleeding, with no problem findings.          Clinical Outcomes     Comments:   The patient was assessed for diet, medication, and activity level changes, missed or extra doses, bruising or bleeding, with no problem findings.             OBJECTIVE    INR Protime   Date Value Ref Range Status   08/21/2019 3.0 (A) 0.86 - 1.14 Final       ASSESSMENT / PLAN  INR assessment THER    Recheck INR In: 2 WEEKS    INR Location Clinic      Anticoagulation Summary  As of 8/21/2019    INR goal:   2.0-2.5   TTR:   41.1 % (3.4 y)   INR used for dosing:   3.0! (8/21/2019)   Warfarin maintenance plan:   2.5 mg (2.5 mg x 1) every Mon; 1.25 mg (2.5 mg x 0.5) all other days   Full warfarin instructions:   2.5 mg every Mon; 1.25 mg all other days   Weekly warfarin total:   10 mg   Plan last modified:   Doreen Finley RN (3/22/2019)   Next INR check:   9/4/2019   Target end date:   Indefinite    Indications    Long-term (current) use of anticoagulants [Z79.01] [Z79.01]  Cerebral artery occlusion with cerebral infarction (HCC) [I63.50] [I63.50]  Cerebral infarction (H) [I63.9]             Anticoagulation Episode Summary     INR check location:   Anticoagulation Clinic    Preferred lab:       Send INR reminders to:   [facility]    Comments:         Anticoagulation Care Providers     Provider Role Specialty Phone number    Dejon Downs MD [facility] [phone]            See the Encounter Report to view Anticoagulation Flowsheet and Dosing Calendar (Go to Encounters tab in chart review, and find the Anticoagulation Therapy Visit)        Doreen Finley RN

## 2019-08-27 ENCOUNTER — DOCUMENTATION ONLY (OUTPATIENT)
Dept: CARDIOLOGY | Facility: CLINIC | Age: 74
End: 2019-08-27

## 2019-08-27 NOTE — TELEPHONE ENCOUNTER
Remote alert for NSVT. Patient has 10 new episodes logged since 8/19/19. 2 available EGMs show NSVT lasting 3-10 beats with rates in the 150s-190s. Patient is S/P VT ablation and on amio.

## 2019-09-02 ENCOUNTER — ANCILLARY PROCEDURE (OUTPATIENT)
Dept: CARDIOLOGY | Facility: CLINIC | Age: 74
End: 2019-09-02
Attending: INTERNAL MEDICINE
Payer: COMMERCIAL

## 2019-09-02 DIAGNOSIS — Z95.810 ICD (IMPLANTABLE CARDIOVERTER-DEFIBRILLATOR) IN PLACE: ICD-10-CM

## 2019-09-02 PROCEDURE — 93295 DEV INTERROG REMOTE 1/2/MLT: CPT | Performed by: INTERNAL MEDICINE

## 2019-09-04 ENCOUNTER — ANTICOAGULATION THERAPY VISIT (OUTPATIENT)
Dept: NURSING | Facility: CLINIC | Age: 74
End: 2019-09-04
Payer: COMMERCIAL

## 2019-09-04 DIAGNOSIS — I63.9 CEREBRAL INFARCTION (H): ICD-10-CM

## 2019-09-04 DIAGNOSIS — I63.50 CEREBRAL ARTERY OCCLUSION WITH CEREBRAL INFARCTION (H): ICD-10-CM

## 2019-09-04 DIAGNOSIS — Z79.01 LONG TERM CURRENT USE OF ANTICOAGULANT THERAPY: ICD-10-CM

## 2019-09-04 LAB — INR POINT OF CARE: 2.3 (ref 0.86–1.14)

## 2019-09-04 PROCEDURE — 36416 COLLJ CAPILLARY BLOOD SPEC: CPT

## 2019-09-04 PROCEDURE — 99207 ZZC NO CHARGE NURSE ONLY: CPT

## 2019-09-04 PROCEDURE — 85610 PROTHROMBIN TIME: CPT | Mod: QW

## 2019-09-04 NOTE — PROGRESS NOTES
ANTICOAGULATION FOLLOW-UP CLINIC VISIT    Patient Name:  Tyrel Rene  Date:  2019  Contact Type:  Face to Face    SUBJECTIVE:  Patient Findings     Comments:   The patient was assessed for diet, medication, and activity level changes, missed or extra doses, bruising or bleeding, with no problem findings.          Clinical Outcomes     Comments:   The patient was assessed for diet, medication, and activity level changes, missed or extra doses, bruising or bleeding, with no problem findings.             OBJECTIVE    INR Protime   Date Value Ref Range Status   2019 2.3 (A) 0.86 - 1.14 Final       ASSESSMENT / PLAN  INR assessment THER    Recheck INR In: 4 WEEKS    INR Location Clinic      Anticoagulation Summary  As of 2019    INR goal:   2.0-2.5   TTR:   40.9 % (3.4 y)   INR used for dosin.3 (2019)   Warfarin maintenance plan:   2.5 mg (2.5 mg x 1) every Mon; 1.25 mg (2.5 mg x 0.5) all other days   Full warfarin instructions:   2.5 mg every Mon; 1.25 mg all other days   Weekly warfarin total:   10 mg   Plan last modified:   Doreen Finley RN (3/22/2019)   Next INR check:   10/2/2019   Target end date:   Indefinite    Indications    Long-term (current) use of anticoagulants [Z79.01] [Z79.01]  Cerebral artery occlusion with cerebral infarction (HCC) [I63.50] [I63.50]  Cerebral infarction (H) [I63.9]             Anticoagulation Episode Summary     INR check location:   Anticoagulation Clinic    Preferred lab:       Send INR reminders to:   [facility]    Comments:         Anticoagulation Care Providers     Provider Role Specialty Phone number    Dejon Downs MD [facility] [phone]            See the Encounter Report to view Anticoagulation Flowsheet and Dosing Calendar (Go to Encounters tab in chart review, and find the Anticoagulation Therapy Visit)        Doreen Finley RN

## 2019-09-11 LAB
MDC_IDC_EPISODE_DTM: NORMAL
MDC_IDC_EPISODE_DURATION: 6 S
MDC_IDC_EPISODE_ID: NORMAL
MDC_IDC_EPISODE_TYPE: NORMAL
MDC_IDC_LEAD_IMPLANT_DT: NORMAL
MDC_IDC_LEAD_LOCATION: NORMAL
MDC_IDC_LEAD_LOCATION_DETAIL_1: NORMAL
MDC_IDC_LEAD_MFG: NORMAL
MDC_IDC_LEAD_MODEL: NORMAL
MDC_IDC_LEAD_POLARITY_TYPE: NORMAL
MDC_IDC_LEAD_SERIAL: NORMAL
MDC_IDC_MSMT_BATTERY_DTM: NORMAL
MDC_IDC_MSMT_BATTERY_REMAINING_LONGEVITY: 24 MO
MDC_IDC_MSMT_BATTERY_REMAINING_PERCENTAGE: 37 %
MDC_IDC_MSMT_BATTERY_STATUS: NORMAL
MDC_IDC_MSMT_CAP_CHARGE_DTM: NORMAL
MDC_IDC_MSMT_CAP_CHARGE_DTM: NORMAL
MDC_IDC_MSMT_CAP_CHARGE_ENERGY: 41 J
MDC_IDC_MSMT_CAP_CHARGE_TIME: 11.1 S
MDC_IDC_MSMT_CAP_CHARGE_TIME: 8.2 S
MDC_IDC_MSMT_CAP_CHARGE_TYPE: NORMAL
MDC_IDC_MSMT_CAP_CHARGE_TYPE: NORMAL
MDC_IDC_MSMT_LEADCHNL_LV_IMPEDANCE_VALUE: 404 OHM
MDC_IDC_MSMT_LEADCHNL_LV_PACING_THRESHOLD_AMPLITUDE: 0.6 V
MDC_IDC_MSMT_LEADCHNL_LV_PACING_THRESHOLD_PULSEWIDTH: 0.5 MS
MDC_IDC_MSMT_LEADCHNL_RA_IMPEDANCE_VALUE: 539 OHM
MDC_IDC_MSMT_LEADCHNL_RA_PACING_THRESHOLD_AMPLITUDE: 0.4 V
MDC_IDC_MSMT_LEADCHNL_RA_PACING_THRESHOLD_PULSEWIDTH: 0.5 MS
MDC_IDC_MSMT_LEADCHNL_RV_IMPEDANCE_VALUE: 415 OHM
MDC_IDC_MSMT_LEADCHNL_RV_PACING_THRESHOLD_AMPLITUDE: 0.8 V
MDC_IDC_MSMT_LEADCHNL_RV_PACING_THRESHOLD_PULSEWIDTH: 0.5 MS
MDC_IDC_PG_IMPLANT_DTM: NORMAL
MDC_IDC_PG_MFG: NORMAL
MDC_IDC_PG_MODEL: NORMAL
MDC_IDC_PG_SERIAL: NORMAL
MDC_IDC_PG_TYPE: NORMAL
MDC_IDC_SESS_CLINIC_NAME: NORMAL
MDC_IDC_SESS_DTM: NORMAL
MDC_IDC_SESS_TYPE: NORMAL
MDC_IDC_SET_BRADY_AT_MODE_SWITCH_RATE: 170 {BEATS}/MIN
MDC_IDC_SET_BRADY_LOWRATE: 65 {BEATS}/MIN
MDC_IDC_SET_BRADY_MAX_SENSOR_RATE: 120 {BEATS}/MIN
MDC_IDC_SET_BRADY_MODE: NORMAL
MDC_IDC_SET_CRT_LVRV_DELAY: 0 MS
MDC_IDC_SET_CRT_PACED_CHAMBERS: NORMAL
MDC_IDC_SET_LEADCHNL_LV_PACING_AMPLITUDE: 1.5 V
MDC_IDC_SET_LEADCHNL_LV_PACING_ANODE_ELECTRODE_1: NORMAL
MDC_IDC_SET_LEADCHNL_LV_PACING_ANODE_LOCATION_1: NORMAL
MDC_IDC_SET_LEADCHNL_LV_PACING_CATHODE_ELECTRODE_1: NORMAL
MDC_IDC_SET_LEADCHNL_LV_PACING_CATHODE_LOCATION_1: NORMAL
MDC_IDC_SET_LEADCHNL_LV_PACING_PULSEWIDTH: 0.5 MS
MDC_IDC_SET_LEADCHNL_LV_SENSING_ADAPTATION_MODE: NORMAL
MDC_IDC_SET_LEADCHNL_LV_SENSING_ANODE_ELECTRODE_1: NORMAL
MDC_IDC_SET_LEADCHNL_LV_SENSING_ANODE_LOCATION_1: NORMAL
MDC_IDC_SET_LEADCHNL_LV_SENSING_CATHODE_ELECTRODE_1: NORMAL
MDC_IDC_SET_LEADCHNL_LV_SENSING_CATHODE_LOCATION_1: NORMAL
MDC_IDC_SET_LEADCHNL_LV_SENSING_SENSITIVITY: 1 MV
MDC_IDC_SET_LEADCHNL_RA_PACING_POLARITY: NORMAL
MDC_IDC_SET_LEADCHNL_RA_SENSING_ADAPTATION_MODE: NORMAL
MDC_IDC_SET_LEADCHNL_RA_SENSING_POLARITY: NORMAL
MDC_IDC_SET_LEADCHNL_RA_SENSING_SENSITIVITY: 0.15 MV
MDC_IDC_SET_LEADCHNL_RV_PACING_AMPLITUDE: 2.5 V
MDC_IDC_SET_LEADCHNL_RV_PACING_POLARITY: NORMAL
MDC_IDC_SET_LEADCHNL_RV_PACING_PULSEWIDTH: 0.5 MS
MDC_IDC_SET_LEADCHNL_RV_SENSING_ADAPTATION_MODE: NORMAL
MDC_IDC_SET_LEADCHNL_RV_SENSING_POLARITY: NORMAL
MDC_IDC_SET_LEADCHNL_RV_SENSING_SENSITIVITY: 0.6 MV
MDC_IDC_SET_ZONE_DETECTION_INTERVAL: 273 MS
MDC_IDC_SET_ZONE_DETECTION_INTERVAL: 353 MS
MDC_IDC_SET_ZONE_DETECTION_INTERVAL: 400 MS
MDC_IDC_SET_ZONE_TYPE: NORMAL
MDC_IDC_SET_ZONE_VENDOR_TYPE: NORMAL
MDC_IDC_STAT_BRADY_DTM_END: NORMAL
MDC_IDC_STAT_BRADY_DTM_START: NORMAL
MDC_IDC_STAT_BRADY_RA_PERCENT_PACED: 0 %
MDC_IDC_STAT_BRADY_RV_PERCENT_PACED: 100 %
MDC_IDC_STAT_CRT_DTM_END: NORMAL
MDC_IDC_STAT_CRT_DTM_START: NORMAL
MDC_IDC_STAT_CRT_LV_PERCENT_PACED: 99 %
MDC_IDC_STAT_EPISODE_RECENT_COUNT: 0
MDC_IDC_STAT_EPISODE_RECENT_COUNT: 465
MDC_IDC_STAT_EPISODE_RECENT_COUNT_DTM_END: NORMAL
MDC_IDC_STAT_EPISODE_RECENT_COUNT_DTM_START: NORMAL
MDC_IDC_STAT_EPISODE_TYPE: NORMAL
MDC_IDC_STAT_EPISODE_VENDOR_TYPE: NORMAL
MDC_IDC_STAT_TACHYTHERAPY_ATP_DELIVERED_RECENT: 0
MDC_IDC_STAT_TACHYTHERAPY_ATP_DELIVERED_TOTAL: 6
MDC_IDC_STAT_TACHYTHERAPY_RECENT_DTM_END: NORMAL
MDC_IDC_STAT_TACHYTHERAPY_RECENT_DTM_START: NORMAL
MDC_IDC_STAT_TACHYTHERAPY_SHOCKS_ABORTED_RECENT: 0
MDC_IDC_STAT_TACHYTHERAPY_SHOCKS_ABORTED_TOTAL: 2
MDC_IDC_STAT_TACHYTHERAPY_SHOCKS_DELIVERED_RECENT: 0
MDC_IDC_STAT_TACHYTHERAPY_SHOCKS_DELIVERED_TOTAL: 9
MDC_IDC_STAT_TACHYTHERAPY_TOTAL_DTM_END: NORMAL

## 2019-09-22 DIAGNOSIS — I48.0 PAROXYSMAL ATRIAL FIBRILLATION (H): Primary | ICD-10-CM

## 2019-09-23 NOTE — TELEPHONE ENCOUNTER
warfarin (COUMADIN) 2.5 MG tablet     Last Written Prescription Date:  na  Last Fill Quantity: na,  # refills: na   Last office visit: 11/26/2018 with prescribing provider:  Yareli   Future Office Visit:        Routing refill request to provider for review/approval because:  Drug not on the [facility], [facility] or [facility] refill protocol or controlled substance

## 2019-09-24 RX ORDER — WARFARIN SODIUM 2.5 MG/1
TABLET ORAL
Qty: 90 TABLET | Refills: 1 | Status: SHIPPED | OUTPATIENT
Start: 2019-09-24 | End: 2019-12-30

## 2019-09-24 NOTE — TELEPHONE ENCOUNTER
"Requested Prescriptions   Pending Prescriptions Disp Refills     warfarin ANTICOAGULANT (COUMADIN) 2.5 MG tablet [Pharmacy Med Name: WARFARIN SODIUM 2.5 MG TABLET] 102 tablet 3     Sig: TAKE 1&1/2 TABLETS BY MOUTH ON MON & FRI AND ONE TABLET ALL OTHER DAYS       Vitamin K Antagonists Failed - 9/23/2019 12:09 PM        Failed - INR is within goal in the past 6 weeks     Confirm INR is within goal in the past 6 weeks.     Recent Labs   Lab Test 09/04/19   INR 2.3*                       Failed - Medication is active on med list        Passed - Recent (12 mo) or future (30 days) visit within the authorizing provider's specialty     Patient had office visit in the last 12 months or has a visit in the next 30 days with authorizing provider or within the authorizing provider's specialty.  See \"Patient Info\" tab in inbasket, or \"Choose Columns\" in Meds & Orders section of the refill encounter.              Passed - Patient is 18 years of age or older          "

## 2019-09-30 ENCOUNTER — HEALTH MAINTENANCE LETTER (OUTPATIENT)
Age: 74
End: 2019-09-30

## 2019-09-30 ENCOUNTER — OFFICE VISIT (OUTPATIENT)
Dept: FAMILY MEDICINE | Facility: CLINIC | Age: 74
End: 2019-09-30
Payer: COMMERCIAL

## 2019-09-30 VITALS
HEART RATE: 65 BPM | WEIGHT: 182 LBS | RESPIRATION RATE: 14 BRPM | TEMPERATURE: 98 F | SYSTOLIC BLOOD PRESSURE: 118 MMHG | BODY MASS INDEX: 24.12 KG/M2 | HEIGHT: 73 IN | DIASTOLIC BLOOD PRESSURE: 78 MMHG | OXYGEN SATURATION: 96 %

## 2019-09-30 DIAGNOSIS — N18.30 CKD (CHRONIC KIDNEY DISEASE) STAGE 3, GFR 30-59 ML/MIN (H): ICD-10-CM

## 2019-09-30 DIAGNOSIS — J18.9 COMMUNITY ACQUIRED PNEUMONIA, UNSPECIFIED LATERALITY: Primary | ICD-10-CM

## 2019-09-30 PROCEDURE — 99214 OFFICE O/P EST MOD 30 MIN: CPT | Performed by: FAMILY MEDICINE

## 2019-09-30 ASSESSMENT — MIFFLIN-ST. JEOR: SCORE: 1614.43

## 2019-09-30 NOTE — LETTER
"My Heart Failure Action Plan   Name: Tyrel Rene    YOB: 1945   Date: 9/30/2019    My doctor: Dejon Downs     [address] [id number] [phone]  My Diagnosis: {HF STAGES:642281}   My Exercise Goal: 30 minutes daily  .     My Weight Plan:   Wt Readings from Last 2 Encounters:   09/30/19 82.6 kg (182 lb)   08/08/19 82.7 kg (182 lb 6.4 oz)     Weigh yourself daily using the same scale. If you gain more than 2 pounds in 24 hours or 5 pounds in a week {HF WEIGHT GOAL:926404}    My Diet Goal: { :447912::\"No added salt\"}    Emergency Room Visits:    Our goal is to improve your quality of life and help you avoid a visit to the emergency room or hospital.  If we work together, we can achieve this goal. But, if you feel you need to call 911 or go to the emergency room, please do so.  If you go to the emergency room, please bring your list of medicines and your daily weight chart with you.       GREEN ZONE     Doing well today    Weight gained is no more than 2 pounds a day or 5 pounds a week.    No swelling in feet, ankles, legs or stomach.    No more swelling than usual.    No more trouble breathing than usual.    No change in my sleep.    No other problems. Actions:    I am doing fine.  I will take my medicine, follow my diet, see my doctor, exercise, and watch for symptoms.           YELLOW ZONE         Having a bad day or flare up    Weight gain of more than 2 pounds in one day or 5 pounds in one week.    New swelling in ankle, leg, knee or thigh.    Bloating in belly, pants feel tighter.    Swelling in hands or face.    Coughing or trouble breathing while walking or talking.    Harder to breathe last night.    Have trouble sleeping, wake up short of breath.    Much more tired than usual.    Not eating.    Pain in my chest or bad leg cramps.    Feel weak or dizzy. Actions:    I need to take action and call " my doctor or nurse today.                 RED ZONE         Need medical care now    Weight gain of 5 pounds overnight.    Chest pain or pressure that does not go away.    Feel less alert.    Wheezing or have trouble breathing when at rest.    Cannot sleep lying down.    Cannot take my water pill.    Pass out or faint. Actions:    I need to call my doctor or nurse now!    Call 911 if I have chest pain or cannot breathe.

## 2019-09-30 NOTE — PROGRESS NOTES
Subjective     Tyrel Rene is a 73 year old male who presents to clinic today for the following health issues:    HPI       Hospital Follow-up Visit:    Hospital/Nursing Home/ Rehab Facility: [facility]  Date of Admission: 09/21/2019  Date of Discharge: 09/22/2019  Reason(s) for Admission: Pnuemonia            Problems taking medications regularly:  None       Medication changes since discharge: None       Problems adhering to non-medication therapy:  None    Summary of hospitalization:  CareEverywhere information obtained and reviewed  Diagnostic Tests/Treatments reviewed.  Follow up needed: none  Other Healthcare Providers Involved in Patient s Care:         None  Update since discharge: improved.     Post Discharge Medication Reconciliation: discharge medications reconciled, continue medications without change.  Plan of care communicated with patient and family     Coding guidelines for this visit:  Type of Medical   Decision Making Face-to-Face Visit       within 7 Days of discharge Face-to-Face Visit        within 14 days of discharge   Moderate Complexity [id number]   High Complexity 24716 03812                Patient Active Problem List   Diagnosis     STEMI (ST elevation myocardial infarction) (H)     Anemia     Health Care Home     Atrial fibrillation (H)     Hypertension     Cerebral infarction (H)     SVT (supraventricular tachycardia) (H)     Cardiogenic shock (H)     CAD (coronary artery disease)     Cardiomyopathy (H)     Atrial flutter (H)     Hyperlipidemia     Subdural hematoma (H)     Diplopia     Long-term (current) use of anticoagulants [Z79.01]     Cerebral artery occlusion with cerebral infarction (HCC) [I63.50]     Chronic systolic congestive heart failure (H)     Mixed hyperlipidemia     Ventricular tachycardia (H)     Screening for prostate cancer     Status post coronary angiogram     V-tach (H)     Paroxysmal ventricular tachycardia (H)     CKD (chronic kidney disease) stage 3, GFR  30-59 ml/min (H)     Past Surgical History:   Procedure Laterality Date     BYPASS GRAFT ARTERY CORONARY  10/2/2012    Procedure: BYPASS GRAFT ARTERY CORONARY;  Coronary Artery Bypass Graft x3 (LAD, Diag, OM) with Endovein Lagrange (On-Pump);  Surgeon: Yeyo Lyman MD;  Location:  OR     CARDIOVERSION  3/14/2013     CORONARY ANGIOGRAPHY ADULT ORDER  10/2/12     CORONARY ARTERY BYPASS  10/2/12    LIMA to LAD, SVG to OM1 and OM3     EP ABLATION VT N/A 2019    Procedure: EP Ablation VT;  Surgeon: Suellen Costello MD;  Location:  HEART CARDIAC CATH LAB     EP COMPREHENSIVE EP STUDY N/A 2019    Procedure: EP Comprehensive EP Study;  Surgeon: Suellen Costello MD;  Location:  HEART CARDIAC CATH LAB     H ABLATION ATRIAL FLUTTER  2011     HAND SURGERY       HEART CATH, ANGIOPLASTY  10/2/12    PTCA to second diagonal and mid LAD, BMS to mid LAD     HERNIA REPAIR       ORTHOPEDIC SURGERY Right 2006    cut on table saw     RELOCATE GENERATOR ICD/PACEMAKER         Social History     Tobacco Use     Smoking status: Former Smoker     Packs/day: 1.00     Years: 14.00     Pack years: 14.00     Types: Cigarettes     Start date:      Last attempt to quit: 7/10/1972     Years since quittin.2     Smokeless tobacco: Never Used   Substance Use Topics     Alcohol use: No     Family History   Problem Relation Age of Onset     Alcohol/Drug Father      Heart Disease Father 71     Alzheimer Disease Sister      Alcohol/Drug Sister      Alcohol/Drug Sister      Gastrointestinal Disease Sister      Obesity Sister      Hypertension Sister      Heart Disease Sister      Hypertension Sister      Heart Disease Daughter         afib     Arrhythmia Daughter      Multiple Sclerosis Daughter      Heart Disease Daughter         afib     Arrhythmia Daughter      Cancer Daughter         lymphoma     Aneurysm Mother              Reviewed and updated as needed this visit by Provider         Review  "of Systems   ROS COMP: Constitutional, HEENT, cardiovascular, pulmonary, gi and gu systems are negative, except as otherwise noted.      Objective    /78 (Patient Position: Sitting, Cuff Size: Adult Regular)   Pulse 65   Temp 98  F (36.7  C) (Tympanic)   Resp 14   Ht 1.854 m (6' 1\")   Wt 82.6 kg (182 lb)   SpO2 96%   BMI 24.01 kg/m    Body mass index is 24.01 kg/m .  Physical Exam   GENERAL APPEARANCE: healthy, alert and no distress  NECK: no adenopathy and no asymmetry, masses, or scars  RESP: lungs clear to auscultation - no rales, rhonchi or wheezes  CV: regular rates and rhythm, normal S1 S2, no S3 or S4 and no murmur, click or rub            Assessment & Plan       ICD-10-CM    1. Community acquired pneumonia, unspecified laterality J18.9    2. CKD (chronic kidney disease) stage 3, GFR 30-59 ml/min (H) N18.3           There are no Patient Instructions on file for this visit.    No follow-ups on file.    Dejon Downs MD  [facility]      "

## 2019-10-01 ENCOUNTER — TELEPHONE (OUTPATIENT)
Dept: FAMILY MEDICINE | Facility: CLINIC | Age: 74
End: 2019-10-01

## 2019-10-01 ENCOUNTER — TELEPHONE (OUTPATIENT)
Dept: CARDIOLOGY | Facility: CLINIC | Age: 74
End: 2019-10-01

## 2019-10-01 DIAGNOSIS — D64.9 ANEMIA, UNSPECIFIED TYPE: Primary | ICD-10-CM

## 2019-10-01 PROBLEM — N18.30 CKD (CHRONIC KIDNEY DISEASE) STAGE 3, GFR 30-59 ML/MIN (H): Status: ACTIVE | Noted: 2019-10-01

## 2019-10-01 RX ORDER — FAMOTIDINE 20 MG/1
20 TABLET, FILM COATED ORAL 2 TIMES DAILY
Qty: 180 TABLET | Refills: 3 | Status: SHIPPED | OUTPATIENT
Start: 2019-10-01 | End: 2020-01-01

## 2019-10-01 NOTE — TELEPHONE ENCOUNTER
Reason for Call:  Other call back    Detailed comments: The patients wife called and stated that she had heart that zantac has been proven to cause cancer. She is wondering what Dr. Downs would want to switch him over to since he does have congestive heart failure. She would like the new prescription sent to the [facility] in [facility] [address] in [place]. She would like a call back to discuss this.     Phone Number Patient can be reached at: Cell number on file:    Telephone Information:   Mobile [phone]       Best Time: Any    Can we leave a detailed message on this number? YES    Call taken on 10/1/2019 at 1:56 PM by Bailey Xie

## 2019-10-01 NOTE — TELEPHONE ENCOUNTER
Call from pt's wife, stating that pt takes Zantac and d/t recall wondering what alternative med to take. Returned call to pt, spoke w/wife. Reviewed that they should review this with Dr. Downs, who prescribes Zantac for Tyrel. Wife states understanding and will call him for an alternative Rx. No other questions/concerns today. Berenice Khan RN on 10/1/2019 at 1:52 PM

## 2019-10-01 NOTE — TELEPHONE ENCOUNTER
Patient wife is concerned with patients continued use of Zantac due to the concerns it may cause cancer that are in the news.     Requesting a medication change.     Sending to PCP for review.

## 2019-10-02 ENCOUNTER — ANTICOAGULATION THERAPY VISIT (OUTPATIENT)
Dept: NURSING | Facility: CLINIC | Age: 74
End: 2019-10-02
Payer: COMMERCIAL

## 2019-10-02 DIAGNOSIS — I63.50 CEREBRAL ARTERY OCCLUSION WITH CEREBRAL INFARCTION (H): ICD-10-CM

## 2019-10-02 DIAGNOSIS — Z79.01 LONG TERM CURRENT USE OF ANTICOAGULANT THERAPY: ICD-10-CM

## 2019-10-02 DIAGNOSIS — I63.9 CEREBRAL INFARCTION (H): ICD-10-CM

## 2019-10-02 LAB — INR POINT OF CARE: 2.8 (ref 0.86–1.14)

## 2019-10-02 PROCEDURE — 99207 ZZC NO CHARGE NURSE ONLY: CPT

## 2019-10-02 PROCEDURE — 85610 PROTHROMBIN TIME: CPT | Mod: QW

## 2019-10-02 PROCEDURE — 36416 COLLJ CAPILLARY BLOOD SPEC: CPT

## 2019-10-23 ENCOUNTER — ANTICOAGULATION THERAPY VISIT (OUTPATIENT)
Dept: NURSING | Facility: CLINIC | Age: 74
End: 2019-10-23
Payer: COMMERCIAL

## 2019-10-23 ENCOUNTER — IMMUNIZATION (OUTPATIENT)
Dept: NURSING | Facility: CLINIC | Age: 74
End: 2019-10-23
Payer: COMMERCIAL

## 2019-10-23 DIAGNOSIS — Z23 NEED FOR PROPHYLACTIC VACCINATION AND INOCULATION AGAINST INFLUENZA: Primary | ICD-10-CM

## 2019-10-23 DIAGNOSIS — I63.9 CEREBRAL INFARCTION (H): ICD-10-CM

## 2019-10-23 DIAGNOSIS — I63.50 CEREBRAL ARTERY OCCLUSION WITH CEREBRAL INFARCTION (H): ICD-10-CM

## 2019-10-23 DIAGNOSIS — Z79.01 LONG TERM CURRENT USE OF ANTICOAGULANT THERAPY: ICD-10-CM

## 2019-10-23 LAB — INR POINT OF CARE: 2.5 (ref 0.86–1.14)

## 2019-10-23 PROCEDURE — G0008 ADMIN INFLUENZA VIRUS VAC: HCPCS

## 2019-10-23 PROCEDURE — 36416 COLLJ CAPILLARY BLOOD SPEC: CPT

## 2019-10-23 PROCEDURE — 85610 PROTHROMBIN TIME: CPT | Mod: QW

## 2019-10-23 PROCEDURE — 90662 IIV NO PRSV INCREASED AG IM: CPT

## 2019-10-23 PROCEDURE — 99207 ZZC NO CHARGE NURSE ONLY: CPT

## 2019-10-23 NOTE — PROGRESS NOTES
ANTICOAGULATION FOLLOW-UP CLINIC VISIT    Patient Name:  Tyrel Rene  Date:  10/23/2019  Contact Type:  Face to Face    SUBJECTIVE:  Patient Findings     Comments:   The patient was assessed for diet, medication, and activity level changes, missed or extra doses, bruising or bleeding, with no problem findings.          Clinical Outcomes     Comments:   The patient was assessed for diet, medication, and activity level changes, missed or extra doses, bruising or bleeding, with no problem findings.             OBJECTIVE    INR Protime   Date Value Ref Range Status   10/23/2019 2.5 (A) 0.86 - 1.14 Final       ASSESSMENT / PLAN  INR assessment THER    Recheck INR In: 4 WEEKS    INR Location Clinic      Anticoagulation Summary  As of 10/23/2019    INR goal:   2.0-2.5   TTR:   40.3 % (3.6 y)   INR used for dosin.5 (10/23/2019)   Warfarin maintenance plan:   2.5 mg (2.5 mg x 1) every Mon; 1.25 mg (2.5 mg x 0.5) all other days   Full warfarin instructions:   2.5 mg every Mon; 1.25 mg all other days   Weekly warfarin total:   10 mg   Plan last modified:   Doreen Finley RN (3/22/2019)   Next INR check:   2019   [place] end date:   Indefinite    Indications    Long-term (current) use of anticoagulants [Z79.01] [Z79.01]  Cerebral artery occlusion with cerebral infarction (HCC) [I63.50] [I63.50]  Cerebral infarction (H) [I63.9]             Anticoagulation Episode Summary     INR check location:   Anticoagulation Clinic    Preferred lab:       Send INR reminders to:   [facility]    Comments:         Anticoagulation Care Providers     Provider Role Specialty Phone number    Dejon Downs MD [facility] [phone]            See the Encounter Report to view Anticoagulation Flowsheet and Dosing Calendar (Go to Encounters tab in chart review, and find the Anticoagulation Therapy Visit)        Doreen Finley RN

## 2019-11-20 ENCOUNTER — ANTICOAGULATION THERAPY VISIT (OUTPATIENT)
Dept: NURSING | Facility: CLINIC | Age: 74
End: 2019-11-20
Payer: COMMERCIAL

## 2019-11-20 DIAGNOSIS — Z79.01 LONG TERM CURRENT USE OF ANTICOAGULANT THERAPY: ICD-10-CM

## 2019-11-20 DIAGNOSIS — I63.9 CEREBRAL INFARCTION (H): ICD-10-CM

## 2019-11-20 DIAGNOSIS — I63.50 CEREBRAL ARTERY OCCLUSION WITH CEREBRAL INFARCTION (H): ICD-10-CM

## 2019-11-20 LAB — INR POINT OF CARE: 2.6 (ref 0.86–1.14)

## 2019-11-20 PROCEDURE — 85610 PROTHROMBIN TIME: CPT | Mod: QW

## 2019-11-20 PROCEDURE — 99207 ZZC NO CHARGE NURSE ONLY: CPT

## 2019-11-20 PROCEDURE — 36416 COLLJ CAPILLARY BLOOD SPEC: CPT

## 2019-11-20 NOTE — PROGRESS NOTES
ANTICOAGULATION FOLLOW-UP CLINIC VISIT    Patient Name:  Tyrel Rene  Date:  2019  Contact Type:  Face to Face    SUBJECTIVE:  Patient Findings     Comments:   The patient was assessed for diet, medication, and activity level changes, missed or extra doses, bruising or bleeding, with no problem findings.          Clinical Outcomes     Comments:   The patient was assessed for diet, medication, and activity level changes, missed or extra doses, bruising or bleeding, with no problem findings.             OBJECTIVE    INR Protime   Date Value Ref Range Status   2019 2.6 (A) 0.86 - 1.14 Final       ASSESSMENT / PLAN  INR assessment THER    Recheck INR In: 4 WEEKS    INR Location Clinic      Anticoagulation Summary  As of 2019    INR goal:   2.0-2.5   TTR:   39.4 % (3.6 y)   INR used for dosin.6! (2019)   Warfarin maintenance plan:   2.5 mg (2.5 mg x 1) every Mon; 1.25 mg (2.5 mg x 0.5) all other days   Full warfarin instructions:   2.5 mg every Mon; 1.25 mg all other days   Weekly warfarin total:   10 mg   Plan last modified:   Doreen Finley RN (3/22/2019)   Next INR check:   2019   Target end date:   Indefinite    Indications    Long-term (current) use of anticoagulants [Z79.01] [Z79.01]  Cerebral artery occlusion with cerebral infarction (HCC) [I63.50] [I63.50]  Cerebral infarction (H) [I63.9]             Anticoagulation Episode Summary     INR check location:   Anticoagulation Clinic    Preferred lab:       Send INR reminders to:   [facility]    Comments:         Anticoagulation Care Providers     Provider Role Specialty Phone number    Dejon Downs MD [facility] [phone]            See the Encounter Report to view Anticoagulation Flowsheet and Dosing Calendar (Go to Encounters tab in chart review, and find the Anticoagulation Therapy Visit)        Doreen Finley RN

## 2019-12-05 ENCOUNTER — ANCILLARY PROCEDURE (OUTPATIENT)
Dept: CARDIOLOGY | Facility: CLINIC | Age: 74
End: 2019-12-05
Attending: INTERNAL MEDICINE
Payer: COMMERCIAL

## 2019-12-05 DIAGNOSIS — Z95.810 ICD (IMPLANTABLE CARDIOVERTER-DEFIBRILLATOR) IN PLACE: ICD-10-CM

## 2019-12-05 PROCEDURE — 93296 REM INTERROG EVL PM/IDS: CPT | Performed by: INTERNAL MEDICINE

## 2019-12-05 PROCEDURE — 93295 DEV INTERROG REMOTE 1/2/MLT: CPT | Performed by: INTERNAL MEDICINE

## 2019-12-09 LAB
MDC_IDC_EPISODE_DTM: NORMAL
MDC_IDC_EPISODE_ID: NORMAL
MDC_IDC_EPISODE_TYPE: NORMAL
MDC_IDC_LEAD_IMPLANT_DT: NORMAL
MDC_IDC_LEAD_LOCATION: NORMAL
MDC_IDC_LEAD_LOCATION_DETAIL_1: NORMAL
MDC_IDC_LEAD_MFG: NORMAL
MDC_IDC_LEAD_MODEL: NORMAL
MDC_IDC_LEAD_POLARITY_TYPE: NORMAL
MDC_IDC_LEAD_SERIAL: NORMAL
MDC_IDC_MSMT_BATTERY_DTM: NORMAL
MDC_IDC_MSMT_BATTERY_REMAINING_LONGEVITY: 18 MO
MDC_IDC_MSMT_BATTERY_REMAINING_PERCENTAGE: 34 %
MDC_IDC_MSMT_BATTERY_STATUS: NORMAL
MDC_IDC_MSMT_CAP_CHARGE_DTM: NORMAL
MDC_IDC_MSMT_CAP_CHARGE_DTM: NORMAL
MDC_IDC_MSMT_CAP_CHARGE_ENERGY: 41 J
MDC_IDC_MSMT_CAP_CHARGE_TIME: 11.4 S
MDC_IDC_MSMT_CAP_CHARGE_TIME: 8.2 S
MDC_IDC_MSMT_CAP_CHARGE_TYPE: NORMAL
MDC_IDC_MSMT_CAP_CHARGE_TYPE: NORMAL
MDC_IDC_MSMT_LEADCHNL_LV_IMPEDANCE_VALUE: 381 OHM
MDC_IDC_MSMT_LEADCHNL_LV_PACING_THRESHOLD_AMPLITUDE: 0.6 V
MDC_IDC_MSMT_LEADCHNL_LV_PACING_THRESHOLD_PULSEWIDTH: 0.5 MS
MDC_IDC_MSMT_LEADCHNL_RA_IMPEDANCE_VALUE: 540 OHM
MDC_IDC_MSMT_LEADCHNL_RA_PACING_THRESHOLD_AMPLITUDE: 0.4 V
MDC_IDC_MSMT_LEADCHNL_RA_PACING_THRESHOLD_PULSEWIDTH: 0.5 MS
MDC_IDC_MSMT_LEADCHNL_RV_IMPEDANCE_VALUE: 456 OHM
MDC_IDC_MSMT_LEADCHNL_RV_PACING_THRESHOLD_AMPLITUDE: 0.8 V
MDC_IDC_MSMT_LEADCHNL_RV_PACING_THRESHOLD_PULSEWIDTH: 0.5 MS
MDC_IDC_PG_IMPLANT_DTM: NORMAL
MDC_IDC_PG_MFG: NORMAL
MDC_IDC_PG_MODEL: NORMAL
MDC_IDC_PG_SERIAL: NORMAL
MDC_IDC_PG_TYPE: NORMAL
MDC_IDC_SESS_CLINIC_NAME: NORMAL
MDC_IDC_SESS_DTM: NORMAL
MDC_IDC_SESS_TYPE: NORMAL
MDC_IDC_SET_BRADY_AT_MODE_SWITCH_RATE: 170 {BEATS}/MIN
MDC_IDC_SET_BRADY_LOWRATE: 65 {BEATS}/MIN
MDC_IDC_SET_BRADY_MAX_SENSOR_RATE: 120 {BEATS}/MIN
MDC_IDC_SET_BRADY_MODE: NORMAL
MDC_IDC_SET_CRT_LVRV_DELAY: 0 MS
MDC_IDC_SET_CRT_PACED_CHAMBERS: NORMAL
MDC_IDC_SET_LEADCHNL_LV_PACING_AMPLITUDE: 1.5 V
MDC_IDC_SET_LEADCHNL_LV_PACING_ANODE_ELECTRODE_1: NORMAL
MDC_IDC_SET_LEADCHNL_LV_PACING_ANODE_LOCATION_1: NORMAL
MDC_IDC_SET_LEADCHNL_LV_PACING_CATHODE_ELECTRODE_1: NORMAL
MDC_IDC_SET_LEADCHNL_LV_PACING_CATHODE_LOCATION_1: NORMAL
MDC_IDC_SET_LEADCHNL_LV_PACING_PULSEWIDTH: 0.5 MS
MDC_IDC_SET_LEADCHNL_LV_SENSING_ADAPTATION_MODE: NORMAL
MDC_IDC_SET_LEADCHNL_LV_SENSING_ANODE_ELECTRODE_1: NORMAL
MDC_IDC_SET_LEADCHNL_LV_SENSING_ANODE_LOCATION_1: NORMAL
MDC_IDC_SET_LEADCHNL_LV_SENSING_CATHODE_ELECTRODE_1: NORMAL
MDC_IDC_SET_LEADCHNL_LV_SENSING_CATHODE_LOCATION_1: NORMAL
MDC_IDC_SET_LEADCHNL_LV_SENSING_SENSITIVITY: 1 MV
MDC_IDC_SET_LEADCHNL_RA_PACING_POLARITY: NORMAL
MDC_IDC_SET_LEADCHNL_RA_SENSING_ADAPTATION_MODE: NORMAL
MDC_IDC_SET_LEADCHNL_RA_SENSING_POLARITY: NORMAL
MDC_IDC_SET_LEADCHNL_RA_SENSING_SENSITIVITY: 0.15 MV
MDC_IDC_SET_LEADCHNL_RV_PACING_AMPLITUDE: 2.5 V
MDC_IDC_SET_LEADCHNL_RV_PACING_POLARITY: NORMAL
MDC_IDC_SET_LEADCHNL_RV_PACING_PULSEWIDTH: 0.5 MS
MDC_IDC_SET_LEADCHNL_RV_SENSING_ADAPTATION_MODE: NORMAL
MDC_IDC_SET_LEADCHNL_RV_SENSING_POLARITY: NORMAL
MDC_IDC_SET_LEADCHNL_RV_SENSING_SENSITIVITY: 0.6 MV
MDC_IDC_SET_ZONE_DETECTION_INTERVAL: 273 MS
MDC_IDC_SET_ZONE_DETECTION_INTERVAL: 353 MS
MDC_IDC_SET_ZONE_DETECTION_INTERVAL: 400 MS
MDC_IDC_SET_ZONE_TYPE: NORMAL
MDC_IDC_SET_ZONE_VENDOR_TYPE: NORMAL
MDC_IDC_STAT_BRADY_DTM_END: NORMAL
MDC_IDC_STAT_BRADY_DTM_START: NORMAL
MDC_IDC_STAT_BRADY_RA_PERCENT_PACED: 0 %
MDC_IDC_STAT_BRADY_RV_PERCENT_PACED: 99 %
MDC_IDC_STAT_CRT_DTM_END: NORMAL
MDC_IDC_STAT_CRT_DTM_START: NORMAL
MDC_IDC_STAT_CRT_LV_PERCENT_PACED: 99 %
MDC_IDC_STAT_EPISODE_RECENT_COUNT: 0
MDC_IDC_STAT_EPISODE_RECENT_COUNT: 479
MDC_IDC_STAT_EPISODE_RECENT_COUNT_DTM_END: NORMAL
MDC_IDC_STAT_EPISODE_RECENT_COUNT_DTM_START: NORMAL
MDC_IDC_STAT_EPISODE_TYPE: NORMAL
MDC_IDC_STAT_EPISODE_VENDOR_TYPE: NORMAL
MDC_IDC_STAT_TACHYTHERAPY_ATP_DELIVERED_RECENT: 0
MDC_IDC_STAT_TACHYTHERAPY_ATP_DELIVERED_TOTAL: 6
MDC_IDC_STAT_TACHYTHERAPY_RECENT_DTM_END: NORMAL
MDC_IDC_STAT_TACHYTHERAPY_RECENT_DTM_START: NORMAL
MDC_IDC_STAT_TACHYTHERAPY_SHOCKS_ABORTED_RECENT: 0
MDC_IDC_STAT_TACHYTHERAPY_SHOCKS_ABORTED_TOTAL: 2
MDC_IDC_STAT_TACHYTHERAPY_SHOCKS_DELIVERED_RECENT: 0
MDC_IDC_STAT_TACHYTHERAPY_SHOCKS_DELIVERED_TOTAL: 9
MDC_IDC_STAT_TACHYTHERAPY_TOTAL_DTM_END: NORMAL

## 2019-12-15 ENCOUNTER — HEALTH MAINTENANCE LETTER (OUTPATIENT)
Age: 74
End: 2019-12-15

## 2019-12-18 ENCOUNTER — ANTICOAGULATION THERAPY VISIT (OUTPATIENT)
Dept: NURSING | Facility: CLINIC | Age: 74
End: 2019-12-18
Payer: COMMERCIAL

## 2019-12-18 DIAGNOSIS — I63.50 CEREBRAL ARTERY OCCLUSION WITH CEREBRAL INFARCTION (H): ICD-10-CM

## 2019-12-18 DIAGNOSIS — I63.9 CEREBRAL INFARCTION (H): ICD-10-CM

## 2019-12-18 DIAGNOSIS — Z79.01 LONG TERM CURRENT USE OF ANTICOAGULANT THERAPY: ICD-10-CM

## 2019-12-18 LAB — INR POINT OF CARE: 2.6 (ref 0.86–1.14)

## 2019-12-18 PROCEDURE — 85610 PROTHROMBIN TIME: CPT | Mod: QW

## 2019-12-18 PROCEDURE — 99207 ZZC NO CHARGE NURSE ONLY: CPT

## 2019-12-18 PROCEDURE — 36416 COLLJ CAPILLARY BLOOD SPEC: CPT

## 2019-12-18 NOTE — PROGRESS NOTES
ANTICOAGULATION FOLLOW-UP CLINIC VISIT    Patient Name:  Tyrel Rene  Date:  2019  Contact Type:  Face to Face    SUBJECTIVE:  Patient Findings     Comments:   The patient was assessed for diet, medication, and activity level changes, missed or extra doses, bruising or bleeding, with no problem findings.          Clinical Outcomes     Comments:   The patient was assessed for diet, medication, and activity level changes, missed or extra doses, bruising or bleeding, with no problem findings.             OBJECTIVE    INR Protime   Date Value Ref Range Status   2019 2.6 (A) 0.86 - 1.14 Final       ASSESSMENT / PLAN  INR assessment THER    Recheck INR In: 5 WEEKS    INR Location Clinic      Anticoagulation Summary  As of 2019    INR goal:   2.0-2.5   TTR:   19.9 % (1 y)   INR used for dosin.6! (2019)   Warfarin maintenance plan:   2.5 mg (2.5 mg x 1) every Mon; 1.25 mg (2.5 mg x 0.5) all other days   Full warfarin instructions:   2.5 mg every Mon; 1.25 mg all other days   Weekly warfarin total:   10 mg   Plan last modified:   Doreen Finley RN (3/22/2019)   Next INR check:   2020   Priority:   Critical   [place] end date:   Indefinite    Indications    Long-term (current) use of anticoagulants [Z79.01] [Z79.01]  Cerebral artery occlusion with cerebral infarction (HCC) [I63.50] [I63.50]  Cerebral infarction (H) [I63.9]             Anticoagulation Episode Summary     INR check location:   Anticoagulation Clinic    Preferred lab:       Send INR reminders to:   [facility]    Comments:         Anticoagulation Care Providers     Provider Role Specialty Phone number    Dejon Downs MD [facility] [phone]            See the Encounter Report to view Anticoagulation Flowsheet and Dosing Calendar (Go to Encounters tab in chart review, and find the Anticoagulation Therapy Visit)        Doreen Finley, RN

## 2019-12-30 ENCOUNTER — TELEPHONE (OUTPATIENT)
Dept: FAMILY MEDICINE | Facility: CLINIC | Age: 74
End: 2019-12-30

## 2020-01-01 ENCOUNTER — VIRTUAL VISIT (OUTPATIENT)
Dept: CARDIOLOGY | Facility: CLINIC | Age: 75
End: 2020-01-01
Payer: COMMERCIAL

## 2020-01-01 ENCOUNTER — TELEPHONE (OUTPATIENT)
Dept: CARDIOLOGY | Facility: CLINIC | Age: 75
End: 2020-01-01

## 2020-01-01 ENCOUNTER — CARE COORDINATION (OUTPATIENT)
Dept: CARDIOLOGY | Facility: CLINIC | Age: 75
End: 2020-01-01

## 2020-01-01 ENCOUNTER — DOCUMENTATION ONLY (OUTPATIENT)
Dept: CARDIOLOGY | Facility: CLINIC | Age: 75
End: 2020-01-01

## 2020-01-01 ENCOUNTER — TELEPHONE (OUTPATIENT)
Dept: FAMILY MEDICINE | Facility: CLINIC | Age: 75
End: 2020-01-01

## 2020-01-01 ENCOUNTER — TRANSFERRED RECORDS (OUTPATIENT)
Dept: HEALTH INFORMATION MANAGEMENT | Facility: CLINIC | Age: 75
End: 2020-01-01

## 2020-01-01 ENCOUNTER — APPOINTMENT (OUTPATIENT)
Dept: GENERAL RADIOLOGY | Facility: CLINIC | Age: 75
DRG: 309 | End: 2020-01-01
Attending: EMERGENCY MEDICINE
Payer: COMMERCIAL

## 2020-01-01 ENCOUNTER — VIRTUAL VISIT (OUTPATIENT)
Dept: CARDIOLOGY | Facility: CLINIC | Age: 75
End: 2020-01-01
Attending: NURSE PRACTITIONER
Payer: COMMERCIAL

## 2020-01-01 ENCOUNTER — DOCUMENTATION ONLY (OUTPATIENT)
Dept: FAMILY MEDICINE | Facility: CLINIC | Age: 75
End: 2020-01-01

## 2020-01-01 ENCOUNTER — ANCILLARY PROCEDURE (OUTPATIENT)
Dept: CARDIOLOGY | Facility: CLINIC | Age: 75
End: 2020-01-01
Attending: INTERNAL MEDICINE
Payer: COMMERCIAL

## 2020-01-01 ENCOUNTER — ANTICOAGULATION THERAPY VISIT (OUTPATIENT)
Dept: FAMILY MEDICINE | Facility: CLINIC | Age: 75
End: 2020-01-01

## 2020-01-01 ENCOUNTER — HOSPITAL LABORATORY (OUTPATIENT)
Dept: OTHER | Facility: CLINIC | Age: 75
End: 2020-01-01

## 2020-01-01 ENCOUNTER — HOSPITAL ENCOUNTER (OUTPATIENT)
Facility: CLINIC | Age: 75
Discharge: HOME | End: 2020-08-11
Admitting: INTERNAL MEDICINE
Payer: COMMERCIAL

## 2020-01-01 ENCOUNTER — ANTICOAGULATION THERAPY VISIT (OUTPATIENT)
Dept: NURSING | Facility: CLINIC | Age: 75
End: 2020-01-01

## 2020-01-01 ENCOUNTER — APPOINTMENT (OUTPATIENT)
Dept: GENERAL RADIOLOGY | Facility: CLINIC | Age: 75
DRG: 309 | End: 2020-01-01
Attending: INTERNAL MEDICINE
Payer: COMMERCIAL

## 2020-01-01 ENCOUNTER — PATIENT OUTREACH (OUTPATIENT)
Dept: NURSING | Facility: CLINIC | Age: 75
End: 2020-01-01
Payer: COMMERCIAL

## 2020-01-01 ENCOUNTER — NURSE TRIAGE (OUTPATIENT)
Dept: CARDIOLOGY | Facility: CLINIC | Age: 75
End: 2020-01-01

## 2020-01-01 ENCOUNTER — VIRTUAL VISIT (OUTPATIENT)
Dept: FAMILY MEDICINE | Facility: CLINIC | Age: 75
End: 2020-01-01
Payer: COMMERCIAL

## 2020-01-01 ENCOUNTER — APPOINTMENT (OUTPATIENT)
Dept: OCCUPATIONAL THERAPY | Facility: CLINIC | Age: 75
DRG: 309 | End: 2020-01-01
Attending: INTERNAL MEDICINE
Payer: COMMERCIAL

## 2020-01-01 ENCOUNTER — APPOINTMENT (OUTPATIENT)
Dept: CARDIOLOGY | Facility: CLINIC | Age: 75
DRG: 309 | End: 2020-01-01
Attending: INTERNAL MEDICINE
Payer: COMMERCIAL

## 2020-01-01 ENCOUNTER — HOSPITAL ENCOUNTER (INPATIENT)
Facility: CLINIC | Age: 75
LOS: 2 days | Discharge: HOME | DRG: 309 | End: 2020-07-30
Attending: EMERGENCY MEDICINE | Admitting: INTERNAL MEDICINE
Payer: COMMERCIAL

## 2020-01-01 ENCOUNTER — SURGERY (OUTPATIENT)
Age: 75
End: 2020-01-01
Payer: COMMERCIAL

## 2020-01-01 ENCOUNTER — DOCUMENTATION ONLY (OUTPATIENT)
Dept: OTHER | Facility: CLINIC | Age: 75
End: 2020-01-01

## 2020-01-01 ENCOUNTER — HOSPITAL ENCOUNTER (INPATIENT)
Facility: CLINIC | Age: 75
LOS: 2 days | Discharge: HOME | DRG: 309 | End: 2020-09-22
Attending: EMERGENCY MEDICINE | Admitting: INTERNAL MEDICINE
Payer: COMMERCIAL

## 2020-01-01 VITALS
TEMPERATURE: 97.6 F | SYSTOLIC BLOOD PRESSURE: 114 MMHG | OXYGEN SATURATION: 96 % | WEIGHT: 173.7 LBS | HEART RATE: 66 BPM | HEIGHT: 73 IN | RESPIRATION RATE: 18 BRPM | BODY MASS INDEX: 23.02 KG/M2 | DIASTOLIC BLOOD PRESSURE: 63 MMHG

## 2020-01-01 VITALS
SYSTOLIC BLOOD PRESSURE: 134 MMHG | WEIGHT: 179.45 LBS | HEART RATE: 65 BPM | DIASTOLIC BLOOD PRESSURE: 83 MMHG | TEMPERATURE: 99.1 F | BODY MASS INDEX: 23.78 KG/M2 | OXYGEN SATURATION: 90 % | RESPIRATION RATE: 18 BRPM | HEIGHT: 73 IN

## 2020-01-01 VITALS
WEIGHT: 182.9 LBS | HEART RATE: 65 BPM | DIASTOLIC BLOOD PRESSURE: 82 MMHG | BODY MASS INDEX: 24.24 KG/M2 | SYSTOLIC BLOOD PRESSURE: 131 MMHG | OXYGEN SATURATION: 98 % | HEIGHT: 73 IN

## 2020-01-01 VITALS
DIASTOLIC BLOOD PRESSURE: 81 MMHG | SYSTOLIC BLOOD PRESSURE: 136 MMHG | WEIGHT: 174.4 LBS | HEIGHT: 73 IN | HEART RATE: 64 BPM | BODY MASS INDEX: 23.11 KG/M2

## 2020-01-01 VITALS
WEIGHT: 178.2 LBS | BODY MASS INDEX: 23.62 KG/M2 | SYSTOLIC BLOOD PRESSURE: 118 MMHG | OXYGEN SATURATION: 99 % | RESPIRATION RATE: 16 BRPM | HEART RATE: 65 BPM | DIASTOLIC BLOOD PRESSURE: 68 MMHG | HEIGHT: 73 IN | TEMPERATURE: 98.1 F

## 2020-01-01 VITALS
HEART RATE: 66 BPM | SYSTOLIC BLOOD PRESSURE: 106 MMHG | BODY MASS INDEX: 22.77 KG/M2 | WEIGHT: 172.6 LBS | DIASTOLIC BLOOD PRESSURE: 62 MMHG

## 2020-01-01 DIAGNOSIS — E55.9 VITAMIN D DEFICIENCY: Primary | ICD-10-CM

## 2020-01-01 DIAGNOSIS — I48.20 CHRONIC ATRIAL FIBRILLATION (H): ICD-10-CM

## 2020-01-01 DIAGNOSIS — I50.22 CHRONIC SYSTOLIC CONGESTIVE HEART FAILURE (H): ICD-10-CM

## 2020-01-01 DIAGNOSIS — I49.01 VENTRICULAR FIBRILLATION (H): ICD-10-CM

## 2020-01-01 DIAGNOSIS — I25.5 ISCHEMIC CARDIOMYOPATHY: ICD-10-CM

## 2020-01-01 DIAGNOSIS — Z95.810 ICD (IMPLANTABLE CARDIOVERTER-DEFIBRILLATOR) IN PLACE: ICD-10-CM

## 2020-01-01 DIAGNOSIS — Z79.01 LONG TERM CURRENT USE OF ANTICOAGULANT THERAPY: ICD-10-CM

## 2020-01-01 DIAGNOSIS — I63.50 CEREBRAL ARTERY OCCLUSION WITH CEREBRAL INFARCTION (H): ICD-10-CM

## 2020-01-01 DIAGNOSIS — I63.00 CEREBRAL INFARCTION DUE TO THROMBOSIS OF PRECEREBRAL ARTERY (H): ICD-10-CM

## 2020-01-01 DIAGNOSIS — I63.9 CEREBRAL INFARCTION (H): ICD-10-CM

## 2020-01-01 DIAGNOSIS — J31.0 CHRONIC RHINITIS: ICD-10-CM

## 2020-01-01 DIAGNOSIS — N18.30 CKD (CHRONIC KIDNEY DISEASE) STAGE 3, GFR 30-59 ML/MIN (H): ICD-10-CM

## 2020-01-01 DIAGNOSIS — Z45.02 AICD DISCHARGE: ICD-10-CM

## 2020-01-01 DIAGNOSIS — I47.20 VENTRICULAR TACHYCARDIA (H): ICD-10-CM

## 2020-01-01 DIAGNOSIS — I50.22 CHRONIC SYSTOLIC CONGESTIVE HEART FAILURE (H): Primary | ICD-10-CM

## 2020-01-01 DIAGNOSIS — I25.5 ISCHEMIC CARDIOMYOPATHY: Primary | ICD-10-CM

## 2020-01-01 DIAGNOSIS — I48.0 PAROXYSMAL ATRIAL FIBRILLATION (H): ICD-10-CM

## 2020-01-01 DIAGNOSIS — R55 SYNCOPE, UNSPECIFIED SYNCOPE TYPE: ICD-10-CM

## 2020-01-01 DIAGNOSIS — I47.20 VT (VENTRICULAR TACHYCARDIA) (H): Primary | ICD-10-CM

## 2020-01-01 DIAGNOSIS — E78.2 MIXED HYPERLIPIDEMIA: ICD-10-CM

## 2020-01-01 DIAGNOSIS — I25.10 CORONARY ARTERY DISEASE INVOLVING NATIVE HEART WITHOUT ANGINA PECTORIS, UNSPECIFIED VESSEL OR LESION TYPE: ICD-10-CM

## 2020-01-01 DIAGNOSIS — R55 SYNCOPE, UNSPECIFIED SYNCOPE TYPE: Primary | ICD-10-CM

## 2020-01-01 DIAGNOSIS — I10 ESSENTIAL HYPERTENSION: ICD-10-CM

## 2020-01-01 DIAGNOSIS — I42.9 CARDIOMYOPATHY (H): ICD-10-CM

## 2020-01-01 DIAGNOSIS — N18.31 STAGE 3A CHRONIC KIDNEY DISEASE (H): ICD-10-CM

## 2020-01-01 DIAGNOSIS — Z95.810 ICD (IMPLANTABLE CARDIOVERTER-DEFIBRILLATOR) IN PLACE: Primary | ICD-10-CM

## 2020-01-01 DIAGNOSIS — Z11.59 ENCOUNTER FOR SCREENING FOR OTHER VIRAL DISEASES: ICD-10-CM

## 2020-01-01 DIAGNOSIS — I48.91 ATRIAL FIBRILLATION, UNSPECIFIED TYPE (H): Primary | ICD-10-CM

## 2020-01-01 DIAGNOSIS — I48.91 ATRIAL FIBRILLATION, UNSPECIFIED TYPE (H): ICD-10-CM

## 2020-01-01 DIAGNOSIS — I42.9 CARDIOMYOPATHY (H): Primary | ICD-10-CM

## 2020-01-01 DIAGNOSIS — I47.29 NSVT (NONSUSTAINED VENTRICULAR TACHYCARDIA) (H): Primary | ICD-10-CM

## 2020-01-01 DIAGNOSIS — I47.20 V-TACH (H): ICD-10-CM

## 2020-01-01 DIAGNOSIS — I47.20 VT (VENTRICULAR TACHYCARDIA) (H): ICD-10-CM

## 2020-01-01 DIAGNOSIS — I47.20 VENTRICULAR TACHYCARDIA (H): Primary | ICD-10-CM

## 2020-01-01 DIAGNOSIS — I49.01 VENTRICULAR FIBRILLATION (H): Primary | ICD-10-CM

## 2020-01-01 DIAGNOSIS — Z79.899 ON AMIODARONE THERAPY: ICD-10-CM

## 2020-01-01 DIAGNOSIS — Z11.59 ENCOUNTER FOR SCREENING FOR OTHER VIRAL DISEASES: Primary | ICD-10-CM

## 2020-01-01 DIAGNOSIS — I47.29 NSVT (NONSUSTAINED VENTRICULAR TACHYCARDIA) (H): ICD-10-CM

## 2020-01-01 DIAGNOSIS — D64.9 ANEMIA, UNSPECIFIED TYPE: ICD-10-CM

## 2020-01-01 LAB
ALBUMIN SERPL-MCNC: 3.2 G/DL (ref 3.4–5)
ALBUMIN SERPL-MCNC: 3.3 G/DL (ref 3.4–5)
ALBUMIN SERPL-MCNC: 3.3 G/DL (ref 3.4–5)
ALP SERPL-CCNC: 75 U/L (ref 40–150)
ALP SERPL-CCNC: 75 U/L (ref 40–150)
ALP SERPL-CCNC: 87 U/L (ref 40–150)
ALT SERPL W P-5'-P-CCNC: 29 U/L (ref 0–70)
ALT SERPL W P-5'-P-CCNC: 73 U/L (ref 0–70)
ALT SERPL-CCNC: 29 U/L (ref 0–70)
AMIODARONE SERPL-MCNC: NORMAL UG/ML (ref 1000–2500)
ANION GAP SERPL CALCULATED.3IONS-SCNC: 1 MMOL/L (ref 3–14)
ANION GAP SERPL CALCULATED.3IONS-SCNC: 1 MMOL/L (ref 3–14)
ANION GAP SERPL CALCULATED.3IONS-SCNC: 11.3 MMOL/L (ref 6–17)
ANION GAP SERPL CALCULATED.3IONS-SCNC: 2 MMOL/L (ref 3–14)
ANION GAP SERPL CALCULATED.3IONS-SCNC: 2 MMOL/L (ref 3–14)
ANION GAP SERPL CALCULATED.3IONS-SCNC: 3 MMOL/L (ref 3–14)
ANION GAP SERPL CALCULATED.3IONS-SCNC: 4 MMOL/L (ref 3–14)
ANION GAP SERPL CALCULATED.3IONS-SCNC: 4 MMOL/L (ref 3–14)
ANION GAP SERPL CALCULATED.3IONS-SCNC: 5 MMOL/L (ref 3–14)
ANION GAP SERPL CALCULATED.3IONS-SCNC: 5 MMOL/L (ref 3–14)
ANION GAP SERPL CALCULATED.3IONS-SCNC: 6 MMOL/L (ref 3–14)
ANION GAP SERPL CALCULATED.3IONS-SCNC: 6 MMOL/L (ref 3–14)
ANION GAP SERPL CALCULATED.3IONS-SCNC: 7 MMOL/L (ref 3–14)
APTT PPP: 33 SEC (ref 22–37)
AST SERPL W P-5'-P-CCNC: 24 U/L (ref 0–45)
AST SERPL W P-5'-P-CCNC: 80 U/L (ref 0–45)
AST SERPL-CCNC: 24 U/L (ref 0–45)
BASOPHILS # BLD AUTO: 0 10E9/L (ref 0–0.2)
BASOPHILS # BLD AUTO: 0 10E9/L (ref 0–0.2)
BASOPHILS NFR BLD AUTO: 0.5 %
BASOPHILS NFR BLD AUTO: 0.5 %
BILIRUB SERPL-MCNC: 0.5 MG/DL (ref 0.2–1.3)
BUN SERPL-MCNC: 16 MG/DL (ref 7–30)
BUN SERPL-MCNC: 16 MG/DL (ref 7–30)
BUN SERPL-MCNC: 19 MG/DL (ref 7–30)
BUN SERPL-MCNC: 19 MG/DL (ref 7–30)
BUN SERPL-MCNC: 20 MG/DL (ref 7–30)
BUN SERPL-MCNC: 20 MG/DL (ref 7–30)
BUN SERPL-MCNC: 21 MG/DL (ref 7–30)
BUN SERPL-MCNC: 22 MG/DL (ref 7–30)
BUN SERPL-MCNC: 24 MG/DL (ref 7–30)
BUN SERPL-MCNC: 26 MG/DL (ref 7–30)
CALCIUM SERPL-MCNC: 7.7 MG/DL (ref 8.5–10.1)
CALCIUM SERPL-MCNC: 7.7 MG/DL (ref 8.5–10.1)
CALCIUM SERPL-MCNC: 8.1 MG/DL (ref 8.5–10.1)
CALCIUM SERPL-MCNC: 8.1 MG/DL (ref 8.5–10.1)
CALCIUM SERPL-MCNC: 8.2 MG/DL (ref 8.5–10.1)
CALCIUM SERPL-MCNC: 8.3 MG/DL (ref 8.5–10.1)
CALCIUM SERPL-MCNC: 8.4 MG/DL (ref 8.5–10.1)
CALCIUM SERPL-MCNC: 8.4 MG/DL (ref 8.5–10.1)
CALCIUM SERPL-MCNC: 8.7 MG/DL (ref 8.5–10.5)
CHLORIDE SERPL-SCNC: 101 MMOL/L (ref 94–109)
CHLORIDE SERPL-SCNC: 103 MMOL/L (ref 94–109)
CHLORIDE SERPL-SCNC: 104 MMOL/L (ref 94–109)
CHLORIDE SERPL-SCNC: 104 MMOL/L (ref 98–107)
CHLORIDE SERPL-SCNC: 105 MMOL/L (ref 94–109)
CHLORIDE SERPL-SCNC: 106 MMOL/L (ref 94–109)
CHLORIDE SERPL-SCNC: 107 MMOL/L (ref 94–109)
CHLORIDE SERPL-SCNC: 107 MMOL/L (ref 94–109)
CHLORIDE SERPL-SCNC: 108 MMOL/L (ref 94–109)
CHLORIDE SERPLBLD-SCNC: 105 MMOL/L (ref 94–109)
CO2 SERPL-SCNC: 22 MMOL/L (ref 20–32)
CO2 SERPL-SCNC: 22 MMOL/L (ref 20–32)
CO2 SERPL-SCNC: 23 MMOL/L (ref 20–32)
CO2 SERPL-SCNC: 25 MMOL/L (ref 20–32)
CO2 SERPL-SCNC: 25 MMOL/L (ref 23–29)
CO2 SERPL-SCNC: 26 MMOL/L (ref 20–32)
CO2 SERPL-SCNC: 28 MMOL/L (ref 20–32)
CO2 SERPL-SCNC: 29 MMOL/L (ref 20–32)
CO2 SERPL-SCNC: 29 MMOL/L (ref 20–32)
CO2 SERPL-SCNC: 30 MMOL/L (ref 20–32)
CO2 SERPL-SCNC: 30 MMOL/L (ref 20–32)
CREAT SERPL-MCNC: 1.26 MG/DL (ref 0.66–1.25)
CREAT SERPL-MCNC: 1.27 MG/DL (ref 0.66–1.25)
CREAT SERPL-MCNC: 1.29 MG/DL (ref 0.66–1.25)
CREAT SERPL-MCNC: 1.32 MG/DL (ref 0.66–1.25)
CREAT SERPL-MCNC: 1.37 MG/DL (ref 0.66–1.25)
CREAT SERPL-MCNC: 1.37 MG/DL (ref 0.66–1.25)
CREAT SERPL-MCNC: 1.4 MG/DL (ref 0.66–1.25)
CREAT SERPL-MCNC: 1.48 MG/DL (ref 0.66–1.25)
CREAT SERPL-MCNC: 1.52 MG/DL (ref 0.7–1.3)
CREAT SERPL-MCNC: 1.64 MG/DL (ref 0.66–1.25)
CREAT SERPL-MCNC: 1.64 MG/DL (ref 0.66–1.25)
DESETHYLAMIODARONE SERPL-MCNC: 662 NG/ML
DIFFERENTIAL METHOD BLD: ABNORMAL
DIFFERENTIAL METHOD BLD: ABNORMAL
DIGOXIN SERPL-MCNC: 1.5 UG/L (ref 0.5–2)
DIGOXIN SERPL-MCNC: 1.6 UG/L (ref 0.5–2)
EOSINOPHIL # BLD AUTO: 0.1 10E9/L (ref 0–0.7)
EOSINOPHIL # BLD AUTO: 0.1 10E9/L (ref 0–0.7)
EOSINOPHIL NFR BLD AUTO: 1.4 %
EOSINOPHIL NFR BLD AUTO: 1.5 %
ERYTHROCYTE [DISTWIDTH] IN BLOOD BY AUTOMATED COUNT: 13.9 % (ref 10–15)
ERYTHROCYTE [DISTWIDTH] IN BLOOD BY AUTOMATED COUNT: 14 % (ref 10–15)
ERYTHROCYTE [DISTWIDTH] IN BLOOD BY AUTOMATED COUNT: 14.2 % (ref 10–15)
GFR SERPL CREATININE-BSD FRML MDRD: 40 ML/MIN/1.73M2
GFR SERPL CREATININE-BSD FRML MDRD: 40 ML/MIN/{1.73_M2}
GFR SERPL CREATININE-BSD FRML MDRD: 45 ML/MIN/{1.73_M2}
GFR SERPL CREATININE-BSD FRML MDRD: 45 ML/MIN/{1.73_M2}
GFR SERPL CREATININE-BSD FRML MDRD: 48 ML/MIN/{1.73_M2}
GFR SERPL CREATININE-BSD FRML MDRD: 49 ML/MIN/{1.73_M2}
GFR SERPL CREATININE-BSD FRML MDRD: 49 ML/MIN/{1.73_M2}
GFR SERPL CREATININE-BSD FRML MDRD: 52 ML/MIN/{1.73_M2}
GFR SERPL CREATININE-BSD FRML MDRD: 53 ML/MIN/{1.73_M2}
GFR SERPL CREATININE-BSD FRML MDRD: 54 ML/MIN/{1.73_M2}
GFR SERPL CREATININE-BSD FRML MDRD: 55 ML/MIN/{1.73_M2}
GLUCOSE SERPL-MCNC: 108 MG/DL (ref 70–99)
GLUCOSE SERPL-MCNC: 113 MG/DL (ref 70–99)
GLUCOSE SERPL-MCNC: 139 MG/DL (ref 70–105)
GLUCOSE SERPL-MCNC: 75 MG/DL (ref 70–99)
GLUCOSE SERPL-MCNC: 75 MG/DL (ref 70–99)
GLUCOSE SERPL-MCNC: 76 MG/DL (ref 70–99)
GLUCOSE SERPL-MCNC: 81 MG/DL (ref 70–99)
GLUCOSE SERPL-MCNC: 82 MG/DL (ref 70–99)
GLUCOSE SERPL-MCNC: 89 MG/DL (ref 70–99)
GLUCOSE SERPL-MCNC: 90 MG/DL (ref 70–99)
GLUCOSE SERPL-MCNC: 92 MG/DL (ref 70–99)
GLUCOSE SERPL-MCNC: 95 MG/DL (ref 70–99)
GLUCOSE SERPL-MCNC: 96 MG/DL (ref 70–99)
HCT VFR BLD AUTO: 39.5 % (ref 40–53)
HCT VFR BLD AUTO: 39.5 % (ref 40–53)
HCT VFR BLD AUTO: 40.6 % (ref 40–53)
HGB BLD-MCNC: 13.2 G/DL (ref 13.3–17.7)
HGB BLD-MCNC: 13.6 G/DL (ref 13.3–17.7)
HGB BLD-MCNC: 13.6 G/DL (ref 13.3–17.7)
IMM GRANULOCYTES # BLD: 0 10E9/L (ref 0–0.4)
IMM GRANULOCYTES # BLD: 0 10E9/L (ref 0–0.4)
IMM GRANULOCYTES NFR BLD: 0.1 %
IMM GRANULOCYTES NFR BLD: 0.3 %
INR PPP: 1.3 (ref 0.9–1.1)
INR PPP: 1.45 (ref 0.86–1.14)
INR PPP: 1.7 (ref 0.9–1.1)
INR PPP: 1.8 (ref 0.9–1.1)
INR PPP: 1.8 (ref 0.9–1.1)
INR PPP: 2.11 (ref 0.86–1.14)
INR PPP: 2.3 (ref 0.9–1.1)
INR PPP: 2.3 (ref 0.9–1.1)
INR PPP: 2.38 (ref 0.86–1.14)
INR PPP: 2.38 (ref 0.9–1.1)
INR PPP: 2.4 (ref 0.9–1.1)
INR PPP: 2.42 (ref 0.86–1.14)
INR PPP: 2.42 (ref 0.86–1.14)
INR PPP: 2.46 (ref 0.86–1.14)
INR PPP: 2.55 (ref 0.86–1.14)
INR PPP: 2.55 (ref 0.86–1.14)
INR PPP: 2.61 (ref 0.86–1.14)
INR PPP: 2.66 (ref 0.86–1.14)
INR PPP: 2.7 (ref 0.9–1.1)
INR PPP: 2.7 (ref 0.9–1.1)
INR PPP: 2.74 (ref 0.86–1.14)
INR PPP: 2.84 (ref 0.86–1.14)
INR PPP: 2.87 (ref 0.86–1.14)
INR PPP: 2.9 (ref 0.9–1.1)
INR PPP: 2.9 (ref 0.9–1.1)
INR PPP: 2.93 (ref 0.86–1.14)
INR PPP: 3 (ref 0.9–1.1)
INR PPP: 3.2 (ref 0.9–1.1)
INR PPP: 3.2 (ref 0.9–1.1)
INTERPRETATION ECG - MUSE: NORMAL
KCT BLD-ACNC: 251 SEC (ref 75–150)
KCT BLD-ACNC: 264 SEC (ref 75–150)
KCT BLD-ACNC: 272 SEC (ref 75–150)
KCT BLD-ACNC: 276 SEC (ref 75–150)
LABORATORY COMMENT REPORT: NORMAL
LABORATORY COMMENT REPORT: NORMAL
LYMPHOCYTES # BLD AUTO: 1.9 10E9/L (ref 0.8–5.3)
LYMPHOCYTES # BLD AUTO: 2.1 10E9/L (ref 0.8–5.3)
LYMPHOCYTES NFR BLD AUTO: 26.1 %
LYMPHOCYTES NFR BLD AUTO: 26.8 %
MAGNESIUM SERPL-MCNC: 1.9 MG/DL (ref 1.6–2.3)
MAGNESIUM SERPL-MCNC: 2.1 MG/DL (ref 1.6–2.3)
MAGNESIUM SERPL-MCNC: 2.1 MG/DL (ref 1.6–2.3)
MAGNESIUM SERPL-MCNC: 2.2 MG/DL (ref 1.6–2.3)
MAGNESIUM SERPL-MCNC: 2.4 MG/DL (ref 1.6–2.3)
MCH RBC QN AUTO: 31.2 PG (ref 26.5–33)
MCH RBC QN AUTO: 31.2 PG (ref 26.5–33)
MCH RBC QN AUTO: 31.8 PG (ref 26.5–33)
MCHC RBC AUTO-ENTMCNC: 33.4 G/DL (ref 31.5–36.5)
MCHC RBC AUTO-ENTMCNC: 33.5 G/DL (ref 31.5–36.5)
MCHC RBC AUTO-ENTMCNC: 34.4 G/DL (ref 31.5–36.5)
MCV RBC AUTO: 92 FL (ref 78–100)
MCV RBC AUTO: 93 FL (ref 78–100)
MCV RBC AUTO: 93 FL (ref 78–100)
MDC_IDC_EPISODE_DTM: NORMAL
MDC_IDC_EPISODE_DURATION: 10 S
MDC_IDC_EPISODE_DURATION: 11 S
MDC_IDC_EPISODE_DURATION: 114 S
MDC_IDC_EPISODE_DURATION: 13 S
MDC_IDC_EPISODE_DURATION: 132 S
MDC_IDC_EPISODE_DURATION: 15 S
MDC_IDC_EPISODE_DURATION: 15 S
MDC_IDC_EPISODE_DURATION: 16 S
MDC_IDC_EPISODE_DURATION: 16 S
MDC_IDC_EPISODE_DURATION: 18 S
MDC_IDC_EPISODE_DURATION: 22 S
MDC_IDC_EPISODE_DURATION: 28 S
MDC_IDC_EPISODE_DURATION: 39 S
MDC_IDC_EPISODE_DURATION: 41 S
MDC_IDC_EPISODE_DURATION: 5 S
MDC_IDC_EPISODE_DURATION: 51 S
MDC_IDC_EPISODE_DURATION: 6 S
MDC_IDC_EPISODE_DURATION: 6 S
MDC_IDC_EPISODE_DURATION: 62 S
MDC_IDC_EPISODE_DURATION: 66 S
MDC_IDC_EPISODE_DURATION: 7 S
MDC_IDC_EPISODE_DURATION: 8 S
MDC_IDC_EPISODE_DURATION: 9 S
MDC_IDC_EPISODE_ID: NORMAL
MDC_IDC_EPISODE_TYPE: NORMAL
MDC_IDC_EPISODE_VENDOR_TYPE: NORMAL
MDC_IDC_LEAD_IMPLANT_DT: NORMAL
MDC_IDC_LEAD_LOCATION: NORMAL
MDC_IDC_LEAD_LOCATION_DETAIL_1: NORMAL
MDC_IDC_LEAD_MFG: NORMAL
MDC_IDC_LEAD_MODEL: NORMAL
MDC_IDC_LEAD_POLARITY_TYPE: NORMAL
MDC_IDC_LEAD_SERIAL: NORMAL
MDC_IDC_MSMT_BATTERY_DTM: NORMAL
MDC_IDC_MSMT_BATTERY_REMAINING_LONGEVITY: 11 MO
MDC_IDC_MSMT_BATTERY_REMAINING_LONGEVITY: 11 MO
MDC_IDC_MSMT_BATTERY_REMAINING_LONGEVITY: 12 MO
MDC_IDC_MSMT_BATTERY_REMAINING_LONGEVITY: 18 MO
MDC_IDC_MSMT_BATTERY_REMAINING_LONGEVITY: 7 MO
MDC_IDC_MSMT_BATTERY_REMAINING_PERCENTAGE: 11 %
MDC_IDC_MSMT_BATTERY_REMAINING_PERCENTAGE: 16 %
MDC_IDC_MSMT_BATTERY_REMAINING_PERCENTAGE: 16 %
MDC_IDC_MSMT_BATTERY_REMAINING_PERCENTAGE: 20 %
MDC_IDC_MSMT_BATTERY_REMAINING_PERCENTAGE: 25 %
MDC_IDC_MSMT_BATTERY_STATUS: NORMAL
MDC_IDC_MSMT_CAP_CHARGE_DTM: NORMAL
MDC_IDC_MSMT_CAP_CHARGE_ENERGY: 41 J
MDC_IDC_MSMT_CAP_CHARGE_TIME: 11.8 S
MDC_IDC_MSMT_CAP_CHARGE_TIME: 11.8 S
MDC_IDC_MSMT_CAP_CHARGE_TIME: 11.9 S
MDC_IDC_MSMT_CAP_CHARGE_TIME: 11.9 S
MDC_IDC_MSMT_CAP_CHARGE_TIME: 12.4 S
MDC_IDC_MSMT_CAP_CHARGE_TIME: 6.4 S
MDC_IDC_MSMT_CAP_CHARGE_TIME: 8.2 S
MDC_IDC_MSMT_CAP_CHARGE_TYPE: NORMAL
MDC_IDC_MSMT_LEADCHNL_LV_IMPEDANCE_VALUE: 366 OHM
MDC_IDC_MSMT_LEADCHNL_LV_IMPEDANCE_VALUE: 377 OHM
MDC_IDC_MSMT_LEADCHNL_LV_IMPEDANCE_VALUE: 394 OHM
MDC_IDC_MSMT_LEADCHNL_LV_IMPEDANCE_VALUE: 403 OHM
MDC_IDC_MSMT_LEADCHNL_LV_IMPEDANCE_VALUE: 414 OHM
MDC_IDC_MSMT_LEADCHNL_LV_PACING_THRESHOLD_AMPLITUDE: 0.7 V
MDC_IDC_MSMT_LEADCHNL_LV_PACING_THRESHOLD_AMPLITUDE: 0.9 V
MDC_IDC_MSMT_LEADCHNL_LV_PACING_THRESHOLD_PULSEWIDTH: 0.5 MS
MDC_IDC_MSMT_LEADCHNL_RA_IMPEDANCE_VALUE: 513 OHM
MDC_IDC_MSMT_LEADCHNL_RA_IMPEDANCE_VALUE: 544 OHM
MDC_IDC_MSMT_LEADCHNL_RA_IMPEDANCE_VALUE: 547 OHM
MDC_IDC_MSMT_LEADCHNL_RA_IMPEDANCE_VALUE: 548 OHM
MDC_IDC_MSMT_LEADCHNL_RA_IMPEDANCE_VALUE: 555 OHM
MDC_IDC_MSMT_LEADCHNL_RA_PACING_THRESHOLD_AMPLITUDE: 0.4 V
MDC_IDC_MSMT_LEADCHNL_RA_PACING_THRESHOLD_PULSEWIDTH: 0.5 MS
MDC_IDC_MSMT_LEADCHNL_RV_IMPEDANCE_VALUE: 399 OHM
MDC_IDC_MSMT_LEADCHNL_RV_IMPEDANCE_VALUE: 424 OHM
MDC_IDC_MSMT_LEADCHNL_RV_IMPEDANCE_VALUE: 425 OHM
MDC_IDC_MSMT_LEADCHNL_RV_IMPEDANCE_VALUE: 429 OHM
MDC_IDC_MSMT_LEADCHNL_RV_IMPEDANCE_VALUE: 432 OHM
MDC_IDC_MSMT_LEADCHNL_RV_PACING_THRESHOLD_AMPLITUDE: 0.8 V
MDC_IDC_MSMT_LEADCHNL_RV_PACING_THRESHOLD_PULSEWIDTH: 0.5 MS
MDC_IDC_PG_IMPLANT_DTM: NORMAL
MDC_IDC_PG_MFG: NORMAL
MDC_IDC_PG_MODEL: NORMAL
MDC_IDC_PG_SERIAL: NORMAL
MDC_IDC_PG_TYPE: NORMAL
MDC_IDC_SESS_CLINIC_NAME: NORMAL
MDC_IDC_SESS_DTM: NORMAL
MDC_IDC_SESS_TYPE: NORMAL
MDC_IDC_SET_BRADY_AT_MODE_SWITCH_RATE: 170 {BEATS}/MIN
MDC_IDC_SET_BRADY_LOWRATE: 65 {BEATS}/MIN
MDC_IDC_SET_BRADY_MAX_SENSOR_RATE: 120 {BEATS}/MIN
MDC_IDC_SET_BRADY_MODE: NORMAL
MDC_IDC_SET_CRT_LVRV_DELAY: 0 MS
MDC_IDC_SET_CRT_PACED_CHAMBERS: NORMAL
MDC_IDC_SET_LEADCHNL_LV_PACING_AMPLITUDE: 1.5 V
MDC_IDC_SET_LEADCHNL_LV_PACING_PULSEWIDTH: 0.5 MS
MDC_IDC_SET_LEADCHNL_LV_SENSING_ADAPTATION_MODE: NORMAL
MDC_IDC_SET_LEADCHNL_LV_SENSING_SENSITIVITY: 1 MV
MDC_IDC_SET_LEADCHNL_RA_PACING_POLARITY: NORMAL
MDC_IDC_SET_LEADCHNL_RA_SENSING_ADAPTATION_MODE: NORMAL
MDC_IDC_SET_LEADCHNL_RA_SENSING_POLARITY: NORMAL
MDC_IDC_SET_LEADCHNL_RA_SENSING_SENSITIVITY: 0.15 MV
MDC_IDC_SET_LEADCHNL_RV_PACING_AMPLITUDE: 2.5 V
MDC_IDC_SET_LEADCHNL_RV_PACING_POLARITY: NORMAL
MDC_IDC_SET_LEADCHNL_RV_PACING_PULSEWIDTH: 0.5 MS
MDC_IDC_SET_LEADCHNL_RV_SENSING_ADAPTATION_MODE: NORMAL
MDC_IDC_SET_LEADCHNL_RV_SENSING_POLARITY: NORMAL
MDC_IDC_SET_LEADCHNL_RV_SENSING_SENSITIVITY: 0.6 MV
MDC_IDC_SET_ZONE_DETECTION_INTERVAL: 273 MS
MDC_IDC_SET_ZONE_DETECTION_INTERVAL: 353 MS
MDC_IDC_SET_ZONE_DETECTION_INTERVAL: 400 MS
MDC_IDC_SET_ZONE_DETECTION_INTERVAL: 462 MS
MDC_IDC_SET_ZONE_TYPE: NORMAL
MDC_IDC_SET_ZONE_VENDOR_TYPE: NORMAL
MDC_IDC_STAT_BRADY_DTM_END: NORMAL
MDC_IDC_STAT_BRADY_DTM_START: NORMAL
MDC_IDC_STAT_BRADY_RA_PERCENT_PACED: 0 %
MDC_IDC_STAT_BRADY_RV_PERCENT_PACED: 100 %
MDC_IDC_STAT_BRADY_RV_PERCENT_PACED: 94 %
MDC_IDC_STAT_CRT_DTM_END: NORMAL
MDC_IDC_STAT_CRT_DTM_START: NORMAL
MDC_IDC_STAT_CRT_LV_PERCENT_PACED: 100 %
MDC_IDC_STAT_CRT_LV_PERCENT_PACED: 93 %
MDC_IDC_STAT_EPISODE_RECENT_COUNT: 0
MDC_IDC_STAT_EPISODE_RECENT_COUNT: 102
MDC_IDC_STAT_EPISODE_RECENT_COUNT: 2
MDC_IDC_STAT_EPISODE_RECENT_COUNT: 226
MDC_IDC_STAT_EPISODE_RECENT_COUNT: 241
MDC_IDC_STAT_EPISODE_RECENT_COUNT: 411
MDC_IDC_STAT_EPISODE_RECENT_COUNT: NORMAL
MDC_IDC_STAT_EPISODE_RECENT_COUNT_DTM_END: NORMAL
MDC_IDC_STAT_EPISODE_RECENT_COUNT_DTM_START: NORMAL
MDC_IDC_STAT_EPISODE_TYPE: NORMAL
MDC_IDC_STAT_EPISODE_VENDOR_TYPE: NORMAL
MDC_IDC_STAT_TACHYTHERAPY_ATP_DELIVERED_RECENT: 0
MDC_IDC_STAT_TACHYTHERAPY_ATP_DELIVERED_RECENT: 0
MDC_IDC_STAT_TACHYTHERAPY_ATP_DELIVERED_RECENT: 2
MDC_IDC_STAT_TACHYTHERAPY_ATP_DELIVERED_TOTAL: 6
MDC_IDC_STAT_TACHYTHERAPY_ATP_DELIVERED_TOTAL: 8
MDC_IDC_STAT_TACHYTHERAPY_RECENT_DTM_END: NORMAL
MDC_IDC_STAT_TACHYTHERAPY_RECENT_DTM_START: NORMAL
MDC_IDC_STAT_TACHYTHERAPY_SHOCKS_ABORTED_RECENT: 0
MDC_IDC_STAT_TACHYTHERAPY_SHOCKS_ABORTED_RECENT: 0
MDC_IDC_STAT_TACHYTHERAPY_SHOCKS_ABORTED_RECENT: 1
MDC_IDC_STAT_TACHYTHERAPY_SHOCKS_ABORTED_TOTAL: 2
MDC_IDC_STAT_TACHYTHERAPY_SHOCKS_ABORTED_TOTAL: 3
MDC_IDC_STAT_TACHYTHERAPY_SHOCKS_DELIVERED_RECENT: 0
MDC_IDC_STAT_TACHYTHERAPY_SHOCKS_DELIVERED_RECENT: 0
MDC_IDC_STAT_TACHYTHERAPY_SHOCKS_DELIVERED_RECENT: 1
MDC_IDC_STAT_TACHYTHERAPY_SHOCKS_DELIVERED_TOTAL: 10
MDC_IDC_STAT_TACHYTHERAPY_SHOCKS_DELIVERED_TOTAL: 9
MDC_IDC_STAT_TACHYTHERAPY_TOTAL_DTM_END: NORMAL
MONOCYTES # BLD AUTO: 0.9 10E9/L (ref 0–1.3)
MONOCYTES # BLD AUTO: 1 10E9/L (ref 0–1.3)
MONOCYTES NFR BLD AUTO: 12.3 %
MONOCYTES NFR BLD AUTO: 12.8 %
NEUTROPHILS # BLD AUTO: 4.4 10E9/L (ref 1.6–8.3)
NEUTROPHILS # BLD AUTO: 4.6 10E9/L (ref 1.6–8.3)
NEUTROPHILS NFR BLD AUTO: 58.4 %
NEUTROPHILS NFR BLD AUTO: 59.3 %
NRBC # BLD AUTO: 0 10*3/UL
NRBC # BLD AUTO: 0 10*3/UL
NRBC BLD AUTO-RTO: 0 /100
NRBC BLD AUTO-RTO: 0 /100
NT-PROBNP SERPL-MCNC: 2788 PG/ML (ref 0–450)
PHOSPHATE SERPL-MCNC: 2.4 MG/DL (ref 2.5–4.5)
PHOSPHATE SERPL-MCNC: 2.9 MG/DL (ref 2.5–4.5)
PHOSPHATE SERPL-MCNC: 3 MG/DL (ref 2.5–4.5)
PHOSPHATE SERPL-MCNC: 3 MG/DL (ref 2.5–4.5)
PLATELET # BLD AUTO: 184 10E9/L (ref 150–450)
PLATELET # BLD AUTO: 202 10E9/L (ref 150–450)
PLATELET # BLD AUTO: 208 10E9/L (ref 150–450)
POTASSIUM SERPL-SCNC: 3.8 MMOL/L (ref 3.4–5.3)
POTASSIUM SERPL-SCNC: 3.8 MMOL/L (ref 3.4–5.3)
POTASSIUM SERPL-SCNC: 4 MMOL/L (ref 3.4–5.3)
POTASSIUM SERPL-SCNC: 4.1 MMOL/L (ref 3.4–5.3)
POTASSIUM SERPL-SCNC: 4.3 MMOL/L (ref 3.4–5.3)
POTASSIUM SERPL-SCNC: 4.3 MMOL/L (ref 3.5–5.1)
POTASSIUM SERPL-SCNC: 4.4 MMOL/L (ref 3.4–5.3)
POTASSIUM SERPL-SCNC: 4.5 MMOL/L (ref 3.4–5.3)
POTASSIUM SERPL-SCNC: 4.7 MMOL/L (ref 3.4–5.3)
PROT SERPL-MCNC: 6.8 G/DL (ref 6.8–8.8)
PROT SERPL-MCNC: 7 G/DL (ref 6.8–8.8)
PROT SERPL-MCNC: 7 G/DL (ref 6.8–8.8)
RBC # BLD AUTO: 4.23 10E12/L (ref 4.4–5.9)
RBC # BLD AUTO: 4.28 10E12/L (ref 4.4–5.9)
RBC # BLD AUTO: 4.36 10E12/L (ref 4.4–5.9)
SARS-COV-2 RNA SPEC QL NAA+PROBE: NEGATIVE
SARS-COV-2 RNA SPEC QL NAA+PROBE: NEGATIVE
SARS-COV-2 RNA SPEC QL NAA+PROBE: NORMAL
SARS-COV-2 RNA SPEC QL NAA+PROBE: NORMAL
SARS-COV-2 RNA SPEC QL NAA+PROBE: NOT DETECTED
SODIUM SERPL-SCNC: 131 MMOL/L (ref 133–144)
SODIUM SERPL-SCNC: 134 MMOL/L (ref 133–144)
SODIUM SERPL-SCNC: 135 MMOL/L (ref 133–144)
SODIUM SERPL-SCNC: 136 MMOL/L (ref 133–144)
SODIUM SERPL-SCNC: 136 MMOL/L (ref 133–144)
SODIUM SERPL-SCNC: 136 MMOL/L (ref 136–145)
SODIUM SERPL-SCNC: 137 MMOL/L (ref 133–144)
SODIUM SERPL-SCNC: 138 MMOL/L (ref 133–144)
SPECIMEN SOURCE: NORMAL
TROPONIN I SERPL-MCNC: 0.03 UG/L (ref 0–0.04)
TROPONIN I SERPL-MCNC: 0.03 UG/L (ref 0–0.04)
TROPONIN I SERPL-MCNC: 0.04 UG/L (ref 0–0.04)
TROPONIN I SERPL-MCNC: 0.05 UG/L (ref 0–0.04)
TROPONIN I SERPL-MCNC: 0.09 UG/L (ref 0–0.04)
TROPONIN I SERPL-MCNC: <0.015 UG/L (ref 0–0.04)
TSH SERPL DL<=0.005 MIU/L-ACNC: 1.32 MU/L (ref 0.4–4)
WBC # BLD AUTO: 6.8 10E9/L (ref 4–11)
WBC # BLD AUTO: 7.4 10E9/L (ref 4–11)
WBC # BLD AUTO: 7.9 10E9/L (ref 4–11)

## 2020-01-01 PROCEDURE — 25000132 ZZH RX MED GY IP 250 OP 250 PS 637: Performed by: INTERNAL MEDICINE

## 2020-01-01 PROCEDURE — 93325 DOPPLER ECHO COLOR FLOW MAPG: CPT | Mod: 26 | Performed by: INTERNAL MEDICINE

## 2020-01-01 PROCEDURE — 21000001 ZZH R&B HEART CARE

## 2020-01-01 PROCEDURE — 99239 HOSP IP/OBS DSCHRG MGMT >30: CPT | Performed by: INTERNAL MEDICINE

## 2020-01-01 PROCEDURE — 25800030 ZZH RX IP 258 OP 636: Performed by: INTERNAL MEDICINE

## 2020-01-01 PROCEDURE — 99495 TRANSJ CARE MGMT MOD F2F 14D: CPT | Mod: TEL | Performed by: FAMILY MEDICINE

## 2020-01-01 PROCEDURE — 84100 ASSAY OF PHOSPHORUS: CPT | Performed by: INTERNAL MEDICINE

## 2020-01-01 PROCEDURE — 36415 COLL VENOUS BLD VENIPUNCTURE: CPT | Performed by: INTERNAL MEDICINE

## 2020-01-01 PROCEDURE — 83735 ASSAY OF MAGNESIUM: CPT | Performed by: INTERNAL MEDICINE

## 2020-01-01 PROCEDURE — 25000128 H RX IP 250 OP 636: Performed by: INTERNAL MEDICINE

## 2020-01-01 PROCEDURE — 93005 ELECTROCARDIOGRAM TRACING: CPT

## 2020-01-01 PROCEDURE — 84484 ASSAY OF TROPONIN QUANT: CPT | Performed by: EMERGENCY MEDICINE

## 2020-01-01 PROCEDURE — 25800030 ZZH RX IP 258 OP 636: Performed by: EMERGENCY MEDICINE

## 2020-01-01 PROCEDURE — C1769 GUIDE WIRE: HCPCS | Performed by: INTERNAL MEDICINE

## 2020-01-01 PROCEDURE — 85027 COMPLETE CBC AUTOMATED: CPT | Performed by: INTERNAL MEDICINE

## 2020-01-01 PROCEDURE — 99223 1ST HOSP IP/OBS HIGH 75: CPT | Mod: 25 | Performed by: INTERNAL MEDICINE

## 2020-01-01 PROCEDURE — 80048 BASIC METABOLIC PNL TOTAL CA: CPT | Performed by: INTERNAL MEDICINE

## 2020-01-01 PROCEDURE — 99232 SBSQ HOSP IP/OBS MODERATE 35: CPT | Performed by: PHYSICIAN ASSISTANT

## 2020-01-01 PROCEDURE — U0003 INFECTIOUS AGENT DETECTION BY NUCLEIC ACID (DNA OR RNA); SEVERE ACUTE RESPIRATORY SYNDROME CORONAVIRUS 2 (SARS-COV-2) (CORONAVIRUS DISEASE [COVID-19]), AMPLIFIED PROBE TECHNIQUE, MAKING USE OF HIGH THROUGHPUT TECHNOLOGIES AS DESCRIBED BY CMS-2020-01-R: HCPCS | Performed by: INTERNAL MEDICINE

## 2020-01-01 PROCEDURE — 99214 OFFICE O/P EST MOD 30 MIN: CPT | Mod: 95 | Performed by: FAMILY MEDICINE

## 2020-01-01 PROCEDURE — 21000000 ZZH R&B IMCU HEART CARE

## 2020-01-01 PROCEDURE — 99285 EMERGENCY DEPT VISIT HI MDM: CPT | Mod: 25

## 2020-01-01 PROCEDURE — C9803 HOPD COVID-19 SPEC COLLECT: HCPCS

## 2020-01-01 PROCEDURE — C1894 INTRO/SHEATH, NON-LASER: HCPCS | Performed by: INTERNAL MEDICINE

## 2020-01-01 PROCEDURE — 93296 REM INTERROG EVL PM/IDS: CPT | Performed by: INTERNAL MEDICINE

## 2020-01-01 PROCEDURE — 85025 COMPLETE CBC W/AUTO DIFF WBC: CPT | Performed by: EMERGENCY MEDICINE

## 2020-01-01 PROCEDURE — 80162 ASSAY OF DIGOXIN TOTAL: CPT | Performed by: INTERNAL MEDICINE

## 2020-01-01 PROCEDURE — 25500064 ZZH RX 255 OP 636: Performed by: INTERNAL MEDICINE

## 2020-01-01 PROCEDURE — C9602 PERC D-E COR STENT ATHER S: HCPCS | Performed by: INTERNAL MEDICINE

## 2020-01-01 PROCEDURE — 83735 ASSAY OF MAGNESIUM: CPT | Performed by: EMERGENCY MEDICINE

## 2020-01-01 PROCEDURE — 99215 OFFICE O/P EST HI 40 MIN: CPT | Mod: 95 | Performed by: INTERNAL MEDICINE

## 2020-01-01 PROCEDURE — 25000128 H RX IP 250 OP 636: Performed by: PHYSICIAN ASSISTANT

## 2020-01-01 PROCEDURE — 99223 1ST HOSP IP/OBS HIGH 75: CPT | Mod: AI | Performed by: INTERNAL MEDICINE

## 2020-01-01 PROCEDURE — 99213 OFFICE O/P EST LOW 20 MIN: CPT | Mod: 95 | Performed by: NURSE PRACTITIONER

## 2020-01-01 PROCEDURE — C1725 CATH, TRANSLUMIN NON-LASER: HCPCS | Performed by: INTERNAL MEDICINE

## 2020-01-01 PROCEDURE — 99214 OFFICE O/P EST MOD 30 MIN: CPT | Mod: 95 | Performed by: NURSE PRACTITIONER

## 2020-01-01 PROCEDURE — 85610 PROTHROMBIN TIME: CPT | Performed by: INTERNAL MEDICINE

## 2020-01-01 PROCEDURE — 40000065 ZZH STATISTIC EKG NON-CHARGEABLE

## 2020-01-01 PROCEDURE — 96375 TX/PRO/DX INJ NEW DRUG ADDON: CPT

## 2020-01-01 PROCEDURE — 93306 TTE W/DOPPLER COMPLETE: CPT | Mod: 26 | Performed by: INTERNAL MEDICINE

## 2020-01-01 PROCEDURE — 85347 COAGULATION TIME ACTIVATED: CPT | Mod: 91

## 2020-01-01 PROCEDURE — 99239 HOSP IP/OBS DSCHRG MGMT >30: CPT | Performed by: PHYSICIAN ASSISTANT

## 2020-01-01 PROCEDURE — 84484 ASSAY OF TROPONIN QUANT: CPT | Performed by: INTERNAL MEDICINE

## 2020-01-01 PROCEDURE — 96365 THER/PROPH/DIAG IV INF INIT: CPT

## 2020-01-01 PROCEDURE — 40000264 ECHOCARDIOGRAM COMPLETE

## 2020-01-01 PROCEDURE — 85610 PROTHROMBIN TIME: CPT | Performed by: EMERGENCY MEDICINE

## 2020-01-01 PROCEDURE — 99152 MOD SED SAME PHYS/QHP 5/>YRS: CPT | Performed by: INTERNAL MEDICINE

## 2020-01-01 PROCEDURE — 99233 SBSQ HOSP IP/OBS HIGH 50: CPT | Performed by: INTERNAL MEDICINE

## 2020-01-01 PROCEDURE — 93295 DEV INTERROG REMOTE 1/2/MLT: CPT | Performed by: INTERNAL MEDICINE

## 2020-01-01 PROCEDURE — 40000852 ZZH STATISTIC HEART CATH LAB OR EP LAB

## 2020-01-01 PROCEDURE — 99214 OFFICE O/P EST MOD 30 MIN: CPT | Performed by: INTERNAL MEDICINE

## 2020-01-01 PROCEDURE — 99232 SBSQ HOSP IP/OBS MODERATE 35: CPT | Performed by: INTERNAL MEDICINE

## 2020-01-01 PROCEDURE — 40000264 ECHOCARDIOGRAM LIMITED

## 2020-01-01 PROCEDURE — G0378 HOSPITAL OBSERVATION PER HR: HCPCS

## 2020-01-01 PROCEDURE — 99221 1ST HOSP IP/OBS SF/LOW 40: CPT | Performed by: INTERNAL MEDICINE

## 2020-01-01 PROCEDURE — 36415 COLL VENOUS BLD VENIPUNCTURE: CPT

## 2020-01-01 PROCEDURE — 99213 OFFICE O/P EST LOW 20 MIN: CPT | Performed by: INTERNAL MEDICINE

## 2020-01-01 PROCEDURE — 93308 TTE F-UP OR LMTD: CPT | Mod: 26 | Performed by: INTERNAL MEDICINE

## 2020-01-01 PROCEDURE — 40000235 ZZH STATISTIC TELEMETRY

## 2020-01-01 PROCEDURE — 93455 CORONARY ART/GRFT ANGIO S&I: CPT | Mod: 26 | Performed by: INTERNAL MEDICINE

## 2020-01-01 PROCEDURE — 99207 ZZC CDG-MDM COMPONENT: MEETS MODERATE - UP CODED: CPT | Performed by: INTERNAL MEDICINE

## 2020-01-01 PROCEDURE — 80299 QUANTITATIVE ASSAY DRUG: CPT | Performed by: INTERNAL MEDICINE

## 2020-01-01 PROCEDURE — 25000125 ZZHC RX 250: Performed by: INTERNAL MEDICINE

## 2020-01-01 PROCEDURE — 84295 ASSAY OF SERUM SODIUM: CPT | Performed by: INTERNAL MEDICINE

## 2020-01-01 PROCEDURE — 40000141 ZZH STATISTIC PERIPHERAL IV START W/O US GUIDANCE

## 2020-01-01 PROCEDURE — 97530 THERAPEUTIC ACTIVITIES: CPT | Mod: GO

## 2020-01-01 PROCEDURE — 84443 ASSAY THYROID STIM HORMONE: CPT | Performed by: EMERGENCY MEDICINE

## 2020-01-01 PROCEDURE — C1724 CATH, TRANS ATHEREC,ROTATION: HCPCS | Performed by: INTERNAL MEDICINE

## 2020-01-01 PROCEDURE — 97165 OT EVAL LOW COMPLEX 30 MIN: CPT | Mod: GO

## 2020-01-01 PROCEDURE — 93010 ELECTROCARDIOGRAM REPORT: CPT | Performed by: INTERNAL MEDICINE

## 2020-01-01 PROCEDURE — 73610 X-RAY EXAM OF ANKLE: CPT | Mod: LT

## 2020-01-01 PROCEDURE — 93455 CORONARY ART/GRFT ANGIO S&I: CPT | Mod: XU | Performed by: INTERNAL MEDICINE

## 2020-01-01 PROCEDURE — 96376 TX/PRO/DX INJ SAME DRUG ADON: CPT

## 2020-01-01 PROCEDURE — 85730 THROMBOPLASTIN TIME PARTIAL: CPT | Performed by: INTERNAL MEDICINE

## 2020-01-01 PROCEDURE — C1887 CATHETER, GUIDING: HCPCS | Performed by: INTERNAL MEDICINE

## 2020-01-01 PROCEDURE — 99152 MOD SED SAME PHYS/QHP 5/>YRS: CPT | Mod: 59 | Performed by: INTERNAL MEDICINE

## 2020-01-01 PROCEDURE — 92933 PRQ TRLML C ATHRC ST ANGIOP1: CPT | Mod: LC | Performed by: INTERNAL MEDICINE

## 2020-01-01 PROCEDURE — U0003 INFECTIOUS AGENT DETECTION BY NUCLEIC ACID (DNA OR RNA); SEVERE ACUTE RESPIRATORY SYNDROME CORONAVIRUS 2 (SARS-COV-2) (CORONAVIRUS DISEASE [COVID-19]), AMPLIFIED PROBE TECHNIQUE, MAKING USE OF HIGH THROUGHPUT TECHNOLOGIES AS DESCRIBED BY CMS-2020-01-R: HCPCS | Performed by: EMERGENCY MEDICINE

## 2020-01-01 PROCEDURE — C1760 CLOSURE DEV, VASC: HCPCS | Performed by: INTERNAL MEDICINE

## 2020-01-01 PROCEDURE — 96374 THER/PROPH/DIAG INJ IV PUSH: CPT

## 2020-01-01 PROCEDURE — 40000893 ZZH STATISTIC PT IP EVAL DEFER

## 2020-01-01 PROCEDURE — 80053 COMPREHEN METABOLIC PANEL: CPT | Performed by: EMERGENCY MEDICINE

## 2020-01-01 PROCEDURE — 99214 OFFICE O/P EST MOD 30 MIN: CPT | Mod: 95 | Performed by: INTERNAL MEDICINE

## 2020-01-01 PROCEDURE — 25000128 H RX IP 250 OP 636: Performed by: EMERGENCY MEDICINE

## 2020-01-01 PROCEDURE — C1874 STENT, COATED/COV W/DEL SYS: HCPCS | Performed by: INTERNAL MEDICINE

## 2020-01-01 PROCEDURE — 71046 X-RAY EXAM CHEST 2 VIEWS: CPT

## 2020-01-01 PROCEDURE — 93321 DOPPLER ECHO F-UP/LMTD STD: CPT | Mod: 26 | Performed by: INTERNAL MEDICINE

## 2020-01-01 PROCEDURE — 93010 ELECTROCARDIOGRAM REPORT: CPT | Mod: 76 | Performed by: INTERNAL MEDICINE

## 2020-01-01 PROCEDURE — 27210794 ZZH OR GENERAL SUPPLY STERILE: Performed by: INTERNAL MEDICINE

## 2020-01-01 PROCEDURE — 71045 X-RAY EXAM CHEST 1 VIEW: CPT

## 2020-01-01 PROCEDURE — 99221 1ST HOSP IP/OBS SF/LOW 40: CPT | Mod: 25 | Performed by: INTERNAL MEDICINE

## 2020-01-01 PROCEDURE — 80048 BASIC METABOLIC PNL TOTAL CA: CPT | Performed by: EMERGENCY MEDICINE

## 2020-01-01 PROCEDURE — 99153 MOD SED SAME PHYS/QHP EA: CPT | Performed by: INTERNAL MEDICINE

## 2020-01-01 PROCEDURE — 90662 IIV NO PRSV INCREASED AG IM: CPT | Performed by: PHYSICIAN ASSISTANT

## 2020-01-01 DEVICE — STENT RESOLUTE ONYX DE 2.7FR 2.50X38MM RONYX DE25038UX: Type: IMPLANTABLE DEVICE | Status: FUNCTIONAL

## 2020-01-01 RX ORDER — FAMOTIDINE 20 MG/1
TABLET, FILM COATED ORAL
Qty: 180 TABLET | Refills: 3 | Status: SHIPPED | OUTPATIENT
Start: 2020-01-01 | End: 2021-01-01

## 2020-01-01 RX ORDER — NITROGLYCERIN 0.4 MG/1
0.4 TABLET SUBLINGUAL EVERY 5 MIN PRN
Status: DISCONTINUED | OUTPATIENT
Start: 2020-01-01 | End: 2020-01-01

## 2020-01-01 RX ORDER — AMIODARONE HYDROCHLORIDE 200 MG/1
200 TABLET ORAL EVERY EVENING
Status: DISCONTINUED | OUTPATIENT
Start: 2020-01-01 | End: 2020-01-01 | Stop reason: HOSPADM

## 2020-01-01 RX ORDER — SACUBITRIL AND VALSARTAN 24; 26 MG/1; MG/1
TABLET, FILM COATED ORAL
Qty: 90 TABLET | Refills: 0 | Status: SHIPPED | OUTPATIENT
Start: 2020-01-01 | End: 2020-01-01

## 2020-01-01 RX ORDER — LISINOPRIL 5 MG/1
5 TABLET ORAL AT BEDTIME
Qty: 90 TABLET | Refills: 0 | Status: ON HOLD | OUTPATIENT
Start: 2020-01-01 | End: 2020-01-01

## 2020-01-01 RX ORDER — LISINOPRIL 5 MG/1
2.5 TABLET ORAL AT BEDTIME
Qty: 30 TABLET | Refills: 0 | COMMUNITY
Start: 2020-01-01 | End: 2020-01-01 | Stop reason: ALTCHOICE

## 2020-01-01 RX ORDER — ONDANSETRON 2 MG/ML
4 INJECTION INTRAMUSCULAR; INTRAVENOUS EVERY 6 HOURS PRN
Status: DISCONTINUED | OUTPATIENT
Start: 2020-01-01 | End: 2020-01-01 | Stop reason: HOSPADM

## 2020-01-01 RX ORDER — CLOPIDOGREL BISULFATE 75 MG/1
75 TABLET ORAL DAILY
Qty: 90 TABLET | Refills: 3 | Status: ON HOLD | OUTPATIENT
Start: 2020-01-01 | End: 2021-01-01

## 2020-01-01 RX ORDER — POTASSIUM CHLORIDE 29.8 MG/ML
20 INJECTION INTRAVENOUS
Status: DISCONTINUED | OUTPATIENT
Start: 2020-01-01 | End: 2020-01-01 | Stop reason: HOSPADM

## 2020-01-01 RX ORDER — DIGOXIN 125 MCG
125 TABLET ORAL
Qty: 90 TABLET | Refills: 3 | COMMUNITY
Start: 2020-01-01 | End: 2020-01-01

## 2020-01-01 RX ORDER — AMIODARONE HYDROCHLORIDE 200 MG/1
200 TABLET ORAL DAILY
Qty: 90 TABLET | Refills: 1 | Status: SHIPPED | OUTPATIENT
Start: 2020-01-01 | End: 2021-01-01

## 2020-01-01 RX ORDER — CARVEDILOL 6.25 MG/1
6.25 TABLET ORAL 2 TIMES DAILY WITH MEALS
Status: DISCONTINUED | OUTPATIENT
Start: 2020-01-01 | End: 2020-01-01

## 2020-01-01 RX ORDER — POTASSIUM CHLORIDE 1500 MG/1
20 TABLET, EXTENDED RELEASE ORAL
Status: CANCELLED | OUTPATIENT
Start: 2020-01-01

## 2020-01-01 RX ORDER — SODIUM CHLORIDE 9 MG/ML
INJECTION, SOLUTION INTRAVENOUS CONTINUOUS
Status: DISCONTINUED | OUTPATIENT
Start: 2020-01-01 | End: 2020-01-01 | Stop reason: HOSPADM

## 2020-01-01 RX ORDER — CLOPIDOGREL BISULFATE 75 MG/1
75 TABLET ORAL DAILY
Status: DISCONTINUED | OUTPATIENT
Start: 2020-01-01 | End: 2020-01-01 | Stop reason: HOSPADM

## 2020-01-01 RX ORDER — NALOXONE HYDROCHLORIDE 0.4 MG/ML
.2-.4 INJECTION, SOLUTION INTRAMUSCULAR; INTRAVENOUS; SUBCUTANEOUS
Status: DISCONTINUED | OUTPATIENT
Start: 2020-01-01 | End: 2020-01-01 | Stop reason: HOSPADM

## 2020-01-01 RX ORDER — FLUMAZENIL 0.1 MG/ML
0.2 INJECTION, SOLUTION INTRAVENOUS
Status: DISCONTINUED | OUTPATIENT
Start: 2020-01-01 | End: 2020-01-01 | Stop reason: HOSPADM

## 2020-01-01 RX ORDER — ROSUVASTATIN CALCIUM 20 MG/1
20 TABLET, COATED ORAL DAILY
Qty: 90 TABLET | Refills: 0 | Status: SHIPPED | OUTPATIENT
Start: 2020-01-01 | End: 2021-01-01

## 2020-01-01 RX ORDER — IPRATROPIUM BROMIDE 21 UG/1
2 SPRAY, METERED NASAL EVERY 12 HOURS
Status: DISCONTINUED | OUTPATIENT
Start: 2020-01-01 | End: 2020-01-01 | Stop reason: HOSPADM

## 2020-01-01 RX ORDER — SACUBITRIL AND VALSARTAN 24; 26 MG/1; MG/1
1 TABLET, FILM COATED ORAL 2 TIMES DAILY
Qty: 180 TABLET | Refills: 1 | Status: SHIPPED | OUTPATIENT
Start: 2020-01-01 | End: 2020-01-01

## 2020-01-01 RX ORDER — MAGNESIUM SULFATE HEPTAHYDRATE 40 MG/ML
4 INJECTION, SOLUTION INTRAVENOUS EVERY 4 HOURS PRN
Status: DISCONTINUED | OUTPATIENT
Start: 2020-01-01 | End: 2020-01-01 | Stop reason: HOSPADM

## 2020-01-01 RX ORDER — AMIODARONE HYDROCHLORIDE 200 MG/1
200 TABLET ORAL ONCE
Status: COMPLETED | OUTPATIENT
Start: 2020-01-01 | End: 2020-01-01

## 2020-01-01 RX ORDER — CARVEDILOL 6.25 MG/1
6.25 TABLET ORAL 2 TIMES DAILY WITH MEALS
Qty: 180 TABLET | Refills: 0 | Status: SHIPPED | OUTPATIENT
Start: 2020-01-01 | End: 2020-01-01

## 2020-01-01 RX ORDER — WARFARIN SODIUM 1 MG/1
TABLET ORAL
Qty: 135 TABLET | Refills: 0 | Status: SHIPPED | OUTPATIENT
Start: 2020-01-01 | End: 2020-01-01

## 2020-01-01 RX ORDER — ACETAMINOPHEN 650 MG/1
650 SUPPOSITORY RECTAL EVERY 4 HOURS PRN
Status: DISCONTINUED | OUTPATIENT
Start: 2020-01-01 | End: 2020-01-01 | Stop reason: HOSPADM

## 2020-01-01 RX ORDER — POTASSIUM CHLORIDE 1500 MG/1
20 TABLET, EXTENDED RELEASE ORAL
Status: DISCONTINUED | OUTPATIENT
Start: 2020-01-01 | End: 2020-01-01 | Stop reason: HOSPADM

## 2020-01-01 RX ORDER — FAMOTIDINE 20 MG/1
20 TABLET, FILM COATED ORAL 2 TIMES DAILY
Status: DISCONTINUED | OUTPATIENT
Start: 2020-01-01 | End: 2020-01-01 | Stop reason: HOSPADM

## 2020-01-01 RX ORDER — ACETAMINOPHEN 325 MG/1
650 TABLET ORAL EVERY 4 HOURS PRN
Status: DISCONTINUED | OUTPATIENT
Start: 2020-01-01 | End: 2020-01-01 | Stop reason: HOSPADM

## 2020-01-01 RX ORDER — AMIODARONE HYDROCHLORIDE 200 MG/1
200 TABLET ORAL AT BEDTIME
Status: DISCONTINUED | OUTPATIENT
Start: 2020-01-01 | End: 2020-01-01 | Stop reason: HOSPADM

## 2020-01-01 RX ORDER — AMIODARONE HYDROCHLORIDE 200 MG/1
200 TABLET ORAL
Status: ON HOLD | COMMUNITY
End: 2020-01-01

## 2020-01-01 RX ORDER — FENTANYL CITRATE 50 UG/ML
INJECTION, SOLUTION INTRAMUSCULAR; INTRAVENOUS
Status: DISCONTINUED | OUTPATIENT
Start: 2020-01-01 | End: 2020-01-01 | Stop reason: HOSPADM

## 2020-01-01 RX ORDER — MAGNESIUM SULFATE HEPTAHYDRATE 40 MG/ML
2 INJECTION, SOLUTION INTRAVENOUS ONCE
Status: COMPLETED | OUTPATIENT
Start: 2020-01-01 | End: 2020-01-01

## 2020-01-01 RX ORDER — LIDOCAINE 40 MG/G
CREAM TOPICAL
Status: DISCONTINUED | OUTPATIENT
Start: 2020-01-01 | End: 2020-01-01 | Stop reason: HOSPADM

## 2020-01-01 RX ORDER — LISINOPRIL 2.5 MG/1
2.5 TABLET ORAL DAILY
Status: DISCONTINUED | OUTPATIENT
Start: 2020-01-01 | End: 2020-01-01 | Stop reason: HOSPADM

## 2020-01-01 RX ORDER — ONDANSETRON 4 MG/1
4 TABLET, ORALLY DISINTEGRATING ORAL EVERY 6 HOURS PRN
Status: DISCONTINUED | OUTPATIENT
Start: 2020-01-01 | End: 2020-01-01 | Stop reason: HOSPADM

## 2020-01-01 RX ORDER — AMIODARONE HYDROCHLORIDE 200 MG/1
200 TABLET ORAL DAILY
Start: 2020-01-01 | End: 2020-01-01

## 2020-01-01 RX ORDER — WARFARIN SODIUM 2 MG/1
2 TABLET ORAL
Status: COMPLETED | OUTPATIENT
Start: 2020-01-01 | End: 2020-01-01

## 2020-01-01 RX ORDER — WARFARIN SODIUM 2 MG/1
2 TABLET ORAL
Status: DISCONTINUED | OUTPATIENT
Start: 2020-01-01 | End: 2020-01-01 | Stop reason: HOSPADM

## 2020-01-01 RX ORDER — HEPARIN SODIUM 1000 [USP'U]/ML
INJECTION, SOLUTION INTRAVENOUS; SUBCUTANEOUS
Status: DISCONTINUED | OUTPATIENT
Start: 2020-01-01 | End: 2020-01-01 | Stop reason: HOSPADM

## 2020-01-01 RX ORDER — SACUBITRIL AND VALSARTAN 24; 26 MG/1; MG/1
1 TABLET, FILM COATED ORAL 2 TIMES DAILY
Qty: 180 TABLET | Refills: 3 | Status: SHIPPED | OUTPATIENT
Start: 2020-01-01 | End: 2020-01-01

## 2020-01-01 RX ORDER — IPRATROPIUM BROMIDE 21 UG/1
2 SPRAY, METERED NASAL EVERY 12 HOURS
Qty: 30 ML | Refills: 8 | Status: SHIPPED | OUTPATIENT
Start: 2020-01-01 | End: 2021-01-01

## 2020-01-01 RX ORDER — LISINOPRIL 5 MG/1
5 TABLET ORAL AT BEDTIME
Status: DISCONTINUED | OUTPATIENT
Start: 2020-01-01 | End: 2020-01-01 | Stop reason: HOSPADM

## 2020-01-01 RX ORDER — MEXILETINE HYDROCHLORIDE 150 MG/1
150 CAPSULE ORAL 2 TIMES DAILY
Qty: 60 CAPSULE | Refills: 4 | Status: SHIPPED | OUTPATIENT
Start: 2020-01-01 | End: 2021-01-01

## 2020-01-01 RX ORDER — CLOPIDOGREL BISULFATE 75 MG/1
TABLET ORAL
Status: DISCONTINUED | OUTPATIENT
Start: 2020-01-01 | End: 2020-01-01 | Stop reason: HOSPADM

## 2020-01-01 RX ORDER — POTASSIUM CHLORIDE 1500 MG/1
20-40 TABLET, EXTENDED RELEASE ORAL
Status: DISCONTINUED | OUTPATIENT
Start: 2020-01-01 | End: 2020-01-01 | Stop reason: HOSPADM

## 2020-01-01 RX ORDER — WARFARIN SODIUM 1 MG/1
TABLET ORAL
Qty: 135 TABLET | Refills: 0 | Status: SHIPPED | OUTPATIENT
Start: 2020-01-01 | End: 2021-01-01

## 2020-01-01 RX ORDER — ACETAMINOPHEN 325 MG/1
650 TABLET ORAL EVERY 4 HOURS PRN
Status: DISCONTINUED | OUTPATIENT
Start: 2020-01-01 | End: 2020-01-01

## 2020-01-01 RX ORDER — LISINOPRIL 5 MG/1
5 TABLET ORAL AT BEDTIME
Status: DISCONTINUED | OUTPATIENT
Start: 2020-01-01 | End: 2020-01-01

## 2020-01-01 RX ORDER — LIDOCAINE 40 MG/G
CREAM TOPICAL
Status: DISCONTINUED | OUTPATIENT
Start: 2020-01-01 | End: 2020-01-01

## 2020-01-01 RX ORDER — DIGOXIN 125 MCG
125 TABLET ORAL
Qty: 78 TABLET | Refills: 3 | Status: SHIPPED | OUTPATIENT
Start: 2020-01-01 | End: 2021-01-01

## 2020-01-01 RX ORDER — DIGOXIN 125 MCG
125 TABLET ORAL
Status: DISCONTINUED | OUTPATIENT
Start: 2020-01-01 | End: 2020-01-01 | Stop reason: HOSPADM

## 2020-01-01 RX ORDER — FENTANYL CITRATE 50 UG/ML
25-50 INJECTION, SOLUTION INTRAMUSCULAR; INTRAVENOUS
Status: DISCONTINUED | OUTPATIENT
Start: 2020-01-01 | End: 2020-01-01 | Stop reason: HOSPADM

## 2020-01-01 RX ORDER — DIGOXIN 125 MCG
125 TABLET ORAL DAILY
Status: DISCONTINUED | OUTPATIENT
Start: 2020-01-01 | End: 2020-01-01

## 2020-01-01 RX ORDER — CARVEDILOL 6.25 MG/1
6.25 TABLET ORAL 2 TIMES DAILY WITH MEALS
Status: DISCONTINUED | OUTPATIENT
Start: 2020-01-01 | End: 2020-01-01 | Stop reason: HOSPADM

## 2020-01-01 RX ORDER — WARFARIN SODIUM 1 MG/1
1 TABLET ORAL
Status: DISCONTINUED | OUTPATIENT
Start: 2020-01-01 | End: 2020-01-01 | Stop reason: HOSPADM

## 2020-01-01 RX ORDER — CARVEDILOL 6.25 MG/1
6.25 TABLET ORAL 2 TIMES DAILY WITH MEALS
Qty: 180 TABLET | Refills: 0 | Status: SHIPPED | OUTPATIENT
Start: 2020-01-01 | End: 2021-01-01

## 2020-01-01 RX ORDER — DIGOXIN 125 MCG
125 TABLET ORAL DAILY
Status: DISCONTINUED | OUTPATIENT
Start: 2020-01-01 | End: 2020-01-01 | Stop reason: HOSPADM

## 2020-01-01 RX ORDER — ROSUVASTATIN CALCIUM 20 MG/1
20 TABLET, COATED ORAL AT BEDTIME
Status: DISCONTINUED | OUTPATIENT
Start: 2020-01-01 | End: 2020-01-01 | Stop reason: HOSPADM

## 2020-01-01 RX ORDER — SACUBITRIL AND VALSARTAN 24; 26 MG/1; MG/1
TABLET, FILM COATED ORAL
Qty: 270 TABLET | Refills: 1 | Status: SHIPPED | OUTPATIENT
Start: 2020-01-01 | End: 2020-01-01

## 2020-01-01 RX ORDER — ROSUVASTATIN CALCIUM 20 MG/1
20 TABLET, COATED ORAL DAILY
Qty: 90 TABLET | Refills: 0 | Status: SHIPPED | OUTPATIENT
Start: 2020-01-01 | End: 2020-01-01

## 2020-01-01 RX ORDER — POTASSIUM CL/LIDO/0.9 % NACL 10MEQ/0.1L
10 INTRAVENOUS SOLUTION, PIGGYBACK (ML) INTRAVENOUS
Status: DISCONTINUED | OUTPATIENT
Start: 2020-01-01 | End: 2020-01-01 | Stop reason: HOSPADM

## 2020-01-01 RX ORDER — ROSUVASTATIN CALCIUM 20 MG/1
20 TABLET, COATED ORAL EVERY EVENING
Status: DISCONTINUED | OUTPATIENT
Start: 2020-01-01 | End: 2020-01-01 | Stop reason: HOSPADM

## 2020-01-01 RX ORDER — POTASSIUM CHLORIDE 1.5 G/1.58G
20-40 POWDER, FOR SOLUTION ORAL
Status: DISCONTINUED | OUTPATIENT
Start: 2020-01-01 | End: 2020-01-01 | Stop reason: HOSPADM

## 2020-01-01 RX ORDER — SACUBITRIL AND VALSARTAN 24; 26 MG/1; MG/1
TABLET, FILM COATED ORAL
Qty: 180 TABLET | Refills: 1 | COMMUNITY
Start: 2020-01-01 | End: 2020-01-01

## 2020-01-01 RX ORDER — NITROGLYCERIN 5 MG/ML
VIAL (ML) INTRAVENOUS
Status: DISCONTINUED | OUTPATIENT
Start: 2020-01-01 | End: 2020-01-01 | Stop reason: HOSPADM

## 2020-01-01 RX ORDER — FAMOTIDINE 20 MG/1
20 TABLET, FILM COATED ORAL AT BEDTIME
Status: DISCONTINUED | OUTPATIENT
Start: 2020-01-01 | End: 2020-01-01 | Stop reason: HOSPADM

## 2020-01-01 RX ORDER — LIDOCAINE HYDROCHLORIDE ANHYDROUS AND DEXTROSE MONOHYDRATE .8; 5 G/100ML; G/100ML
1 INJECTION, SOLUTION INTRAVENOUS CONTINUOUS
Status: DISCONTINUED | OUTPATIENT
Start: 2020-01-01 | End: 2020-01-01

## 2020-01-01 RX ORDER — ACETAMINOPHEN 500 MG
1000 TABLET ORAL EVERY 8 HOURS PRN
Status: DISCONTINUED | OUTPATIENT
Start: 2020-01-01 | End: 2020-01-01 | Stop reason: HOSPADM

## 2020-01-01 RX ORDER — LIDOCAINE HYDROCHLORIDE ANHYDROUS AND DEXTROSE MONOHYDRATE .8; 5 G/100ML; G/100ML
0.5 INJECTION, SOLUTION INTRAVENOUS CONTINUOUS
Status: ACTIVE | OUTPATIENT
Start: 2020-01-01 | End: 2020-01-01

## 2020-01-01 RX ORDER — NALOXONE HYDROCHLORIDE 0.4 MG/ML
.1-.4 INJECTION, SOLUTION INTRAMUSCULAR; INTRAVENOUS; SUBCUTANEOUS
Status: DISCONTINUED | OUTPATIENT
Start: 2020-01-01 | End: 2020-01-01 | Stop reason: HOSPADM

## 2020-01-01 RX ORDER — IOPAMIDOL 755 MG/ML
INJECTION, SOLUTION INTRAVASCULAR
Status: DISCONTINUED | OUTPATIENT
Start: 2020-01-01 | End: 2020-01-01 | Stop reason: HOSPADM

## 2020-01-01 RX ORDER — SODIUM CHLORIDE 9 MG/ML
INJECTION, SOLUTION INTRAVENOUS CONTINUOUS
Status: CANCELLED | OUTPATIENT
Start: 2020-01-01

## 2020-01-01 RX ORDER — ALUMINA, MAGNESIA, AND SIMETHICONE 2400; 2400; 240 MG/30ML; MG/30ML; MG/30ML
30 SUSPENSION ORAL EVERY 4 HOURS PRN
Status: DISCONTINUED | OUTPATIENT
Start: 2020-01-01 | End: 2020-01-01 | Stop reason: HOSPADM

## 2020-01-01 RX ORDER — WARFARIN SODIUM 1 MG/1
1 TABLET ORAL
Status: COMPLETED | OUTPATIENT
Start: 2020-01-01 | End: 2020-01-01

## 2020-01-01 RX ORDER — NITROGLYCERIN 0.4 MG/1
0.4 TABLET SUBLINGUAL EVERY 5 MIN PRN
Status: DISCONTINUED | OUTPATIENT
Start: 2020-01-01 | End: 2020-01-01 | Stop reason: HOSPADM

## 2020-01-01 RX ORDER — ASPIRIN 81 MG/1
81 TABLET ORAL DAILY
Status: DISCONTINUED | OUTPATIENT
Start: 2020-01-01 | End: 2020-01-01 | Stop reason: HOSPADM

## 2020-01-01 RX ORDER — ATROPINE SULFATE 0.1 MG/ML
0.5 INJECTION INTRAVENOUS EVERY 5 MIN PRN
Status: DISCONTINUED | OUTPATIENT
Start: 2020-01-01 | End: 2020-01-01 | Stop reason: HOSPADM

## 2020-01-01 RX ORDER — POTASSIUM CHLORIDE 7.45 MG/ML
10 INJECTION INTRAVENOUS
Status: DISCONTINUED | OUTPATIENT
Start: 2020-01-01 | End: 2020-01-01 | Stop reason: HOSPADM

## 2020-01-01 RX ORDER — LIDOCAINE 40 MG/G
CREAM TOPICAL
Status: CANCELLED | OUTPATIENT
Start: 2020-01-01

## 2020-01-01 RX ORDER — MAGNESIUM SULFATE HEPTAHYDRATE 40 MG/ML
2 INJECTION, SOLUTION INTRAVENOUS DAILY PRN
Status: DISCONTINUED | OUTPATIENT
Start: 2020-01-01 | End: 2020-01-01 | Stop reason: HOSPADM

## 2020-01-01 RX ORDER — SACUBITRIL AND VALSARTAN 24; 26 MG/1; MG/1
1 TABLET, FILM COATED ORAL EVERY MORNING
Qty: 180 TABLET | Refills: 1 | Status: SHIPPED | OUTPATIENT
Start: 2020-01-01 | End: 2021-01-01

## 2020-01-01 RX ORDER — FAMOTIDINE 20 MG/1
20 TABLET, FILM COATED ORAL 2 TIMES DAILY
Status: DISCONTINUED | OUTPATIENT
Start: 2020-01-01 | End: 2020-01-01

## 2020-01-01 RX ORDER — VIT C/E/ZN/COPPR/LUTEIN/ZEAXAN 60 MG-6 MG
1 CAPSULE ORAL 2 TIMES DAILY
Status: DISCONTINUED | OUTPATIENT
Start: 2020-01-01 | End: 2020-01-01 | Stop reason: HOSPADM

## 2020-01-01 RX ADMIN — IPRATROPIUM BROMIDE 2 SPRAY: 21 SPRAY, METERED NASAL at 22:39

## 2020-01-01 RX ADMIN — Medication 1 CAPSULE: at 22:30

## 2020-01-01 RX ADMIN — METOPROLOL TARTRATE 12.5 MG: 25 TABLET, FILM COATED ORAL at 05:08

## 2020-01-01 RX ADMIN — FAMOTIDINE 20 MG: 20 TABLET ORAL at 08:16

## 2020-01-01 RX ADMIN — AMIODARONE HYDROCHLORIDE 1 MG/MIN: 50 INJECTION, SOLUTION INTRAVENOUS at 05:27

## 2020-01-01 RX ADMIN — ROSUVASTATIN CALCIUM 20 MG: 20 TABLET, FILM COATED ORAL at 19:17

## 2020-01-01 RX ADMIN — AMIODARONE HYDROCHLORIDE 200 MG: 200 TABLET ORAL at 22:38

## 2020-01-01 RX ADMIN — MIDAZOLAM 0.5 MG: 1 INJECTION INTRAMUSCULAR; INTRAVENOUS at 11:00

## 2020-01-01 RX ADMIN — CLOPIDOGREL BISULFATE 75 MG: 75 TABLET, FILM COATED ORAL at 09:46

## 2020-01-01 RX ADMIN — LISINOPRIL 5 MG: 5 TABLET ORAL at 23:53

## 2020-01-01 RX ADMIN — CARVEDILOL 6.25 MG: 6.25 TABLET, FILM COATED ORAL at 09:05

## 2020-01-01 RX ADMIN — NITROGLYCERIN 100 MCG: 5 INJECTION, SOLUTION INTRAVENOUS at 12:33

## 2020-01-01 RX ADMIN — AMIODARONE HYDROCHLORIDE 200 MG: 200 TABLET ORAL at 15:22

## 2020-01-01 RX ADMIN — WARFARIN SODIUM 1.25 MG: 2.5 TABLET ORAL at 18:16

## 2020-01-01 RX ADMIN — CARVEDILOL 6.25 MG: 6.25 TABLET, FILM COATED ORAL at 23:55

## 2020-01-01 RX ADMIN — AMIODARONE HYDROCHLORIDE 1 MG/MIN: 50 INJECTION, SOLUTION INTRAVENOUS at 20:43

## 2020-01-01 RX ADMIN — FENTANYL CITRATE 50 MCG: 50 INJECTION, SOLUTION INTRAMUSCULAR; INTRAVENOUS at 10:51

## 2020-01-01 RX ADMIN — DIGOXIN 125 MCG: 125 TABLET ORAL at 09:05

## 2020-01-01 RX ADMIN — METOPROLOL TARTRATE 12.5 MG: 25 TABLET, FILM COATED ORAL at 21:51

## 2020-01-01 RX ADMIN — FAMOTIDINE 20 MG: 20 TABLET ORAL at 21:51

## 2020-01-01 RX ADMIN — WARFARIN SODIUM 2 MG: 2 TABLET ORAL at 18:40

## 2020-01-01 RX ADMIN — MIDAZOLAM 0.5 MG: 1 INJECTION INTRAMUSCULAR; INTRAVENOUS at 12:18

## 2020-01-01 RX ADMIN — AMIODARONE HYDROCHLORIDE 200 MG: 200 TABLET ORAL at 21:51

## 2020-01-01 RX ADMIN — FAMOTIDINE 20 MG: 20 TABLET ORAL at 22:33

## 2020-01-01 RX ADMIN — Medication 1 CAPSULE: at 09:05

## 2020-01-01 RX ADMIN — SODIUM CHLORIDE: 9 INJECTION, SOLUTION INTRAVENOUS at 09:47

## 2020-01-01 RX ADMIN — METOPROLOL TARTRATE 12.5 MG: 25 TABLET, FILM COATED ORAL at 19:17

## 2020-01-01 RX ADMIN — FAMOTIDINE 20 MG: 20 TABLET ORAL at 08:42

## 2020-01-01 RX ADMIN — METOPROLOL TARTRATE 12.5 MG: 25 TABLET, FILM COATED ORAL at 00:04

## 2020-01-01 RX ADMIN — AMIODARONE HYDROCHLORIDE 0.5 MG/MIN: 50 INJECTION, SOLUTION INTRAVENOUS at 01:59

## 2020-01-01 RX ADMIN — AMIODARONE HYDROCHLORIDE 0.5 MG/MIN: 50 INJECTION, SOLUTION INTRAVENOUS at 09:27

## 2020-01-01 RX ADMIN — WARFARIN SODIUM 1.25 MG: 2.5 TABLET ORAL at 23:53

## 2020-01-01 RX ADMIN — CARVEDILOL 6.25 MG: 6.25 TABLET, FILM COATED ORAL at 18:16

## 2020-01-01 RX ADMIN — ROSUVASTATIN CALCIUM 20 MG: 20 TABLET, FILM COATED ORAL at 21:51

## 2020-01-01 RX ADMIN — NITROGLYCERIN 200 MCG: 5 INJECTION, SOLUTION INTRAVENOUS at 12:19

## 2020-01-01 RX ADMIN — FENTANYL CITRATE 25 MCG: 50 INJECTION, SOLUTION INTRAMUSCULAR; INTRAVENOUS at 11:00

## 2020-01-01 RX ADMIN — Medication 1 CAPSULE: at 11:19

## 2020-01-01 RX ADMIN — SODIUM CHLORIDE 500 ML: 9 INJECTION, SOLUTION INTRAVENOUS at 03:37

## 2020-01-01 RX ADMIN — MIDAZOLAM 1 MG: 1 INJECTION INTRAMUSCULAR; INTRAVENOUS at 10:51

## 2020-01-01 RX ADMIN — CLOPIDOGREL BISULFATE 75 MG: 75 TABLET, FILM COATED ORAL at 08:42

## 2020-01-01 RX ADMIN — FAMOTIDINE 20 MG: 20 TABLET ORAL at 22:38

## 2020-01-01 RX ADMIN — IPRATROPIUM BROMIDE 2 SPRAY: 21 SPRAY, METERED NASAL at 23:57

## 2020-01-01 RX ADMIN — IPRATROPIUM BROMIDE 2 SPRAY: 21 SPRAY, METERED NASAL at 09:06

## 2020-01-01 RX ADMIN — ROSUVASTATIN CALCIUM 20 MG: 20 TABLET, FILM COATED ORAL at 23:55

## 2020-01-01 RX ADMIN — DIGOXIN 125 MCG: 125 TABLET ORAL at 11:19

## 2020-01-01 RX ADMIN — HEPARIN SODIUM 2000 UNITS: 1000 INJECTION INTRAVENOUS; SUBCUTANEOUS at 12:09

## 2020-01-01 RX ADMIN — AMIODARONE HYDROCHLORIDE 200 MG: 200 TABLET ORAL at 22:34

## 2020-01-01 RX ADMIN — LIDOCAINE HYDROCHLORIDE 8 ML: 10 INJECTION, SOLUTION EPIDURAL; INFILTRATION; INTRACAUDAL; PERINEURAL at 11:01

## 2020-01-01 RX ADMIN — LISINOPRIL 5 MG: 5 TABLET ORAL at 03:37

## 2020-01-01 RX ADMIN — HEPARIN SODIUM 7000 UNITS: 1000 INJECTION INTRAVENOUS; SUBCUTANEOUS at 11:15

## 2020-01-01 RX ADMIN — METOPROLOL TARTRATE 12.5 MG: 25 TABLET, FILM COATED ORAL at 13:06

## 2020-01-01 RX ADMIN — LIDOCAINE HYDROCHLORIDE 1 MG/MIN: 8 INJECTION, SOLUTION INTRAVENOUS at 00:01

## 2020-01-01 RX ADMIN — AMIODARONE HYDROCHLORIDE 1 MG/MIN: 50 INJECTION, SOLUTION INTRAVENOUS at 19:55

## 2020-01-01 RX ADMIN — HUMAN ALBUMIN MICROSPHERES AND PERFLUTREN 9 ML: 10; .22 INJECTION, SOLUTION INTRAVENOUS at 11:29

## 2020-01-01 RX ADMIN — METOPROLOL TARTRATE 12.5 MG: 25 TABLET, FILM COATED ORAL at 06:33

## 2020-01-01 RX ADMIN — CARVEDILOL 6.25 MG: 6.25 TABLET, FILM COATED ORAL at 09:46

## 2020-01-01 RX ADMIN — CLOPIDOGREL BISULFATE 600 MG: 75 TABLET ORAL at 12:37

## 2020-01-01 RX ADMIN — DIGOXIN 125 MCG: 125 TABLET ORAL at 10:06

## 2020-01-01 RX ADMIN — LISINOPRIL 5 MG: 5 TABLET ORAL at 22:34

## 2020-01-01 RX ADMIN — Medication 1 CAPSULE: at 23:55

## 2020-01-01 RX ADMIN — INFLUENZA A VIRUS A/MICHIGAN/45/2015 X-275 (H1N1) ANTIGEN (FORMALDEHYDE INACTIVATED), INFLUENZA A VIRUS A/SINGAPORE/INFIMH-16-0019/2016 IVR-186 (H3N2) ANTIGEN (FORMALDEHYDE INACTIVATED), INFLUENZA B VIRUS B/PHUKET/3073/2013 ANTIGEN (FORMALDEHYDE INACTIVATED), AND INFLUENZA B VIRUS B/MARYLAND/15/2016 BX-69A ANTIGEN (FORMALDEHYDE INACTIVATED) 0.7 ML: 60; 60; 60; 60 INJECTION, SUSPENSION INTRAMUSCULAR at 09:39

## 2020-01-01 RX ADMIN — AMIODARONE HYDROCHLORIDE 1 MG/MIN: 50 INJECTION, SOLUTION INTRAVENOUS at 00:03

## 2020-01-01 RX ADMIN — IPRATROPIUM BROMIDE 2 SPRAY: 21 SPRAY, METERED NASAL at 11:39

## 2020-01-01 RX ADMIN — CLOPIDOGREL BISULFATE 75 MG: 75 TABLET, FILM COATED ORAL at 08:16

## 2020-01-01 RX ADMIN — IOPAMIDOL 190 ML: 755 INJECTION, SOLUTION INTRAVENOUS at 12:39

## 2020-01-01 RX ADMIN — IPRATROPIUM BROMIDE 2 SPRAY: 21 SPRAY NASAL at 08:44

## 2020-01-01 RX ADMIN — HUMAN ALBUMIN MICROSPHERES AND PERFLUTREN 9 ML: 10; .22 INJECTION, SOLUTION INTRAVENOUS at 11:12

## 2020-01-01 RX ADMIN — MIDAZOLAM 0.5 MG: 1 INJECTION INTRAMUSCULAR; INTRAVENOUS at 12:03

## 2020-01-01 RX ADMIN — CARVEDILOL 6.25 MG: 6.25 TABLET, FILM COATED ORAL at 11:19

## 2020-01-01 RX ADMIN — AMIODARONE HYDROCHLORIDE 150 MG: 1.5 INJECTION, SOLUTION INTRAVENOUS at 20:33

## 2020-01-01 RX ADMIN — IPRATROPIUM BROMIDE 2 SPRAY: 21 SPRAY NASAL at 22:38

## 2020-01-01 RX ADMIN — IPRATROPIUM BROMIDE 2 SPRAY: 21 SPRAY NASAL at 08:13

## 2020-01-01 RX ADMIN — AMIODARONE HYDROCHLORIDE 150 MG: 1.5 INJECTION, SOLUTION INTRAVENOUS at 19:17

## 2020-01-01 RX ADMIN — AMIODARONE HYDROCHLORIDE 0.5 MG/MIN: 50 INJECTION, SOLUTION INTRAVENOUS at 19:00

## 2020-01-01 RX ADMIN — MIDAZOLAM 0.5 MG: 1 INJECTION INTRAMUSCULAR; INTRAVENOUS at 11:52

## 2020-01-01 RX ADMIN — WARFARIN SODIUM 1 MG: 1 TABLET ORAL at 21:52

## 2020-01-01 RX ADMIN — DIGOXIN 125 MCG: 125 TABLET ORAL at 16:38

## 2020-01-01 RX ADMIN — MAGNESIUM SULFATE IN WATER 2 G: 40 INJECTION, SOLUTION INTRAVENOUS at 04:42

## 2020-01-01 RX ADMIN — ROSUVASTATIN CALCIUM 20 MG: 20 TABLET, FILM COATED ORAL at 22:33

## 2020-01-01 RX ADMIN — IPRATROPIUM BROMIDE 2 SPRAY: 21 SPRAY NASAL at 21:59

## 2020-01-01 RX ADMIN — POTASSIUM PHOSPHATE, MONOBASIC AND POTASSIUM PHOSPHATE, DIBASIC 15 MMOL: 224; 236 INJECTION, SOLUTION, CONCENTRATE INTRAVENOUS at 12:52

## 2020-01-01 RX ADMIN — AMIODARONE HYDROCHLORIDE 200 MG: 200 TABLET ORAL at 03:37

## 2020-01-01 ASSESSMENT — ACTIVITIES OF DAILY LIVING (ADL)
ADLS_ACUITY_SCORE: 13
ADLS_ACUITY_SCORE: 13
TRANSFERRING: 0-->INDEPENDENT
ADLS_ACUITY_SCORE: 13
FALL_HISTORY_WITHIN_LAST_SIX_MONTHS: NO
ADLS_ACUITY_SCORE: 13
SWALLOWING: 0-->SWALLOWS FOODS/LIQUIDS WITHOUT DIFFICULTY
TOILETING: 0-->INDEPENDENT
DRESS: 0-->INDEPENDENT
ADLS_ACUITY_SCORE: 13
BATHING: 0-->INDEPENDENT
ADLS_ACUITY_SCORE: 13
RETIRED_COMMUNICATION: 0-->UNDERSTANDS/COMMUNICATES WITHOUT DIFFICULTY
ADLS_ACUITY_SCORE: 13
RETIRED_EATING: 0-->INDEPENDENT
ADLS_ACUITY_SCORE: 13
COGNITION: 0 - NO COGNITION ISSUES REPORTED
ADLS_ACUITY_SCORE: 13
ADLS_ACUITY_SCORE: 13
PREVIOUS_RESPONSIBILITIES: DRIVING;FINANCES;MEDICATION MANAGEMENT
AMBULATION: 0-->INDEPENDENT
ADLS_ACUITY_SCORE: 13

## 2020-01-01 ASSESSMENT — MIFFLIN-ST. JEOR
SCORE: 1602.62
SCORE: 1563.16
SCORE: 1574.95
SCORE: 1571.78
SCORE: 1597.88
SCORE: 1566.79
SCORE: 1590.38
SCORE: 1592.19
SCORE: 1608.51

## 2020-01-01 ASSESSMENT — ENCOUNTER SYMPTOMS
COUGH: 0
DIAPHORESIS: 0
COUGH: 0
FEVER: 0
PALPITATIONS: 1
NAUSEA: 0
LIGHT-HEADEDNESS: 1
SHORTNESS OF BREATH: 0
CHILLS: 0
NUMBNESS: 1
DIZZINESS: 1
FEVER: 0

## 2020-01-07 ENCOUNTER — OFFICE VISIT (OUTPATIENT)
Dept: FAMILY MEDICINE | Facility: CLINIC | Age: 75
End: 2020-01-07
Payer: COMMERCIAL

## 2020-01-07 VITALS
TEMPERATURE: 97.4 F | HEIGHT: 72 IN | DIASTOLIC BLOOD PRESSURE: 70 MMHG | RESPIRATION RATE: 16 BRPM | SYSTOLIC BLOOD PRESSURE: 128 MMHG | OXYGEN SATURATION: 98 % | HEART RATE: 68 BPM | WEIGHT: 181 LBS | BODY MASS INDEX: 24.52 KG/M2

## 2020-01-07 DIAGNOSIS — Z12.5 SCREENING FOR PROSTATE CANCER: ICD-10-CM

## 2020-01-07 DIAGNOSIS — I25.10 CORONARY ARTERY DISEASE INVOLVING NATIVE HEART WITHOUT ANGINA PECTORIS, UNSPECIFIED VESSEL OR LESION TYPE: ICD-10-CM

## 2020-01-07 DIAGNOSIS — I10 ESSENTIAL HYPERTENSION: ICD-10-CM

## 2020-01-07 DIAGNOSIS — I50.22 CHRONIC SYSTOLIC CONGESTIVE HEART FAILURE (H): ICD-10-CM

## 2020-01-07 DIAGNOSIS — E78.2 MIXED HYPERLIPIDEMIA: ICD-10-CM

## 2020-01-07 DIAGNOSIS — I25.5 ISCHEMIC CARDIOMYOPATHY: ICD-10-CM

## 2020-01-07 DIAGNOSIS — R55 SYNCOPE, UNSPECIFIED SYNCOPE TYPE: ICD-10-CM

## 2020-01-07 DIAGNOSIS — Z00.00 ROUTINE MEDICAL EXAM: Primary | ICD-10-CM

## 2020-01-07 DIAGNOSIS — N18.30 CKD (CHRONIC KIDNEY DISEASE) STAGE 3, GFR 30-59 ML/MIN (H): ICD-10-CM

## 2020-01-07 PROBLEM — I47.20 VENTRICULAR TACHYCARDIA (H): Status: RESOLVED | Noted: 2017-12-03 | Resolved: 2020-01-07

## 2020-01-07 PROBLEM — I47.20 V-TACH (H): Status: RESOLVED | Noted: 2018-11-20 | Resolved: 2020-01-07

## 2020-01-07 LAB
BASOPHILS # BLD AUTO: 0 10E9/L (ref 0–0.2)
BASOPHILS NFR BLD AUTO: 0.6 %
DIFFERENTIAL METHOD BLD: ABNORMAL
EOSINOPHIL # BLD AUTO: 0.2 10E9/L (ref 0–0.7)
EOSINOPHIL NFR BLD AUTO: 2.5 %
ERYTHROCYTE [DISTWIDTH] IN BLOOD BY AUTOMATED COUNT: 14.4 % (ref 10–15)
HCT VFR BLD AUTO: 41.4 % (ref 40–53)
HGB BLD-MCNC: 13.5 G/DL (ref 13.3–17.7)
LYMPHOCYTES # BLD AUTO: 1.7 10E9/L (ref 0.8–5.3)
LYMPHOCYTES NFR BLD AUTO: 25.2 %
MCH RBC QN AUTO: 30.8 PG (ref 26.5–33)
MCHC RBC AUTO-ENTMCNC: 32.6 G/DL (ref 31.5–36.5)
MCV RBC AUTO: 95 FL (ref 78–100)
MONOCYTES # BLD AUTO: 1 10E9/L (ref 0–1.3)
MONOCYTES NFR BLD AUTO: 14.7 %
NEUTROPHILS # BLD AUTO: 3.9 10E9/L (ref 1.6–8.3)
NEUTROPHILS NFR BLD AUTO: 57 %
NT-PROBNP SERPL-MCNC: 2158 PG/ML (ref 0–450)
PLATELET # BLD AUTO: 163 10E9/L (ref 150–450)
RBC # BLD AUTO: 4.38 10E12/L (ref 4.4–5.9)
WBC # BLD AUTO: 6.8 10E9/L (ref 4–11)

## 2020-01-07 PROCEDURE — G0103 PSA SCREENING: HCPCS | Performed by: FAMILY MEDICINE

## 2020-01-07 PROCEDURE — 80053 COMPREHEN METABOLIC PANEL: CPT | Performed by: FAMILY MEDICINE

## 2020-01-07 PROCEDURE — 85025 COMPLETE CBC W/AUTO DIFF WBC: CPT | Performed by: FAMILY MEDICINE

## 2020-01-07 PROCEDURE — 80061 LIPID PANEL: CPT | Performed by: FAMILY MEDICINE

## 2020-01-07 PROCEDURE — 36415 COLL VENOUS BLD VENIPUNCTURE: CPT | Performed by: FAMILY MEDICINE

## 2020-01-07 PROCEDURE — 99397 PER PM REEVAL EST PAT 65+ YR: CPT | Performed by: FAMILY MEDICINE

## 2020-01-07 PROCEDURE — 83880 ASSAY OF NATRIURETIC PEPTIDE: CPT | Performed by: FAMILY MEDICINE

## 2020-01-07 ASSESSMENT — ACTIVITIES OF DAILY LIVING (ADL): CURRENT_FUNCTION: NO ASSISTANCE NEEDED

## 2020-01-07 ASSESSMENT — MIFFLIN-ST. JEOR: SCORE: 1589.01

## 2020-01-07 NOTE — PROGRESS NOTES
"SUBJECTIVE:   Tyrel Rene is a 74 year old male who presents for Preventive Visit.  Are you in the first 12 months of your Medicare coverage?  No    Healthy Habits:     In general, how would you rate your overall health?  Fair    Duration of exercise:  Less than 15 minutes    Do you usually eat at least 4 servings of fruit and vegetables a day, include whole grains    & fiber and avoid regularly eating high fat or \"junk\" foods?  Yes    Taking medications regularly:  Yes    Medication side effects:  None    Ability to successfully perform activities of daily living:  No assistance needed    Home Safety:  No safety concerns identified    Hearing Impairment:  No hearing concerns    In the past 6 months, have you been bothered by leaking of urine?  No    In general, how would you rate your overall mental or emotional health?  Excellent      PHQ-2 Total Score: 0    Additional concerns today:  No    Do you feel safe in your environment? Yes    Have you ever done Advance Care Planning? (For example, a Health Directive, POLST, or a discussion with a medical provider or your loved ones about your wishes): Yes, patient states has an Advance Care Planning document and will bring a copy to the clinic.      Fall risk  Fallen 2 or more times in the past year?: No  Any fall with injury in the past year?: No    Cognitive Screening   1) Repeat 3 items (Leader, Season, Table)    2) Clock draw: NORMAL  3) 3 item recall: Recalls 3 objects  Results: 3 items recalled: COGNITIVE IMPAIRMENT LESS LIKELY    Mini-CogTM Copyright FERCHO Storm. Licensed by the author for use in [facility]; reprinted with permission (epi@.[facility]). All rights reserved.          Reviewed and updated as needed this visit by clinical staff  Tobacco  Allergies  Meds  Med Hx  Surg Hx  Fam Hx  Soc Hx        Reviewed and updated as needed this visit by Provider        Social History     Tobacco Use     Smoking status: Former Smoker     Packs/day: 1.00 "     Years: 14.00     Pack years: 14.00     Types: Cigarettes     Start date:      Last attempt to quit: 7/10/1972     Years since quittin.5     Smokeless tobacco: Never Used   Substance Use Topics     Alcohol use: No         Alcohol Use 2020   Prescreen: >3 drinks/day or >7 drinks/week? Not Applicable   Prescreen: >3 drinks/day or >7 drinks/week? -       Colonoscopy done on this date: 10/2012 (approximately), by this group: [facility], results were fine.             Current providers sharing in care for this patient include:   Patient Care Team:  Dejon Downs MD as PCP - General (Family Practice)  Dejon Downs MD as Assigned PCP    The following health maintenance items are reviewed in Epic and correct as of today:  Health Maintenance   Topic Date Due     ZOSTER IMMUNIZATION (1 of 2) 10/14/1993     OP ANNUAL INR REFERRAL  2016     HF ACTION PLAN  2016     LIPID  2018     MEDICARE ANNUAL WELLNESS VISIT  2018     FALL RISK ASSESSMENT  2018     DIGOXIN  2019     PHQ-2  2020     CBC  2020     BMP  2020     ALT  2020     COLONOSCOPY  03/10/2021     DTAP/TDAP/TD IMMUNIZATION (3 - Td) 10/01/2022     ADVANCE CARE PLANNING  2023     INFLUENZA VACCINE  Completed     PNEUMOCOCCAL IMMUNIZATION 65+ LOW/MEDIUM RISK  Completed     IPV IMMUNIZATION  Aged Out     MENINGITIS IMMUNIZATION  Aged Out     Patient Active Problem List   Diagnosis     STEMI (ST elevation myocardial infarction) (H)     Anemia     Health Care Home     Atrial fibrillation (H)     Hypertension     Cerebral infarction (H)     SVT (supraventricular tachycardia) (H)     CAD (coronary artery disease)     Cardiomyopathy (H)     Atrial flutter (H)     Diplopia     Long-term (current) use of anticoagulants [Z79.01]     Cerebral artery occlusion with cerebral infarction (HCC) [I63.50]     Chronic systolic congestive heart failure (H)     Mixed hyperlipidemia     Screening for  prostate cancer     Status post coronary angiogram     Paroxysmal ventricular tachycardia (H)     CKD (chronic kidney disease) stage 3, GFR 30-59 ml/min (H)     Past Surgical History:   Procedure Laterality Date     BYPASS GRAFT ARTERY CORONARY  10/2/2012    Procedure: BYPASS GRAFT ARTERY CORONARY;  Coronary Artery Bypass Graft x3 (LAD, Diag, OM) with Endovein [place] (On-Pump);  Surgeon: Yeyo Lyman MD;  Location:  OR     CARDIOVERSION  3/14/2013     CORONARY ANGIOGRAPHY ADULT ORDER  10/2/12     CORONARY ARTERY BYPASS  10/2/12    LIMA to LAD, SVG to OM1 and OM3     EP ABLATION VT N/A 2019    Procedure: EP Ablation VT;  Surgeon: Suellen Costello MD;  Location:  HEART CARDIAC CATH LAB     EP COMPREHENSIVE EP STUDY N/A 2019    Procedure: EP Comprehensive EP Study;  Surgeon: Suellen Costello MD;  Location:  HEART CARDIAC CATH LAB     H ABLATION ATRIAL FLUTTER  2011     HAND SURGERY      table saw injury right hand     HEART CATH, ANGIOPLASTY  10/2/12    PTCA to second diagonal and mid LAD, BMS to mid LAD     HERNIA REPAIR       RELOCATE GENERATOR ICD/PACEMAKER         Social History     Tobacco Use     Smoking status: Former Smoker     Packs/day: 1.00     Years: 14.00     Pack years: 14.00     Types: Cigarettes     Start date:      Last attempt to quit: 7/10/1972     Years since quittin.5     Smokeless tobacco: Never Used   Substance Use Topics     Alcohol use: No     Family History   Problem Relation Age of Onset     Alcohol/Drug Father      Heart Disease Father 71     Alzheimer Disease Sister      Alcohol/Drug Sister      Alcohol/Drug Sister      Gastrointestinal Disease Sister      Obesity Sister      Hypertension Sister      Heart Disease Sister      Hypertension Sister      Heart Disease Daughter         afib     Arrhythmia Daughter      Multiple Sclerosis Daughter      Heart Disease Daughter         afib     Arrhythmia Daughter      Cancer Daughter          lymphoma     Aneurysm Mother              Review of Systems  CONSTITUTIONAL: NEGATIVE for fever, chills, change in weight  INTEGUMENTARY/SKIN: NEGATIVE for worrisome rashes, moles or lesions  EYES: NEGATIVE for vision changes or irritation  ENT/MOUTH: NEGATIVE for ear, mouth and throat problems  RESP: NEGATIVE for significant cough or SOB  BREAST: NEGATIVE for masses, tenderness or discharge  CV: NEGATIVE for chest pain, palpitations or peripheral edema  GI: NEGATIVE for nausea, abdominal pain, heartburn, or change in bowel habits  : NEGATIVE for frequency, dysuria, or hematuria  MUSCULOSKELETAL: NEGATIVE for significant arthralgias or myalgia  NEURO: NEGATIVE for weakness, dizziness or paresthesias  ENDOCRINE: NEGATIVE for temperature intolerance, skin/hair changes  HEME: NEGATIVE for bleeding problems  PSYCHIATRIC: NEGATIVE for changes in mood or affect    OBJECTIVE:   /70   Pulse 68   Temp 97.4  F (36.3  C) (Tympanic)   Resp 16   Ht 1.829 m (6')   Wt 82.1 kg (181 lb)   SpO2 98%   BMI 24.55 kg/m   Estimated body mass index is 24.55 kg/m  as calculated from the following:    Height as of this encounter: 1.829 m (6').    Weight as of this encounter: 82.1 kg (181 lb).  Physical Exam  GENERAL: healthy, alert and no distress  EYES: Eyes grossly normal to inspection, PERRL and conjunctivae and sclerae normal  HENT: ear canals and TM's normal, nose and mouth without ulcers or lesions  NECK: no adenopathy, no asymmetry, masses, or scars and thyroid normal to palpation  RESP: lungs clear to auscultation - no rales, rhonchi or wheezes  CV: regular rate and rhythm, normal S1 S2, no S3 or S4, no murmur, click or rub, no peripheral edema and peripheral pulses strong  ABDOMEN: soft, nontender, no hepatosplenomegaly, no masses and bowel sounds normal   (male): normal male genitalia without lesions or urethral discharge, no hernia  RECTAL: normal sphincter tone, no rectal masses, prostate normal size,  smooth, nontender without nodules or masses  MS: no gross musculoskeletal defects noted, no edema  SKIN: no suspicious lesions or rashes  NEURO: Normal strength and tone, mentation intact and speech normal  PSYCH: mentation appears normal, affect normal/bright  LYMPH: no cervical, supraclavicular, axillary, or inguinal adenopathy        ASSESSMENT / PLAN:       ICD-10-CM    1. Routine medical exam Z00.00    2. CKD (chronic kidney disease) stage 3, GFR 30-59 ml/min (H) N18.3 Comprehensive metabolic panel   3. Mixed hyperlipidemia E78.2 Lipid Profile   4. Essential hypertension I10 UA with Microscopic     CBC with platelets differential   5. Coronary artery disease involving native heart without angina pectoris, unspecified vessel or lesion type I25.10    6. Ischemic cardiomyopathy I25.5    7. Chronic systolic congestive heart failure (H) I50.22 BNP-N terminal pro   8. Screening for prostate cancer Z12.5 Prostate spec antigen screen   9. Syncope, unspecified syncope type R55        COUNSELING:  Reviewed preventive health counseling, as reflected in patient instructions       Regular exercise       Healthy diet/nutrition       Prostate cancer screening    Estimated body mass index is 24.55 kg/m  as calculated from the following:    Height as of this encounter: 1.829 m (6').    Weight as of this encounter: 82.1 kg (181 lb).         reports that he quit smoking about 47 years ago. His smoking use included cigarettes. He started smoking about 62 years ago. He has a 14.00 pack-year smoking history. He has never used smokeless tobacco.      Appropriate preventive services were discussed with this patient, including applicable screening as appropriate for cardiovascular disease, diabetes, osteopenia/osteoporosis, and glaucoma.  As appropriate for age/gender, discussed screening for colorectal cancer, prostate cancer, breast cancer, and cervical cancer. Checklist reviewing preventive services available has been given to the  patient.    Reviewed patients plan of care and provided an AVS. The Basic Care Plan (routine screening as documented in Health Maintenance) for Tyrel meets the Care Plan requirement. This Care Plan has been established and reviewed with the Patient.    Counseling Resources:  ATP IV Guidelines  Pooled Cohorts Equation Calculator  Breast Cancer Risk Calculator  FRAX Risk Assessment  ICSI Preventive Guidelines  Dietary Guidelines for Americans, 2010  [employer]'s MyPlate  ASA Prophylaxis  Lung CA Screening    Dejon Downs MD  [facility]    Identified Health Risks:

## 2020-01-08 LAB
ALBUMIN SERPL-MCNC: 3.4 G/DL (ref 3.4–5)
ALP SERPL-CCNC: 59 U/L (ref 40–150)
ALT SERPL W P-5'-P-CCNC: 25 U/L (ref 0–70)
ANION GAP SERPL CALCULATED.3IONS-SCNC: 3 MMOL/L (ref 3–14)
AST SERPL W P-5'-P-CCNC: 19 U/L (ref 0–45)
BILIRUB SERPL-MCNC: 0.6 MG/DL (ref 0.2–1.3)
BUN SERPL-MCNC: 26 MG/DL (ref 7–30)
CALCIUM SERPL-MCNC: 8.4 MG/DL (ref 8.5–10.1)
CHLORIDE SERPL-SCNC: 108 MMOL/L (ref 94–109)
CHOLEST SERPL-MCNC: 116 MG/DL
CO2 SERPL-SCNC: 28 MMOL/L (ref 20–32)
CREAT SERPL-MCNC: 1.69 MG/DL (ref 0.66–1.25)
GFR SERPL CREATININE-BSD FRML MDRD: 39 ML/MIN/{1.73_M2}
GLUCOSE SERPL-MCNC: 89 MG/DL (ref 70–99)
HDLC SERPL-MCNC: 42 MG/DL
LDLC SERPL CALC-MCNC: 52 MG/DL
NONHDLC SERPL-MCNC: 74 MG/DL
POTASSIUM SERPL-SCNC: 4.4 MMOL/L (ref 3.4–5.3)
PROT SERPL-MCNC: 6.8 G/DL (ref 6.8–8.8)
PSA SERPL-ACNC: 1.47 UG/L (ref 0–4)
SODIUM SERPL-SCNC: 139 MMOL/L (ref 133–144)
TRIGL SERPL-MCNC: 111 MG/DL

## 2020-01-22 ENCOUNTER — ANTICOAGULATION THERAPY VISIT (OUTPATIENT)
Dept: NURSING | Facility: CLINIC | Age: 75
End: 2020-01-22
Payer: COMMERCIAL

## 2020-01-22 DIAGNOSIS — I63.9 CEREBRAL INFARCTION (H): ICD-10-CM

## 2020-01-22 DIAGNOSIS — I63.50 CEREBRAL ARTERY OCCLUSION WITH CEREBRAL INFARCTION (H): ICD-10-CM

## 2020-01-22 DIAGNOSIS — Z79.01 LONG TERM CURRENT USE OF ANTICOAGULANT THERAPY: ICD-10-CM

## 2020-01-22 LAB — INR POINT OF CARE: 2.2 (ref 0.86–1.14)

## 2020-01-22 PROCEDURE — 36416 COLLJ CAPILLARY BLOOD SPEC: CPT

## 2020-01-22 PROCEDURE — 99207 ZZC NO CHARGE NURSE ONLY: CPT

## 2020-01-22 PROCEDURE — 85610 PROTHROMBIN TIME: CPT | Mod: QW

## 2020-01-22 NOTE — PROGRESS NOTES
ANTICOAGULATION FOLLOW-UP CLINIC VISIT    Patient Name:  Tyrel Rene  Date:  2020  Contact Type:  Face to Face    SUBJECTIVE:  Patient Findings     Comments:   The patient was assessed for diet, medication, and activity level changes, missed or extra doses, bruising or bleeding, with no problem findings.          Clinical Outcomes     Comments:   The patient was assessed for diet, medication, and activity level changes, missed or extra doses, bruising or bleeding, with no problem findings.             OBJECTIVE    INR Protime   Date Value Ref Range Status   2020 2.2 (A) 0.86 - 1.14 Final       ASSESSMENT / PLAN  INR assessment THER    Recheck INR In: 4 WEEKS    INR Location Clinic      Anticoagulation Summary  As of 2020    INR goal:   2.0-2.5   TTR:   25.0 % (1 y)   INR used for dosin.2 (2020)   Warfarin maintenance plan:   2.5 mg (2.5 mg x 1) every Mon; 1.25 mg (2.5 mg x 0.5) all other days   Full warfarin instructions:   2.5 mg every Mon; 1.25 mg all other days   Weekly warfarin total:   10 mg   Plan last modified:   Doreen Finley RN (3/22/2019)   Next INR check:   2020   Priority:   Critical   Target end date:   Indefinite    Indications    Long-term (current) use of anticoagulants [Z79.01] [Z79.01]  Cerebral artery occlusion with cerebral infarction (HCC) [I63.50] [I63.50]  Cerebral infarction (H) [I63.9]             Anticoagulation Episode Summary     INR check location:   Anticoagulation Clinic    Preferred lab:       Send INR reminders to:   [facility]    Comments:         Anticoagulation Care Providers     Provider Role Specialty Phone number    Dejon Downs MD [facility] [phone]            See the Encounter Report to view Anticoagulation Flowsheet and Dosing Calendar (Go to Encounters tab in chart review, and find the Anticoagulation Therapy Visit)        Doreen Finley, RN

## 2020-01-24 ENCOUNTER — TELEPHONE (OUTPATIENT)
Dept: CARDIOLOGY | Facility: CLINIC | Age: 75
End: 2020-01-24

## 2020-01-24 DIAGNOSIS — Z79.899 ON AMIODARONE THERAPY: Primary | ICD-10-CM

## 2020-02-04 ENCOUNTER — OFFICE VISIT (OUTPATIENT)
Dept: CARDIOLOGY | Facility: CLINIC | Age: 75
End: 2020-02-04
Attending: NURSE PRACTITIONER
Payer: COMMERCIAL

## 2020-02-04 VITALS
HEART RATE: 65 BPM | SYSTOLIC BLOOD PRESSURE: 118 MMHG | HEIGHT: 73 IN | OXYGEN SATURATION: 96 % | WEIGHT: 183.5 LBS | BODY MASS INDEX: 24.32 KG/M2 | DIASTOLIC BLOOD PRESSURE: 80 MMHG

## 2020-02-04 DIAGNOSIS — I48.20 CHRONIC ATRIAL FIBRILLATION (H): ICD-10-CM

## 2020-02-04 DIAGNOSIS — Z79.899 ON AMIODARONE THERAPY: ICD-10-CM

## 2020-02-04 DIAGNOSIS — I50.22 CHRONIC SYSTOLIC CONGESTIVE HEART FAILURE (H): ICD-10-CM

## 2020-02-04 DIAGNOSIS — I47.20 VENTRICULAR TACHYCARDIA (H): Primary | ICD-10-CM

## 2020-02-04 LAB
ALBUMIN SERPL-MCNC: 3.4 G/DL (ref 3.4–5)
ALP SERPL-CCNC: 64 U/L (ref 40–150)
ALT SERPL W P-5'-P-CCNC: 27 U/L (ref 0–70)
AST SERPL W P-5'-P-CCNC: 20 U/L (ref 0–45)
BILIRUB DIRECT SERPL-MCNC: 0.2 MG/DL (ref 0–0.2)
BILIRUB SERPL-MCNC: 0.6 MG/DL (ref 0.2–1.3)
PROT SERPL-MCNC: 7.2 G/DL (ref 6.8–8.8)
TSH SERPL DL<=0.005 MIU/L-ACNC: 1.64 MU/L (ref 0.4–4)

## 2020-02-04 PROCEDURE — 93000 ELECTROCARDIOGRAM COMPLETE: CPT | Performed by: INTERNAL MEDICINE

## 2020-02-04 PROCEDURE — 36415 COLL VENOUS BLD VENIPUNCTURE: CPT | Performed by: INTERNAL MEDICINE

## 2020-02-04 PROCEDURE — 84443 ASSAY THYROID STIM HORMONE: CPT | Performed by: INTERNAL MEDICINE

## 2020-02-04 PROCEDURE — 99214 OFFICE O/P EST MOD 30 MIN: CPT | Performed by: INTERNAL MEDICINE

## 2020-02-04 PROCEDURE — 80076 HEPATIC FUNCTION PANEL: CPT | Performed by: INTERNAL MEDICINE

## 2020-02-04 RX ORDER — AMIODARONE HYDROCHLORIDE 200 MG/1
200 TABLET ORAL DAILY
Qty: 90 TABLET | Refills: 3 | Status: ON HOLD | COMMUNITY
Start: 2020-02-04 | End: 2020-01-01

## 2020-02-04 ASSESSMENT — MIFFLIN-ST. JEOR: SCORE: 1616.23

## 2020-02-04 NOTE — LETTER
2/4/2020      Dejon Downs MD  [address]  [facility] [address]      RE: Tyrel Rene       Dear Colleague,    I had the pleasure of seeing Tyrel Rene in the [facility].    Service Date: 02/04/2020      HISTORY OF PRESENT ILLNESS:  I had the pleasure of seeing Mr. Tyrel Rene, a delightful 74-year-old gentleman with the following medical issues:   A.  Severe ischemic cardiomyopathy related to a large anterior MI several years ago.  LVEF is 25%-30%.   B.  Unstable ventricular tachyarrhythmias requiring ICD shock in 11/2017.  Amiodarone was started at that time.  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  Catheter ablation of VT on 01/02/2019.  He has had no recurrence of sustained VT since then.   C.  Permanent atrial fibrillation and complete AV block.  On warfarin.   D.  CRT ICD in place.   E.  CVA following atrial flutter ablation in 2011.      Marko returns to the EP Clinic 13 months after his VT ablation in 01/2019.  He has done well after the procedure.  He has had no further ICD shocks.  Nonsustained VT has been recorded through his ICD.  He has been maintained on amiodarone 200 mg daily.      He sees Dr. Newman and Prince Galvez.  He has done well in terms of CHF.  His weight has been stable at home.        His main complaint is dizziness.  This typically occurs with change in position.  Perhaps orthostasis or perhaps related to his cardiac drugs.  This symptom has been chronic.      He does not have orthopnea, PND, lower extremity edema.      PHYSICAL EXAMINATION:   VITAL SIGNS:  Blood pressure 118/80, heart rate 65 and regular, weight 83 kg, height 185 cm.   GENERAL:  He is a very pleasant man who as always is accompanied by his wife.   NECK:  Supple without carotid bruits.   LUNGS:  Clear.  No crackles or wheezes.   CARDIOVASCULAR:  Normal JVP, regular paced rhythm, no apparent gallop or murmur.  Nicely healed left pectoral incision.   ABDOMEN:  Soft,  nontender.  Negative HJR.   EXTREMITIES:  No edema.      DIAGNOSTIC STUDIES:     His 12-lead ECG today showed atrial fibrillation with ventricular pacing and RBBB complex in V1.      The patient had a TSH earlier today, results pending.  Recent liver function tests, ALT 25, AST 19, both normal.  Recent proBNP 2158.      IMPRESSION:   1.  Ventricular tachycardia.  Marko has been doing well since his catheter ablation procedure 13 months ago.  He is still on amiodarone.  I asked him to decrease amiodarone to 6 days per week (skip on Sundays), but he prefers to do so after he returns from [place] in the spring.  I think this is fine.      His TSH is pending.  It was normal in 2019 (1.49).  AST and ALT were normal in January.  He has not had PFTs in almost 1-1/2 years.  Thus, I ordered full PFTs today.      2.  Ischemic cardiomyopathy and chronic systolic CHF.  [facility].      RECOMMENDATIONS:   A.  Decrease amiodarone to 200 mg 6 days per week.   B.  Full PFTs with DLCO measurements.   C.  I have requested followup in the EP Clinic in 1 year.        It was my pleasure seeing Marko today.  Time spent 25 minutes, more than 50% of the time was discussion.      cc:   [place] [address]         ANN-MARIE FRANK MD             D: 2020   T: 2020   MT: BRENT      Name:     ARTEM ELIZALDE   MRN:      [id number]        Account:      [id number]   :      1945           Service Date: 2020      Document: [id number]         Outpatient Encounter Medications as of 2020   Medication Sig Dispense Refill     amiodarone (PACERONE/CODARONE) 200 MG tablet Take 1 tab daily from MON to SAT (skip on SUNDAYS) 90 tablet 3     ASPIRIN NOT PRESCRIBED (INTENTIONAL) continuous prn for other Please choose reason not prescribed, below       carvedilol (COREG) 6.25 MG tablet Take 1 tablet (6.25  mg) by mouth 2 times daily (with meals) 180 tablet 3     digoxin (LANOXIN) 125 MCG tablet Take 1 tablet (125 mcg) by mouth daily 90 tablet 3     famotidine (PEPCID) 20 MG tablet Take 1 tablet (20 mg) by mouth 2 times daily 180 tablet 3     lisinopril (PRINIVIL/ZESTRIL) 5 MG tablet Take 1 tablet (5 mg) by mouth At Bedtime 90 tablet 3     Multiple Vitamins-Minerals (PRESERVISION AREDS 2 PO) Take 1 capsule by mouth 2 times daily       nitroGLYcerin (NITROSTAT) 0.4 MG sublingual tablet DISSOLVE 1 TABLET UNDER THE TONGUE EVERY 5 MINUTES AS NEEDED FOR CHEST PAIN 25 tablet 0     rosuvastatin (CRESTOR) 20 MG tablet Take 1 tablet (20 mg) by mouth daily 90 tablet 3     sildenafil (VIAGRA) 100 MG tablet Take 1 tablet (100 mg) by mouth daily as needed Take 30 min to 4 hours before intercourse.  Never use with nitroglycerin, terazosin or doxazosin. 16 tablet 3     Vitamin D, Cholecalciferol, 1000 UNITS TABS Take 1,000 mg by mouth daily        warfarin ANTICOAGULANT (COUMADIN) 2.5 MG tablet Take 2.5 mg on Mondays and 1.25mg all other days or as directed by the anticoagulation clinic 75 tablet 3     [DISCONTINUED] amiodarone (PACERONE/CODARONE) 200 MG tablet Take 1 tablet (200 mg) by mouth daily 90 tablet 3     No facility-administered encounter medications on file as of 2/4/2020.              Again, thank you for allowing me to participate in the care of your patient.      Sincerely,    Suellen Costello MD     [facility]

## 2020-02-04 NOTE — LETTER
2/4/2020    Dejon Downs MD  [address]  [facility] [address]    RE: Tyrel Rene       Dear Colleague,    I had the pleasure of seeing Tyrel Rene in the [facility].    HPI and Plan:   See dictation    Orders Placed This Encounter   Procedures     Follow-Up with Cardiac Advanced Practice Provider     EKG 12-lead complete w/read - Clinics (performed today)     General PFT Lab (Please always keep checked)     Pulmonary Function Test       Orders Placed This Encounter   Medications     amiodarone (PACERONE/CODARONE) 200 MG tablet     Sig: Take 1 tab daily from MON to SAT (skip on SUNDAYS)     Dispense:  90 tablet     Refill:  3       Medications Discontinued During This Encounter   Medication Reason     amiodarone (PACERONE/CODARONE) 200 MG tablet Reorder         Encounter Diagnosis   Name Primary?     Ventricular tachycardia (H)        CURRENT MEDICATIONS:  Current Outpatient Medications   Medication Sig Dispense Refill     amiodarone (PACERONE/CODARONE) 200 MG tablet Take 1 tab daily from MON to SAT (skip on SUNDAYS) 90 tablet 3     ASPIRIN NOT PRESCRIBED (INTENTIONAL) continuous prn for other Please choose reason not prescribed, below       carvedilol (COREG) 6.25 MG tablet Take 1 tablet (6.25 mg) by mouth 2 times daily (with meals) 180 tablet 3     digoxin (LANOXIN) 125 MCG tablet Take 1 tablet (125 mcg) by mouth daily 90 tablet 3     famotidine (PEPCID) 20 MG tablet Take 1 tablet (20 mg) by mouth 2 times daily 180 tablet 3     lisinopril (PRINIVIL/ZESTRIL) 5 MG tablet Take 1 tablet (5 mg) by mouth At Bedtime 90 tablet 3     Multiple Vitamins-Minerals (PRESERVISION AREDS 2 PO) Take 1 capsule by mouth 2 times daily       nitroGLYcerin (NITROSTAT) 0.4 MG sublingual tablet DISSOLVE 1 TABLET UNDER THE TONGUE EVERY 5 MINUTES AS NEEDED FOR CHEST PAIN 25 tablet 0     rosuvastatin (CRESTOR) 20 MG tablet Take 1 tablet (20 mg) by mouth daily 90 tablet 3     sildenafil  (VIAGRA) 100 MG tablet Take 1 tablet (100 mg) by mouth daily as needed Take 30 min to 4 hours before intercourse.  Never use with nitroglycerin, terazosin or doxazosin. 16 tablet 3     Vitamin D, Cholecalciferol, 1000 UNITS TABS Take 1,000 mg by mouth daily        warfarin ANTICOAGULANT (COUMADIN) 2.5 MG tablet Take 2.5 mg on Mondays and 1.25mg all other days or as directed by the anticoagulation clinic 75 tablet 3       ALLERGIES     Allergies   Allergen Reactions     Nystatin Other (See Comments)     Make his mouth numb & swelling       PAST MEDICAL HISTORY:  Past Medical History:   Diagnosis Date     Atrial fibrillation (H)     s/p Cardioversion 3/14/2013     Atrial flutter (H)     S/p Aflutter ablation 1/11/2011     CAD (coronary artery disease)     CABG x3 10/2012- LIMA to distal LAD, SVG to OM1 & OM3; cath 10/2012- PTCA to second diagonal and mid LAD, BMS to mid LAD     Cardiogenic shock (H)      Cardiomyopathy (H)      ED (erectile dysfunction)      Hypertension      Neuropathy      SVT (supraventricular tachycardia) (H)     S/p dual chamber ICD 10/11/12- upgraded to BIV ICD 6/2013     Syncope        PAST SURGICAL HISTORY:  Past Surgical History:   Procedure Laterality Date     BYPASS GRAFT ARTERY CORONARY  10/2/2012    Procedure: BYPASS GRAFT ARTERY CORONARY;  Coronary Artery Bypass Graft x3 (LAD, Diag, OM) with Endovein [place] (On-Pump);  Surgeon: Yeyo Lyman MD;  Location:  OR     CARDIOVERSION  3/14/2013     CORONARY ANGIOGRAPHY ADULT ORDER  10/2/12     CORONARY ARTERY BYPASS  10/2/12    LIMA to LAD, SVG to OM1 and OM3     EP ABLATION VT N/A 1/2/2019    Procedure: EP Ablation VT;  Surgeon: Suellen Costello MD;  Location:  HEART CARDIAC CATH LAB     EP COMPREHENSIVE EP STUDY N/A 1/2/2019    Procedure: EP Comprehensive EP Study;  Surgeon: Suellen Costello MD;  Location:  HEART CARDIAC CATH LAB     H ABLATION ATRIAL FLUTTER  1/11/2011     HAND SURGERY      table saw  injury right hand     HEART CATH, ANGIOPLASTY  10/2/12    PTCA to second diagonal and mid LAD, BMS to mid LAD     HERNIA REPAIR       RELOCATE GENERATOR ICD/PACEMAKER         FAMILY HISTORY:  Family History   Problem Relation Age of Onset     Alcohol/Drug Father      Heart Disease Father 71     Alzheimer Disease Sister      Alcohol/Drug Sister      Alcohol/Drug Sister      Gastrointestinal Disease Sister      Obesity Sister      Hypertension Sister      Heart Disease Sister      Hypertension Sister      Heart Disease Daughter         afib     Arrhythmia Daughter      Multiple Sclerosis Daughter      Heart Disease Daughter         afib     Arrhythmia Daughter      Cancer Daughter         lymphoma     Aneurysm Mother        SOCIAL HISTORY:  Social History     Socioeconomic History     Marital status:      Spouse name: None     Number of children: None     Years of education: None     Highest education level: None   Occupational History     None   Social Needs     Financial resource strain: None     Food insecurity:     Worry: None     Inability: None     Transportation needs:     Medical: None     Non-medical: None   Tobacco Use     Smoking status: Former Smoker     Packs/day: 1.00     Years: 14.00     Pack years: 14.00     Types: Cigarettes     Start date:      Last attempt to quit: 7/10/1972     Years since quittin.6     Smokeless tobacco: Never Used   Substance and Sexual Activity     Alcohol use: No     Drug use: No     Sexual activity: Yes     Partners: Female   Lifestyle     Physical activity:     Days per week: None     Minutes per session: None     Stress: None   Relationships     Social connections:     Talks on phone: None     Gets together: None     Attends Mosque service: None     Active member of club or organization: None     Attends meetings of clubs or organizations: None     Relationship status: None     Intimate partner violence:     Fear of current or ex partner: None      Emotionally abused: None     Physically abused: None     Forced sexual activity: None   Other Topics Concern     Parent/sibling w/ CABG, MI or angioplasty before 65F 55M? No      Service Not Asked     Blood Transfusions Not Asked     Caffeine Concern Yes     Comment: 4 cups coffee daily     Occupational Exposure Not Asked     Hobby Hazards Not Asked     Sleep Concern No     Stress Concern No     Weight Concern Yes     Comment: gain 2lbs     Special Diet Yes     Comment: low sodium     Back Care Not Asked     Exercise Yes     Comment: walking, doing sittups, played pickle ball in summer     Bike Helmet Not Asked     Seat Belt Yes     Self-Exams Not Asked   Social History Narrative     None       Review of Systems:  Skin:  Negative       Eyes:  Positive for glasses    ENT:  Negative      Respiratory:  Positive for dyspnea on exertion;shortness of breath     Cardiovascular:  Negative for;chest pain;palpitations dizziness;Positive for;lightheadedness    Gastroenterology: Negative for poor appetite;nausea;vomiting;heartburn;reflux;diarrhea    Genitourinary:  Negative      Musculoskeletal:  Negative      Neurologic:  Negative for      Psychiatric:  Negative      Heme/Lymph/Imm:  Negative      Endocrine:  Negative        [id number]              Thank you for allowing me to participate in the care of your patient.      Sincerely,     Suellen Costello MD     [facility]    cc:   Lauren Jorgensen, APRN CNP  [address]  [place] [address]

## 2020-02-04 NOTE — PROGRESS NOTES
HPI and Plan:   See dictation    Orders Placed This Encounter   Procedures     Follow-Up with Cardiac Advanced Practice Provider     EKG 12-lead complete w/read - Clinics (performed today)     General PFT Lab (Please always keep checked)     Pulmonary Function Test       Orders Placed This Encounter   Medications     amiodarone (PACERONE/CODARONE) 200 MG tablet     Sig: Take 1 tab daily from MON to SAT (skip on SUNDAYS)     Dispense:  90 tablet     Refill:  3       Medications Discontinued During This Encounter   Medication Reason     amiodarone (PACERONE/CODARONE) 200 MG tablet Reorder         Encounter Diagnosis   Name Primary?     Ventricular tachycardia (H)        CURRENT MEDICATIONS:  Current Outpatient Medications   Medication Sig Dispense Refill     amiodarone (PACERONE/CODARONE) 200 MG tablet Take 1 tab daily from MON to SAT (skip on SUNDAYS) 90 tablet 3     ASPIRIN NOT PRESCRIBED (INTENTIONAL) continuous prn for other Please choose reason not prescribed, below       carvedilol (COREG) 6.25 MG tablet Take 1 tablet (6.25 mg) by mouth 2 times daily (with meals) 180 tablet 3     digoxin (LANOXIN) 125 MCG tablet Take 1 tablet (125 mcg) by mouth daily 90 tablet 3     famotidine (PEPCID) 20 MG tablet Take 1 tablet (20 mg) by mouth 2 times daily 180 tablet 3     lisinopril (PRINIVIL/ZESTRIL) 5 MG tablet Take 1 tablet (5 mg) by mouth At Bedtime 90 tablet 3     Multiple Vitamins-Minerals (PRESERVISION AREDS 2 PO) Take 1 capsule by mouth 2 times daily       nitroGLYcerin (NITROSTAT) 0.4 MG sublingual tablet DISSOLVE 1 TABLET UNDER THE TONGUE EVERY 5 MINUTES AS NEEDED FOR CHEST PAIN 25 tablet 0     rosuvastatin (CRESTOR) 20 MG tablet Take 1 tablet (20 mg) by mouth daily 90 tablet 3     sildenafil (VIAGRA) 100 MG tablet Take 1 tablet (100 mg) by mouth daily as needed Take 30 min to 4 hours before intercourse.  Never use with nitroglycerin, terazosin or doxazosin. 16 tablet 3     Vitamin D, Cholecalciferol, 1000 UNITS  TABS Take 1,000 mg by mouth daily        warfarin ANTICOAGULANT (COUMADIN) 2.5 MG tablet Take 2.5 mg on Mondays and 1.25mg all other days or as directed by the anticoagulation clinic 75 tablet 3       ALLERGIES     Allergies   Allergen Reactions     Nystatin Other (See Comments)     Make his mouth numb & swelling       PAST MEDICAL HISTORY:  Past Medical History:   Diagnosis Date     Atrial fibrillation (H)     s/p Cardioversion 3/14/2013     Atrial flutter (H)     S/p Aflutter ablation 1/11/2011     CAD (coronary artery disease)     CABG x3 10/2012- LIMA to distal LAD, SVG to OM1 & OM3; cath 10/2012- PTCA to second diagonal and mid LAD, BMS to mid LAD     Cardiogenic shock (H)      Cardiomyopathy (H)      ED (erectile dysfunction)      Hypertension      Neuropathy      SVT (supraventricular tachycardia) (H)     S/p dual chamber ICD 10/11/12- upgraded to BIV ICD 6/2013     Syncope        PAST SURGICAL HISTORY:  Past Surgical History:   Procedure Laterality Date     BYPASS GRAFT ARTERY CORONARY  10/2/2012    Procedure: BYPASS GRAFT ARTERY CORONARY;  Coronary Artery Bypass Graft x3 (LAD, Diag, OM) with Endovein [place] (On-Pump);  Surgeon: Yeyo Lyman MD;  Location:  OR     CARDIOVERSION  3/14/2013     CORONARY ANGIOGRAPHY ADULT ORDER  10/2/12     CORONARY ARTERY BYPASS  10/2/12    LIMA to LAD, SVG to OM1 and OM3     EP ABLATION VT N/A 1/2/2019    Procedure: EP Ablation VT;  Surgeon: Suellen Costello MD;  Location:  HEART CARDIAC CATH LAB     EP COMPREHENSIVE EP STUDY N/A 1/2/2019    Procedure: EP Comprehensive EP Study;  Surgeon: Suellen Costello MD;  Location:  HEART CARDIAC CATH LAB     H ABLATION ATRIAL FLUTTER  1/11/2011     HAND SURGERY      table saw injury right hand     HEART CATH, ANGIOPLASTY  10/2/12    PTCA to second diagonal and mid LAD, BMS to mid LAD     HERNIA REPAIR       RELOCATE GENERATOR ICD/PACEMAKER         FAMILY HISTORY:  Family History   Problem  Relation Age of Onset     Alcohol/Drug Father      Heart Disease Father 71     Alzheimer Disease Sister      Alcohol/Drug Sister      Alcohol/Drug Sister      Gastrointestinal Disease Sister      Obesity Sister      Hypertension Sister      Heart Disease Sister      Hypertension Sister      Heart Disease Daughter         afib     Arrhythmia Daughter      Multiple Sclerosis Daughter      Heart Disease Daughter         afib     Arrhythmia Daughter      Cancer Daughter         lymphoma     Aneurysm Mother        SOCIAL HISTORY:  Social History     Socioeconomic History     Marital status:      Spouse name: None     Number of children: None     Years of education: None     Highest education level: None   Occupational History     None   Social Needs     Financial resource strain: None     Food insecurity:     Worry: None     Inability: None     Transportation needs:     Medical: None     Non-medical: None   Tobacco Use     Smoking status: Former Smoker     Packs/day: 1.00     Years: 14.00     Pack years: 14.00     Types: Cigarettes     Start date:      Last attempt to quit: 7/10/1972     Years since quittin.6     Smokeless tobacco: Never Used   Substance and Sexual Activity     Alcohol use: No     Drug use: No     Sexual activity: Yes     Partners: Female   Lifestyle     Physical activity:     Days per week: None     Minutes per session: None     Stress: None   Relationships     Social connections:     Talks on phone: None     Gets together: None     Attends Jain service: None     Active member of club or organization: None     Attends meetings of clubs or organizations: None     Relationship status: None     Intimate partner violence:     Fear of current or ex partner: None     Emotionally abused: None     Physically abused: None     Forced sexual activity: None   Other Topics Concern     Parent/sibling w/ CABG, MI or angioplasty before 65F 55M? No      Service Not Asked     Blood  Transfusions Not Asked     Caffeine Concern Yes     Comment: 4 cups coffee daily     Occupational Exposure Not Asked     Hobby Hazards Not Asked     Sleep Concern No     Stress Concern No     Weight Concern Yes     Comment: gain 2lbs     Special Diet Yes     Comment: low sodium     Back Care Not Asked     Exercise Yes     Comment: walking, doing sittups, played pickle ball in summer     Bike Helmet Not Asked     Seat Belt Yes     Self-Exams Not Asked   Social History Narrative     None       Review of Systems:  Skin:  Negative       Eyes:  Positive for glasses    ENT:  Negative      Respiratory:  Positive for dyspnea on exertion;shortness of breath     Cardiovascular:  Negative for;chest pain;palpitations dizziness;Positive for;lightheadedness    Gastroenterology: Negative for poor appetite;nausea;vomiting;heartburn;reflux;diarrhea    Genitourinary:  Negative      Musculoskeletal:  Negative      Neurologic:  Negative for      Psychiatric:  Negative      Heme/Lymph/Imm:  Negative      Endocrine:  Negative        [id number]

## 2020-02-05 ENCOUNTER — TELEPHONE (OUTPATIENT)
Dept: CARDIOLOGY | Facility: CLINIC | Age: 75
End: 2020-02-05

## 2020-02-05 NOTE — PROGRESS NOTES
Service Date: 02/04/2020      HISTORY OF PRESENT ILLNESS:    I had the pleasure of seeing Mr. Tyrel Rene, a delightful 74-year-old gentleman with the following medical issues:   A.  Severe ischemic cardiomyopathy related to a large anterior MI several years ago.  LVEF is 25%-30%.   B.  Unstable ventricular tachyarrhythmias requiring ICD shock in 11/2017.  Amiodarone was started.  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  Catheter ablation of VT on 01/02/2019.  No recurrence of sustained VT since then.   C.  Permanent atrial fibrillation and complete AV block.  On warfarin.  Prior atrial flutter ablation (2011).   D.  CRT ICD in place.   E.  CVA following atrial flutter ablation in 2011.      Marko returns to the EP Clinic 13 months after VT ablation in 01/2019.  He has done well with no further ICD shocks.  Only non-sustained VT has been recorded through his ICD.  He has been maintained on amiodarone 200 mg daily.      He sees Dr. Newman and Prince Galvez NP.  He has done well in terms of CHF.  His weight has remained stable at home.        His main complaint is dizziness.  This typically occurs with change in position, perhaps orthostatic and related to his cardiac drugs.  This symptom is chronic.      He does not have orthopnea, PND, lower extremity edema.      PHYSICAL EXAMINATION:   VITAL SIGNS:  Blood pressure 118/80, heart rate 65 and regular, weight 83 kg, height 185 cm.   GENERAL:  He is a very pleasant man who as always is accompanied by his wife.   NECK:  Supple without carotid bruits.   LUNGS:  Clear.  No crackles or wheezes.   CARDIOVASCULAR:  Normal JVP, regular paced rhythm, no apparent gallop or murmur.  Nicely healed left pectoral incision.   ABDOMEN:  Soft, nontender.  Negative HJR.   EXTREMITIES:  No edema.      DIAGNOSTIC STUDIES:     - 12-lead ECG today showed atrial fibrillation with ventricular pacing and RBBB complex in V1.   - The patient had a TSH earlier today, results pending.   Recent liver function tests, ALT 25, AST 19, both normal.  Recent proBNP 2158.        IMPRESSION:   1.  Ventricular tachycardia.  Marko has done well since VT ablation 13 months ago.  He is on amiodarone 200 mg daily.  Today I asked him to decrease amiodarone to 6 days per week (skip on Sundays), but he prefers to do so after he returns from [place] in the spring.       TSH is pending.  It was normal in 2019 (1.49).  AST and ALT were normal last January.  He has not had PFTs in almost 1.5 years.  I ordered full PFTs today.      2.  Ischemic cardiomyopathy and chronic systolic CHF.  Stable and apparently euvolemic today.  He is [facility] in [facility].      RECOMMENDATIONS:   A.  Decrease amiodarone to 200 mg 6 days per week.   B.  Full PFTs with DLCO measurements.   C.  I have requested followup in the EP Clinic in 1 year.        It was my pleasure seeing Marko today.    Time spent 25 minutes, more than 50% of the time was discussion.        ANN-MARIE FRANK MD, FACC         cc:   [place] [address]          D: 2020   T: 2020   MT: BRENT      Name:     ARTEM ELIZALDE   MRN:      [id number]        Account:      [id number]   :      1945           Service Date: 2020      Document: [id number]

## 2020-02-05 NOTE — TELEPHONE ENCOUNTER
Left a voicemail for patient informing him of normal lab results. Asked that they call back with any questions.     ----- Message from Suellen Costello MD sent at 2/5/2020  7:31 AM CST -----  Normal TSH & LFTs (on amio-).  Please let him know.  DI

## 2020-02-06 ENCOUNTER — HOSPITAL ENCOUNTER (OUTPATIENT)
Dept: CARDIAC REHAB | Facility: CLINIC | Age: 75
End: 2020-02-06
Attending: INTERNAL MEDICINE
Payer: COMMERCIAL

## 2020-02-06 DIAGNOSIS — I47.20 VENTRICULAR TACHYCARDIA (H): ICD-10-CM

## 2020-02-06 PROCEDURE — 94726 PLETHYSMOGRAPHY LUNG VOLUMES: CPT

## 2020-02-06 PROCEDURE — 94375 RESPIRATORY FLOW VOLUME LOOP: CPT

## 2020-02-06 PROCEDURE — 94729 DIFFUSING CAPACITY: CPT

## 2020-02-06 PROCEDURE — 40001038 ZZH STATISTIC RESPIRATORY TESTING VISIT

## 2020-02-12 ENCOUNTER — APPOINTMENT (OUTPATIENT)
Dept: GENERAL RADIOLOGY | Facility: CLINIC | Age: 75
DRG: 194 | End: 2020-02-12
Attending: PHYSICIAN ASSISTANT
Payer: COMMERCIAL

## 2020-02-12 ENCOUNTER — HOSPITAL ENCOUNTER (INPATIENT)
Facility: CLINIC | Age: 75
LOS: 2 days | Discharge: HOME | DRG: 194 | End: 2020-02-14
Attending: PHYSICIAN ASSISTANT | Admitting: INTERNAL MEDICINE
Payer: COMMERCIAL

## 2020-02-12 ENCOUNTER — NURSE TRIAGE (OUTPATIENT)
Dept: FAMILY MEDICINE | Facility: CLINIC | Age: 75
End: 2020-02-12

## 2020-02-12 ENCOUNTER — OFFICE VISIT (OUTPATIENT)
Dept: INTERNAL MEDICINE | Facility: CLINIC | Age: 75
End: 2020-02-12
Payer: COMMERCIAL

## 2020-02-12 VITALS
TEMPERATURE: 98.1 F | RESPIRATION RATE: 18 BRPM | OXYGEN SATURATION: 95 % | DIASTOLIC BLOOD PRESSURE: 76 MMHG | SYSTOLIC BLOOD PRESSURE: 124 MMHG | HEART RATE: 66 BPM

## 2020-02-12 DIAGNOSIS — J18.9 PNEUMONIA OF RIGHT LOWER LOBE DUE TO INFECTIOUS ORGANISM: ICD-10-CM

## 2020-02-12 DIAGNOSIS — R53.1 WEAKNESS GENERALIZED: ICD-10-CM

## 2020-02-12 DIAGNOSIS — R05.9 COUGH: Primary | ICD-10-CM

## 2020-02-12 LAB
ALBUMIN SERPL-MCNC: 3.5 G/DL (ref 3.4–5)
ALBUMIN UR-MCNC: 10 MG/DL
ALP SERPL-CCNC: 59 U/L (ref 40–150)
ALT SERPL W P-5'-P-CCNC: 28 U/L (ref 0–70)
ANION GAP SERPL CALCULATED.3IONS-SCNC: 3 MMOL/L (ref 3–14)
APPEARANCE UR: CLEAR
AST SERPL W P-5'-P-CCNC: 21 U/L (ref 0–45)
BASOPHILS # BLD AUTO: 0 10E9/L (ref 0–0.2)
BASOPHILS NFR BLD AUTO: 0.2 %
BILIRUB SERPL-MCNC: 1 MG/DL (ref 0.2–1.3)
BILIRUB UR QL STRIP: NEGATIVE
BUN SERPL-MCNC: 26 MG/DL (ref 7–30)
CALCIUM SERPL-MCNC: 8.7 MG/DL (ref 8.5–10.1)
CHLORIDE SERPL-SCNC: 109 MMOL/L (ref 94–109)
CO2 SERPL-SCNC: 27 MMOL/L (ref 20–32)
COLOR UR AUTO: YELLOW
CREAT SERPL-MCNC: 1.59 MG/DL (ref 0.66–1.25)
DIFFERENTIAL METHOD BLD: ABNORMAL
EOSINOPHIL # BLD AUTO: 0 10E9/L (ref 0–0.7)
EOSINOPHIL NFR BLD AUTO: 0.1 %
ERYTHROCYTE [DISTWIDTH] IN BLOOD BY AUTOMATED COUNT: 14.2 % (ref 10–15)
FLUAV+FLUBV AG SPEC QL: NEGATIVE
FLUAV+FLUBV AG SPEC QL: NEGATIVE
FLUAV+FLUBV RNA SPEC QL NAA+PROBE: NEGATIVE
FLUAV+FLUBV RNA SPEC QL NAA+PROBE: NEGATIVE
GFR SERPL CREATININE-BSD FRML MDRD: 41 ML/MIN/{1.73_M2}
GLUCOSE SERPL-MCNC: 96 MG/DL (ref 70–99)
GLUCOSE UR STRIP-MCNC: NEGATIVE MG/DL
HCT VFR BLD AUTO: 42.4 % (ref 40–53)
HGB BLD-MCNC: 14.2 G/DL (ref 13.3–17.7)
HGB UR QL STRIP: NEGATIVE
IMM GRANULOCYTES # BLD: 0 10E9/L (ref 0–0.4)
IMM GRANULOCYTES NFR BLD: 0.2 %
INR PPP: 2.44 (ref 0.86–1.14)
INTERPRETATION ECG - MUSE: NORMAL
KETONES UR STRIP-MCNC: NEGATIVE MG/DL
LACTATE BLD-SCNC: 1.4 MMOL/L (ref 0.7–2)
LEUKOCYTE ESTERASE UR QL STRIP: NEGATIVE
LYMPHOCYTES # BLD AUTO: 0.9 10E9/L (ref 0.8–5.3)
LYMPHOCYTES NFR BLD AUTO: 7.1 %
MCH RBC QN AUTO: 31.2 PG (ref 26.5–33)
MCHC RBC AUTO-ENTMCNC: 33.5 G/DL (ref 31.5–36.5)
MCV RBC AUTO: 93 FL (ref 78–100)
MONOCYTES # BLD AUTO: 1.2 10E9/L (ref 0–1.3)
MONOCYTES NFR BLD AUTO: 9.5 %
MUCOUS THREADS #/AREA URNS LPF: PRESENT /LPF
NEUTROPHILS # BLD AUTO: 10.6 10E9/L (ref 1.6–8.3)
NEUTROPHILS NFR BLD AUTO: 82.9 %
NITRATE UR QL: NEGATIVE
NRBC # BLD AUTO: 0 10*3/UL
NRBC BLD AUTO-RTO: 0 /100
PH UR STRIP: 5.5 PH (ref 5–7)
PLATELET # BLD AUTO: 155 10E9/L (ref 150–450)
POTASSIUM SERPL-SCNC: 4.7 MMOL/L (ref 3.4–5.3)
PROT SERPL-MCNC: 7.1 G/DL (ref 6.8–8.8)
RBC # BLD AUTO: 4.55 10E12/L (ref 4.4–5.9)
RBC #/AREA URNS AUTO: <1 /HPF (ref 0–2)
RSV RNA SPEC NAA+PROBE: NEGATIVE
SODIUM SERPL-SCNC: 139 MMOL/L (ref 133–144)
SOURCE: ABNORMAL
SP GR UR STRIP: 1.02 (ref 1–1.03)
SPECIMEN SOURCE: NORMAL
SPECIMEN SOURCE: NORMAL
SQUAMOUS #/AREA URNS AUTO: <1 /HPF (ref 0–1)
TROPONIN I SERPL-MCNC: 0.02 UG/L (ref 0–0.04)
UROBILINOGEN UR STRIP-MCNC: NORMAL MG/DL (ref 0–2)
WBC # BLD AUTO: 12.7 10E9/L (ref 4–11)
WBC #/AREA URNS AUTO: <1 /HPF (ref 0–5)

## 2020-02-12 PROCEDURE — 99207 ZZC OFFICE-HOSPITAL ADMIT: CPT | Performed by: PHYSICIAN ASSISTANT

## 2020-02-12 PROCEDURE — 81001 URINALYSIS AUTO W/SCOPE: CPT | Performed by: PHYSICIAN ASSISTANT

## 2020-02-12 PROCEDURE — 99223 1ST HOSP IP/OBS HIGH 75: CPT | Mod: AI | Performed by: INTERNAL MEDICINE

## 2020-02-12 PROCEDURE — 71046 X-RAY EXAM CHEST 2 VIEWS: CPT

## 2020-02-12 PROCEDURE — 83605 ASSAY OF LACTIC ACID: CPT | Performed by: PHYSICIAN ASSISTANT

## 2020-02-12 PROCEDURE — 87040 BLOOD CULTURE FOR BACTERIA: CPT | Performed by: PHYSICIAN ASSISTANT

## 2020-02-12 PROCEDURE — 93005 ELECTROCARDIOGRAM TRACING: CPT

## 2020-02-12 PROCEDURE — 25800030 ZZH RX IP 258 OP 636: Performed by: PHYSICIAN ASSISTANT

## 2020-02-12 PROCEDURE — 87804 INFLUENZA ASSAY W/OPTIC: CPT | Performed by: PHYSICIAN ASSISTANT

## 2020-02-12 PROCEDURE — 25000132 ZZH RX MED GY IP 250 OP 250 PS 637: Performed by: PHYSICIAN ASSISTANT

## 2020-02-12 PROCEDURE — 96365 THER/PROPH/DIAG IV INF INIT: CPT

## 2020-02-12 PROCEDURE — 87631 RESP VIRUS 3-5 TARGETS: CPT | Performed by: PHYSICIAN ASSISTANT

## 2020-02-12 PROCEDURE — 99291 CRITICAL CARE FIRST HOUR: CPT | Mod: 25

## 2020-02-12 PROCEDURE — 96367 TX/PROPH/DG ADDL SEQ IV INF: CPT

## 2020-02-12 PROCEDURE — 84484 ASSAY OF TROPONIN QUANT: CPT | Performed by: PHYSICIAN ASSISTANT

## 2020-02-12 PROCEDURE — 85610 PROTHROMBIN TIME: CPT | Performed by: PHYSICIAN ASSISTANT

## 2020-02-12 PROCEDURE — 12000000 ZZH R&B MED SURG/OB

## 2020-02-12 PROCEDURE — 80053 COMPREHEN METABOLIC PANEL: CPT | Performed by: PHYSICIAN ASSISTANT

## 2020-02-12 PROCEDURE — 25000132 ZZH RX MED GY IP 250 OP 250 PS 637: Performed by: INTERNAL MEDICINE

## 2020-02-12 PROCEDURE — 25000125 ZZHC RX 250: Performed by: PHYSICIAN ASSISTANT

## 2020-02-12 PROCEDURE — 25000128 H RX IP 250 OP 636: Performed by: PHYSICIAN ASSISTANT

## 2020-02-12 PROCEDURE — 85025 COMPLETE CBC W/AUTO DIFF WBC: CPT | Performed by: PHYSICIAN ASSISTANT

## 2020-02-12 PROCEDURE — 99207 ZZC APP CREDIT; MD BILLING SHARED VISIT: CPT | Performed by: PHYSICIAN ASSISTANT

## 2020-02-12 RX ORDER — CEFTRIAXONE 2 G/1
2 INJECTION, POWDER, FOR SOLUTION INTRAMUSCULAR; INTRAVENOUS EVERY 24 HOURS
Status: DISCONTINUED | OUTPATIENT
Start: 2020-02-13 | End: 2020-02-14 | Stop reason: HOSPADM

## 2020-02-12 RX ORDER — ONDANSETRON 2 MG/ML
4 INJECTION INTRAMUSCULAR; INTRAVENOUS EVERY 6 HOURS PRN
Status: DISCONTINUED | OUTPATIENT
Start: 2020-02-12 | End: 2020-02-14 | Stop reason: HOSPADM

## 2020-02-12 RX ORDER — ACETAMINOPHEN 650 MG/1
650 SUPPOSITORY RECTAL EVERY 4 HOURS PRN
Status: DISCONTINUED | OUTPATIENT
Start: 2020-02-12 | End: 2020-02-14 | Stop reason: HOSPADM

## 2020-02-12 RX ORDER — POLYETHYLENE GLYCOL 3350 17 G/17G
17 POWDER, FOR SOLUTION ORAL DAILY PRN
Status: DISCONTINUED | OUTPATIENT
Start: 2020-02-12 | End: 2020-02-14 | Stop reason: HOSPADM

## 2020-02-12 RX ORDER — AMOXICILLIN 250 MG
2 CAPSULE ORAL 2 TIMES DAILY PRN
Status: DISCONTINUED | OUTPATIENT
Start: 2020-02-12 | End: 2020-02-14 | Stop reason: HOSPADM

## 2020-02-12 RX ORDER — PROCHLORPERAZINE MALEATE 5 MG
5 TABLET ORAL EVERY 6 HOURS PRN
Status: DISCONTINUED | OUTPATIENT
Start: 2020-02-12 | End: 2020-02-14 | Stop reason: HOSPADM

## 2020-02-12 RX ORDER — ONDANSETRON 4 MG/1
4 TABLET, ORALLY DISINTEGRATING ORAL EVERY 6 HOURS PRN
Status: DISCONTINUED | OUTPATIENT
Start: 2020-02-12 | End: 2020-02-14 | Stop reason: HOSPADM

## 2020-02-12 RX ORDER — CEFTRIAXONE 1 G/1
1 INJECTION, POWDER, FOR SOLUTION INTRAMUSCULAR; INTRAVENOUS ONCE
Status: COMPLETED | OUTPATIENT
Start: 2020-02-12 | End: 2020-02-12

## 2020-02-12 RX ORDER — DOXYCYCLINE 100 MG/10ML
100 INJECTION, POWDER, LYOPHILIZED, FOR SOLUTION INTRAVENOUS EVERY 12 HOURS
Status: CANCELLED | OUTPATIENT
Start: 2020-02-12

## 2020-02-12 RX ORDER — ACETAMINOPHEN 325 MG/1
650 TABLET ORAL EVERY 4 HOURS PRN
Status: DISCONTINUED | OUTPATIENT
Start: 2020-02-12 | End: 2020-02-13

## 2020-02-12 RX ORDER — WARFARIN SODIUM 1 MG/1
1 TABLET ORAL
Status: COMPLETED | OUTPATIENT
Start: 2020-02-12 | End: 2020-02-12

## 2020-02-12 RX ORDER — DOXYCYCLINE 100 MG/10ML
100 INJECTION, POWDER, LYOPHILIZED, FOR SOLUTION INTRAVENOUS ONCE
Status: COMPLETED | OUTPATIENT
Start: 2020-02-12 | End: 2020-02-12

## 2020-02-12 RX ORDER — ALBUTEROL SULFATE 0.83 MG/ML
2.5 SOLUTION RESPIRATORY (INHALATION)
Status: DISCONTINUED | OUTPATIENT
Start: 2020-02-12 | End: 2020-02-14 | Stop reason: HOSPADM

## 2020-02-12 RX ORDER — ACETAMINOPHEN 500 MG
1000 TABLET ORAL EVERY 8 HOURS PRN
COMMUNITY
End: 2021-01-01

## 2020-02-12 RX ORDER — DOXYCYCLINE 100 MG/1
100 CAPSULE ORAL EVERY 12 HOURS SCHEDULED
Status: DISCONTINUED | OUTPATIENT
Start: 2020-02-12 | End: 2020-02-14 | Stop reason: HOSPADM

## 2020-02-12 RX ORDER — LIDOCAINE 40 MG/G
CREAM TOPICAL
Status: DISCONTINUED | OUTPATIENT
Start: 2020-02-12 | End: 2020-02-14 | Stop reason: HOSPADM

## 2020-02-12 RX ORDER — AMOXICILLIN 250 MG
1 CAPSULE ORAL 2 TIMES DAILY PRN
Status: DISCONTINUED | OUTPATIENT
Start: 2020-02-12 | End: 2020-02-14 | Stop reason: HOSPADM

## 2020-02-12 RX ORDER — BISACODYL 10 MG
10 SUPPOSITORY, RECTAL RECTAL DAILY PRN
Status: DISCONTINUED | OUTPATIENT
Start: 2020-02-12 | End: 2020-02-14 | Stop reason: HOSPADM

## 2020-02-12 RX ORDER — NALOXONE HYDROCHLORIDE 0.4 MG/ML
.1-.4 INJECTION, SOLUTION INTRAMUSCULAR; INTRAVENOUS; SUBCUTANEOUS
Status: DISCONTINUED | OUTPATIENT
Start: 2020-02-12 | End: 2020-02-14 | Stop reason: HOSPADM

## 2020-02-12 RX ORDER — PROCHLORPERAZINE 25 MG
12.5 SUPPOSITORY, RECTAL RECTAL EVERY 12 HOURS PRN
Status: DISCONTINUED | OUTPATIENT
Start: 2020-02-12 | End: 2020-02-14 | Stop reason: HOSPADM

## 2020-02-12 RX ADMIN — DOXYCYCLINE 100 MG: 100 INJECTION, POWDER, LYOPHILIZED, FOR SOLUTION INTRAVENOUS at 14:31

## 2020-02-12 RX ADMIN — DOXYCYCLINE 100 MG: 100 CAPSULE ORAL at 22:04

## 2020-02-12 RX ADMIN — WARFARIN SODIUM 1 MG: 1 TABLET ORAL at 18:35

## 2020-02-12 RX ADMIN — SODIUM CHLORIDE 500 ML: 9 INJECTION, SOLUTION INTRAVENOUS at 12:39

## 2020-02-12 RX ADMIN — CEFTRIAXONE 1 G: 1 INJECTION, POWDER, FOR SOLUTION INTRAMUSCULAR; INTRAVENOUS at 13:49

## 2020-02-12 ASSESSMENT — ENCOUNTER SYMPTOMS
FEVER: 1
WEAKNESS: 1
VOMITING: 0
NAUSEA: 0
ABDOMINAL PAIN: 0
DIZZINESS: 1
LIGHT-HEADEDNESS: 1
DIFFICULTY URINATING: 0
CHILLS: 1

## 2020-02-12 ASSESSMENT — ACTIVITIES OF DAILY LIVING (ADL): ADLS_ACUITY_SCORE: 15

## 2020-02-12 ASSESSMENT — MIFFLIN-ST. JEOR: SCORE: 1577.67

## 2020-02-12 NOTE — ED NOTES
Upon arrival to unit and change of shift patient appears comfortable and resting in bed watching TV. A&Ox4, pain in left arm is 3/10 and tolerable per pt statement. Wife is at bedside.

## 2020-02-12 NOTE — PROGRESS NOTES
Subjective     Tyrel Rene is a 74 year old male who presents to clinic today for the following health issues:    HPI   RESPIRATORY SYMPTOMS      Duration: 1 day    Description  nasal congestion, cough, chills and Dizziness  Pt feels really dizzy  extremely weak, cant walk  Shivering and notes bed was shaking   Had pneumonia in September   Cough   Denies pain and difficulty breathing and sore throat     Severity: moderate    Accompanying signs and symptoms: None    History (predisposing factors):  none    Precipitating or alleviating factors: None    Therapies tried and outcome:  none    Wife has flu symptoms also.       Reviewed and updated as needed this visit by Provider  Meds  Problems             Objective    /76   Pulse 66   Temp 98.1  F (36.7  C) (Oral)   Resp 18   SpO2 95%   There is no height or weight on file to calculate BMI.  Physical Exam   GENERAL: healthy, alert and no distress  EYES: Eyes grossly normal to inspection, PERRL and conjunctivae and sclerae normal  HENT: ear canals and TM's normal, nose and mouth without ulcers or lesions  RESP: lungs clear to auscultation - no rales, rhonchi or wheezes  CV: regular rates and rhythm, normal S1 S2, no S3 or S4 and no murmur, click or rub    Diagnostic Test Results:  Labs reviewed in Epic        Assessment & Plan     1. Cough  - Influenza A/B antigen    2. Weakness generalized  - CMA notes prior to visit that patient got weak and shaky in lobby in wheelchair was needed  - patient and wife report he can't stand due to weakness and they were barely able to get him into the clinic   - based on how weak patient is, advised patient needs to seek emergency care  - wife does not feel she is able to safely bring patient to the care, ambulance is called   - ED staff notified of patient's case     No follow-ups on file.    Kenya Coates PA-C  [facility]

## 2020-02-12 NOTE — ED PROVIDER NOTES
"  History     Chief Complaint:  Generalized Weakness    The history is provided by the patient.      Tyrel Rene is a 74 year old male, with history notable for atrial fibrillation on Coumadin, who presents via EMS on the advice of urgent care with generalized weakness. Earlier this morning, patient attest to onset of severe chills and dizziness. He affirms near-syncopal lightheadedness that was notably worse today and also notes his gait has felt weaker. Wife states his lightheadedness has been at baseline since starting at Amidarone on 2/4. Currently, he complains of subjective fever. He denies any chest pain, abdominal pain, nausea/vomiting, difficulty urinating, or rash. Patient has not taken his morning medications (Digoxin, Coreg, Vitamin D, \"eye pill\", and an antacid). Tyrel's spouse also reports she has a cold. Rapid flu was negative in clinic today but they also noted he was abnormally hypotensive.     Allergies:  Nystatin     Medications:    Coreg  Digoxin  Lisinopril  Vitamin D  Nitrostat  Crestor  Viagra  Coumadin    Past Medical History:    Afib/flutter  CAD  Cardiogenic shock  CKD III  Cardiomyopathy  ED  Hypertension  Neuropathy  CVA  Syncope  SVT  STEMI    Past Surgical History:    CABG x3  AVR   Cardia stents  Ablation  Angioplasty  Right hernia repair  ICD    Family History:    Alcohol/drug  Heart disease  Alzheimer  Aneurysm    Social History:  Accompanied by wife.  Former Smoker  Alcohol Use: No  Marital Status:      Review of Systems   Constitutional: Positive for chills and fever (subjective).   Cardiovascular: Negative for chest pain.   Gastrointestinal: Negative for abdominal pain, nausea and vomiting.   Genitourinary: Negative for difficulty urinating.   Musculoskeletal: Positive for gait problem.   Skin: Negative for rash.   Neurological: Positive for dizziness, weakness and light-headedness.   All other systems reviewed and are negative.    Physical Exam   Patient Vitals for the " "past 24 hrs:   BP Temp Heart Rate Resp SpO2 Height Weight   02/12/20 1217 100/54 98.5  F (36.9  C) 65 13 93 % 1.854 m (6' 1\") 79.4 kg (175 lb)     Physical Exam  General: Alert, interactive. No acute distress.   Head:  Scalp is atraumatic.  Eyes:  EOM intact. The pupils are equal, round, and reactive to light. No scleral icterus.   ENT:                                      Ears:  The external ears are normal.  Nose:  The external nose is normal.  Throat:  The oropharynx is normal. Mucus membranes are moist.                 Neck:  Normal range of motion. There is no rigidity.   CV:  Regular rate and rhythm. No murmur. 2+ radial pulses  Resp:  Course breath sounds right base. No wheezing.  Non-labored, no retractions or accessory muscle use.  GI:  Abdomen is soft, no distension, no tenderness.   MS:  Normal range of motion.   Skin:  Warm and dry.   Neuro:  Strength and sensation grossly intact.   Psych:  Awake. Alert.  Appropriate interactions.     Emergency Department Course   ECG:  ECG taken at 1254, ECG read at 1258  Ventricular-paced rhythm  Biventricular pacemaker detected  Abnormal ECG  Rate 65 bpm. NY interval * ms. QRS duration 158 ms. QT/QTc 462/480 ms. P-R-T axes * -42 94.  No changes compared to prior ECG on 01/09/19.     Imaging:  XR Chest 2 Views  New prominent consolidation at the right mid to inferior chest consistent with pneumonia. Left lung is clear. Stable left chest pacemaker. Stable cardiac silhouette.  Reading per radiology    Laboratory:  CBC: WBC 12.7 (H), HGB 14.2,   CMP: creatinine 1.59 (H) GFR 41 (L) o/w WNL    Troponin (Collected 1233): 0.018    INR: 2.44 (H)    Lactic Acid (Resulted: 1251): 1.4  Blood culture x2: Pending    Interventions:  1239: NS 0.5L IV Bolus   1349: Rocephin 1 g in  mL IV infusion  1431: vibramycin 100 mg in  mL IV infusion    Emergency Department Course:  Nursing notes and vitals reviewed.  1229 I performed an exam of the patient as documented " above.   1233 IV was inserted and blood was drawn for laboratory testing, results above.  1254 EKG obtained in the ED, see results above.   1302 The patient was sent for a XR while in the emergency department, results above.   1328 Patient rechecked and updated. Findings and plan explained to the patient who consents to admission.   1332 Shared services with Dr. Harrison.   1359 Discussed the patient with Anna Barthell PA-C of the hospitalist service, who will admit the patient for further monitoring, evaluation, and treatment. Patient is admitted under the care of Dr. Ma.     Impression & Plan      Medical Decision Making:  Tyrel Rene is a 74 year old male with medical history including atrial fibrillation on coumadin who presents for evaluation of cough and chills.  History, physical exam and imaging studies are consistent with pneumonia.  Rocephin and Doxycycline started in the ED. Patient has history of CHF and 500ml given in the ED. Lab work notable for leukocytosis, lactic acid normal. No electrolyte abnormality. Creatinine baseline. Troponin negative.      There are no signs of complications of pneumonia at this point such as septic shock, bacteremia, hypoxia, respiratory failure or compromise.  This seems to be community acquired pneumonia and there are no risk factors at this point for coverage of antibiotic-resistant strains.  I doubt PE, dissection, acute coronary syndrome, or other worrisome etiology for patients symptoms.  Given patient's generalized weakness will admit for further care. The patient and wife agree with this plan.       Diagnosis:    ICD-10-CM   1. Pneumonia of right lower lobe due to infectious organism (H) J18.1     Disposition: Admitted to Medicine    Scribe Disclosure:   Rei MALDONADO, am serving as a scribe at 1:30 PM on 2/12/2020 to document services personally performed by Lauren Orr PA-C based on my observations and the provider's statements to me.      EMERGENCY  DEPARTMENT       Lauren Orr PA-C  02/12/20 1952

## 2020-02-12 NOTE — TELEPHONE ENCOUNTER
Patient's wife called reporting pt has a cough, chills and shakes this morning. She has not checked his temperature. Wife notes last time pt had this symptoms he had pneumonia. She asked if pt needs to be seen or can rx for antibiotics be sent for patient. Wife denies other symptoms. Advised pt be seen today  information given. She opts to call [facility] to check for appt first. [facility] phone number given.     Additional Information    Negative: Bluish (or gray) lips or face    Negative: Severe difficulty breathing (e.g., struggling for each breath, speaks in single words)    Negative: Rapid onset of cough and has hives    Negative: Coughing started suddenly after medicine, an allergic food or bee sting    Negative: Difficulty breathing after exposure to flames, smoke, or fumes    Negative: Sounds like a life-threatening emergency to the triager    Negative: Previous asthma attacks and this feels like asthma attack    Negative: Chest pain present when not coughing    Negative: Difficulty breathing    Negative: Passed out (i.e., fainted, collapsed and was not responding)    Patient sounds very sick or weak to the triager     Per wife pt has hx of heart problems. Advised pt be seen today at  or ER if worsening symptoms.    Protocols used: COUGH-A-OH

## 2020-02-12 NOTE — LETTER
Transition Communication Hand-off for Care Transitions to Next Level of Care Provider    Name: Tyrel Rene  : 1945  MRN #: [id number]  Primary Care Provider: Dejon Downs     Primary Clinic: [address] [place] [id number]     Reason for Hospitalization:  Pneumonia of right lower lobe due to infectious organism (H) [J18.1]  Admit Date/Time: 2020 12:15 PM  Discharge Date: 2020  Payor Source: Payor: [facility] / Plan: [employer] / Product Type: HMO /     Readmission Assessment Measure (DENZEL) Risk Score/category: average           Reason for Communication Hand-off Referral: Admission diagnoses: PN    Discharge Plan: discharge to home follow up with PCP  CT Chest in 4-6 weeks (not ordered yet)       Concern for non-adherence with plan of care:   Y/N N  Discharge Needs Assessment:  Needs      Most Recent Value   Equipment Currently Used at Home  shower chair          Already enrolled in Tele-monitoring program and name of program:  N  Follow-up specialty is recommended: No    Follow-up plan:    Future Appointments   Date Time Provider Department Center   2020  1:15 PM [facility]   2020  3:10 PM [facility]   2020 11:45 AM Dejon Downs MD [facility]   3/6/2020  3:15 [facility]       Any outstanding tests or procedures:              Key Recommendations:  Right middle and lower lobe pneumonia.   WBC 12.7, afebrile, mild hypotension mid 90s.  No tachycardia or hypoxia on presentation.  Evident on CXR.  Rapid flu negative.  Treated with 500 mL fluid bolus, ceftriaxone, and doxycycline in the ED.  Diagnosed with pneumonia in 2019 in similar location.  -- Continue ceftriaxone and doxycycline, initiated 2020.   -- continue off IVF  -- Encourage IS and OOB.  -- PT/OT.  -- given recurrent area of pneumonia, recommend CT of the chest in 4-6 weeks with   Zaheer  - ambulate and add back on his home regimen     Chronic ischemic cardiomyopathy with HFrEF secondary to anterior STEMI (2012). Last TTE 08/2019 showed EF 25%-30%, bi-atrial dilation, and RV systolic blood pressures elevated consistent with mild pulmonary hypertension as well as prior WMA consistent with prior extensive LAD infarction.  He has been stable for several years from a heart failure standpoint and appears euvolemic on exam.  Plan  - hold lisinopril today, restart amiodarone, digoxin, and coreg    Thanks for following this patient post discharge from the hospital.     Heydi Martin RN    AVS/Discharge Summary is the source of truth; this is a helpful guide for improved communication of patient story           room air

## 2020-02-12 NOTE — PHARMACY-ADMISSION MEDICATION HISTORY
Pharmacy Medication History  Admission medication history interview status for the 2/12/2020  admission is complete. See EPIC admission navigator for prior to admission medications     Medication history sources:  Patient's family/friend (wife), Surescripts and clinic notes  Medication history source reliability: Good  Adherence assessment: Good    Significant changes made to the medication list:  Added apap. Modified amiodarone per pt's wife      Additional medication history information:   Waiting to change Amiodarone from daily to 6 days a week after patient comes back from [place] in case of issues with dose change    Medication reconciliation completed by provider prior to medication history? No    Time spent in this activity: 10 minutes      Prior to Admission medications    Medication Sig Last Dose Taking? Auth Provider   acetaminophen (TYLENOL) 500 MG tablet Take 1,000 mg by mouth every 8 hours as needed for mild pain 2/12/2020 at 0900 Yes Unknown, Entered By History   amiodarone (PACERONE/CODARONE) 200 MG tablet Take 200 mg by mouth daily Will switch to taking daily except for Sundays after patient returns from [place] 2/11/2020 at Unknown time Yes Suellen Costello MD   carvedilol (COREG) 6.25 MG tablet Take 1 tablet (6.25 mg) by mouth 2 times daily (with meals) 2/11/2020 at pm Yes Parish Newman MD   digoxin (LANOXIN) 125 MCG tablet Take 1 tablet (125 mcg) by mouth daily 2/11/2020 at Unknown time Yes Parish Newman MD   famotidine (PEPCID) 20 MG tablet Take 1 tablet (20 mg) by mouth 2 times daily 2/11/2020 at pm Yes Dejon Downs MD   lisinopril (PRINIVIL/ZESTRIL) 5 MG tablet Take 1 tablet (5 mg) by mouth At Bedtime 2/11/2020 at Unknown time Yes Parish Newman MD   Multiple Vitamins-Minerals (PRESERVISION AREDS 2 PO) Take 1 capsule by mouth 2 times daily 2/11/2020 at pm Yes Reported, Patient   nitroGLYcerin (NITROSTAT) 0.4 MG sublingual tablet DISSOLVE 1 TABLET UNDER THE TONGUE  EVERY 5 MINUTES AS NEEDED FOR CHEST PAIN prn Yes Dejon Downs MD   rosuvastatin (CRESTOR) 20 MG tablet Take 1 tablet (20 mg) by mouth daily 2/11/2020 at hs Yes Parish Newman MD   Vitamin D, Cholecalciferol, 1000 UNITS TABS Take 1,000 mg by mouth daily  2/11/2020 at Unknown time Yes Reported, Patient   warfarin ANTICOAGULANT (COUMADIN) 2.5 MG tablet Take 2.5 mg on Mondays and 1.25mg all other days or as directed by the anticoagulation clinic 2/11/2020 at hs Yes Dejon Downs MD   ASPIRIN NOT PRESCRIBED (INTENTIONAL) continuous prn for other Please choose reason not prescribed, below   Reported, Patient   sildenafil (VIAGRA) 100 MG tablet Take 1 tablet (100 mg) by mouth daily as needed Take 30 min to 4 hours before intercourse.  Never use with nitroglycerin, terazosin or doxazosin.  unknown  Dejon Downs MD

## 2020-02-12 NOTE — ED NOTES
Bed: ED28  Expected date:   Expected time:   Means of arrival:   Comments:  531  74 M dizzy/weak/low bp  1203

## 2020-02-12 NOTE — PHARMACY-ANTICOAGULATION SERVICE
Clinical Pharmacy - Warfarin Dosing Consult     Pharmacy has been consulted to manage this patient s warfarin therapy.  Indication: Atrial Fibrillation / CVA   Therapy Goal: INR 2-3  Warfarin Prior to Admission: Yes  Warfarin PTA Regimen: 2.5mg QMon; 1.25mg ROW  Significant drug interactions: Doxycycline    INR   Date Value Ref Range Status   02/12/2020 2.44 (H) 0.86 - 1.14 Final     INR Protime   Date Value Ref Range Status   01/22/2020 2.2 (A) 0.86 - 1.14 Final       Recommend warfarin 1 mg today.  Pharmacy will monitor Tyrel RUSTY Sanchezla daily and order warfarin doses to achieve specified goal.      Please contact pharmacy as soon as possible if the warfarin needs to be held for a procedure or if the warfarin goals change.

## 2020-02-12 NOTE — H&P
Admitted:     02/12/2020      HOSPITALIST ADMISSION      DATE OF ADMISSION:  02/12/2020        PRIMARY CARE PROVIDER:  Dejon Downs     CHIEF COMPLAINT:  Weakness.      History is provided by the patient.      HISTORY OF PRESENT ILLNESS:  Tyrel Rene is a 74-year-old male with CKD stage III, chronic atrial fibrillation on anticoagulation, history of recurrent ventricular tachycardia, status post biventricular ICD, CAD status post CABG, and chronic ischemic cardiomyopathy with HFrEF who presented to the Emergency Department with complaints of generalized weakness, nonproductive cough, and chills that started suddenly on the morning of admission when he woke up.  He states he has been more dizzy/lightheaded than usual.  He was seen in clinic and noted to be considerably weak and they recommended he present to the ED for further evaluation.      In the Emergency Department, the patient was found to be afebrile and hemodynamically stable.  He has a mild leukocytosis of 12.7.  Rapid flu was negative in clinic.  Chest x-ray showed a new prominent consolidation of the right mid to anterior chest consistent with pneumonia.      The patient reports his wife is sick with similar symptoms.  He states he has been eating okay.  He does not believe he has had a fever.  No chest pain.  No difficulty breathing.  No abdominal pain, nausea, vomiting, diarrhea, and no urinary symptoms.  He has not had any increase in lower extremity edema.  While he has a complex cardiac history, he has been relatively stable since hospitalization for recurrent V-tach in 11/2018.  The patient did get his flu shot this year.      PAST MEDICAL HISTORY:   1.  Chronic ischemic cardiomyopathy with HFrEF secondary to remote large anterior MI in 2012. Follows with Dr. Newman.  2.  CAD, status post CABG in 2012.   3.  History unstable ventricular tachyarrhythmias. Initially complicated anterior MI with recurrent VF s/p ICD (eventually upgraded to  BiV-ICD). Shock 11/2017. Recurrent VF storm with 7 ICD shocks 11/2018. Catheter ablation VT 1/2019 and no recurrence sustained VT since. Follows with EP Dr. Costello.   4.  Chronic atrial fibrillation.   5.  Complete AV block.   6.  History of CVA following an atrial flutter ablation in 2011.   7.  Mild pulmonary hypertension by TTE.   8.  Chronic kidney disease, stage III.  Follows with Dr. Arguelles with [facility].        PAST SURGICAL HISTORY:  Past Surgical History:   Procedure Laterality Date     BYPASS GRAFT ARTERY CORONARY  10/2/2012    Procedure: BYPASS GRAFT ARTERY CORONARY;  Coronary Artery Bypass Graft x3 (LAD, Diag, OM) with Endovein Vivian (On-Pump);  Surgeon: Yeyo Lyman MD;  Location:  OR     CARDIOVERSION  3/14/2013     CORONARY ANGIOGRAPHY ADULT ORDER  10/2/12     CORONARY ARTERY BYPASS  10/2/12    LIMA to LAD, SVG to OM1 and OM3     EP ABLATION VT N/A 1/2/2019    Procedure: EP Ablation VT;  Surgeon: Suellen Costello MD;  Location:  HEART CARDIAC CATH LAB     EP COMPREHENSIVE EP STUDY N/A 1/2/2019    Procedure: EP Comprehensive EP Study;  Surgeon: Suellen Costello MD;  Location:  HEART CARDIAC CATH LAB     H ABLATION ATRIAL FLUTTER  1/11/2011     HAND SURGERY      table saw injury right hand     HEART CATH, ANGIOPLASTY  10/2/12    PTCA to second diagonal and mid LAD, BMS to mid LAD     HERNIA REPAIR       RELOCATE GENERATOR ICD/PACEMAKER        FAMILY HISTORY:  Family History   Problem Relation Age of Onset     Alcohol/Drug Father      Heart Disease Father 71     Alzheimer Disease Sister      Alcohol/Drug Sister      Alcohol/Drug Sister      Gastrointestinal Disease Sister      Obesity Sister      Hypertension Sister      Heart Disease Sister      Hypertension Sister      Heart Disease Daughter         afib     Arrhythmia Daughter      Multiple Sclerosis Daughter      Heart Disease Daughter         afib     Arrhythmia Daughter      Cancer  Daughter         lymphoma     Aneurysm Mother       SOCIAL HISTORY:  The patient is a former smoker.  He quit 07/10/1972.  No illicit drug use or alcohol use.  He is  and lives with his wife, Sobia.  He does not walk with a cane or a walker at baseline.      PRIOR TO ADMISSION MEDICATIONS:  Prior to Admission medications    Medication Sig Last Dose Taking? Auth Provider   acetaminophen (TYLENOL) 500 MG tablet Take 1,000 mg by mouth every 8 hours as needed for mild pain 2/12/2020 at 0900 Yes Unknown, Entered By History   amiodarone (PACERONE/CODARONE) 200 MG tablet Take 200 mg by mouth daily Will switch to taking daily except for Sundays after patient returns from [place] 2/11/2020 at Unknown time Yes Suellen Costello MD   carvedilol (COREG) 6.25 MG tablet Take 1 tablet (6.25 mg) by mouth 2 times daily (with meals) 2/11/2020 at pm Yes Parish Newman MD   digoxin (LANOXIN) 125 MCG tablet Take 1 tablet (125 mcg) by mouth daily 2/11/2020 at Unknown time Yes Parish Newman MD   famotidine (PEPCID) 20 MG tablet Take 1 tablet (20 mg) by mouth 2 times daily 2/11/2020 at pm Yes Dejon Downs MD   lisinopril (PRINIVIL/ZESTRIL) 5 MG tablet Take 1 tablet (5 mg) by mouth At Bedtime 2/11/2020 at Unknown time Yes Parish Newman MD   Multiple Vitamins-Minerals (PRESERVISION AREDS 2 PO) Take 1 capsule by mouth 2 times daily 2/11/2020 at pm Yes Reported, Patient   nitroGLYcerin (NITROSTAT) 0.4 MG sublingual tablet DISSOLVE 1 TABLET UNDER THE TONGUE EVERY 5 MINUTES AS NEEDED FOR CHEST PAIN prn Yes Dejon Downs MD   rosuvastatin (CRESTOR) 20 MG tablet Take 1 tablet (20 mg) by mouth daily 2/11/2020 at hs Yes Parish Newman MD   Vitamin D, Cholecalciferol, 1000 UNITS TABS Take 1,000 mg by mouth daily  2/11/2020 at Unknown time Yes Reported, Patient   warfarin ANTICOAGULANT (COUMADIN) 2.5 MG tablet Take 2.5 mg on Mondays and 1.25mg all other days or as directed by the anticoagulation  clinic 2020 at  Yes Dejon Downs MD   ASPIRIN NOT PRESCRIBED (INTENTIONAL) continuous prn for other Please choose reason not prescribed, below   Reported, Patient   sildenafil (VIAGRA) 100 MG tablet Take 1 tablet (100 mg) by mouth daily as needed Take 30 min to 4 hours before intercourse.  Never use with nitroglycerin, terazosin or doxazosin.   Dejon Downs MD      ALLERGIES:  NYSTATIN CAUSES MOUTH TO FEEL NUMB AND SWOLLEN.      REVIEW OF SYSTEMS:  A complete review of systems was performed and is negative except for that noted in the HPI.      PHYSICAL EXAMINATION:   VITAL SIGNS:  Blood pressure 100/54, temperature 98.5, respirations 16, oxygen saturation 93%.   GENERAL:  Pleasant elderly male who appears stated age.  He looks overall comfortable sitting upright in bed eating a courtesy meal.   SKIN:  Warm, dry.  No rash or lesions on exposed skin.   HEENT:  Normocephalic, atraumatic.  EOMs grossly intact.  PERRLA.   NECK:  Supple.  No obvious JVD.   CHEST:  Breath sounds clear to auscultation.  No increased work of breathing on room air.   CARDIOVASCULAR:  Irregularly irregular.  No murmur appreciated.  No peripheral edema.   ABDOMEN:  Soft, nontender, nondistended.   MUSCULOSKELETAL:  Moves all 4 extremities.   NEUROLOGIC:  Cranial nerves II-XII grossly intact.      LABORATORY DATA:     1.  Sodium 139, potassium 4.7, creatinine 1.59, GFR 41, glucose 96.   2.  WBC 12.7, hemoglobin 14.2, platelets 155.   3.  Troponin I 0.018.     4.  Blood cultures x 2 pending.   5.  Rapid flu swab negative.      IMAGIN.  EKG personally reviewed and shows ventricular paced rhythm.   2.  Chest x-ray personally reviewed and shows a consolidation in the right mid inferior region.      ASSESSMENT AND PLAN:  Tyrel Rene is a 74-year-old male with complex cardiac medical history as below who presented to the Emergency Department with sudden onset shaking chills, cough, and weakness found to have a  right mid-inferior pneumonia.     Right middle and lower lobe pneumonia.   WBC 12.7, afebrile, mild hypotension mid 90s.  No tachycardia or hypoxia on presentation.  Evident on CXR.  Rapid flu negative.  Treated with 500 mL fluid bolus, ceftriaxone, and doxycycline in the ED.  Diagnosed with pneumonia in 9/2019 in similar location.  -- Continue ceftriaxone and doxycycline, initiated 02/12/2020.   -- PRN albuterol nebulizer available for wheezing.   -- Will hold off on IV fluids, given underlying HFrEF and tolerating PO. If needed can treat with small fluid bolus or gentle maintenance fluids.  -- Encourage IS and OOB.  -- PT/OT.  -- Rec serial imaging 4-6 weeks.    1. Chronic ischemic cardiomyopathy with HFrEF secondary to anterior STEMI (2012). Last TTE 08/2019 showed EF 25%-30%, bi-atrial dilation, and RV systolic blood pressures elevated consistent with mild pulmonary hypertension as well as prior WMA consistent with prior extensive LAD infarction.  He has been stable for several years from a heart failure standpoint and appears euvolemic on exam.  2. CAD s/p emergent 3v-CABG (10/2012). LIMA to his LAD, saphenous vein graft to the OM1, saphenous vein graft to OM3. Complicated by cardiogenic shock, resp failure, and recurrent sustained VT.  3. Hx unstable ventricular tachyarrhythmia status post biventricular ICD. Complication of STEMI in 2012 and is when initial ICD placed. ICD shock in 11/2017.  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  Status post catheter ablation V-tach on 01/2019 and no recurrence of V-tach since then.  Follows with Dr. Costello.  4. Chronic atrial fibrillation. Prior history of CVA 2011 following atrial flutter ablation.   5. HTN, DLD.  -- Hold PTA lisinopril given soft blood pressure in the 90s systolic.   -- Continue PTA amiodarone, digoxin, carvedilol 6.25 mg b.i.d. with hold parameters in place and rosuvastatin.   -- Pharmacy to dose Coumadin.    Chronic kidney disease, stage III.   Baseline creatinine  appears in 1.5-1.6 range. Kidney functioning appears near baseline on admission. Follows with Dr. Arguelles with [facility].     -- Noted and holding PTA lisinopril due to soft blood pressure as above.       Deep venous thrombosis prophylaxis: PTA Warfarin.      CODE STATUS:  Full code expressed by patient at time of admission.      DISPOSITION:  Anticipate discharge in greater than 2 days, pending response to above.      This patient was discussed with Dr. Brandie Puckett of the Hospitalist Service who is in agreement with my assessment and plan of care as outlined above.         BRANDIE PUCKETT MD       As dictated by JOANNA ULMEN BARTHELL, PA-C            D: 2020   T: 2020   MT: [email or web address]      Name:     ARTEM ELIZALDE   MRN:      [id number]        Account:      [id number]   :      1945        Admitted:     2020                   Document: [id number]

## 2020-02-12 NOTE — ED NOTES
"[facility]  ED Nurse Handoff Report    ED Chief complaint: Generalized Weakness      ED Diagnosis:   Final diagnoses:   Pneumonia of right lower lobe due to infectious organism (H)       Code Status: not discussed by ED RN    Allergies:   Allergies   Allergen Reactions     Nystatin Other (See Comments)     Make his mouth numb & swelling       Patient Story: 74M coming from home/clinic, weak today  Focused Assessment:  Patient reports that he woke feeling fatigued and dizzy this AM, reports having a slight cough but not much. Was too weak to ambulate on his own this AM. Pt has pneumonia, hasn't been out of bed in ED.    Treatments and/or interventions provided: antibiotics, 500mL bolus  Patient's response to treatments and/or interventions: pt reports feeling better,     To be done/followed up on inpatient unit:      Does this patient have any cognitive concerns?: n/a    Activity level - Baseline/Home:  Independent  Activity Level - Current:   Unknown    Patient's Preferred language: English   Needed?: No    Isolation: None  Infection: Not Applicable  Bariatric?: No    Vital Signs:   Vitals:    02/12/20 1217   BP: 100/54   Resp: 13   Temp: 98.5  F (36.9  C)   SpO2: 93%   Weight: 79.4 kg (175 lb)   Height: 1.854 m (6' 1\")       Cardiac Rhythm:Cardiac Rhythm: Ventricular paced    Was the PSS-3 completed:   Yes  What interventions are required if any?               Family Comments: wife at bedside  OBS brochure/video discussed/provided to patient/family: N/A              Name of person given brochure if not patient:               Relationship to patient:     For the majority of the shift this patient's behavior was Green.   Behavioral interventions performed were .    ED NURSE PHONE NUMBER: [phone]         "

## 2020-02-12 NOTE — ED TRIAGE NOTES
Sent from [facility]. Patient was dizzy and had chills this am, unable to ambulate without help. Flu swab done in clinic. BPs lower than usual, did not take meds this morning.

## 2020-02-12 NOTE — PROGRESS NOTES
RECEIVING UNIT ED HANDOFF REVIEW    ED Nurse Handoff Report was reviewed by: Enoc Suggs RN on February 12, 2020 at 4:08 PM       Room is pending to be cleaned right now.

## 2020-02-12 NOTE — PROGRESS NOTES
Hospitalist admission note dictated. Confirmation #: [id number].    JoAnna Barthell, PA-C  2/12/2020   2:37 PM

## 2020-02-13 ENCOUNTER — APPOINTMENT (OUTPATIENT)
Dept: OCCUPATIONAL THERAPY | Facility: CLINIC | Age: 75
DRG: 194 | End: 2020-02-13
Attending: PHYSICIAN ASSISTANT
Payer: COMMERCIAL

## 2020-02-13 LAB — INR PPP: 2.49 (ref 0.86–1.14)

## 2020-02-13 PROCEDURE — 85610 PROTHROMBIN TIME: CPT | Performed by: PHYSICIAN ASSISTANT

## 2020-02-13 PROCEDURE — 97165 OT EVAL LOW COMPLEX 30 MIN: CPT | Mod: GO

## 2020-02-13 PROCEDURE — 99232 SBSQ HOSP IP/OBS MODERATE 35: CPT | Performed by: HOSPITALIST

## 2020-02-13 PROCEDURE — 25000132 ZZH RX MED GY IP 250 OP 250 PS 637: Performed by: HOSPITALIST

## 2020-02-13 PROCEDURE — 12000000 ZZH R&B MED SURG/OB

## 2020-02-13 PROCEDURE — 25000132 ZZH RX MED GY IP 250 OP 250 PS 637: Performed by: INTERNAL MEDICINE

## 2020-02-13 PROCEDURE — 36415 COLL VENOUS BLD VENIPUNCTURE: CPT | Performed by: PHYSICIAN ASSISTANT

## 2020-02-13 PROCEDURE — 25000128 H RX IP 250 OP 636: Performed by: PHYSICIAN ASSISTANT

## 2020-02-13 PROCEDURE — 25000132 ZZH RX MED GY IP 250 OP 250 PS 637: Performed by: PHYSICIAN ASSISTANT

## 2020-02-13 RX ORDER — NITROGLYCERIN 0.4 MG/1
0.4 TABLET SUBLINGUAL EVERY 5 MIN PRN
Status: DISCONTINUED | OUTPATIENT
Start: 2020-02-13 | End: 2020-02-14 | Stop reason: HOSPADM

## 2020-02-13 RX ORDER — CARVEDILOL 6.25 MG/1
6.25 TABLET ORAL 2 TIMES DAILY WITH MEALS
Status: DISCONTINUED | OUTPATIENT
Start: 2020-02-13 | End: 2020-02-14 | Stop reason: HOSPADM

## 2020-02-13 RX ORDER — FAMOTIDINE 20 MG/1
20 TABLET, FILM COATED ORAL DAILY
Status: DISCONTINUED | OUTPATIENT
Start: 2020-02-13 | End: 2020-02-14 | Stop reason: HOSPADM

## 2020-02-13 RX ORDER — WARFARIN SODIUM 1 MG/1
1 TABLET ORAL
Status: COMPLETED | OUTPATIENT
Start: 2020-02-13 | End: 2020-02-13

## 2020-02-13 RX ORDER — AMIODARONE HYDROCHLORIDE 200 MG/1
200 TABLET ORAL DAILY
Status: DISCONTINUED | OUTPATIENT
Start: 2020-02-13 | End: 2020-02-14 | Stop reason: HOSPADM

## 2020-02-13 RX ORDER — ACETAMINOPHEN 500 MG
1000 TABLET ORAL EVERY 8 HOURS PRN
Status: DISCONTINUED | OUTPATIENT
Start: 2020-02-13 | End: 2020-02-14 | Stop reason: HOSPADM

## 2020-02-13 RX ORDER — DIGOXIN 125 MCG
125 TABLET ORAL DAILY
Status: DISCONTINUED | OUTPATIENT
Start: 2020-02-13 | End: 2020-02-14 | Stop reason: HOSPADM

## 2020-02-13 RX ORDER — ROSUVASTATIN CALCIUM 20 MG/1
20 TABLET, COATED ORAL AT BEDTIME
Status: DISCONTINUED | OUTPATIENT
Start: 2020-02-13 | End: 2020-02-14 | Stop reason: HOSPADM

## 2020-02-13 RX ADMIN — ROSUVASTATIN CALCIUM 20 MG: 20 TABLET, FILM COATED ORAL at 20:54

## 2020-02-13 RX ADMIN — DOXYCYCLINE 100 MG: 100 CAPSULE ORAL at 08:35

## 2020-02-13 RX ADMIN — WARFARIN SODIUM 1 MG: 1 TABLET ORAL at 17:48

## 2020-02-13 RX ADMIN — DOXYCYCLINE 100 MG: 100 CAPSULE ORAL at 20:54

## 2020-02-13 RX ADMIN — FAMOTIDINE 20 MG: 20 TABLET, FILM COATED ORAL at 11:45

## 2020-02-13 RX ADMIN — DIGOXIN 125 MCG: 125 TABLET ORAL at 11:45

## 2020-02-13 RX ADMIN — AMIODARONE HYDROCHLORIDE 200 MG: 200 TABLET ORAL at 11:45

## 2020-02-13 RX ADMIN — CARVEDILOL 6.25 MG: 6.25 TABLET, FILM COATED ORAL at 17:48

## 2020-02-13 RX ADMIN — CEFTRIAXONE 2 G: 2 INJECTION, POWDER, FOR SOLUTION INTRAMUSCULAR; INTRAVENOUS at 14:21

## 2020-02-13 RX ADMIN — CARVEDILOL 6.25 MG: 6.25 TABLET, FILM COATED ORAL at 11:45

## 2020-02-13 ASSESSMENT — ACTIVITIES OF DAILY LIVING (ADL)
ADLS_ACUITY_SCORE: 15
PREVIOUS_RESPONSIBILITIES: MEAL PREP;MEDICATION MANAGEMENT;DRIVING;FINANCES
ADLS_ACUITY_SCORE: 13
ADLS_ACUITY_SCORE: 15
ADLS_ACUITY_SCORE: 15
ADLS_ACUITY_SCORE: 13
ADLS_ACUITY_SCORE: 13

## 2020-02-13 ASSESSMENT — MIFFLIN-ST. JEOR: SCORE: 1625.88

## 2020-02-13 NOTE — PROGRESS NOTES
Discharge Planner OT   Patient plan for discharge: Home with spouse  Current status: Pt is completing self-cares, transfers, ambulation, stairs independently. He reports he is at baseline in activity tolerance. Dizziness present on admission has resolved. VSS. Pt very pleasant. Agreeable to continue ambulating in [place] with staff while hospitalized. Writer notified PT that no PT services are indicated.  Barriers to return to prior living situation: none  Recommendations for discharge: Home with spouse  Rationale for recommendations: OT will sign off       Entered by: Bhavna Hutson 02/13/2020 1:55 PM

## 2020-02-13 NOTE — PROGRESS NOTES
[facility]    Medicine Progress Note - Hospitalist Service       Date of Admission:  2/12/2020  Assessment & Plan      Right middle and lower lobe pneumonia.   WBC 12.7, afebrile, mild hypotension mid 90s.  No tachycardia or hypoxia on presentation.  Evident on CXR.  Rapid flu negative.  Treated with 500 mL fluid bolus, ceftriaxone, and doxycycline in the ED.  Diagnosed with pneumonia in 9/2019 in similar location.  -- Continue ceftriaxone and doxycycline, initiated 02/12/2020.   -- continue off IVF  -- Encourage IS and OOB.  -- PT/OT.  -- given recurrent area of pneumonia, recommend CT of the chest in 4-6 weeks with Dr. Schwab  - ambulate and add back on his home regimen     Chronic ischemic cardiomyopathy with HFrEF secondary to anterior STEMI (2012). Last TTE 08/2019 showed EF 25%-30%, bi-atrial dilation, and RV systolic blood pressures elevated consistent with mild pulmonary hypertension as well as prior WMA consistent with prior extensive LAD infarction.  He has been stable for several years from a heart failure standpoint and appears euvolemic on exam.  Plan  - hold lisinopril today, restart amiodarone, digoxin, and coreg  CAD s/p emergent 3v-CABG (10/2012). LIMA to his LAD, saphenous vein graft to the OM1, saphenous vein graft to OM3. Complicated by cardiogenic shock, resp failure, and recurrent sustained VT.  Hx unstable ventricular tachyarrhythmia status post biventricular ICD. Complication of STEMI in 2012 and is when initial ICD placed. ICD shock in 11/2017.  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  Status post catheter ablation V-tach on 01/2019 and no recurrence of V-tach since then.  Follows with Dr. Costello.  4. Chronic atrial fibrillation. Prior history of CVA 2011 following atrial flutter ablation.   5. HTN, DLD.  -- Hold PTA lisinopril given soft blood pressure in the 90s systolic.   -- Continue PTA amiodarone, digoxin, carvedilol 6.25 mg b.i.d. with hold parameters in place and rosuvastatin.    -- Pharmacy to dose Coumadin.     Chronic kidney disease, stage III.   Baseline creatinine appears in 1.5-1.6 range. Kidney functioning appears near baseline on admission. Follows with Dr. Arguelles with [facility].     -- Noted and holding PTA lisinopril due to soft blood pressure as above.        Deep venous thrombosis prophylaxis: PTA Warfarin.     Diet: Combination Diet Low Saturated Fat Na <2400mg Diet, No Caffeine Diet    DVT Prophylaxis: Warfarin  Resendiz Catheter: not present  Code Status: Full Code      Disposition Plan   Expected discharge: Tomorrow, recommended to prior living arrangement once SIRS/Sepsis treated.  Entered: Parish Bishop DO 02/13/2020, 3:33 PM       The patient's care was discussed with the Patient and Patient's Family.    Parish Bishop DO  Hospitalist Service  [facility]    ______________________________________________________________________    Interval History   Doing well. Out of bed without issues    Data reviewed today: I reviewed all medications, new labs and imaging results over the last 24 hours. I personally reviewed no images or EKG's today.    Physical Exam   Vital Signs: Temp: 97.9  F (36.6  C) Temp src: Axillary BP: (P) 114/61 Pulse: 65 Heart Rate: 64 Resp: 16 SpO2: 96 % O2 Device: None (Room air)    Weight: 185 lbs 10.04 oz  GENERAL:  Pleasant elderly male who appears stated age.  He looks overall comfortable sitting upright in bed eating a courtesy meal.   SKIN:  Warm, dry.  No rash or lesions on exposed skin.   CHEST:  Breath sounds clear to auscultation.  No increased work of breathing on room air.   CARDIOVASCULAR:  Irregularly irregular.  No murmur appreciated.  No peripheral edema.   ABDOMEN:  Soft, nontender, nondistended.   MUSCULOSKELETAL:  Moves all 4 extremities.   NEUROLOGIC:  Cranial nerves II-XII grossly intact.    Data   Recent Labs   Lab 02/13/20  0834 02/12/20  1233   WBC  --  12.7*   HGB  --  14.2   MCV   --  93   PLT  --  155   INR 2.49* 2.44*   NA  --  139   POTASSIUM  --  4.7   CHLORIDE  --  109   CO2  --  27   BUN  --  26   CR  --  1.59*   ANIONGAP  --  3   LORI  --  8.7   GLC  --  96   ALBUMIN  --  3.5   PROTTOTAL  --  7.1   BILITOTAL  --  1.0   ALKPHOS  --  59   ALT  --  28   AST  --  21   TROPI  --  0.018     No results found for this or any previous visit (from the past 24 hour(s)).  Medications     Warfarin Therapy Reminder         amiodarone  200 mg Oral Daily     carvedilol  6.25 mg Oral BID w/meals     cefTRIAXone  2 g Intravenous Q24H     digoxin  125 mcg Oral Daily     doxycycline hyclate  100 mg Oral Q12H SHYANNE     famotidine  20 mg Oral Daily     rosuvastatin  20 mg Oral At Bedtime     sodium chloride (PF)  3 mL Intracatheter Q8H     warfarin ANTICOAGULANT  1 mg Oral ONCE at 18:00

## 2020-02-13 NOTE — PLAN OF CARE
DATE & TIME: 2/12/20 8938-3839                Cognitive Concerns/ Orientation : A&Ox4   BEHAVIOR & AGGRESSION TOOL COLOR: Green  CIWA SCORE: NA    ABNL VS/O2: VSS on RA  MOBILITY: SBA- dizziness at times- urinal at bedside  PAIN MANAGMENT: Denied  DIET: Low sat fat, 2 gm NA  BOWEL/BLADDER: Continent- urinal/commode  ABNL LAB/BG: Cr 1.59, WBC 12.7, INR 2.44  DRAIN/DEVICES: PIV  TELEMETRY RHYTHM:  100% v-paced  SKIN: WDL  TESTS/PROCEDURES: UA sent and and negative. Still need sputum sample.    D/C DAY/GOALS/PLACE: Pending  OTHER IMPORTANT INFO:

## 2020-02-13 NOTE — ED PROVIDER NOTES
Emergency Department Attending Supervision Note  2/13/2020  9:26 AM      I evaluated this patient in conjunction with Lauren Orr PA-C and agree with her evaluation, exam, diagnosis, and disposition.      Briefly, the patient presented with weakness, cough, and fever.      On my exam, patient is satting good, fairly comfortable at rest.  He has some rhonchi in lung sounds.  Chest x-ray confirms a consolidated infiltrate.  White blood cell count is elevated slightly but his lactic acid is normal and his vital signs otherwise have been good.  Antibiotics are started and he will be admitted for further management of this pneumonia.  Blood cultures are also pending.  Influenza is negative.      Diagnosis    ICD-10-CM    1. Pneumonia of right lower lobe due to infectious organism (H) J18.1 UA with Microscopic     Blood culture     Blood culture     INR     INR     Influenza A and B and RSV PCR     INR     CANCELED: INR     CANCELED: Influenza A/B antigen         MD Hunter Robertson Tracy Alan, MD  02/13/20 0923

## 2020-02-13 NOTE — PROGRESS NOTES
SPIRITUAL HEALTH SERVICES Progress Note  [facility]    Initiated visit due to pt request.  Pt stated that he had already been visited by [facility] , but was not interested in seeing Fr. Randall, stating that he was expecting to discharge tomorrow.  Pt named no further spiritual needs at this time.  SH remains available for f/u upon pt/family request.      Ian Scott  Chaplain Resident

## 2020-02-13 NOTE — PROGRESS NOTES
02/13/20 1334   Quick Adds   Type of Visit Initial Occupational Therapy Evaluation   Living Environment   Lives With spouse   Living Arrangements house   Home Accessibility stairs within home   Transportation Anticipated car, drives self   Self-Care   Usual Activity Tolerance excellent   Current Activity Tolerance excellent   Equipment Currently Used at Home shower chair   Functional Level   Ambulation 0-->independent   Transferring 0-->independent   Toileting 0-->independent   Bathing 0-->independent   Dressing 0-->independent   Eating 0-->independent   Fall history within last six months no   General Information   Onset of Illness/Injury or Date of Surgery - Date 02/12/20   Referring Physician Barthell, Joanna Kersten Ulmen, PA-C   Patient/Family Goals Statement Return home tomorrow   Additional Occupational Profile Info/Pertinent History of Current Problem Tyrel Rene is a 74 year old male with medical history including atrial fibrillation on coumadin who presents for evaluation of cough and chills.  History, physical exam and imaging studies are consistent with pneumonia   Precautions/Limitations fall precautions   Cognitive Status Examination   Orientation orientation to person, place and time   Level of Consciousness alert   Follows Commands (Cognition) follows three step commands   Visual Perception   Visual Perception No deficits were identified;Wears glasses   Pain Assessment   Patient Currently in Pain No   Mobility   Bed Mobility Bed mobility skill: Sit to supine;Bed mobility skill: Supine to sit   Bed Mobility Skill: Sit to Supine   Level of [place]: Sit/Supine independent   Bed Mobility Skill: Supine to Sit   Level of [place]: Supine/Sit independent   Transfer Skill: Sit to Stand   Level of [place]: Sit/Stand independent   Transfer Skill: Toilet Transfer   Level of [place]: Toilet independent   Lower Body Dressing   Level of [place]: Dress Lower Body independent   Grooming  "  Level of [place]: Grooming independent   Instrumental Activities of Daily Living (IADL)   Previous Responsibilities meal prep;medication management;driving;finances   Activities of Daily Living Analysis   Impairments Contributing to Impaired Activities of Daily Living   (none)   Clinical Impression   Criteria for Skilled Therapeutic Interventions Met no   Clinical Decision Making (Complexity) Low complexity   Anticipated Discharge Disposition Home   Risks and Benefits of Treatment have been explained. Yes   Patient, Family & other staff in agreement with plan of care Yes   Clinical Impression Comments Pt is essentially at baseline ADL and IADL performance. No skilled therapy needs. OT will sign off   [employer] AM-PAC TM \"6 Clicks\"   2016, Trustees of [employer], under license to [employer].  All rights reserved.   6 Clicks Short Forms Daily Activity Inpatient Short Form   [employer] AM-PAC  \"6 Clicks\" Daily Activity Inpatient Short Form   1. Putting on and taking off regular lower body clothing? 4 - None   2. Bathing (including washing, rinsing, drying)? 4 - None   3. Toileting, which includes using toilet, bedpan or urinal? 4 - None   4. Putting on and taking off regular upper body clothing? 4 - None   5. Taking care of personal grooming such as brushing teeth? 4 - None   6. Eating meals? 4 - None   Daily Activity Raw Score (Score out of 24.Lower scores equate to lower levels of function) 24   Total Evaluation Time   Total Evaluation Time (Minutes) 25     "

## 2020-02-13 NOTE — PROGRESS NOTES
Admission    Patient arrives to room 601-2 via cart from ED.  Care plan note: Put into bed. Comfortable. See care plan note.    Inpatient nursing criteria listed below were met:    PCD's Documented: Yes  Skin issues/needs documented :Yes  Isolation education started/completed NA  Patient allergies verified with patient: Yes  Verified completion of Pender Risk Assessment Tool:  NA  Verified completion of Guardianship screening tool: NA  Fall Prevention: Care plan updated, Education given and documented Yes  Care Plan initiated: Yes  Home medications documented in belongings flowsheet: NA  Patient belongings documented in belongings flowsheet: Yes  Reminder note (belongings/ medications) placed in discharge instructions:NA  Admission profile/ required documentation complete: Yes  Bedside Report Letter given and explained to patient No

## 2020-02-13 NOTE — PLAN OF CARE
A&O. VSS on room air. Lung sounds diminished in the bases bilaterally. Pt appears to be resting comfortably this shift, denies pain.

## 2020-02-13 NOTE — PLAN OF CARE
DATE & TIME: 2/13/2020 0693-8235                        Cognitive Concerns/ Orientation : A+Ox4   BEHAVIOR & AGGRESSION TOOL COLOR: Green  CIWA SCORE:  NA   ABNL VS/O2: Soft BP, 100's/50's. Other VSS.  MOBILITY: Up with SBA in room and halls.   PAIN MANAGMENT: Denies pain.   DIET: Tolerating low fat, low sodium, no caffeine diet.   BOWEL/BLADDER: Continent of bladder, no BM this shift.   ABNL LAB/BG: NA  DRAIN/DEVICES: NA  TELEMETRY RHYTHM: 100 percent V-Paced  SKIN: WDL.  TESTS/PROCEDURES: NA  D/C DAY/GOALS/PLACE: Anticipate discharge to home tomorrow 2/14. Pending BP's and oxygenation.  OTHER IMPORTANT INFO: Nursing will continue to monitor.

## 2020-02-14 ENCOUNTER — TELEPHONE (OUTPATIENT)
Dept: FAMILY MEDICINE | Facility: CLINIC | Age: 75
End: 2020-02-14

## 2020-02-14 VITALS
OXYGEN SATURATION: 96 % | BODY MASS INDEX: 23.8 KG/M2 | HEIGHT: 73 IN | RESPIRATION RATE: 16 BRPM | WEIGHT: 179.6 LBS | TEMPERATURE: 98.5 F | SYSTOLIC BLOOD PRESSURE: 110 MMHG | DIASTOLIC BLOOD PRESSURE: 61 MMHG | HEART RATE: 68 BPM

## 2020-02-14 LAB
DIGOXIN SERPL-MCNC: 1.3 UG/L (ref 0.5–2)
ERYTHROCYTE [DISTWIDTH] IN BLOOD BY AUTOMATED COUNT: 14.5 % (ref 10–15)
HCT VFR BLD AUTO: 38.9 % (ref 40–53)
HGB BLD-MCNC: 12.9 G/DL (ref 13.3–17.7)
INR PPP: 2.18 (ref 0.86–1.14)
MCH RBC QN AUTO: 30.9 PG (ref 26.5–33)
MCHC RBC AUTO-ENTMCNC: 33.2 G/DL (ref 31.5–36.5)
MCV RBC AUTO: 93 FL (ref 78–100)
PLATELET # BLD AUTO: 143 10E9/L (ref 150–450)
RBC # BLD AUTO: 4.17 10E12/L (ref 4.4–5.9)
WBC # BLD AUTO: 9.6 10E9/L (ref 4–11)

## 2020-02-14 PROCEDURE — 80162 ASSAY OF DIGOXIN TOTAL: CPT | Performed by: HOSPITALIST

## 2020-02-14 PROCEDURE — 25000132 ZZH RX MED GY IP 250 OP 250 PS 637: Performed by: HOSPITALIST

## 2020-02-14 PROCEDURE — 25000132 ZZH RX MED GY IP 250 OP 250 PS 637: Performed by: PHYSICIAN ASSISTANT

## 2020-02-14 PROCEDURE — 85027 COMPLETE CBC AUTOMATED: CPT | Performed by: HOSPITALIST

## 2020-02-14 PROCEDURE — 36415 COLL VENOUS BLD VENIPUNCTURE: CPT | Performed by: HOSPITALIST

## 2020-02-14 PROCEDURE — 99239 HOSP IP/OBS DSCHRG MGMT >30: CPT | Performed by: HOSPITALIST

## 2020-02-14 PROCEDURE — 85610 PROTHROMBIN TIME: CPT | Performed by: HOSPITALIST

## 2020-02-14 RX ORDER — DOXYCYCLINE 100 MG/1
100 CAPSULE ORAL 2 TIMES DAILY
Qty: 10 CAPSULE | Refills: 0 | Status: SHIPPED | OUTPATIENT
Start: 2020-02-14 | End: 2020-03-04

## 2020-02-14 RX ADMIN — DIGOXIN 125 MCG: 125 TABLET ORAL at 08:20

## 2020-02-14 RX ADMIN — DOXYCYCLINE 100 MG: 100 CAPSULE ORAL at 08:19

## 2020-02-14 RX ADMIN — FAMOTIDINE 20 MG: 20 TABLET, FILM COATED ORAL at 08:20

## 2020-02-14 RX ADMIN — CARVEDILOL 6.25 MG: 6.25 TABLET, FILM COATED ORAL at 08:20

## 2020-02-14 ASSESSMENT — ACTIVITIES OF DAILY LIVING (ADL)
ADLS_ACUITY_SCORE: 13

## 2020-02-14 ASSESSMENT — MIFFLIN-ST. JEOR: SCORE: 1598.54

## 2020-02-14 NOTE — PROGRESS NOTES
Care Coordination:    Following for discharge plan.  Pt discharge to home with wife. Needs follow up with PCP.  Met with patient and explained role. Pt wishes to have CC make follow up appt.   Feb 24, 2020 11:45 AM CST  SHORT with Dejon Downs MD  [facility] ([facility] [address] [place] [id number] [phone]     Above added to AVS.  Unable to make follow up with PCP sooner. Pt does have follow up with CORE clinic next Wed.  Pt voices no other needs for d/c        Heydi Martin RN BSN  Inpatient Care Coordination  [facility]  [phone]

## 2020-02-14 NOTE — DISCHARGE SUMMARY
[facility]  Hospitalist Discharge Summary       Date of Admission:  2/12/2020  Date of Discharge:  2/14/2020 10:00 AM  Discharging Provider: Parish Bishop DO      Discharge Diagnoses   Community acquired pneumonia from unspecified organism    Follow-ups Needed After Discharge   Follow-up Appointments     Follow-up and recommended labs and tests       Follow up with primary care provider, Dejon Downs, within 7   days for hospital follow- up.  No follow up labs or test are needed.             Unresulted Labs Ordered in the Past 30 Days of this Admission     Date and Time Order Name Status Description    2/12/2020 1238 Blood culture Preliminary     2/12/2020 1238 Blood culture Preliminary       These results will be followed up by PCP    Discharge Disposition   Discharged to home  Condition at discharge: Stable    Hospital Course   Right middle and lower lobe pneumonia.   WBC 12.7, afebrile, mild hypotension mid 90s.  No tachycardia or hypoxia on presentation.  Evident on CXR.  Rapid flu negative.  Treated with 500 mL fluid bolus, ceftriaxone, and doxycycline in the ED.  Diagnosed with pneumonia in 9/2019 in similar location.  -- finish abx course as below  -- given recurrent area of pneumonia in same lung fields, recommend CT of the chest in 4-6 weeks as per PCP  - ambulated and was symptom free on discharge    Consultations This Hospital Stay   PHARMACY TO DOSE WARFARIN  PHYSICAL THERAPY ADULT IP CONSULT  OCCUPATIONAL THERAPY ADULT IP CONSULT    Code Status   Full Code    Time Spent on this Encounter   I, Parish Bishop DO, personally saw the patient today and spent greater than 30 minutes discharging this patient.       Parish Bishop DO  [facility]  ______________________________________________________________________    Physical Exam   Vital Signs: Temp: 98.5  F (36.9  C) Temp src: Axillary BP: 110/61 Pulse: 68 Heart Rate: 72 Resp: 16 SpO2: 96 % O2  Device: None (Room air)    Weight: 179 lbs 9.6 oz  GENERAL:  Pleasant elderly male who appears stated age.  He looks overall comfortable sitting upright in bed eating a courtesy meal.   SKIN:  Warm, dry.  No rash or lesions on exposed skin.   CHEST:  Breath sounds clear to auscultation.  No increased work of breathing on room air.   CARDIOVASCULAR:  Irregularly irregular.  No murmur appreciated.  No peripheral edema.   ABDOMEN:  Soft, nontender, nondistended.   MUSCULOSKELETAL:  Moves all 4 extremities.   NEUROLOGIC:  Cranial nerves II-XII grossly intact.    Primary Care Physician   Dejon Downs    Discharge Orders      Reason for your hospital stay    Community acquired pneumonia     Follow-up and recommended labs and tests     Follow up with primary care provider, Dejon oDwns, within 7 days for hospital follow- up.  No follow up labs or test are needed.     Activity    Your activity upon discharge: activity as tolerated     Full Code     Diet    Follow this diet upon discharge: Orders Placed This Encounter      Combination Diet Low Saturated Fat Na <2400mg Diet, No Caffeine Diet       Significant Results and Procedures   Most Recent 3 CBC's:  Recent Labs   Lab Test 02/14/20  0823 02/12/20  1233 01/07/20  1114   WBC 9.6 12.7* 6.8   HGB 12.9* 14.2 13.5   MCV 93 93 95   * 155 163     Most Recent 3 BMP's:  Recent Labs   Lab Test 02/12/20  1233 01/07/20  1114 08/05/19  1145    139 137   POTASSIUM 4.7 4.4 4.6   CHLORIDE 109 108 106   CO2 27 28 27   BUN 26 26 18   CR 1.59* 1.69* 1.48*   ANIONGAP 3 3 8.6   LORI 8.7 8.4* 8.7   GLC 96 89 92     Most Recent 2 LFT's:  Recent Labs   Lab Test 02/12/20  1233 02/04/20  1520   AST 21 20   ALT 28 27   ALKPHOS 59 64   BILITOTAL 1.0 0.6   ,   Results for orders placed or performed during the hospital encounter of 02/12/20   XR Chest 2 Views    Narrative    CHEST TWO VIEWS   2/12/2020 1:02 PM     HISTORY:  Cough, fever.    COMPARISON: 10/23/2018.       Impression    IMPRESSION: New prominent consolidation at the right mid to inferior  chest consistent with pneumonia. Left lung is clear. Stable left chest  pacemaker. Stable cardiac silhouette.    DELORES MCCOY MD       Discharge Medications   Discharge Medication List as of 2/14/2020  9:37 AM      START taking these medications    Details   amoxicillin-clavulanate (AUGMENTIN) 875-125 MG tablet Take 1 tablet by mouth 2 times daily for 5 days, Disp-10 tablet, R-0, E-Prescribe      doxycycline hyclate (VIBRAMYCIN) 100 MG capsule Take 1 capsule (100 mg) by mouth 2 times daily for 5 days, Disp-10 capsule, R-0, E-Prescribe         CONTINUE these medications which have NOT CHANGED    Details   acetaminophen (TYLENOL) 500 MG tablet Take 1,000 mg by mouth every 8 hours as needed for mild pain, Historical      amiodarone (PACERONE/CODARONE) 200 MG tablet Take 200 mg by mouth daily Will switch to taking daily except for Sundays after patient returns from [place], Disp-90 tablet, R-3, Historical      ASPIRIN NOT PRESCRIBED (INTENTIONAL)       carvedilol (COREG) 6.25 MG tablet Take 1 tablet (6.25 mg) by mouth 2 times daily (with meals), Disp-180 tablet, R-3, E-Prescribe      digoxin (LANOXIN) 125 MCG tablet Take 1 tablet (125 mcg) by mouth daily, Disp-90 tablet, R-3, E-Prescribe      famotidine (PEPCID) 20 MG tablet Take 1 tablet (20 mg) by mouth 2 times daily, Disp-180 tablet, R-3, E-Prescribe      lisinopril (PRINIVIL/ZESTRIL) 5 MG tablet Take 1 tablet (5 mg) by mouth At Bedtime, Disp-90 tablet, R-3, E-Prescribe      Multiple Vitamins-Minerals (PRESERVISION AREDS 2 PO) Take 1 capsule by mouth 2 times daily, Historical      nitroGLYcerin (NITROSTAT) 0.4 MG sublingual tablet DISSOLVE 1 TABLET UNDER THE TONGUE EVERY 5 MINUTES AS NEEDED FOR CHEST PAIN, Disp-25 tablet, R-0, E-Prescribe      rosuvastatin (CRESTOR) 20 MG tablet Take 1 tablet (20 mg) by mouth daily, Disp-90 tablet, R-3, E-Prescribe      sildenafil (VIAGRA) 100 MG tablet  Take 1 tablet (100 mg) by mouth daily as needed Take 30 min to 4 hours before intercourse.  Never use with nitroglycerin, terazosin or doxazosin., Disp-16 tablet, R-3, Local Print      Vitamin D, Cholecalciferol, 1000 UNITS TABS Take 1,000 mg by mouth daily , Historical      warfarin ANTICOAGULANT (COUMADIN) 2.5 MG tablet Take 2.5 mg on Mondays and 1.25mg all other days or as directed by the anticoagulation clinic, Disp-75 tablet, R-3, E-Prescribe           Allergies   Allergies   Allergen Reactions     Nystatin Other (See Comments)     Make his mouth numb & swelling

## 2020-02-14 NOTE — TELEPHONE ENCOUNTER
ANTICOAGULATION  MANAGEMENT: Discharge Review    Tyrel Rene chart reviewed for anticoagulation continuity of care    Hospital Admission on 2/12/2020 for pneumonia.    Discharge disposition: Home    Results:    Recent Labs   Lab Test 02/14/20  0823   INR 2.18*       Anticoagulation inpatient management:     home regimen continued    Anticoagulation discharge instructions:     Warfarin dosing: home regimen continued   Bridging: No   INR goal change: No      Medication changes affecting anticoagulation: No    Additional factors affecting anticoagulation: No    Plan     No adjustment to anticoagulation plan needed    Spoke with Marko.  He is on same dosing of coumadin. He is on augmentin and vibramycin which was started to day. He will keep taking some greens. He is scheduled for a recheck on 2/19.    .    Doreen Finley RN

## 2020-02-14 NOTE — PLAN OF CARE
DATE & TIME: 2/13/2020 1528-0934    Cognitive Concerns/ Orientation : A+Ox4   BEHAVIOR & AGGRESSION TOOL COLOR: Green  CIWA SCORE:  NA   ABNL VS/O2: VSS on RA.   MOBILITY: Up with SBA in room and halls.   PAIN MANAGMENT: Denies pain.   DIET: Tolerating low fat, low sodium, no caffeine diet.   BOWEL/BLADDER: Continent of bladder, no BM this shift.   ABNL LAB/BG: NA  DRAIN/DEVICES: PIV SL, some red and tenderness noted.   TELEMETRY RHYTHM: 100 percent V-Paced  SKIN: WDL.  TESTS/PROCEDURES: NA  D/C DAY/GOALS/PLACE: Anticipate discharge to home tomorrow 2/14. Pending BP's and oxygenation.  OTHER IMPORTANT INFO: Nursing will continue to monitor. Patient was content on evening shift, wife at bedside most of night.

## 2020-02-14 NOTE — PROGRESS NOTES
DATE & TIME: 2/14/2020 5090-4538    Cognitive Concerns/ Orientation : A&Ox4   BEHAVIOR & AGGRESSION TOOL COLOR: green  CIWA SCORE: na   ABNL VS/O2: VSS on RA   MOBILITY: SBA in room and halls, steady gait   PAIN MANAGMENT: denied pain  DIET: low fat, low sodium diet with no caffeine   BOWEL/BLADDER: continent of bowel and bladder, no BM. Ambulates to restroom.   ABNL LAB/BG: na  DRAIN/DEVICES: PIV SL, some red and tenderness. No pain per pt   TELEMETRY RHYTHM: 100% V-paced   SKIN: WDL  TESTS/PROCEDURES: na  D/C DAY/GOALS/PLACE: Today 2/14 pending treatment of sepsis per MD note   OTHER IMPORTANT INFO: continue to monitor, improving.

## 2020-02-14 NOTE — PLAN OF CARE
Discharge    Patient discharged to home via independently with wife  Care plan note: VSS, on RA, denies pain. PIV removed. Denies SOB, LS clear. Discharge instructions and medication information reviewed with pt and spouse. Questions encouraged and answered.     Listed belongings gathered and returned to patient. Yes  Care Plan and Patient education resolved: Yes  Prescriptions if needed, hard copies sent with patient  NA  Home and hospital acquired medications returned to patient: Yes  Medication Bin checked and emptied on discharge Yes  Follow up appointment made for patient: Yes

## 2020-02-17 ENCOUNTER — PATIENT OUTREACH (OUTPATIENT)
Dept: CARE COORDINATION | Facility: CLINIC | Age: 75
End: 2020-02-17

## 2020-02-17 DIAGNOSIS — Z71.89 OTHER SPECIFIED COUNSELING: ICD-10-CM

## 2020-02-17 ASSESSMENT — ACTIVITIES OF DAILY LIVING (ADL): DEPENDENT_IADLS:: MEDICATION MANAGEMENT

## 2020-02-17 NOTE — LETTER
[place] CARE COORDINATION  [facility]  [address], [place] [address]    February 17, 2020    Tyrel Rene  [address] [place] [address]      Dear Tyrel,    I am a clinic care coordinator who works with Dejon Downs MD at [facility]. I wanted to thank you for spending the time to talk with me.  Below is a description of clinic care coordination and how I can further assist you.      The clinic care coordinator team is made up of a registered nurse,  and community health worker who understand the health care system. The goal of clinic care coordination is to help you manage your health and improve access to the health care system in the most efficient manner. The team can assist you in meeting your health care goals by providing education, coordinating services, strengthening the communication among your providers  and supporting you with any resource needs.    Please feel free to contact me with any questions or concerns. We are focused on providing you with the highest-quality healthcare experience possible and that all starts with you.     Sincerely,     Catarino Orellana RN  Clinic Care Coordinator  [facility]  Ph: [phone]    Enclosed: I have enclosed a copy of a 24 Hour Access Plan. This has helpful phone numbers for you to call when needed. Please keep this in an easy to access place to use as needed.

## 2020-02-17 NOTE — LETTER
Health Care Home - Access Care Plan    About Me:    Patient Name:  Tyrel Elizalde    YOB: 1945  Age:                      74 year old   Walter MRN:     [id number] Telephone Information:   Home Phone [phone]   Mobile [phone]       Address:  [address] [place] [address] Email address:  [email or web address]      Emergency Contact(s)   Name Relationship Lgl Grd Work Phone Home Phone Mobile Phone   1. BRIGIDA ELIZALDE Spouse No  [phone]   2. PRATIK TURCIOS Daughter No  [phone]             Health Maintenance: Routine Health maintenance Reviewed: Due/Overdue   Health Maintenance Due   Topic Date Due     ZOSTER IMMUNIZATION (1 of 2) 10/14/1993     OP ANNUAL INR REFERRAL  05/29/2016     HF ACTION PLAN  08/28/2016     My Access Plan  Medical Emergency 911   Questions or concerns during clinic hours Primary Clinic Line, I will call the clinic directly: [facility] - [phone]   24 Hour Appointment Line [phone] or  [phone]) (toll free)   24 Hour Nurse Line [phone] (toll free)   Questions or concerns outside clinic hours 24 Hour Appointment Line, I will call the after-hours on-call line:   [facility] [phone] or [phone]) (toll-free)   Preferred Urgent Care [facility][1]/[facility], [phone]   Preferred Hospital [facility]  [phone]   Preferred Pharmacy [id number] - [place], [place] - [address]      Behavioral Health Crisis Line The National Suicide Prevention Lifeline at 1-642.154.9481 or 916       My Care Team Members  Patient Care Team       Relationship Specialty Notifications Start End    Dejon Downs MD PCP - General Family Practice  7/30/13     Phone: [phone] Fax: [phone]         [address] [facility] [address]    Dejon Downs MD Assigned PCP   12/9/12     Phone:  [phone] Fax: [phone]         [address] [facility] [address]           My Medical and Care Information  Problem List   Patient Active Problem List   Diagnosis     STEMI (ST elevation myocardial infarction) (H)     Anemia     Health Care Home     Atrial fibrillation (H)     Hypertension     Cerebral infarction (H)     SVT (supraventricular tachycardia) (H)     CAD (coronary artery disease)     Cardiomyopathy (H)     Atrial flutter (H)     Diplopia     Long-term (current) use of anticoagulants [Z79.01]     Cerebral artery occlusion with cerebral infarction (HCC) [I63.50]     Chronic systolic congestive heart failure (H)     Mixed hyperlipidemia     Screening for prostate cancer     Status post coronary angiogram     Paroxysmal ventricular tachycardia (H)     CKD (chronic kidney disease) stage 3, GFR 30-59 ml/min (H)     Right lower lobe pneumonia (H)      Current Medications:  Current Outpatient Medications   Medication     acetaminophen (TYLENOL) 500 MG tablet     amiodarone (PACERONE/CODARONE) 200 MG tablet     amoxicillin-clavulanate (AUGMENTIN) 875-125 MG tablet     ASPIRIN NOT PRESCRIBED (INTENTIONAL)     carvedilol (COREG) 6.25 MG tablet     digoxin (LANOXIN) 125 MCG tablet     doxycycline hyclate (VIBRAMYCIN) 100 MG capsule     famotidine (PEPCID) 20 MG tablet     lisinopril (PRINIVIL/ZESTRIL) 5 MG tablet     Multiple Vitamins-Minerals (PRESERVISION AREDS 2 PO)     nitroGLYcerin (NITROSTAT) 0.4 MG sublingual tablet     rosuvastatin (CRESTOR) 20 MG tablet     sildenafil (VIAGRA) 100 MG tablet     Vitamin D, Cholecalciferol, 1000 UNITS TABS     warfarin ANTICOAGULANT (COUMADIN) 2.5 MG tablet     No current facility-administered medications for this visit.

## 2020-02-17 NOTE — PROGRESS NOTES
Clinic Care Coordination Contact    Clinic Care Coordination Contact  OUTREACH    Referral Information:  Referral Source: IP Handoff  Primary Diagnosis: Pneumonia  Chief Complaint   Patient presents with     Clinic Care Coordination - Post Hospital     RLL pneumonia     Clinical Pathway: Clinic Care Coordination Pneumonia Assessment  Discharge:  Hospital summary: The patient was hospitalized at [facility] from 2/12/20 to 2/14/20 for right lower lobe pneumonia.  He was discharged on oral antibiotics.  What recommendations were made for follow up after your recent hospitalization? Follow up with PCP within 7 days, repeat chest CT in 4-6 weeks  Have the follow up appointments been scheduled? Yes  If not, can I help you set up these appointments? N/A  Is there a referral/need for Pulmonary Rehab? Not at this time  Is the patient enrolled in "[employer] Tele-Assurance program? No    Symptom Review:  How have you been feeling now that you are home?  The patient reports feeling somewhat improved, still recovering.  Are you having any increased shortness of breath? No  Are you having any fevers? No  When you cough, are you coughing up anything? Yes  Color- white  Consistency - foamy  Frequency - occasional  Medications:   Were you started on any new medications?  Yes, Augmentin and Vibramycin  Were any of your previous medications changed? No  Do you have all of your medications? Yes  Have you had trouble filling your prescriptions? No  Are your medications effective in controlling your symptoms? Yes  Are you currently on Prednisone?  No  Medication reconciliation completed? Yes -- Post-discharge medication reconciliation status: Discharge medications reviewed and reconciled.  Changed medications per note/orders (see AVS).  Was MTM or Diabetic Education referral placed (*Make sure to put transitions as reason for referral)? No  Patient's wife assists patient with medication set-up and management but he is fairly  independent with this.    Oxygen/DME  Are you currently on oxygen? No   Is this new for you? N/A   Is it set up at home? N/A   Activity:  How much activity can you do before you are SOB? Moderate walking  Have you had to reduce your activities because of dyspnea or other symptoms?  Somewhat, but still able to do most activities  Were you instructed on how to cough and deep breathe? Yes, CC RN reviewed deep-breathing exercises and encouraged use of incentive spirometer  Diet:  Are there any current diet restrictions or changes per discharge instructions? No  Emotions/Lifestyle:    Do you smoke? reports that he quit smoking about 47 years ago. His smoking use included cigarettes. He started smoking about 62 years ago. He has a 14.00 pack-year smoking history. He has never used smokeless tobacco.  Functional Status:  Dependent ADLs: Independent  Dependent IADLs: Medication Management  Mobility Status: Independent    Living Situation:  Current living arrangement: I live in a private home with spouse    Lifestyle & Psychosocial Needs:  Inadequate nutrition (GOAL): No  Tube Feeding: No  Regular car, Family     Mandaen or spiritual beliefs that impact treatment: No  Mental health DX: No  Mental health management concern (GOAL): No  Informal Support system: Spouse   Socioeconomic History     Marital status:      Spouse name: Not on file     Number of children: Not on file     Years of education: Not on file     Highest education level: Not on file     Tobacco Use     Smoking status: Former Smoker     Packs/day: 1.00     Years: 14.00     Pack years: 14.00     Types: Cigarettes     Start date:      Last attempt to quit: 7/10/1972     Years since quittin.6     Smokeless tobacco: Never Used   Substance and Sexual Activity     Alcohol use: No     Drug use: No     Sexual activity: Yes     Partners: Female      Resources and Interventions:  Community Resources: [facility]  Equipment Currently Used at Home:  shower chair    Patient/Caregiver understanding: Yes     Future Appointments              In 2 days [facility] ANTICOAGULATION CLINIC [facility]    In 2 days Hayde Galvez APRN CNP [facility] CLIN    In 1 week Dejon Downs MD [facility], [facility]    In 2 weeks MARQUEZ DCRN [facility] PSA CLIN        Plan: The patient will continue taking his medications as prescribed and will attend his upcoming appointments as scheduled.  He denied need for additional assistance from Care Coordination at this time but agreed to contact CC RN with additional questions or concerns.  CC RN will make no further scheduled patient outreaches at this time but will remain available should any patient needs arise.    Catarino Orellana, RN  Clinic Care Coordinator  [facility]  Ph: [phone]

## 2020-02-18 ENCOUNTER — DOCUMENTATION ONLY (OUTPATIENT)
Dept: CARDIOLOGY | Facility: CLINIC | Age: 75
End: 2020-02-18

## 2020-02-18 ENCOUNTER — TELEPHONE (OUTPATIENT)
Dept: FAMILY MEDICINE | Facility: CLINIC | Age: 75
End: 2020-02-18

## 2020-02-18 DIAGNOSIS — Z79.01 LONG TERM CURRENT USE OF ANTICOAGULANT THERAPY: ICD-10-CM

## 2020-02-18 DIAGNOSIS — I63.9 CEREBRAL INFARCTION (H): ICD-10-CM

## 2020-02-18 DIAGNOSIS — I50.22 CHRONIC SYSTOLIC CONGESTIVE HEART FAILURE (H): Primary | ICD-10-CM

## 2020-02-18 DIAGNOSIS — I63.50 CEREBRAL ARTERY OCCLUSION WITH CEREBRAL INFARCTION (H): ICD-10-CM

## 2020-02-18 LAB
BACTERIA SPEC CULT: NO GROWTH
BACTERIA SPEC CULT: NO GROWTH
SPECIMEN SOURCE: NORMAL
SPECIMEN SOURCE: NORMAL

## 2020-02-18 NOTE — PROGRESS NOTES
Chart reviewed for 6 month CORE visit w/MADELEINE King on 2/19/20 in [facility]. No labs scheduled prior. Orders placed for BMP, Hgb. Spoke w/patient who is still recovering from CAP and recent admission 2/12/20 - 2/14/20 . Patient would like to reschedule CORE visit. Informed patient scheduling would call him to arrange a new visit time w/MADELEINE King.     Catarino Singer LPN  [facility]  [phone]

## 2020-02-18 NOTE — TELEPHONE ENCOUNTER
Patient recently discharged from hospital for pneumonia. He is completing antibiotics tomorrow.  Wife states she caught a cold so doesn't want to bring patient out to the clinic for INR. They would like to use in home labs for INR this week.    Left a message for [facility] to give us a call back.  Radha Pierce, RN  Anticoagulation Nurse - [facility], [place]

## 2020-02-18 NOTE — TELEPHONE ENCOUNTER
Information and order faxed to in home labs.  Wife informed.  Radha Pierce, RN  Anticoagulation Nurse - [facility], [place]

## 2020-02-19 ENCOUNTER — TELEPHONE (OUTPATIENT)
Dept: FAMILY MEDICINE | Facility: CLINIC | Age: 75
End: 2020-02-19

## 2020-02-19 ENCOUNTER — TELEPHONE (OUTPATIENT)
Dept: CARE COORDINATION | Facility: CLINIC | Age: 75
End: 2020-02-19

## 2020-02-19 ENCOUNTER — TRANSFERRED RECORDS (OUTPATIENT)
Dept: HEALTH INFORMATION MANAGEMENT | Facility: CLINIC | Age: 75
End: 2020-02-19

## 2020-02-19 DIAGNOSIS — I63.50 CEREBRAL ARTERY OCCLUSION WITH CEREBRAL INFARCTION (H): ICD-10-CM

## 2020-02-19 DIAGNOSIS — Z79.01 LONG TERM CURRENT USE OF ANTICOAGULANT THERAPY: ICD-10-CM

## 2020-02-19 DIAGNOSIS — I63.9 CEREBRAL INFARCTION (H): ICD-10-CM

## 2020-02-19 DIAGNOSIS — J06.9 UPPER RESPIRATORY TRACT INFECTION, UNSPECIFIED TYPE: Primary | ICD-10-CM

## 2020-02-19 LAB — INR PPP: 2.1 (ref 0.9–1.1)

## 2020-02-19 RX ORDER — CODEINE PHOSPHATE/GUAIFENESIN 10-100MG/5
5 LIQUID (ML) ORAL EVERY 4 HOURS PRN
Qty: 120 ML | Refills: 1 | Status: ON HOLD | OUTPATIENT
Start: 2020-02-19 | End: 2020-01-01

## 2020-02-19 NOTE — TELEPHONE ENCOUNTER
CC RN received call from patient's wife updating that the patient seems to have caught the same cold that she has been fighting.  The patient was recently hospitalized for pneumonia.  About a day or two after being home he developed the same cold symptoms she has been having; primarily persistent cough and runny nose.  Last night the patient's cough was bad enough that he wasn't able to sleep lying down.  The patient has not had any fevers.    The patient's wife has been prescribed a couple of medications that she stated have helped her a lot and is wondering if the patient can get the same/similar medications for his symptoms.  The patient's wife was prescribed:    benzonatate 200 mg capsules TID prn    codeine-guaifenesin 10-10-0 mg/5mL syrup every 6 hours PRN    If PCP agreeable to prescribing patient medication for his cough, the prescription(s) can be sent to:  [id number] [place] [address]    CC RN will forward patient update to PCP for review and response.    Catarino Orellana RN  Clinic Care Coordinator  [facility]  Ph: [phone]

## 2020-02-21 ENCOUNTER — TELEPHONE (OUTPATIENT)
Dept: FAMILY MEDICINE | Facility: CLINIC | Age: 75
End: 2020-02-21

## 2020-02-21 NOTE — TELEPHONE ENCOUNTER
ANTICOAGULATION  MANAGEMENT: Discharge Review    Tyrel Rene chart reviewed for anticoagulation continuity of care    Hospital Admission on 02/14/2020 for pneumonia    Discharge disposition: Home    Results:    Recent Labs   Lab Test 02/19/20   INR 2.1*     Recent Labs   Lab Test 02/19/20 02/14/20  0823 02/13/20  0834   INR 2.1* 2.18* 2.49*       Anticoagulation inpatient management:     home regimen continued and INR done by [facility]    Anticoagulation discharge instructions:     Warfarin dosing: home regimen continued   Bridging: No   INR goal change: No      Medication changes affecting anticoagulation: Yes: On Augmentin until 2/19/2020    Additional factors affecting anticoagulation: No    Plan     No adjustment to anticoagulation plan needed    Recommended follow up is scheduled    Anticoagulation Calendar updated    Doreen Finley RN

## 2020-02-25 ENCOUNTER — TELEPHONE (OUTPATIENT)
Dept: FAMILY MEDICINE | Facility: CLINIC | Age: 75
End: 2020-02-25

## 2020-02-25 LAB
DLCOUNC-%PRED-PRE: 84 %
DLCOUNC-PRE: 21.81 ML/MIN/MMHG
DLCOUNC-PRED: 25.76 ML/MIN/MMHG
ERV-%PRED-PRE: 169 %
ERV-PRE: 1.99 L
ERV-PRED: 1.17 L
EXPTIME-PRE: 7.3 SEC
FEF2575-%PRED-PRE: 113 %
FEF2575-PRE: 2.41 L/SEC
FEF2575-PRED: 2.12 L/SEC
FEFMAX-%PRED-PRE: 123 %
FEFMAX-PRE: 9.83 L/SEC
FEFMAX-PRED: 7.94 L/SEC
FEV1-%PRED-PRE: 125 %
FEV1-PRE: 3.63 L
FEV1FEV6-PRE: 73 %
FEV1FEV6-PRED: 77 %
FEV1FVC-PRE: 72 %
FEV1FVC-PRED: 76 %
FEV1SVC-PRE: 69 %
FEV1SVC-PRED: 60 %
FIFMAX-PRE: 5.94 L/SEC
FRCPLETH-%PRED-PRE: 123 %
FRCPLETH-PRE: 4.7 L
FRCPLETH-PRED: 3.81 L
FVC-%PRED-PRE: 131 %
FVC-PRE: 5.04 L
FVC-PRED: 3.84 L
IC-%PRED-PRE: 89 %
IC-PRE: 3.25 L
IC-PRED: 3.63 L
RVPLETH-%PRED-PRE: 95 %
RVPLETH-PRE: 2.68 L
RVPLETH-PRED: 2.8 L
TLCPLETH-%PRED-PRE: 108 %
TLCPLETH-PRE: 7.95 L
TLCPLETH-PRED: 7.33 L
VA-%PRED-PRE: 117 %
VA-PRE: 7.68 L
VC-%PRED-PRE: 109 %
VC-PRE: 5.27 L
VC-PRED: 4.8 L

## 2020-02-25 NOTE — TELEPHONE ENCOUNTER
Dulcy from [facility] calling back.  They can get pt on their schedule for this Thursday  Spoke with pt's wife and relayed plan. No further questions at this time.

## 2020-02-25 NOTE — TELEPHONE ENCOUNTER
Patient wife calling and states that they had [facility] come out last week to check patient INR.  She states that he still is not feeling well enough to come out to the INR clinic for tomorrow's appointment and asking if they can have the service one more week.     Did leave message on VM of [facility] asking if they can check patient INR tomorrow or Thursday.  Asked that they call back to central office to verify if can recheck INR and if another order is needed.  We can then call patient's wife back with an update.  .  Lili Apodaca RN  [facility]

## 2020-02-27 ENCOUNTER — TELEPHONE (OUTPATIENT)
Dept: FAMILY MEDICINE | Facility: CLINIC | Age: 75
End: 2020-02-27

## 2020-02-27 ENCOUNTER — TRANSFERRED RECORDS (OUTPATIENT)
Dept: HEALTH INFORMATION MANAGEMENT | Facility: CLINIC | Age: 75
End: 2020-02-27

## 2020-02-27 DIAGNOSIS — I63.9 CEREBRAL INFARCTION (H): ICD-10-CM

## 2020-02-27 DIAGNOSIS — Z79.01 LONG TERM CURRENT USE OF ANTICOAGULANT THERAPY: ICD-10-CM

## 2020-02-27 DIAGNOSIS — I63.50 CEREBRAL ARTERY OCCLUSION WITH CEREBRAL INFARCTION (H): ICD-10-CM

## 2020-02-27 LAB — INR PPP: 3 (ref 0.9–1.1)

## 2020-02-27 NOTE — TELEPHONE ENCOUNTER
ANTICOAGULATION MANAGEMENT     Patient Name:  Tyrel Rene  Date:  2/27/2020    ASSESSMENT /SUBJECTIVE:      Today's INR result of 3.0 is therapeutic. Goal INR of 2.0-3.0      Warfarin dose taken: Warfarin taken as previously instructed    Diet: No new diet changes affecting INR    Medication changes/ interactions: No interaction expected between guaifenesin and warfarin    Previous INR: Therapeutic     S/S of bleeding or thromboembolism: No    New injury or illness:  Yes: Recently had pneumonia, currently has a cold, is coughing and sneezing and has a runny nose    Upcoming surgery, procedure or cardioversion:  No    Additional findings: None      PLAN:    Spoke with Tyrel regarding INR result and instructed:     Warfarin Dosing Instructions: Continue your current warfarin dose of 2.5mg Mon; 1.25mg all other days    Instructed patient to follow up no later than: 1 week  Lab visit scheduled (has [facility] appt 3/4 w/ labs; added on INR-declined to follow up with [facility] at this time as he is physically feeling better now)    Education provided: Yes: Effect of illness on INR      Marko verbalizes understanding and agrees to warfarin dosing plan.    Instructed to call the Anticoagulation Clinic for any changes, questions or concerns. (#[phone])        OBJECTIVE:  INR   Date Value Ref Range Status   02/27/2020 3.0 (A) 0.90 - 1.10 Final             Anticoagulation Summary  As of 2/27/2020    INR goal:   2.0-2.5   TTR:   29.6 % (11.9 mo)   INR used for dosing:   3.0! (2/27/2020)   Warfarin maintenance plan:   2.5 mg (2.5 mg x 1) every Mon; 1.25 mg (2.5 mg x 0.5) all other days   Full warfarin instructions:   2.5 mg every Mon; 1.25 mg all other days   Weekly warfarin total:   10 mg   Plan last modified:   Doreen Finley RN (3/22/2019)   Next INR check:   3/5/2020   Priority:   High   Target end date:   Indefinite    Indications    Long-term (current) use of anticoagulants [Z79.01] [Z79.01]  Cerebral artery  occlusion with cerebral infarction (HCC) [I63.50] [I63.50]  Cerebral infarction (H) [I63.9]             Anticoagulation Episode Summary     INR check location:   Anticoagulation Clinic    Preferred lab:       Send INR reminders to:   HANS ALONZO    Comments:         Anticoagulation Care Providers     Provider Role Specialty Phone number    Dejon Downs MD [facility] [phone]

## 2020-02-29 ENCOUNTER — OFFICE VISIT (OUTPATIENT)
Dept: FAMILY MEDICINE | Facility: CLINIC | Age: 75
End: 2020-02-29
Payer: COMMERCIAL

## 2020-02-29 VITALS
WEIGHT: 177 LBS | RESPIRATION RATE: 12 BRPM | BODY MASS INDEX: 23.46 KG/M2 | OXYGEN SATURATION: 93 % | HEART RATE: 89 BPM | SYSTOLIC BLOOD PRESSURE: 120 MMHG | HEIGHT: 73 IN | TEMPERATURE: 96.6 F | DIASTOLIC BLOOD PRESSURE: 60 MMHG

## 2020-02-29 DIAGNOSIS — J18.9 PNEUMONIA OF RIGHT LOWER LOBE DUE TO INFECTIOUS ORGANISM: ICD-10-CM

## 2020-02-29 DIAGNOSIS — J22 LOWER RESPIRATORY TRACT INFECTION: Primary | ICD-10-CM

## 2020-02-29 PROCEDURE — 99214 OFFICE O/P EST MOD 30 MIN: CPT | Performed by: FAMILY MEDICINE

## 2020-02-29 RX ORDER — MOXIFLOXACIN HYDROCHLORIDE 400 MG/1
400 TABLET ORAL DAILY
Qty: 10 TABLET | Refills: 0 | Status: SHIPPED | OUTPATIENT
Start: 2020-02-29 | End: 2020-01-01

## 2020-02-29 ASSESSMENT — MIFFLIN-ST. JEOR: SCORE: 1586.75

## 2020-02-29 NOTE — PROGRESS NOTES
Subjective     Tyrel Rene is a 74 year old male who presents to clinic today for the following health issues:    [facility]     Hospital Follow-up Visit:    Hospital/Nursing Home/IP Rehab Facility: [facility]  Date of Admission: 02/12/2020  Date of Discharge: 02/14/2020  Reason(s) for Admission: Pneumonia            Problems taking medications regularly:  None       Medication changes since discharge: Yes       Problems adhering to non-medication therapy:  None    Summary of hospitalization:  [facility] discharge summary reviewed  Diagnostic Tests/Treatments reviewed.  Follow up needed: none  Other Healthcare Providers Involved in Patient s Care:         None  Update since discharge: Improving, however, he is still coughing a lot.      Post Discharge Medication Reconciliation: discharge medications reconciled, continue medications without change.  Plan of care communicated with patient and family     Coding guidelines for this visit:  Type of Medical   Decision Making Face-to-Face Visit       within 7 Days of discharge Face-to-Face Visit        within 14 days of discharge   Moderate Complexity 20390 [id number]   High Complexity 45994 [id number]            Patient Active Problem List   Diagnosis     STEMI (ST elevation myocardial infarction) (H)     Anemia     Health Care Home     Atrial fibrillation (H)     Hypertension     Cerebral infarction (H)     SVT (supraventricular tachycardia) (H)     CAD (coronary artery disease)     Cardiomyopathy (H)     Atrial flutter (H)     Diplopia     Long-term (current) use of anticoagulants [Z79.01]     Cerebral artery occlusion with cerebral infarction (HCC) [I63.50]     Chronic systolic congestive heart failure (H)     Mixed hyperlipidemia     Screening for prostate cancer     Status post coronary angiogram     Paroxysmal ventricular tachycardia (H)     CKD (chronic kidney disease) stage 3, GFR 30-59 ml/min (H)     Right lower lobe pneumonia (H)     Past Surgical  History:   Procedure Laterality Date     BYPASS GRAFT ARTERY CORONARY  10/2/2012    Procedure: BYPASS GRAFT ARTERY CORONARY;  Coronary Artery Bypass Graft x3 (LAD, Diag, OM) with Endovein [place] (On-Pump);  Surgeon: Yeyo Lyman MD;  Location:  OR     CARDIOVERSION  3/14/2013     CORONARY ANGIOGRAPHY ADULT ORDER  10/2/12     CORONARY ARTERY BYPASS  10/2/12    LIMA to LAD, SVG to OM1 and OM3     EP ABLATION VT N/A 2019    Procedure: EP Ablation VT;  Surgeon: Suellen Costello MD;  Location:  HEART CARDIAC CATH LAB     EP COMPREHENSIVE EP STUDY N/A 2019    Procedure: EP Comprehensive EP Study;  Surgeon: Suellen Costello MD;  Location:  HEART CARDIAC CATH LAB     H ABLATION ATRIAL FLUTTER  2011     HAND SURGERY      table saw injury right hand     HEART CATH, ANGIOPLASTY  10/2/12    PTCA to second diagonal and mid LAD, BMS to mid LAD     HERNIA REPAIR       RELOCATE GENERATOR ICD/PACEMAKER         Social History     Tobacco Use     Smoking status: Former Smoker     Packs/day: 1.00     Years: 14.00     Pack years: 14.00     Types: Cigarettes     Start date:      Last attempt to quit: 7/10/1972     Years since quittin.6     Smokeless tobacco: Never Used   Substance Use Topics     Alcohol use: No     Family History   Problem Relation Age of Onset     Alcohol/Drug Father      Heart Disease Father 71     Alzheimer Disease Sister      Alcohol/Drug Sister      Alcohol/Drug Sister      Gastrointestinal Disease Sister      Obesity Sister      Hypertension Sister      Heart Disease Sister      Hypertension Sister      Heart Disease Daughter         afib     Arrhythmia Daughter      Multiple Sclerosis Daughter      Heart Disease Daughter         afib     Arrhythmia Daughter      Cancer Daughter         lymphoma     Aneurysm Mother              Reviewed and updated as needed this visit by Provider         Review of Systems   ROS COMP: Constitutional, HEENT,  "cardiovascular, pulmonary, gi and gu systems are negative, except as otherwise noted.      Objective    /60   Pulse 89   Temp 96.6  F (35.9  C) (Tympanic)   Resp 12   Ht 1.854 m (6' 1\")   Wt 80.3 kg (177 lb)   SpO2 93%   BMI 23.35 kg/m    Body mass index is 23.35 kg/m .  Physical Exam   GENERAL APPEARANCE: healthy, alert and no distress  EYES: Eyes grossly normal to inspection, PERRL and conjunctivae and sclerae normal  HENT: ear canals and TM's normal and nose and mouth without ulcers or lesions  NECK: no adenopathy and no asymmetry, masses, or scars  RESP: rhonchi throughout the right side of the chest anteriorly and posteriorly.  LYMPHATICS: no cervical adenopathy            Assessment & Plan       ICD-10-CM    1. Lower respiratory tract infection J22    2. Pneumonia of right lower lobe due to infectious organism (H) J18.1           Patient Instructions   The patient finished his antibiotics 5 days ago.  I placed him on Avelox 400 mg daily for the next 10 days.  He will follow-up in 2 weeks.  He will need repeat exam at that point and probably getting his chest x-ray repeated also.  The patient and his wife are comfortable with the plan.  He will otherwise treat his symptoms.  He does have cough syrup that he can take which is been helping some.  He has an appointment next week with cardiology and will get blood testing done at that time.  I will see him back in mid March for reexamination and probable chest x-ray.  He will come back sooner if he has more issues.      Return in about 2 weeks (around 3/14/2020) for lrti f/u, CXR.    Dejon Downs MD  [facility]        "

## 2020-02-29 NOTE — PATIENT INSTRUCTIONS
The patient finished his antibiotics 5 days ago.  I placed him on Avelox 400 mg daily for the next 10 days.  He will follow-up in 2 weeks.  He will need repeat exam at that point and probably getting his chest x-ray repeated also.  The patient and his wife are comfortable with the plan.  He will otherwise treat his symptoms.  He does have cough syrup that he can take which is been helping some.  He has an appointment next week with cardiology and will get blood testing done at that time.  I will see him back in mid March for reexamination and probable chest x-ray.  He will come back sooner if he has more issues.

## 2020-03-04 ENCOUNTER — TELEPHONE (OUTPATIENT)
Dept: FAMILY MEDICINE | Facility: CLINIC | Age: 75
End: 2020-03-04

## 2020-03-04 ENCOUNTER — OFFICE VISIT (OUTPATIENT)
Dept: CARDIOLOGY | Facility: CLINIC | Age: 75
End: 2020-03-04
Attending: INTERNAL MEDICINE
Payer: COMMERCIAL

## 2020-03-04 VITALS
SYSTOLIC BLOOD PRESSURE: 116 MMHG | DIASTOLIC BLOOD PRESSURE: 72 MMHG | BODY MASS INDEX: 24.18 KG/M2 | WEIGHT: 182.4 LBS | HEIGHT: 73 IN | HEART RATE: 60 BPM | OXYGEN SATURATION: 97 %

## 2020-03-04 DIAGNOSIS — I63.9 CEREBRAL INFARCTION (H): ICD-10-CM

## 2020-03-04 DIAGNOSIS — I50.22 CHRONIC SYSTOLIC CONGESTIVE HEART FAILURE (H): ICD-10-CM

## 2020-03-04 DIAGNOSIS — Z79.01 LONG TERM CURRENT USE OF ANTICOAGULANT THERAPY: ICD-10-CM

## 2020-03-04 DIAGNOSIS — I63.50 CEREBRAL ARTERY OCCLUSION WITH CEREBRAL INFARCTION (H): ICD-10-CM

## 2020-03-04 LAB
ANION GAP SERPL CALCULATED.3IONS-SCNC: 9.6 MMOL/L (ref 6–17)
BUN SERPL-MCNC: 29 MG/DL (ref 7–30)
CALCIUM SERPL-MCNC: 8.4 MG/DL (ref 8.5–10.5)
CHLORIDE SERPL-SCNC: 106 MMOL/L (ref 98–107)
CO2 SERPL-SCNC: 27 MMOL/L (ref 23–29)
CREAT SERPL-MCNC: 1.6 MG/DL (ref 0.7–1.3)
GFR SERPL CREATININE-BSD FRML MDRD: 42 ML/MIN/{1.73_M2}
GLUCOSE SERPL-MCNC: 95 MG/DL (ref 70–105)
HGB BLD-MCNC: 13 G/DL (ref 13.3–17.7)
INR PPP: 2.74 (ref 0.86–1.14)
POTASSIUM SERPL-SCNC: 4.6 MMOL/L (ref 3.5–5.1)
SODIUM SERPL-SCNC: 138 MMOL/L (ref 136–145)

## 2020-03-04 PROCEDURE — 36415 COLL VENOUS BLD VENIPUNCTURE: CPT | Performed by: INTERNAL MEDICINE

## 2020-03-04 PROCEDURE — 99214 OFFICE O/P EST MOD 30 MIN: CPT | Performed by: NURSE PRACTITIONER

## 2020-03-04 PROCEDURE — 80048 BASIC METABOLIC PNL TOTAL CA: CPT | Performed by: INTERNAL MEDICINE

## 2020-03-04 PROCEDURE — 85610 PROTHROMBIN TIME: CPT | Performed by: INTERNAL MEDICINE

## 2020-03-04 PROCEDURE — 85018 HEMOGLOBIN: CPT | Performed by: INTERNAL MEDICINE

## 2020-03-04 ASSESSMENT — MIFFLIN-ST. JEOR: SCORE: 1611.24

## 2020-03-04 NOTE — LETTER
3/4/2020    Dejon Downs MD  [address]  [facility] [address]    RE: Tyrel Rene       Dear Colleague,    I had the pleasure of seeing Tyrel Rene in the [facility].    HPI and Plan:   See [id number]    No orders of the defined types were placed in this encounter.      No orders of the defined types were placed in this encounter.      Medications Discontinued During This Encounter   Medication Reason     amoxicillin-clavulanate (AUGMENTIN) 875-125 MG tablet Discontinued by another Health Care Provider     doxycycline hyclate (VIBRAMYCIN) 100 MG capsule Discontinued by another Health Care Provider         Encounter Diagnosis   Name Primary?     Chronic systolic congestive heart failure (H)        CURRENT MEDICATIONS:  Current Outpatient Medications   Medication Sig Dispense Refill     acetaminophen (TYLENOL) 500 MG tablet Take 1,000 mg by mouth every 8 hours as needed for mild pain       amiodarone (PACERONE/CODARONE) 200 MG tablet Take 200 mg by mouth daily Will switch to taking daily except for Sundays after patient returns from [place] 90 tablet 3     ASPIRIN NOT PRESCRIBED (INTENTIONAL) continuous prn for other Please choose reason not prescribed, below       carvedilol (COREG) 6.25 MG tablet Take 1 tablet (6.25 mg) by mouth 2 times daily (with meals) 180 tablet 3     digoxin (LANOXIN) 125 MCG tablet Take 1 tablet (125 mcg) by mouth daily 90 tablet 3     famotidine (PEPCID) 20 MG tablet Take 1 tablet (20 mg) by mouth 2 times daily 180 tablet 3     guaiFENesin-codeine 100-10 MG/5ML PO syrup Take 5 mLs by mouth every 4 hours as needed for congestion 120 mL 1     lisinopril (PRINIVIL/ZESTRIL) 5 MG tablet Take 1 tablet (5 mg) by mouth At Bedtime 90 tablet 3     moxifloxacin (AVELOX) 400 MG tablet Take 1 tablet (400 mg) by mouth daily 10 tablet 0     Multiple Vitamins-Minerals (PRESERVISION AREDS 2 PO) Take 1 capsule by mouth 2 times daily       nitroGLYcerin  (NITROSTAT) 0.4 MG sublingual tablet DISSOLVE 1 TABLET UNDER THE TONGUE EVERY 5 MINUTES AS NEEDED FOR CHEST PAIN 25 tablet 0     rosuvastatin (CRESTOR) 20 MG tablet Take 1 tablet (20 mg) by mouth daily 90 tablet 3     sildenafil (VIAGRA) 100 MG tablet Take 1 tablet (100 mg) by mouth daily as needed Take 30 min to 4 hours before intercourse.  Never use with nitroglycerin, terazosin or doxazosin. 16 tablet 3     Vitamin D, Cholecalciferol, 1000 UNITS TABS Take 1,000 mg by mouth daily        warfarin ANTICOAGULANT (COUMADIN) 2.5 MG tablet Take 2.5 mg on Mondays and 1.25mg all other days or as directed by the anticoagulation clinic 75 tablet 3       ALLERGIES     Allergies   Allergen Reactions     Aspirin      Other reaction(s): Aspirin Contraindication (for reporting)  Cardiologist recommended no aspirin as he is on Pradaxa     Nystatin Other (See Comments)     Make his mouth numb & swelling       PAST MEDICAL HISTORY:  Past Medical History:   Diagnosis Date     Atrial fibrillation (H)     s/p Cardioversion 3/14/2013     Atrial flutter (H)     S/p Aflutter ablation 1/11/2011     CAD (coronary artery disease)     CABG x3 10/2012- LIMA to distal LAD, SVG to OM1 & OM3; cath 10/2012- PTCA to second diagonal and mid LAD, BMS to mid LAD     Cardiogenic shock (H)      Cardiomyopathy (H)      ED (erectile dysfunction)      Hypertension      Neuropathy      SVT (supraventricular tachycardia) (H)     S/p dual chamber ICD 10/11/12- upgraded to BIV ICD 6/2013     Syncope        PAST SURGICAL HISTORY:  Past Surgical History:   Procedure Laterality Date     BYPASS GRAFT ARTERY CORONARY  10/2/2012    Procedure: BYPASS GRAFT ARTERY CORONARY;  Coronary Artery Bypass Graft x3 (LAD, Diag, OM) with Endovein [place] (On-Pump);  Surgeon: Yeyo Lyman MD;  Location: [facility] OR     CARDIOVERSION  3/14/2013     CORONARY ANGIOGRAPHY ADULT ORDER  10/2/12     CORONARY ARTERY BYPASS  10/2/12    LIMA to LAD, SVG to OM1 and OM3     EP ABLATION  VT N/A 2019    Procedure: EP Ablation VT;  Surgeon: Suellen Costello MD;  Location:  HEART CARDI[facility] CATH LAB     EP COMPREHENSIVE EP STUDY N/A 2019    Procedure: EP Comprehensive EP Study;  Surgeon: Suellen Costello MD;  Location:  HEART CARDI[facility] CATH LAB     H ABLATION ATRIAL FLUTTER  2011     HAND SURGERY      table saw injury right hand     HEART CATH, ANGIOPLASTY  10/2/12    PTCA to second diagonal and mid LAD, BMS to mid LAD     HERNIA REPAIR       RELOCATE GENERATOR ICD/PACEMAKER         FAMILY HISTORY:  Family History   Problem Relation Age of Onset     Alcohol/Drug Father      Heart Disease Father 71     Alzheimer Disease Sister      Alcohol/Drug Sister      Alcohol/Drug Sister      Gastrointestinal Disease Sister      Obesity Sister      Hypertension Sister      Heart Disease Sister      Hypertension Sister      Heart Disease Daughter         afib     Arrhythmia Daughter      Multiple Sclerosis Daughter      Heart Disease Daughter         afib     Arrhythmia Daughter      Cancer Daughter         lymphoma     Aneurysm Mother        SOCIAL HISTORY:  Social History     Socioeconomic History     Marital status:      Spouse name: None     Number of children: None     Years of education: None     Highest education level: None   Occupational History     None   Social Needs     Financial resource strain: None     Food insecurity:     Worry: None     Inability: None     Transportation needs:     Medical: None     Non-medical: None   Tobacco Use     Smoking status: Former Smoker     Packs/day: 1.00     Years: 14.00     Pack years: 14.00     Types: Cigarettes     Start date:      Last attempt to quit: 7/10/1972     Years since quittin.6     Smokeless tobacco: Never Used   Substance and Sexual Activity     Alcohol use: No     Drug use: No     Sexual activity: Yes     Partners: Female   Lifestyle     Physical activity:     Days per week: None     Minutes per  "session: None     Stress: None   Relationships     Social connections:     Talks on phone: None     Gets together: None     Attends Yazidi service: None     Active member of club or organization: None     Attends meetings of clubs or organizations: None     Relationship status: None     Intimate partner violence:     Fear of current or ex partner: None     Emotionally abused: None     Physically abused: None     Forced sexual activity: None   Other Topics Concern     Parent/sibling w/ CABG, MI or angioplasty before 65F 55M? No      Service Not Asked     Blood Transfusions Not Asked     Caffeine Concern Yes     Comment: 4 cups coffee daily     Occupational Exposure Not Asked     Hobby Hazards Not Asked     Sleep Concern No     Stress Concern No     Weight Concern Yes     Comment: gain 2lbs     Special Diet Yes     Comment: low sodium     Back Care Not Asked     Exercise Yes     Comment: walking, doing sittups, played [employer] ball in summer     Bike Helmet Not Asked     Seat Belt Yes     Self-Exams Not Asked   Social History Narrative     None       Review of Systems:  Skin:  Negative       Eyes:  Positive for glasses    ENT:  Negative      Respiratory:  Positive for dyspnea on exertion;shortness of breath SOB with some activity- no more than usual   Cardiovascular:    dizziness;Positive for;lightheadedness    Gastroenterology: Negative      Genitourinary:  Negative      Musculoskeletal:  Negative      Neurologic:  Negative      Psychiatric:  Negative      Heme/Lymph/Imm:  Negative      Endocrine:  Negative        Physical Exam:  Vitals: /72   Pulse 60   Ht 1.854 m (6' 1\")   Wt 82.7 kg (182 lb 6.4 oz)   SpO2 97%   BMI 24.06 kg/m      Constitutional:  cooperative, alert and oriented, well developed, well nourished, in no acute distress        Skin:  warm and dry to the touch, no apparent skin lesions or masses noted   ICD incision in the left infraclavicular area was well-healed      Head:  " normocephalic, no masses or lesions        Eyes:  pupils equal and round;conjunctivae and lids unremarkable;sclera white;no xanthalasma        Lymph:No Cervical lymphadenopathy present     ENT:  no pallor or cyanosis        Neck:  JVP normal        Respiratory:  clear to auscultation;healed median sternotomy scar         Cardiac: regular rhythm;no murmurs, gallops or rubs detected                not assessed this visit                                        GI:  not assessed this visit        Extremities and Muscular Skeletal:  no edema              Neurological:  no gross motor deficits;affect appropriate        Psych:  Alert and Oriented x 3;affect appropriate, oriented to time, person and place        CC  Parish Newman MD  [address]  [place] [address]                Service Date: 03/04/2020      PRIMARY CARDIOLOGIST:  Parish Newman MD      PRIMARY ELECTROPHYSIOLOGIST:  Sulelen Costello MD      REASON FOR VISIT:  Followup on chronic severe ischemic cardiomyopathy, chronic systolic heart failure and chronic atrial fibrillation.      HISTORY OF PRESENT ILLNESS:  I had the pleasure of seeing Mr. Tyrel Rene in the [facility] regarding his chronic severe ischemic cardiomyopathy, chronic systolic heart failure and chronic atrial fibrillation.  He suffered an extensive anterior wall myocardial infarction in the past with a relatively stable ejection fraction over the last several years of 25%-30%.  He has a biventricular ICD in place and chronic atrial fibrillation.  He has had episodes of recurrent ventricular tachycardia, initially treated with oral amiodarone, but unfortunately had breakthrough episodes of sustained ventricular tachycardia causing syncope and ICD shocks.  Eventually, he underwent ablation of his VT in 01/2019.  He recently returned to see Dr. Costello 02/04/2020 after 13 months from his VT ablation in 01/2019.  He has done well with no further ICD shocks.  Only nonsustained VT has  been recorded through his ICD.  He has been maintained on amiodarone 200 mg a day.  Dr. Costello suggested he decrease amiodarone to 6 days per week (skip Sundays), but the patient preferred to decrease the dose after he vacations in [place] for 6 weeks this spring.  He did have pulmonary function tests done on 02/06/2020.  It appears that his DLCO was stable.      He returns today in followup.      He was recently admitted on 02/12/2020 with complaints of generalized weakness, nonproductive cough, chills.  He was diagnosed with right middle and lower lobe pneumonia and treated with antibiotics.  He was discharged on 02/14/2020.  He was then seen by Dr. Downs, his primary care doctor, who placed him on a second round of antibiotics due to symptoms.  He placed him on Avelox 400 mg daily for 10 days.  He has followup scheduled 2 weeks from 02/29.  The patient tells me today that since starting the second round of antibiotics, he is feeling much better.  His significant cough has improved.  His weight has been stable.  He has not lost his appetite through this.  He denies any defibrillator shocks.      He denies chest pain symptoms or increased shortness of breath.      PHYSICAL EXAMINATION:   VITAL SIGNS:  Blood pressure 116/72, heart rate 60, and weight is 182 pounds in the clinic and 174 pounds at home.   LUNGS:  Clear now.     HEART:  Rhythm is regular.  He has no significant cardiac murmurs.   EXTREMITIES:  No peripheral edema.      DIAGNOSTICS:   Last echocardiogram 08/05/2019 shows a stable degree of severe left ventricular dysfunction with extensive anterior, anteroseptal and apical wall motion abnormality, borderline left ventricular dilatation with an ejection fraction 25%-30% with mild mitral and aortic valve regurgitation.      Basic metabolic panel:  Sodium 138, potassium 4.6, BUN 29, creatinine 1.60, GFR 42.      IMPRESSION AND PLAN:  Mr. Tyrel Rene is a 74-year-old gentleman with a severe ischemic  cardiomyopathy with a stable ejection fraction of 25%-30%, appears to be euvolemic today.  He has had issues with recurrent ventricular tachycardia causing syncope and ICD shocks.  Fortunately, since his V tach ablation in 2019, he has done much better without any recurrent episodes.  He remains on low-dose amiodarone.  He was recently seen by Dr. Costello, who suggested to decrease the amiodarone and skip Sundays.  The patient has not done yes.  He also told Dr. Costello that he preferred to decrease the dose after he returns from vacationing in [place] for 6 weeks this spring.  The patient has a device check this Friday.  It appears that his symptoms post pneumonia are improving.  He has followup with Dr. Downs.      Dizziness: Continues to have significant dizziness. He tell me he has had dizziness since starting Amiodarone. He stopped playing [employer] ball recently due to dizziness.      The patient will follow up with Dr. Newman in 2020.     Follow up with me in 2020          JAMAL CHRISTIE, JILL             D: 2020   T: 2020   MT: kelly      Name:     ARTEM ELIZALDE   MRN:      [id number]        Account:      [id number]   :      1945           Service Date: 2020      Document: [id number]        Thank you for allowing me to participate in the care of your patient.      Sincerely,     JAMAL Christie CNP     [facility]    cc:   Parish Newman MD  [address]  [place] [address]

## 2020-03-04 NOTE — TELEPHONE ENCOUNTER
ANTICOAGULATION MANAGEMENT     Patient Name:  Tyrel Rene  Date:  3/4/2020    ASSESSMENT /SUBJECTIVE:      Today's INR result of 2.74 is therapeutic. Goal INR of 2.0-2.5      Warfarin dose taken: Warfarin taken as previously instructed    Diet: No new diet changes affecting INR    Medication changes/ interactions: Interaction between MOXIFLOXACIN  and warfarin may be affecting INR, Has five days left of a 10 day course    Previous INR: Supratherapeutic     S/S of bleeding or thromboembolism: No    New injury or illness:  Yes: still dealing with pneumonia    Upcoming surgery, procedure or cardioversion:  No    Additional findings: N0      PLAN:    Spoke with Marko regarding INR result and instructed:     Warfarin Dosing Instructions: Continue your current warfarin dose    Instructed patient to follow up no later than: 1 week  ACC RN visit scheduled    Education provided: Yes: potential interaction of moxifloxacin and warfarin  Marko agrees to slightly increase greens today or tomorrow to help lower his INR. He states he would rather not take a lower dose    Marko verbalizes understanding and agrees to warfarin dosing plan.    Instructed to call the Anticoagulation Clinic for any changes, questions or concerns. (#[phone])        OBJECTIVE:  INR   Date Value Ref Range Status   2020 2.74 (H) 0.86 - 1.14 Final             Anticoagulation Summary  As of 3/4/2020    INR goal:   2.0-2.5   TTR:   29.6 % (12 mo)   INR used for dosin.74! (3/4/2020)   Warfarin maintenance plan:   2.5 mg (2.5 mg x 1) every Mon; 1.25 mg (2.5 mg x 0.5) all other days   Full warfarin instructions:   2.5 mg every Mon; 1.25 mg all other days   Weekly warfarin total:   10 mg   Plan last modified:   Doreen Finley, RN (3/22/2019)   Next INR check:   3/11/2020   Priority:   High   Target end date:   Indefinite    Indications    Long-term (current) use of anticoagulants [Z79.01] [Z79.01]  Cerebral artery occlusion with cerebral infarction  (HCC) [I63.50] [I63.50]  Cerebral infarction (H) [I63.9]             Anticoagulation Episode Summary     INR check location:   Anticoagulation Clinic    Preferred lab:       Send INR reminders to:   HANS ALONZO    Comments:         Anticoagulation Care Providers     Provider Role Specialty Phone number    Dejon Downs MD [facility] [phone]

## 2020-03-04 NOTE — PATIENT INSTRUCTIONS
Call [facility] nurse for any questions or concerns Mon-Fri 8am-4pm:                                                #[phone]                                       For concerns after hours:                                               #[phone]     1: Medication changes: No changes  2: Plan from today: Device check on Friday. see Dr. Newman in August, call for an appt early June   Call the INR nurse back   3: Lab results: see attached;   Component      Latest Ref Rng & Units 2/12/2020 3/4/2020   Sodium      136 - 145 mmol/L 139 138   Potassium      3.5 - 5.1 mmol/L 4.7 4.6   Chloride      98 - 107 mmol/L 109 106   Carbon Dioxide      23 - 29 mmol/L 27 27   Anion Gap      6 - 17 mmol/L 3 9.6   Glucose      70 - 105 mg/dL 96 95   Urea Nitrogen      7 - 30 mg/dL 26 29   Creatinine      0.70 - 1.30 mg/dL 1.59 (H) 1.60 (H)   GFR Estimate      >60 mL/min/1.73:m2 41 (L) 42 (L)   GFR Estimate If Black      >60 mL/min/1.73:m2 48 (L) 51 (L)   Calcium      8.5 - 10.5 mg/dL 8.7 8.4 (L)   Bilirubin Total      0.2 - 1.3 mg/dL 1.0    Albumin      3.4 - 5.0 g/dL 3.5    Protein Total      6.8 - 8.8 g/dL 7.1    Alkaline Phosphatase      40 - 150 U/L 59    ALT      0 - 70 U/L 28    AST      0 - 45 U/L 21    Hemoglobin      13.3 - 17.7 g/dL  13.0 (L)   INR      0.86 - 1.14  2.74 (H)

## 2020-03-04 NOTE — LETTER
3/4/2020      Dejon Downs MD  [address]  [facility] [address]      RE: Tyrel Rene       Dear Colleague,    I had the pleasure of seeing Tyrel Rene in the [facility].    Service Date: 03/04/2020      PRIMARY CARDIOLOGIST:  Parish Newman MD      PRIMARY ELECTROPHYSIOLOGIST:  Suellen Costello MD      REASON FOR VISIT:  Followup on chronic severe ischemic cardiomyopathy, chronic systolic heart failure and chronic atrial fibrillation.      HISTORY OF PRESENT ILLNESS:  I had the pleasure of seeing Mr. Tyrel Rene in the [facility] regarding his chronic severe ischemic cardiomyopathy, chronic systolic heart failure and chronic atrial fibrillation.  He suffered an extensive anterior wall myocardial infarction in the past with a relatively stable ejection fraction over the last several years of 25%-30%.  He has a biventricular ICD in place and chronic atrial fibrillation.  He has had episodes of recurrent ventricular tachycardia, initially treated with oral amiodarone, but unfortunately had breakthrough episodes of sustained ventricular tachycardia causing syncope and ICD shocks.  Eventually, he underwent ablation of his VT in 01/2019.  He recently returned to see Dr. Costello 02/04/2020 after 13 months from his VT ablation in 01/2019.  He has done well with no further ICD shocks.  Only nonsustained VT has been recorded through his ICD.  He has been maintained on amiodarone 200 mg a day.  Dr. Costello suggested he decrease amiodarone to 6 days per week (skip Sundays), but the patient preferred to decrease the dose after he vacations in [place] for 6 weeks this spring.  He did have pulmonary function tests done on 02/06/2020.  It appears that his DLCO was stable.      He returns today in followup.      He was recently admitted on 02/12/2020 with complaints of generalized weakness, nonproductive cough, chills.  He was diagnosed with right middle and lower lobe pneumonia and  treated with antibiotics.  He was discharged on 02/14/2020.  He was then seen by Dr. Downs, his primary care doctor, who placed him on a second round of antibiotics due to symptoms.  He placed him on Avelox 400 mg daily for 10 days.  He has followup scheduled 2 weeks from 02/29.  The patient tells me today that since starting the second round of antibiotics, he is feeling much better.  His significant cough has improved.  His weight has been stable.  He has not lost his appetite through this.  He denies any defibrillator shocks.      He denies chest pain symptoms or increased shortness of breath.      PHYSICAL EXAMINATION:   VITAL SIGNS:  Blood pressure 116/72, heart rate 60, and weight is 182 pounds in the clinic and 174 pounds at home.   LUNGS:  Clear now.     HEART:  Rhythm is regular.  He has no significant cardiac murmurs.   EXTREMITIES:  No peripheral edema.      DIAGNOSTICS:   Last echocardiogram 08/05/2019 shows a stable degree of severe left ventricular dysfunction with extensive anterior, anteroseptal and apical wall motion abnormality, borderline left ventricular dilatation with an ejection fraction 25%-30% with mild mitral and aortic valve regurgitation.      Basic metabolic panel:  Sodium 138, potassium 4.6, BUN 29, creatinine 1.60, GFR 42.      IMPRESSION AND PLAN:  Mr. Tyrel Rene is a 74-year-old gentleman with a severe ischemic cardiomyopathy with a stable ejection fraction of 25%-30%, appears to be euvolemic today.  He has had issues with recurrent ventricular tachycardia causing syncope and ICD shocks.  Fortunately, since his V tach ablation in 01/2019, he has done much better without any recurrent episodes.  He remains on low-dose amiodarone.  He was recently seen by Dr. Costello, who suggested to decrease the amiodarone and skip Sundays.  The patient has not done yes.  He also told Dr. Costello that he preferred to decrease the dose after he returns from vacationing in [place] for 6 weeks this  spring.  The patient has a device check this Friday.  It appears that his symptoms post pneumonia are improving.  He has followup with Dr. Downs.      Dizziness: Continues to have significant dizziness. He tell me he has had dizziness since starting Amiodarone. He stopped playing [employer] ball recently due to dizziness.      The patient will follow up with Dr. Newman in 2020.     Follow up with me in 2020          JAMAL BOOTHE, JILL             D: 2020   T: 2020   MT: kelly      Name:     ARTEM ELIZALDE   MRN:      [id number]        Account:      [id number]   :      1945           Service Date: 2020      Document: [id number]           Outpatient Encounter Medications as of 3/4/2020   Medication Sig Dispense Refill     acetaminophen (TYLENOL) 500 MG tablet Take 1,000 mg by mouth every 8 hours as needed for mild pain       amiodarone (PACERONE/CODARONE) 200 MG tablet Take 200 mg by mouth daily Will switch to taking daily except for Sundays after patient returns from [place] 90 tablet 3     ASPIRIN NOT PRESCRIBED (INTENTIONAL) continuous prn for other Please choose reason not prescribed, below       carvedilol (COREG) 6.25 MG tablet Take 1 tablet (6.25 mg) by mouth 2 times daily (with meals) 180 tablet 3     digoxin (LANOXIN) 125 MCG tablet Take 1 tablet (125 mcg) by mouth daily 90 tablet 3     famotidine (PEPCID) 20 MG tablet Take 1 tablet (20 mg) by mouth 2 times daily 180 tablet 3     guaiFENesin-codeine 100-10 MG/5ML PO syrup Take 5 mLs by mouth every 4 hours as needed for congestion 120 mL 1     lisinopril (PRINIVIL/ZESTRIL) 5 MG tablet Take 1 tablet (5 mg) by mouth At Bedtime 90 tablet 3     moxifloxacin (AVELOX) 400 MG tablet Take 1 tablet (400 mg) by mouth daily 10 tablet 0     Multiple Vitamins-Minerals (PRESERVISION AREDS 2 PO) Take 1 capsule by mouth 2 times daily       nitroGLYcerin (NITROSTAT) 0.4 MG sublingual tablet DISSOLVE 1 TABLET UNDER THE TONGUE EVERY  5 MINUTES AS NEEDED FOR CHEST PAIN 25 tablet 0     rosuvastatin (CRESTOR) 20 MG tablet Take 1 tablet (20 mg) by mouth daily 90 tablet 3     sildenafil (VIAGRA) 100 MG tablet Take 1 tablet (100 mg) by mouth daily as needed Take 30 min to 4 hours before intercourse.  Never use with nitroglycerin, terazosin or doxazosin. 16 tablet 3     Vitamin D, Cholecalciferol, 1000 UNITS TABS Take 1,000 mg by mouth daily        warfarin ANTICOAGULANT (COUMADIN) 2.5 MG tablet Take 2.5 mg on Mondays and 1.25mg all other days or as directed by the anticoagulation clinic 75 tablet 3     [DISCONTINUED] amoxicillin-clavulanate (AUGMENTIN) 875-125 MG tablet Take 1 tablet by mouth 2 times daily for 5 days 10 tablet 0     [DISCONTINUED] doxycycline hyclate (VIBRAMYCIN) 100 MG capsule Take 1 capsule (100 mg) by mouth 2 times daily for 5 days 10 capsule 0     No facility-administered encounter medications on file as of 3/4/2020.        Again, thank you for allowing me to participate in the care of your patient.      Sincerely,    JAMAL Christie [facility]

## 2020-03-04 NOTE — TELEPHONE ENCOUNTER
Anticoagulation Management    Unable to reach Marko today.    Today's INR result of 2.74 is supratherapeutic (goal INR of 2.0-2.5).  Result received from: Clinic Lab    Follow up required to confirm warfarin dose taken and assess for changes    Left message to call back to discuss.      Anticoagulation clinic to follow up    Sheila Cid RN

## 2020-03-05 NOTE — PROGRESS NOTES
Service Date: 03/04/2020      PRIMARY CARDIOLOGIST:  Parish Newman MD      PRIMARY ELECTROPHYSIOLOGIST:  Suellen Costello MD      REASON FOR VISIT:  Followup on chronic severe ischemic cardiomyopathy, chronic systolic heart failure and chronic atrial fibrillation.      HISTORY OF PRESENT ILLNESS:  I had the pleasure of seeing Mr. Tyrel Rene in the [facility] regarding his chronic severe ischemic cardiomyopathy, chronic systolic heart failure and chronic atrial fibrillation.  He suffered an extensive anterior wall myocardial infarction in the past with a relatively stable ejection fraction over the last several years of 25%-30%.  He has a biventricular ICD in place and chronic atrial fibrillation.  He has had episodes of recurrent ventricular tachycardia, initially treated with oral amiodarone, but unfortunately had breakthrough episodes of sustained ventricular tachycardia causing syncope and ICD shocks.  Eventually, he underwent ablation of his VT in 01/2019.  He recently returned to see Dr. Costello 02/04/2020 after 13 months from his VT ablation in 01/2019.  He has done well with no further ICD shocks.  Only nonsustained VT has been recorded through his ICD.  He has been maintained on amiodarone 200 mg a day.  Dr. Costello suggested he decrease amiodarone to 6 days per week (skip Sundays), but the patient preferred to decrease the dose after he vacations in [place] for 6 weeks this spring.  He did have pulmonary function tests done on 02/06/2020.  It appears that his DLCO was stable.      He returns today in followup.      He was recently admitted on 02/12/2020 with complaints of generalized weakness, nonproductive cough, chills.  He was diagnosed with right middle and lower lobe pneumonia and treated with antibiotics.  He was discharged on 02/14/2020.  He was then seen by Dr. Downs, his primary care doctor, who placed him on a second round of antibiotics due to symptoms.  He placed him on Avelox 400 mg daily  for 10 days.  He has followup scheduled 2 weeks from 02/29.  The patient tells me today that since starting the second round of antibiotics, he is feeling much better.  His significant cough has improved.  His weight has been stable.  He has not lost his appetite through this.  He denies any defibrillator shocks.      He denies chest pain symptoms or increased shortness of breath.      PHYSICAL EXAMINATION:   VITAL SIGNS:  Blood pressure 116/72, heart rate 60, and weight is 182 pounds in the clinic and 174 pounds at home.   LUNGS:  Clear now.     HEART:  Rhythm is regular.  He has no significant cardiac murmurs.   EXTREMITIES:  No peripheral edema.      DIAGNOSTICS:   Last echocardiogram 08/05/2019 shows a stable degree of severe left ventricular dysfunction with extensive anterior, anteroseptal and apical wall motion abnormality, borderline left ventricular dilatation with an ejection fraction 25%-30% with mild mitral and aortic valve regurgitation.      Basic metabolic panel:  Sodium 138, potassium 4.6, BUN 29, creatinine 1.60, GFR 42.      IMPRESSION AND PLAN:  Mr. Tyrel Rene is a 74-year-old gentleman with a severe ischemic cardiomyopathy with a stable ejection fraction of 25%-30%, appears to be euvolemic today.  He has had issues with recurrent ventricular tachycardia causing syncope and ICD shocks.  Fortunately, since his V tach ablation in 01/2019, he has done much better without any recurrent episodes.  He remains on low-dose amiodarone.  He was recently seen by Dr. Costello, who suggested to decrease the amiodarone and skip Sundays.  The patient has not done yes.  He also told Dr. Costello that he preferred to decrease the dose after he returns from vacationing in [place] for 6 weeks this spring.  The patient has a device check this Friday.  It appears that his symptoms post pneumonia are improving.  He has followup with Dr. Downs.      Dizziness: Continues to have significant dizziness. He tell me he has had  dizziness since starting Amiodarone. He stopped playing Pickle ball recently due to dizziness.      The patient will follow up with Dr. Newman in 2020.     Follow up with me in 2020          JAMAL BOOTHE, CNP             D: 2020   T: 2020   MT: kelly      Name:     ARTEM ELIZALDE   MRN:      [id number]        Account:      [id number]   :      1945           Service Date: 2020      Document: [id number]

## 2020-03-05 NOTE — PROGRESS NOTES
HPI and Plan:   See [id number]    No orders of the defined types were placed in this encounter.      No orders of the defined types were placed in this encounter.      Medications Discontinued During This Encounter   Medication Reason     amoxicillin-clavulanate (AUGMENTIN) 875-125 MG tablet Discontinued by another Health Care Provider     doxycycline hyclate (VIBRAMYCIN) 100 MG capsule Discontinued by another Health Care Provider         Encounter Diagnosis   Name Primary?     Chronic systolic congestive heart failure (H)        CURRENT MEDICATIONS:  Current Outpatient Medications   Medication Sig Dispense Refill     acetaminophen (TYLENOL) 500 MG tablet Take 1,000 mg by mouth every 8 hours as needed for mild pain       amiodarone (PACERONE/CODARONE) 200 MG tablet Take 200 mg by mouth daily Will switch to taking daily except for Sundays after patient returns from [place] 90 tablet 3     ASPIRIN NOT PRESCRIBED (INTENTIONAL) continuous prn for other Please choose reason not prescribed, below       carvedilol (COREG) 6.25 MG tablet Take 1 tablet (6.25 mg) by mouth 2 times daily (with meals) 180 tablet 3     digoxin (LANOXIN) 125 MCG tablet Take 1 tablet (125 mcg) by mouth daily 90 tablet 3     famotidine (PEPCID) 20 MG tablet Take 1 tablet (20 mg) by mouth 2 times daily 180 tablet 3     guaiFENesin-codeine 100-10 MG/5ML PO syrup Take 5 mLs by mouth every 4 hours as needed for congestion 120 mL 1     lisinopril (PRINIVIL/ZESTRIL) 5 MG tablet Take 1 tablet (5 mg) by mouth At Bedtime 90 tablet 3     moxifloxacin (AVELOX) 400 MG tablet Take 1 tablet (400 mg) by mouth daily 10 tablet 0     Multiple Vitamins-Minerals (PRESERVISION AREDS 2 PO) Take 1 capsule by mouth 2 times daily       nitroGLYcerin (NITROSTAT) 0.4 MG sublingual tablet DISSOLVE 1 TABLET UNDER THE TONGUE EVERY 5 MINUTES AS NEEDED FOR CHEST PAIN 25 tablet 0     rosuvastatin (CRESTOR) 20 MG tablet Take 1 tablet (20 mg) by mouth daily 90 tablet 3      sildenafil (VIAGRA) 100 MG tablet Take 1 tablet (100 mg) by mouth daily as needed Take 30 min to 4 hours before intercourse.  Never use with nitroglycerin, terazosin or doxazosin. 16 tablet 3     Vitamin D, Cholecalciferol, 1000 UNITS TABS Take 1,000 mg by mouth daily        warfarin ANTICOAGULANT (COUMADIN) 2.5 MG tablet Take 2.5 mg on Mondays and 1.25mg all other days or as directed by the anticoagulation clinic 75 tablet 3       ALLERGIES     Allergies   Allergen Reactions     Aspirin      Other reaction(s): Aspirin Contraindication (for reporting)  Cardiologist recommended no aspirin as he is on Pradaxa     Nystatin Other (See Comments)     Make his mouth numb & swelling       PAST MEDICAL HISTORY:  Past Medical History:   Diagnosis Date     Atrial fibrillation (H)     s/p Cardioversion 3/14/2013     Atrial flutter (H)     S/p Aflutter ablation 1/11/2011     CAD (coronary artery disease)     CABG x3 10/2012- LIMA to distal LAD, SVG to OM1 & OM3; cath 10/2012- PTCA to second diagonal and mid LAD, BMS to mid LAD     Cardiogenic shock (H)      Cardiomyopathy (H)      ED (erectile dysfunction)      Hypertension      Neuropathy      SVT (supraventricular tachycardia) (H)     S/p dual chamber ICD 10/11/12- upgraded to BIV ICD 6/2013     Syncope        PAST SURGICAL HISTORY:  Past Surgical History:   Procedure Laterality Date     BYPASS GRAFT ARTERY CORONARY  10/2/2012    Procedure: BYPASS GRAFT ARTERY CORONARY;  Coronary Artery Bypass Graft x3 (LAD, Diag, OM) with Endovein [place] (On-Pump);  Surgeon: Yeyo Lyman MD;  Location:  OR     CARDIOVERSION  3/14/2013     CORONARY ANGIOGRAPHY ADULT ORDER  10/2/12     CORONARY ARTERY BYPASS  10/2/12    LIMA to LAD, SVG to OM1 and OM3     EP ABLATION VT N/A 1/2/2019    Procedure: EP Ablation VT;  Surgeon: Suellen Costello MD;  Location:  HEART CARDIAC CATH LAB     EP COMPREHENSIVE EP STUDY N/A 1/2/2019    Procedure: EP Comprehensive EP Study;   Surgeon: Suellen Costello MD;  Location:  HEART CARDIAC CATH LAB     H ABLATION ATRIAL FLUTTER  2011     HAND SURGERY      table saw injury right hand     HEART CATH, ANGIOPLASTY  10/2/12    PTCA to second diagonal and mid LAD, BMS to mid LAD     HERNIA REPAIR       RELOCATE GENERATOR ICD/PACEMAKER         FAMILY HISTORY:  Family History   Problem Relation Age of Onset     Alcohol/Drug Father      Heart Disease Father 71     Alzheimer Disease Sister      Alcohol/Drug Sister      Alcohol/Drug Sister      Gastrointestinal Disease Sister      Obesity Sister      Hypertension Sister      Heart Disease Sister      Hypertension Sister      Heart Disease Daughter         afib     Arrhythmia Daughter      Multiple Sclerosis Daughter      Heart Disease Daughter         afib     Arrhythmia Daughter      Cancer Daughter         lymphoma     Aneurysm Mother        SOCIAL HISTORY:  Social History     Socioeconomic History     Marital status:      Spouse name: None     Number of children: None     Years of education: None     Highest education level: None   Occupational History     None   Social Needs     Financial resource strain: None     Food insecurity:     Worry: None     Inability: None     Transportation needs:     Medical: None     Non-medical: None   Tobacco Use     Smoking status: Former Smoker     Packs/day: 1.00     Years: 14.00     Pack years: 14.00     Types: Cigarettes     Start date:      Last attempt to quit: 7/10/1972     Years since quittin.6     Smokeless tobacco: Never Used   Substance and Sexual Activity     Alcohol use: No     Drug use: No     Sexual activity: Yes     Partners: Female   Lifestyle     Physical activity:     Days per week: None     Minutes per session: None     Stress: None   Relationships     Social connections:     Talks on phone: None     Gets together: None     Attends [employer] service: None     Active member of club or organization: None     Attends  "meetings of clubs or organizations: None     Relationship status: None     Intimate partner violence:     Fear of current or ex partner: None     Emotionally abused: None     Physically abused: None     Forced sexual activity: None   Other Topics Concern     Parent/sibling w/ CABG, MI or angioplasty before 65F 55M? No      Service Not Asked     Blood Transfusions Not Asked     Caffeine Concern Yes     Comment: 4 cups coffee daily     Occupational Exposure Not Asked     Hobby Hazards Not Asked     Sleep Concern No     Stress Concern No     Weight Concern Yes     Comment: gain 2lbs     Special Diet Yes     Comment: low sodium     Back Care Not Asked     Exercise Yes     Comment: walking, doing sittups, played "[employer] ball in summer     Bike Helmet Not Asked     Seat Belt Yes     Self-Exams Not Asked   Social History Narrative     None       Review of Systems:  Skin:  Negative       Eyes:  Positive for glasses    ENT:  Negative      Respiratory:  Positive for dyspnea on exertion;shortness of breath SOB with some activity- no more than usual   Cardiovascular:    dizziness;Positive for;lightheadedness    Gastroenterology: Negative      Genitourinary:  Negative      Musculoskeletal:  Negative      Neurologic:  Negative      Psychiatric:  Negative      Heme/Lymph/Imm:  Negative      Endocrine:  Negative        Physical Exam:  Vitals: /72   Pulse 60   Ht 1.854 m (6' 1\")   Wt 82.7 kg (182 lb 6.4 oz)   SpO2 97%   BMI 24.06 kg/m      Constitutional:  cooperative, alert and oriented, well developed, well nourished, in no acute distress        Skin:  warm and dry to the touch, no apparent skin lesions or masses noted   ICD incision in the left infraclavicular area was well-healed      Head:  normocephalic, no masses or lesions        Eyes:  pupils equal and round;conjunctivae and lids unremarkable;sclera white;no xanthalasma        Lymph:No Cervical lymphadenopathy present     ENT:  no pallor or cyanosis    "     Neck:  JVP normal        Respiratory:  clear to auscultation;healed median sternotomy scar         Cardiac: regular rhythm;no murmurs, gallops or rubs detected                not assessed this visit                                        GI:  not assessed this visit        Extremities and Muscular Skeletal:  no edema              Neurological:  no gross motor deficits;affect appropriate        Psych:  Alert and Oriented x 3;affect appropriate, oriented to time, person and place        CC  Parish Newman MD  [address]  [place] [id number]

## 2020-03-06 ENCOUNTER — ANCILLARY PROCEDURE (OUTPATIENT)
Dept: CARDIOLOGY | Facility: CLINIC | Age: 75
End: 2020-03-06
Attending: INTERNAL MEDICINE
Payer: COMMERCIAL

## 2020-03-06 DIAGNOSIS — Z95.810 ICD (IMPLANTABLE CARDIOVERTER-DEFIBRILLATOR) IN PLACE: ICD-10-CM

## 2020-03-06 DIAGNOSIS — E78.2 MIXED HYPERLIPIDEMIA: Primary | ICD-10-CM

## 2020-03-06 DIAGNOSIS — I25.5 ISCHEMIC CARDIOMYOPATHY: ICD-10-CM

## 2020-03-06 PROCEDURE — 93284 PRGRMG EVAL IMPLANTABLE DFB: CPT | Performed by: INTERNAL MEDICINE

## 2020-03-08 DIAGNOSIS — I50.22 CHRONIC SYSTOLIC CONGESTIVE HEART FAILURE (H): Primary | ICD-10-CM

## 2020-03-11 ENCOUNTER — ANTICOAGULATION THERAPY VISIT (OUTPATIENT)
Dept: NURSING | Facility: CLINIC | Age: 75
End: 2020-03-11
Payer: COMMERCIAL

## 2020-03-11 DIAGNOSIS — I63.50 CEREBRAL ARTERY OCCLUSION WITH CEREBRAL INFARCTION (H): ICD-10-CM

## 2020-03-11 DIAGNOSIS — I63.9 CEREBRAL INFARCTION (H): ICD-10-CM

## 2020-03-11 DIAGNOSIS — Z79.01 LONG TERM CURRENT USE OF ANTICOAGULANT THERAPY: ICD-10-CM

## 2020-03-11 LAB
INR POINT OF CARE: 3.3 (ref 0.86–1.14)
MDC_IDC_LEAD_IMPLANT_DT: NORMAL
MDC_IDC_LEAD_LOCATION: NORMAL
MDC_IDC_LEAD_LOCATION_DETAIL_1: NORMAL
MDC_IDC_LEAD_MFG: NORMAL
MDC_IDC_LEAD_MODEL: NORMAL
MDC_IDC_LEAD_POLARITY_TYPE: NORMAL
MDC_IDC_LEAD_SERIAL: NORMAL
MDC_IDC_MSMT_BATTERY_STATUS: NORMAL
MDC_IDC_MSMT_CAP_CHARGE_DTM: NORMAL
MDC_IDC_MSMT_CAP_CHARGE_ENERGY: 41 J
MDC_IDC_MSMT_CAP_CHARGE_TIME: 11.59 S
MDC_IDC_MSMT_CAP_CHARGE_TIME: 8.2
MDC_IDC_MSMT_CAP_CHARGE_TYPE: NORMAL
MDC_IDC_MSMT_CAP_CHARGE_TYPE: NORMAL
MDC_IDC_MSMT_LEADCHNL_LV_IMPEDANCE_VALUE: 398 OHM
MDC_IDC_MSMT_LEADCHNL_LV_PACING_THRESHOLD_AMPLITUDE: 0.7 V
MDC_IDC_MSMT_LEADCHNL_LV_PACING_THRESHOLD_PULSEWIDTH: 0.5 MS
MDC_IDC_MSMT_LEADCHNL_LV_SENSING_INTR_AMPL: NORMAL
MDC_IDC_MSMT_LEADCHNL_RA_IMPEDANCE_VALUE: 548 OHM
MDC_IDC_MSMT_LEADCHNL_RA_PACING_THRESHOLD_AMPLITUDE: 0.4 V
MDC_IDC_MSMT_LEADCHNL_RA_PACING_THRESHOLD_PULSEWIDTH: 0.5 MS
MDC_IDC_MSMT_LEADCHNL_RA_SENSING_INTR_AMPL: 1.5 MV
MDC_IDC_MSMT_LEADCHNL_RV_IMPEDANCE_VALUE: 417 OHM
MDC_IDC_MSMT_LEADCHNL_RV_PACING_THRESHOLD_AMPLITUDE: 0.8 V
MDC_IDC_MSMT_LEADCHNL_RV_PACING_THRESHOLD_PULSEWIDTH: 0.5 MS
MDC_IDC_MSMT_LEADCHNL_RV_SENSING_INTR_AMPL: NORMAL
MDC_IDC_PG_IMPLANT_DTM: NORMAL
MDC_IDC_PG_MFG: NORMAL
MDC_IDC_PG_MODEL: NORMAL
MDC_IDC_PG_SERIAL: NORMAL
MDC_IDC_PG_TYPE: NORMAL
MDC_IDC_SESS_CLINIC_NAME: NORMAL
MDC_IDC_SESS_DTM: NORMAL
MDC_IDC_SESS_TYPE: NORMAL
MDC_IDC_SET_BRADY_AT_MODE_SWITCH_MODE: NORMAL
MDC_IDC_SET_BRADY_LOWRATE: 65 {BEATS}/MIN
MDC_IDC_SET_BRADY_MAX_SENSOR_RATE: 120 {BEATS}/MIN
MDC_IDC_SET_BRADY_MODE: NORMAL
MDC_IDC_SET_BRADY_PAV_DELAY_HIGH: 150 MS
MDC_IDC_SET_BRADY_PAV_DELAY_LOW: 150 MS
MDC_IDC_SET_BRADY_SAV_DELAY_LOW: 120 MS
MDC_IDC_SET_CRT_LVRV_DELAY: 0 MS
MDC_IDC_SET_CRT_PACED_CHAMBERS: NORMAL
MDC_IDC_SET_LEADCHNL_LV_PACING_AMPLITUDE: 1.5 V
MDC_IDC_SET_LEADCHNL_LV_PACING_ANODE_ELECTRODE_1: NORMAL
MDC_IDC_SET_LEADCHNL_LV_PACING_ANODE_LOCATION_1: NORMAL
MDC_IDC_SET_LEADCHNL_LV_PACING_CAPTURE_MODE: NORMAL
MDC_IDC_SET_LEADCHNL_LV_PACING_CATHODE_ELECTRODE_1: NORMAL
MDC_IDC_SET_LEADCHNL_LV_PACING_CATHODE_LOCATION_1: NORMAL
MDC_IDC_SET_LEADCHNL_LV_PACING_POLARITY: NORMAL
MDC_IDC_SET_LEADCHNL_LV_PACING_PULSEWIDTH: 0.5 MS
MDC_IDC_SET_LEADCHNL_LV_SENSING_ADAPTATION_MODE: NORMAL
MDC_IDC_SET_LEADCHNL_LV_SENSING_ANODE_ELECTRODE_1: NORMAL
MDC_IDC_SET_LEADCHNL_LV_SENSING_ANODE_LOCATION_1: NORMAL
MDC_IDC_SET_LEADCHNL_LV_SENSING_CATHODE_ELECTRODE_1: NORMAL
MDC_IDC_SET_LEADCHNL_LV_SENSING_CATHODE_LOCATION_1: NORMAL
MDC_IDC_SET_LEADCHNL_LV_SENSING_POLARITY: NORMAL
MDC_IDC_SET_LEADCHNL_LV_SENSING_SENSITIVITY: 1 MV
MDC_IDC_SET_LEADCHNL_RA_PACING_POLARITY: NORMAL
MDC_IDC_SET_LEADCHNL_RA_SENSING_ADAPTATION_MODE: NORMAL
MDC_IDC_SET_LEADCHNL_RA_SENSING_POLARITY: NORMAL
MDC_IDC_SET_LEADCHNL_RA_SENSING_SENSITIVITY: 0.15 MV
MDC_IDC_SET_LEADCHNL_RV_PACING_AMPLITUDE: 2.5 V
MDC_IDC_SET_LEADCHNL_RV_PACING_CAPTURE_MODE: NORMAL
MDC_IDC_SET_LEADCHNL_RV_PACING_POLARITY: NORMAL
MDC_IDC_SET_LEADCHNL_RV_PACING_PULSEWIDTH: 0.5 MS
MDC_IDC_SET_LEADCHNL_RV_SENSING_ADAPTATION_MODE: NORMAL
MDC_IDC_SET_LEADCHNL_RV_SENSING_POLARITY: NORMAL
MDC_IDC_SET_LEADCHNL_RV_SENSING_SENSITIVITY: 0.6 MV
MDC_IDC_SET_ZONE_DETECTION_INTERVAL: 273 MS
MDC_IDC_SET_ZONE_DETECTION_INTERVAL: 353 MS
MDC_IDC_SET_ZONE_DETECTION_INTERVAL: 400 MS
MDC_IDC_SET_ZONE_TYPE: NORMAL
MDC_IDC_SET_ZONE_VENDOR_TYPE: NORMAL
MDC_IDC_STAT_EPISODE_RECENT_COUNT: 0
MDC_IDC_STAT_EPISODE_RECENT_COUNT: 0
MDC_IDC_STAT_EPISODE_RECENT_COUNT: 536
MDC_IDC_STAT_EPISODE_RECENT_COUNT_DTM_END: NORMAL
MDC_IDC_STAT_EPISODE_RECENT_COUNT_DTM_START: NORMAL
MDC_IDC_STAT_EPISODE_TOTAL_COUNT: 10
MDC_IDC_STAT_EPISODE_TOTAL_COUNT: 2102
MDC_IDC_STAT_EPISODE_TOTAL_COUNT: 6
MDC_IDC_STAT_EPISODE_TOTAL_COUNT_DTM_END: NORMAL
MDC_IDC_STAT_EPISODE_TYPE: NORMAL
MDC_IDC_STAT_EPISODE_VENDOR_TYPE: NORMAL
MDC_IDC_STAT_TACHYTHERAPY_ATP_DELIVERED_RECENT: 0
MDC_IDC_STAT_TACHYTHERAPY_ATP_DELIVERED_TOTAL: 6
MDC_IDC_STAT_TACHYTHERAPY_RECENT_DTM_END: NORMAL
MDC_IDC_STAT_TACHYTHERAPY_RECENT_DTM_START: NORMAL
MDC_IDC_STAT_TACHYTHERAPY_SHOCKS_ABORTED_RECENT: 0
MDC_IDC_STAT_TACHYTHERAPY_SHOCKS_ABORTED_TOTAL: 2
MDC_IDC_STAT_TACHYTHERAPY_SHOCKS_DELIVERED_RECENT: 0
MDC_IDC_STAT_TACHYTHERAPY_SHOCKS_DELIVERED_TOTAL: 9
MDC_IDC_STAT_TACHYTHERAPY_TOTAL_DTM_END: NORMAL

## 2020-03-11 PROCEDURE — 99207 ZZC NO CHARGE NURSE ONLY: CPT

## 2020-03-11 PROCEDURE — 36416 COLLJ CAPILLARY BLOOD SPEC: CPT

## 2020-03-11 PROCEDURE — 85610 PROTHROMBIN TIME: CPT | Mod: QW

## 2020-03-11 NOTE — PROGRESS NOTES
ANTICOAGULATION FOLLOW-UP CLINIC VISIT    Patient Name:  Tyrel Rene  Date:  3/11/2020  Contact Type:  Face to Face    SUBJECTIVE:  Patient Findings     Comments:   The patient was assessed for diet, medication, and activity level changes, missed or extra doses, bruising or bleeding, with no problem findings.          Clinical Outcomes     Comments:   The patient was assessed for diet, medication, and activity level changes, missed or extra doses, bruising or bleeding, with no problem findings.             OBJECTIVE    INR Protime   Date Value Ref Range Status   03/11/2020 3.3 (A) 0.86 - 1.14 Final       ASSESSMENT / PLAN  INR assessment SUPRA    Recheck INR In: 1 WEEK    INR Location Clinic      Anticoagulation Summary  As of 3/11/2020    INR goal:   2.0-2.5   TTR:   29.6 % (11.9 mo)   INR used for dosing:   3.3! (3/11/2020)   Warfarin maintenance plan:   2.5 mg (2.5 mg x 1) every Mon; 1.25 mg (2.5 mg x 0.5) all other days   Full warfarin instructions:   2.5 mg every Mon; 1.25 mg all other days   Weekly warfarin total:   10 mg   Plan last modified:   Doreen Finley RN (3/22/2019)   Next INR check:   3/18/2020   Priority:   High   Target end date:   Indefinite    Indications    Long-term (current) use of anticoagulants [Z79.01] [Z79.01]  Cerebral artery occlusion with cerebral infarction (HCC) [I63.50] [I63.50]  Cerebral infarction (H) [I63.9]             Anticoagulation Episode Summary     INR check location:   Anticoagulation Clinic    Preferred lab:       Send INR reminders to:   HANS ALONZO    Comments:         Anticoagulation Care Providers     Provider Role Specialty Phone number    Dejon Downs MD [facility] [phone]            See the Encounter Report to view Anticoagulation Flowsheet and Dosing Calendar (Go to Encounters tab in chart review, and find the Anticoagulation Therapy Visit)        Doreen Finley, RN

## 2020-03-16 ENCOUNTER — TELEPHONE (OUTPATIENT)
Dept: FAMILY MEDICINE | Facility: CLINIC | Age: 75
End: 2020-03-16

## 2020-03-16 NOTE — TELEPHONE ENCOUNTER
RN called back to pt and wife.    They state that Sharon, with [facility] had seen pt.   Additionally if this is not possible, wondering if instead of getting INR checked in lab on Wednesday, appt can either be moved to tomorrow (03/17) or be done in lab tomorrow.    INR team, please advise on best way to do this.     Pt and wife waiting back on call regarding what can be done to not come in twice.

## 2020-03-16 NOTE — TELEPHONE ENCOUNTER
Reason for Call:  Other call back    Detailed comments: the patient's wife called and stated that the patient has an appointment on 3/17 with Dr. Downs and INR on 3/18.  She stated that she would like to get the INR done at home which has been done before. She also would like to know if the lung check can be done at the same time.  If they can't check his lungs at home, does Dr. Downs feel erasmo it is safe for the patient to come in for the appointment.     Phone Number Patient can be reached at: Cell number on file:    Telephone Information:   Mobile [phone]       Best Time: Any    Can we leave a detailed message on this number? YES    Call taken on 3/16/2020 at 11:23 AM by Bailey Ariza

## 2020-03-16 NOTE — TELEPHONE ENCOUNTER
Talked with Sharel his wife. They are waiting for a call from Dr Downs tomorrow morning. If Marko comes in to see Dr Downs they will have the INR drawn in lab.  Otherwise I will call and order [facility] to come and do it

## 2020-03-17 ENCOUNTER — OFFICE VISIT (OUTPATIENT)
Dept: FAMILY MEDICINE | Facility: CLINIC | Age: 75
End: 2020-03-17
Payer: COMMERCIAL

## 2020-03-17 ENCOUNTER — ANCILLARY PROCEDURE (OUTPATIENT)
Dept: GENERAL RADIOLOGY | Facility: CLINIC | Age: 75
End: 2020-03-17
Attending: FAMILY MEDICINE
Payer: COMMERCIAL

## 2020-03-17 ENCOUNTER — ANTICOAGULATION THERAPY VISIT (OUTPATIENT)
Dept: NURSING | Facility: CLINIC | Age: 75
End: 2020-03-17
Payer: COMMERCIAL

## 2020-03-17 VITALS
DIASTOLIC BLOOD PRESSURE: 70 MMHG | RESPIRATION RATE: 12 BRPM | TEMPERATURE: 96.2 F | WEIGHT: 180 LBS | BODY MASS INDEX: 23.86 KG/M2 | SYSTOLIC BLOOD PRESSURE: 100 MMHG | HEIGHT: 73 IN | OXYGEN SATURATION: 97 % | HEART RATE: 78 BPM

## 2020-03-17 DIAGNOSIS — Z79.01 LONG TERM CURRENT USE OF ANTICOAGULANT THERAPY: ICD-10-CM

## 2020-03-17 DIAGNOSIS — I63.50 CEREBRAL ARTERY OCCLUSION WITH CEREBRAL INFARCTION (H): ICD-10-CM

## 2020-03-17 DIAGNOSIS — J22 LOWER RESPIRATORY TRACT INFECTION: ICD-10-CM

## 2020-03-17 DIAGNOSIS — I63.9 CEREBRAL INFARCTION (H): ICD-10-CM

## 2020-03-17 DIAGNOSIS — J22 LOWER RESPIRATORY TRACT INFECTION: Primary | ICD-10-CM

## 2020-03-17 DIAGNOSIS — J31.0 CHRONIC RHINITIS: ICD-10-CM

## 2020-03-17 LAB — INR POINT OF CARE: 2.3 (ref 0.86–1.14)

## 2020-03-17 PROCEDURE — 99214 OFFICE O/P EST MOD 30 MIN: CPT | Performed by: FAMILY MEDICINE

## 2020-03-17 PROCEDURE — 36416 COLLJ CAPILLARY BLOOD SPEC: CPT

## 2020-03-17 PROCEDURE — 71046 X-RAY EXAM CHEST 2 VIEWS: CPT | Mod: FY

## 2020-03-17 PROCEDURE — 99207 ZZC NO CHARGE NURSE ONLY: CPT

## 2020-03-17 PROCEDURE — 85610 PROTHROMBIN TIME: CPT | Mod: QW

## 2020-03-17 RX ORDER — IPRATROPIUM BROMIDE 21 UG/1
2 SPRAY, METERED NASAL EVERY 12 HOURS
Qty: 30 ML | Refills: 5 | Status: SHIPPED | OUTPATIENT
Start: 2020-03-17 | End: 2020-01-01

## 2020-03-17 ASSESSMENT — MIFFLIN-ST. JEOR: SCORE: 1600.35

## 2020-03-17 NOTE — TELEPHONE ENCOUNTER
Huddle with Provider   Huddled with DR. Downs who states patient should continue to come in for appointment today.    Patient Contact    Attempt # 1    Was call answered?  No.  Left message on voicemail with information to call triage back.    On call back, relay provider message above.

## 2020-03-17 NOTE — PROGRESS NOTES
Subjective     Tyrel Rene is a 74 year old male who presents to clinic today for the following health issues:    HPI      RESPIRATORY SYMPTOMS      Duration: 02/2020    Description  Pneumonia Follow Up    Severity: Better    Accompanying signs and symptoms: None    History (predisposing factors):  none    Precipitating or alleviating factors: None    Therapies tried and outcome:  none    Chronic rhinitis    Patient has had chronic rhinitis for the past many years.  He was tried at one point on a nasal steroid which did not do much.  Now that he is over his upper respiratory illness he is back to having his constant runny nose.  He denies any fevers chills or sweats.  Cough is pretty much gone.    Patient Active Problem List   Diagnosis     STEMI (ST elevation myocardial infarction) (H)     Anemia     Health Care Home     Atrial fibrillation (H)     Hypertension     Cerebral infarction (H)     SVT (supraventricular tachycardia) (H)     CAD (coronary artery disease)     Cardiomyopathy (H)     Atrial flutter (H)     Diplopia     Long-term (current) use of anticoagulants [Z79.01]     Cerebral artery occlusion with cerebral infarction (HCC) [I63.50]     Chronic systolic congestive heart failure (H)     Mixed hyperlipidemia     Screening for prostate cancer     Status post coronary angiogram     Paroxysmal ventricular tachycardia (H)     CKD (chronic kidney disease) stage 3, GFR 30-59 ml/min (H)     Right lower lobe pneumonia (H)     Past Surgical History:   Procedure Laterality Date     BYPASS GRAFT ARTERY CORONARY  10/2/2012    Procedure: BYPASS GRAFT ARTERY CORONARY;  Coronary Artery Bypass Graft x3 (LAD, Diag, OM) with Endovein [place] (On-Pump);  Surgeon: Yeyo Lyman MD;  Location: [facility] OR     CARDIOVERSION  3/14/2013     CORONARY ANGIOGRAPHY ADULT ORDER  10/2/12     CORONARY ARTERY BYPASS  10/2/12    LIMA to LAD, SVG to OM1 and OM3     EP ABLATION VT N/A 1/2/2019    Procedure: EP Ablation VT;  Surgeon:  "Suellen Costello MD;  Location:  HEART CARDIAC CATH LAB     EP COMPREHENSIVE EP STUDY N/A 2019    Procedure: EP Comprehensive EP Study;  Surgeon: Suellen Costello MD;  Location:  HEART CARDIAC CATH LAB     H ABLATION ATRIAL FLUTTER  2011     HAND SURGERY      table saw injury right hand     HEART CATH, ANGIOPLASTY  10/2/12    PTCA to second diagonal and mid LAD, BMS to mid LAD     HERNIA REPAIR       RELOCATE GENERATOR ICD/PACEMAKER         Social History     Tobacco Use     Smoking status: Former Smoker     Packs/day: 1.00     Years: 14.00     Pack years: 14.00     Types: Cigarettes     Start date:      Last attempt to quit: 7/10/1972     Years since quittin.7     Smokeless tobacco: Never Used   Substance Use Topics     Alcohol use: No     Family History   Problem Relation Age of Onset     Alcohol/Drug Father      Heart Disease Father 71     Alzheimer Disease Sister      Alcohol/Drug Sister      Alcohol/Drug Sister      Gastrointestinal Disease Sister      Obesity Sister      Hypertension Sister      Heart Disease Sister      Hypertension Sister      Heart Disease Daughter         afib     Arrhythmia Daughter      Multiple Sclerosis Daughter      Heart Disease Daughter         afib     Arrhythmia Daughter      Cancer Daughter         lymphoma     Aneurysm Mother              Reviewed and updated as needed this visit by Provider         Review of Systems   ROS COMP: Constitutional, HEENT, cardiovascular, pulmonary, gi and gu systems are negative, except as otherwise noted.      Objective    /70   Pulse 78   Temp 96.2  F (35.7  C) (Tympanic)   Resp 12   Ht 1.854 m (6' 1\")   Wt 81.6 kg (180 lb)   SpO2 97%   BMI 23.75 kg/m    Body mass index is 23.75 kg/m .  Physical Exam   GENERAL APPEARANCE: healthy, alert and no distress  HENT: Patient has a very runny nose.  RESP: lungs clear to auscultation - no rales, rhonchi or wheezes    Diagnostic Test Results:  CXR - " negative by my reading         Assessment & Plan       ICD-10-CM    1. Lower respiratory tract infection  J22 XR Chest 2 Views    Resolved   2. Chronic rhinitis  J31.0 ipratropium (ATROVENT) 0.03 % nasal spray          Patient Instructions   The patient's physical exam today other than his runny nose is normal.  I did not appreciate any infiltrates on his chest x-ray.  We will await the official reading by radiology.  With respect to his chronic rhinitis I placed him on Atrovent nasal spray in the lower concentration.  He will try that 2 puffs each nostril twice daily for the next 2 weeks.  If not improving he will let us know and we can get him the higher dose of the nasal spray.  He will plan on following up in June in a virtual visit.      Return in about 2 weeks (around 3/31/2020) for If symptoms persist.    Dejon Downs MD  [facility]

## 2020-03-17 NOTE — PATIENT INSTRUCTIONS
The patient's physical exam today other than his runny nose is normal.  I did not appreciate any infiltrates on his chest x-ray.  We will await the official reading by radiology.  With respect to his chronic rhinitis I placed him on Atrovent nasal spray in the lower concentration.  He will try that 2 puffs each nostril twice daily for the next 2 weeks.  If not improving he will let us know and we can get him the higher dose of the nasal spray.  He will plan on following up in June in a virtual visit.

## 2020-03-17 NOTE — PROGRESS NOTES
ANTICOAGULATION FOLLOW-UP CLINIC VISIT    Patient Name:  Tyrel Rene  Date:  3/17/2020  Contact Type:  Face to Face    SUBJECTIVE:         OBJECTIVE    INR Protime   Date Value Ref Range Status   2020 2.3 (A) 0.86 - 1.14 Final       ASSESSMENT / PLAN  No question data found.  Anticoagulation Summary  As of 3/17/2020    INR goal:   2.0-2.5   TTR:   30.0 % (11.9 mo)   INR used for dosin.3 (3/17/2020)   Warfarin maintenance plan:   2.5 mg (2.5 mg x 1) every Mon; 1.25 mg (2.5 mg x 0.5) all other days   Full warfarin instructions:   2.5 mg every Mon; 1.25 mg all other days   Weekly warfarin total:   10 mg   No change documented:   Lili Apodaca RN   Plan last modified:   Doreen Finley RN (3/22/2019)   Next INR check:   4/15/2020   Priority:   High   Target end date:   Indefinite    Indications    Long-term (current) use of anticoagulants [Z79.01] [Z79.01]  Cerebral artery occlusion with cerebral infarction (HCC) [I63.50] [I63.50]  Cerebral infarction (H) [I63.9]             Anticoagulation Episode Summary     INR check location:   Anticoagulation Clinic    Preferred lab:       [facility]    Comments:         Anticoagulation Care Providers     Provider Role Specialty Phone number    Dejon Downs MD [facility] [phone]            See the Encounter Report to view Anticoagulation Flowsheet and Dosing Calendar (Go to Encounters tab in chart review, and find the Anticoagulation Therapy Visit)    Dosage adjustment made based on physician directed care plan.     Pt denies any changes in diet,health or activity. Marko is aware if signs of clotting (pain, tenderness, swelling, color change in leg or arm, SOB) and bleeding occur (blood in stool, urine, large bruising, bleeding gums, nosebleeds) to have INR check sooner. If sx severe report to ER or concerned for stroke call 911. If general questions or concerns arise, call clinic.          Lili Apodaca, RN

## 2020-03-20 DIAGNOSIS — Z79.01 LONG TERM CURRENT USE OF ANTICOAGULANT THERAPY: ICD-10-CM

## 2020-03-20 DIAGNOSIS — I63.50 CEREBRAL ARTERY OCCLUSION WITH CEREBRAL INFARCTION (H): Primary | ICD-10-CM

## 2020-03-20 DIAGNOSIS — I48.91 ATRIAL FIBRILLATION, UNSPECIFIED TYPE (H): ICD-10-CM

## 2020-04-15 ENCOUNTER — TELEPHONE (OUTPATIENT)
Dept: FAMILY MEDICINE | Facility: CLINIC | Age: 75
End: 2020-04-15

## 2020-04-15 DIAGNOSIS — I63.50 CEREBRAL ARTERY OCCLUSION WITH CEREBRAL INFARCTION (H): ICD-10-CM

## 2020-04-15 DIAGNOSIS — I63.9 CEREBRAL INFARCTION (H): ICD-10-CM

## 2020-04-15 DIAGNOSIS — Z79.01 LONG TERM CURRENT USE OF ANTICOAGULANT THERAPY: ICD-10-CM

## 2020-04-15 LAB — INR PPP: 3.1 (ref 0.9–1.1)

## 2020-04-15 NOTE — TELEPHONE ENCOUNTER
ANTICOAGULATION MANAGEMENT     Patient Name:  Tyrel Rene  Date:  4/15/2020    ASSESSMENT /SUBJECTIVE:    Today's INR result of 3.1 is supratherapeutic. Goal INR of 2.0-2.5      Warfarin dose taken: Warfarin taken as previously instructed    Diet: No new diet changes affecting INR    Medication changes/ interactions: No new medications/supplements affecting INR    Previous INR: Therapeutic     S/S of bleeding or thromboembolism: No    New injury or illness: No    Upcoming surgery, procedure or cardioversion: No    Additional findings: None      PLAN:    Spoke with Rylie regarding INR result and instructed:     Warfarin Dosing Instructions: Change your warfarin dose to 1.25 mg daily    Instructed patient to follow up no later than: 2 weeks  Orders given to [facility] ([phone])    Education provided: Importance of taking warfarin as instructed, Monitoring for bleeding signs and symptoms and Monitoring for clotting signs and symptoms      Rylie verbalizes understanding and agrees to warfarin dosing plan.    Instructed to call the Anticoagulation Clinic for any changes, questions or concerns. (#[phone])        OBJECTIVE:  INR   Date Value Ref Range Status   04/15/2020 3.1 (A) 0.90 - 1.10 Final             Anticoagulation Summary  As of 4/15/2020    INR goal:   2.0-2.5   TTR:   32.0 % (12 mo)   INR used for dosing:   3.1! (4/15/2020)   Warfarin maintenance plan:   1.25 mg (2.5 mg x 0.5) every day   Full warfarin instructions:   4/15: Hold; Otherwise 1.25 mg every day   Weekly warfarin total:   8.75 mg   Plan last modified:   Leatha Jewell RN (4/15/2020)   Next INR check:   4/29/2020   Priority:   High   Target end date:   Indefinite    Indications    Long-term (current) use of anticoagulants [Z79.01] [Z79.01]  Cerebral artery occlusion with cerebral infarction (HCC) [I63.50] [I63.50]  Cerebral infarction (H) [I63.9]             Anticoagulation Episode Summary     INR check location:    Anticoagulation Clinic    Preferred lab:       Send INR reminders to:   HANS MCLEOD    Comments:         Anticoagulation Care Providers     Provider Role Specialty Phone number    Dejon Downs MD [facility] [phone]           Leatha Jewell RN - [facility]

## 2020-04-29 ENCOUNTER — TRANSFERRED RECORDS (OUTPATIENT)
Dept: HEALTH INFORMATION MANAGEMENT | Facility: CLINIC | Age: 75
End: 2020-04-29

## 2020-04-29 ENCOUNTER — TELEPHONE (OUTPATIENT)
Dept: FAMILY MEDICINE | Facility: CLINIC | Age: 75
End: 2020-04-29

## 2020-04-29 DIAGNOSIS — I63.50 CEREBRAL ARTERY OCCLUSION WITH CEREBRAL INFARCTION (H): ICD-10-CM

## 2020-04-29 DIAGNOSIS — Z79.01 LONG TERM CURRENT USE OF ANTICOAGULANT THERAPY: ICD-10-CM

## 2020-04-29 DIAGNOSIS — I63.9 CEREBRAL INFARCTION (H): ICD-10-CM

## 2020-04-29 LAB — INR PPP: 2.1 (ref 0.9–1.1)

## 2020-04-29 NOTE — TELEPHONE ENCOUNTER
ANTICOAGULATION MANAGEMENT     Patient Name:  Tyrel Rene  Date:  2020    ASSESSMENT /SUBJECTIVE:    Today's INR result of 2.1 is therapeutic. Goal INR of 2.0-2.5      Warfarin dose taken: Warfarin taken as previously instructed    Diet: No new diet changes affecting INR    Medication changes/ interactions: No new medications/supplements affecting INR    Previous INR: Supratherapeutic     S/S of bleeding or thromboembolism: No    New injury or illness: No    Upcoming surgery, procedure or cardioversion: No    Additional findings: None      PLAN:    Spoke with Tyrel regarding INR result and instructed:     Warfarin Dosing Instructions: Continue your current warfarin dose 1.25 mg every day    Instructed patient to follow up no later than: 2 weeks  Orders given to [facility] ([phone]), gave recheck date of 20 to angie at [facility]    Education provided: None required      Marko verbalizes understanding and agrees to warfarin dosing plan.    Instructed to call the Anticoagulation Clinic for any changes, questions or concerns. (#[phone])        OBJECTIVE:  INR   Date Value Ref Range Status   2020 2.1 (A) 0.90 - 1.10 Final             Anticoagulation Summary  As of 2020    INR goal:   2.0-2.5   TTR:   33.5 % (11.9 mo)   INR used for dosin.1 (2020)   Warfarin maintenance plan:   1.25 mg (2.5 mg x 0.5) every day   Full warfarin instructions:   1.25 mg every day   Weekly warfarin total:   8.75 mg   Plan last modified:   Leatha Jewell RN (4/15/2020)   Next INR check:   2020   Priority:   High   Target end date:   Indefinite    Indications    Long-term (current) use of anticoagulants [Z79.01] [Z79.01]  Cerebral artery occlusion with cerebral infarction (HCC) [I63.50] [I63.50]  Cerebral infarction (H) [I63.9]             Anticoagulation Episode Summary     INR check location:   Anticoagulation Clinic    Preferred lab:       Send INR reminders to:   HANS  HARSHA    Comments:         Anticoagulation Care Providers     Provider Role Specialty Phone number    Dejon Downs MD [facility] [phone]

## 2020-05-13 NOTE — TELEPHONE ENCOUNTER
ANTICOAGULATION MANAGEMENT     Patient Name:  Tyrel Rene  Date:  2020    ASSESSMENT /SUBJECTIVE:    Today's INR result of 2.7 is supratherapeutic. Goal INR of 2.0-2.5      Warfarin dose taken: More warfarin taken than instructed which may be affecting INR    Diet: No new diet changes affecting INR    Medication changes/ interactions: No new medications/supplements affecting INR    Previous INR: Therapeutic     S/S of bleeding or thromboembolism: No    New injury or illness: No    Upcoming surgery, procedure or cardioversion: No    Additional findings: patient would like to stay on the higher end of 2.5, per patient he has been and would like to continue taking 2.5mg on Monday's and 1.25mg AOD - RN advised and educated on reason to stay within therapeutic goal set by provider.     Per patient, patients cardiologist's have advised him to be within 2.5-3.0. Patients INR referral is written for 2.0-2.5, will route encounter to PCP to advise on change in goal INR, per patient.       PLAN:    Spoke with Tyrel regarding INR result and instructed:     Warfarin Dosing Instructions: patient will take 2.5mg  and 1.25mg AOD (RN advised 1.25mg everyday)    Instructed patient to follow up no later than: 1 week  Orders given to [facility] ([phone]) to recheck next week .    Education provided: Target INR goal and significance of current INR result, Importance of therapeutic range, Importance of following up for INR monitoring at instructed interval and Importance of taking warfarin as instructed      Marko verbalizes understanding and agrees to warfarin dosing plan.    Instructed to call the Anticoagulation Clinic for any changes, questions or concerns. (#[phone])        OBJECTIVE:  INR   Date Value Ref Range Status   2020 2.7 (A) 0.90 - 1.10 Final             Anticoagulation Summary  As of 2020    INR goal:   2.0-2.5   TTR:   36.1 % (11.9 mo)   INR used for dosin.7!  (5/13/2020)   Warfarin maintenance plan:   1.25 mg (2.5 mg x 0.5) every day   Full warfarin instructions:   1.25 mg every day   Weekly warfarin total:   8.75 mg   Plan last modified:   Leatha Jewell RN (4/15/2020)   Next INR check:      Priority:   High   Target end date:   Indefinite    Indications    Long-term (current) use of anticoagulants [Z79.01] [Z79.01]  Cerebral artery occlusion with cerebral infarction (HCC) [I63.50] [I63.50]  Cerebral infarction (H) [I63.9]             Anticoagulation Episode Summary     INR check location:   Anticoagulation Clinic    Preferred lab:       Send INR reminders to:   HANS MCLEOD    Comments:         Anticoagulation Care Providers     Provider Role Specialty Phone number    Dejon Downs MD [facility] [phone]         Claudia Triana RN, BSN, PHN

## 2020-05-14 NOTE — TELEPHONE ENCOUNTER
Per Dr. Schwab on 5/13/20 patient INR goal should be 2.5-3.0.  Changing in episode due to this.  Shahnaz Montenegro RN

## 2020-05-20 NOTE — TELEPHONE ENCOUNTER
ANTICOAGULATION MANAGEMENT     Patient Name:  Tyrel Rene  Date:  2020    ASSESSMENT /SUBJECTIVE:    Today's INR result of 2.9 is therapeutic. Goal INR of 2.5-3.0      Warfarin dose taken: Warfarin taken as previously instructed    Diet: No new diet changes affecting INR    Medication changes/ interactions: No new medications/supplements affecting INR    Previous INR: Therapeutic     S/S of bleeding or thromboembolism: No    New injury or illness: No    Upcoming surgery, procedure or cardioversion: No    Additional findings: None      PLAN:    Spoke with Tyrel and wife regarding INR result and instructed:     Warfarin Dosing Instructions: Continue your current warfarin dose 2.5mg Monday and 1.25mg AOD    Instructed patient to follow up no later than: 2 weeks  Orders given to [facility] ([phone])    Education provided: None required      Tyrel and wife verbalizes understanding and agrees to warfarin dosing plan.    Instructed to call the Anticoagulation Clinic for any changes, questions or concerns. (#[phone])        OBJECTIVE:  INR   Date Value Ref Range Status   2020 2.9 (A) 0.90 - 1.10 Final             Anticoagulation Summary  As of 2020    INR goal:   2.5-3.5   TTR:   37.9 % (11.9 mo)   INR used for dosin.9 (2020)   Warfarin maintenance plan:   2.5 mg (2.5 mg x 1) every Mon; 1.25 mg (2.5 mg x 0.5) all other days   Full warfarin instructions:   2.5 mg every Mon; 1.25 mg all other days   Weekly warfarin total:   10 mg   Plan last modified:   Claudia Jeronimo RN (2020)   Next INR check:   6/3/2020   Priority:   High   Target end date:   Indefinite    Indications    Long-term (current) use of anticoagulants [Z79.01] [Z79.01]  Cerebral artery occlusion with cerebral infarction (HCC) [I63.50] [I63.50]  Cerebral infarction (H) [I63.9]             Anticoagulation Episode Summary     INR check location:   Anticoagulation Clinic    Preferred lab:       Send INR  reminders to:   HANS MCLEOD    Comments:   Patient goal should be 2.5-3.0 (5/13/20)      Anticoagulation Care Providers     Provider Role Specialty Phone number    Dejon Downs MD [facility] [phone]         Claudia Triana, RN, BSN, PHN

## 2020-06-03 NOTE — PROGRESS NOTES
ANTICOAGULATION MANAGEMENT     Patient Name:  Tyrel Rene  Date:  6/3/2020    ASSESSMENT /SUBJECTIVE:    Today's INR result of 3.0 is therapeutic. Goal INR of 2.5-3.5      Warfarin dose taken: Warfarin taken as previously instructed    Diet: No new diet changes affecting INR    Medication changes/ interactions: No new medications/supplements affecting INR    Previous INR: Therapeutic     S/S of bleeding or thromboembolism: No    New injury or illness: No    Upcoming surgery, procedure or cardioversion: No    Additional findings: None      PLAN:    Spoke with Tyrel regarding INR result and instructed:     Warfarin Dosing Instructions: Continue your current warfarin dose 2.5mg Monday & 1.25mg AOD    Instructed patient to follow up no later than: 3 weeks  Orders given to [facility] to recheck [phone]    Education provided: Importance of consistent vitamin K intake, Impact of vitamin K foods on INR, Vitamin K content of foods, Importance of therapeutic range, Monitoring for bleeding signs and symptoms, Monitoring for clotting signs and symptoms and When to seek medical attention/emergency care      Marko verbalizes understanding and agrees to warfarin dosing plan.    Instructed to call the Anticoagulation Clinic for any changes, questions or concerns. (#[phone])        OBJECTIVE:  INR   Date Value Ref Range Status   06/03/2020 3.0 (A) 0.90 - 1.10 Final         No question data found.  Anticoagulation Summary  As of 6/3/2020    INR goal:   2.5-3.5   TTR:   42.0 % (11.9 mo)   INR used for dosing:   3.0 (6/3/2020)   Warfarin maintenance plan:   2.5 mg (2.5 mg x 1) every Mon; 1.25 mg (2.5 mg x 0.5) all other days   Full warfarin instructions:   2.5 mg every Mon; 1.25 mg all other days   Weekly warfarin total:   10 mg   No change documented:   Claudia Jeronimo, RN   Plan last modified:   Claudia Jeronimo RN (5/13/2020)   Next INR check:   6/24/2020   Priority:   High   Target end date:   Indefinite     Indications    Long-term (current) use of anticoagulants [Z79.01] [Z79.01]  Cerebral artery occlusion with cerebral infarction (HCC) [I63.50] [I63.50]  Cerebral infarction (H) [I63.9]             Anticoagulation Episode Summary     INR check location:   Anticoagulation Clinic    Preferred lab:       Send INR reminders to:   HANS MCLEOD    Comments:   Patient goal should be 2.5-3.0 (5/13/20)      Anticoagulation Care Providers     Provider Role Specialty Phone number    Dejon Downs MD [facility] [phone]         Claudia Triana RN, BSN, PHN

## 2020-06-17 NOTE — TELEPHONE ENCOUNTER
"Received call from patient's wife, Noris, asking about patient's Amiodarone dose.     Per Dr. Costello' note 2/4/20: \"Marko has done well since VT ablation 13 months ago.  He is on amiodarone 200 mg daily.  Today I asked him to decrease amiodarone to 6 days per week (skip on Sundays), but he prefers to do so after he returns from [place] in the spring.\"    Patient and his wife did not go to [place] due to COVID19, but forgot to decrease the Amiodarone dose until now.  They are wondering if Dr. Costello' still wants him to decrease his dose at this time or if he would recommend he wait until things settle down with COVID19.    Patient has been feeling well.  Patient had a remote ICD check on 6/8/20 showing the following arrhythmias:   Ventricular Arrhythmia: 102 NSVT episodes logged. EGMs show NSVT during afib lasting 3-12 beats with ventricular rates in the 170s-240s. He is on amiodarone.   ATP: 0    Shocks: 0      Underlying rhythm on 3/6/20 was chronic Afib with V rates <30bpm.  Example of EGM during NSVT below.     Will route message to Dr. Costello for review and recommendations.    AGAPITO Marrufo          "

## 2020-06-18 NOTE — TELEPHONE ENCOUNTER
Called and left message for patient stating that Dr. Costello would like him to try taking the Amiodarone 6 days per week (Monday through Saturday, skip Sundays per prior note).  Asked them to call back with any questions, device clinic phone number provided.    AGAPITO Marrufo

## 2020-06-24 NOTE — PROGRESS NOTES
ANTICOAGULATION MANAGEMENT     Patient Name:  Tyrel Rene  Date:  2020    ASSESSMENT /SUBJECTIVE:    Today's INR result of 2.7 is therapeutic. Goal INR of 2.5-3.0      Warfarin dose taken: Warfarin taken as previously instructed    Diet: No new diet changes affecting INR    Medication changes/ interactions: No new medications/supplements affecting INR    Previous INR: Therapeutic     S/S of bleeding or thromboembolism: No    New injury or illness: No    Upcoming surgery, procedure or cardioversion: No    Additional findings: None      PLAN:    Spoke with Pamela, spouse, regarding INR result and instructed:     Warfarin Dosing Instructions: Continue your current warfarin dose 2.5 mg on  and 1.25 mg all other days    Instructed patient to follow up no later than: 4 weeks  Orders given to [facility] ([phone]), spoke with Skip    Education provided: None required      Sharel, spouse,  verbalizes understanding and agrees to warfarin dosing plan.    Instructed to call the Anticoagulation Clinic for any changes, questions or concerns. (#[phone])        OBJECTIVE:  INR   Date Value Ref Range Status   2020 2.7 (A) 0.90 - 1.10 Final         No question data found.  Anticoagulation Summary  As of 2020    INR goal:   2.5-3.5   TTR:   46.8 % (11.9 mo)   INR used for dosin.7 (2020)   Warfarin maintenance plan:   2.5 mg (2.5 mg x 1) every Mon; 1.25 mg (2.5 mg x 0.5) all other days   Full warfarin instructions:   2.5 mg every Mon; 1.25 mg all other days   Weekly warfarin total:   10 mg   Plan last modified:   Claudia Jeronimo RN (2020)   Next INR check:   2020   Priority:   High   Target end date:   Indefinite    Indications    Long-term (current) use of anticoagulants [Z79.01] [Z79.01]  Cerebral artery occlusion with cerebral infarction (HCC) [I63.50] [I63.50]  Cerebral infarction (H) [I63.9]             Anticoagulation Episode Summary     INR check location:    Anticoagulation Clinic    Preferred lab:       Send INR reminders to:   HANS MCLEOD    Comments:   Patient goal should be 2.5-3.0 (5/13/20)      Anticoagulation Care Providers     Provider Role Specialty Phone number    Dejon Downs MD [facility] [phone]

## 2020-07-22 NOTE — PROGRESS NOTES
ANTICOAGULATION MANAGEMENT     Patient Name:  Tyrel Rene  Date:  2020    ASSESSMENT /SUBJECTIVE:    Today's INR result of 2.4 is subtherapeutic. Goal INR of 2.5-3.5      Warfarin dose taken: Warfarin taken as previously instructed    Diet: No new diet changes affecting INR    Medication changes/ interactions: Interaction between Amiodarone and warfarin may be affecting INR, Dose has changed from 1 pill daily to taking 1 pill 6 days a week, this changed 2-3 weeks ago.     Previous INR: Therapeutic     S/S of bleeding or thromboembolism: No    New injury or illness: No    Upcoming surgery, procedure or cardioversion: No    Additional findings: None      PLAN:    Spoke with Tyrel regarding INR result and instructed:     Warfarin Dosing Instructions: Continue your current warfarin dose 2.5 mg Mon and 1.25 mg ROW    Instructed patient to follow up no later than: 2 weeks  Orders given to [facility] ([phone])    Education provided: Target INR goal and significance of current INR result and Potential interaction between warfarin and Amiodarone      Marko verbalizes understanding and agrees to warfarin dosing plan.    Instructed to call the Anticoagulation Clinic for any changes, questions or concerns. (#[phone])        Leatha Jewell RN      OBJECTIVE:  Recent labs: (last 7 days)     20  1116   INR 2.4*         No question data found.  Anticoagulation Summary  As of 2020    INR goal:   2.5-3.5   TTR:   44.2 % (11.9 mo)   INR used for dosin.4! (2020)   Warfarin maintenance plan:   2.5 mg (2.5 mg x 1) every Mon; 1.25 mg (2.5 mg x 0.5) all other days   Full warfarin instructions:   2.5 mg every Mon; 1.25 mg all other days   Weekly warfarin total:   10 mg   No change documented:   Leatha Jewell, RN   Plan last modified:   Claudia Jeronimo, RN (2020)   Next INR check:   2020   Priority:   High   Target end date:   Indefinite    Indications    Long-term (current) use of  anticoagulants [Z79.01] [Z79.01]  Cerebral artery occlusion with cerebral infarction (HCC) [I63.50] [I63.50]  Cerebral infarction (H) [I63.9]             Anticoagulation Episode Summary     INR check location:   Anticoagulation Clinic    Preferred lab:       Send INR reminders to:   HANS MCLEOD    Comments:   Patient goal should be 2.5-3.0 (5/13/20)      Anticoagulation Care Providers     Provider Role Specialty Phone number    Dejon Downs MD [facility] [phone]

## 2020-07-28 PROBLEM — I49.01 VENTRICULAR FIBRILLATION (H): Status: ACTIVE | Noted: 2020-01-01

## 2020-07-28 NOTE — ED PROVIDER NOTES
"  History     Chief Complaint:  Syncope, evaluation of AICD firing    The history is provided by the patient.      Tyrel Rene is a 74 year old male on warfarin with a history of syncope, chronic systolic CHF, Edema, CAD, STEMI, neuropathy, and supraventricular tachycardia who presents for evaluation after syncopal episode. The patient reports he was at home with his spouse, sitting in his recliner with his feet up when he began to feel dizzy. He woke up after the episode with no recollection, thinking he had fallen asleep. His AICD had fired. Per the wife's report, she saw him slump over in the chair for a bit, and then made a \"painful noise\" before he became alert again. Here, he complains of lingering numbness and tingling in his bilateral hands, and some in his feet. The patient also notes some mild left lower extremity/ankle swelling from when he slipped in his chair approximately 3 days ago, but is not particularly concerned by this. He denies any fevers or cough. He further denies any sick contacts or any changes or missed doses of medicine. The patient follows Dr. Newman in Cardiology.    Allergies:  Aspirin  Nystatin    Medications:    Amiodarone  Aspirin, intentional  Carvedilol  Digoxin  Famotidine  Lisinopril  Moxifloxacin  Nitroglycerin  Rosuvastatin  Warfarin  Vitamin D  Docusate  Ranitidine  Simvastatin    Past Medical History:    Right lower lobe pneumonia  Chronic Kidney Disease, stage 3  Paroxysmal ventricular tachycardia  Mixed hyperlipidemia  Chronic systolic congestive heart failure  Long-term anticoagulation use  Cerebral artery occlusion with cerebral infarction  Diplopia  Atrial fibrillation  Hypertension  Supraventricular tachycardia  Coronary Artery Disease  Cardiomyopathy  Atrial flutter  Anemia  STEMI  Erectile dysfunction  Neuropathy  Syncope  Shortness of breath  Abnormal weight gain  Dehydration  Hyposmolality   Edema  Pneumonia  Dysphagia  Nephrotic syndrome  Colon polyp  Cerebral " "infarction  Shingles  Hyperlipidemia    Past Surgical History:    Coronary Artery Bypass Graft x3  Aortic valve replacement x3  Cardioversion  Coronary angiography  Ventricular ablation  Comprehensive EP study  Atrial flutter ablation  Right hand surgery  Heart cath and angioplasty  Right inguinal hernia repair  Relocation of ICD/Pacemaker    Family History:    Father: Substance abuse, Heart disease, Hyperlipidemia  Mother: Aneurysm  Sister 1: Alzheimer's disease, Substance abuse  Sister 2: Substance abuse, GI disease, Obesity, Hypertension, Heart disease  Sister 3: Heart disease, Hypertension    Social History:  The patient was not accompanied to the ED.  Smoking Status: Former smoker, last quit 7/10/1972  Smokeless Tobacco: Never used  Alcohol Use: No  Drug Use: No  Marital Status:      Review of Systems   Constitutional: Negative for fever.   Respiratory: Negative for cough.    Cardiovascular: Positive for leg swelling (mild left lower extremity/ankle swelling after recent fall).   Neurological: Positive for dizziness (prior to syncope), syncope and numbness (bilateral hands and feet).   All other systems reviewed and are negative.    Physical Exam     Patient Vitals for the past 24 hrs:   BP Temp Temp src Pulse Heart Rate Resp SpO2 Height Weight   07/28/20 2030 (!) 145/78 -- -- 65 65 17 98 % -- --   07/28/20 2000 (!) 145/85 -- -- 65 65 19 98 % -- --   07/28/20 1930 -- -- -- -- 65 10 98 % -- --   07/28/20 1840 (!) 138/108 97.9  F (36.6  C) Oral 118 -- 16 98 % 1.854 m (6' 1\") 77.9 kg (171 lb 12.8 oz)       Physical Exam  General: Alert, interactive in mild distress  Head:  Scalp is atraumatic  Eyes:  The pupils are equal, round, and reactive to light    EOM's intact    No scleral icterus  ENT:      Nose:  The external nose is normal  Ears:  External ears are normal  Mouth/Throat: The oropharynx is normal    Mucus membranes are moist      Neck:  Normal range of motion.      There is no rigidity.    Trachea " is in the midline         CV:  Regular rate and rhythm    No murmur   Resp:  Breath sounds are clear bilaterally    Non-labored, no retractions or accessory muscle use      GI:  Abdomen is soft, no distension, no tenderness.       MS:  Normal strength in all 4 extremities. Trace pedal edema in bilateral lower extremities.  Skin:  Warm and dry, No rash or lesions noted.  Neuro: Strength 5/5 x4.  Sensation intact  In all 4 extremities.        Psych:  Awake. Alert.  Normal affect.      Appropriate interactions.    Emergency Department Course     ECG:  Indication: ACID Problem  Completed at 1912.  Read at 1934.   Ventricular-paced rhythm  Abnormal ECG   Rate 65 bpm. AK interval *. QRS duration 164. QT/QTc 452/470. P-R-T axes 78 -78 97.  No significant change when compared to EKG dated 2/12/2020.  Agree with computer interpretation.    Imaging:  Radiology findings were communicated with the patient who voiced understanding of the findings.    XR Chest 2 Views:  Mild hyperinflation of the lungs. No consolidation. No  pleural effusions. No pneumothorax. Unchanged cardiac size. Median  sternotomy. Left-sided implantable cardiac defibrillator/pacer.  Report per radiology     Laboratory:  Laboratory findings were communicated with the patient who voiced understanding of the findings.    CBC: WBC 7.9, HGB 13.6,   BMP: Creatinine 1.48 (H), GFR Estimate 45 (L), Calcium 8.4 (L), o/w WNL    Magnesium: 2.2    INR: 2.42 (H)    (1848) Troponin I ES: <0.015    TSH with free T4 reflex: 1.32    COVID-19 Virus (Coronavirus) by Nasopharyngeal (NP) Swab: Pending    Procedures  None.    Interventions:  2033 Nexterone 150 mg IV Bolus    Emergency Department Course:  Past medical records, nursing notes, and vitals reviewed.    1849 I performed an exam of the patient as documented above.     EKG obtained in the ED, see results above.     IV was inserted and blood was drawn for laboratory testing, results above.    The patient had a  chest X-ray while in the emergency department, results above.     1639 The patient had an episode of V fib that he was paced out of.    1641 The patient had a 49 second run of V fib that required defibrillation at 41 joules.    2020 I rechecked the patient and discussed the results of his workup thus far.     Findings and plan explained to the Patient who consents to admission. Discussed the patient with Dr. New, who will admit the patient to a medical bed for further monitoring, evaluation, and treatment.    I personally reviewed the laboratory and imaging results with the Patient and answered all related questions prior to admission.     Impression & Plan     Covid-19  Tyrel Rene was evaluated during a global COVID-19 pandemic, which necessitated consideration that the patient might be at risk for infection with the SARS-CoV-2 virus that causes COVID-19.   Applicable protocols for evaluation were followed during the patient's care.   COVID-19 was considered as part of the patient's evaluation. A test was obtained during this visit.    Medical Decision Making:  Tyrel Rene is a 74 year old male who was seen and evaluated. His pacemaker was interrogated here, demonstrating ventricular fibrillation x 2, one of which he was paced out of, the other required defibrillation. He is asymptomatic at this time, however I believe he would benefit from hospitalization, cardiac consultation, and possible AICD reprogramming. He is anticoagulated and I doubt pulmonary embolism. There are no signs of electrolyte abnormality. He was loaded with amiodarone here and subsequently admitted to the [facility] [place] [place] for further evaluation and treatment under the care of Dr. New and colleagues.    Diagnosis:    ICD-10-CM    1. AICD discharge  Z45.02 Asymptomatic COVID-19 Virus (Coronavirus) by PCR   2. Syncope, unspecified syncope type  R55    3. CKD (chronic kidney disease) stage 3, GFR 30-59 ml/min (H)  N18.3        Disposition:  Admitted  to Dr. New.      Scribe Disclosure:  I, Suze Bond, am serving as a scribe at 6:49 PM on 7/28/2020 to document services personally performed by Jimenez Weldon MD based on my observations and the provider's statements to me.     Suze Bond  7/28/2020    EMERGENCY DEPARTMENT       Jimenez Weldon MD  07/28/20 2130

## 2020-07-28 NOTE — ED TRIAGE NOTES
Wife reported that patient slumped over in chair a bit, then made a painful noise and was alert again. AICD fired. Pt having couplets of EMS tele.

## 2020-07-28 NOTE — ED NOTES
Bed: ED10  Expected date:   Expected time:   Means of arrival:   Comments:  531  74 M implanted defiv/fired once tonight/couplet/triplet pvcs  1834

## 2020-07-29 NOTE — PROGRESS NOTES
SPIRITUAL HEALTH SERVICES  SPIRITUAL ASSESSMENT Progress Note  [facility]     REFERRAL SOURCE: Consult request for Health Care Directive    Reviewed documentation. Reflective conversation shared with Marko and his wife, Cherry, which integrated elements of illness and family narratives.     Delivered and discussed the process and purpose of an HCD. Marko would like to complete a form and have it added to his records.   They are members of a [employer] parish and have been attending online Conf[employer]ianist, not having left their home since early March. They welcomed receiving Holy Communion from Fr Randall.    I offered spiritual and emotional support through reflective listening that affirmed emotions, experience, and meaning.     PLAN: Will arrange for Fr Randall to bring communion and to have the HCD notarized with [employer] virtually tomorrow.     Katy Greenwood  Associate   Pager [phone]  Phone [phone]

## 2020-07-29 NOTE — PLAN OF CARE
MD Notification    Notified Person: MD    Notified Person Name: Danilo    Notification Date/Time: 4:06 PM 7/29    Notification Interaction: Web-based paging    Purpose of Notification: Lidocaine gtt is off, pt is vitally stable on RA with plans to d/c amiodarone gtt later this evening, appropriate to d/c IMC orders?    Orders Received: TORB from Dr. Carrasco to discontinue IMC orders, patient will remain on telemetry.    Comments:

## 2020-07-29 NOTE — PROGRESS NOTES
Echo results reviewed with Ivette ALTMNA:  LVEF 42% based on biplane 2D tracing.  There is anterior, septal, and apical wall akinesis with thinned walls  consistent with old LAD territory infarct.  Due to apical trabeculation, cannot rule out small LV apical thrombus.  No significant valvular heart disease.  Pt already on warfarin with a therapeutic INR.   Would recommend INR 2.5-3.0  Repeat echo in 2-3 months

## 2020-07-29 NOTE — PLAN OF CARE
"MD Notification    Notified Person: MD    Notified Person Name: Moody    Notification Date/Time: 1:13 PM 7/29    Notification Interaction: Web-based paging    Purpose of Notification: Echo results are in. \"Cannot rule out small LV apical thrombus\".    Orders Received: Spoke with Lauren Jorgensen NP at 3128, no change to current plan of care. Pt is anticoagulated on coumadin with INR of 2.61 this AM.     Comments:      "

## 2020-07-29 NOTE — PROGRESS NOTES
[facility]    Hospitalist Progress Note    Assessment & Plan   Tyrel Rene is a 74 year old male who was admitted on 7/28/2020.  Patient admitted following ICD shock.  Severe ischemic cardiomyopathy with AICD firing x2 due to monomorphic VT.  --Uneventful since admission, patient seen by electrophysiology team, and is to wean down amiodarone drip and continue with increased dose of amiodarone 200 mg daily.  Reduction amiodarone dose is considered to be the causative factor for his monomorphic VT and ICD firing.  ---Echo completed, results pending, discontinue lidocaine drip, troponins elevated, creatinine is 1.48, will repeat BMP in a.m. INR is therapeutic at 2.61  -continue Coreg  ---Patient appears to be euvolemic, continue his other PTA medications which include lisinopril and Crestor  --- Will request PT/OT evaluation  History of atrial fibrillation--continue his PTA medications which include digoxin and warfarin patient is also currently on amiodarone  GERD  ---Continue Pepcid    DVT Prophylaxis: On warfarin  Code Status: Full Code   Plan of care discussed with the spouse at bedside  Disposition: Expected discharge 7/30, depending on EP plans    Kenia Carrasco MD  Text Page   (7am to 6pm)    Interval History   Patient resting, no new concerns since admission, denies any chest pain or shortness of breath, wife near bedside, plan for echocardiogram now.  He was seen by electrophysiology team.    -Data reviewed today: I reviewed all new labs and imaging results over the last 24 hours. I personally reviewed labs , consultant notes, imaging    Physical Exam   Temp: 97.8  F (36.6  C) Temp src: Oral BP: 107/58 Pulse: 67 Heart Rate: 66 Resp: 18 SpO2: 100 % O2 Device: None (Room air)    Vitals:    07/28/20 1840   Weight: 77.9 kg (171 lb 12.8 oz)     Vital Signs with Ranges  Temp:  [97.8  F (36.6  C)-98.3  F (36.8  C)] 97.8  F (36.6  C)  Pulse:  [] 67  Heart Rate:  [65-66] 66  Resp:  [10-20]  18  BP: ()/() 107/58  SpO2:  [97 %-100 %] 100 %  I/O last 3 completed shifts:  In: -   Out: 640 [Urine:640]    Constitutional: Awake, alert, cooperative, no apparent distress  Respiratory: Clear to auscultation bilaterally, no crackles or wheezing  Cardiovascular: Regular rate and rhythm, normal S1 and S2, and no murmur noted  GI: Normal bowel sounds, soft, non-distended, non-tender  Skin/Integumen: No rashes, no cyanosis, no edema  Neuro : moving all 4 extremities, no focal deficit noted     Medications     amiodarone 0.5 mg/min (07/29/20 0927)     lidocaine 0.5 mg/min (07/29/20 1150)     - MEDICATION INSTRUCTIONS -       - MEDICATION INSTRUCTIONS -       Warfarin Therapy Reminder         carvedilol  6.25 mg Oral BID w/meals     digoxin  125 mcg Oral Daily     famotidine  20 mg Oral At Bedtime     ipratropium  2 spray Both Nostrils Q12H     lisinopril  5 mg Oral At Bedtime     multivitamin  with lutein  1 capsule Oral BID     rosuvastatin  20 mg Oral At Bedtime     sodium chloride (PF)  3 mL Intracatheter Q8H     warfarin ANTICOAGULANT  1.25 mg Oral ONCE at 18:00       Data   Recent Labs   Lab 07/29/20  0615 07/29/20  0340 07/29/20  0058 07/28/20  1848   WBC  --   --   --  7.9   HGB  --   --   --  13.6   MCV  --   --   --  93   PLT  --   --   --  184   INR 2.61*  --   --  2.42*   NA  --   --   --  134   POTASSIUM  --   --   --  4.5   CHLORIDE  --   --   --  104   CO2  --   --   --  26   BUN  --   --   --  26   CR  --   --   --  1.48*   ANIONGAP  --   --   --  4   LORI  --   --   --  8.4*   GLC  --   --   --  95   TROPI  --  0.086* 0.049* <0.015     Recent Labs   Lab 07/28/20  1848   GLC 95       Imaging:   Recent Results (from the past 24 hour(s))   XR Chest 2 Views    Narrative    XR CHEST 2 VW 7/28/2020 7:05 PM    HISTORY: AICD fired    COMPARISON: 3/17/2020      Impression    IMPRESSION: Mild hyperinflation of the lungs. No consolidation. No  pleural effusions. No pneumothorax. Unchanged cardiac size.  Median  sternotomy. Left-sided implantable cardiac defibrillator/pacer.    CARLOS RAMACHANDRAN MD   Echocardiogram Complete    Narrative    [id number]  [id number]  [id number]^MITZY^LU^KRISTI           [facility]  Echocardiography Laboratory  [address] [place] [id number]        Name: ARTEM ELIZALDE  MRN: [id number]  : 1945  Study Date: 2020 10:15 AM  Age: 74 yrs  Gender: Male  Patient Location: [facility]  Reason For Study: Ventricular fibrillation  Ordering Physician: LU FORRESTER  Referring Physician: SUSU MCKEON  Performed By: Jarred Mays RDCS     BSA: 2.0 m2  Height: 73 in  Weight: 171 lb  HR: 65  BP: 137/75 mmHg  _____________________________________________________________________________  __        Procedure  Complete Portable Echo Adult. Optison (NDC #[id number]) given intravenously.  _____________________________________________________________________________  __        Interpretation Summary     The left ventricle is mildly dilated.  LVEF 42% based on biplane 2D tracing.  There is anterior, septal, and apical wall akinesis with thinned walls  consistent with old LAD territory infarct.  Due to apical trabeculation, cannot rule out small LV apical thrombus.  No significant valvular heart disease.  _____________________________________________________________________________  __        Left Ventricle  The left ventricle is mildly dilated. There is normal left ventricular wall  thickness. Diastolic Doppler findings (E/E' ratio and/or other parameters)  suggest left ventricular filling pressures are increased. LVEF 42% based on  biplane 2D tracing. There is anterior, septal, and apical wall akinesis.  Thrombus can not be excluded.     Right Ventricle  There is a catheter/pacemaker lead seen in the right ventricle. The right  ventricle is normal in size and function.     Atria  The left atrium is moderate to severely dilated. The right atrium is moderate  to  severely dilated. There is no color Doppler evidence of an atrial shunt.     Mitral Valve  The mitral valve leaflets are mildly thickened. There is trace mitral  regurgitation.        Tricuspid Valve  There is mild (1+) tricuspid regurgitation. IVC diameter and respiratory  changes fall into an intermediate range suggesting an RA pressure of 8 mmHg.     Aortic Valve  There is trivial trileaflet aortic sclerosis. There is trace aortic  regurgitation.     Pulmonic Valve  There is trace pulmonic valvular regurgitation.     Vessels  Normal size aorta. Mild aortic root dilatation.     Pericardium  There is no pericardial effusion.        Rhythm  Sinus rhythm was noted.  _____________________________________________________________________________  __  MMode/2D Measurements & Calculations  IVSd: 0.60 cm     LVIDd: 6.2 cm  LVIDs: 4.9 cm  LVPWd: 0.74 cm  FS: 21.9 %  LV mass(C)d: 158.7 grams  LV mass(C)dI: 78.8 grams/m2  Ao root diam: 4.0 cm  LA dimension: 4.3 cm  asc Aorta Diam: 3.2 cm  LA/Ao: 1.1  LA Volume (BP): 134.0 ml  LA Volume Index (BP): 66.7 ml/m2  RWT: 0.24           Doppler Measurements & Calculations  MV E max lai: 101.0 cm/sec  MV dec time: 0.13 sec  PA acc time: 0.12 sec  TR max lai: 304.7 cm/sec  TR max P.1 mmHg  E/E' av.0  Lateral E/e': 14.3  Medial E/e': 17.6           _____________________________________________________________________________  __           Report approved by: Marifer Artis 2020 12:20 PM

## 2020-07-29 NOTE — PLAN OF CARE
Neuro- A&O  Most Recent Vitals- Temp: 98.3  F (36.8  C) Temp src: Oral BP: 93/50 Pulse: 69 Heart Rate: 66 Resp: 20 SpO2: 97 % O2 Device: None (Room air)    Tele - 100% Vpaced.   Cardiac- Currently on amiodarone drip and lidocaine drip. No chest pain reported. Few runs of VT, hospitalist notified  Resp- LS clear, no shortness of breath reported   O2- RA  Activity- IND up to bedside   Pain- Denies  Drips- Amiodarone and lidocaine drip  Devices- PPM/ICD  Aggression Color- Green  Plan- Cardiology/EP to consult 7/29  Misc- NPO since EVELIO Ball RN

## 2020-07-29 NOTE — PLAN OF CARE
MD Notification    Notified Person: MD    Notified Person Name: Danilo    Notification Date/Time: 1:14 PM 7/29    Notification Interaction: Web-based paging    Purpose of Notification: FYI pt had amio infiltration this morning. Site improving.     Orders Received: No new orders    Comments:

## 2020-07-29 NOTE — PROVIDER NOTIFICATION
MD Notification    Notified Person: MD    Notified Person Name: Dr. Ferrari     Notification Date/Time: 7/28 2300    Notification Interaction: Text page     Purpose of Notification: Patient having frequent runs of vtach, last run 16 beats. Patient asymptomatic, vital signs stable. Currently has amiodarone drip running.    Orders Received:    Comments:

## 2020-07-29 NOTE — PLAN OF CARE
Tele: 100% v-paced. Lidocaine gtt weaned off at 1400. Amiodarone to be turned off at 24 hr afshan - 2000 tonight. VSS on RA. Echo complete. Anticoagulated on coumadin. Goal INR 2.5-3. R) PIV infiltrated this morning, new PIV started that has amio infusing, sites checked q1-2 hours this shift, infiltration site improving. Nursing will continue to monitor.     Ivette Martinez RN on 7/29/2020 at 6:05 PM

## 2020-07-29 NOTE — ED NOTES
Pacemaker interrogated successfully by SHAMIKAT.  Cait Marin RN,.......................................... 7/28/2020   7:35 PM

## 2020-07-29 NOTE — PHARMACY-ADMISSION MEDICATION HISTORY
Pharmacy Medication History  Admission medication history interview status for the 7/28/2020  admission is complete. See EPIC admission navigator for prior to admission medications     Medication history sources: Patient's family/friend (Wife Pamela)  Medication history source reliability: Good  Adherence assessment: Good    Significant changes made to the medication list:        Additional medication history information:       Medication reconciliation completed by provider prior to medication history? No    Time spent in this activity: 15 min      Prior to Admission medications    Medication Sig Last Dose Taking? Auth Provider   acetaminophen (TYLENOL) 500 MG tablet Take 1,000 mg by mouth every 8 hours as needed for mild pain Past Month at Unknown time Yes Unknown, Entered By History   amiodarone (PACERONE) 200 MG tablet Take 200 mg by mouth six times a week Every day except Sundays -- takes in pm 7/27/2020 at pm Yes Unknown, Entered By History   carvedilol (COREG) 6.25 MG tablet Take 1 tablet (6.25 mg) by mouth 2 times daily (with meals) 7/28/2020 at x1 Yes Parish Newman MD   digoxin (LANOXIN) 125 MCG tablet Take 1 tablet (125 mcg) by mouth daily 7/28/2020 at Unknown time Yes Parish Newman MD   famotidine (PEPCID) 20 MG tablet Take 1 tablet (20 mg) by mouth 2 times daily 7/28/2020 at x1 Yes Dejon Downs MD   ipratropium (ATROVENT) 0.03 % nasal spray Spray 2 sprays into both nostrils every 12 hours 7/28/2020 at x1 Yes Dejon Downs MD   lisinopril (ZESTRIL) 5 MG tablet Take 1 tablet (5 mg) by mouth At Bedtime 7/27/2020 at hs Yes Parish Newman MD   Multiple Vitamins-Minerals (PRESERVISION AREDS 2 PO) Take 1 capsule by mouth 2 times daily 7/28/2020 at x1 Yes Reported, Patient   nitroGLYcerin (NITROSTAT) 0.4 MG sublingual tablet DISSOLVE 1 TABLET UNDER THE TONGUE EVERY 5 MINUTES AS NEEDED FOR CHEST PAIN  Yes Dejon Downs MD   rosuvastatin (CRESTOR) 20 MG tablet Take 1  tablet (20 mg) by mouth daily 7/27/2020 at hs Yes Parish Newman MD   Vitamin D, Cholecalciferol, 1000 UNITS TABS Take 1,000 mg by mouth daily  7/28/2020 at Unknown time Yes Reported, Patient   warfarin ANTICOAGULANT (COUMADIN) 2.5 MG tablet Take 2.5 mg on Mondays and 1.25mg all other days or as directed by the anticoagulation clinic 7/27/2020 at pm Yes Dejon Downs MD   ASPIRIN NOT PRESCRIBED (INTENTIONAL) continuous prn for other Please choose reason not prescribed, below   Reported, Patient   sildenafil (VIAGRA) 100 MG tablet Take 1 tablet (100 mg) by mouth daily as needed Take 30 min to 4 hours before intercourse.  Never use with nitroglycerin, terazosin or doxazosin.   Dejon Downs MD

## 2020-07-29 NOTE — ED NOTES
"[facility]  ED Nurse Handoff Report    ED Chief complaint: AICD Problem      ED Diagnosis:   Final diagnoses:   AICD discharge   Syncope, unspecified syncope type   CKD (chronic kidney disease) stage 3, GFR 30-59 ml/min (H)       Code Status: Full Code    Allergies:   Allergies   Allergen Reactions     Aspirin      Other reaction(s): Aspirin Contraindication (for reporting)  Cardiologist recommended no aspirin as he is on Pradaxa     Nystatin Other (See Comments)     Make his mouth numb & swelling       Patient Story:   Pt BIBA c/o pacemaker firing tonight. Wife reported that patient slumped over in chair a bit, then made a painful noise and was alert again. AICD fired. Pt was having couplets of EMS tele enroute.     Focused Assessment:    Generalized fatigue  Ventricular paced at 65 bpm and capturing per ECG.  SBP's in 140's  RA at 98%.    Treatments and/or interventions provided:   Pt to be placed on amio drip  Patient's response to treatments and/or interventions:   Stable no change    To be done/followed up on inpatient unit:    Continue with plan of care.    Does this patient have any cognitive concerns?: none    Activity level - Baseline/Home:  Independent  Activity Level - Current:   Independent    Patient's Preferred language: English   Needed?: No    Isolation: None  Infection: Not Applicable  Bariatric?: No    Vital Signs:   Vitals:    07/28/20 1840 07/28/20 1930   BP: (!) 138/108    Pulse: 118    Resp: 16 10   Temp: 97.9  F (36.6  C)    TempSrc: Oral    SpO2: 98% 98%   Weight: 77.9 kg (171 lb 12.8 oz)    Height: 1.854 m (6' 1\")        Cardiac Rhythm:     Was the PSS-3 completed:   Yes  What interventions are required if any?               Family Comments: Wife at bedside in ED.  OBS brochure/video discussed/provided to patient/family: N/A              Name of person given brochure if not patient: n/a              Relationship to patient: n/a    For the majority of the shift " this patient's behavior was Green.   Behavioral interventions performed were none.    ED NURSE PHONE NUMBER: [phone]

## 2020-07-29 NOTE — PROVIDER NOTIFICATION
Dr. Ferrari paged. Need IMC orders since pt is being placed on lidocaine gtt and requires frequent monitoring. Orders placed.

## 2020-07-29 NOTE — H&P
Admitted:     07/28/2020      CHIEF COMPLAINT:  Syncope, AICD firing.      HISTORY OF PRESENT ILLNESS:  Tyrel Rene is a very pleasant 64-year-old  gentleman who has chronic severe ischemic cardiomyopathy and chronic systolic heart failure as well as chronic underlying atrial fibrillation.  The patient had a big anterior wall myocardial infarction and has had stable ejection fraction of 25-30%.  He had a biventricular ICD in place.  He had complications of recurrent ventricular tachycardia and was initially placed on amiodarone, but had breakthrough episodes with sustained ventricular tachycardia causing syncope and multiple ICD shocks.  As a result, the patient underwent ablation of his ventricular tachycardia in 01/2019.  Since that time, the patient has been relatively stable on amiodarone.  The patient is also followed by [facility], and his most recent DLCO documented in the cardio notes has been stable.      The patient was at home with his wife this evening.  He was sitting in a recliner with his feet up, when he started feeling dizzy, and then he woke up after his episode without much recollection and thought that he may have gotten shocked once.  He thought he may have fallen asleep.  The patient's wife apparently noted him to be slumped over in the chair, and he made some painful noises and then he became alert again.  He has some numbness and tingling in his hands.  The patient denies any recent fevers, sickness or sick contacts.  The patient came to [facility] for further assessment.      In the emergency room, the patient was seen by Dr. Jimenez Weldon.  The AICD was interrogated.  The AICD apparently went off at 4:39 p.m.  He had sustained ventricular fibrillation for about 27 seconds that was paced out.  The patient had a second episode 2 minutes later at 4:41 p.m., and this will last for about 49 seconds.  The patient was subsequently started on amiodarone loading dose  and is admitted to the Cardiac Telemetry Unit for further evaluation.  The patient was evaluated by me.  Currently, he offers no chest pain or shortness of breath and is overall stable.      PAST MEDICAL HISTORY:   1.  Ischemic cardiomyopathy with EF of 25-30%, status post ICD placement.   2.  History of recurrent ventricular tachycardia and ventricular fibrillation, for which he underwent ablation in 01/2019 and since then had been on amiodarone had been stable.   3.  History of chronic systolic congestive heart failure.   4.  Chronic atrial fibrillation.   5.  Hyperlipidemia.   6.  Shingles.   7.  History of stroke.   8.  Colon polyps.   9.  Nephrotic syndrome.     10. Dysphagia.   11.  Pneumonia.   12.  Peripheral edema.   13.  Erectile dysfunction.   14.  Neuropathy.   15.  Anemia.   16.  Atrial flutter, for which he underwent ablation.   17.  Atherosclerotic cardiovascular disease.   18.  SVT.   19.  Hypertension.   20.  Hyperlipidemia.   21.  Chronic kidney disease, stage III.      PAST SURGICAL HISTORY:   1.  Coronary artery bypass surgery, 3-vessel.   2.  Aortic valve replacement.   3.  Cardioversion.   4.  History of VT ablation.   5.  Atrial flutter ablation.   6.  Right hand surgery.   7.  Left heart catheterization with angioplasty.   8.  Right inguinal hernia repair.   9.  ICD and pacemaker placement with subsequent relocation.      FAMILY HISTORY:  Father with substance abuse, heart disease, hyperlipidemia.  Mother with aneurysm.  Sister with Alzheimer's, substance abuse, obesity, hypertension.      SOCIAL HISTORY:  He is  and lives in a [facility].  No tobacco or alcohol.  Quit tobacco back in 1972.  He is retired.      ALLERGIES:  ASPIRIN, Statin.      CURRENT MEDICATIONS:   Include Coreg, digoxin, Pepcid, Atrovent nasal spray, lisinopril, nitroglycerin, Crestor, Viagra, Warfarin, amiodarone, PreserVision, Vitamin D.      REVIEW OF SYSTEMS:  Ten points reviewed.  Positive for syncope, some mild  leg swelling and dizziness prior to his syncopal episode.  Denies any chest pain or shortness of breath.  Denies any recent syncope.  Denies any increased fatigue or exercise intolerance, orthopnea.  Otherwise, 10-point systems reviewed and negative.      PHYSICAL EXAMINATION:   VITAL SIGNS:  Temperature 97.9, heart rate 65, respiration 10, blood pressure 145/85, saturations are 98% on room air.   GENERAL:  The patient is alert and oriented x 3, no distress.   HEENT:  Unremarkable.   NECK:  Veins not distended.   CHEST:  Lungs are clear to auscultation.   CARDIOVASCULAR:  S1, S2.  Heart rate is regular.  EKG shows ventricular paced rhythm.   ABDOMEN:  Soft, nontender.  Normoactive bowel sounds.   MUSCULOSKELETAL:  Shows trace edema of his lower extremities.   NEUROLOGIC:  He is grossly nonfocal.  Cranial nerves are grossly intact.   SKIN:  Warm, dry, well perfused.   PSYCHIATRIC:  Mood and affect stable.      LABORATORY DATA:  EKG shows ventricular paced rhythm, heart rate of 65.  Chest x-ray shows mild hyperinflation of the lungs.  No consolidation or effusion.  Left implantable defibrillator pacer noted.  Electrolytes are unremarkable other than borderline low sodium of 134, BUN 26, creatinine 1.4 with calculated GFR 45.  Troponin is negative.  TSH 1.32.  Magnesium level is 3.2, potassium 4.5.  CBC is normal.  Glucose 95.  INR is 2.42.      ASSESSMENT:  Tyrel Rene is a 74-year-old gentleman with severe ischemic cardiomyopathy, ejection fraction of 25-30% and history of recurrent ventricular tachycardia for which he underwent ICD placement, who has had a breakthrough ventricular tachycardia, for which he underwent ablation and has been stable on amiodarone since 01/2019, now comes in with another episode of ventricular tachycardia, ventricular fibrillation for initial 27 seconds and then followed by 49 seconds, for which he, I believe, was shocked twice and now is being admitted for further evaluation.      PLAN:    1.  Severe ischemic cardiomyopathy with AICD firing x 2 due to ventricular tachycardia and ventricular fibrillation:  The patient has been started on amiodarone loading dose.  The patient will be seen by Electrophysiology tomorrow.  The patient may need repeat EP study for ablation.  We will repeat an echocardiogram in the morning as well.  Initial troponin was negative, but we would expect this to increase.   2.  Atherosclerotic cardiovascular disease:  The patient will be continued on all his cardiac medications including Coreg, lisinopril and Crestor.  He is hemodynamically stable.   3.  History of atrial fibrillation:  The patient will be continued on warfarin with Pharmacy dosing.  I will check a digoxin level in the morning.   4.  Gastroesophageal reflux disease:  We will continue the patient on Pepcid.   5.  Deep venous thrombosis prophylaxis:  The patient is already anticoagulated and therapeutic with his INR.      CODE STATUS:  FULL.         KWABENA DELONG MD             D: 2020   T: 2020   MT: JONATHAN      Name:     ARTEM ELIZALDE   MRN:      [id number]        Account:      [id number]   :      1945        Admitted:     2020                   Document: [id number]       cc: Suellen Newman MD, [facility]       Dejon Downs MD

## 2020-07-29 NOTE — PHARMACY-ANTICOAGULATION SERVICE
Clinical Pharmacy - Warfarin Dosing Consult     Pharmacy has been consulted to manage this patient s warfarin therapy.  Indication: Atrial Fibrillation  Therapy Goal: INR 2-3  Warfarin Prior to Admission: Yes  Warfarin PTA Regimen: current warfarin dose 2.5 mg Mon and 1.25 mg ROW  Significant drug interactions: Amiodarone    INR   Date Value Ref Range Status   07/28/2020 2.42 (H) 0.86 - 1.14 Final   07/22/2020 2.4 (A) 0.90 - 1.10 Final       Recommend warfarin 1.25 mg today.  Pharmacy will monitor Tyrel Rene daily and order warfarin doses to achieve specified goal.      Please contact pharmacy as soon as possible if the warfarin needs to be held for a procedure or if the warfarin goals change.

## 2020-07-29 NOTE — CONSULTS
Electrophysiology/ Cardiology- Consult Note         H&P and Plan:     Reason for consult: ICD shock.      History of present illness: 64-year old gentleman with a history of hypertension, hyperlipidemia, CKD, CVA (2011), permanent atrial fibrillation/complete AV block (previous CRT-D implantation) and heart failure secondary to ischemic cardiomyopathy (previous large anterior MI psoriasis; CABG in 2012; EF of 25-30%), who was admitted after an episode of ICD shock.    Patient had a long history of ventricular arrhythmias resulting ICD shocks.  He presented with 9 shocks back in November 2018 and underwent VT ablation in January 2019.  He did well after ablation and has been on chronic therapy with amiodarone (200 mg daily).  He informs that the last 6 weeks, they attempted to decrease the dose of amiodarone (skip every Sunday) and last night he had recurrence of VT/ICD shock.  He was sitting in a chair and started feeling dizzy and experienced an ICD shocks.    Device was interrogated and confirmed an episode of monomorphic VT/flutter (cycling from 260-280 ms).  He was treated with ATP x2 and finally had a single shock, resulting restoration of normal rhythm.    At the moment, he is doing well.  He has no other complaints during this visit.  He denies chest pain, shortness of breath or lightheadedness.      Previous studies:  -Echocardiogram (8/5/2019): Mid and distal anterior, anteroseptal and apical akinesis consistent with extensive LAD infarction.  EF was estimated at 25-30%.  There was mild MR/TR and AI.  -Coronary angiography (1/2018):  Occluded proximal LAD and widely patent LIMA graft to LAD.  Subtotal first obtuse marginal well revascularized by a widely patent SVG graft. 70% stenosis in the second obtuse marginal- cannot find the SVG graft to this OM which was reportedly bypassed. Mild disease in a dominant right coronary artery.     Plan:  1.  Recurrent monomorphic VT/flutter resulting in ICD shock.   "Patient has no other complaints.  He is known to have monomorphic VT which is been well controlled on amiodarone therapy.  Recurrence of VT was likely related to decreasing dose of amiodarone.  He is currently on lidocaine and amnio drip.  I recommend the following:  -Limited echo.  -Off lidocaine drip.  -Complete amiodarone IV load and then switch to oral (200 g daily).  -Continue Coreg.  2.  CHF.  Euvolemic on physical exam.  Continue therapy with Crestor, lisinopril, digoxin and Coreg.  3.  Atrial fibrillation/AV block.  Heart rate is well controlled.  4.  Embolic prevention.  Chads vas score of 4.  Continue therapy with Coumadin indefinitely.    Taz Uriostegui MD    Physical Exam:  Vitals: /56   Pulse 65   Temp 97.8  F (36.6  C) (Oral)   Resp 18   Ht 1.854 m (6' 1\")   Wt 77.9 kg (171 lb 12.8 oz)   SpO2 100%   BMI 22.67 kg/m        Intake/Output Summary (Last 24 hours) at 7/29/2020 0902  Last data filed at 7/29/2020 0600  Gross per 24 hour   Intake --   Output 640 ml   Net -640 ml     Vitals:    07/28/20 1840   Weight: 77.9 kg (171 lb 12.8 oz)       Constitutional:  AAO x3.  Pt is in NAD.  HEAD: Normocephalic.  SKIN: Skin normal color, texture and turgor with no lesions or eruptions.  Eyes: PERRL, EOMI.  ENT:  Supple, normal JVP. No lymphadenopathy or thyroid enlargement.  Chest:  CTAB  Cardiac: RRR, normal  S1 and S2.  No murmurs rubs or gallop.   Abdomen:  Normal BS.  Soft, non-tender and non-distended.  No rebound or guarding.    Extremities:  Pedious pulses palpable B/L.  No LE edema noticed.   Neurological: Strength and sensation grossly symmetric and intact throughout.         Review of Systems:  Complete review of system is otherwise negative with the exception of what was described above.     CURRENT MEDICATIONS:    carvedilol  6.25 mg Oral BID w/meals     digoxin  125 mcg Oral Daily     famotidine  20 mg Oral At Bedtime     ipratropium  2 spray Both Nostrils Q12H     lisinopril  5 mg Oral At " Bedtime     multivitamin  with lutein  1 capsule Oral BID     rosuvastatin  20 mg Oral At Bedtime     sodium chloride (PF)  3 mL Intracatheter Q8H     PRN Meds: acetaminophen, acetaminophen, alum & mag hydroxide-simethicone, lidocaine 4%, lidocaine (buffered or not buffered), - MEDICATION INSTRUCTIONS -, nitroGLYcerin, ondansetron **OR** ondansetron, - MEDICATION INSTRUCTIONS -, sodium chloride (PF), Warfarin Therapy Reminder    ALLERGIES     Allergies   Allergen Reactions     Aspirin      Other reaction(s): Aspirin Contraindication (for reporting)  Cardiologist recommended no aspirin as he is on Pradaxa     Nystatin Other (See Comments)     Make his mouth numb & swelling       PAST MEDICAL HISTORY:  Past Medical History:   Diagnosis Date     Atrial fibrillation (H)     s/p Cardioversion 3/14/2013     Atrial flutter (H)     S/p Aflutter ablation 1/11/2011     CAD (coronary artery disease)     CABG x3 10/2012- LIMA to distal LAD, SVG to OM1 & OM3; cath 10/2012- PTCA to second diagonal and mid LAD, BMS to mid LAD     Cardiogenic shock (H)      Cardiomyopathy (H)      ED (erectile dysfunction)      Hypertension      Neuropathy      SVT (supraventricular tachycardia) (H)     S/p dual chamber ICD 10/11/12- upgraded to BIV ICD 6/2013     Syncope        PAST SURGICAL HISTORY:  Past Surgical History:   Procedure Laterality Date     BYPASS GRAFT ARTERY CORONARY  10/2/2012    Procedure: BYPASS GRAFT ARTERY CORONARY;  Coronary Artery Bypass Graft x3 (LAD, Diag, OM) with Endovein [place] (On-Pump);  Surgeon: Yeyo Lyman MD;  Location:  OR     CARDIOVERSION  3/14/2013     CORONARY ANGIOGRAPHY ADULT ORDER  10/2/12     CORONARY ARTERY BYPASS  10/2/12    LIMA to LAD, SVG to OM1 and OM3     EP ABLATION VT N/A 1/2/2019    Procedure: EP Ablation VT;  Surgeon: Suellen Costello MD;  Location:  HEART CARDIAC CATH LAB     EP COMPREHENSIVE EP STUDY N/A 1/2/2019    Procedure: EP Comprehensive EP Study;  Surgeon:  Suellen Costello MD;  Location:  HEART CARDIAC CATH LAB     H ABLATION ATRIAL FLUTTER  2011     HAND SURGERY      table saw injury right hand     HEART CATH, ANGIOPLASTY  10/2/12    PTCA to second diagonal and mid LAD, BMS to mid LAD     HERNIA REPAIR       RELOCATE GENERATOR ICD/PACEMAKER         FAMILY HISTORY:  Family History   Problem Relation Age of Onset     Alcohol/Drug Father      Heart Disease Father 71     Alzheimer Disease Sister      Alcohol/Drug Sister      Alcohol/Drug Sister      Gastrointestinal Disease Sister      Obesity Sister      Hypertension Sister      Heart Disease Sister      Hypertension Sister      Heart Disease Daughter         afib     Arrhythmia Daughter      Multiple Sclerosis Daughter      Heart Disease Daughter         afib     Arrhythmia Daughter      Cancer Daughter         lymphoma     Aneurysm Mother        SOCIAL HISTORY:  Social History     Socioeconomic History     Marital status:      Spouse name: Not on file     Number of children: Not on file     Years of education: Not on file     Highest education level: Not on file   Occupational History     Not on file   Social Needs     Financial resource strain: Not on file     Food insecurity     Worry: Not on file     Inability: Not on file     Transportation needs     Medical: Not on file     Non-medical: Not on file   Tobacco Use     Smoking status: Former Smoker     Packs/day: 1.00     Years: 14.00     Pack years: 14.00     Types: Cigarettes     Start date:      Last attempt to quit: 7/10/1972     Years since quittin.0     Smokeless tobacco: Never Used   Substance and Sexual Activity     Alcohol use: No     Drug use: No     Sexual activity: Yes     Partners: Female   Lifestyle     Physical activity     Days per week: Not on file     Minutes per session: Not on file     Stress: Not on file   Relationships     Social connections     Talks on phone: Not on file     Gets together: Not on file      Attends Mandaeism service: Not on file     Active member of club or organization: Not on file     Attends meetings of clubs or organizations: Not on file     Relationship status: Not on file     Intimate partner violence     Fear of current or ex partner: Not on file     Emotionally abused: Not on file     Physically abused: Not on file     Forced sexual activity: Not on file   Other Topics Concern     Parent/sibling w/ CABG, MI or angioplasty before 65F 55M? No      Service Not Asked     Blood Transfusions Not Asked     Caffeine Concern Yes     Comment: 4 cups coffee daily     Occupational Exposure Not Asked     Hobby Hazards Not Asked     Sleep Concern No     Stress Concern No     Weight Concern Yes     Comment: gain 2lbs     Special Diet Yes     Comment: low sodium     Back Care Not Asked     Exercise Yes     Comment: walking, doing sittups, played pickle ball in summer     Bike Helmet Not Asked     Seat Belt Yes     Self-Exams Not Asked   Social History Narrative     Not on file         Recent Lab Results:  Recent Labs   Lab 07/29/20  0615 07/29/20  0340 07/29/20  0058 07/28/20  1848 07/22/20  1116   WBC  --   --   --  7.9  --    HGB  --   --   --  13.6  --    MCV  --   --   --  93  --    PLT  --   --   --  184  --    INR 2.61*  --   --  2.42* 2.4*   NA  --   --   --  134  --    POTASSIUM  --   --   --  4.5  --    CHLORIDE  --   --   --  104  --    CO2  --   --   --  26  --    BUN  --   --   --  26  --    CR  --   --   --  1.48*  --    ANIONGAP  --   --   --  4  --    LORI  --   --   --  8.4*  --    GLC  --   --   --  95  --    TROPI  --  0.086* 0.049* <0.015  --

## 2020-07-29 NOTE — ED NOTES
Assumed care at this time.  Cait Marin RN,.......................................... 7/28/2020   7:15 PM

## 2020-07-30 NOTE — PROGRESS NOTES
[employer] CRT-D Latitude consult (In patient remote transmission.    Remote consult after presenting to ED with ICD shock  Dr Uriostegui saw pt while IP. He had received ATP X 2 followed by appropriate 1 shock for VT rate of 230 BPM on 7-28 in the afternoon.   Per Dr Uriostegui:  the last 6 weeks, they attempted to decrease the dose of amiodarone (skip every Sunday) and last night he had recurrence of VT/ICD shock.  He was sitting in a chair and started feeling dizzy and experienced an ICD shocks.  Plan to return Amiodarone dose to original 200 mg daily. We did not get an alert as he was IP when the device would have sent us report.  Will continue to monitor. AGAPITO Valiente

## 2020-07-30 NOTE — DISCHARGE SUMMARY
[facility]    Discharge Summary  Hospitalist    Date of Admission:  7/28/2020  Date of Discharge:  7/30/2020  1:26 PM  Discharging Provider: Kenia Carrasco MD    Discharge Diagnoses     Monomorphic vt with icd firing     History of Present Illness   Please review admission history and physical.    Hospital Course   Tyrel Rene was admitted on 7/28/2020.  The following problems were addressed during his hospitalization:    Active Problems:    Ventricular fibrillation (H)  Tyrel Rene is a 74 year old male who was admitted on 7/28/2020.  Patient admitted following ICD shock.  Severe ischemic cardiomyopathy with AICD firing x2 due to monomorphic VT.  --Uneventful since admission, patient seen by electrophysiology team, and is to wean down amiodarone drip and continue with increased dose of amiodarone 200 mg daily.  Reduction amiodarone dose is considered to be the causative factor for his monomorphic VT and ICD firing.  ---Echo completed, results below , creatinine baseline, international normalized ratio  Therapeutic on discharge, need to keep it between 2.5-3.  -continue Coreg  ---Patient appears to be euvolemic, continue his other PTA medications which include lisinopril and Crestor.    History of atrial fibrillation--continue his PTA medications which include digoxin and warfarin patient is also currently on amiodarone     echocardiogram 7/29  The left ventricle is mildly dilated.  LVEF 42% based on biplane 2D tracing.  There is anterior, septal, and apical wall akinesis with thinned walls  consistent with old LAD territory infarct.  Due to apical trabeculation, cannot rule out small LV apical thrombus.  No significant valvular heart disease.  Xray was obtained prior to discharge.  IMPRESSION: There is an 8 mm long sliver-like bony density projecting  adjacent to the superior margin of the tarsonavicular on the lateral  view. This may represent a nondisplaced avulsion injury of uncertain  age  and clinical correlation for point tenderness in this region is  recommended. No other bony abnormalities. Vascular calcifications.  I reached out to his spouse and recommended follow up with orthopedics  In next 24-48 hours or earlier if any worsening of symptoms, they did mention improvement of symptoms already, injury happened almost 6 days ago and patient was bearing weight on his foot during this time .  Kenia Carrasco MD    Significant Results and Procedures       Pending Results     Unresulted Labs Ordered in the Past 30 Days of this Admission     No orders found from 6/28/2020 to 7/29/2020.          Code Status   Full Code       Primary Care Physician   Dejon Downs    Physical Exam   Temp: 97.6  F (36.4  C) Temp src: Oral BP: 114/63 Pulse: 66 Heart Rate: 65 Resp: 18 SpO2: 96 % O2 Device: None (Room air)    Vitals:    07/28/20 1840 07/30/20 0548   Weight: 77.9 kg (171 lb 12.8 oz) 78.8 kg (173 lb 11.2 oz)     Vital Signs with Ranges  Temp:  [97.6  F (36.4  C)-98.3  F (36.8  C)] 97.6  F (36.4  C)  Pulse:  [65-66] 66  Heart Rate:  [65] 65  Resp:  [18-20] 18  BP: (104-143)/(53-77) 114/63  SpO2:  [96 %-98 %] 96 %  I/O last 3 completed shifts:  In: 566.48 [P.O.:240; I.V.:326.48]  Out: 1675 [Urine:1675]    The patient was examined on the day of discharge.    Discharge Disposition   Discharged to home  Condition at discharge: Stable    Consultations This Hospital Stay   ADVANCE DIRECTIVE IP CONSULT  ELECTROPHYSIOLOGY IP CONSULT  PHARMACY TO DOSE WARFARIN  PHYSICAL THERAPY ADULT IP CONSULT  OCCUPATIONAL THERAPY ADULT IP CONSULT  PHARMACY TO DOSE WARFARIN  SMOKING CESSATION PROGRAM IP CONSULT    Time Spent on this Encounter   I, Kenia Carrasco MD, personally saw the patient today and spent greater than 30 minutes discharging this patient.    Discharge Orders      Follow-up and recommended labs and tests     Virtual visit with Dr. Newman as previously arranged 8/5/2020. Device nurses will contact you to set  up remote ICD check prior to that appt     Reason for your hospital stay    icd shocks, monomorphic VT     Follow-up and recommended labs and tests     Follow up with primary care provider, Dejon Downs, within 7 days to evaluate medication change and for hospital follow- up.  No follow up labs or test are needed.follow up with cardiology as scheduled.     Activity    Your activity upon discharge: activity as tolerated     Discharge Instructions    You have nitroglycerin  And viagra in your medication list, please don't take them together or with in 4 hours of either  ,beware of the sie effects which include severe hypotension which could  Cause dizziness, passing out episodes, severe chest pain etc.     Diet    Follow this diet upon discharge: Orders Placed This Encounter      Combination Diet Low Saturated Fat Na <2400mg Diet; No Caffeine for 24 hours (once tests completed, may have caffeine)     Discharge Medications   Current Discharge Medication List      CONTINUE these medications which have CHANGED    Details   amiodarone (PACERONE) 200 MG tablet Take 1 tablet (200 mg) by mouth daily    Associated Diagnoses: Ventricular fibrillation (H)         CONTINUE these medications which have NOT CHANGED    Details   acetaminophen (TYLENOL) 500 MG tablet Take 1,000 mg by mouth every 8 hours as needed for mild pain      carvedilol (COREG) 6.25 MG tablet Take 1 tablet (6.25 mg) by mouth 2 times daily (with meals)  Qty: 180 tablet, Refills: 3    Associated Diagnoses: Ventricular tachycardia (H)      digoxin (LANOXIN) 125 MCG tablet Take 1 tablet (125 mcg) by mouth daily  Qty: 90 tablet, Refills: 3    Associated Diagnoses: Paroxysmal atrial fibrillation (H)      famotidine (PEPCID) 20 MG tablet Take 1 tablet (20 mg) by mouth 2 times daily  Qty: 180 tablet, Refills: 3    Associated Diagnoses: Anemia, unspecified type      ipratropium (ATROVENT) 0.03 % nasal spray Spray 2 sprays into both nostrils every 12  hours  Qty: 30 mL, Refills: 5    Associated Diagnoses: Chronic rhinitis      lisinopril (ZESTRIL) 5 MG tablet Take 1 tablet (5 mg) by mouth At Bedtime  Qty: 90 tablet, Refills: 0    Associated Diagnoses: Chronic systolic congestive heart failure (H)      Multiple Vitamins-Minerals (PRESERVISION AREDS 2 PO) Take 1 capsule by mouth 2 times daily      nitroGLYcerin (NITROSTAT) 0.4 MG sublingual tablet DISSOLVE 1 TABLET UNDER THE TONGUE EVERY 5 MINUTES AS NEEDED FOR CHEST PAIN  Qty: 25 tablet, Refills: 0    Associated Diagnoses: Postsurgical aortocoronary bypass status      rosuvastatin (CRESTOR) 20 MG tablet Take 1 tablet (20 mg) by mouth daily  Qty: 90 tablet, Refills: 0    Associated Diagnoses: Mixed hyperlipidemia      Vitamin D, Cholecalciferol, 1000 UNITS TABS Take 1,000 mg by mouth daily       warfarin ANTICOAGULANT (COUMADIN) 2.5 MG tablet Take 2.5 mg on Mondays and 1.25mg all other days or as directed by the anticoagulation clinic  Qty: 75 tablet, Refills: 3    Associated Diagnoses: Paroxysmal atrial fibrillation (H); Long term current use of anticoagulant therapy      ASPIRIN NOT PRESCRIBED (INTENTIONAL) continuous prn for other Please choose reason not prescribed, below      sildenafil (VIAGRA) 100 MG tablet Take 1 tablet (100 mg) by mouth daily as needed Take 30 min to 4 hours before intercourse.  Never use with nitroglycerin, terazosin or doxazosin.  Qty: 16 tablet, Refills: 3    Associated Diagnoses: Erectile dysfunction, unspecified erectile dysfunction type           Allergies   Allergies   Allergen Reactions     Aspirin      Other reaction(s): Aspirin Contraindication (for reporting)  Cardiologist recommended no aspirin as he is on Pradaxa     Nystatin Other (See Comments)     Make his mouth numb & swelling     Data        Most Recent 3 CBC's:  Recent Labs   Lab Test 07/28/20  1848 03/04/20  1444 02/14/20  0823 02/12/20  1233   WBC 7.9  --  9.6 12.7*   HGB 13.6 13.0* 12.9* 14.2   MCV 93  --  93 93   PLT  184  --  143* 155      Most Recent 3 BMP's:  Recent Labs   Lab Test 07/30/20  0602 07/28/20  1848 03/04/20  1444    134 138   POTASSIUM 3.8 4.5 4.6   CHLORIDE 108 104 106   CO2 26 26 27   BUN 24 26 29   CR 1.32* 1.48* 1.60*   ANIONGAP 4 4 9.6   LORI 8.2* 8.4* 8.4*   GLC 90 95 95     Most Recent 2 LFT's:  Recent Labs   Lab Test 02/12/20  1233 02/04/20  1520   AST 21 20   ALT 28 27   ALKPHOS 59 64   BILITOTAL 1.0 0.6     Most Recent INR's and Anticoagulation Dosing History:  Anticoagulation Dose History     Recent Dosing and Labs Latest Ref Rng & Units 5/19/2020 6/3/2020 6/24/2020 7/22/2020 7/28/2020 7/29/2020 7/30/2020    Warfarin 1.25 mg - - - - - 1.25 mg 1.25 mg -    INR 0.86 - 1.14 2.9(A) 3.0(A) 2.7(A) 2.4(A) 2.42(H) 2.61(H) 2.93(H)    INR 0.86 - 1.14 - - - - - - -    INR Point of Care 0.86 - 1.14 - - - - - - -        Most Recent 3 Troponin's:  Recent Labs   Lab Test 07/29/20  0340 07/29/20  0058 07/28/20  1848   TROPI 0.086* 0.049* <0.015     Most Recent Cholesterol Panel:  Recent Labs   Lab Test 01/07/20  1114   CHOL 116   LDL 52   HDL 42   TRIG 111     Most Recent 6 Bacteria Isolates From Any Culture (See EPIC Reports for Culture Details):  Recent Labs   Lab Test 02/12/20  1233 10/17/12  1355 10/15/12  0845 10/14/12  1220 10/14/12  1130 10/14/12  1049   CULT No growth  No growth No growth Heavy growth Klebsiella pneumoniae Heavy growth Coagulase negative Staphylococcus Susceptibility testing not  routinely done No growth No growth Duplicate request Charge credited RN DRAW     Most Recent TSH, T4 and A1c Labs:  Recent Labs   Lab Test 07/28/20  1848  10/09/12  0450   TSH 1.32   < >  --    A1C  --   --  5.7    < > = values in this interval not displayed.     Results for orders placed or performed during the hospital encounter of 07/28/20   XR Chest 2 Views    Narrative    XR CHEST 2 VW 7/28/2020 7:05 PM    HISTORY: AICD fired    COMPARISON: 3/17/2020      Impression    IMPRESSION: Mild hyperinflation of the  lungs. No consolidation. No  pleural effusions. No pneumothorax. Unchanged cardiac size. Median  sternotomy. Left-sided implantable cardiac defibrillator/pacer.    CARLOS RAMACHANDRAN MD   XR Ankle Left G/E 3 Views    Narrative    LEFT ANKLE THREE OR MORE VIEWS  2020 12:29 PM     HISTORY: Fall.    COMPARISON: None.      Impression    IMPRESSION: There is an 8 mm long sliver-like bony density projecting  adjacent to the superior margin of the tarsonavicular on the lateral  view. This may represent a nondisplaced avulsion injury of uncertain  age and clinical correlation for point tenderness in this region is  recommended. No other bony abnormalities. Vascular calcifications.   Echocardiogram Complete    Narrative    [id number]  [id number]  [id number]  [id number]^KRISTI           [facility]  Echocardiography Laboratory  [address] [place] [address]        Name: ARTEM ELIZALDE  MRN: [id number]  : 1945  Study Date: 2020 10:15 AM  Age: 74 yrs  Gender: Male  Patient Location: [facility]  Reason For Study: Ventricular fibrillation  Ordering Physician: LU FORRESTER  Referring Physician: SUSU MCKEON  Performed By: Jarred Mays RDCS     BSA: 2.0 m2  Height: 73 in  Weight: 171 lb  HR: 65  BP: 137/75 mmHg  _____________________________________________________________________________  __        Procedure  Complete Portable Echo Adult. Optison (NDC #[id number]) given intravenously.  _____________________________________________________________________________  __        Interpretation Summary     The left ventricle is mildly dilated.  LVEF 42% based on biplane 2D tracing.  There is anterior, septal, and apical wall akinesis with thinned walls  consistent with old LAD territory infarct.  Due to apical trabeculation, cannot rule out small LV apical thrombus.  No significant valvular heart disease.  _____________________________________________________________________________  __         Left Ventricle  The left ventricle is mildly dilated. There is normal left ventricular wall  thickness. Diastolic Doppler findings (E/E' ratio and/or other parameters)  suggest left ventricular filling pressures are increased. LVEF 42% based on  biplane 2D tracing. There is anterior, septal, and apical wall akinesis.  Thrombus can not be excluded.     Right Ventricle  There is a catheter/pacemaker lead seen in the right ventricle. The right  ventricle is normal in size and function.     Atria  The left atrium is moderate to severely dilated. The right atrium is moderate  to severely dilated. There is no color Doppler evidence of an atrial shunt.     Mitral Valve  The mitral valve leaflets are mildly thickened. There is trace mitral  regurgitation.        Tricuspid Valve  There is mild (1+) tricuspid regurgitation. IVC diameter and respiratory  changes fall into an intermediate range suggesting an RA pressure of 8 mmHg.     Aortic Valve  There is trivial trileaflet aortic sclerosis. There is trace aortic  regurgitation.     Pulmonic Valve  There is trace pulmonic valvular regurgitation.     Vessels  Normal size aorta. Mild aortic root dilatation.     Pericardium  There is no pericardial effusion.        Rhythm  Sinus rhythm was noted.  _____________________________________________________________________________  __  MMode/2D Measurements & Calculations  IVSd: 0.60 cm     LVIDd: 6.2 cm  LVIDs: 4.9 cm  LVPWd: 0.74 cm  FS: 21.9 %  LV mass(C)d: 158.7 grams  LV mass(C)dI: 78.8 grams/m2  Ao root diam: 4.0 cm  LA dimension: 4.3 cm  asc Aorta Diam: 3.2 cm  LA/Ao: 1.1  LA Volume (BP): 134.0 ml  LA Volume Index (BP): 66.7 ml/m2  RWT: 0.24           Doppler Measurements & Calculations  MV E max lai: 101.0 cm/sec  MV dec time: 0.13 sec  PA acc time: 0.12 sec  TR max lai: 304.7 cm/sec  TR max P.1 mmHg  E/E' av.0  Lateral E/e': 14.3  Medial E/e': 17.6            _____________________________________________________________________________  __           Report approved by: Marifer Artis 07/29/2020 12:20 PM

## 2020-07-30 NOTE — PROGRESS NOTES
07/30/20 0845   Quick Adds   Type of Visit Initial Occupational Therapy Evaluation   Living Environment   Lives With spouse   Living Arrangements house   Home Accessibility stairs within home   Transportation Anticipated family or friend will provide   Living Environment Comment Patient states he lives with spouse in split level home. 3 steps up/down and then another 7 stairs up/down to bed room or living room    Self-Care   Usual Activity Tolerance moderate   Current Activity Tolerance moderate   Regular Exercise No   Equipment Currently Used at Home shower chair   Functional Level   Ambulation 0-->independent   Transferring 0-->independent   Toileting 0-->independent   Bathing 0-->independent   Dressing 0-->independent   Eating 0-->independent   Communication 0-->understands/communicates without difficulty   Swallowing 0-->swallows foods/liquids without difficulty   Cognition 0 - no cognition issues reported   Fall history within last six months no   Which of the above functional risks had a recent onset or change? none   Prior Functional Level Comment Patient independent in ADLs/IADLs prior. Patient states wife assists with cooking, cleaning and home management a lot more lately as patient has noted increased dizziness    General Information   Onset of Illness/Injury or Date of Surgery - Date 07/28/20   Referring Physician Kenia Carrasco MD    Patient/Family Goals Statement return home    Additional Occupational Profile Info/Pertinent History of Current Problem 64-year old gentleman with a history of hypertension, hyperlipidemia, CKD, CVA (2011), permanent atrial fibrillation/complete AV block (previous CRT-D implantation) and heart failure secondary to ischemic cardiomyopathy (previous large anterior MI psoriasis; CABG in 2012; EF of 25-30%), who was admitted after an episode of ICD shock   Precautions/Limitations fall precautions   General Observations Activity: Ambulate    Cognitive Status Examination    Orientation orientation to person, place and time   Visual Perception   Visual Perception Wears glasses   Sensory Examination   Sensory Quick Adds No deficits were identified   Pain Assessment   Patient Currently in Pain No   Integumentary/Edema   Integumentary/Edema no deficits were identifed   Posture   Posture not impaired   Range of Motion (ROM)   ROM Quick Adds No deficits were identified   Strength   Manual Muscle Testing Quick Adds No deficits were identified   Coordination   Upper Extremity Coordination No deficits were identified   Mobility   Bed Mobility Bed mobility skill: Sit to supine;Bed mobility skill: Supine to sit   Bed Mobility Skill: Sit to Supine   Level of [place]: Sit/Supine independent   Bed Mobility Skill: Supine to Sit   Level of [place]: Supine/Sit independent   Transfer Skill: Sit to Stand   Level of [place]: Sit/Stand stand-by assist   Lower Body Dressing   Level of [place]: Dress Lower Body stand-by assist   Instrumental Activities of Daily Living (IADL)   Previous Responsibilities driving;finances;medication management   Activities of Daily Living Analysis   Impairments Contributing to Impaired Activities of Daily Living strength decreased;balance impaired   General Therapy Interventions   Planned Therapy Interventions ADL retraining;IADL retraining;ROM;strengthening;transfer training;progressive activity/exercise;risk factor education   Clinical Impression   Criteria for Skilled Therapeutic Interventions Met yes, treatment indicated   OT Diagnosis decreased independence in ADLs/IADLs    Influenced by the following impairments deconditioning, decreased balance, decreased activity tolerance    Assessment of Occupational Performance 1-3 Performance Deficits   Identified Performance Deficits bathing, dressing,   Clinical Decision Making (Complexity) Low complexity   Therapy Frequency Daily   Predicted Duration of Therapy Intervention (days/wks) 2 days    Anticipated  "Discharge Disposition Home with Assist   Risks and Benefits of Treatment have been explained. Yes   Patient, Family & other staff in agreement with plan of care Yes   [facility] TM \"6 Clicks\"   2016, Trustees of [facility], under license to [employer].  All rights reserved.   6 Clicks Short Forms Daily Activity Inpatient Short Form   [facility]  \"6 Clicks\" Daily Activity Inpatient Short Form   1. Putting on and taking off regular lower body clothing? 4 - None   2. Bathing (including washing, rinsing, drying)? 3 - A Little   3. Toileting, which includes using toilet, bedpan or urinal? 4 - None   4. Putting on and taking off regular upper body clothing? 4 - None   5. Taking care of personal grooming such as brushing teeth? 4 - None   6. Eating meals? 4 - None   Daily Activity Raw Score (Score out of 24.Lower scores equate to lower levels of function) 23   Total Evaluation Time   Total Evaluation Time (Minutes) 10     "

## 2020-07-30 NOTE — PROGRESS NOTES
Dr. Trent ORTIZ before you see pt 8/5    He was admitted 7/28-7/30/2020 after recurrent VT.  His Amio was decreased to 200 mg 6 days/week ~6 weeks ago as no recurrent prolonged VT/shocks since VT ablation ~1 year ago.    We made no changes in the hospital except increasing amio back to 200 mg daily. Otherwise, he's doing great!    Lina

## 2020-07-30 NOTE — PROGRESS NOTES
SPIRITUAL HEALTH SERVICES  SPIRITUAL ASSESSMENT Progress Note  [facility]     REFERRAL SOURCE: follow up for notarization of HCD      Met with Marko and his wife in order to facilitate virtual notarization of his HCD with [employer].     He is looking forward to discharging later today and said there were no additional needs at this time.    PLAN: Nothing further at this time due to pending discharge.    Katy Greenwood  Associate   Pager [phone]  Phone [phone]

## 2020-07-30 NOTE — PROGRESS NOTES
ANTICOAGULATION  MANAGEMENT: Discharge Review    Tyrel Rene chart reviewed for anticoagulation continuity of care    Emergency room visit on 07/28-07/29 for Syncope.    Discharge disposition: Home    Results:    Recent labs: (last 7 days)     07/28/20  1848 07/29/20  0615 07/30/20  0602   INR 2.42* 2.61* 2.93*     Anticoagulation inpatient management:     home regimen continued    Anticoagulation discharge instructions:     Warfarin dosing: home regimen continued   Bridging: No   INR goal change: No      Medication changes affecting anticoagulation: No    Additional factors affecting anticoagulation: No    Plan     Recommend to check INR on 08/03/21    Spoke with Sheral, wife, and called Candy with in home labs to move next INR check to 08/03    Anticoagulation Calendar updated    Shahnaz Montenegro RN

## 2020-07-30 NOTE — PLAN OF CARE
Neuro- A&O x4  Most Recent Vitals- Temp: 97.6  F (36.4  C) Temp src: Oral BP: 108/58 Pulse: 66 Heart Rate: 65 Resp: 18 SpO2: 98 % O2 Device: None (Room air)    Tele/Cardiac- 100% V paced  Resp- Lungs clear  Activity- SBA  Pain- Denies  Drips- NA  Drains/Tubes- NA  Skin- Bruise on left foot by ankle & on arms.  GI/- WDL  Aggression Color- Green  Plan- Possible discharge home today    Nela Lemos RN

## 2020-07-30 NOTE — PLAN OF CARE
VSS, not in pain at time of discharge. Pt states all belongings and paperwork are accounted for. No  new medications . Discharge education complete including meds, follow up and all instructions. ICD shock info gone over. No further questions on discharge information. Family here to drive pt home.

## 2020-07-30 NOTE — PLAN OF CARE
Initial OT evaluation completed and treatment initiated. Patient 64-year old gentleman with a history of hypertension, hyperlipidemia, CKD, CVA (2011), permanent atrial fibrillation/complete AV block (previous CRT-D implantation) and heart failure secondary to ischemic cardiomyopathy (previous large anterior MI psoriasis; CABG in 2012; EF of 25-30%), who was admitted after an episode of ICD shock. Patient reports he lives in split level home with spouse- multiple stairs to manage. Patient states he was independent prior, though wife has been managing cooking, cleaning, etc lately due to increased dizziness.     Discharge Planner OT   Patient plan for discharge: home    Current status: Patient transferred supine-sit EOB independently. BP /68, HR 65 bpm, Patient transferred sit-stand SBA, patient ambulated in room into hallway ~ 250 feet at moderate level pace able to converse during with no c/o's SOB. Patient ambulated up/down 6 stairs with use of one railing independently. Patient returned to room and transferred to bed independently. BP after activity 110/64, HR 80 bpm however with rest returned to 65 bpm. Therapist encouraged patient to continue to ambulate with staff in hallway during stay.     Barriers to return to prior living situation: anticipate none    Recommendations for discharge: home with continued assist from wife for IADLs prn    Rationale for recommendations: Patient tolerated therapy well today, encouraged patient to continue HEP he received from previous cardiac rehab. Patient lives with wife who is supportive.        Entered by: Katy Bowles 07/30/2020 9:37 AM        Occupational Therapy Discharge Summary    Reason for therapy discharge:    Discharged to home.    Progress towards therapy goal(s). See goals on Care Plan in [facility] electronic health record for goal details.  Goals partially met.  Barriers to achieving goals:   discharge from facility.    Therapy recommendation(s):    Continue  home exercise program.

## 2020-07-30 NOTE — PROGRESS NOTES
[facility]    EP Progress Note    Date of Service (when I saw the patient): 07/30/2020     Assessment & Plan   Tyrel Rene is a 74 year old male with h/o severe ischemic CM (EF 25-30%), unstable ventricular tachyarrhythmias with ICD shock 11/2017 and again 11/2018 with VT ablation 1/2019, CAD s/p CABG 2012, permanent AFib with complete AV Block, BiV ICD, CVA following  AFlutter ablation 2011 who was admitted on 7/28/2020 due to recurrent VT requiring shock.    1. Recurrent VT; EF 42% -   - Had shocks 11/2017 and then VT storm 11/2018.   - Underwent VT ablation 1/2019 with Dr. Costello. Noted to have extensive scar in anterior wall, apex and anterior septum, and underwent ablation of border zone between healthy/infarcted area    - Clinically he had done well on amiodarone 200 mg daily.  Given no recurrent shocks/prolonged VT >1 year following ablation, Dr. Costello decreased his dose of amiodarone to 200 mg 6 days a week ~6 weeks ago.    - Presented again after ICD shock for monomorphic VT/Flutter (cycle 260-280 ms). Treated with ATP x 2, then 1 shock.    - Started on lidocaine and then amiodarone gtt. Started back on oral amiodarone 200 mg daily.     PLAN:   * Continue amiodarone 200 mg daily. Do not think attempt at reducing amiodarone should be made again given recurrent VT despite ablation    * Continue carvedilol 6.25mg BID   * Will get device interrogation (remote) before Dr. Newman appt   * OK to discharge from EP standpoint    2. Ischemic CM, CAD -   - EF 42% with WMA noted on echo 7/29/2020  - Cath 1/2018 showed patent LIMA-LAD, patent SVG to subtotal occlusion of OM1 and native OM2 70% lesion without graft visible.   - PTA on Coreg 6.25 mg BID, lisinopril 5 mg. These have been continued  - Does not require diuretics at home. Here, appears euvolemic  - Peak troponin 0.086, likely shock related     PLAN:   * Continue current meds, including BB, ACE, statin. No ASA due to warfarin   * Keep appt  "with Dr. Newman  8/5    3. Permanent AFib -   - On Coreg 6.25 mg and digoxin 125 mcg daily at home.  - Last clinical device interrogation 6/2020 with 100% BIV paced in VVIR  bpm.   - On warfarin for high CHADSVASc (at least 7 - CM, HTN, CVA, CAD, age)  - He remains in AFib (was NOT converted to SR following ICD shock).      PLAN:   * Continue current meds    4. Questionable LV apical thrombus -   - INRs have been followed at home and have been therapeutic  - Echo 7/29 showed EF 42% with akinesis of anterior, septal and apical wall. Due to apical trabeculation, small LV apical thrombus could not be ruled out.     PLAN:   * Continue routine INRs. They'll have it checked Monday. Dr. Uriostegui has suggested keeping INR on the high side - 2.5-3.0 given questionable small thrombus    Component INR   Latest Ref Rng & Units 0.86 - 1.14   7/22/2020 2.4 (A)   7/28/2020 2.42 (H)   7/29/2020 2.61 (H)   7/30/2020 2.93 (H)     Joelle Rodríguez PA-C    Interval History   Feeling \"good\" without complaint    Physical Exam   Temp: 97.6  F (36.4  C) Temp src: Oral BP: 114/63 Pulse: 66 Heart Rate: 65 Resp: 18 SpO2: 96 % O2 Device: None (Room air)    Vitals:    07/28/20 1840 07/30/20 0548   Weight: 77.9 kg (171 lb 12.8 oz) 78.8 kg (173 lb 11.2 oz)     Vital Signs with Ranges  Temp:  [97.6  F (36.4  C)-98.3  F (36.8  C)] 97.6  F (36.4  C)  Pulse:  [65-67] 66  Heart Rate:  [65] 65  Resp:  [18-20] 18  BP: (104-143)/(53-90) 114/63  SpO2:  [96 %-98 %] 96 %  I/O last 3 completed shifts:  In: 566.48 [P.O.:240; I.V.:326.48]  Out: 1675 [Urine:1675]    Telemetry:  with underlying AFib    Constitutional: Awake, alert. Wife at bedside  Respiratory: CTA B  Cardiovascular: Regular  Musculoskeletal: No edema    Medications     - MEDICATION INSTRUCTIONS -       - MEDICATION INSTRUCTIONS -       Warfarin Therapy Reminder         amiodarone  200 mg Oral QPM     carvedilol  6.25 mg Oral BID w/meals     digoxin  125 mcg Oral Daily     famotidine "  20 mg Oral At Bedtime     ipratropium  2 spray Both Nostrils Q12H     lisinopril  5 mg Oral At Bedtime     multivitamin  with lutein  1 capsule Oral BID     rosuvastatin  20 mg Oral At Bedtime     sodium chloride (PF)  3 mL Intracatheter Q8H       Data   I personally reviewed the EKG tracing showing BiV Paced 65 bpm.  Results for orders placed or performed during the hospital encounter of 20 (from the past 24 hour(s))   Echocardiogram Complete    Narrative    [id number]  [id number]  [id number]^MITZY^[id number]^KRISTI           [facility]  Echocardiography Laboratory  [address] [place] [address]        Name: ARTEM ELIZALDE  MRN: [id number]  : 1945  Study Date: 2020 10:15 AM  Age: 74 yrs  Gender: Male  Patient Location: [facility]  Reason For Study: Ventricular fibrillation  Ordering Physician: LU FORRESTER  Referring Physician: SUSU MCKEON  Performed By: Jarred Mays RDCS     BSA: 2.0 m2  Height: 73 in  Weight: 171 lb  HR: 65  BP: 137/75 mmHg  _____________________________________________________________________________  __        Procedure  Complete Portable Echo Adult. Optison (NDC #[id number]) given intravenously.  _____________________________________________________________________________  __        Interpretation Summary     The left ventricle is mildly dilated.  LVEF 42% based on biplane 2D tracing.  There is anterior, septal, and apical wall akinesis with thinned walls  consistent with old LAD territory infarct.  Due to apical trabeculation, cannot rule out small LV apical thrombus.  No significant valvular heart disease.  _____________________________________________________________________________  __        Left Ventricle  The left ventricle is mildly dilated. There is normal left ventricular wall  thickness. Diastolic Doppler findings (E/E' ratio and/or other parameters)  suggest left ventricular filling pressures are increased. LVEF 42% based on  biplane  2D tracing. There is anterior, septal, and apical wall akinesis.  Thrombus can not be excluded.     Right Ventricle  There is a catheter/pacemaker lead seen in the right ventricle. The right  ventricle is normal in size and function.     Atria  The left atrium is moderate to severely dilated. The right atrium is moderate  to severely dilated. There is no color Doppler evidence of an atrial shunt.     Mitral Valve  The mitral valve leaflets are mildly thickened. There is trace mitral  regurgitation.        Tricuspid Valve  There is mild (1+) tricuspid regurgitation. IVC diameter and respiratory  changes fall into an intermediate range suggesting an RA pressure of 8 mmHg.     Aortic Valve  There is trivial trileaflet aortic sclerosis. There is trace aortic  regurgitation.     Pulmonic Valve  There is trace pulmonic valvular regurgitation.     Vessels  Normal size aorta. Mild aortic root dilatation.     Pericardium  There is no pericardial effusion.        Rhythm  Sinus rhythm was noted.  _____________________________________________________________________________  __  MMode/2D Measurements & Calculations  IVSd: 0.60 cm     LVIDd: 6.2 cm  LVIDs: 4.9 cm  LVPWd: 0.74 cm  FS: 21.9 %  LV mass(C)d: 158.7 grams  LV mass(C)dI: 78.8 grams/m2  Ao root diam: 4.0 cm  LA dimension: 4.3 cm  asc Aorta Diam: 3.2 cm  LA/Ao: 1.1  LA Volume (BP): 134.0 ml  LA Volume Index (BP): 66.7 ml/m2  RWT: 0.24           Doppler Measurements & Calculations  MV E max lai: 101.0 cm/sec  MV dec time: 0.13 sec  PA acc time: 0.12 sec  TR max lai: 304.7 cm/sec  TR max P.1 mmHg  E/E' av.0  Lateral E/e': 14.3  Medial E/e': 17.6           _____________________________________________________________________________  __           Report approved by: Marifer Artis 2020 12:20 PM      INR   Result Value Ref Range    INR 2.93 (H) 0.86 - 1.14   Basic metabolic panel   Result Value Ref Range    Sodium 138 133 - 144 mmol/L    Potassium 3.8 3.4  - 5.3 mmol/L    Chloride 108 94 - 109 mmol/L    Carbon Dioxide 26 20 - 32 mmol/L    Anion Gap 4 3 - 14 mmol/L    Glucose 90 70 - 99 mg/dL    Urea Nitrogen 24 7 - 30 mg/dL    Creatinine 1.32 (H) 0.66 - 1.25 mg/dL    GFR Estimate 52 (L) >60 mL/min/[1.73_m2]    GFR Estimate If Black 60 (L) >60 mL/min/[1.73_m2]    Calcium 8.2 (L) 8.5 - 10.1 mg/dL

## 2020-07-30 NOTE — PLAN OF CARE
Discharge Planner PT   Patient plan for discharge: Home today per chart review.  Current status: PT eval received, chart reviewed. Per discussion with OT, pt is at baseline for all functional mobility including gait and stairs. No need for PT assessment at this time. PT will complete orders this date.  Barriers to return to prior living situation: None anticipated from mobility stand point.  Recommendations for discharge: Defer to OT notes   Rationale for recommendations: Defer to OT notes.        Entered by: David Carrasco 07/30/2020 9:33 AM

## 2020-07-30 NOTE — PROVIDER NOTIFICATION
Pt's amio gtt weaned off at 2000 per orders. No PO amio ordered. Cards note says to restart PO but not a specific time. Dr. Del Rosario paged with cardiology to clarify.     Update: Spoke with Dr. Del Rosario. Received telephone order with readback to start PO Amiodarone 200 mg daily with first dose to be given now. Primary RN updated.

## 2020-07-31 NOTE — TELEPHONE ENCOUNTER
"Patient was evaluated by cardiology while inpatient for episode of monomorphic VT proceeded by episode of dizziness while sitting. ATP x 2 then shock x 1 via ICD to NSR. PMH: HTN, HLD, CAD, CABG in 2012, HF, ICM with previous EF of 25% secondary to scar from previous large MI, CVA 2011, CKD, VT ablation 1/2019 secondary to VT storm. Per YONATHAN Lina Rodríguez's IP note, \"Clinically he had done well on amiodarone 200 mg daily. Given no recurrent shocks/prolonged VT >1 year following ablation, Dr. Costello decreased his dose of amiodarone to 200 mg 6 days a week ~6 weeks ago. Amiodarone dosage was increased back up to 200 mg daily. Do not think attempt at reducing amiodarone should be made again given recurrent VT despite ablation.\" EF 42% with WMA noted on echo 7/29/2020. Writer attempted to call patient to discuss any post hospital d/c questions he may have, review medication changes, and confirm f/u appts, but no answer. VM left and pt instructed to call back with any non emergent questions or concerns or medication questions. RN left reminder with patient that he has an OV appt for CORE scheduled on 8/5/20 at 1415 with Dr. Newman. Reminded pt to weigh self every AM, after waking and using the restroom, but before breakfast and medications. Call clinic for a weight gain of 2 lbs overnight or 5 lbs in a week. Low Na+ diet encouraged. Pt instructed to bring daily wt/BP diary and medications with to f/u OV. Patient advised to call clinic with any cardiac related questions or concerns prior to this yonathan'sheila Hoover RN.            "

## 2020-07-31 NOTE — TELEPHONE ENCOUNTER
Received page from triage asking me to  phone for patient call. Spoke with patient's wife, Noris, who stated the patient is feeling the same symptoms he felt on Tuesday before he passed out and was shocked. Patient is having head rush sensations and stated his fingers and hands are tingling. Patient did state he took 1 tab of Nitroglycerin a few minutes ago but he denies any chest pain/pressure.     Attempted to have patient send manual device check and was unable so transferred patient to device tech, Prince, to trouble shoot monitor.     Will review the transmission once it comes through and call patient and patient's wife back to review.

## 2020-07-31 NOTE — TELEPHONE ENCOUNTER
Reviewed patient's sent transmission. Patient has 1 NSVT episode logged since his 07/28/20 hospital admission. EGM shows 3 beats of NSVT with V rates in the 150's and this episode is logged on 07/30/2020 at 1458. Device and lead measurements available are stable. Called patient and patient's wife back and reviewed the findings with them. Of note, appears patient was converted out of Afib after received ICD shock. Presenting rhythm at time of transmission shows regular AS events in the 40-50's not in sync with regular BIVP at patient is programmed VVIR and has been since 2013.    Did update patient with this information. Made a plan for patient to send manual transmission on Monday AM so it can be reviewed. If on Monday patient is still out of Afib will get patient scheduled for in-clinic device check to adjust settings so patient's heart can be in sync. Per chart review patient has had a cardioversion and ends up back in Afib. Informed patient that typically patient's don't have tingling hands and fingers when the top and bottom of the heart aren't communicating.     Additionally patient stated that he thinks the Nitroglycerin tab he took now 30 minutes prior has helped. Patient denied having any chest pain or pressure today at all and patient stated he took the Nitroglycerin just to see if it would make a difference. Informed patient that if he thinks that the Nitroglycerin did make a difference there could be something else going on with his heart that is not device related. Patient and patient's wife stated that they understood and agreed that if symptoms come back or if he takes another Nitroglycerin and his symptoms improve that they would go into an Emergency Department to be evaluated.     Patient stated his blood pressure this AM was 93/60 so did have patient retake his blood pressure at this time and it was 119/57. Reviewed with patient that his blood pressure is ok right now but did review that  Nitroglycerin can lower blood pressure so he needs to make sure he checks that and is aware of what his blood pressure is if he does take an additional tablet at all this weekend.     Reviewed the above information in the clinic with Lina Rodríguez so she is aware and additionally will route to Dr. Newman and his team so they are up to date. Provided patient with direct device clinic phone number in the event they have additional questions or concerns and did remind them that the voicemail is not check until Monday AM at 0800. Patient stated he understood.

## 2020-07-31 NOTE — TELEPHONE ENCOUNTER
Imelda brandt for update. Dr. Uriostegui had not thought he converted to SR with the shock so can update him when I see him next.    Agree with rechecking Monday and reprogramming if he remains in regular rhythm - can always reprogram him back to VVIR if he goes back into AFib.    Note INR is set up for Monday with plan for INR goal 2.5-3.0 given ?LV clot. Super impt now that he might be back in SR.    We didn't do ischemic w/u as no big trop bump and stable EF. Dr. Uriostegui felt that most likely culprit for VT was due to reduction in Amio. Agree using NTG only for CP.    I'm out next week but will be back 8/10.  Lina

## 2020-08-03 NOTE — TELEPHONE ENCOUNTER
Courtesy Check to Assess Presenting Rhythm    Patient is back into Afib with regular BVP at time of transmission at 0910.    Patient has 4 NSVT episodes logged since last checked on 07/31/20. 2 EGM's available showing 4 beats of NSVT with average V rates in the 130's. The NSVT episodes occurred on 08/01/2020 at 1643 and at that time patient was still in SR. Patient converted from SR back to Afib sometime after NSVT episodes were logged and patient sending manual transmission today.    Patient scheduled for follow-up with Dr. Newman on 08/05/20 and a remote device check on 09/09/2020.    No programming changes need to be made to patient's mode at this time. Called and reviewed these results with patient and patient's wife. Patient stated he has been feeling better since our last conversation on Friday, 07/31/20. Patient denied any more tingling in his fingers and hands and stated he was tired some on Sunday 08/02/20 but otherwise feels like himself again. Encouraged patient to call with anymore questions or concerns.

## 2020-08-03 NOTE — PROGRESS NOTES
ANTICOAGULATION MANAGEMENT     Patient Name:  Tyrel Rene  Date:  8/3/2020    ASSESSMENT /SUBJECTIVE:    Today's INR result of 3.2 is therapeutic. Goal INR of 2.5-3.5  (2.5-3.0)    Warfarin dose taken: Warfarin taken as previously instructed    Diet: No new diet changes affecting INR    Medication changes/ interactions: No new medications/supplements affecting INR    Previous INR: Therapeutic     S/S of bleeding or thromboembolism: No    New injury or illness: No    Upcoming surgery, procedure or cardioversion: No    Additional findings: Recent Hospital Visit. Wasn't feeling well the past few days. Now feeling  Much better. Will not change Maint dose as this time due to recent hospital visit and not feeling well.  Now that he is feeling better, will leave dosing as is and recheck INR in 1 week. If INR still above 3.2, will change maint dosing.    PLAN:    Spoke with Tyrel regarding INR result and instructed:     Warfarin Dosing Instructions: Continue your current warfarin dose 2.5 mg Mon, 1.25 mg all other days    Instructed patient to follow up no later than: 1 week  Orders given to [facility] ([phone]) (Spoke with Gasper).    Education provided: Monitoring for bleeding signs and symptoms and Monitoring for clotting signs and symptoms      Marko verbalizes understanding and agrees to warfarin dosing plan.    Instructed to call the Anticoagulation Clinic for any changes, questions or concerns. (#[phone])        Radha Pierce RN      OBJECTIVE:  Recent labs: (last 7 days)     07/28/20  1848 07/29/20  0615 07/30/20  0602 08/03/20   INR 2.42* 2.61* 2.93* 3.2*         No question data found.  Anticoagulation Summary  As of 8/3/2020    INR goal:   2.5-3.5   TTR:   42.7 % (11.8 mo)   INR used for dosing:   3.2 (8/3/2020)   Warfarin maintenance plan:   2.5 mg (2.5 mg x 1) every Mon; 1.25 mg (2.5 mg x 0.5) all other days   Full warfarin instructions:   2.5 mg every Mon; 1.25 mg all other days    Weekly warfarin total:   10 mg   No change documented:   Radha Pierce, RN   Plan last modified:   Leatha Jewell, RN (7/22/2020)   Next INR check:   8/10/2020   Priority:   High   Target end date:   Indefinite    Indications    Long-term (current) use of anticoagulants [Z79.01] [Z79.01]  Cerebral artery occlusion with cerebral infarction (HCC) [I63.50] [I63.50]  Cerebral infarction (H) [I63.9]             Anticoagulation Episode Summary     INR check location:   Anticoagulation Clinic    Preferred lab:       Send INR reminders to:   HANS MCLEOD    Comments:   Patient goal should be 2.5-3.0 (5/13/20)      Anticoagulation Care Providers     Provider Role Specialty Phone number    Dejon Downs MD [facility] [phone]

## 2020-08-05 PROBLEM — I47.20 VENTRICULAR TACHYCARDIA (H): Status: ACTIVE | Noted: 2020-01-01

## 2020-08-05 PROBLEM — I25.5 ISCHEMIC CARDIOMYOPATHY: Status: ACTIVE | Noted: 2020-01-01

## 2020-08-05 NOTE — PROGRESS NOTES
"Tyrel Rene is a 74 year old male who is being evaluated via a billable video visit.      The patient has been notified of following:     \"This video visit will be conducted via a call between you and your physician/provider. We have found that certain health care needs can be provided without the need for an in-person physical exam.  This service lets us provide the care you need with a video conversation.  If a prescription is necessary we can send it directly to your pharmacy.  If lab work is needed we can place an order for that and you can then stop by our lab to have the test done at a later time.    Video visits are billed at different rates depending on your insurance coverage.  Please reach out to your insurance provider with any questions.    If during the course of the call the physician/provider feels a video visit is not appropriate, you will not be charged for this service.\"    Patient has given verbal consent for Video visit? Yes  How would you like to obtain your AVS? MyChart  If you are dropped from the video visit, the video invite should be resent to: Text to cell phone: [phone]  Will anyone else be joining your video visit? No      Bp:136/81  Pulse:64  Wt:174.4lb    Review Of Systems  Skin: NEGATIVE  Eyes:Ears/Nose/Throat: Glasses   Respiratory: SOB w/exertion  Cardiovascular:Dizziness  Gastrointestinal: NEGATIVE  Genitourinary:NEGATIVE   Musculoskeletal: NEGATIVE  Neurologic: NEGATIVE  Psychiatric: NEGATIVE  Hematologic/Lymphatic/Immunologic: NEGATIVE  Endocrine:  NEGATIVE    Catie LAMAS    Video-Visit Details    Type of service:  Video Visit    Video Start Time: 2:22  Video End Time: 2:55    Originating Location (pt. Location): Home    Distant Location (provider location):  [facility]     Platform used for Video Visit: LYNNE Mckeon MD    HPI and Plan:   See dictation    Orders Placed This Encounter   Procedures     Basic " metabolic panel     Follow-Up with Electrophysiologist     Follow-Up with [facility] - DAVE visit     Follow-Up with [facility] - Return MD visit     No orders of the defined types were placed in this encounter.    There are no discontinued medications.      Encounter Diagnoses   Name Primary?     Chronic systolic congestive heart failure (H)      Ischemic cardiomyopathy Yes     ICD (implantable cardioverter-defibrillator) in place      Ventricular tachycardia (H)      Syncope, unspecified syncope type      AICD discharge      CKD (chronic kidney disease) stage 3, GFR 30-59 ml/min (H)      Mixed hyperlipidemia      Chronic atrial fibrillation (H)      On amiodarone therapy      Essential hypertension        CURRENT MEDICATIONS:  Current Outpatient Medications   Medication Sig Dispense Refill     acetaminophen (TYLENOL) 500 MG tablet Take 1,000 mg by mouth every 8 hours as needed for mild pain       amiodarone (PACERONE) 200 MG tablet Take 1 tablet (200 mg) by mouth daily       ASPIRIN NOT PRESCRIBED (INTENTIONAL) continuous prn for other Please choose reason not prescribed, below       carvedilol (COREG) 6.25 MG tablet Take 1 tablet (6.25 mg) by mouth 2 times daily (with meals) 180 tablet 3     digoxin (LANOXIN) 125 MCG tablet Take 1 tablet (125 mcg) by mouth daily 90 tablet 3     famotidine (PEPCID) 20 MG tablet Take 1 tablet (20 mg) by mouth 2 times daily 180 tablet 3     ipratropium (ATROVENT) 0.03 % nasal spray Spray 2 sprays into both nostrils every 12 hours 30 mL 5     lisinopril (ZESTRIL) 5 MG tablet Take 1 tablet (5 mg) by mouth At Bedtime 90 tablet 0     Multiple Vitamins-Minerals (PRESERVISION AREDS 2 PO) Take 1 capsule by mouth 2 times daily       nitroGLYcerin (NITROSTAT) 0.4 MG sublingual tablet DISSOLVE 1 TABLET UNDER THE TONGUE EVERY 5 MINUTES AS NEEDED FOR CHEST PAIN 25 tablet 0     rosuvastatin (CRESTOR) 20 MG tablet Take 1 tablet (20 mg) by mouth daily 90 tablet 0     sildenafil (VIAGRA) 100 MG  tablet Take 1 tablet (100 mg) by mouth daily as needed Take 30 min to 4 hours before intercourse.  Never use with nitroglycerin, terazosin or doxazosin. 16 tablet 3     Vitamin D, Cholecalciferol, 1000 UNITS TABS Take 1,000 mg by mouth daily        warfarin ANTICOAGULANT (COUMADIN) 2.5 MG tablet Take 2.5 mg on Mondays and 1.25mg all other days or as directed by the anticoagulation clinic 75 tablet 3       ALLERGIES     Allergies   Allergen Reactions     Aspirin      Other reaction(s): Aspirin Contraindication (for reporting)  Cardiologist recommended no aspirin as he is on Pradaxa     Nystatin Other (See Comments)     Make his mouth numb & swelling       PAST MEDICAL HISTORY:  Past Medical History:   Diagnosis Date     Atrial fibrillation (H)     s/p Cardioversion 3/14/2013     Atrial flutter (H)     S/p Aflutter ablation 1/11/2011     CAD (coronary artery disease)     CABG x3 10/2012- LIMA to distal LAD, SVG to OM1 & OM3; cath 10/2012- PTCA to second diagonal and mid LAD, BMS to mid LAD     Cardiogenic shock (H)      Cardiomyopathy (H)      ED (erectile dysfunction)      Hypertension      Neuropathy      SVT (supraventricular tachycardia) (H)     S/p dual chamber ICD 10/11/12- upgraded to BIV ICD 6/2013     Syncope        PAST SURGICAL HISTORY:  Past Surgical History:   Procedure Laterality Date     BYPASS GRAFT ARTERY CORONARY  10/2/2012    Procedure: BYPASS GRAFT ARTERY CORONARY;  Coronary Artery Bypass Graft x3 (LAD, Diag, OM) with Endovein [place] (On-Pump);  Surgeon: Yeyo Lyman MD;  Location:  OR     CARDIOVERSION  3/14/2013     CORONARY ANGIOGRAPHY ADULT ORDER  10/2/12     CORONARY ARTERY BYPASS  10/2/12    LIMA to LAD, SVG to OM1 and OM3     EP ABLATION VT N/A 1/2/2019    Procedure: EP Ablation VT;  Surgeon: Suellen Costello MD;  Location:  HEART CARDIAC CATH LAB     EP COMPREHENSIVE EP STUDY N/A 1/2/2019    Procedure: EP Comprehensive EP Study;  Surgeon: Suellen Costello  MD Charis;  Location:  HEART CARDI[facility] CATH LAB     H ABLATION ATRIAL FLUTTER  2011     HAND SURGERY      table saw injury right hand     HEART CATH, ANGIOPLASTY  10/2/12    PTCA to second diagonal and mid LAD, BMS to mid LAD     HERNIA REPAIR       RELOCATE GENERATOR ICD/PACEMAKER         FAMILY HISTORY:  Family History   Problem Relation Age of Onset     Alcohol/Drug Father      Heart Disease Father 71     Alzheimer Disease Sister      Alcohol/Drug Sister      Alcohol/Drug Sister      Gastrointestinal Disease Sister      Obesity Sister      Hypertension Sister      Heart Disease Sister      Hypertension Sister      Heart Disease Daughter         afib     Arrhythmia Daughter      Multiple Sclerosis Daughter      Heart Disease Daughter         afib     Arrhythmia Daughter      Cancer Daughter         lymphoma     Aneurysm Mother        SOCIAL HISTORY:  Social History     Socioeconomic History     Marital status:      Spouse name: Not on file     Number of children: Not on file     Years of education: Not on file     Highest education level: Not on file   Occupational History     Not on file   Social Needs     Financial resource strain: Not on file     Food insecurity     Worry: Not on file     Inability: Not on file     Transportation needs     Medical: Not on file     Non-medical: Not on file   Tobacco Use     Smoking status: Former Smoker     Packs/day: 1.00     Years: 14.00     Pack years: 14.00     Types: Cigarettes     Start date:      Last attempt to quit: 7/10/1972     Years since quittin.1     Smokeless tobacco: Never Used   Substance and Sexual Activity     Alcohol use: No     Drug use: No     Sexual activity: Yes     Partners: Female   Lifestyle     Physical activity     Days per week: Not on file     Minutes per session: Not on file     Stress: Not on file   Relationships     Social connections     Talks on phone: Not on file     Gets together: Not on file     Attends [employer]  "service: Not on file     Active member of club or organization: Not on file     Attends meetings of clubs or organizations: Not on file     Relationship status: Not on file     Intimate partner violence     Fear of current or ex partner: Not on file     Emotionally abused: Not on file     Physically abused: Not on file     Forced sexual activity: Not on file   Other Topics Concern     Parent/sibling w/ CABG, MI or angioplasty before 65F 55M? No      Service Not Asked     Blood Transfusions Not Asked     Caffeine Concern Yes     Comment: 4 cups coffee daily     Occupational Exposure Not Asked     Hobby Hazards Not Asked     Sleep Concern No     Stress Concern No     Weight Concern Yes     Comment: gain 2lbs     Special Diet Yes     Comment: low sodium     Back Care Not Asked     Exercise Yes     Comment: walking, doing sittups, played pickle ball in summer     Bike Helmet Not Asked     Seat Belt Yes     Self-Exams Not Asked   Social History Narrative     Not on file       Physical Exam:  Vitals: /81   Pulse 64   Ht 1.854 m (6' 1\")   Wt 79.1 kg (174 lb 6.4 oz)   BMI 23.01 kg/m      General:  no apparent distress, normal body habitus, sitting upright.  ENT/Mouth:  membranes moist, no nasal discharge.  Normal head shape, no apparent injury or laceration.  Eyes:  no scleral icterus, normal conjunctivae.  No observed jaundice.  Neck:  no apparent neck swelling.   Chest/Lungs:  No breathing difficulty while speaking.  No audible wheezing.  No cough during conversation.  Cardiovascular:  No obviously elevated jugular venous pressure.  No apparent edema bilaterally in LE.   Abdomen:  no obvious abdominal distention.   Extremities:  no apparent cyanosis.  Skin:  no xanthelasma.  No facial lacerations.  Neurologic:  Normal arm motion bilateral, no tremors.    Psychiatric:  Alert and oriented x3, calm demeanor    The rest of the comprehensive physical examination is deferred due to public health emergency " video visit restrictions.        Recent Lab Results:  LIPID RESULTS:  Lab Results   Component Value Date    CHOL 116 01/07/2020    HDL 42 01/07/2020    LDL 52 01/07/2020    TRIG 111 01/07/2020    CHOLHDLRATIO 2.8 12/17/2014       LIVER ENZYME RESULTS:  Lab Results   Component Value Date    AST 21 02/12/2020    ALT 28 02/12/2020       CBC RESULTS:  Lab Results   Component Value Date    WBC 7.9 07/28/2020    RBC 4.36 (L) 07/28/2020    HGB 13.6 07/28/2020    HCT 40.6 07/28/2020    MCV 93 07/28/2020    MCH 31.2 07/28/2020    MCHC 33.5 07/28/2020    RDW 14.2 07/28/2020     07/28/2020       BMP RESULTS:  Lab Results   Component Value Date     07/30/2020    POTASSIUM 3.8 07/30/2020    CHLORIDE 108 07/30/2020    CO2 26 07/30/2020    ANIONGAP 4 07/30/2020    GLC 90 07/30/2020    BUN 24 07/30/2020    CR 1.32 (H) 07/30/2020    GFRESTIMATED 52 (L) 07/30/2020    GFRESTBLACK 60 (L) 07/30/2020    LORI 8.2 (L) 07/30/2020        A1C RESULTS:  Lab Results   Component Value Date    A1C 5.7 10/09/2012       INR RESULTS:  Lab Results   Component Value Date    INR 3.2 (A) 08/03/2020    INR 2.93 (H) 07/30/2020           CC  Parish Newman MD  [address]  [place] [id number]

## 2020-08-05 NOTE — PROGRESS NOTES
Service Date: 08/05/2020      HISTORY OF PRESENT ILLNESS:  I had the opportunity to see Mr. Tyrel Rene in Cardiology Clinic today at the [facility] in [place] for reevaluation of chronic severe ischemic cardiomyopathy, chronic systolic heart failure and chronic atrial fibrillation.  Recently, he has had recurrent ventricular tachycardia requiring ICD shocks as well.      His history includes extensive anterior wall myocardial infarction with a stable ejection fraction over the last several years of 25%-30% with evidence of thinning and akinesis of the anterior, anteroseptal and apical territories consistent with transmural infarction.  He has a biventricular ICD in place and chronic atrial fibrillation.  He had episodes of recurrent ventricular tachycardia requiring ICD shocks in the past and treated with oral amiodarone, but unfortunately had breakthrough episodes and underwent ablation of his VT in 01/2019.  He has been maintained on amiodarone 200 mg a day  since then and seems to be doing well.        When he saw Dr. Costello in Electrophysiology in 02/2020, he suggested to him that he could decrease the amiodarone to 6 days a week.  Mr. Rene did not do that.  Initially because he plans on traveling, but after the pandemic issues occurred, he eventually decided to decrease the amiodarone about 6 weeks ago.  One week ago he was sitting in his chair at home, suddenly felt his hands and face tingling and lost consciousness.  His wife witnessed this and heard him cry out, apparently when he received an ICD shock.  He woke up and again felt a tingling again and got another ICD shock.  They called 911 and he came into the hospital at [facility] where he was seen by Electrophysiology, Dr. Uriostegui, and RENNY Moreno.  The cause of his breakthrough ventricular tachycardia and ICD shock was thought to be the decrease in his amiodarone dosing and amiodarone was increased again to 7 days a week,  200 mg a day.  He has had some brief episodes of nonsustained ventricular tachycardia since then, but has received no further ICD shocks.  He was briefly back in sinus rhythm, but reverted back to atrial fibrillation again shortly after that episode.      He tells me today that he has not been feeling quite as well, mostly due to shortness of breath and some fatigue.  He has not really had any anginal symptoms specifically.      I reviewed his echocardiogram and it looks very stable compared to prior studies.  The report overestimates the left ventricular function.  His ejection fraction is 25%-30% with a stable area of akinesis within the LAD territory.      PHYSICAL EXAMINATION:  His blood pressure 136/81, heart rate 64 and weight 174 pounds.  His weight is actually down about 6 pounds compared to 3 months ago.      IMPRESSIONS:  Mr. Tyrel Rene is a 74-year-old gentleman with the following issues:   1.  Coronary artery disease with history of extensive anterior, anteroseptal and apical transmural infarction.  I reviewed his last coronary angiogram from 01/2018, and his LIMA to LAD is widely patent with good runoff, but perhaps some disease proximal to the insertion of the LIMA graft.  His first obtuse marginal is subtotally occluded with a patent saphenous vein graft to that vessel.  His right coronary artery demonstrates mild disease without graft.  His second obtuse marginal was apparently grafted, but no graft was identified, indicating that it may be occluded.  This second obtuse marginal appears to have an ostial 90% stenosis, which was reported to be 70% with an FFR of 0.81.  I suspect that that the stenosis was possibly underestimated at that time and may be significant.   2.  Severe ischemic cardiomyopathy with an ejection fraction of 25%-30%, stable.   3.  Chronic systolic heart failure with mild increase in heart failure symptoms recently.   4.  Recurrent sustained ventricular tachycardia.  He has  undergone VT ablation and was maintained on amiodarone, but after decreasing the dose by only 200 mg a week, he had recurrent ventricular tachycardia.  It seems unusual to me that such as a small dose reduction could result in a drastic change in the amount of ventricular tachycardia and ICD shock.  However, that certainly remains a possibility.  I am also concerned about the possibility of ischemia contributing to recurrent ventricular tachycardia.  For now, I agree with increasing the amiodarone back to 7 days per week, 200 mg a day.   5.  Chronic atrial fibrillation, on warfarin anticoagulation.   6.  Possible small left ventricular thrombus.  It seems quite unlikely that he would have a left ventricular thrombus with therapeutic anticoagulation consistently, although it remains possible.   7.  Chronic kidney disease, stage III, with most recent creatinine of 1.3, baseline typically 1.4 to 1.6.  This may mean that he is a bit volume overloaded at the moment.   8.  Hypertension.   9.  Dyslipidemia.      RECOMMENDATIONS:  I have suggested that we proceed with repeat coronary angiography for evaluation, specifically of the second obtuse marginal vessel and possible revascularization.  If there is any ischemic component to his ventricular tachycardia, this may help rectify that situation and help prevent further episodes of VT.  I will also arrange a followup visit with Dr. Costello to consider repeat VT ablation procedure if we are not able to provide any revascularization.      I have not changed his heart failure medications right now due to the upcoming coronary angiogram, but I would certainly like to see him use Entresto instead of lisinopril.  At this point, his blood pressure appears to be high enough to tolerate that after discontinuing lisinopril and waiting 36 hours.  We will address that issue with his followup in [facility] visit after his electrophysiology evaluation.      I have discussed the risks  and benefits of cardiac catheterization in detail with Mr. Elizalde, including the possibility of bleeding, renal failure, heart attack, stroke, emergency surgery and death.  He understands and agrees to proceed.      I will have him hold his warfarin starting on 2020 and he will have an INR on Monday 08/10/2020.  If his angiogram is not scheduled for Tuesday, 2020, we will initiate Lovenox to provide bridging anticoagulation.      I will have him follow up in [facility] in a month for reevaluation of these issues.      cc:      Dejon Downs MD    [facility]   [address] [place] [address]         LYNNE KAUFMAN MD, [facility]             D: 2020   T: 2020   MT: BERRY      Name:     ARTEM ELIZADLE   MRN:      [id number]        Account:      [id number]   :      1945           Service Date: 2020      Document: [id number]

## 2020-08-05 NOTE — LETTER
8/5/2020    Dejon Downs MD  [address]  [facility] [address]    RE: Tyrel Rene       Dear Colleague,    I had the pleasure of seeing Tyrel Rene in the [facility].    Tyrel Rene is a 74 year old male who is being evaluated via a billable video visit.      HPI and Plan:   See dictation    Orders Placed This Encounter   Procedures     Basic metabolic panel     Follow-Up with Electrophysiologist     Follow-Up with [facility] - DAVE visit     Follow-Up with [facility] - Return MD visit     No orders of the defined types were placed in this encounter.    There are no discontinued medications.      Encounter Diagnoses   Name Primary?     Chronic systolic congestive heart failure (H)      Ischemic cardiomyopathy Yes     ICD (implantable cardioverter-defibrillator) in place      Ventricular tachycardia (H)      Syncope, unspecified syncope type      AICD discharge      CKD (chronic kidney disease) stage 3, GFR 30-59 ml/min (H)      Mixed hyperlipidemia      Chronic atrial fibrillation (H)      On amiodarone therapy      Essential hypertension        CURRENT MEDICATIONS:  Current Outpatient Medications   Medication Sig Dispense Refill     acetaminophen (TYLENOL) 500 MG tablet Take 1,000 mg by mouth every 8 hours as needed for mild pain       amiodarone (PACERONE) 200 MG tablet Take 1 tablet (200 mg) by mouth daily       ASPIRIN NOT PRESCRIBED (INTENTIONAL) continuous prn for other Please choose reason not prescribed, below       carvedilol (COREG) 6.25 MG tablet Take 1 tablet (6.25 mg) by mouth 2 times daily (with meals) 180 tablet 3     digoxin (LANOXIN) 125 MCG tablet Take 1 tablet (125 mcg) by mouth daily 90 tablet 3     famotidine (PEPCID) 20 MG tablet Take 1 tablet (20 mg) by mouth 2 times daily 180 tablet 3     ipratropium (ATROVENT) 0.03 % nasal spray Spray 2 sprays into both nostrils every 12 hours 30 mL 5     lisinopril (ZESTRIL) 5 MG tablet Take 1 tablet (5  mg) by mouth At Bedtime 90 tablet 0     Multiple Vitamins-Minerals (PRESERVISION AREDS 2 PO) Take 1 capsule by mouth 2 times daily       nitroGLYcerin (NITROSTAT) 0.4 MG sublingual tablet DISSOLVE 1 TABLET UNDER THE TONGUE EVERY 5 MINUTES AS NEEDED FOR CHEST PAIN 25 tablet 0     rosuvastatin (CRESTOR) 20 MG tablet Take 1 tablet (20 mg) by mouth daily 90 tablet 0     sildenafil (VIAGRA) 100 MG tablet Take 1 tablet (100 mg) by mouth daily as needed Take 30 min to 4 hours before intercourse.  Never use with nitroglycerin, terazosin or doxazosin. 16 tablet 3     Vitamin D, Cholecalciferol, 1000 UNITS TABS Take 1,000 mg by mouth daily        warfarin ANTICOAGULANT (COUMADIN) 2.5 MG tablet Take 2.5 mg on Mondays and 1.25mg all other days or as directed by the anticoagulation clinic 75 tablet 3       ALLERGIES     Allergies   Allergen Reactions     Aspirin      Other reaction(s): Aspirin Contraindication (for reporting)  Cardiologist recommended no aspirin as he is on Pradaxa     Nystatin Other (See Comments)     Make his mouth numb & swelling       PAST MEDICAL HISTORY:  Past Medical History:   Diagnosis Date     Atrial fibrillation (H)     s/p Cardioversion 3/14/2013     Atrial flutter (H)     S/p Aflutter ablation 1/11/2011     CAD (coronary artery disease)     CABG x3 10/2012- LIMA to distal LAD, SVG to OM1 & OM3; cath 10/2012- PTCA to second diagonal and mid LAD, BMS to mid LAD     Cardiogenic shock (H)      Cardiomyopathy (H)      ED (erectile dysfunction)      Hypertension      Neuropathy      SVT (supraventricular tachycardia) (H)     S/p dual chamber ICD 10/11/12- upgraded to BIV ICD 6/2013     Syncope        PAST SURGICAL HISTORY:  Past Surgical History:   Procedure Laterality Date     BYPASS GRAFT ARTERY CORONARY  10/2/2012    Procedure: BYPASS GRAFT ARTERY CORONARY;  Coronary Artery Bypass Graft x3 (LAD, Diag, OM) with Endovein Tasley (On-Pump);  Surgeon: Yeyo Lyman MD;  Location:  OR      CARDIOVERSION  3/14/2013     CORONARY ANGIOGRAPHY ADULT ORDER  10/2/12     CORONARY ARTERY BYPASS  10/2/12    LIMA to LAD, SVG to OM1 and OM3     EP ABLATION VT N/A 2019    Procedure: EP Ablation VT;  Surgeon: Suellen Costello MD;  Location:  HEART CARDIAC CATH LAB     EP COMPREHENSIVE EP STUDY N/A 2019    Procedure: EP Comprehensive EP Study;  Surgeon: Suellen Costello MD;  Location:  HEART CARDIAC CATH LAB     H ABLATION ATRIAL FLUTTER  2011     HAND SURGERY      table saw injury right hand     HEART CATH, ANGIOPLASTY  10/2/12    PTCA to second diagonal and mid LAD, BMS to mid LAD     HERNIA REPAIR       RELOCATE GENERATOR ICD/PACEMAKER         FAMILY HISTORY:  Family History   Problem Relation Age of Onset     Alcohol/Drug Father      Heart Disease Father 71     Alzheimer Disease Sister      Alcohol/Drug Sister      Alcohol/Drug Sister      Gastrointestinal Disease Sister      Obesity Sister      Hypertension Sister      Heart Disease Sister      Hypertension Sister      Heart Disease Daughter         afib     Arrhythmia Daughter      Multiple Sclerosis Daughter      Heart Disease Daughter         afib     Arrhythmia Daughter      Cancer Daughter         lymphoma     Aneurysm Mother        SOCIAL HISTORY:  Social History     Socioeconomic History     Marital status:      Spouse name: Not on file     Number of children: Not on file     Years of education: Not on file     Highest education level: Not on file   Occupational History     Not on file   Social Needs     Financial resource strain: Not on file     Food insecurity     Worry: Not on file     Inability: Not on file     Transportation needs     Medical: Not on file     Non-medical: Not on file   Tobacco Use     Smoking status: Former Smoker     Packs/day: 1.00     Years: 14.00     Pack years: 14.00     Types: Cigarettes     Start date:      Last attempt to quit: 7/10/1972     Years since quittin.1      "Smokeless tobacco: Never Used   Substance and Sexual Activity     Alcohol use: No     Drug use: No     Sexual activity: Yes     Partners: Female   Lifestyle     Physical activity     Days per week: Not on file     Minutes per session: Not on file     Stress: Not on file   Relationships     Social connections     Talks on phone: Not on file     Gets together: Not on file     Attends Yazdanism service: Not on file     Active member of club or organization: Not on file     Attends meetings of clubs or organizations: Not on file     Relationship status: Not on file     Intimate partner violence     Fear of current or ex partner: Not on file     Emotionally abused: Not on file     Physically abused: Not on file     Forced sexual activity: Not on file   Other Topics Concern     Parent/sibling w/ CABG, MI or angioplasty before 65F 55M? No      Service Not Asked     Blood Transfusions Not Asked     Caffeine Concern Yes     Comment: 4 cups coffee daily     Occupational Exposure Not Asked     Hobby Hazards Not Asked     Sleep Concern No     Stress Concern No     Weight Concern Yes     Comment: gain 2lbs     Special Diet Yes     Comment: low sodium     Back Care Not Asked     Exercise Yes     Comment: walking, doing sittups, played pickle ball in summer     Bike Helmet Not Asked     Seat Belt Yes     Self-Exams Not Asked   Social History Narrative     Not on file       Physical Exam:  Vitals: /81   Pulse 64   Ht 1.854 m (6' 1\")   Wt 79.1 kg (174 lb 6.4 oz)   BMI 23.01 kg/m      General:  no apparent distress, normal body habitus, sitting upright.  ENT/Mouth:  membranes moist, no nasal discharge.  Normal head shape, no apparent injury or laceration.  Eyes:  no scleral icterus, normal conjunctivae.  No observed jaundice.  Neck:  no apparent neck swelling.   Chest/Lungs:  No breathing difficulty while speaking.  No audible wheezing.  No cough during conversation.  Cardiovascular:  No obviously elevated jugular " venous pressure.  No apparent edema bilaterally in LE.   Abdomen:  no obvious abdominal distention.   Extremities:  no apparent cyanosis.  Skin:  no xanthelasma.  No facial lacerations.  Neurologic:  Normal arm motion bilateral, no tremors.    Psychiatric:  Alert and oriented x3, calm demeanor    The rest of the comprehensive physical examination is deferred due to public [facility] emergency video visit restrictions.        Recent Lab Results:  LIPID RESULTS:  Lab Results   Component Value Date    CHOL 116 01/07/2020    HDL 42 01/07/2020    LDL 52 01/07/2020    TRIG 111 01/07/2020    CHOLHDLRATIO 2.8 12/17/2014       LIVER ENZYME RESULTS:  Lab Results   Component Value Date    AST 21 02/12/2020    ALT 28 02/12/2020       CBC RESULTS:  Lab Results   Component Value Date    WBC 7.9 07/28/2020    RBC 4.36 (L) 07/28/2020    HGB 13.6 07/28/2020    HCT 40.6 07/28/2020    MCV 93 07/28/2020    MCH 31.2 07/28/2020    MCHC 33.5 07/28/2020    RDW 14.2 07/28/2020     07/28/2020       BMP RESULTS:  Lab Results   Component Value Date     07/30/2020    POTASSIUM 3.8 07/30/2020    CHLORIDE 108 07/30/2020    CO2 26 07/30/2020    ANIONGAP 4 07/30/2020    GLC 90 07/30/2020    BUN 24 07/30/2020    CR 1.32 (H) 07/30/2020    GFRESTIMATED 52 (L) 07/30/2020    GFRESTBLACK 60 (L) 07/30/2020    LORI 8.2 (L) 07/30/2020        A1C RESULTS:  Lab Results   Component Value Date    A1C 5.7 10/09/2012       INR RESULTS:  Lab Results   Component Value Date    INR 3.2 (A) 08/03/2020    INR 2.93 (H) 07/30/2020       Service Date: 08/05/2020      HISTORY OF PRESENT ILLNESS:  I had the opportunity to see Mr. Tyrel Rene in Cardiology Clinic today at the [facility] in [place] for reevaluation of chronic severe ischemic cardiomyopathy, chronic systolic heart failure and chronic atrial fibrillation.  Recently, he has had recurrent ventricular tachycardia requiring ICD shocks as well.      His history includes extensive  anterior wall myocardial infarction with a stable ejection fraction over the last several years of 25%-30% with evidence of thinning and akinesis of the anterior, anteroseptal and apical territories consistent with transmural infarction.  He has a biventricular ICD in place and chronic atrial fibrillation.  He had episodes of recurrent ventricular tachycardia requiring ICD shocks in the past and treated with oral amiodarone, but unfortunately had breakthrough episodes and underwent ablation of his VT in 01/2019.  He has been maintained on amiodarone 200 mg a day  since then and seems to be doing well.        When he saw Dr. Costello in Electrophysiology in 02/2020, he suggested to him that he could decrease the amiodarone to 6 days a week.  Mr. Rene did not do that.  Initially because he plans on traveling, but after the pandemic issues occurred, he eventually decided to decrease the amiodarone about 6 weeks ago.  One week ago he was sitting in his chair at home, suddenly felt his hands and face tingling and lost consciousness.  His wife witnessed this and heard him cry out, apparently when he received an ICD shock.  He woke up and again felt a tingling again and got another ICD shock.  They called 911 and he came into the hospital at [facility] where he was seen by Electrophysiology, Dr. Uriostegui, and RENNY Moreno.  The cause of his breakthrough ventricular tachycardia and ICD shock was thought to be the decrease in his amiodarone dosing and amiodarone was increased again to 7 days a week, 200 mg a day.  He has had some brief episodes of nonsustained ventricular tachycardia since then, but has received no further ICD shocks.  He was briefly back in sinus rhythm, but reverted back to atrial fibrillation again shortly after that episode.      He tells me today that he has not been feeling quite as well, mostly due to shortness of breath and some fatigue.  He has not really had any anginal symptoms specifically.       I reviewed his echocardiogram and it looks very stable compared to prior studies.  The report overestimates the left ventricular function.  His ejection fraction is 25%-30% with a stable area of akinesis within the LAD territory.      PHYSICAL EXAMINATION:  His blood pressure 136/81, heart rate 64 and weight 174 pounds.  His weight is actually down about 6 pounds compared to 3 months ago.      IMPRESSIONS:  Mr. Tyrel Rene is a 74-year-old gentleman with the following issues:   1.  Coronary artery disease with history of extensive anterior, anteroseptal and apical transmural infarction.  I reviewed his last coronary angiogram from 01/2018, and his LIMA to LAD is widely patent with good runoff, but perhaps some disease proximal to the insertion of the LIMA graft.  His first obtuse marginal is subtotally occluded with a patent saphenous vein graft to that vessel.  His right coronary artery demonstrates mild disease without graft.  His second obtuse marginal was apparently grafted, but no graft was identified, indicating that it may be occluded.  This second obtuse marginal appears to have an ostial 90% stenosis, which was reported to be 70% with an FFR of 0.81.  I suspect that that the stenosis was possibly underestimated at that time and may be significant.   2.  Severe ischemic cardiomyopathy with an ejection fraction of 25%-30%, stable.   3.  Chronic systolic heart failure with mild increase in heart failure symptoms recently.   4.  Recurrent sustained ventricular tachycardia.  He has undergone VT ablation and was maintained on amiodarone, but after decreasing the dose by only 200 mg a week, he had recurrent ventricular tachycardia.  It seems unusual to me that such as a small dose reduction could result in a drastic change in the amount of ventricular tachycardia and ICD shock.  However, that certainly remains a possibility.  I am also concerned about the possibility of ischemia contributing to recurrent  ventricular tachycardia.  For now, I agree with increasing the amiodarone back to 7 days per week, 200 mg a day.   5.  Chronic atrial fibrillation, on warfarin anticoagulation.   6.  Possible small left ventricular thrombus.  It seems quite unlikely that he would have a left ventricular thrombus with therapeutic anticoagulation consistently, although it remains possible.   7.  Chronic kidney disease, stage III, with most recent creatinine of 1.3, baseline typically 1.4 to 1.6.  This may mean that he is a bit volume overloaded at the moment.   8.  Hypertension.   9.  Dyslipidemia.      RECOMMENDATIONS:  I have suggested that we proceed with repeat coronary angiography for evaluation, specifically of the second obtuse marginal vessel and possible revascularization.  If there is any ischemic component to his ventricular tachycardia, this may help rectify that situation and help prevent further episodes of VT.  I will also arrange a followup visit with Dr. Costello to consider repeat VT ablation procedure if we are not able to provide any revascularization.      I have not changed his heart failure medications right now due to the upcoming coronary angiogram, but I would certainly like to see him use Entresto instead of lisinopril.  At this point, his blood pressure appears to be high enough to tolerate that after discontinuing lisinopril and waiting 36 hours.  We will address that issue with his followup in [facility] visit after his electrophysiology evaluation.      I have discussed the risks and benefits of cardiac catheterization in detail with Mr. Rene, including the possibility of bleeding, renal failure, heart attack, stroke, emergency surgery and death.  He understands and agrees to proceed.      I will have him hold his warfarin starting on Friday 08/07/2020 and he will have an INR on Monday 08/10/2020.  If his angiogram is not*** scheduled for Tuesday, 08/11/2020, we will initiate Lovenox to provide  bridging anticoagulation.      I will have him follow up in [facility] in a month for reevaluation of these issues.        Thank you for allowing me to participate in the care of your patient.    Sincerely,     LYNNE KAUFMAN MD     [facility]

## 2020-08-06 NOTE — TELEPHONE ENCOUNTER
I called pt and updated him on cath time/date. Pt will start holding his warfarin tomorrow, and will have his INR checked on Monday. Pt said that [facility] sends a person to his house to check it. Pt doesn't take ASA since he is on warfarin. I told him he will need to take it the night before and morning of procedure. I will review with pt after he has his INR checked, and when I call him to do his COVID travel screening on Monday. I told pt the COVID team will be calling him to set up testing, and that he should have it tomorrow or Saturday.   I reviewed prep instructions with pt. Pt instructed not to eat or drink anything after midnight the night before his procedure. Pt to take AM medications with a small sip of water. No known allergy to contrast dye. Pt updated that one visitor is allowed and masks must be worn. Pt's wife will drive him and stay with him 24 hours post procedure.   Pt asked if  will be doing the procedure. I told him  does not do coronary angiograms, and it will be . Pt had no further questions at this time. I will call him on Monday to go over everything one more time. Shashi ALTMAN August 6, 2020, 11:07 AM

## 2020-08-06 NOTE — TELEPHONE ENCOUNTER
----- Message from Parish Newman MD sent at 8/5/2020  5:48 PM CDT -----  Heart cath next week. Ideally on Tuesday. See my note for anticoagulation recs. EE

## 2020-08-06 NOTE — TELEPHONE ENCOUNTER
Pt's wife called and left message asking when they would hear from the COVID scheduling team. She said she forgot to mention that pt had a COVID test on 8/30 during his most recent hospitalization. She wanted to know if that would be good enough. I called the COVID scheduling line and set pt up for Saturday 8/8 @ 3:50 @ [facility]. I was told that instructions for COVID testing will be sent to pt's my chart.    I called pt's wife back and let her know that he will need to be COVID tested within 4 days of his procedure. I provided her with an update on his COVID test/date/time/location. I also gave her the COVID line number in case she has more questions. Shashi ALTMAN August 6, 2020, 4:05 PM

## 2020-08-06 NOTE — TELEPHONE ENCOUNTER
wants pt to be scheduled for a heart cath on 8/11/20. Pt was instructed to start holding his warfarin 8/7/20 and to have an INR check on 8/10/20. If pt is unable to get cath on 8/11 then he will need lovenox bridging.    I called scheduling and was able to schedule pt for his coronary angiogram on 8/11/20 @ 11:00, with a 9:00 check in time at [facility]. Pt scheduled for a post cath/CORE video visit with Prince on 8/20 @ 3:10. Pt will need to have his COVID test tomorrow or Saturday (COVID scheduling line is [phone]). I will call pt to update him on procedure date/instructions and follow up visit date/time. Shashi ALTMAN August 6, 2020, 9:12 AM

## 2020-08-10 NOTE — PATIENT INSTRUCTIONS
We discussed making sure that he gets in adequate fluids to not add to his lightheadedness because he gets a little bit dehydrated.  He will stand and count 5 before he starts to walk.  He has an angiogram scheduled for tomorrow.  We did discuss the fact that he is due for an INR today and they have not been out to his house yet to do it.  We went over a plan on how to get that accomplished should they not show up.  He could conceivably wait until Tuesday morning to get his INR done at the hospital if necessary.

## 2020-08-10 NOTE — PROGRESS NOTES
"Tyrel Rene is a 74 year old male who is being evaluated via a billable telephone visit.      The patient has been notified of following:     \"This telephone visit will be conducted via a call between you and your physician/provider. We have found that certain health care needs can be provided without the need for a physical exam.  This service lets us provide the care you need with a short phone conversation.  If a prescription is necessary we can send it directly to your pharmacy.  If lab work is needed we can place an order for that and you can then stop by our lab to have the test done at a later time.    Telephone visits are billed at different rates depending on your insurance coverage. During this emergency period, for some insurers they may be billed the same as an in-person visit.  Please reach out to your insurance provider with any questions.    If during the course of the call the physician/provider feels a telephone visit is not appropriate, you will not be charged for this service.\"    Patient has given verbal consent for Telephone visit?  Yes    What phone number would you like to be contacted at? [phone]    How would you like to obtain your AVS? Sally Wilson     Tyrel Rene is a 74 year old male who presents via phone visit today for the following health issues:    [facility]      Hospital Follow-up Visit:    Hospital/Nursing Home/IP Rehab Facility: [facility]  Date of Admission: 07/28/2020  Date of Discharge: 07/30/2020  Reason(s) for Admission: ICD shocks, monomorphic VT      Was your hospitalization related to COVID-19? No   Problems taking medications regularly:  None  Medication changes since discharge: None  Problems adhering to non-medication therapy:  None    Summary of hospitalization:  [facility] discharge summary reviewed  Diagnostic Tests/Treatments reviewed.  Follow up needed: Angiogram is scheduled for tomorrow  Other Healthcare Providers Involved in " Patient s Care:         [facility] plus electrophysiology  Update since discharge: stable.  He does have occasional bouts of lightheadedness and dizziness.  Usually when he gets up from a sitting position or starts walking right away.  fluctuating course. Post Discharge Medication Reconciliation: discharge medications reconciled, continue medications without change.  Plan of care communicated with patient               Hypertension Follow-up      Do you check your blood pressure regularly outside of the clinic? Yes with cardiology     are you following a low salt diet? Yes    Are your blood pressures ever more than 140 on the top number (systolic) OR more   than 90 on the bottom number (diastolic), for example 140/90? No      How many servings of fruits and vegetables do you eat daily?  2-3    On average, how many sweetened beverages do you drink each day (Examples: soda, juice, sweet tea, etc.  Do NOT count diet or artificially sweetened beverages)?   0    How many days per week do you exercise enough to make your heart beat faster? 3 or less    How many minutes a day do you exercise enough to make your heart beat faster? 9 or less    How many days per week do you miss taking your medication? 0      Patient Active Problem List   Diagnosis     STEMI (ST elevation myocardial infarction) (H)     Anemia     Health Care Home     Atrial fibrillation (H)     Hypertension     Cerebral infarction (H)     SVT (supraventricular tachycardia) (H)     CAD (coronary artery disease)     Cardiomyopathy (H)     Atrial flutter (H)     Diplopia     Long-term (current) use of anticoagulants [Z79.01]     Cerebral artery occlusion with cerebral infarction (HCC) [I63.50]     Chronic systolic congestive heart failure (H)     Mixed hyperlipidemia     Screening for prostate cancer     Status post coronary angiogram     Paroxysmal ventricular tachycardia (H)     CKD (chronic kidney disease) stage 3, GFR 30-59 ml/min (H)     Right lower lobe  pneumonia     Ventricular fibrillation (H)     Ischemic cardiomyopathy     Ventricular tachycardia (H)     Past Surgical History:   Procedure Laterality Date     BYPASS GRAFT ARTERY CORONARY  10/2/2012    Procedure: BYPASS GRAFT ARTERY CORONARY;  Coronary Artery Bypass Graft x3 (LAD, Diag, OM) with Endovein Ekron (On-Pump);  Surgeon: Yeyo Lyman MD;  Location:  OR     CARDIOVERSION  3/14/2013     CORONARY ANGIOGRAPHY ADULT ORDER  10/2/12     CORONARY ARTERY BYPASS  10/2/12    LIMA to LAD, SVG to OM1 and OM3     EP ABLATION VT N/A 2019    Procedure: EP Ablation VT;  Surgeon: Suellen Costello MD;  Location:  HEART CARDIAC CATH LAB     EP COMPREHENSIVE EP STUDY N/A 2019    Procedure: EP Comprehensive EP Study;  Surgeon: Suellen Costello MD;  Location:  HEART CARDIAC CATH LAB     H ABLATION ATRIAL FLUTTER  2011     HAND SURGERY      table saw injury right hand     HEART CATH, ANGIOPLASTY  10/2/12    PTCA to second diagonal and mid LAD, BMS to mid LAD     HERNIA REPAIR       RELOCATE GENERATOR ICD/PACEMAKER         Social History     Tobacco Use     Smoking status: Former Smoker     Packs/day: 1.00     Years: 14.00     Pack years: 14.00     Types: Cigarettes     Start date:      Last attempt to quit: 7/10/1972     Years since quittin.1     Smokeless tobacco: Never Used   Substance Use Topics     Alcohol use: No     Family History   Problem Relation Age of Onset     Alcohol/Drug Father      Heart Disease Father 71     Alzheimer Disease Sister      Alcohol/Drug Sister      Alcohol/Drug Sister      Gastrointestinal Disease Sister      Obesity Sister      Hypertension Sister      Heart Disease Sister      Hypertension Sister      Heart Disease Daughter         afib     Arrhythmia Daughter      Multiple Sclerosis Daughter      Heart Disease Daughter         afib     Arrhythmia Daughter      Cancer Daughter         lymphoma     Aneurysm Mother            Reviewed  and updated as needed this visit by Provider         Review of Systems   Constitutional, HEENT, cardiovascular, pulmonary, gi and gu systems are negative, except as otherwise noted.       Objective   Reported vitals:  There were no vitals taken for this visit.   healthy, alert and no distress  PSYCH: Alert and oriented times 3; coherent speech, normal   rate and volume, able to articulate logical thoughts, able   to abstract reason, no tangential thoughts, no hallucinations   or delusions  His affect is normal  RESP: No cough, no audible wheezing, able to talk in full sentences  Remainder of exam unable to be completed due to telephone visits            Assessment/Plan:    1. Ischemic cardiomyopathy      2. Ventricular fibrillation (H)      3. CKD (chronic kidney disease) stage 3, GFR 30-59 ml/min (H)      4. Chronic systolic congestive heart failure (H)      5. Long-term (current) use of anticoagulants [Z79.01]      6. Atrial fibrillation, unspecified type (H)      7. Essential hypertension    We discussed making sure that he gets in adequate fluids to not add to his lightheadedness because he gets a little bit dehydrated.  He will stand and count to 5 before he starts to walk.  He has an angiogram scheduled for tomorrow.  We did discuss the fact that he is due for an INR today and they have not been out to his house yet to do it.  We went over a plan on how to get that accomplished should they not show up.  He could conceivably wait until Tuesday morning to get his INR done at the hospital if necessary.  However, his last INR was elevated.  I suggested he contact the core clinic if the home INR team is now been out by 3:00.    No follow-ups on file.      Phone call duration:  18 minutes    Dejon Downs MD

## 2020-08-10 NOTE — PROGRESS NOTES
"Copied from Visit with Jo Ann logy dated 8/5/20: \"...I will have him hold his warfarin starting on Friday 08/07/2020 and he will have an INR on Monday 08/10/2020.  If his angiogram is not scheduled for Tuesday, 08/11/2020, we will initiate Lovenox to provide bridging anticoagulation. ...\"    Patient's INR Today is 1.7 and per wife, Jo Ann logy already informed.  Patient is set for his procedure tomorrow per wife.     He will take direction from Cardiology after procedure and next INR check in 1 week.  In Home Lab connection contacted.    Radha Pierce RN  Anticoagulation Nurse - [facility], [place]    "

## 2020-08-10 NOTE — TELEPHONE ENCOUNTER
Received call from pt's wife, Sobia, stating that zonia's INR is 1.7 today and she is concerned this is too low. I reassured her that INR needs to be < 2.0, and preferably at 1.5 or less, for his angiogram tomorrow.     Reviewed cath instructions with Sobia. Orders in Epic.    Hospital: [facility]  Arrival Time: 0900   to/from procedure? YES wife  Responsible adult with patient 24hrs post procedure? YES wife  NPO starting at midnight (except allowable meds morning of procedure with small sip of water) reviewed with patient? YES   On Anticoagulant? YES warfarin on hold starting 8/7/2020  Diabetic? NO  On Aspirin? Normally on warfarin and no aspirin; pt was instructed to take ASA 81 mg morning of procedure with small sip of water  Any other Meds to Hold (Such as phosphodiesterase type 5 inhibitors (Viagra/Cialis/Levitra)?YES advised no Viagra tonight  Contrast Dye Allergy? NO  COVID-19 test result: NEGATIVE    Wellness Screening Tool    Symptom Screening:    Do you have one of the following NEW symptoms:      Fever (subjective or >100.0)?  NO    New cough? NO    Shortness of breath? NO    Chills? NO    New loss of taste or smell? NO    Generalized body aches? NO    New persistent headache? NO    New sore throat? NO    Nausea, vomiting or diarrhea? NO    Within the past 3 weeks, have you been exposed to someone with a known positive illness below?      COVID - 19 (known or suspected) NO    Chicken pox? NO    Measles? NO    Pertussis? NO          Patient notified of visitor restriction: YES  Patient informed to wear a mask: YES    Patient's appointment status: Patient will be seen as scheduled.     Travel screen completed in separate encounter. Berenice Khan RN on 8/10/2020 at 2:27 PM

## 2020-08-11 NOTE — DISCHARGE INSTRUCTIONS
Medication instructions after stent placement for Tyrel Rene:  -OK to resume warfarin on 8/12  -Take aspirin 81 mg daily, clopidogrel (Plavix) 75 mg daily, and warfarin for the next 1 month  -1 month after stent placement, stop aspirin. Continue taking clopidogrel 75 mg daily and warfarin  ----------------------------------------------------------------------------------------------------    Cardiac Angioplasty/Stent Discharge Instructions - Femoral    After you go home:      Have an adult stay with you until tomorrow.    Drink extra fluids for 2 days.    You may resume your normal diet.    No smoking       For 24 hours - due to the sedation you received:    Relax and take it easy.    Do NOT make any important or legal decisions.    Do NOT drive or operate machines at home or at work.    Do NOT drink alcohol.    Care of Groin Puncture Site:      For the first 24 hrs - check the puncture site every 1-2 hours while awake.    For 2 days, when you cough, sneeze, laugh or move your bowels, hold your hand over the puncture site and press firmly.    Remove the bandaid after 24 hours. If there is minor oozing, apply another bandaid and remove it after 12 hours.    It is normal to have a small bruise or pea size lump at the site.    You may shower tomorrow. Do NOT take a bath, or use a hot tub or pool for at least 3 days. Do NOT scrub the site. Do not use lotion or powder near the puncture site.     Activity:            For 2 days:    No stooping or squatting    Do NOT do any heavy activity such as exercise, lifting, or straining.     No housework, yard work or any activity that make you sweat    Do NOT lift more than 10 pounds    Bleeding:      If you start bleeding from the site in your groin, lie down flat and press firmly on/above the site for 10 minutes.     Once bleeding stops, lay flat for 2 hours.     Call [facility] as soon as you can.       Call 911 right away if you have heavy bleeding or bleeding that does not  stop.      Medicines:      If you are taking an antiplatelet medication such as Plavix, Brilinta or Effient, do not stop taking it until you talk to your cardiologist.        If you are on Metformin (Glucophage), do not restart it until you have blood tests (within 2 to 3 days after discharge).  After you have your blood drawn, you may restart the Metformin.     Take your medications, including blood thinners, unless your provider tells you not to.      If you take Coumadin (Warfarin), have your INR checked by your provider in  3-5 days. Call your clinic to schedule this.    If you have stopped any medicines, check with your provider about when to restart them.    Follow Up Appointments:      Follow up with [facility] Nurse Practitioner at [facility] of patient preference in 7-10 days.    Cardiac Rehab will contact you for follow up care.    Call the clinic if:      You have increased pain or a large or growing hard lump around the site.    The site is red, swollen, hot or tender.    Blood or fluid is draining from the site.    You have chills or a fever greater than 101 F (38 C).    Your leg feels numb, cool or changes color.    You have hives, a rash or unusual itching.    New pain in the back or belly that you cannot control with Tylenol.    Any questions or concerns.      Other Instructions:      If you received a stent - carry your stent card with you at all times.      [facility] [place]:    [phone] [facility] (7 days a week)

## 2020-08-11 NOTE — Clinical Note
The first balloon was inserted into the circumflex and middle circumflex.Max pressure = 14 mark. Total duration = 23 seconds.

## 2020-08-11 NOTE — PROGRESS NOTES
Care Suites Post Procedure Note    Patient Information  Name: Tyrel Rene  Age: 74 year old    Post Procedure  Time patient returned to Care Suites: 1245  Concerns/abnormal assessment: no  If abnormal assessment, provider notified: N/A  Plan/Other: 4 hour bedrest until 1545.  Monitor/    Jaye Franklin RN

## 2020-08-11 NOTE — PROGRESS NOTES
Procedure: Heart cath  CS arrival time: 1245  Accompanied by:   Concerns/abnormal assessment after procedure: None  Plan: 4hr BR.

## 2020-08-11 NOTE — PRE-PROCEDURE
GENERAL PRE-PROCEDURE:   Procedure:  Coronary angiogram with possible intervention  Date/Time:  8/11/2020 10:34 AM    Written consent obtained?: Yes    Risks and benefits: Risks, benefits and alternatives were discussed    Consent given by:  Patient  Patient states understanding of procedure being performed: Yes    Patient's understanding of procedure matches consent: Yes    Procedure consent matches procedure scheduled: Yes    Expected level of sedation:  Moderate  Appropriately NPO:  Yes  ASA Class:  Class 3- Severe systemic disease, definite functional limitations  Mallampati  :  Grade 2- soft palate, base of uvula, tonsillar pillars, and portion of posterior pharyngeal wall visible  Lungs:  Lungs clear with good breath sounds bilaterally  Heart:  Normal heart sounds and rate  History & Physical reviewed:  History and physical reviewed and no updates needed  Statement of review:  I have reviewed the lab findings, diagnostic data, medications, and the plan for sedation

## 2020-08-11 NOTE — PROGRESS NOTES
Reason for admission: Heart cath  CS arrival time: 0900  Accompanied by: Sharel- wife  Name/Phone of discontinue : Noris- [phone]  Medications held: Coumadin (last taken 8/6)  Consent signed: Contrast, yes. Procedure, no  Abnormal assessment/labs: INR 1.45, Cre 1.40  If, abnormal, provider notified: Yes  Education/questions answered: Yes  Plan: continue with procedure

## 2020-08-11 NOTE — Clinical Note
The first balloon was inserted into the circumflex.Max pressure = 14 mark. Total duration = 15 seconds.

## 2020-08-11 NOTE — Clinical Note
The first balloon was inserted into the circumflex and middle circumflex.Max pressure = 14 mark. Total duration = 14 seconds.     Max pressure = 14 mark. Total duration = 18 seconds.    Balloon reinflated a second time: Max pressure = 14 mark. Total duration = 18 seconds.  Balloon reinflated a third time: Max pressure = 20 mark. Total duration = 10 seconds.

## 2020-08-11 NOTE — PROGRESS NOTES
Care Suites Discharge Nursing Note    Patient Information  Name: Tyrel Rene  Age: 74 year old    Discharge Education:  Discharge instructions reviewed: Yes- Kemar Ordoñez RN  Additional education/resources provided: n/a  Patient/patient representative verbalizes understanding: Yes  Patient discharging on new medications: Yes  Medication education completed: Yes- kemar Zayas    Discharge Plans:   Discharge location: home  Discharge ride contacted: Yes  Approximate discharge time: 1730    Discharge Criteria:  Discharge criteria met and vital signs stable: Yes    Patient Belongs:  Patient belongings returned to patient: Yes    Jaye Franklin RN

## 2020-08-11 NOTE — PROGRESS NOTES
PATIENT/VISITOR WELLNESS SCREENING     Step 1 Patient Screening     1. In the last month, have you been in contact with someone who was confirmed or suspected to have Coronavirus/COVID-19? No     2. Do you have the following symptoms?  Fever/Chills? No              Cough? No              Shortness of breath? No              Skin rash? No              Loss of taste or smell? No  Sore throat? No  Runny or stuffy nose? No  Muscle or body aches? No  Headaches? No  Fatigue? No  Vomiting or diarrhea? No     Step 2 Visitor Screening     1. Name of Visitor (1 visitor per patient): Noris     2. In the last month, have you been in contact with someone who was confirmed or suspected to have Coronavirus/COVID-19? No     3. Do you have the following symptoms?  Fever/Chills? No              Cough? No              Shortness of breath? No              Skin rash? No              Loss of taste or smell? No  Sore throat? No  Runny or stuffy nose? No  Muscle or body aches? No  Headaches? No  Fatigue? No  Vomiting or diarrhea? No     If the visitor has positive symptoms, notify supervisor/manger  Per policy, the visitor will need to leave the facility      Step 3 Refer to logic grid below for actions     NO SYMPTOM(S)     ACTIONS:  1. Standard rooming process  2. Provider to assess per normal protocol  3. Implement precautions as needed and per guidelines      POSITIVE SYMPTOM(S)  If positive for ANY of the following symptoms: fever, cough, shortness of breath, rash     ACTION:  1. Continue to have the patient wear a mask   2. Room patient as soon as possible  3. Don appropriate PPE when entering room  4. Provider evaluation

## 2020-08-11 NOTE — Clinical Note
The first balloon was inserted into the circumflex and middle circumflex.Max pressure = 20 mark. Total duration = 12 seconds.

## 2020-08-11 NOTE — Clinical Note
Max pressure = 10 mark. Total duration = 10 seconds.     Max pressure = 10 mark. Total duration = 15 seconds.    Balloon reinflated a second time: Max pressure = 10 mark. Total duration = 15 seconds.  Balloon reinflated a third time:

## 2020-08-11 NOTE — PROGRESS NOTES
Education/questions answered: Yes. Questions about aspirin, and starting coumadin again.  Patient discharge location: Door 2  Accompanied by: Staff  CS discharge time:

## 2020-08-11 NOTE — Clinical Note
Catheter removed over wire.   Consciousness: Alert  Orientation Level: Oriented X4  Cognition: Appropriate safety awareness, Appropriate judgement, Appropriate attention/concentration, Appropriate for developmental age, Follows commands  Language: Clear    HEENT  HEENT (WDL): Exceptions to WDL  Right Eye: Impaired vision  Left Eye: Impaired vision  Nose: Intact  Voice: Normal  Mucous Membrane: Moist, Intact, Pink    Respiratory  Respiratory Pattern: Regular  Respiratory Depth: Normal  Respiratory Quality/Effort: Dyspnea with exertion, Unlabored  Chest Assessment: Chest expansion symmetrical, Trachea midline  L Breath Sounds: Clear, Diminished  R Breath Sounds: Clear, Diminished    Breath Sounds  Right Upper Lobe: Clear  Right Middle Lobe: Clear  Right Lower Lobe: Diminished  Left Upper Lobe: Clear  Left Lower Lobe: Diminished    Cardiac  Cardiac Regularity: Irregular  Cardiac Rhythm: Atrial fibrillation  Rhythm Interpretation  Pulse: 75    Cardiac Monitor  Telemetry Monitor On: Yes  Telemetry Audible: Yes  Telemetry Alarms Set: Yes    Gastrointestinal  Abdominal (WDL): Exceptions to WDL  Abdomen Inspection: Rounded, Rotund  Tenderness: No guarding, Soft  RUQ Bowel Sounds: Active  LUQ Bowel Sounds: Active  RLQ Bowel Sounds: Active  LLQ Bowel Sounds: Active     Bowel Sounds  RUQ Bowel Sounds: Active  LUQ Bowel Sounds: Active  RLQ Bowel Sounds: Active  LLQ Bowel Sounds:  Active    Peripheral Vascular  Peripheral Vascular (WDL): Exceptions to WDL  Edema: Right lower extremity, Left lower extremity  RLE Edema: +1, Non-pitting  LLE Edema: +1, Non-pitting    Musculoskeletal  RUE: Full movement  LUE: Full movement  RL Extremity: Swelling, Full movement  LL Extremity: Swelling, Full movement    Genitourinary  Genitourinary (WDL): Within Defined Limits

## 2020-08-11 NOTE — Clinical Note
The first balloon was inserted into the circumflex and middle circumflex.Max pressure = 15 mark. Total duration = 20 seconds.     Max pressure = 18 mark. Total duration = 15 seconds.    Balloon reinflated a second time: Max pressure = 18 mark. Total duration = 15 seconds.  Balloon reinflated a third time: Max pressure = 20 mark. Total duration = 15 seconds.

## 2020-08-12 NOTE — TELEPHONE ENCOUNTER
Spoke with patient post discharge from care suites after coronary angiography.     Intervention: BONITA to prox OM2, rotational atherectomy of mid LCx into OM2    Access Site: Right femoral artery    Instructions:   Patient may remove the Band-Aid after 24 hours, but if there is minor oozing apply another and may remove second Band-Aid after 12 hours. Also, no heavy exercise for 2 days, do not take a bath, or use a hot tub or pool for at least 3 days but may shower.     Reviewed with patient care of groin site to not scrub the site, for the first 2 days do not squat or stoop, and hold your hand over the puncture site when you cough, sneeze or have a bowel movement. It is normal to have a lump or bruise. No lifting >10 lbs for 3-5 days. Lie down and place firm pressure on groin site if it start bleeding.    Instructed patient to call 911 right away if the bleeding is heavy or does not stop. Call the clinic if there is a large or growing lump around the site, the site is red, swollen, hot, or tender, have fever >101 degrees F or chills, your arm or leg feels numb or cool, or hives, a rash, or unusual itching, shortness of breath, or chest discomfort.    Anticoagulation plan: Triple therapy (ASA, Plavix and Warfarin x 1 month) then stop ASA and continue Plavix and Warfarin     Heart Follow Up: Prince Galvez 8/20/2020    After hours contact number [phone] option 2.     Olga Lidia Fong, APRN, CNP  8/12/2020

## 2020-08-12 NOTE — PROGRESS NOTES
ANTICOAGULATION  MANAGEMENT: Discharge Review    Tyrel Rene chart reviewed for anticoagulation continuity of care    Hospital Admission on 08/11/20 for Heart Catherization.    Discharge disposition: Home    Results:    Recent labs: (last 7 days)     08/10/20 08/11/20  0940   INR 1.7* 1.45*     Anticoagulation inpatient management:     home regimen continued    Anticoagulation discharge instructions:     Warfarin dosing: home regimen continued   Bridging: No   INR goal change: No      Medication changes affecting anticoagulation: Yes: started plavix, restarted on amidoarone on 07/30/20    Additional factors affecting anticoagulation: No    Plan     Agree with discharge plan for follow up on [id number]    Patient not contacted    No adjustment to Anticoagulation Calendar was required    Shahnaz Montenegro RN

## 2020-08-17 NOTE — PROGRESS NOTES
ANTICOAGULATION MANAGEMENT     Patient Name:  Tyrel Rene  Date:  2020    ASSESSMENT /SUBJECTIVE:    Today's INR result of 1.3 is subtherapeutic. Goal INR of 2.5-3.5      Warfarin dose taken: Less warfarin taken than instructed which may be affecting INR - restarted warfarin the day after angiogram last week    Diet: No new diet changes affecting INR    Medication changes/ interactions: No new medications/supplements affecting INR    Previous INR: Subtherapeutic     S/S of bleeding or thromboembolism: No    New injury or illness: No    Upcoming surgery, procedure or cardioversion: No    Additional findings: None      PLAN:    Spoke with spouse, regarding INR result and instructed:     Warfarin Dosing Instructions: take 3.75 mg today and 2.5 mg Tues then continue your current warfarin dose of 2.5 mg every Mon; 1.25 mg all other days    Instructed patient to follow up no later than:   Orders given to [facility] ([phone])    Education provided: Target INR goal and significance of current INR result, Importance of therapeutic range and Importance of taking warfarin as instructed      Sharel verbalizes understanding and agrees to warfarin dosing plan.    Instructed to call the Anticoagulation Clinic for any changes, questions or concerns. (#[phone])        Chanell Dela Cruz RN      OBJECTIVE:  Recent labs: (last 7 days)     20  0940 20   INR 1.45* 1.3*         No question data found.  Anticoagulation Summary  As of 2020    INR goal:   2.5-3.5   TTR:   43.6 % (11.8 mo)   INR used for dosin.3! (2020)   Warfarin maintenance plan:   2.5 mg (2.5 mg x 1) every Mon; 1.25 mg (2.5 mg x 0.5) all other days   Full warfarin instructions:   : 3.75 mg; : 2.5 mg; Otherwise 2.5 mg every Mon; 1.25 mg all other days   Weekly warfarin total:   10 mg   Plan last modified:   Leatha Jewell, RN (2020)   Next INR check:   2020   Priority:   High   Target end date:    Indefinite    Indications    Long-term (current) use of anticoagulants [Z79.01] [Z79.01]  Cerebral artery occlusion with cerebral infarction (HCC) [I63.50] [I63.50]  Cerebral infarction (H) [I63.9]             Anticoagulation Episode Summary     INR check location:       Preferred lab:   [facility]    Send INR reminders to:   HANS MCLEOD    Comments:   [facility] Patient goal should be 2.5-3.0 (5/13/20)      Anticoagulation Care Providers     Provider Role Specialty Phone number    Dejon Downs MD [facility] [phone]

## 2020-08-18 NOTE — TELEPHONE ENCOUNTER

## 2020-08-20 NOTE — PATIENT INSTRUCTIONS
Call [facility] nurse for any questions or concerns Mon-Fri 8am-4pm:                                                [phone]                                For concerns after hours: [phone]    Medication changes:   1. No changes  2. Stop Aspirin around 9/11/20 (1 month after the procedure) Continue Plavix and warfarin.    Plan from today:  1. Return to see Dr. Costello

## 2020-08-20 NOTE — PROGRESS NOTES
ANTICOAGULATION MANAGEMENT     Patient Name:  Tyrel Rene  Date:  2020    ASSESSMENT /SUBJECTIVE:    Today's INR result of 1.8 is subtherapeutic. Goal INR of 2.5-3.5      Warfarin dose taken: Warfarin taken as previously instructed    Diet: No new diet changes affecting INR    Medication changes/ interactions: No new medications/supplements affecting INR    Previous INR: Subtherapeutic     S/S of bleeding or thromboembolism: No    New injury or illness: No    Upcoming surgery, procedure or cardioversion: No    Additional findings: Slow to rise after angiogram. Will do additional boost dose today and tomorrow.      PLAN:    Spoke with wife Sharel regarding INR result and instructed:     Warfarin Dosing Instructions: Boost dose of 2.5 mg x 2, then resume normal dosing    Instructed patient to follow up no later than: 4 days  Orders given to [facility] ([phone])    Education provided: Monitoring for bleeding signs and symptoms and Monitoring for clotting signs and symptoms      Sharel, Wife verbalizes understanding and agrees to warfarin dosing plan.    Instructed to call the Anticoagulation Clinic for any changes, questions or concerns. (#[phone])        Radha Pierce RN      OBJECTIVE:  Recent labs: (last 7 days)     20   INR 1.3* 1.8*         No question data found.  Anticoagulation Summary  As of 2020    INR goal:   2.5-3.5   TTR:   43.6 % (11.8 mo)   INR used for dosin.8! (2020)   Warfarin maintenance plan:   2.5 mg (2.5 mg x 1) every Mon; 1.25 mg (2.5 mg x 0.5) all other days   Full warfarin instructions:   : 2.5 mg; : 2.5 mg; Otherwise 2.5 mg every Mon; 1.25 mg all other days   Weekly warfarin total:   10 mg   Plan last modified:   Leatha Jewell RN (2020)   Next INR check:   2020   Priority:   High   Target end date:   Indefinite    Indications    Long-term (current) use of anticoagulants [Z79.01] [Z79.01]  Cerebral artery  occlusion with cerebral infarction (HCC) [I63.50] [I63.50]  Cerebral infarction (H) [I63.9]             Anticoagulation Episode Summary     INR check location:       Preferred lab:   [facility]    Send INR reminders to:   HANS MCLEOD    Comments:   [facility] Patient goal should be 2.5-3.0 (5/13/20)      Anticoagulation Care Providers     Provider Role Specialty Phone number    Dejon Downs MD [facility] [phone]

## 2020-08-20 NOTE — LETTER
8/20/2020    Dejon Downs MD  [address]  [place] [address]    RE: Tyrel Rene       Dear Colleague,    I had the pleasure of seeing Tyrel Rene in the [facility].    Tyrel Rene is a 74 year old male who is being evaluated via a billable video visit.  This visit is being conducted as a virtual visit due to the emphasis on mitigation of the COVID-19 virus pandemic. The clinician has decided that the risk of an in-office visit outweighs the benefit for this patient.     Tyrel Rene presents for follow up.       I have reviewed and updated the patient's Past Medical History, Social History, Family History and Medication List.    Review Of Systems  Skin: negative  Eyes: negative  Ears/Nose/Throat: negative  Respiratory: SOB/PRINCE w/exertion  Cardiovascular: Fatigue, positional dizziness, used NTG days prior to angio   Gastrointestinal: negative  Genitourinary: negative  Musculoskeletal: negative  Neurologic: negative  Psychiatric: negative  Hematologic/Lymphatic/Immunologic: negative  Endocrine: negative  (ROS TAKEN BY Catarino Singer LPN PRIOR TO VISIT)        ALLERGIES  Aspirin and Nystatin    MEDICATIONS  Current Outpatient Medications   Medication Sig Dispense Refill     acetaminophen (TYLENOL) 500 MG tablet Take 1,000 mg by mouth every 8 hours as needed for mild pain       amiodarone (PACERONE) 200 MG tablet Take 1 tablet (200 mg) by mouth daily       ASPIRIN NOT PRESCRIBED (INTENTIONAL) continuous prn for other Please choose reason not prescribed, below       carvedilol (COREG) 6.25 MG tablet Take 1 tablet (6.25 mg) by mouth 2 times daily (with meals) 180 tablet 3     clopidogrel (PLAVIX) 75 MG tablet Take 1 tablet (75 mg) by mouth daily 90 tablet 3     digoxin (LANOXIN) 125 MCG tablet Take 1 tablet (125 mcg) by mouth daily 90 tablet 3     famotidine (PEPCID) 20 MG tablet Take 1 tablet (20 mg) by mouth 2 times daily 180 tablet 3     ipratropium (ATROVENT)  0.03 % nasal spray Spray 2 sprays into both nostrils every 12 hours 30 mL 5     lisinopril (ZESTRIL) 5 MG tablet Take 1 tablet (5 mg) by mouth At Bedtime 90 tablet 0     Multiple Vitamins-Minerals (PRESERVISION AREDS 2 PO) Take 1 capsule by mouth 2 times daily       nitroGLYcerin (NITROSTAT) 0.4 MG sublingual tablet DISSOLVE 1 TABLET UNDER THE TONGUE EVERY 5 MINUTES AS NEEDED FOR CHEST PAIN 25 tablet 0     rosuvastatin (CRESTOR) 20 MG tablet Take 1 tablet (20 mg) by mouth daily 90 tablet 0     sildenafil (VIAGRA) 100 MG tablet Take 1 tablet (100 mg) by mouth daily as needed Take 30 min to 4 hours before intercourse.  Never use with nitroglycerin, terazosin or doxazosin. 16 tablet 3     Vitamin D, Cholecalciferol, 1000 UNITS TABS Take 1,000 mg by mouth daily        warfarin ANTICOAGULANT (COUMADIN) 2.5 MG tablet Take 2.5 mg on Mondays and 1.25mg all other days or as directed by the anticoagulation clinic 75 tablet 3       C.O.R.E. DAVE NOTE:  This visit was completed via video due to COVID-19 precautions.  I had the opportunity to speak to Marko over the phone for a cardiology clinic visit.  Due to the COVID-19 pandemic, this visit was a telephone visit because the video did not work.     HISTORY OF PRESENT ILLNESS:  I had the opportunity to speak with Mr. Tyrel Rene today, regarding his chronic severe ischemic cardiomyopathy, chronic systolic heart failure and chronic atrial fibrillation. Recently, he has had recurrent ventricular tachycardia requiring ICD shocks.      His history includes extensive anterior wall myocardial infarction with a stable ejection fraction over the last several years of 25%-30% with evidence of thinning and akinesis of the anterior, anteroseptal and apical territories consistent with transmural infarction.  He has a biventricular ICD in place and chronic atrial fibrillation.  He had episodes of recurrent ventricular tachycardia requiring ICD shocks in the past and treated with oral  amiodarone, but unfortunately had breakthrough episodes and underwent ablation of his VT in 01/2019.  He has been maintained on amiodarone 200 mg a day  since then and seems to be doing well.      When he was seen by Dr. Costello in Electrophysiology in 02/2020, he suggested to him that he could decrease the amiodarone to 6 days a week.  Mr. Rene did not do that.  Initially because of his plans for traveling, but after the pandemic issues occurred, he eventually decided to decrease the amiodarone about 2 months ago.      7/28/20 he was sitting in his chair at home, suddenly felt his hands and face tingling and lost consciousness.  His wife witnessed this and heard him cry out, apparently when he received an ICD shock.  He woke up and again felt a tingling again and got another ICD shock.  They called 911 and he came into the hospital at [facility] where he was seen by Electrophysiology, Dr. Uriostegui, and RENNY Moreno.  The cause of his breakthrough ventricular tachycardia and ICD shock was thought to be the decrease in his amiodarone dosing and amiodarone was increased again to 7 days a week, 200 mg a day.  He has had some brief episodes of nonsustained ventricular tachycardia since then, but has received no further ICD shocks.  He was briefly back in sinus rhythm, but reverted back to atrial fibrillation again shortly after that episode.      8/5/20 he was seen by Dr. Newman, he has not feeling well, mostly due to shortness of breath and some fatigue.  He denied any anginal symptoms specifically.      Echocardiogram was very stable compared to prior studies.  The report overestimates the left ventricular function.  His ejection fraction is 25%-30% with a stable area of akinesis within the LAD territory.      Dr. Newman reviewed his last coronary angiogram from 01/2018, and his LIMA to LAD is widely patent with good runoff, but perhaps some disease proximal to the insertion of the LIMA graft.  His first obtuse  marginal is subtotally occluded with a patent saphenous vein graft to that vessel.  His right coronary artery demonstrates mild disease without graft.  His second obtuse marginal was apparently grafted, but no graft was identified, indicating that it may be occluded.  This second obtuse marginal appears to have an ostial 90% stenosis, which was reported to be 70% with an FFR of 0.81.  He suspected that that the stenosis was possibly underestimated at that time and may be significant.     Dr. Newman suggested to proceed with repeat coronary angiography for evaluation, specifically of the second obtuse marginal vessel and possible revascularization.  If there is any ischemic component to his ventricular tachycardia, this may help rectify that situation and help prevent further episodes of VT.  He also arranged a followup visit with Dr. Costello.       8/11 Tyrel underwent coronary angiogram per Dr. Weaver.  Results:  1. Severe proximal stenosis of OM2 s/p successful intervention with 1 drug eluting stent (Resolute Suraj 2.5x38mm)  2. Successful rotational atherectomy (Rotapro 1.25mm edith) to mid LCx into OM2  3. Ischemic cardiomyopathy due to severe native multivessel coronary artery disease                - 100% chronic total occlusion of proximal LAD (failed proximal-mid intervention in 2012)                - 100% chronic total occlusion of OM1  4. Patent LIMA-LAD, SVG-OM1 bypass grafts  5. Occluded SVG-OM2 bypass graft  6.6Fr Angioseal closure of RFA    Post angiogram plan:  Post antiplatelet therapy of    Aspirin; give 81 mg qd .     Plavix; and 75 mg qd daily.      Continue high dose statin therapy  Clopidogrel 600mg given in cath lab  Continue triple therapy (aspirin 81mg, Plavix 75mg, warfarin) for 1 month  After 1 month of triple therapy, stop aspirin and continue warfarin and Plavix 75 for total duration of at least 12 months     Today he returns via phone visit. He states he feels okay. He's afraid and anxious at  times because of what he's been through. He denies chest pain, chest pressure, neck or arm pain. Denies increase shortness of breath.     ROS:  12-pt ROS is negative except for as noted above.        DIAGNOSTIC STUDIES:  Recent laboratory tests:   Results for STEVE RENE (MRN [id number]) as of 8/20/2020 15:24   Ref. Range 8/19/2020 14:57   Sodium Latest Ref Range: 136 - 145 mmol/L 136   Potassium Latest Ref Range: 3.5 - 5.1 mmol/L 4.3   Chloride Latest Ref Range: 98 - 107 mmol/L 104   Carbon Dioxide Latest Ref Range: 23 - 29 mmol/L 25   Urea Nitrogen Latest Ref Range: 7 - 30 mg/dL 21   Creatinine Latest Ref Range: 0.70 - 1.30 mg/dL 1.52 (H)   GFR Estimate Latest Ref Range: >60 mL/min/1.73_m2 45 (L)   GFR Estimate If Black Latest Ref Range: >60 mL/min/1.73_m2 54 (L)   Calcium Latest Ref Range: 8.5 - 10.5 mg/dL 8.7   Anion Gap Latest Ref Range: 6 - 17 mmol/L 11.3   Glucose Latest Ref Range: 70 - 105 mg/dL 139 (H)     Device check:8/3/20   Patient has 4 NSVT episodes logged since last checked on 07/31/20. 2 EGM's available showing 4 beats of NSVT with average V rates in the 130's. The NSVT episodes occurred on 08/01/2020 at 1643 and at that time patient was still in SR. Patient converted from SR back to Afib sometime after NSVT episodes were logged and patient sending manual transmission today.        IMPRESSION:  Mr. Tyrel Rene is a 74-year-old gentleman with the following issues:   1.  Coronary artery disease with history of extensive anterior, anteroseptal and apical transmural infarction.      August 11, 2020 underwent coronary angiogram which showed severe proximal stenosis of OM2 s/p successful intervention with 1 drug eluting stent (Resolute Suraj 2.5x38mm)  -Successful rotational atherectomy (Rotapro 1.25mm edith) to mid LCx into OM2  -Ischemic cardiomyopathy due to severe native multivessel coronary artery disease                - 100% chronic total occlusion of proximal LAD (failed proximal-mid intervention  in 2012)                - 100% chronic total occlusion of OM1  -Patent LIMA-LAD, SVG-OM1 bypass grafts  -Occluded SVG-OM2 bypass graft  - 6Fr Angioseal closure of RFA  He denies chest pain or shortness of breath.     2.  Severe ischemic cardiomyopathy with an ejection fraction of 25%-30%, with AICD firing x 2 7/28/20 due to ventricular tachycardia and ventricular fibrillation. Amiodarone increased to daily.     3.  Chronic systolic heart failure with mild increase in heart failure symptoms recently. Today denies increase shortness of breath. States he's more fatigued.     4.  Recurrent sustained ventricular tachycardia.  He has undergone VT ablation and was maintained on amiodarone, but after decreasing the dose by only 200 mg a week, he had recurrent ventricular tachycardia. Amiodarone was increased back to 7 days per week, 200 mg a day.     5.  Chronic atrial fibrillation, on warfarin anticoagulation.     6.  Possible small left ventricular thrombus. Per Dr. Newman, it seemed quite unlikely that he would have a left ventricular thrombus with therapeutic anticoagulation consistently, although it remains possible.     7.  Chronic kidney disease, stage III, with most recent creatinine of 1.3, baseline typically 1.4 to 1.6.  Today creat. 1.5  8.  Hypertension.   9.  Dyslipidemia.        RECOMMENDATIONS:  Plan:  -continue:     -Aspirin; give 81 mg qd .     -Plavix; and 75 mg qd daily.    -Continue high dose statin therapy  -Continue triple therapy (aspirin 81mg, Plavix 75mg, warfarin) for 1 month ~ 9/11/20   After 1 month of triple therapy, stop aspirin and continue warfarin and Plavix 75 for total duration of at least 12 months       Follow-up:   1. Follow up with Dr. Costello, I requested a device download prior to his visit.   2. Follow up with Prince Galvez in a few weeks to consider Entresto.  3. Follow up with Dr. Newman.      Thank you for the opportunity to be involved in this very pleasant patient's care please  feel to contact me with any questions.      Hayde BERRY CNP   [facility]  Pager: [phone]  Text Page  (7:30am - 4pm M-F)     Thank you for allowing me to participate in the care of your patient.    Sincerely,     JAMAL Christie CNP     [facility]

## 2020-08-20 NOTE — PROGRESS NOTES
"Tyrel Rene is a 74 year old male who is being evaluated via a billable video visit.  This visit is being conducted as a virtual visit due to the emphasis on mitigation of the COVID-19 virus pandemic. The clinician has decided that the risk of an in-office visit outweighs the benefit for this patient.     The patient has been notified of following:   \"This video visit will be conducted via a call between you and your physician/provider. We have found that certain health care needs can be provided without the need for an in-person physical exam.  This service lets us provide the care you need with a video conversation.  If a prescription is necessary we can send it directly to your pharmacy.  If lab work is needed we can place an order for that and you can then stop by our lab to have the test done at a later time.  If during the course of the call the physician/provider feels a video visit is not appropriate, you will not be charged for this service.\"     Patient has given verbal consent for Video visit? Yes    Patient would like the video invitation sent by: 3:10pm     Video Start Time: 3:10 pm, did not work, turned into a telephone visit.     Tyrel Rene presents for follow up.       I have reviewed and updated the patient's Past Medical History, Social History, Family History and Medication List.    Review Of Systems  Skin: negative  Eyes: negative  Ears/Nose/Throat: negative  Respiratory: SOB/PRINCE w/exertion  Cardiovascular: Fatigue, positional dizziness, used NTG days prior to angio   Gastrointestinal: negative  Genitourinary: negative  Musculoskeletal: negative  Neurologic: negative  Psychiatric: negative  Hematologic/Lymphatic/Immunologic: negative  Endocrine: negative  (ROS TAKEN BY Catarino Singer LPN PRIOR TO VISIT)        ALLERGIES  Aspirin and Nystatin    MEDICATIONS  Current Outpatient Medications   Medication Sig Dispense Refill     acetaminophen (TYLENOL) 500 MG tablet Take 1,000 mg by mouth " every 8 hours as needed for mild pain       amiodarone (PACERONE) 200 MG tablet Take 1 tablet (200 mg) by mouth daily       ASPIRIN NOT PRESCRIBED (INTENTIONAL) continuous prn for other Please choose reason not prescribed, below       carvedilol (COREG) 6.25 MG tablet Take 1 tablet (6.25 mg) by mouth 2 times daily (with meals) 180 tablet 3     clopidogrel (PLAVIX) 75 MG tablet Take 1 tablet (75 mg) by mouth daily 90 tablet 3     digoxin (LANOXIN) 125 MCG tablet Take 1 tablet (125 mcg) by mouth daily 90 tablet 3     famotidine (PEPCID) 20 MG tablet Take 1 tablet (20 mg) by mouth 2 times daily 180 tablet 3     ipratropium (ATROVENT) 0.03 % nasal spray Spray 2 sprays into both nostrils every 12 hours 30 mL 5     lisinopril (ZESTRIL) 5 MG tablet Take 1 tablet (5 mg) by mouth At Bedtime 90 tablet 0     Multiple Vitamins-Minerals (PRESERVISION AREDS 2 PO) Take 1 capsule by mouth 2 times daily       nitroGLYcerin (NITROSTAT) 0.4 MG sublingual tablet DISSOLVE 1 TABLET UNDER THE TONGUE EVERY 5 MINUTES AS NEEDED FOR CHEST PAIN 25 tablet 0     rosuvastatin (CRESTOR) 20 MG tablet Take 1 tablet (20 mg) by mouth daily 90 tablet 0     sildenafil (VIAGRA) 100 MG tablet Take 1 tablet (100 mg) by mouth daily as needed Take 30 min to 4 hours before intercourse.  Never use with nitroglycerin, terazosin or doxazosin. 16 tablet 3     Vitamin D, Cholecalciferol, 1000 UNITS TABS Take 1,000 mg by mouth daily        warfarin ANTICOAGULANT (COUMADIN) 2.5 MG tablet Take 2.5 mg on Mondays and 1.25mg all other days or as directed by the anticoagulation clinic 75 tablet 3       C.O.R.E. DAVE NOTE:  This visit was completed via video due to COVID-19 precautions.  I had the opportunity to speak to Marko over the phone for a cardiology clinic visit.  Due to the COVID-19 pandemic, this visit was a telephone visit because the video did not work.     HISTORY OF PRESENT ILLNESS:  I had the opportunity to speak with . Tyrel Rene today, regarding his  chronic severe ischemic cardiomyopathy, chronic systolic heart failure and chronic atrial fibrillation. Recently, he has had recurrent ventricular tachycardia requiring ICD shocks.      His history includes extensive anterior wall myocardial infarction with a stable ejection fraction over the last several years of 25%-30% with evidence of thinning and akinesis of the anterior, anteroseptal and apical territories consistent with transmural infarction.  He has a biventricular ICD in place and chronic atrial fibrillation.  He had episodes of recurrent ventricular tachycardia requiring ICD shocks in the past and treated with oral amiodarone, but unfortunately had breakthrough episodes and underwent ablation of his VT in 01/2019.  He has been maintained on amiodarone 200 mg a day  since then and seems to be doing well.      When he was seen by Dr. Costello in Electrophysiology in 02/2020, he suggested to him that he could decrease the amiodarone to 6 days a week.  Mr. Rene did not do that.  Initially because of his plans for traveling, but after the pandemic issues occurred, he eventually decided to decrease the amiodarone about 2 months ago.      7/28/20 he was sitting in his chair at home, suddenly felt his hands and face tingling and lost consciousness.  His wife witnessed this and heard him cry out, apparently when he received an ICD shock.  He woke up and again felt a tingling again and got another ICD shock.  They called 911 and he came into the hospital at [facility] where he was seen by Electrophysiology, Dr. Uriostegui, and RENNY Moreno.  The cause of his breakthrough ventricular tachycardia and ICD shock was thought to be the decrease in his amiodarone dosing and amiodarone was increased again to 7 days a week, 200 mg a day.  He has had some brief episodes of nonsustained ventricular tachycardia since then, but has received no further ICD shocks.  He was briefly back in sinus rhythm, but reverted back to  atrial fibrillation again shortly after that episode.      8/5/20 he was seen by Dr. Newman, he has not feeling well, mostly due to shortness of breath and some fatigue.  He denied any anginal symptoms specifically.      Echocardiogram was very stable compared to prior studies.  The report overestimates the left ventricular function.  His ejection fraction is 25%-30% with a stable area of akinesis within the LAD territory.      Dr. Newman reviewed his last coronary angiogram from 01/2018, and his LIMA to LAD is widely patent with good runoff, but perhaps some disease proximal to the insertion of the LIMA graft.  His first obtuse marginal is subtotally occluded with a patent saphenous vein graft to that vessel.  His right coronary artery demonstrates mild disease without graft.  His second obtuse marginal was apparently grafted, but no graft was identified, indicating that it may be occluded.  This second obtuse marginal appears to have an ostial 90% stenosis, which was reported to be 70% with an FFR of 0.81.  He suspected that that the stenosis was possibly underestimated at that time and may be significant.     Dr. Newman suggested to proceed with repeat coronary angiography for evaluation, specifically of the second obtuse marginal vessel and possible revascularization.  If there is any ischemic component to his ventricular tachycardia, this may help rectify that situation and help prevent further episodes of VT.  He also arranged a followup visit with Dr. Costello.       8/11 Tyrel underwent coronary angiogram per Dr. Weaver.  Results:  1. Severe proximal stenosis of OM2 s/p successful intervention with 1 drug eluting stent (Resolute [place] 2.5x38mm)  2. Successful rotational atherectomy (Rotapro 1.25mm edith) to mid LCx into OM2  3. Ischemic cardiomyopathy due to severe native multivessel coronary artery disease                - 100% chronic total occlusion of proximal LAD (failed proximal-mid intervention in 2012)                 - 100% chronic total occlusion of OM1  4. Patent LIMA-LAD, SVG-OM1 bypass grafts  5. Occluded SVG-OM2 bypass graft  6.6Fr Angioseal closure of RFA    Post angiogram plan:  Post antiplatelet therapy of    Aspirin; give 81 mg qd .     Plavix; and 75 mg qd daily.      Continue high dose statin therapy  Clopidogrel 600mg given in cath lab  Continue triple therapy (aspirin 81mg, Plavix 75mg, warfarin) for 1 month  After 1 month of triple therapy, stop aspirin and continue warfarin and Plavix 75 for total duration of at least 12 months     Today he returns via phone visit. He states he feels okay. He's afraid and anxious at times because of what he's been through. He denies chest pain, chest pressure, neck or arm pain. Denies increase shortness of breath.     ROS:  12-pt ROS is negative except for as noted above.        DIAGNOSTIC STUDIES:  Recent laboratory tests:   Results for STEVE ELIZALDE (MRN [id number]) as of 8/20/2020 15:24   Ref. Range 8/19/2020 14:57   Sodium Latest Ref Range: 136 - 145 mmol/L 136   Potassium Latest Ref Range: 3.5 - 5.1 mmol/L 4.3   Chloride Latest Ref Range: 98 - 107 mmol/L 104   Carbon Dioxide Latest Ref Range: 23 - 29 mmol/L 25   Urea Nitrogen Latest Ref Range: 7 - 30 mg/dL 21   Creatinine Latest Ref Range: 0.70 - 1.30 mg/dL 1.52 (H)   GFR Estimate Latest Ref Range: >60 mL/min/1.73_m2 45 (L)   GFR Estimate If Black Latest Ref Range: >60 mL/min/1.73_m2 54 (L)   Calcium Latest Ref Range: 8.5 - 10.5 mg/dL 8.7   Anion Gap Latest Ref Range: 6 - 17 mmol/L 11.3   Glucose Latest Ref Range: 70 - 105 mg/dL 139 (H)     Device check:8/3/20   Patient has 4 NSVT episodes logged since last checked on 07/31/20. 2 EGM's available showing 4 beats of NSVT with average V rates in the 130's. The NSVT episodes occurred on 08/01/2020 at 1643 and at that time patient was still in SR. Patient converted from SR back to Afib sometime after NSVT episodes were logged and patient sending manual transmission  today.        IMPRESSION:  Mr. Tyrel Rene is a 74-year-old gentleman with the following issues:   1.  Coronary artery disease with history of extensive anterior, anteroseptal and apical transmural infarction.      August 11, 2020 underwent coronary angiogram which showed severe proximal stenosis of OM2 s/p successful intervention with 1 drug eluting stent (Resolute [place] 2.5x38mm)  -Successful rotational atherectomy (Rotapro 1.25mm edith) to mid LCx into OM2  -Ischemic cardiomyopathy due to severe native multivessel coronary artery disease                - 100% chronic total occlusion of proximal LAD (failed proximal-mid intervention in 2012)                - 100% chronic total occlusion of OM1  -Patent LIMA-LAD, SVG-OM1 bypass grafts  -Occluded SVG-OM2 bypass graft  - 6Fr Angioseal closure of RFA  He denies chest pain or shortness of breath.     2.  Severe ischemic cardiomyopathy with an ejection fraction of 25%-30%, with AICD firing x 2 7/28/20 due to ventricular tachycardia and ventricular fibrillation. Amiodarone increased to daily.     3.  Chronic systolic heart failure with mild increase in heart failure symptoms recently. Today denies increase shortness of breath. States he's more fatigued.     4.  Recurrent sustained ventricular tachycardia.  He has undergone VT ablation and was maintained on amiodarone, but after decreasing the dose by only 200 mg a week, he had recurrent ventricular tachycardia. Amiodarone was increased back to 7 days per week, 200 mg a day.     5.  Chronic atrial fibrillation, on warfarin anticoagulation.     6.  Possible small left ventricular thrombus. Per Dr. Newman, it seemed quite unlikely that he would have a left ventricular thrombus with therapeutic anticoagulation consistently, although it remains possible.     7.  Chronic kidney disease, stage III, with most recent creatinine of 1.3, baseline typically 1.4 to 1.6.  Today creat. 1.5  8.  Hypertension.   9.  Dyslipidemia.         RECOMMENDATIONS:  Plan:  -continue:     -Aspirin; give 81 mg qd .     -Plavix; and 75 mg qd daily.    -Continue high dose statin therapy  -Continue triple therapy (aspirin 81mg, Plavix 75mg, warfarin) for 1 month ~ 9/11/20   After 1 month of triple therapy, stop aspirin and continue warfarin and Plavix 75 for total duration of at least 12 months       Follow-up:   1. Follow up with Dr. Costello, I requested a device download prior to his visit.   2. Follow up with Prince Galvez in a few weeks to consider Entresto.  3. Follow up with Dr. Newman.      Thank you for the opportunity to be involved in this very pleasant patient's care please feel to contact me with any questions.        Hayde BERRY CNP   [facility]  Pager: [phone]  Text Page  (7:30am - 4pm M-F)

## 2020-08-21 NOTE — PROGRESS NOTES
Scheduled courtesy ICD check on 8/27/20 per Prince Woodard to check for VT prior to virtual visit with Dr Costello.

## 2020-08-31 NOTE — LETTER
"8/31/2020    Dejon Downs MD  [address]  [facility] [address]    RE: Tyrel Rene       Dear Colleague,    I had the pleasure of seeing Tyrel Rene in the [facility].    Tyrel Rene is a 74 year old male who is being evaluated via a billable video visit.      The patient has been notified of following:     \"This video visit will be conducted via a call between you and your physician/provider. We have found that certain health care needs can be provided without the need for an in-person physical exam.  This service lets us provide the care you need with a video conversation.  If a prescription is necessary we can send it directly to your pharmacy.  If lab work is needed we can place an order for that and you can then stop by our lab to have the test done at a later time.    Video visits are billed at different rates depending on your insurance coverage.  Please reach out to your insurance provider with any questions.    If during the course of the call the physician/provider feels a video visit is not appropriate, you will not be charged for this service.\"    Patient has given verbal consent for Video visit? Yes  How would you like to obtain your AVS? MyChart  If you are dropped from the video visit, the video invite should be resent to: Text to cell phone: [phone]  Will anyone else be joining your video visit? No    Pt reported vitals 109/61 p64 172#  Review Of Systems  Skin: negative  Eyes: glasses  Ears/Nose/Throat: negative  Respiratory: Dyspnea on exertion-  Cardiovascular: dizzy  Gastrointestinal: negative  Genitourinary: negative  Musculoskeletal: negative  Neurologic: stroke and numbness or tingling of hands  Psychiatric: negative  Hematologic/Lymphatic/Immunologic:bruising  Endocrine: negative  CYDNEY Mata        PHYSICIAN NOTE:  This visit was completed via telephone after video connection via [employer] (COVID-19 precautions).  The " patient provided consent for a telephone visit.    I had the pleasure speaking to Mr. Rene as part of a telephone visit today.    The patient is a delightful 75 yo male with the following chronic medical issues:  A.  Severe ischemic cardiomyopathy related to a large anterior MI several years ago.   3-vessel CABG in 2012.  LVEF is 25%-30%.  Recent PCI of OM2.  B.  Unstable ventricular tachyarrhythmias.  ICD shock in 11/2017.  Amiodarone was started.  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  Catheter ablation of VT on 01/02/2019.    Amiodarone decreased to 6 days/week in May 2020.  Recurrence of VT with syncope and 2 shocks in 07/2020.   C.  Permanent atrial fibrillation and complete AV block.  On warfarin.  Prior atrial flutter ablation (2011).   D.  CRT ICD in place.   E.  CVA following atrial flutter ablation in 2011.     Unfortunately, Marko was hospitalized in late July after syncope and 2 ICD shocks due to recurrent VT.  Before admission he had decreased amiodarone from 7 days/week to 6 days/week as I had instructed him.  At the hospital he was loaded with IV amiodarone.  The maintenance dose was increased to the previous 200 mg daily.    He has not had further rhythm issues.  On 8/11/2020 he underwent PCI of the native OM2 (the graft to the OM closed several years ago).  He is currently on aspirin, clopidogrel and warfarin.    He has not been driving.      DIAGNOSTIC STUDIES:  Interrogation of his ICD last week has shown only brief NSVT, up to 4 beats since hospital discharge.      IMPRESSION:  1. Recurrent ventricular tachycardia.  Lowering of the dose of amiodarone may have been a trigger.  He is back on 200 mg daily at this time.  No changes.  I am concerned he may have more VT despite PCI and the higher amiodarone dose.  2. Ischemic cardiomyopathy.  No CHF symptoms at this point.    RECOMMENDATIONS:  1. Continue amiodarone 200 mg daily.  2. Routine annual labs.  3. No driving for 6 months.    It was  my pleasure speaking to Marko today.    Suellen Costello MD, [facility]      Telephone visit documentation  Start: 3:40 PM  End: 3:52 PM  Time: 12 minutes      Thank you for allowing me to participate in the care of your patient.    Sincerely,     Suellen Costello MD     [facility]

## 2020-08-31 NOTE — PROGRESS NOTES
"Tyrel Rene is a 74 year old male who is being evaluated via a billable video visit.      The patient has been notified of following:     \"This video visit will be conducted via a call between you and your physician/provider. We have found that certain health care needs can be provided without the need for an in-person physical exam.  This service lets us provide the care you need with a video conversation.  If a prescription is necessary we can send it directly to your pharmacy.  If lab work is needed we can place an order for that and you can then stop by our lab to have the test done at a later time.    Video visits are billed at different rates depending on your insurance coverage.  Please reach out to your insurance provider with any questions.    If during the course of the call the physician/provider feels a video visit is not appropriate, you will not be charged for this service.\"    Patient has given verbal consent for Video visit? Yes  How would you like to obtain your AVS? MyChart  If you are dropped from the video visit, the video invite should be resent to: Text to cell phone: [phone]  Will anyone else be joining your video visit? No    Pt reported vitals 109/61 p64 172#  Review Of Systems  Skin: negative  Eyes: glasses  Ears/Nose/Throat: negative  Respiratory: Dyspnea on exertion-  Cardiovascular: dizzy  Gastrointestinal: negative  Genitourinary: negative  Musculoskeletal: negative  Neurologic: stroke and numbness or tingling of hands  Psychiatric: negative  Hematologic/Lymphatic/Immunologic:bruising  Endocrine: negative  CYDNEY Mata        PHYSICIAN NOTE:  This visit was completed via telephone after video connection via "[employer] failed (COVID-19 precautions).  The patient provided consent for a telephone visit.    I had the pleasure speaking to Mr. Rene as part of a telephone visit today.    The patient is a delightful 73 yo male with the following chronic medical issues:  A.  Severe " ischemic cardiomyopathy related to a large anterior MI several years ago.   3-vessel CABG in 2012.  LVEF is 25%-30%.  Recent PCI of OM2.  B.  Unstable ventricular tachyarrhythmias.  ICD shock in 11/2017.  Amiodarone was started.  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  Catheter ablation of VT on 01/02/2019.    Amiodarone decreased to 6 days/week in May 2020.  Recurrence of VT with syncope and 2 shocks in 07/2020.   C.  Permanent atrial fibrillation and complete AV block.  On warfarin.  Prior atrial flutter ablation (2011).   D.  CRT ICD in place.   E.  CVA following atrial flutter ablation in 2011.     Unfortunately, Marko was hospitalized in late July after syncope and 2 ICD shocks due to recurrent VT.  Before admission he had decreased amiodarone from 7 days/week to 6 days/week as I had instructed him.  At the hospital he was loaded with IV amiodarone.  The maintenance dose was increased to the previous 200 mg daily.    He has not had further rhythm issues.  On 8/11/2020 he underwent PCI of the native OM2 (the graft to the OM closed several years ago).  He is currently on aspirin, clopidogrel and warfarin.    He has not been driving.      DIAGNOSTIC STUDIES:  Interrogation of his ICD last week has shown only brief NSVT, up to 4 beats since hospital discharge.      IMPRESSION:  1. Recurrent ventricular tachycardia.  Lowering of the dose of amiodarone may have been a trigger.  He is back on 200 mg daily at this time.  No changes.  I am concerned he may have more VT despite PCI and the higher amiodarone dose.  2. Ischemic cardiomyopathy.  No CHF symptoms at this point.    RECOMMENDATIONS:  1. Continue amiodarone 200 mg daily.  2. Routine annual labs.  3. No driving for 6 months.    It was my pleasure speaking to Marko today.    Suellen Costello MD, [facility]      Telephone visit documentation  Start: 3:40 PM  End: 3:52 PM  Time: 12 minutes

## 2020-08-31 NOTE — PROGRESS NOTES
ANTICOAGULATION MANAGEMENT     Patient Name:  Tyrel Rene  Date:  2020    ASSESSMENT /SUBJECTIVE:    Today's INR result of 2.9 is therapeutic. Goal INR of 2.5-3.5      Warfarin dose taken: Warfarin taken as previously instructed    Diet: No new diet changes affecting INR    Medication changes/ interactions: No new medications/supplements affecting INR    Previous INR: Therapeutic     S/S of bleeding or thromboembolism: No    New injury or illness: No    Upcoming surgery, procedure or cardioversion: No    Additional findings: None      PLAN:    Spoke with Tyrel regarding INR result and instructed:     Warfarin Dosing Instructions: Continue your current warfarin dose 2.5 mg Mon, 1.25 mg all other days    Instructed patient to follow up no later than: 3 weeks  Orders given to [facility] ([phone])    Education provided: Monitoring for bleeding signs and symptoms and Monitoring for clotting signs and symptoms      Marko verbalizes understanding and agrees to warfarin dosing plan.    Instructed to call the Anticoagulation Clinic for any changes, questions or concerns. (#[phone])        Radha Pierce RN      OBJECTIVE:  Recent labs: (last 7 days)     20   INR 2.9*         No question data found.  Anticoagulation Summary  As of 2020    INR goal:   2.5-3.5   TTR:   45.8 % (11.8 mo)   INR used for dosin.9 (2020)   Warfarin maintenance plan:   2.5 mg (2.5 mg x 1) every Mon; 1.25 mg (2.5 mg x 0.5) all other days   Full warfarin instructions:   2.5 mg every Mon; 1.25 mg all other days   Weekly warfarin total:   10 mg   No change documented:   Radha Pierce RN   Plan last modified:   Leatha Jewell RN (2020)   Next INR check:   2020   Priority:   High   Target end date:   Indefinite    Indications    Long-term (current) use of anticoagulants [Z79.01] [Z79.01]  Cerebral artery occlusion with cerebral infarction (HCC) [I63.50] [I63.50]  Cerebral infarction (H)  [I63.9]             Anticoagulation Episode Summary     INR check location:       Preferred lab:   [facility]TER[facility]    Send INR reminders to:   HANS MCLEOD    Comments:   [facility] Patient goal should be 2.5-3.0 (5/13/20)      Anticoagulation Care Providers     Provider Role Specialty Phone number    Dejon Downs MD [facility] [phone]

## 2020-09-11 NOTE — PROGRESS NOTES
Called [facility] to see what cost of Entresto would be.   Spoke to Sabi at [facility]. Pt has choose of going to preferred  pharmacy ([facility], [facility], [facility]) or mail order through [facility]. No prior authorization is required.     Price:   $35  For 60 tablets (1 month supply)  $105 for 180 tablet (90 day supply)   Sheila Strickland RN 3:35 PM 09/11/20

## 2020-09-11 NOTE — PROGRESS NOTES
Pt saw Prince Galvez CNP on 8/20/20.  Plan was to stop aspirin around 9/11/20 which is 1 month post PCI on angiogram.  Continue Plavix and Coumadin.     Called and spoke w/ Marko to confirm plan.  He reports today was his last dose of aspirin and tomorrow he has planned to take aspirin away.      Per amiodarone protocol he is due for hepatic pnl.  Has upcoming appt 9/22 to discuss starting Entresto.  CMP ordered to be drawn prior.      Future Appointments   Date Time Provider Department Center   9/22/2020  3:10 PM Hayde Galvez APRN CNP [facility]   10/28/2020 12:45 PM Parish Newman MD [facility]   12/10/2020 12:00 AM MARQUZE DCR2 [facility]       Marizol Scales RN BSN   [facility] [place], [place]  [email or web address]. Clinic Care Coordinator  09/11/20, 2:20 PM

## 2020-09-20 PROBLEM — I47.29 NSVT (NONSUSTAINED VENTRICULAR TACHYCARDIA) (H): Status: ACTIVE | Noted: 2020-01-01

## 2020-09-20 PROBLEM — R55 SYNCOPE: Status: ACTIVE | Noted: 2020-01-01

## 2020-09-20 NOTE — ED NOTES
"[facility]  ED Nurse Handoff Report    ED Chief complaint: Pacemaker Problem (Pt had a loss of consciousness briefly while sitting in a chair, wife witnessed this and saw him \"twitch\" assuming his pacemaker shocked him and he regained consciousness.) and Loss of Consciousness      ED Diagnosis:   Final diagnoses:   Syncope, unspecified syncope type   V-tach (H)       Code Status: Full Code    Allergies:   Allergies   Allergen Reactions     Aspirin      Other reaction(s): Aspirin Contraindication (for reporting)  Cardiologist recommended no aspirin as he is on Pradaxa     Nystatin Other (See Comments)     Make his mouth numb & swelling       Patient Story: Pt comes in via EMS after loss of consciousness.  Focused Assessment:    Hx of pacemaker placement in 2012 for afib after MI. Today pt had a loss of consciousness briefly while sitting in a chair, wife witnessed this and saw him \"twitch\" assuming his pacemaker shocked him and he regained consciousness. A&O. VSS, but heart rate is variable and irregular, slightly hypertensive. EMS gave 324 aspirin.             Treatments and/or interventions provided: monitoring, labs, imaging  Patient's response to treatments and/or interventions: fair    To be done/followed up on inpatient unit:  continuation of MD orders    Does this patient have any cognitive concerns?: none    Activity level - Baseline/Home:  Independent  Activity Level - Current:   Stand with Assist    Patient's Preferred language: English   Needed?: No    Isolation: None  Infection: Not Applicable  Bariatric?: No    Vital Signs:   Vitals:    09/19/20 2210 09/19/20 2222 09/19/20 2300   BP: (!) 147/103  (!) 144/99   Pulse: 122  112   Resp: 14  10   Temp:  98.8  F (37.1  C)    TempSrc:  Oral    Weight:  78.3 kg (172 lb 9.6 oz)    Height:  1.854 m (6' 1\")        Cardiac Rhythm:Cardiac Rhythm: Ventricular tachycardia;Atrial fibrillation(Runs of Vtach in beats of 3 or 4, sometimes peats " up to 8. Then paced rhythm in afib.)    Was the PSS-3 completed:   Yes  What interventions are required if any?               Family Comments: no family present  OBS brochure/video discussed/provided to patient/family: N/A              Name of person given brochure if not patient: none              Relationship to patient: none    For the majority of the shift this patient's behavior was Green.   Behavioral interventions performed were none.    ED NURSE PHONE NUMBER: *[phone]

## 2020-09-20 NOTE — PLAN OF CARE
Pt A&Ox4, denies chest pain, on and off V- tach and cardiology was paged - Coreg 6.25 mg and Lisinopril 5 mg discontinued and Metoprolol 12.5 mg ordered, also Amiodarone 150 mg bolus with continuous gtt ordered. Pt on RA and LS diminished, compression stockings on and pt ambulated in brownlee with SBA, Phosphorus replaced and recheck in AM. Plan to continue to monitor.

## 2020-09-20 NOTE — CONSULTS
[facility]    Cardiology Consultation     Date of Admission:  9/19/2020    Assessment & Plan   Tyrel Rene is a 74 year old male who was admitted on 9/19/2020. I was asked to see the patient for syncope.    Patient has known history of ischemic cardiomyopathy, CAD, VT status post ablation and ICD implant, orthostatic hypotension, CKD, CVA.    Patient was resting in chair and had a syncope episode.  Wife was concerned he could have had an ICD discharge due to a jerking motion the patient made prior to awaking.  Total time of syncope was <1 min.  Interrogation of device was negative for ICD discharge.    Cause of syncope is unclear but there was NSVT around the time of the syncope.  Patient has had NSVT here in hospital (11 beat run maximum) without syncope.    #1 Syncope, unclear cause  #2 Ischemic cardiomyopathy, EF 20-25% (BONITA to OM2 on 8/20/20)  #3 History of unstable VT and VT ablation  #4 Orthostatic hypotension  #5 CKD  #6 History of CVA  #7 History of Atrial fibrillation    Plan:  Echocardiogram with EF 20-25% with no change from prior    Patient is having NSVT in hospital, will give additional 200mg amiodarone.  As an outpatient, the patient has orthostatic hypotension and amiodarone dose has been reduced in hopes there could be a balance between worsening his orthostatic symptoms and suppression of his VT.    We will have electrophysiology visit with the patient tomorrow to see if increasing his amiodarone from 1400mg weekly to something higher would be reasonable.     It should also be noted the patient is taking <1000mg of sodium per day due to tight Na restriction.  I am curious if some liberalization of this to <2000mg of sodium would help his orthostatic symptoms.     Continue carvedilol, Plavix, lisinopril, anticoagulation    Tyrel Reyes MD     Code Status    Full Code    Reason for Consult   Reason for consult: Syncope    Primary Care Physician   Dejon Moss  [facility]    Chief Complaint   Syncope    History is obtained from the patient    History of Present Illness   Tyrel Rene is a 74 year old male who was admitted on 9/19/2020. I was asked to see the patient for syncope.    Patient has known history of ischemic cardiomyopathy, CAD, VT status post ablation and ICD implant, orthostatic hypotension, CKD, CVA.    Patient was resting in chair and had a syncope episode.  Wife was concerned he could have had an ICD discharge due to a jerking motion the patient made prior to awaking.  Total time of syncope was <1 min.  Interrogation of device was negative for ICD discharge.    Cause of syncope is unclear but there was NSVT around the time of the syncope.  Patient has had NSVT here in hospital (11 beat run maximum) without syncope.    Patient has had multiple reoccurrences of VT and there was some relationship to decreasing his amiodarone dose from 1400mg weekly to 1200mg weekly.    Patient did have a BONITA to OM2 on 8/20/20.    Troponin studies are negative thus far.  Electrolytes with Na 131, K 4.4, Creatinine 1.37.    It should also be noted the patient is taking <1000mg of sodium per day due to tight Na restriction.      Past Medical History   I have reviewed this patient's medical history and updated it with pertinent information if needed.   Past Medical History:   Diagnosis Date     Atrial fibrillation (H)     s/p Cardioversion 3/14/2013     Atrial flutter (H)     S/p Aflutter ablation 1/11/2011     CAD (coronary artery disease)     CABG x3 10/2012- LIMA to distal LAD, SVG to OM1 & OM3; cath 10/2012- PTCA to second diagonal and mid LAD, BMS to mid LAD     Cardiogenic shock (H)      Cardiomyopathy (H)      ED (erectile dysfunction)      Hypertension      Neuropathy      SVT (supraventricular tachycardia) (H)     S/p dual chamber ICD 10/11/12- upgraded to BIV ICD 6/2013     Syncope        Past Surgical History   I have reviewed this patient's surgical history and updated  it with pertinent information if needed.  Past Surgical History:   Procedure Laterality Date     BYPASS GRAFT ARTERY CORONARY  10/2/2012    Procedure: BYPASS GRAFT ARTERY CORONARY;  Coronary Artery Bypass Graft x3 (LAD, Diag, OM) with Endovein [place] (On-Pump);  Surgeon: Yeyo Lyman MD;  Location:  OR     CARDIOVERSION  3/14/2013     CORONARY ANGIOGRAPHY ADULT ORDER  10/2/12     CORONARY ARTERY BYPASS  10/2/12    LIMA to LAD, SVG to OM1 and OM3     CV ANGIOGRAM CORONARY GRAFT N/A 8/11/2020    Procedure: Angiogram Coronary Graft;  Surgeon: Erin Weaver MD;  Location:  HEART CARDIAC CATH LAB     CV HEART CATHETERIZATION WITH POSSIBLE INTERVENTION N/A 8/11/2020    Procedure: Heart Catheterization with Possible Intervention;  Surgeon: Erin Weaver MD;  Location:  HEART CARDIAC CATH LAB     CV PCI ATHERECTOMY ORBITAL N/A 8/11/2020    Procedure: Percutaneous Coronary Intervention Atherectomy Rotational;  Surgeon: Erin Weaver MD;  Location:  HEART CARDIAC CATH LAB     EP ABLATION VT N/A 1/2/2019    Procedure: EP Ablation VT;  Surgeon: Suellen Costello MD;  Location:  HEART CARDIAC CATH LAB     EP COMPREHENSIVE EP STUDY N/A 1/2/2019    Procedure: EP Comprehensive EP Study;  Surgeon: Suellen Costello MD;  Location:  HEART CARDIAC CATH LAB     H ABLATION ATRIAL FLUTTER  1/11/2011     HAND SURGERY      table saw injury right hand     HEART CATH, ANGIOPLASTY  10/2/12    PTCA to second diagonal and mid LAD, BMS to mid LAD     HERNIA REPAIR       RELOCATE GENERATOR ICD/PACEMAKER         Prior to Admission Medications   Prior to Admission Medications   Prescriptions Last Dose Informant Patient Reported? Taking?   ASPIRIN NOT PRESCRIBED (INTENTIONAL)  Spouse/Significant Other Yes No   Sig: continuous prn for other Please choose reason not prescribed, below   Multiple Vitamins-Minerals (PRESERVISION AREDS 2 PO) 9/19/2020 at am Spouse/Significant Other  Yes Yes   Sig: Take 1 capsule by mouth 2 times daily   Vitamin D, Cholecalciferol, 1000 UNITS TABS 9/19/2020 at am Spouse/Significant Other Yes Yes   Sig: Take 1,000 mg by mouth daily    acetaminophen (TYLENOL) 500 MG tablet prn Spouse/Significant Other Yes No   Sig: Take 1,000 mg by mouth every 8 hours as needed for mild pain   amiodarone (PACERONE) 200 MG tablet 9/18/2020 at pm Spouse/Significant Other No Yes   Sig: Take 1 tablet (200 mg) by mouth daily   Patient taking differently: Take 200 mg by mouth At Bedtime    carvedilol (COREG) 6.25 MG tablet 9/19/2020 at am Spouse/Significant Other No Yes   Sig: Take 1 tablet (6.25 mg) by mouth 2 times daily (with meals)   clopidogrel (PLAVIX) 75 MG tablet 9/19/2020 at am Spouse/Significant Other No Yes   Sig: Take 1 tablet (75 mg) by mouth daily   digoxin (LANOXIN) 125 MCG tablet 9/19/2020 at am Spouse/Significant Other No Yes   Sig: Take 1 tablet (125 mcg) by mouth daily   famotidine (PEPCID) 20 MG tablet 9/19/2020 at am Spouse/Significant Other No Yes   Sig: Take 1 tablet (20 mg) by mouth 2 times daily   ipratropium (ATROVENT) 0.03 % nasal spray 9/19/2020 at am Spouse/Significant Other No Yes   Sig: Spray 2 sprays into both nostrils every 12 hours   lisinopril (ZESTRIL) 5 MG tablet 9/18/2020 at pm Spouse/Significant Other No Yes   Sig: Take 1 tablet (5 mg) by mouth At Bedtime   nitroGLYcerin (NITROSTAT) 0.4 MG sublingual tablet prn Spouse/Significant Other No No   Sig: DISSOLVE 1 TABLET UNDER THE TONGUE EVERY 5 MINUTES AS NEEDED FOR CHEST PAIN   rosuvastatin (CRESTOR) 20 MG tablet 9/18/2020 at pm Spouse/Significant Other No No   Sig: Take 1 tablet (20 mg) by mouth daily   sildenafil (VIAGRA) 100 MG tablet prn Spouse/Significant Other No No   Sig: Take 1 tablet (100 mg) by mouth daily as needed Take 30 min to 4 hours before intercourse.  Never use with nitroglycerin, terazosin or doxazosin.   Patient not taking: Reported on 8/20/2020   warfarin ANTICOAGULANT (COUMADIN)  2.5 MG tablet 9/18/2020 at pm Spouse/Significant Other No Yes   Sig: Take 2.5 mg on Mondays and 1.25mg all other days or as directed by the anticoagulation clinic      Facility-Administered Medications: None     Allergies   Allergies   Allergen Reactions     Aspirin      Other reaction(s): Aspirin Contraindication (for reporting)  Cardiologist recommended no aspirin as he is on Pradaxa     Nystatin Other (See Comments)     Make his mouth numb & swelling       Social History   I have reviewed this patient's social history and updated it with pertinent information if needed. Tyrel Rene  reports that he quit smoking about 48 years ago. His smoking use included cigarettes. He started smoking about 62 years ago. He has a 14.00 pack-year smoking history. He has never used smokeless tobacco. He reports that he does not drink alcohol or use drugs.    Family History   I have reviewed this patient's family history and updated it with pertinent information if needed.   Family History   Problem Relation Age of Onset     Alcohol/Drug Father      Heart Disease Father 71     Alzheimer Disease Sister      Alcohol/Drug Sister      Alcohol/Drug Sister      Gastrointestinal Disease Sister      Obesity Sister      Hypertension Sister      Heart Disease Sister      Hypertension Sister      Heart Disease Daughter         afib     Arrhythmia Daughter      Multiple Sclerosis Daughter      Heart Disease Daughter         afib     Arrhythmia Daughter      Cancer Daughter         lymphoma     Aneurysm Mother        Review of Systems   The 12 point Review of Systems is negative other than noted in the HPI or here.     Physical Exam   Temp: 97.3  F (36.3  C) Temp src: Oral BP: 119/79 Pulse: 65   Resp: 16 SpO2: 97 % O2 Device: None (Room air)    Vital Signs with Ranges  Temp:  [97.3  F (36.3  C)-98.8  F (37.1  C)] 97.3  F (36.3  C)  Pulse:  [] 65  Resp:  [8-19] 16  BP: (104-164)/() 119/79  SpO2:  [95 %-99 %] 97 %  177 lbs 12.8  oz    GENERAL APPEARANCE: Alert and oriented x3. No acute distress.  SKIN: Inspection of the skin reveals no rashes, ulcerations, warm, dry  NECK: Supple and symmetric.  normal JVP  LUNGS: Clear breath sounds throughout bilaterally, no wheezes, no rhonchi  CARDIOVASCULAR: S1, S2, regular rate and irregular rhythm without any murmurs, gallops, rubs. The carotid pulses were normal and 2+ bilaterally without bruits. Peripheral pulses were 2+ and symmetric.  ABDOMEN: Soft, non-tender, non-distended with normal bowel sounds.  No ascites noted.  EXTREMITIES: no edema.  NEUROLOGIC:  Normal mood and affect.  Sensation to touch was normal.      Data   Results for orders placed or performed during the hospital encounter of 09/19/20 (from the past 24 hour(s))   CBC with platelets + differential   Result Value Ref Range    WBC 7.4 4.0 - 11.0 10e9/L    RBC Count 4.28 (L) 4.4 - 5.9 10e12/L    Hemoglobin 13.6 13.3 - 17.7 g/dL    Hematocrit 39.5 (L) 40.0 - 53.0 %    MCV 92 78 - 100 fl    MCH 31.8 26.5 - 33.0 pg    MCHC 34.4 31.5 - 36.5 g/dL    RDW 13.9 10.0 - 15.0 %    Platelet Count 202 150 - 450 10e9/L    Diff Method Automated Method     % Neutrophils 59.3 %    % Lymphocytes 26.1 %    % Monocytes 12.3 %    % Eosinophils 1.5 %    % Basophils 0.5 %    % Immature Granulocytes 0.3 %    Nucleated RBCs 0 0 /100    Absolute Neutrophil 4.4 1.6 - 8.3 10e9/L    Absolute Lymphocytes 1.9 0.8 - 5.3 10e9/L    Absolute Monocytes 0.9 0.0 - 1.3 10e9/L    Absolute Eosinophils 0.1 0.0 - 0.7 10e9/L    Absolute Basophils 0.0 0.0 - 0.2 10e9/L    Abs Immature Granulocytes 0.0 0 - 0.4 10e9/L    Absolute Nucleated RBC 0.0    Comprehensive metabolic panel   Result Value Ref Range    Sodium 131 (L) 133 - 144 mmol/L    Potassium 4.4 3.4 - 5.3 mmol/L    Chloride 101 94 - 109 mmol/L    Carbon Dioxide 23 20 - 32 mmol/L    Anion Gap 7 3 - 14 mmol/L    Glucose 108 (H) 70 - 99 mg/dL    Urea Nitrogen 19 7 - 30 mg/dL    Creatinine 1.37 (H) 0.66 - 1.25 mg/dL    GFR  Estimate 49 (L) >60 mL/min/[1.73_m2]    GFR Estimate If Black 57 (L) >60 mL/min/[1.73_m2]    Calcium 8.1 (L) 8.5 - 10.1 mg/dL    Bilirubin Total 0.5 0.2 - 1.3 mg/dL    Albumin 3.2 (L) 3.4 - 5.0 g/dL    Protein Total 6.8 6.8 - 8.8 g/dL    Alkaline Phosphatase 87 40 - 150 U/L    ALT 73 (H) 0 - 70 U/L    AST 80 (H) 0 - 45 U/L   INR   Result Value Ref Range    INR 2.46 (H) 0.86 - 1.14   Magnesium   Result Value Ref Range    Magnesium 2.1 1.6 - 2.3 mg/dL   XR Chest Port 1 View    Narrative    EXAM: XR CHEST PORT 1 VW  LOCATION[facility]  DATE/TIME: 9/19/2020 11:00 PM    INDICATION: Syncope with ICD firing  COMPARISON: 07/28/2020      Impression    IMPRESSION: Sternotomy. Left-sided AICD with leads over the RA and RV. Minimal cardiac enlargement. Normal pulmonary vascularity. Minimal interstitial fibrotic scarring in the lung bases. No new infiltrates or pneumothorax. Atherosclerotic vascular   calcification.   Asymptomatic COVID-19 Virus (Coronavirus) by PCR    Specimen: Nasopharyngeal   Result Value Ref Range    COVID-19 Virus PCR to [facility] - Source Nasopharyngeal     COVID-19 Virus PCR to [facility] - Result       Test received-See reflex to IDDL test SARS CoV2 (COVID-19) Virus RT-PCR   SARS-CoV-2 COVID-19 Virus (Coronavirus) RT-PCR Nasopharyngeal    Specimen: Nasopharyngeal   Result Value Ref Range    SARS-CoV-2 Virus Specimen Source Nasopharyngeal     SARS-CoV-2 PCR Result NEGATIVE     SARS-CoV-2 PCR Comment       Testing was performed using the [employer]plex[employer] COVID-19 Direct Assay on the [employer] Liaison MDX   instrument. Additional information about this Emergency Use Authorization (EUA) assay can   be found via the Lab Guide.     Troponin I   Result Value Ref Range    Troponin I ES 0.028 0.000 - 0.045 ug/L   Digoxin level   Result Value Ref Range    Digoxin Level 1.5 0.5 - 2.0 ug/L   Phosphorus (AM Draw)   Result Value Ref Range    Phosphorus 2.9 2.5 - 4.5 mg/dL   Troponin I   Result Value Ref Range     Troponin I ES 0.036 0.000 - 0.045 ug/L   Troponin I   Result Value Ref Range    Troponin I ES 0.034 0.000 - 0.045 ug/L   Sodium   Result Value Ref Range    Sodium 137 133 - 144 mmol/L   Phosphorus   Result Value Ref Range    Phosphorus 2.4 (L) 2.5 - 4.5 mg/dL   Echocardiogram Limited    Narrative    [id number]  [id number]  [id number]  [id number]^BAYRON^GUY^[facility]  Echocardiography Laboratory  [address] [place] [address]        Name: ARTEM ELIZALDE  MRN: [id number]  : 1945  Study Date: 2020 10:33 AM  Age: 74 yrs  Gender: Male  Patient Location: [facility]  Reason For Study: Syncope and Collapse  Ordering Physician: GUY VERMA  Referring Physician: Dejon Downs  Performed By: Kylah Altman     BSA: 2.0 m2  Height: 73 in  Weight: 177 lb  BP: 119/79 mmHg  _____________________________________________________________________________  __        Procedure  Limited Portable Echo Adult. Optison (NDC #5048-9442) given intravenously.  _____________________________________________________________________________  __        Interpretation Summary     The rhythm was paced.  There is anterior, septal, and apical wall akinesis.(thinned)  The left ventricle is moderately dilated.  The visual ejection fraction is estimated at 20-25%.  There is no thrombus seen in the left ventricle.  Mild aortic root dilatation.  The right ventricular systolic pressure is approximated at 35mmHg plus the  right atrial pressure.  The inferior vena cava is normal.  Compared to the prior study dated 20, there have been no changes.  _____________________________________________________________________________  __        Left Ventricle  The left ventricle is moderately dilated. There is normal left ventricular  wall thickness. The visual ejection fraction is estimated at 20-25%. Left  ventricular diastolic function is abnormal. There is anterior, septal, and  apical wall akinesis. There is no  thrombus seen in the left ventricle.     Right Ventricle  There is a catheter/pacemaker lead seen in the right ventricle. The right  ventricle is normal size. Mildly decreased right ventricular systolic  function.     Atria  Normal left atrial size. Right atrial size is normal. Intact atrial septum.     Mitral Valve  The mitral valve leaflets appear thickened, but open well. There is trace to  mild mitral regurgitation.        Tricuspid Valve  The tricuspid valve is normal in structure and function. There is trace  tricuspid regurgitation. The right ventricular systolic pressure is  approximated at 35mmHg plus the right atrial pressure.     Aortic Valve  The aortic valve is trileaflet with aortic valve sclerosis. There is trace  aortic regurgitation.     Pulmonic Valve  Normal pulmonic valve. There is trace pulmonic valvular regurgitation.     Vessels  Mild aortic root dilatation. Normal size ascending aorta. The inferior vena  cava is normal.     Pericardium  The pericardium appears normal.        Rhythm  The rhythm was paced.  _____________________________________________________________________________  __  MMode/2D Measurements & Calculations     IVSd: 0.43 cm  LVIDd: 6.1 cm  LVIDs: 5.5 cm  LVPWd: 0.95 cm  FS: 10.4 %  LV mass(C)d: 162.9 grams  LV mass(C)dI: 79.7 grams/m2  asc Aorta Diam: 3.4 cm  RWT: 0.31        Doppler Measurements & Calculations  MV E max lai: 102.0 cm/sec  TR max lai: 295.6 cm/sec  TR max P.0 mmHg  E/E' av.2  Lateral E/e': 18.8  Medial E/e': 19.7              _____________________________________________________________________________  __        Report approved by: Marifer Villavicencio 2020 11:52 AM

## 2020-09-20 NOTE — ED NOTES
Bed: ED12  Expected date:   Expected time:   Means of arrival:   Comments:  519 74m from home, ICD fired pt in paced rhythem VSS alert no covid eta 10

## 2020-09-20 NOTE — H&P
[facility]    History and Physical - Hospitalist Service       Date of Admission:  9/19/2020    Assessment & Plan   Tyrel Rene is a 74 year old male admitted on 9/19/2020. He presents to the emergency department after an episode of lightheadedness while seated in his recliner reminiscent of prior episodes of ventricular tachycardia requiring ICD conversion with subsequent syncope and approximately 1 minute loss of consciousness.  On ICD interrogation in the emergency department, no reported ICD firing and no significant duration of NSVT.    Syncope and collapse: History of syncope associated with ventricular tachycardia episodes and ICD firing, though ICD interrogation in the emergency department did not note any significant duration of ventricular tachycardia.  It is possible that patient had syncope associated with brief episodes of non-perfusing nonsustained ventricular tachycardia, which would essentially result in a more prolonged sinus pause.  It is also possible that poor perfusion from NSVT was compounded by chronic orthostasis.  -Compression stockings to bilateral lower extremities  -Orthostatic blood pressure and pulse  -500 mL normal saline bolus  -Discussed some liberalization of his sodium restricted diet as below  -Low threshold for Florinef initiation given chronic orthostasis, though would liberalize sodium diet first  -Electrophysiology/cardiology consult for more chronic orthostasis on antiarrhythmic and antihypertensive agents, syncope, frequent episodes of ventricular tachycardia  -Amiodarone and digoxin levels pending  -Patient counseled on laying down and elevating lower extremities if timing permits if he again has presyncope (syncopized in recliner at this event)  -Limited TTE.  I do not anticipate any significant change from 7/29/2020 study  -Potassium, magnesium, phosphorus replacement protocols in place  -Empiric magnesium    Hyponatremia: Patient with mild  hyponatremia at 131.  Patient describes tight restriction of sodium at home in the setting of heart failure.  He does not have significant edema and no evidence of decompensated heart failure.  With chronic orthostasis and mild hyponatremia, discussed some liberalization of his sodium restricted diet  -500 mL normal saline bolus  -Recheck sodium in a.m.    Permanent atrial fibrillation, also with a history of complete AV block: Permanent pacemaker/ICD in place.  Also with a history of atrial flutter status post ablation in 2011.  Has required cardioversion in the past as well.  -Continue warfarin, pharmacy to dose  -Continue prior to admission carvedilol 6.25 mg twice daily, amlodipine 200 mg at bedtime  -Await pharmacy reconciliation of digoxin 125 mg daily    Coronary artery disease: Severe ischemic cardiomyopathy with chronic systolic heart failure following anterior STEMI (2012) requiring emergent three-vessel coronary artery bypass grafting in 2012 by Dr. Yeyo Lyman following unsuccessful percutaneous intervention.  More recent drug-eluting stent placement in OM 2 8/20/20.  Most recent ejection fraction 7/29/2020 42% on biplane 2D tracing, though visual appearance of 25 to 30%; discrepancy thought secondary to distribution of wall motion abnormalities in paced postischemic heart.  History of unstable ventricular tachycardia including ablation in February 2019 following V. tach storm.  Continued intermittent ventricular tachycardia  -Continue prior to admission carvedilol, lisinopril.  -Continue warfarin, pharmacy to dose; note that it is thought that patient could potentially have an apical LV thrombus given trabeculation noted on echocardiogram, though this has not been defined on imaging.  -Continue Plavix 75 mg daily in the setting of recent drug-eluting stent placement  -Await pharmacy reconciliation of other cardiac medications including rosuvastatin.  Anticipate statin can be resumed at  discharge  -Cardiology consulted as above     Stage III chronic kidney disease: At baseline.  Creatinine of 1.37 currently which is at or improved from baseline ranging from the 1.3-1.6 range.  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         Disposition Plan   Expected discharge: Tomorrow, recommended to prior living arrangement once Cardiac work-up complete, orthostasis improved.  Entered: Jarred Lyman MD 09/20/2020, 2:18 AM     The patient's care was discussed with the Bedside Nurse, Patient and Dr. Cruz in the emergency department.    Jarred Lyman MD  [facility]    ______________________________________________________________________    Chief Complaint   Syncope    History is obtained from patient, chart review, discussion with Dr. Cruz in the emergency department    History of Present Illness   Tyrel Rene is a 74 year old male who presents to the emergency department for evaluation of syncope and collapse.    As above, patient has a history of large anterior myocardial infarction with severe ischemic cardiomyopathy and three-vessel coronary artery bypass grafting after failed percutaneous intervention in 2012 at time of this event.  He also has a history of permanent atrial fibrillation, atrial flutter status post ablation, complete heart block.  He has required cardioversion in the past for his atrial fibrillation episodes, and also has a history of unstable ventricular tachycardia including ventricular tachycardia storm requiring ablation.  He currently has a pacer ICD in place, is on digoxin as  well as amiodarone.  Ejection fraction between 25 and 45%; difficult estimate by most recent echocardiogram given wall motion abnormalities with pacer and post ischemic heart.  Patient recently with ischemic work-up following repeat episode of ventricular tachycardia requiring cardioversion in July.  Angiogram this August demonstrated circumflex/OM 2 branch disease requiring stenting.    Patient has followed closely with Dr. hester of electrophysiology.  Cardiology team is aware of history of orthostasis that patient describes.  He wears compression stockings during the day, though tells me that he has orthostasis every night when he gets up out of bed.  This is to the point where he sits up at the edge of the bed, drinks water at midnight as he has to go to the bathroom, though has dry mouth and does not want to be lightheaded.  Still has significant lightheadedness where he uses furniture for assist as he ambulates to the bathroom.  This is longstanding and unchanged.  Patient tells me that his cardiologist in the past has thought that it is possible that he may have chronic orthostasis secondary to his need for antiarrhythmic agents with frequent ventricular tachycardia.  In fact, during last attempt to reduce amiodarone dosing, where patient went from 200 mg daily down to 200 mg 6 days/week, this is when he had V. tach requiring cardioversion episode.  That episode started with lightheadedness, subsequent syncope.  Interrogation demonstrated appropriate ICD firing.    Tonight, patient tells me that he was sitting in his recliner with his feet up at approximately 9:15 PM when he began to feel lightheaded very similar to his prior encounter which resulted in ICD firing.  He lost consciousness, apparently for approximately 1 minute.  His wife witnessed this and noted some myoclonic jerking prior to him regaining consciousness.  She felt that this motion was similar to that when he was cardioverted 2 months earlier.   No verbalization or guttural sounds reported which were noted at prior cardioversion in July.    Patient was transported the emergency department for evaluation where interrogation of his ICD took place in the emergency department.  It was reported that patient had several episodes of nonsustained ventricular tachycardia each less than 10 beats around 915, though also note that he has frequent episodes of brief nonsustained ventricular tachycardia, 170 such episodes from the end of August to mid September during his last interrogation.    Here in the emergency department, patient has recurrent episodes of brief nonsustained ventricular tachycardia.  He is asymptomatic with these.  He is unaware of any palpitations, has no chest pain, has no shortness of breath.    Patient is mildly hyponatremic.  He states that he drinks 48 ounces of water per day regularly to avoid dehydration given his chronic orthostasis.  He has not noted this to improve his orthostatic symptoms.  He has no lower extremity swelling, no orthopnea or shortness of breath despite decreased ejection fraction systolic heart failure     Overall, patient feels well currently at his baseline.  He is still lightheaded when he stands, though tells me that this is baseline for him and unchanged.    Review of Systems    The 10 point Review of Systems is negative other than noted in the HPI or here.   No fevers or chills  No shortness of breath  No palpitations    Past Medical History    I have reviewed this patient's medical history and updated it with pertinent information if needed.   Past Medical History:   Diagnosis Date     Atrial fibrillation (H)     s/p Cardioversion 3/14/2013     Atrial flutter (H)     S/p Aflutter ablation 1/11/2011     CAD (coronary artery disease)     CABG x3 10/2012- LIMA to distal LAD, SVG to OM1 & OM3; cath 10/2012- PTCA to second diagonal and mid LAD, BMS to mid LAD     Cardiogenic shock (H)      Cardiomyopathy (H)      ED  (erectile dysfunction)      Hypertension      Neuropathy      SVT (supraventricular tachycardia) (H)     S/p dual chamber ICD 10/11/12- upgraded to BIV ICD 6/2013     Syncope        Past Surgical History   I have reviewed this patient's surgical history and updated it with pertinent information if needed.  Past Surgical History:   Procedure Laterality Date     BYPASS GRAFT ARTERY CORONARY  10/2/2012    Procedure: BYPASS GRAFT ARTERY CORONARY;  Coronary Artery Bypass Graft x3 (LAD, Diag, OM) with Endovein [place] (On-Pump);  Surgeon: Yeyo Lyman MD;  Location:  OR     CARDIOVERSION  3/14/2013     CORONARY ANGIOGRAPHY ADULT ORDER  10/2/12     CORONARY ARTERY BYPASS  10/2/12    LIMA to LAD, SVG to OM1 and OM3     CV ANGIOGRAM CORONARY GRAFT N/A 8/11/2020    Procedure: Angiogram Coronary Graft;  Surgeon: Erin Weaver MD;  Location:  HEART CARDIAC CATH LAB     CV HEART CATHETERIZATION WITH POSSIBLE INTERVENTION N/A 8/11/2020    Procedure: Heart Catheterization with Possible Intervention;  Surgeon: Erin Weaver MD;  Location:  HEART CARDIAC CATH LAB     CV PCI ATHERECTOMY ORBITAL N/A 8/11/2020    Procedure: Percutaneous Coronary Intervention Atherectomy Rotational;  Surgeon: Erin Weaver MD;  Location:  HEART CARDIAC CATH LAB     EP ABLATION VT N/A 1/2/2019    Procedure: EP Ablation VT;  Surgeon: Suellen Costello MD;  Location:  HEART CARDIAC CATH LAB     EP COMPREHENSIVE EP STUDY N/A 1/2/2019    Procedure: EP Comprehensive EP Study;  Surgeon: Suellen Costello MD;  Location:  HEART CARDIAC CATH LAB     H ABLATION ATRIAL FLUTTER  1/11/2011     HAND SURGERY      table saw injury right hand     HEART CATH, ANGIOPLASTY  10/2/12    PTCA to second diagonal and mid LAD, BMS to mid LAD     HERNIA REPAIR       RELOCATE GENERATOR ICD/PACEMAKER         Social History   I have reviewed this patient's social history and updated it with pertinent  information if needed.  Social History     Tobacco Use     Smoking status: Former Smoker     Packs/day: 1.00     Years: 14.00     Pack years: 14.00     Types: Cigarettes     Start date:      Last attempt to quit: 7/10/1972     Years since quittin.2     Smokeless tobacco: Never Used   Substance Use Topics     Alcohol use: No     Drug use: No       Family History   I have reviewed this patient's family history and updated it with pertinent information if needed.  Family History   Problem Relation Age of Onset     Alcohol/Drug Father      Heart Disease Father 71     Alzheimer Disease Sister      Alcohol/Drug Sister      Alcohol/Drug Sister      Gastrointestinal Disease Sister      Obesity Sister      Hypertension Sister      Heart Disease Sister      Hypertension Sister      Heart Disease Daughter         afib     Arrhythmia Daughter      Multiple Sclerosis Daughter      Heart Disease Daughter         afib     Arrhythmia Daughter      Cancer Daughter         lymphoma     Aneurysm Mother        Prior to Admission Medications   Prior to Admission Medications   Prescriptions Last Dose Informant Patient Reported? Taking?   ASPIRIN NOT PRESCRIBED (INTENTIONAL)  Spouse/Significant Other Yes No   Sig: continuous prn for other Please choose reason not prescribed, below   Multiple Vitamins-Minerals (PRESERVISION AREDS 2 PO)  Spouse/Significant Other Yes No   Sig: Take 1 capsule by mouth 2 times daily   Vitamin D, Cholecalciferol, 1000 UNITS TABS  Spouse/Significant Other Yes No   Sig: Take 1,000 mg by mouth daily    acetaminophen (TYLENOL) 500 MG tablet  Spouse/Significant Other Yes No   Sig: Take 1,000 mg by mouth every 8 hours as needed for mild pain   amiodarone (PACERONE) 200 MG tablet   No No   Sig: Take 1 tablet (200 mg) by mouth daily   carvedilol (COREG) 6.25 MG tablet   No No   Sig: Take 1 tablet (6.25 mg) by mouth 2 times daily (with meals)   clopidogrel (PLAVIX) 75 MG tablet   No No   Sig: Take 1 tablet (75  mg) by mouth daily   digoxin (LANOXIN) 125 MCG tablet  Spouse/Significant Other No No   Sig: Take 1 tablet (125 mcg) by mouth daily   famotidine (PEPCID) 20 MG tablet  Spouse/Significant Other No No   Sig: Take 1 tablet (20 mg) by mouth 2 times daily   ipratropium (ATROVENT) 0.03 % nasal spray  Spouse/Significant Other No No   Sig: Spray 2 sprays into both nostrils every 12 hours   lisinopril (ZESTRIL) 5 MG tablet  Spouse/Significant Other No No   Sig: Take 1 tablet (5 mg) by mouth At Bedtime   nitroGLYcerin (NITROSTAT) 0.4 MG sublingual tablet  Spouse/Significant Other No No   Sig: DISSOLVE 1 TABLET UNDER THE TONGUE EVERY 5 MINUTES AS NEEDED FOR CHEST PAIN   rosuvastatin (CRESTOR) 20 MG tablet  Spouse/Significant Other No No   Sig: Take 1 tablet (20 mg) by mouth daily   sildenafil (VIAGRA) 100 MG tablet  Spouse/Significant Other No No   Sig: Take 1 tablet (100 mg) by mouth daily as needed Take 30 min to 4 hours before intercourse.  Never use with nitroglycerin, terazosin or doxazosin.   Patient not taking: Reported on 8/20/2020   warfarin ANTICOAGULANT (COUMADIN) 2.5 MG tablet  Spouse/Significant Other No No   Sig: Take 2.5 mg on Mondays and 1.25mg all other days or as directed by the anticoagulation clinic      Facility-Administered Medications: None     Allergies   Allergies   Allergen Reactions     Aspirin      Other reaction(s): Aspirin Contraindication (for reporting)  Cardiologist recommended no aspirin as he is on Pradaxa     Nystatin Other (See Comments)     Make his mouth numb & swelling       Physical Exam   Vital Signs: Temp: 98.8  F (37.1  C) Temp src: Oral BP: (!) 150/94 Pulse: 65   Resp: 15 SpO2: 95 %      Weight: 172 lbs 9.6 oz    General Appearance: Well-appearing 74-year-old male resting comfortably in bed.  No distress.  Eyes: No scleral icterus or injection  HEENT: Normocephalic and atraumatic  Respiratory: Breath sounds are clear bilaterally to auscultation.  No wheezes or crackles.  No  increased work of breathing or accessory muscle use.  Cardiovascular: Regular rate and rhythm with intermittent rapid beats correlating with ventricular tachycardia on telemetry, typically occurring in triplets.  Patient has some rightward shifted PMI.  He is uncertain if this is new or old.  He has no sense of palpitations during episodes  GI: Thin, nontender to palpation.  No palpable mass  Lymph/Hematologic: No lower extremity edema, some trace sock line edema, though this is even with compression stockings partially on.  Genitourinary: Not examined  Skin: No rashes, no evidence of trauma  Musculoskeletal: Thin with mild diffuse muscular wasting  Neurologic: Alert, conversant, appropriate in conversation.  Mental status grossly intact.  Psychiatric: Normal affect, very pleasant    Data   Data reviewed today: I reviewed all medications, new labs and imaging results over the last 24 hours. I personally reviewed the EKG tracing showing Ventricular paced rhythm.  On telemetry, patient with paced rhythm and intermittent brief episodes of ventricular tachycardia lasting approximately 3-5 beats, though fairly frequent in occurrence.  Note that he is overdue for his amiodarone dose which he reports he takes typically around the midnight hour.    Recent Labs   Lab 09/19/20  2219   WBC 7.4   HGB 13.6   MCV 92      INR 2.46*   *   POTASSIUM 4.4   CHLORIDE 101   CO2 23   BUN 19   CR 1.37*   ANIONGAP 7   LORI 8.1*   *   ALBUMIN 3.2*   PROTTOTAL 6.8   BILITOTAL 0.5   ALKPHOS 87   ALT 73*   AST 80*

## 2020-09-20 NOTE — PROVIDER NOTIFICATION
MD Notification    Notified Person: MD    Notified Person Name: Dr. Reyes    Notification Date/Time: 9/20/2020 at 1820    Notification Interaction: spoke with Dr. Reyes, Dr. Reyes at bedside    Purpose of Notification: pt is on and off V tach with -135, BP soft 86/74 L arm and 91/76 R arm.    Orders Received:    Comments:

## 2020-09-20 NOTE — PLAN OF CARE
"A&Ox4. /81 (BP Location: Left arm)   Pulse 84   Temp 97.6  F (36.4  C) (Oral)   Resp 8   Ht 1.854 m (6' 1\")   Wt 80.6 kg (177 lb 12.8 oz)   SpO2 99%   BMI 23.46 kg/m    Tele 100% V-paced with frequent runs of Vtach, asymptomatic. 2gm Magnesium IV and 500ml NS bolus given. Up with SBA. Using urinal at bedside. Denies pain. Plan for echo and cards consult. Continue to monitor.   "

## 2020-09-20 NOTE — ED TRIAGE NOTES
"Hx of pacemaker placement in 2012 for afib after MI. Today pt had a loss of consciousness briefly while sitting in a chair, wife witnessed this and saw him \"twitch\" assuming his pacemaker shocked him and he regained consciousness. A&O. VSS, but heart rate is variable and irregular, slightly hypertensive. EMS gave 324 aspirin  "

## 2020-09-20 NOTE — PROGRESS NOTES
Patient admitted early this morning by my colleague, Dr. Lyman. Non-billing note.     Patient seen and examined. Denies any new complaints since admission.Reports he has had some light-headedness while standing, but this is at baseline.  On exam appears comfortable at rest, currently having echocardiogram performed so remainder of exam deferred.     A/P  - Cardiology consulted, additional amiodarone dose given. Plan for EP to see 9/21/20   - Echocardiogram with EF 20-25%, no significant changes from 7/2020  - Continue telemetry, correlate any arrhythmias with symptoms  - Admission medications reconciled     Sharif Carranza MD   Hospitalist  [phone]

## 2020-09-20 NOTE — PROGRESS NOTES
RECEIVING UNIT ED HANDOFF REVIEW    ED Nurse Handoff Report was reviewed by: JEAN BETHEA RN on September 20, 2020 at 2:26 AM

## 2020-09-20 NOTE — PLAN OF CARE
A/Ox4. VSS. Lung sounds are clear. Tele 100% V paced with few runs of V Tach. Echo complete. Cardiology consult complete. Phos being placed. Recheck tomorrow morning.  Up with SBA. Voiding using urinal at bedside.  Plan possible discharge tomorrow.

## 2020-09-20 NOTE — PHARMACY-ADMISSION MEDICATION HISTORY
Pharmacy Medication History  Admission medication history interview status for the 9/19/2020  admission is complete. See EPIC admission navigator for prior to admission medications     Medication history sources: Patient's family/friend (spouse) and Surescripts  Medication history source reliability: Good  Adherence assessment: Good    Significant changes made to the medication list:  none      Additional medication history information:   none    Medication reconciliation completed by provider prior to medication history? Yes    Time spent in this activity: 10 minutese      Prior to Admission medications    Medication Sig Last Dose Taking? Auth Provider   amiodarone (PACERONE) 200 MG tablet Take 1 tablet (200 mg) by mouth daily  Patient taking differently: Take 200 mg by mouth At Bedtime  9/18/2020 at pm Yes Joelle Rodríguez PA-C   carvedilol (COREG) 6.25 MG tablet Take 1 tablet (6.25 mg) by mouth 2 times daily (with meals) 9/19/2020 at am Yes Parish Newman MD   clopidogrel (PLAVIX) 75 MG tablet Take 1 tablet (75 mg) by mouth daily 9/19/2020 at am Yes Rehan Seymour MD   digoxin (LANOXIN) 125 MCG tablet Take 1 tablet (125 mcg) by mouth daily 9/19/2020 at am Yes Parish Newman MD   famotidine (PEPCID) 20 MG tablet Take 1 tablet (20 mg) by mouth 2 times daily 9/19/2020 at am Yes Dejon Downs MD   ipratropium (ATROVENT) 0.03 % nasal spray Spray 2 sprays into both nostrils every 12 hours 9/19/2020 at am Yes Dejon Downs MD   lisinopril (ZESTRIL) 5 MG tablet Take 1 tablet (5 mg) by mouth At Bedtime 9/18/2020 at pm Yes Parish Newman MD   Multiple Vitamins-Minerals (PRESERVISION AREDS 2 PO) Take 1 capsule by mouth 2 times daily 9/19/2020 at am Yes Reported, Patient   Vitamin D, Cholecalciferol, 1000 UNITS TABS Take 1,000 mg by mouth daily  9/19/2020 at am Yes Reported, Patient   warfarin ANTICOAGULANT (COUMADIN) 2.5 MG tablet Take 2.5 mg on Mondays and 1.25mg all other days or as  directed by the anticoagulation clinic 9/18/2020 at pm Yes Dejon Downs MD   acetaminophen (TYLENOL) 500 MG tablet Take 1,000 mg by mouth every 8 hours as needed for mild pain prn  Unknown, Entered By History   ASPIRIN NOT PRESCRIBED (INTENTIONAL) continuous prn for other Please choose reason not prescribed, below   Reported, Patient   nitroGLYcerin (NITROSTAT) 0.4 MG sublingual tablet DISSOLVE 1 TABLET UNDER THE TONGUE EVERY 5 MINUTES AS NEEDED FOR CHEST PAIN prn  Dejon Downs MD   rosuvastatin (CRESTOR) 20 MG tablet Take 1 tablet (20 mg) by mouth daily 9/18/2020 at pm  Parish Newman MD   sildenafil (VIAGRA) 100 MG tablet Take 1 tablet (100 mg) by mouth daily as needed Take 30 min to 4 hours before intercourse.  Never use with nitroglycerin, terazosin or doxazosin.  Patient not taking: Reported on 8/20/2020 prn  Dejon Downs MD

## 2020-09-20 NOTE — ED PROVIDER NOTES
History     Chief Complaint:  Loss of consciousness     HPI   Tyrel Rene is a 74 year old male with a history of atrial fibrillation, ventricular tachycardia with ICD in place, and ischemic cardiomyopathy who presents via EMS with loss of consciousness.  The patient reports developing tingling in his hands and a funny feeling in his face while sitting in his recliner at approximately 2115 this evening.  He then passed out and his wife witnessed him twitch as if his ICD fired.  He regained consciousness immediately after the ICD shock.  He has had similar episodes of syncope with brief prodromal tingling in his face and hands; last episode was in July of this year.  At that time he had had monomorphic ventricular tachycardia thought to be due to a reduction in his Amiodarone dose.  His dose was increased at that time and he has not had any ICD firing since then until today.  He now feels back to baseline and denies any chest pain, shortness of breath, nausea, or sweating.  He drinks 48 ounces of water per day.  No recent fever, chills, right other infectious symptoms.  He received Aspirin 324 mg en route.    Allergies:  Nystatin - swelling, numbness     Medications:    Amiodarone   Carvedilol  Lisinopril   Plavix  Nitroglycerin  Rosuvastatin   Warfarin   Sildenafil   Famotidine  Ipratropium nasal spray    Past Medical History:    Atrial fibrillation   Atrial flutter   Ventricular tachycardia   Ventricular fibrillation  Ischemic cardiomyopathy   Supraventricular tachycardia   ST elevation myocardial infarction   Hypertension   Coronary artery disease   Hyperlipidemia   Cerebral infarction   Chronic kidney disease   Neuropathy   Erectile dysfunction    Past Surgical History:    Coronary angiogram, atherectomy 8/11/2020  EP ablation 2011, 2019  Cardioversion 2013  Coronary artery bypass graft, 3 vessel 2012  Coronary angiogram 2012  Hand surgery   Hernia repair     Family History:    Substance abuse - father,  "sister  Heart disease - father, sister   Hypertension - sister   Atrial fibrillation - daughter   Multiple sclerosis - daughter  Lymphoma - daughter  Aneurysm - mother     Social History:  Presents to the ED with his wife.  Tobacco Use: Former smoker, 14 pack year history, quit 1972.   Alcohol Use: No alcohol use.   PCP: Dejon Downs      Review of Systems   Constitutional: Negative for chills, diaphoresis and fever.   Respiratory: Negative for cough and shortness of breath.    Cardiovascular: Positive for palpitations. Negative for chest pain.   Gastrointestinal: Negative for nausea.   Neurological: Positive for syncope and light-headedness.        Positive for paresthesias.   All other systems reviewed and are negative.    Physical Exam   First Vitals:  BP: (!) 147/103  Pulse: 122  Temp: 98.8  F (37.1  C)  Resp: 14  Height: 185.4 cm (6' 1\")  Weight: 78.3 kg (172 lb 9.6 oz)  SpO2: 98 %      Physical Exam  Constitutional:  Appears well-developed and well-nourished. Cooperative.   HENT:   Head:    Atraumatic.   Mouth/Throat:   Oropharynx is without erythema or exudate and mucous membranes are moist.   Eyes:    Conjunctivae normal and EOM are normal.      Pupils are equal, round, and reactive to light.   Neck:    Normal range of motion. Neck supple.   Cardiovascular:  Regular rate and rhythm with frequent ectopy. Radial and dorsalis pedis pulses are 2+ and symmetric.    Pulmonary/Chest:  Effort normal.  Few crackles in both lung bases.  Abdominal:   Soft. Bowel sounds are normal.      No splenomegaly or hepatomegaly. No tenderness. No rebound.   Musculoskeletal:  Normal range of motion. No edema and no tenderness.   Neurological:  Alert. Normal strength. No cranial nerve deficit.  Skin:    Skin is warm and dry.   Psychiatric:   Normal mood and affect.      Emergency Department Course   ECG:  @ 2214  Indication: syncope  Vent. Rate 120 bpm. IN interval * ms. QRS duration 210 ms. QT/QTc 352/497 ms. P-R-T axis * " -83 86.   Underlying irregular wide complex rhythm with ventricular pacing. Right bundle branch block. Left anterior fascicular block. Bifascicular block. Anterior infarct, age undetermined. Abnormal ECG.    Read @ 2230 by Dr. Zhang.     Imaging:  Chest X-ray, Portable (1 view):  Sternotomy. Left-sided AICD with leads over the RA and RV. Minimal cardiac enlargement. Normal pulmonary vascularity. Minimal interstitial fibrotic scarring in the lung bases. No new infiltrates or pneumothorax. Atherosclerotic vascular calcification.  Report per radiology.      Radiographic findings were communicated with the patient who voiced understanding of the findings.    Laboratory:  CBC: RBC 4.28 (H), HCT 39.5 (L), otherwise WNL (WBC 7.4, HGB 13.6, )   CMP: Na 131 (L), Glucose 108 (H), Creatinine 1.37 (H), GFR 49 (L), Ca 8.1 (L), Albumin 3.2 (L), ALT 73 (H), AST 80 (H), otherwise WNL   Magnesium: 2.1  INR: 2.46 (H)    Emergency Department Course:  The patient arrived in the emergency department via EMS.   Nursing notes and vitals reviewed.  I performed an exam of the patient as documented above.     EKG was done, interpretation as above.    A peripheral IV was established.  Blood was drawn and sent for laboratory testing, results as above. The patient was placed on continuous cardiac monitoring and pulse oximetry.     The patient's ICD was interrogated.  Results reviewed.    (0001) I spoke with [facility] staff regarding the results of the patient's ICD interrogation.  Nonsustained ventricular tachycardia noted - a few runs about 2040 of 7 - 8 beats and then a few shorter runs at about 2115.  ICD did not fire.    Findings and plan explained to the patient who consents to observation.   (0054) I discussed the patient with Dr. Lyman of the hospitalist service, who will place the patient patient in observation in a medical telemetry bed for further monitoring, evaluation, and treatment.      Impression & Plan      Medical  Decision Making:  The patient presents after syncope while he was sitting in his chair.  He felt funny in the head, and then passed out.  He felt it feels similar to symptoms he has had prior to being shocked by his ICD.  He does not recall being shocked.  His wife thought she saw him twitch as if he had been shocked.      Here in the ED the patient complains of feeling a little lightheaded, which chronic for him.  He denies any chest discomfort.  He denies recent illness or injury.  He said there is no reason he would be dehydrated.  He said he has been taking all his medicines as directed.     His EKG shows a V-paced rhythm with frequent irregular wide complex tachycardia.  This looks like underlying atrial fibrillation.  On the monitor he also has multiple brief of runs of 2 - 4 beats of V-tach, followed by pacing.  Laboratory testing returned looking fairly unremarkable as above.  His kidney function looks stable.  INR is therapeutic.  Sodium is borderline low at 131.  Potassium and magnesium are normal.  Troponin is detectable but not positive.  Chest x-ray does not appear to show any acute abnormality.  There is no CHF and the AICD looks to be in normal position.    Patient has had a episode of syncope.  Interrogation of the defibrillator does not show any episodes of sustained ventricular tachycardia.  He did have episodes of non-sustained V-tach around 8:20 and again at 9:15 pm.  9:15 is when he had his symptoms.  The device did not shock him nor attempt any pacing.  His 2 runs of V-tach at 9:15 were 7 or 8 beats long.      The patient is not having any chest pain.  I doubt acute coronary syndrome.  He is on Coumadin and his INR is therapeutic.  I doubt PE.  There are no focal neurologic findings.  He is orthostatic, and this may be a contributing factor.    Discussed test results and plan to hospitalize with the patient and he voices understanding.  Dr. Lyman accepted for admission.      Diagnosis:     ICD-10-CM    1. Syncope, unspecified syncope type  R55    2. V-tach (H)  I47.2        Disposition:  Observation       I, Morena Cornelius, am serving as a scribe on 9/19/2020 at 10:39 PM to personally document services performed by Dale Zhang based on my observations and the provider's statements to me.     Morena Cornelius  9/19/2020    EMERGENCY DEPARTMENT       Dale Zhang MD  09/20/20 0608

## 2020-09-20 NOTE — UTILIZATION REVIEW
Admission Status; Secondary Review Determination       Under the authority of the Utilization Management Committee, the utilization review process indicated a secondary review on the above patient. The review outcome is based on review of the medical records, discussions with staff, and applying clinical experience noted on the date of the review.     (x) Inpatient Status Appropriate - This patient's medical care is consistent with medical management for inpatient care and reasonable inpatient medical practice.     RATIONALE FOR DETERMINATION   75 yo man with atrial fibrillation, v tach s/p ablation and pacemaker/ICD placement for complete heart block, CAD, ischemic cardiomyopathy, CKD stage III, CVA, hyponatremia, orthostasic symptoms who presented with witnessed syncopal episode while sitting in a chair. Admitted with recurrent syncope. Now having NSVT and per cardiology is having amiodarone adjusted and EP cardiology consultation tomorrow. Admitted 9/19/20 and not discharging today so meets minimum of 2 midnight stay. High risk for readmission for same symptoms/findings if not resolved now.     At the time of admission with the information available to the attending physician more than 2 nights hospital complex care was anticipated, based on patient risk of adverse outcome if treated as outpatient and complex care required. Inpatient admission is appropriate based on the Medicare guidelines.     This document was produced using voice recognition software.    The information on this document is developed by the utilization review team in order for the business office to ensure compliance. This only denotes the appropriateness of proper admission status and does not reflect the quality of care rendered.   The definitions of Inpatient Status and Observation Status used in making the determination above are those provided in the CMS Coverage Manual, Chapter 1 and Chapter 6, section 70.4.     Sincerely,   Gayatri RIZZO  MD Vega  Utilization Review  Physician Advisor  [facility].

## 2020-09-20 NOTE — PHARMACY-ANTICOAGULATION SERVICE
Clinical Pharmacy - Warfarin Dosing Consult     Pharmacy has been consulted to manage this patient s warfarin therapy.  Indication: Atrial Fibrillation  Therapy Goal: INR 2-3  Warfarin Prior to Admission: Yes  Warfarin PTA Regimen: 2.5mg mon, 1.25 row  Significant drug interactions: amiodarone, plavix    INR   Date Value Ref Range Status   09/20/2020 2.11 (H) 0.86 - 1.14 Final   09/19/2020 2.46 (H) 0.86 - 1.14 Final       Recommend warfarin 2 mg today.  Pharmacy will monitor Tyrel Rene daily and order warfarin doses to achieve specified goal.      Please contact pharmacy as soon as possible if the warfarin needs to be held for a procedure or if the warfarin goals change.

## 2020-09-21 NOTE — PROVIDER NOTIFICATION
MD Notification    Notified Person: MD Dr. Reyes    Notified Person Name: Amanda Seaver, RN     Notification Date/Time: 9/21 0350    Notification Interaction: Telephone    Purpose of Notification: Frequent VT and high rates    Orders Received: Increase amio gtt to 1 mg     Comments:

## 2020-09-21 NOTE — PLAN OF CARE
VSS overnight, HR in the low 100s, V paced with frequent 3-6 beat runs of VT. Dr montgomery and Dr. Foster aware. Per MD order , amio infusing at 1mg/ml. Voids in urinal. EP to see today.

## 2020-09-21 NOTE — PLAN OF CARE
A&O x4. Pt denied pain. VSS, on RA. Up w/ SBA. Tele: now 100% v paced after device setting changes.  Alarms on. Plan for possible discharge tomorrow pending heart rhythm tonight. Continue to monitor.

## 2020-09-21 NOTE — CONSULTS
CARDIAC ELECTROPHYSIOLOGY CONSULTATION  September 21, 2020    REQUESTING PROVIDER:  MAXX Reyes MD, MAXX Lyman MD    REASON FOR CONSULTATION:  Syncope, wide-complex tachycardia.      HISTORY OF PRESENT ILLNESS:    It is my pleasure seeing . Marko Rene, a delightful 74-year-old male, who is well-known to me as I have followed him in our clinic for years.  He also sees Dr. Newman for his cardiomyopathy/CHF.    Marko suffered a large anterior MI many years ago.  EF has been around 25-35% over the years.  He underwent a 3-vessel bypass in 2012.  Only a few days days after his bypass he developed sustained fast ventricular tachycardia.  He underwent implantation of a [employer] ICD before hospital discharge.    The patient underwent atrial flutter ablation in 2011 which was complicated by a CVA.  In 2013 the patient underwent upgrade of his ICD to a CRT-D.  Around that time he developed atrial fibrillation which later became permanent.  Over the past few years, he has had complete AV block requiring 100% ventricular pacing.    He received an ICD shock in 11/2017 (for fast VT with syncope) and amiodarone was started.  Unfortunately, he later developed VF storm and received 7 ICD shocks in 11/2018.  He underwent catheter ablation of VT in January 2019.  He had inducible fast VT at 220 bpm.  Substrate modification was performed mostly in the basal anterior LV.  He remained VT-free and amiodarone dose was decreased to 200 mg 6 days/week in 05/2020.  He had recurrence of VT with syncope and 2 ICD shocks in 07/2020.  Amiodarone was increased again to 200 mg daily.    Since early September we have recorded more than his usual nonsustained VT through his device.  Then on Saturday, 9/19/2020 he was sitting at his recliner when he lost consciousness.  His wife stated that he then jerked like as he had received a shock from his ICD.  He was out for less than 1 minute.    In the emergency room ICD interrogation did not show  "sustained ventricular arrhythmia or ICD discharge.  However he did have runs of wide QRS tachycardia recorded on ECG.    Overnight the patient has had multiple runs of wide QRS tachycardia and he was started on IV amiodarone.  This morning he still having nearly incessant runs of WCT despite IV amiodarone.  The rate of this tachycardia is about 120 bpm and the patient is unaware of these runs.    Marko lives with his wife in [place].  He is a non-smoker nondrinker.  He has not had chest pain recently.  Of note, he underwent PCI of OM\2 in August 2020 in hopes to prevent further VT via complete revascularization.      DIAGNOSTIC STUDIES:  Labs: Sodium 134, potassium 4.4, creatinine 1.26, INR 2.55  12-lead ECG: Atrial fibrillation with ventricular pacing and runs of wide QRS tachycardia with RBBB/superior axis  Echocardiogram: EF 25 to 30% with anterior wall motion abnormality, 1+ MR      IMPRESSION:  1. Syncope on 9/19/2020.  Etiology remains unclear.  According to his ICD, the patient did not have sustained fast VT >150 bpm (this is the current limit for VT detection) or ICD therapy.  He was sitting at the time which makes the likelihood of orthostatic hypotension low.  We have seen no evidence of \"noise\" on the RV channel which could inhibit ventricular pacing in this PM-dependent patient.  It is conceivable the patient was in sustained VT at a rate below 150 bpm and this led to gradual hypotension and eventual LOC.  I will point out, however, that the patient is completely asymptomatic today with nearly incessant VT in the 120s-130s.  In summary, the cause of syncope last Saturday remains a bit of a mystery.    2. Nearly incessant slow VT in the 120s-130s since last night.  The arrhythmia has a RBBB/superior axis morphology which is similar to the patient's intrinsic QRS in the past.  However, we confirmed today that there is no native AV conduction, so this is clearly a slow VT.  During device interrogation " "today it was noted that the arrhythmia seemed to be triggered by faster LV pacing.  This was caused by a device algorithm designed to \"smooth out AF\" with temporary faster ventricular pacing.  Today we deactivated the algorithm.  We will now stop iv amiodarone and watch and see whether episodes of slow VT continue.  Preliminarily, it does appear we are dealing with device-induced proarrhythmia.    3. Severe ischemic cardiomyopathy.  Compensated at the moment with no obvious CHF.    RECOMMENDATIONS:    1. Reprogram ICD to detect VT at 130 bpm.  This will be in the monitor zone only.    2. Inactivate the \"rate smoothing algorithm\", see above.    3. If the patient continues to have nearly incessant NSVT, we would consider catheter ablation under general anesthesia.    I appreciate the opportunity to be part of this patient's care.  Please feel free to call me with any questions (pager #[phone]).      Suellen Costello MD, [facility]        PHYSICAL EXAM:  Vitals: BP (!) 126/106   Pulse 120   Temp 97.6  F (36.4  C) (Oral)   Resp 18   Ht 1.854 m (6' 1\")   Wt 81.9 kg (180 lb 8 oz)   SpO2 94%   BMI 23.81 kg/m      Intake/Output Summary (Last 24 hours) at 9/21/2020 0849  Last data filed at 9/21/2020 0752  Gross per 24 hour   Intake 240 ml   Output 1125 ml   Net -885 ml     Vitals:    09/19/20 2222 09/20/20 0300 09/21/20 0648   Weight: 78.3 kg (172 lb 9.6 oz) 80.6 kg (177 lb 12.8 oz) 81.9 kg (180 lb 8 oz)     Constitutional:  Alert and oriented x3.  Pt appears comfortable, has no CP or respiratory distress despite multiple VT runs.  Head: Normocephalic and atraumatic.   Skin:  Normal color and texture.  No rashes, lesions or eruptions.  Eyes:  no jaundice, EOMI.  ENT:  Supple, normal JVP.  No lymphadenopathy or apparent thyroid enlargement.  Chest/Lungs: Minimal basilar rales, no wheezing.  Cardiac: Frequent runs of slow VT.  No apparent gallop or murmur.  Abdomen:  Normal bowel sounds. Abdomen is soft, non-tender & " not distended.  No rebound or guarding.    Extremities:  Radial pulses 2+ bilaterally.  DP and PT pulses palpable bilaterally.  No lower extremity edema is present.   Neurological:  CN 2-12 grossly intact.  Strength in UE is normal & symmetric.    Back:  No CVA tenderness.     REVIEW OF SYSTEMS:  Review of system completed and negative with the exception of what was described in the HPI section.     CURRENT MEDICATIONS:    amiodarone  200 mg Oral At Bedtime     clopidogrel  75 mg Oral Daily     famotidine  20 mg Oral BID     ipratropium  2 spray Both Nostrils Q12H     metoprolol tartrate  12.5 mg Oral Q6H     rosuvastatin  20 mg Oral QPM     sodium chloride (PF)  3 mL Intracatheter Q8H     warfarin ANTICOAGULANT  1 mg Oral ONCE at 18:00       ALLERGIES     Allergies   Allergen Reactions     Aspirin      Other reaction(s): Aspirin Contraindication (for reporting)  Cardiologist recommended no aspirin as he is on Pradaxa     Nystatin Other (See Comments)     Make his mouth numb & swelling       PAST MEDICAL HISTORY:  Past Medical History:   Diagnosis Date     Atrial fibrillation (H)     s/p Cardioversion 3/14/2013     Atrial flutter (H)     S/p Aflutter ablation 1/11/2011     CAD (coronary artery disease)     CABG x3 10/2012- LIMA to distal LAD, SVG to OM1 & OM3; cath 10/2012- PTCA to second diagonal and mid LAD, BMS to mid LAD     Cardiogenic shock (H)      Cardiomyopathy (H)      ED (erectile dysfunction)      Hypertension      Neuropathy      SVT (supraventricular tachycardia) (H)     S/p dual chamber ICD 10/11/12- upgraded to BIV ICD 6/2013     Syncope        PAST SURGICAL HISTORY:  Past Surgical History:   Procedure Laterality Date     BYPASS GRAFT ARTERY CORONARY  10/2/2012    Procedure: BYPASS GRAFT ARTERY CORONARY;  Coronary Artery Bypass Graft x3 (LAD, Diag, OM) with Endovein [place] (On-Pump);  Surgeon: Yeyo Lyman MD;  Location: [facility] OR     CARDIOVERSION  3/14/2013     CORONARY ANGIOGRAPHY ADULT  ORDER  10/2/12     CORONARY ARTERY BYPASS  10/2/12    LIMA to LAD, SVG to OM1 and OM3     CV ANGIOGRAM CORONARY GRAFT N/A 8/11/2020    Procedure: Angiogram Coronary Graft;  Surgeon: Erin Weaver MD;  Location:  HEART CARDIAC CATH LAB     CV HEART CATHETERIZATION WITH POSSIBLE INTERVENTION N/A 8/11/2020    Procedure: Heart Catheterization with Possible Intervention;  Surgeon: Erin Weaver MD;  Location:  HEART CARDIAC CATH LAB     CV PCI ATHERECTOMY ORBITAL N/A 8/11/2020    Procedure: Percutaneous Coronary Intervention Atherectomy Rotational;  Surgeon: Erin Weaver MD;  Location:  HEART CARDIAC CATH LAB     EP ABLATION VT N/A 1/2/2019    Procedure: EP Ablation VT;  Surgeon: Suellen Costello MD;  Location:  HEART CARDIAC CATH LAB     EP COMPREHENSIVE EP STUDY N/A 1/2/2019    Procedure: EP Comprehensive EP Study;  Surgeon: Suellen Costello MD;  Location:  HEART CARDIAC CATH LAB     H ABLATION ATRIAL FLUTTER  1/11/2011     HAND SURGERY      table saw injury right hand     HEART CATH, ANGIOPLASTY  10/2/12    PTCA to second diagonal and mid LAD, BMS to mid LAD     HERNIA REPAIR       RELOCATE GENERATOR ICD/PACEMAKER         FAMILY HISTORY:  Family History   Problem Relation Age of Onset     Alcohol/Drug Father      Heart Disease Father 71     Alzheimer Disease Sister      Alcohol/Drug Sister      Alcohol/Drug Sister      Gastrointestinal Disease Sister      Obesity Sister      Hypertension Sister      Heart Disease Sister      Hypertension Sister      Heart Disease Daughter         afib     Arrhythmia Daughter      Multiple Sclerosis Daughter      Heart Disease Daughter         afib     Arrhythmia Daughter      Cancer Daughter         lymphoma     Aneurysm Mother        SOCIAL HISTORY:  Social History     Socioeconomic History     Marital status:      Spouse name: Not on file     Number of children: Not on file     Years of education: Not on file      Highest education level: Not on file   Occupational History     Not on file   Social Needs     Financial resource strain: Not on file     Food insecurity     Worry: Not on file     Inability: Not on file     Transportation needs     Medical: Not on file     Non-medical: Not on file   Tobacco Use     Smoking status: Former Smoker     Packs/day: 1.00     Years: 14.00     Pack years: 14.00     Types: Cigarettes     Start date:      Last attempt to quit: 7/10/1972     Years since quittin.2     Smokeless tobacco: Never Used   Substance and Sexual Activity     Alcohol use: No     Drug use: No     Sexual activity: Yes     Partners: Female   Lifestyle     Physical activity     Days per week: Not on file     Minutes per session: Not on file     Stress: Not on file   Relationships     Social connections     Talks on phone: Not on file     Gets together: Not on file     Attends Buddhist service: Not on file     Active member of club or organization: Not on file     Attends meetings of clubs or organizations: Not on file     Relationship status: Not on file     Intimate partner violence     Fear of current or ex partner: Not on file     Emotionally abused: Not on file     Physically abused: Not on file     Forced sexual activity: Not on file   Other Topics Concern     Parent/sibling w/ CABG, MI or angioplasty before 65F 55M? No      Service Not Asked     Blood Transfusions Not Asked     Caffeine Concern Yes     Comment: 4 cups coffee daily     Occupational Exposure Not Asked     Hobby Hazards Not Asked     Sleep Concern No     Stress Concern No     Weight Concern Yes     Comment: gain 2lbs     Special Diet Yes     Comment: low sodium     Back Care Not Asked     Exercise Yes     Comment: walking, doing sittups, played pickle ball in summer     Bike Helmet Not Asked     Seat Belt Yes     Self-Exams Not Asked   Social History Narrative     Not on file         Recent Lab Results:  Recent Labs   Lab  09/21/20  0556 09/20/20  1902 09/20/20  1555 09/20/20  1033 09/20/20  0637 09/20/20  0317 09/19/20  2219   WBC  --   --   --   --   --   --  7.4   HGB  --   --   --   --   --   --  13.6   MCV  --   --   --   --   --   --  92   PLT  --   --   --   --   --   --  202   INR 2.55*  --  2.11*  --   --   --  2.46*    134  --  137  --   --  131*   POTASSIUM 4.4 4.7  --   --   --   --  4.4   CHLORIDE 106 107  --   --   --   --  101   CO2 22 22  --   --   --   --  23   BUN 19 20  --   --   --   --  19   CR 1.26* 1.40*  --   --   --   --  1.37*   ANIONGAP 6 5  --   --   --   --  7   LORI 7.7* 7.7*  --   --   --   --  8.1*   GLC 96 113*  --   --   --   --  108*   ALBUMIN  --   --   --   --   --   --  3.2*   PROTTOTAL  --   --   --   --   --   --  6.8   BILITOTAL  --   --   --   --   --   --  0.5   ALKPHOS  --   --   --   --   --   --  87   ALT  --   --   --   --   --   --  73*   AST  --   --   --   --   --   --  80*   TROPI  --   --   --  0.034 0.036 0.028  --

## 2020-09-21 NOTE — PROVIDER NOTIFICATION
Patient is here for vag discharge and is on a implant for birth control.  Patient is here to r/o BV, she has had this in the past.   Recurrent episodes of brief 3-5 beat runs VT, asymptomatic, but frequent.     Note now on amio ggt w/ EP following    As asymptomatic and known frequent VT episodes, would defer lidocaine ggt to EP team in AM. If he becomes symptomatic, please page cardiology to update (anticipating initiation of lidocaine at that time)    Jarred Lyman MD  2:41 AM

## 2020-09-21 NOTE — PLAN OF CARE
Patient care from [id number]    Neuro- A&O x 4  Most Recent Vitals- Temp: 97.9  F (36.6  C) Temp src: Oral BP: 97/86 Pulse: 117   Resp: 16 SpO2: 97 % O2 Device: None (Room air)    Tele/Cardiac- V paced with couplets of VT. Denies CP.  Resp- Lungs clear on RA.  Activity- SBA  Pain- Denies pain  Drips- Amio gtt @ 60ml/hr.  Drains/Tubes- PIV left and right arms  Skin- Skin intact, bruises.  GI/- WDL  Aggression Color- Green  COVID status- negative    Nela Lemos

## 2020-09-21 NOTE — PROGRESS NOTES
[facility]        Left a detailed message for Sharel, patient's wife asking for a call back to reschedule Marko's lab appointment and Video Visit with Prince Galvez CNP.    Marizol Kidd, RN  Care Coordinator  [email or web address]. Nurse Line: [phone]  / Personal Line: [phone]  09/21/20 9:53 AM

## 2020-09-21 NOTE — TELEPHONE ENCOUNTER
Nabil from [facility] calling to report that pt was supposed to have INR done today, but found out pt is currently hospitalized.  Advised her that we will call when pt discharges to schedule next INR check.  No further questions.

## 2020-09-21 NOTE — PROGRESS NOTES
[facility]    Hospitalist Progress Note      Assessment & Plan   Tyrel Rene is a 74 year old male with a history of CAD s/p CABG, ischemic cardiomyopathy with EF 20-25%, hx afib/flutter, hx complete heart block s/p PPM/ICD, hx unstable vtach, CKD, CVA who was admitted on 9/19/2020 after a syncopal episode.     Syncope and collapse: History of syncope associated with ventricular tachycardia episodes and ICD firing, though ICD interrogation in the emergency department did not note any significant duration of ventricular tachycardia.  It is possible that patient had syncope associated with brief episodes of non-perfusing nonsustained ventricular tachycardia, which would essentially result in a more prolonged sinus pause.  It is also possible that poor perfusion from NSVT was compounded by chronic orthostasis. Etiology is not entirely clear at this time.   -Compression stockings to bilateral lower extremities  -Low threshold for Florinef initiation given chronic orthostasis, though would liberalize sodium diet first  -Electrophysiology consulted, appreciate assistance   -Potassium, magnesium, phosphorus replacement protocols in place    Ventricular tachycardia    Permanent atrial fibrillation, also with a history of complete AV block s/p permanent pacemaker/ICD.  Pt with a history of atrial flutter status post ablation in 2011.  Has required cardioversion in the past as well. History of unstable ventricular tachycardia including ablation in February 2019 following V. tach storm. Continues to have frequent episodes of NSVT here in the hospital. INR therapeutic.   --EP following, it is suspected that episodes result of device-induced proarrhythmia. Device settings adjusted 9/21 and since that time patient has been 100% paced. Plan to monitor overnight.   -Continue warfarin, pharmacy to dose  --amiodarone drip discontinued 9/21  -PTA Coreg switched to Metoprolol per Cardiology  -Defer PTA digoxin  management to Cardiology, currently on hold due to level of 1.5   --electrolyte replacement      Coronary artery disease s/p BONITA to OM2 8/20/20, hx CABG  Severe ischemic cardiomyopathy with chronic systolic heart failure following anterior STEMI (2012) requiring emergent three-vessel CABG in 2012.  More recent drug-eluting stent placement in OM 2 8/20/20.   -Continue prior to admission lisinopril.  -Coreg changed to metoprolol   -Continue warfarin, pharmacy to dose  -Continue Plavix 75 mg daily in the setting of recent drug-eluting stent placement  -Cardiology consulted as above     Stage III chronic kidney disease: At baseline.  Creatinine of 1.37 currently which is at or improved from baseline ranging from the 1.3-1.6 range.  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    --continue warfarin, statin     Hyponatremia, resolved: Patient with mild hyponatremia at 131 on admit.  Patient describes tight restriction of sodium at home in the setting of heart failure.  He does not have significant edema and no evidence of decompensated heart failure.  With chronic orthostasis and mild hyponatremia, discussed some liberalization of his sodium restricted diet  - improved with IVF    DVT Prophylaxis: Warfarin  Code Status: Full Code    Disposition: Expected discharge tomorrow if no recurrent arrhythmia overnight and still feeling well.     This patient was discussed with Dr. Duvall who agrees with the above plan.    Guerda Bee PA-C    Interval History   Doing well today. Denies dyspnea, CP, palpitations. Chronic baseline light headedness with position changes which is unchanged. Ambulating.     -Data reviewed today: I reviewed all new labs and imaging results over the last 24 hours. I personally reviewed no images or EKG's today.    Physical Exam   Temp: 97.9  F (36.6  C) Temp src: Oral BP: (!) 115/98 Pulse: 124   Resp: 16 SpO2: 97 % O2 Device:  None (Room air)    Vitals:    09/19/20 2222 09/20/20 0300 09/21/20 0648   Weight: 78.3 kg (172 lb 9.6 oz) 80.6 kg (177 lb 12.8 oz) 81.9 kg (180 lb 8 oz)     Vital Signs with Ranges  Temp:  [97.3  F (36.3  C)-97.9  F (36.6  C)] 97.9  F (36.6  C)  Pulse:  [] 124  Resp:  [16] 16  BP: ()/() 115/98  SpO2:  [97 %] 97 %  I/O last 3 completed shifts:  In: 790 [P.O.:240; I.V.:550]  Out: 1425 [Urine:1425]    Constitutional: Alert and oriented, sitting up in bed eating lunch. Appears comfortable and is pleasantly conversant   ENT: normal cephalic, moist mucous membranes  Eyes:  Sclera anicteric, EOMI  Respiratory: Lungs clear to auscultation bilaterally, no increased work of breathing or wheezing   Cardiovascular: Regular rate and rhythm, no significant LE edema   GI:  active bowel sounds, abdomen soft, non-tender  MSK:  Moves all four extremities. strength is equal bilaterally.   Neuro:  Speech is clear. Face symmetric. CN 2-12 grossly intact. Follows commands       Medications     amiodarone 1 mg/min (09/21/20 0527)     Warfarin Therapy Reminder         amiodarone  200 mg Oral At Bedtime     clopidogrel  75 mg Oral Daily     famotidine  20 mg Oral BID     ipratropium  2 spray Both Nostrils Q12H     metoprolol tartrate  12.5 mg Oral Q6H     rosuvastatin  20 mg Oral QPM     sodium chloride (PF)  3 mL Intracatheter Q8H       Data   Recent Labs   Lab 09/21/20  0556 09/20/20  1902 09/20/20  1555 09/20/20  1033 09/20/20  0637 09/20/20  0317 09/19/20  2219   WBC  --   --   --   --   --   --  7.4   HGB  --   --   --   --   --   --  13.6   MCV  --   --   --   --   --   --  92   PLT  --   --   --   --   --   --  202   INR 2.55*  --  2.11*  --   --   --  2.46*    134  --  137  --   --  131*   POTASSIUM 4.4 4.7  --   --   --   --  4.4   CHLORIDE 106 107  --   --   --   --  101   CO2 22 22  --   --   --   --  23   BUN 19 20  --   --   --   --  19   CR 1.26* 1.40*  --   --   --   --  1.37*   ANIONGAP 6 5  --   --    --   --  7   LORI 7.7* 7.7*  --   --   --   --  8.1*   GLC 96 113*  --   --   --   --  108*   ALBUMIN  --   --   --   --   --   --  3.2*   PROTTOTAL  --   --   --   --   --   --  6.8   BILITOTAL  --   --   --   --   --   --  0.5   ALKPHOS  --   --   --   --   --   --  87   ALT  --   --   --   --   --   --  73*   AST  --   --   --   --   --   --  80*   TROPI  --   --   --  0.034 0.036 0.028  --        Recent Results (from the past 24 hour(s))   Echocardiogram Limited    Narrative    [id number]  [id number]  [id number]  [id number]^BAYRON^GUY^TEX           [facility]  Echocardiography Laboratory  [address] [place] [id number]        Name: ARTEM ELIZALDE  MRN: [id number]  : 1945  Study Date: 2020 10:33 AM  Age: 74 yrs  Gender: Male  Patient Location: [facility]  Reason For Study: Syncope and Collapse  Ordering Physician: GUY VERMA  Referring Physician: Dejon Downs  Performed By: Kylah Altman     BSA: 2.0 m2  Height: 73 in  Weight: 177 lb  BP: 119/79 mmHg  _____________________________________________________________________________  __        Procedure  Limited Portable Echo Adult. Optison (NDC #3851-1315) given intravenously.  _____________________________________________________________________________  __        Interpretation Summary     The rhythm was paced.  There is anterior, septal, and apical wall akinesis.(thinned)  The left ventricle is moderately dilated.  The visual ejection fraction is estimated at 20-25%.  There is no thrombus seen in the left ventricle.  Mild aortic root dilatation.  The right ventricular systolic pressure is approximated at 35mmHg plus the  right atrial pressure.  The inferior vena cava is normal.  Compared to the prior study dated 20, there have been no changes.  _____________________________________________________________________________  __        Left Ventricle  The left ventricle is moderately dilated. There is normal left  ventricular  wall thickness. The visual ejection fraction is estimated at 20-25%. Left  ventricular diastolic function is abnormal. There is anterior, septal, and  apical wall akinesis. There is no thrombus seen in the left ventricle.     Right Ventricle  There is a catheter/pacemaker lead seen in the right ventricle. The right  ventricle is normal size. Mildly decreased right ventricular systolic  function.     Atria  Normal left atrial size. Right atrial size is normal. Intact atrial septum.     Mitral Valve  The mitral valve leaflets appear thickened, but open well. There is trace to  mild mitral regurgitation.        Tricuspid Valve  The tricuspid valve is normal in structure and function. There is trace  tricuspid regurgitation. The right ventricular systolic pressure is  approximated at 35mmHg plus the right atrial pressure.     Aortic Valve  The aortic valve is trileaflet with aortic valve sclerosis. There is trace  aortic regurgitation.     Pulmonic Valve  Normal pulmonic valve. There is trace pulmonic valvular regurgitation.     Vessels  Mild aortic root dilatation. Normal size ascending aorta. The inferior vena  cava is normal.     Pericardium  The pericardium appears normal.        Rhythm  The rhythm was paced.  _____________________________________________________________________________  __  MMode/2D Measurements & Calculations     IVSd: 0.43 cm  LVIDd: 6.1 cm  LVIDs: 5.5 cm  LVPWd: 0.95 cm  FS: 10.4 %  LV mass(C)d: 162.9 grams  LV mass(C)dI: 79.7 grams/m2  asc Aorta Diam: 3.4 cm  RWT: 0.31        Doppler Measurements & Calculations  MV E max lai: 102.0 cm/sec  TR max lai: 295.6 cm/sec  TR max P.0 mmHg  E/E' av.2  Lateral E/e': 18.8  Medial E/e': 19.7              _____________________________________________________________________________  __        Report approved by: Marifer Villavicencio 2020 11:52 AM

## 2020-09-21 NOTE — PROVIDER NOTIFICATION
MD Notification    Notified Person: MD Dr. Lyman     Notified Person Name: Amanda Seaver RN      Notification Date/Time: 0240 9/21    Notification Interaction: Telephone    Purpose of Notification: Pt continues to have PVC couplets with frequent runs of 2-5 beats of VT.     Orders Received: No orders, pt has had this, if pt becomes symptomatic-page cards     Comments: Will continue to monitor pt closely

## 2020-09-22 NOTE — PLAN OF CARE
A&Ox4. VSS on RA. Denies pain. Tele: V-paced. Up SBA. No new issues overnight. Possible discharge today.

## 2020-09-22 NOTE — DISCHARGE SUMMARY
[facility]    Discharge Summary  Hospitalist    Date of Admission:  9/19/2020  Date of Discharge:  9/22/2020 10:15 AM  Discharging Provider: Guerda Bee PA-C    Discharge Diagnoses   Syncope  Ventricular tachycardia  Hx afib s/p PPM, ICD  CAD  Severe ischemic cardiomyopathy  CKD  Hyponatremia, resolved     History of Present Illness   Tyrel Rene is an 74 year old male with a history of CAD s/p CABG, ischemic cardiomyopathy with EF 20-25%, hx afib/flutter, hx complete heart block s/p PPM/ICD, hx unstable vtach, CKD, CVA who was admitted on 9/19/2020 after a syncopal episode. On the day of admission patient was sitting in his recliner when he began feeling light headed. He lost consciousness for approximately one minute with some myoclonic jerking observed by his wife and was brought to the ED for evaluation. Please see admission H&P by Dr. Lyman for additional information.      Hospital Course   Tyrel Rene was admitted on 9/19/2020.  The following problems were addressed during his hospitalization:    Syncope and collapse: History of syncope associated with ventricular tachycardia episodes and ICD firing, though ICD interrogation in the emergency department did not note any significant duration of ventricular tachycardia.  It is possible that patient had syncope associated with brief episodes of non-perfusing nonsustained ventricular tachycardia, which would essentially result in a more prolonged sinus pause.  It is also possible that poor perfusion from NSVT was compounded by chronic orthostasis. Etiology is not entirely clear at this time.   -Compression stockings to bilateral lower extremities  -Electrophysiology consulted, appreciate assistance      Ventricular tachycardia    Permanent atrial fibrillation, also with a history of complete AV block s/p permanent pacemaker/ICD.  Pt with a history of atrial flutter status post ablation in 2011.  Has required cardioversion in the past as  well. History of unstable ventricular tachycardia including ablation in February 2019 following V. tach storm. Continues to have frequent episodes of NSVT here in the hospital. INR therapeutic.   --EP following, it is suspected that episodes result of device-induced proarrhythmia. Device settings adjusted 9/21 and since that time patient has been 100% paced.   -Continue warfarin, pharmacy to dose  --amiodarone drip discontinued 9/21, continue po amiodarone on discharge  -discharge on digoxin, adjust to 6 days per week      Coronary artery disease s/p BONITA to OM2 8/20/20, hx CABG  Severe ischemic cardiomyopathy with chronic systolic heart failure following anterior STEMI (2012) requiring emergent three-vessel CABG in 2012.  More recent drug-eluting stent placement in OM 2 8/20/20.   -discharge on prior to admission lisinopril at reduced dose 2.5mg  -continue PTA Coreg at discharge  -Continue warfarin, pharmacy to dose  -Continue Plavix 75 mg daily in the setting of recent drug-eluting stent placement  -Cardiology consulted as above     Stage III chronic kidney disease: At baseline.  Creatinine of 1.37 currently which is at or improved from baseline ranging from the 1.3-1.6 range.     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    --continue warfarin, statin      Hyponatremia, resolved: Patient with mild hyponatremia at 131 on admit.  Patient describes tight restriction of sodium at home in the setting of heart failure.  He does not have significant edema and no evidence of decompensated heart failure.  With chronic orthostasis and mild hyponatremia, discussed some liberalization of his sodium restricted diet  - improved with IVF    This patient was seen and discussed with Dr. Duvall who agrees with the above plan     Guerda Bee PA-C, LATRICIA    Significant Results and Procedures   See below     Pending Results   These results will be followed up by  Cardiology   Unresulted Labs Ordered in the Past 30 Days of this Admission       Date and Time Order Name Status Description    9/20/2020 0314 Amiodarone level In process             Code Status   Full Code       Primary Care Physician   Dejon Downs    Physical Exam   Temp: 99.1  F (37.3  C) Temp src: Oral BP: 134/83 Pulse: 65   Resp: 18 SpO2: 90 % O2 Device: None (Room air)    Vitals:    09/20/20 0300 09/21/20 0648 09/22/20 0506   Weight: 80.6 kg (177 lb 12.8 oz) 81.9 kg (180 lb 8 oz) 81.4 kg (179 lb 7.3 oz)     Vital Signs with Ranges  Temp:  [97.4  F (36.3  C)-99.1  F (37.3  C)] 99.1  F (37.3  C)  Pulse:  [65-67] 65  Resp:  [16-18] 18  BP: (121-139)/(69-92) 134/83  SpO2:  [90 %-96 %] 90 %  I/O last 3 completed shifts:  In: -   Out: 1235 [Urine:1235]       Constitutional: Alert and oriented, sitting up in bed eating breakfast. Appears comfortable and is pleasantly conversant   ENT: normal cephalic, moist mucous membranes  Eyes:  Sclera anicteric, EOMI  Respiratory: Lungs clear to auscultation bilaterally, no increased work of breathing or wheezing   Cardiovascular: Regular rate and rhythm, no significant LE edema   GI:  active bowel sounds, abdomen soft, non-tender  MSK:  Moves all four extremities. strength is equal bilaterally.   Neuro:  Speech is clear. Face symmetric. CN 2-12 grossly intact. Follows commands          Discharge Disposition   Discharged to home  Condition at discharge: Stable    Consultations This Hospital Stay   PHARMACY TO DOSE WARFARIN  CARDIOLOGY IP CONSULT    Time Spent on this Encounter   I, Guerda Bee PA-C, personally saw the patient today and spent greater than 30 minutes discharging this patient.    Discharge Orders      Discharge Order: F/U with Cardiac  DAVE      Reason for your hospital stay    You were here for evaluation of loss of consciousness and abnormal heart rhythm.     Activity    Your activity upon discharge: activity as tolerated     Follow-up and  recommended labs and tests     Follow up with Cardiology post discharge for hospital follow up. They will arrange this appointment for you.    [facility] [place] Office  [phone] [address], [address]  [place] [address]    Follow up with primary care provider in 7-10 days for hospital follow up. You take a high dose of vitamin D supplementation, please review this with primary care provider.  **SEE DETAILS BELOW**     Discharge Instructions    Modify your digoxin dosing: you will continue current dose but only take this medication Monday through Saturday- skip your Sunday dose each week. You will need your digoxin level rechecked later on, will defer timing of this to Cardiology.     We are reducing your dose of lisinopril- please take 1/2 tablet daily (2.5mg)    NO DRIVING until told otherwise by care provider after this episode of loss of consciousness     Full Code     Diet    Follow this diet upon discharge: Orders Placed This Encounter      Combination Diet Regular Diet Adult     Discharge Medications   Discharge Medication List as of 9/22/2020  9:46 AM        CONTINUE these medications which have CHANGED    Details   digoxin (LANOXIN) 125 MCG tablet Take 1 tablet (125 mcg) by mouth six times a week Do not take Sundays, Disp-90 tablet,R-3, Historical      lisinopril (ZESTRIL) 5 MG tablet Take 0.5 tablets (2.5 mg) by mouth At Bedtime, Disp-30 tablet,R-0, Historical           CONTINUE these medications which have NOT CHANGED    Details   acetaminophen (TYLENOL) 500 MG tablet Take 1,000 mg by mouth every 8 hours as needed for mild pain, Historical      amiodarone (PACERONE) 200 MG tablet Take 1 tablet (200 mg) by mouth daily, No Print Out      ASPIRIN NOT PRESCRIBED (INTENTIONAL)       carvedilol (COREG) 6.25 MG tablet Take 1 tablet (6.25 mg) by mouth 2 times daily (with meals), Disp-180 tablet,R-0, E-Prescribe      clopidogrel (PLAVIX) 75 MG tablet Take 1 tablet (75 mg) by mouth  daily, Disp-90 tablet,R-3, Local Print      famotidine (PEPCID) 20 MG tablet Take 1 tablet (20 mg) by mouth 2 times daily, Disp-180 tablet,R-3, E-Prescribe      ipratropium (ATROVENT) 0.03 % nasal spray Spray 2 sprays into both nostrils every 12 hours, Disp-30 mL,R-5, E-Prescribe      Multiple Vitamins-Minerals (PRESERVISION AREDS 2 PO) Take 1 capsule by mouth 2 times daily, Historical      nitroGLYcerin (NITROSTAT) 0.4 MG sublingual tablet DISSOLVE 1 TABLET UNDER THE TONGUE EVERY 5 MINUTES AS NEEDED FOR CHEST PAIN, Disp-25 tablet,R-0, E-Prescribe      rosuvastatin (CRESTOR) 20 MG tablet Take 1 tablet (20 mg) by mouth daily, Disp-90 tablet,R-0, E-Prescribe      sildenafil (VIAGRA) 100 MG tablet Take 1 tablet (100 mg) by mouth daily as needed Take 30 min to 4 hours before intercourse.  Never use with nitroglycerin, terazosin or doxazosin., Disp-16 tablet,R-3, Local Print      Vitamin D, Cholecalciferol, 1000 UNITS TABS Take 1,000 mg by mouth daily , Historical      warfarin ANTICOAGULANT (COUMADIN) 2.5 MG tablet Take 2.5 mg on Mondays and 1.25mg all other days or as directed by the anticoagulation clinic, Disp-75 tablet,R-3, E-Prescribe           Allergies   Allergies   Allergen Reactions    Aspirin      Other reaction(s): Aspirin Contraindication (for reporting)  Cardiologist recommended no aspirin as he is on Pradaxa    Nystatin Other (See Comments)     Make his mouth numb & swelling     Data   Most Recent 3 CBC's:  Recent Labs   Lab Test 09/19/20  2219 08/11/20  0940 07/28/20  1848   WBC 7.4 6.8 7.9   HGB 13.6 13.2* 13.6   MCV 92 93 93    208 184      Most Recent 3 BMP's:  Recent Labs   Lab Test 09/22/20  0556 09/21/20  0556 09/20/20  1902    134 134   POTASSIUM 4.0 4.4 4.7   CHLORIDE 107 106 107   CO2 25 22 22   BUN 24 19 20   CR 1.27* 1.26* 1.40*   ANIONGAP 5 6 5   LORI 8.1* 7.7* 7.7*   GLC 92 96 113*     Most Recent 2 LFT's:  Recent Labs   Lab Test 09/19/20  2219 02/12/20  1233   AST 80* 21   ALT  73* 28   ALKPHOS 87 59   BILITOTAL 0.5 1.0     Most Recent INR's and Anticoagulation Dosing History:  Anticoagulation Dose History       Recent Dosing and Labs Latest Ref Rng & Units 8/20/2020 8/24/2020 8/31/2020 9/19/2020 9/20/2020 9/21/2020 9/22/2020    Warfarin 1 mg - - - - - - 1 mg -    Warfarin 2 mg - - - - - 2 mg - -    INR 0.86 - 1.14 1.8(A) 3.2(A) 2.9(A) 2.46(H) 2.11(H) 2.55(H) 2.42(H)    INR 0.86 - 1.14 - - - - - - -    INR Point of Care 0.86 - 1.14 - - - - - - -          Most Recent 3 Troponin's:  Recent Labs   Lab Test 09/20/20  1033 09/20/20  0637 09/20/20  0317   TROPI 0.034 0.036 0.028     Most Recent Cholesterol Panel:  Recent Labs   Lab Test 01/07/20  1114   CHOL 116   LDL 52   HDL 42   TRIG 111     Most Recent 6 Bacteria Isolates From Any Culture (See EPIC Reports for Culture Details):  Recent Labs   Lab Test 02/12/20  1233 10/17/12  1355 10/15/12  0845 10/14/12  1220 10/14/12  1130 10/14/12  1049   CULT No growth  No growth No growth Heavy growth Klebsiella pneumoniae Heavy growth Coagulase negative Staphylococcus Susceptibility testing not  routinely done No growth No growth Duplicate request Charge credited RN DRAW     Most Recent TSH, T4 and A1c Labs:  Recent Labs   Lab Test 07/28/20  1848  10/09/12  0450   TSH 1.32   < >  --    A1C  --   --  5.7    < > = values in this interval not displayed.     Results for orders placed or performed during the hospital encounter of 09/19/20   XR Chest Port 1 View    Narrative    EXAM: XR CHEST PORT 1 VW  LOCATION: [facility]  DATE/TIME: 9/19/2020 11:00 PM    INDICATION: Syncope with ICD firing  COMPARISON: 07/28/2020      Impression    IMPRESSION: Sternotomy. Left-sided AICD with leads over the RA and RV. Minimal cardiac enlargement. Normal pulmonary vascularity. Minimal interstitial fibrotic scarring in the lung bases. No new infiltrates or pneumothorax. Atherosclerotic vascular   calcification.   Echocardiogram Limited    Narrative     [id number]  [facility]  [id number]  [id number]^VERMA^GUY^TEX           [facility]  Echocardiography Laboratory  [address] [place] [address]        Name: ARTEM ELIZALDE  MRN: [id number]  : 1945  Study Date: 2020 10:33 AM  Age: 74 yrs  Gender: Male  Patient Location: [facility]  Reason For Study: Syncope and Collapse  Ordering Physician: GUY VERMA  Referring Physician: Dejon Downs  Performed By: Kylah Altman     BSA: 2.0 m2  Height: 73 in  Weight: 177 lb  BP: 119/79 mmHg  _____________________________________________________________________________  __        Procedure  Limited Portable Echo Adult. Optison (NDC #[id number]) given intravenously.  _____________________________________________________________________________  __        Interpretation Summary     The rhythm was paced.  There is anterior, septal, and apical wall akinesis.(thinned)  The left ventricle is moderately dilated.  The visual ejection fraction is estimated at 20-25%.  There is no thrombus seen in the left ventricle.  Mild aortic root dilatation.  The right ventricular systolic pressure is approximated at 35mmHg plus the  right atrial pressure.  The inferior vena cava is normal.  Compared to the prior study dated 20, there have been no changes.  _____________________________________________________________________________  __        Left Ventricle  The left ventricle is moderately dilated. There is normal left ventricular  wall thickness. The visual ejection fraction is estimated at 20-25%. Left  ventricular diastolic function is abnormal. There is anterior, septal, and  apical wall akinesis. There is no thrombus seen in the left ventricle.     Right Ventricle  There is a catheter/pacemaker lead seen in the right ventricle. The right  ventricle is normal size. Mildly decreased right ventricular systolic  function.     Atria  Normal left atrial size. Right atrial size is normal. Intact atrial septum.      Mitral Valve  The mitral valve leaflets appear thickened, but open well. There is trace to  mild mitral regurgitation.        Tricuspid Valve  The tricuspid valve is normal in structure and function. There is trace  tricuspid regurgitation. The right ventricular systolic pressure is  approximated at 35mmHg plus the right atrial pressure.     Aortic Valve  The aortic valve is trileaflet with aortic valve sclerosis. There is trace  aortic regurgitation.     Pulmonic Valve  Normal pulmonic valve. There is trace pulmonic valvular regurgitation.     Vessels  Mild aortic root dilatation. Normal size ascending aorta. The inferior vena  cava is normal.     Pericardium  The pericardium appears normal.        Rhythm  The rhythm was paced.  _____________________________________________________________________________  __  MMode/2D Measurements & Calculations     IVSd: 0.43 cm  LVIDd: 6.1 cm  LVIDs: 5.5 cm  LVPWd: 0.95 cm  FS: 10.4 %  LV mass(C)d: 162.9 grams  LV mass(C)dI: 79.7 grams/m2  asc Aorta Diam: 3.4 cm  RWT: 0.31        Doppler Measurements & Calculations  MV E max lai: 102.0 cm/sec  TR max lai: 295.6 cm/sec  TR max P.0 mmHg  E/E' av.2  Lateral E/e': 18.8  Medial E/e': 19.7              _____________________________________________________________________________  __        Report approved by: Marifer Villavicencio 2020 11:52 AM

## 2020-09-22 NOTE — PROGRESS NOTES
[facility]        Left a detailed message for Sharel letting her know I am trying to schedule Marko's follow-up CORE appointment.         Marizol Kidd, RN  Care Coordinator  [facility] Nurse Line: [phone]  / Personal Line: [phone]  09/22/20 4:55 PM

## 2020-09-22 NOTE — PLAN OF CARE
Vital signs stable. Medications discussed, Information reviewed, Questions answered, and concerns addressed. Follow-up Instructions reviewed. Pt belongings accounted for and sent home with them. Pt left via wheelchair and driven by wife.

## 2020-09-22 NOTE — PLAN OF CARE
[id number] A&O x 4. Patient denies pain. VSS, on RA. Up with SBA. Tele: 100% V paced. Pt requested to keep EUGENIO stockings on overnight. Plan for possible discharge tomorrow. Continue to Monitor.

## 2020-09-22 NOTE — PROGRESS NOTES
"EP CARDIOLOGY PROGRESS NOTE  Kailee Costello MD    Delightful 74-year-old male with a long history of ischemic cardiomyopathy and recurrent ventricular tachycardia.  He was admitted this time after a syncopal event at home while sitting.  ICD interrogation showed no ventricular tachycardia >150 bpm or ICD therapy.  The etiology of the event remains unclear.      While hospitalized, the patient was noted to have multiple runs of slow ventricular tachycardia in the 120s to 130s.  These have cleared in the past 24 hours after inactivating the \"rate smoothing algorithm\" that was previously programmed in his ICD.  -----------------------------------------------------------------    ASSESSMENT:  1. Syncope in the sitting position, unclear etiology.  One potential explanation is prolonged (slow) VT at a rate below the previously set detection rate of 150 bpm, leading to cerebral hypoperfusion.  2. ICD proarrhythmia.  Slow VT stopped immediately after the rate smoothing algorithm was inactivated.  3. Ischemic cardiomyopathy, chronic systolic CHF.  We will decrease digoxin from 7 days a week to 6 days/week due to dig level being somewhat high at 1.5 on admission.  Will switch metoprolol back to his PTA carvedilol.  Decrease PTA lisinopril from 5 mg to 2.5 mg because of daily symptomatic orthostatic lightheadedness.    PLAN:  1. Med adjustments, as above.  2. No driving.  3. We will arrange for follow-up in the cardiology CORE clinic with Prince CORONADO in 2 to 3 weeks.    Total Time: 25 minutes;   10 minutes spent in direct communication with patient / family and care coordination.             Interval History:     no new complaints and doing well          Review of Systems:     CONSTITUTIONAL: NEGATIVE for fever, chills, change in weight  ENT/MOUTH: NEGATIVE for ear, mouth and throat problems  RESP: NEGATIVE for significant cough or SOB  CV: NEGATIVE for chest pain, palpitations or peripheral edema          Physical Exam:      Blood " "pressure 134/83, pulse 65, temperature 99.1  F (37.3  C), temperature source Oral, resp. rate 18, height 1.854 m (6' 1\"), weight 81.4 kg (179 lb 7.3 oz), SpO2 90 %.  Vitals:    09/20/20 0300 09/21/20 0648 09/22/20 0506   Weight: 80.6 kg (177 lb 12.8 oz) 81.9 kg (180 lb 8 oz) 81.4 kg (179 lb 7.3 oz)     Vital Signs with Ranges  Temp:  [97.4  F (36.3  C)-99.1  F (37.3  C)] 99.1  F (37.3  C)  Pulse:  [65-67] 65  Resp:  [16-18] 18  BP: (121-139)/(69-92) 134/83  SpO2:  [90 %-96 %] 90 %  I/O's Last 24 hours  I/O last 3 completed shifts:  In: -   Out: 1235 [Urine:1235]    GENERAL: Alert, oriented, no distress  HEENT:  normocephalic, atraumatic.  NECK:  normal JVP.  LUNGS:  clear bilaterally, no wheezes  CARDIOVASCULAR: Regular paced rhythm, no gallop, murmur or rub  ABDOMEN: soft, NT, no apparent hepatosplenomegaly  EXTREMITIES:  no edema  VASCULAR:  1+ carotids, 2+ radial pulses           Medications:          amiodarone  200 mg Oral At Bedtime     carvedilol  6.25 mg Oral BID w/meals     clopidogrel  75 mg Oral Daily     digoxin  125 mcg Oral Once per day on Mon Tue Wed Thu Fri Sat     famotidine  20 mg Oral BID     [START ON 9/23/2020] influenza vac high-dose quad  0.7 mL Intramuscular Prior to discharge     ipratropium  2 spray Both Nostrils Q12H     lisinopril  2.5 mg Oral Daily     rosuvastatin  20 mg Oral QPM     sodium chloride (PF)  3 mL Intracatheter Q8H     warfarin ANTICOAGULANT  2 mg Oral ONCE at 18:00            Data:      All new lab and imaging data was reviewed.   Recent Labs   Lab Test 09/22/20  0556 09/21/20  0556 09/20/20  1555 09/19/20  2219  08/11/20  0940  07/28/20  1848   WBC  --   --   --  7.4  --  6.8  --  7.9   HGB  --   --   --  13.6  --  13.2*  --  13.6   MCV  --   --   --  92  --  93  --  93   PLT  --   --   --  202  --  208  --  184   INR 2.42* 2.55* 2.11* 2.46*   < > 1.45*   < > 2.42*    < > = values in this interval not displayed.      Recent Labs   Lab Test 09/22/20  0556 09/21/20  0556 " 09/20/20  1902    134 134   POTASSIUM 4.0 4.4 4.7   CHLORIDE 107 106 107   CO2 25 22 22   BUN 24 19 20   CR 1.27* 1.26* 1.40*   ANIONGAP 5 6 5   LORI 8.1* 7.7* 7.7*   GLC 92 96 113*     Recent Labs   Lab Test 09/20/20  1033 09/20/20  0637 09/20/20  0317   TROPI 0.034 0.036 0.028         EKG results:  Reviewed      Echo Results:  No results found for this or any previous visit (from the past 4320 hour(s)).    Imaging:   No results found for this or any previous visit (from the past 24 hour(s)).

## 2020-09-23 NOTE — LETTER
[facility]ARE [facility]ORDIN[facility]  [address] [place]  [address]      September 23, 2020      Tyrel Rene  [address] [place] [address]      Dear Tyrel,    I am a clinic community health worker who works with Dejon Downs MD at [facility]. I wanted to thank you for spending the time to talk with me.  Below is a description of clinic care coordination and how I can further assist you.      The clinic care coordination team is made up of a registered nurse,  and community health worker who understand the health care system. The goal of clinic care coordination is to help you manage your health and improve access to the health care system in the most efficient manner. The team can assist you in meeting your health care goals by providing education, coordinating services, strengthening the communication among your providers and supporting you with any resource needs.    Please feel free to contact me at [phone] with any questions or concerns. We are focused on providing you with the highest-quality healthcare experience possible and that all starts with you.     Sincerely,     BRANDON Jauregui  Clinic Care Coordination  [facility]: [facility]

## 2020-09-23 NOTE — TELEPHONE ENCOUNTER
ED / Discharge Outreach Protocol    Patient Contact    Attempt # 1    Was call answered?  No.  Left message on voicemail with information to call triage back.    Patient has a future appointment scheduled 09/29/2020 with doctor Yareli.    Follow-up and recommended labs and tests      Follow up with Cardiology post discharge for hospital follow up. They will arrange this appointment for you.     [facility] [place] Office  [phone] [address], [address]  [place] [address]     Follow up with primary care provider in 7-10 days for hospital follow up. You take a high dose of vitamin D supplementation, please review this with primary care provider.  **SEE DETAILS BELOW**          Discharge Instructions     Modify your digoxin dosing: you will continue current dose but only take this medication Monday through Saturday- skip your Sunday dose each week. You will need your digoxin level rechecked later on, will defer timing of this to Cardiology.      We are reducing your dose of lisinopril- please take 1/2 tablet daily (2.5mg)     NO DRIVING until told otherwise by care provider after this episode of loss of consciousness

## 2020-09-23 NOTE — PROGRESS NOTES
ANTICOAGULATION  MANAGEMENT: Discharge Review    Tyrel Rene chart reviewed for anticoagulation continuity of care    Hospital Admission on 9/19-9/22 for syncopal episode.    Discharge disposition: Home    Results:    Recent labs: (last 7 days)     09/19/20  2219 09/20/20  1555 09/21/20  0556 09/22/20  0556   INR 2.46* 2.11* 2.55* 2.42*     Anticoagulation inpatient management:     Dosed by pharmacist     Anticoagulation discharge instructions:     Warfarin dosing: see calendar    Bridging: No   INR goal change: No      Medication changes affecting anticoagulation: Possibly: amiodarone d/c'd 9/21 drip, digoxin     Additional factors affecting anticoagulation: No    Plan     No adjustment to anticoagulation plan needed    [facility] with [facility] and appointment made for INR recheck tomorrow 9/24    Anticoagulation Calendar updated    Claudia Triana RN, BSN, PHN

## 2020-09-23 NOTE — PROGRESS NOTES
Clinic Care Coordination Contact  Community Health Worker Initial Outreach    CHW Initial Information Gathering:  Referral Source: IP Report  Preferred Hospital: [facility]  [phone]  No PCP office visit in Past Year: No  Spoke with patient's wife, Sharel.    Chart Review: Referral from a discharge.  IP for loss of consciousness and abnormal heart rhythm.  Medication Changes- Yes  Follow up with scheduled appointment on 9/29/20 at 1:40pm with PCP.     Patient accepts CC: No, Not at this time. Patient will be sent Care Coordination introduction letter for future reference.   Plan: Care Coordinator will send care coordination introduction letter with care coordinator contact information and explanation of care coordination services via [employer]. Care Coordinator will do no further outreaches at this time.    Olivia Hinton, BRANDON  Clinic Care Coordination  [facility]: [facility]   Phone: [phone]        
[place]

## 2020-09-24 NOTE — PROGRESS NOTES
Needs CMP prior to 10/6/20 appt.  Called Tyrel to schedule CMP prior per Prince Galvez CNP instructions.  He get's [facility]'s [facility].  Next INR 9/28/20.    CMP order faxed to [facility] to be drawn on 9/28/20 prior to his 10/6 appt w/ Prince Galvez CNP.  Requested results be faxed back.     Future Appointments   Date Time Provider Department Center   9/29/2020  1:40 PM Dejon Downs MD [facility]   10/6/2020  2:30 PM Hayde Galvez APRN CNP [facility]   10/28/2020 12:45 PM Parish Newman MD [facility] CL[facility]   12/10/2020 12:00 AM MARQUEZ DCR2 [facility] PSA CLIN       Marizol Scales RN BSN  [facility]- [place], [place]  C.O.R.E. Clinic Care Coordinator  09/24/20, 3:06 PM

## 2020-09-24 NOTE — PROGRESS NOTES
ANTICOAGULATION MANAGEMENT     Patient Name:  Tyrel Rene  Date:  2020    ASSESSMENT /SUBJECTIVE:    Today's INR result of 1.8 is subtherapeutic. Goal INR of 2.5-3.5      Warfarin dose taken: Warfarin taken as instructed    Diet: No new diet changes affecting INR    Medication changes/ interactions: Amiodarone drip in [facility]    Previous INR: Subtherapeutic     S/S of bleeding or thromboembolism: No    New injury or illness: No    Upcoming surgery, procedure or cardioversion: No    Additional findings: Hospitalized -      PLAN:    Telephone call with Tyrel regarding INR result and instructed:     Warfarin Dosing Instructions: 2.5 mg tonight then continue your current warfarin dose of 2.5 mg on monday and 1.25 mg all other days    Instructed patient to follow up no later than: 20  Orders given to [facility] ([phone]) Spoke with Candy    Education provided: Monitoring for clotting signs and symptoms      Marko verbalizes understanding and agrees to warfarin dosing plan.    Instructed to call the Anticoagulation Clinic for any changes, questions or concerns. (#[phone])        Shahnaz Montenegro RN      OBJECTIVE:  Recent labs: (last 7 days)     20  2219 20  1555 20  0556 20  0556 20   INR 2.46* 2.11* 2.55* 2.42* 1.8*         No question data found.  Anticoagulation Summary  As of 2020    INR goal:   2.5-3.5   TTR:   47.5 % (11.6 mo)   INR used for dosin.8! (2020)   Warfarin maintenance plan:   2.5 mg (2.5 mg x 1) every Mon; 1.25 mg (2.5 mg x 0.5) all other days   Full warfarin instructions:   : 2.5 mg; Otherwise 2.5 mg every Mon; 1.25 mg all other days   Weekly warfarin total:   10 mg   Plan last modified:   Leatha Jewell, RN (2020)   Next INR check:   2020   Priority:   High   Target end date:   Indefinite    Indications    Long-term (current) use of anticoagulants [Z79.01] [Z79.01]  Cerebral artery occlusion  with cerebral infarction (HCC) [I63.50] [I63.50]  Cerebral infarction (H) [I63.9]             Anticoagulation Episode Summary     INR check location:       Preferred lab:   [facility]    Send INR reminders to:   HANS MCLEOD    Comments:   [facility] Patient goal should be 2.5-3.0 (5/13/20)      Anticoagulation Care Providers     Provider Role Specialty Phone number    Dejon Downs MD [facility] [phone]

## 2020-09-24 NOTE — TELEPHONE ENCOUNTER
PCP please advise:    1) Can follow up be changed to virtual?  2) Does pt need any follow up labs? If so, can these orders be placed and have a lab only on 09/28?  3) Should pt continue on 1/2 lisinopril at this time, or would you like to change this?

## 2020-09-24 NOTE — TELEPHONE ENCOUNTER
"  ED for acute condition Discharge Protocol    \"Hi, my name is Kristan Troy RN, a registered nurse, and I am calling from [facility]3].  I am calling to follow up and see how things are going for you after your recent emergency visit.\"    Tell me how you are doing now that you are home?\"     Doing ok. Has been resting. He has not been having chest pain since that time. No more syncopal episodes.         Discharge Instructions    \"Let's review your discharge instructions.  What is/are the follow-up recommendations?  Pt. Response: Follow up with primary and cardiologist. Both of these appointments are scheduled.    \"Has an appointment with your primary care provider been scheduled?\"  Yes. (confirm and remind to bring meds)    Medications    \"Tell me what changed about your medicines when you discharged?\"    Changes to digoxin and lisinopril.    \"What questions do you have about your medications?\"   Yes, please see note above    On warfarin: \"Were you given any recommendations for follow-up with the anticoagulation clinic?\" INR draw at home 09/24    Call Summary    \"What questions or concerns do you have about your recent visit and your follow-up care?\"     none    \"If you have questions or things don't continue to improve, we encourage you contact us through the main clinic number (give number).  Even if the clinic is not open, triage nurses are available 24/7 to help you.     We would like you to know that our clinic has extended hours (provide information).  We also have urgent care (provide details on closest location and hours/contact info)\"    \"Thank you for your time and take care!\"                "

## 2020-09-25 NOTE — TELEPHONE ENCOUNTER
Pt and wife calling back.    RN called back to pt and wife.  Relayed provider message.  Pt and wife states understanding.  No further action needed at this time.

## 2020-09-25 NOTE — TELEPHONE ENCOUNTER
Patient Contact    Attempt # 1    Was call answered?  No.  Left message on voicemail with information to call me back.    On call back:    -Make appt telephone  -No labs needed  -Keep on 1/2 tab lisinopril

## 2020-09-28 NOTE — PROGRESS NOTES
ANTICOAGULATION MANAGEMENT     Patient Name:  Tyrel Rene  Date:  2020    ASSESSMENT /SUBJECTIVE:    Today's INR result of 2.3 is subtherapeutic. Goal INR of 2.5-3.0      Warfarin dose taken: Warfarin taken as instructed    Diet: No new diet changes affecting INR    Medication changes/ interactions: No new medications/supplements affecting INR    Previous INR: Subtherapeutic, trending up appropriately    S/S of bleeding or thromboembolism: No    New injury or illness: No    Upcoming surgery, procedure or cardioversion: No    Additional findings: None      PLAN:    Telephone call with Tyrel regarding INR result and instructed:     Warfarin Dosing Instructions: Continue your current warfarin dose     2.5 mg every Mon; 1.25 mg all other days       Instructed patient to follow up no later than: 1 week  Orders given to [facility] ([phone]). Spoke to Candy    Education provided: Target INR goal and significance of current INR result      Marko verbalizes understanding and agrees to warfarin dosing plan.    Instructed to call the Anticoagulation Clinic for any changes, questions or concerns. (#[phone])        Velma Mckeon RN      OBJECTIVE:  Recent labs: (last 7 days)     20  0556 20   INR 2.42* 1.8* 2.3*  2.3*         No question data found.  Anticoagulation Summary  As of 2020    INR goal:   2.5-3.5   TTR:   47.5 % (11.6 mo)   INR used for dosin.3! (2020)   Warfarin maintenance plan:   2.5 mg (2.5 mg x 1) every Mon; 1.25 mg (2.5 mg x 0.5) all other days   Full warfarin instructions:   2.5 mg every Mon; 1.25 mg all other days   Weekly warfarin total:   10 mg   Plan last modified:   Leatha Jewell RN (2020)   Next INR check:   10/5/2020   Priority:   High   Target end date:   Indefinite    Indications    Long-term (current) use of anticoagulants [Z79.01] [Z79.01]  Cerebral artery occlusion with cerebral infarction (HCC) [I63.50]  [I63.50]  Cerebral infarction (H) [I63.9]             Anticoagulation Episode Summary     INR check location:       Preferred lab:   [facility]    Send INR reminders to:   HANS MCLEOD    Comments:   [facility] Patient goal should be 2.5-3.0 (5/13/20)      Anticoagulation Care Providers     Provider Role Specialty Phone number    Dejon Downs MD [facility] [phone]

## 2020-09-29 NOTE — PROGRESS NOTES
"Tyrel Rene is a 74 year old male who is being evaluated via a billable telephone visit.      The patient has been notified of following:     \"This telephone visit will be conducted via a call between you and your physician/provider. We have fouYesd that certain health care needs can be provided without the need for a physical exam.  This service lets us provide the care you need with a short phone conversation.  If a prescription is necessary we can send it directly to your pharmacy.  If lab work is needed we can place an order for that and you can then stop by our lab to have the test done at a later time.    Telephone visits are billed at different rates depending on your insurance coverage. During this emergency period, for some insurers they may be billed the same as an in-person visit.  Please reach out to your insurance provider with any questions.    If during the course of the call the physician/provider feels a telephone visit is not appropriate, you will not be charged for this service.\"    Patient has given verbal consent for Telephone visit?  Yes    What phone number would you like to be contacted at? [phone]    How would you like to obtain your AVS? Sally Wilson     Tyrel Rene is a 74 year old male who presents via phone visit today for the following health issues:    [facility]      Hospital Follow-up Visit:    Hospital/Nursing Home/IP Rehab Facility: [facility]  Date of Admission: 9/19/20  Date of Discharge: 9/22/20  Reason(s) for Admission: syncope      Was your hospitalization related to COVID-19? No   Problems taking medications regularly:  None  Medication changes since discharge: None  Problems adhering to non-medication therapy:  None    Summary of hospitalization:  [facility] discharge summary reviewed  Diagnostic Tests/Treatments reviewed.  Follow up needed: none  Other Healthcare Providers Involved in Patient s Care:         Specialist appointment - " [facility]  Update since discharge: improved.       Post Discharge Medication Reconciliation: discharge medications reconciled, continue medications without change.  Plan of care communicated with patient                       Review of Systems   Constitutional, HEENT, cardiovascular, pulmonary, gi and gu systems are negative, except as otherwise noted.       Objective   Vitals - Patient Reported  Systolic (Patient Reported): 117  Diastolic (Patient Reported): 72  Weight (Patient Reported): 78 kg (172 lb)  Pulse (Patient Reported): 65      Vitals:  No vitals were obtained today due to virtual visit.    healthy, alert and no distress  PSYCH: Alert and oriented times 3; coherent speech, normal   rate and volume, able to articulate logical thoughts, able   to abstract reason, no tangential thoughts, no hallucinations   or delusions  His affect is normal  RESP: No cough, no audible wheezing, able to talk in full sentences  Remainder of exam unable to be completed due to telephone visits            Assessment/Plan:    Assessment & Plan     Vitamin D deficiency    - Vitamin D Deficiency    Syncope, unspecified syncope type      Coronary artery disease involving native heart without angina pectoris, unspecified vessel or lesion type      Ventricular tachycardia (H)           Patient Instructions   The patient has 3 upcoming appointments at [facility].  He has not had any further syncopal episodes.  He is feeling well.  A question on his vitamin D came up at his hospitalization.  I cannot find anywhere where we have ever checked a vitamin D level on him.  I did put an order in for his next blood draw to get a vitamin D level checked.  He buys that over-the-counter so I am not sure if it was actually prescribed for him or if someone just suggested it would be prudent for him to take a vitamin D tablet.  He will check back in January for his  annual wellness exam.  He has attempted video visits in the past and would prefer that milieu.  If that is unsuccessful we would have to make a face-to-face visit to accomplish his annual wellness exam.      Return in about 15 weeks (around 1/12/2021).    Dejon Downs MD  [facility]    Phone call duration:  23 minutes

## 2020-09-29 NOTE — PROGRESS NOTES
BMP order was faxed on 9/24/20 to be drawn on 9/28 w/ his scheduled INR.  Called [facility] to request results be faxed back.  They report they did not draw BMP.      BMP order re-faxed to [facility] to be drawn on 10/5/20 prior to his 10/6/20 appt w/ Prince Galvez CNP.  Requested results be faxed back.     Future Appointments   Date Time Provider Department Center   9/29/2020  1:40 PM Dejon Downs MD [facility]   10/6/2020  2:30 PM Hayde Galvez APRN CNP [facility] PSA CLIN   10/28/2020 12:45 PM Parish Newman MD [facility] CL[facility]   12/10/2020 12:00 AM MARQUEZ DCR2 [facility] CLIN       Marizol Scales RN BSN  [facility] [place], [place]  C.O.R.E. Clinic Care Coordinator  09/29/20, 11:23 AM

## 2020-10-05 NOTE — PROGRESS NOTES
"Tyrel Rene is a 74 year old male who is being evaluated via a billable video visit.  This visit is being conducted as a virtual visit due to the emphasis on mitigation of the COVID-19 virus pandemic. The clinician has decided that the risk of an in-office visit outweighs the benefit for this patient.     The patient has been notified of following:   \"This video visit will be conducted via a call between you and your physician/provider. We have found that certain health care needs can be provided without the need for an in-person physical exam.  This service lets us provide the care you need with a video conversation.  If a prescription is necessary we can send it directly to your pharmacy.  If lab work is needed we can place an order for that and you can then stop by our lab to have the test done at a later time.  If during the course of the call the physician/provider feels a video visit is not appropriate, you will not be charged for this service.\"       Video did not work.     I have reviewed and updated the patient's Past Medical History, Social History, Family History and Medication List.    Review Of Systems  Skin: negative  Eyes: negative  Ears/Nose/Throat: negative  Respiratory: SOB/PRINCE w/ADLs   Cardiovascular: Fatigue, dizziness   Gastrointestinal: negative  Genitourinary: negative  Musculoskeletal: negative  Neurologic: negative  Psychiatric: negative  Hematologic/Lymphatic/Immunologic: negative  Endocrine: negative  (ROS TAKEN BY Catarino Singer LPN PRIOR TO VISIT)    Vital Signs:   Wt: 169.4#  BP: 139/86 - sitting   BP: 146/89 - standing   HR: 65    Retaking:  BP sitting 114/ 66 sitting HR 71 bpm  BP standing     BP's at home over last 2 weeks.   /71;137/82, 106/62    ALLERGIES  Aspirin and Nystatin    MEDICATIONS  Current Outpatient Medications   Medication Sig Dispense Refill     acetaminophen (TYLENOL) 500 MG tablet Take 1,000 mg by mouth every 8 hours as needed for mild pain       " amiodarone (PACERONE) 200 MG tablet Take 1 tablet (200 mg) by mouth daily 90 tablet 1     ASPIRIN NOT PRESCRIBED (INTENTIONAL) continuous prn for other Please choose reason not prescribed, below       carvedilol (COREG) 6.25 MG tablet Take 1 tablet (6.25 mg) by mouth 2 times daily (with meals) 180 tablet 0     clopidogrel (PLAVIX) 75 MG tablet Take 1 tablet (75 mg) by mouth daily 90 tablet 3     digoxin (LANOXIN) 125 MCG tablet Take 1 tablet (125 mcg) by mouth six times a week Do not take Sundays 90 tablet 3     famotidine (PEPCID) 20 MG tablet Take 1 tablet (20 mg) by mouth 2 times daily 180 tablet 3     ipratropium (ATROVENT) 0.03 % nasal spray Spray 2 sprays into both nostrils every 12 hours 30 mL 5     lisinopril (ZESTRIL) 5 MG tablet Take 0.5 tablets (2.5 mg) by mouth At Bedtime 30 tablet 0     Multiple Vitamins-Minerals (PRESERVISION AREDS 2 PO) Take 1 capsule by mouth 2 times daily       nitroGLYcerin (NITROSTAT) 0.4 MG sublingual tablet DISSOLVE 1 TABLET UNDER THE TONGUE EVERY 5 MINUTES AS NEEDED FOR CHEST PAIN 25 tablet 0     rosuvastatin (CRESTOR) 20 MG tablet Take 1 tablet (20 mg) by mouth daily 90 tablet 0     sildenafil (VIAGRA) 100 MG tablet Take 1 tablet (100 mg) by mouth daily as needed Take 30 min to 4 hours before intercourse.  Never use with nitroglycerin, terazosin or doxazosin. 16 tablet 3     Vitamin D, Cholecalciferol, 1000 UNITS TABS Take 1,000 mg by mouth daily        warfarin ANTICOAGULANT (COUMADIN) 1 MG tablet Take 1 & 1/2 tablets (1.5 mg) every day or as directed. 135 tablet 0     warfarin ANTICOAGULANT (COUMADIN) 2.5 MG tablet Take 2.5 mg on Mondays and 1.25mg all other days or as directed by the anticoagulation clinic 75 tablet 3       CARDIOLOGY CLINIC / C.O.R.E. CLINIC VISIT  (Heart Failure Specialty)    This visit was completed via telephone due to COVID-19 precautions.    I had the opportunity to speak to Marko over the phone for a cardiology clinic visit.  Due to the COVID-19  pandemic, this was done as a telephone.     HISTORY OF PRESENT ILLNESS:    It is my pleasure seeing . Marko Rene, a delightful 74-year-old male, who follows in our clinic for years.    He follows with Dr. Newman, Dr. Costello and myself for his cardiomyopathy/CHF.     Marko suffered a large anterior MI many years ago.  EF has been around 25-35% over the years.  He underwent a 3-vessel bypass in 2012.  Only a few days days after his bypass he developed sustained fast ventricular tachycardia.  He underwent implantation of a [employer] ICD before hospital discharge.     The patient underwent atrial flutter ablation in 2011 which was complicated by a CVA.  In 2013 the patient underwent upgrade of his ICD to a CRT-D.  Around that time he developed atrial fibrillation which later became permanent.  Over the past few years, he has had complete AV block requiring 100% ventricular pacing.     He received an ICD shock in 11/2017 (for fast VT with syncope) and amiodarone was started. Unfortunately, he later developed VF storm and received 7 ICD shocks in 11/2018.  He underwent catheter ablation of VT in January 2019.  He had inducible fast VT at 220 bpm.  Substrate modification was performed mostly in the basal anterior LV.  He remained VT-free and amiodarone dose was decreased to 200 mg 6 days/week in 05/2020.  He had recurrence of VT with syncope and 2 ICD shocks in 07/2020.  Amiodarone was increased again to 200 mg daily.     Since early September we have recorded more than his usual nonsustained VT through his device.  Then on Saturday, 9/19/2020 he was sitting at his recliner when he lost consciousness.  His wife stated that he then jerked like as he had received a shock from his ICD.  He was out for less than 1 minute.     In the emergency room ICD interrogation did not show sustained ventricular arrhythmia or ICD discharge. However he did have runs of wide QRS tachycardia recorded on ECG.     9/20 the patient has had  multiple runs of wide QRS tachycardia and he was started on IV amiodarone.  This morning he still having nearly incessant runs of WCT despite IV amiodarone.  The rate of this tachycardia was about 120 bpm and the patient is unaware of these runs.     Marko lives with his wife in [place].  He is a non-smoker nondrinker.  He has not had chest pain recently. Of note, he underwent PCI of OM\2 in August 2020 in hopes to prevent further VT via complete revascularization.    Per Dr. Costello, the syncopal episode etiology on 9/19 remains unclear.  see his note for complete explanation.One potential explanation is prolonged (slow) VT at a rate below the previously set detection rate of 150 bpm, leading to cerebral hypoperfusion.     Digoxin was decreased from 7 days a week to 6 days/week due to dig level being somewhat high at 1.5 on admission.    Metoprolol back to his PTA carvedilol.  Prior to discharge Lisinopril was decreased to 2.5mg daily.     Today, he follows up. No more syncopal spells. BP as noted above. Denies chest pain, chest pressure. Denies ICD shocks since the hospital.         DIAGNOSTIC STUDIES:  Labs: Sodium 134, potassium 4.4, creatinine 1.26, INR 2.55  Echocardiogram: EF 25 to 30% with anterior wall motion abnormality, 1+ MR                  IMPRESSION:  Delightful 74-year-old male with a long history of ischemic cardiomyopathy and recurrent ventricular tachycardia.  He was admitted this time after a syncopal event at home while sitting.  ICD interrogation showed no ventricular tachycardia >150 bpm or ICD therapy.  The etiology of the event remains unclear.       While hospitalized, the patient was noted to have multiple runs of slow ventricular tachycardia in the 120s to 130s.  Device changes were made.         1. Syncope in the sitting position, unclear etiology.  One potential explanation is prolonged (slow) VT at a rate below the previously set detection rate of 150 bpm, leading to cerebral  hypoperfusion.  2. ICD proarrhythmia.  Slow VT stopped immediately after the rate smoothing algorithm was inactivated.  3. Ischemic cardiomyopathy, chronic systolic CHF.  Digoxin was decreased from 7 days a week to 6 days/week due to dig level being somewhat high at 1.5 on admission.  Metoprolol was changed back to his carvedilol.  Lisinopril was decrease from 5 mg to 2.5 mg because of daily symptomatic orthostatic lightheadedness.  RECOMMENDATIONS:    1.Today we discussed trying Entresto. I spoke with Dr. Newman and he suggested to consider starting it if BP > 105 systolic. BP's are as above.   I ordered Entresto and asked Marko to call when he receives it in the mail and we can give him instructions.   The plan will be to stop lisinopril for 36 hours and start Entresto 1/2 tablet at HS.   Check BP three times per day. Call if BP < 90 or symptomatic    2. I will ask the [facility] nurse to let me know when the patient calls that he received the medication. Plan will be to start it during the week, not on a weekend.   3. Based on when he starts it, a bmp will need to be started and a follow up appt will need to be set up.               Thank you for the opportunity to be involved in this very pleasant patient's care please feel to contact me with any questions.          Hayde BERRY [facility]  Pager: [phone]  Text Page  (7:30am - 4pm M-F)

## 2020-10-05 NOTE — PROGRESS NOTES
ANTICOAGULATION MANAGEMENT     Patient Name:  Tyrel Rene  Date:  10/5/2020    ASSESSMENT /SUBJECTIVE:    Today's INR result of 2.38 is subtherapeutic. Goal INR of 2.5-3.0      Warfarin dose taken: Warfarin taken as instructed    Diet: No new diet changes affecting INR    Medication changes/ interactions: No new medications/supplements affecting INR    Previous INR: Subtherapeutic     S/S of bleeding or thromboembolism: No    New injury or illness: No    Upcoming surgery, procedure or cardioversion: No    Additional findings: INR continues to remain slightly sub on current dose. If we increase it by the smallest amount possible (1.25 mg) it would be a 12.5% increase which would likely make him supra therapeutic. We will switch his tablet strength to 1 mg tablets. His daughter will pick it up today for him. He will start taking 1 & 1/2 tabs (1.5 mg) daily. A weekly increase of 0.5 mg (5%). Recheck in 1 week by in home labs.      PLAN:    Telephone call with Tyrel regarding INR result and instructed:     Warfarin Dosing Instructions: Change your warfarin dose to 1.5 mg daily    Instructed patient to follow up no later than: 1 week  Orders given to [facility] ([phone])    Education provided: None required      Marko verbalizes understanding and agrees to warfarin dosing plan.    Instructed to call the Anticoagulation Clinic for any changes, questions or concerns. (#[phone])        Sheila Cid RN      OBJECTIVE:  Recent labs: (last 7 days)     10/05/20  1100   INR 2.38*         No question data found.  Anticoagulation Summary  As of 10/5/2020    INR goal:  2.5-3.5   TTR:  47.4 % (11.6 mo)   INR used for dosin.38 (10/5/2020)   Warfarin maintenance plan:  1.5 mg (1 mg x 1.5) every day   Full warfarin instructions:  1.5 mg every day   Weekly warfarin total:  10.5 mg   Plan last modified:  Sheila Cid, RN (10/5/2020)   Next INR check:  10/12/2020   Priority:  High   Target end date:   Indefinite    Indications    Long-term (current) use of anticoagulants [Z79.01] [Z79.01]  Cerebral artery occlusion with cerebral infarction (HCC) [I63.50] [I63.50]  Cerebral infarction (H) [I63.9]             Anticoagulation Episode Summary     INR check location:      Preferred lab:  EXTERNAL LAB    Send INR reminders to:  HANS MCLEOD    Comments:  [facility] Patient goal should be 2.5-3.0 (5/13/20)      Anticoagulation Care Providers     Provider Role Specialty Phone number    Dejon Downs MD [facility] [phone]

## 2020-10-05 NOTE — PROGRESS NOTES
Lab results received via fax.  Copy made to be entered into chart.    CMP, INR collected on 10/5/2020.     Sodium  136  Potassium  4.1  Chloride  105  CO2  30  Anion Gap  1 (L)  Glucose  75  BUN  24  Creatinine  1.64 (H)  eGFR, Af Amer  40  eGDF, non Af Amer  46  Calcium  8.2 (L)  Bilirubin, total  0.5  Albumin  3.3 (L)  Protein, total  7.0  Alk Phosphatase  75  ALT  29  AST  24    INR  2.38 (H)    Pt has [facility] appt tomorrow    Future Appointments   Date Time Provider Department Center   10/6/2020  2:30 PM Hayde Galvez APRN CNP [facility]   10/28/2020 12:45 PM Parish Newman MD [facility]   12/10/2020 12:00 AM MARQUEZ DCR2 [facility]         Marizol Scales RN BSN  [place], [place]  C.O.R.E. Clinic Care Coordinator  10/05/20, 3:15 PM

## 2020-10-06 NOTE — PATIENT INSTRUCTIONS
Call [facility] nurse for any questions or concerns Mon-Fri 8am-4pm:                                                [phone]                                For concerns after hours: [phone]    Medication changes:   1. Sent Entresto prescription, please let us know when you receive it.      Plan from today:   1. Instructs to follow.

## 2020-10-06 NOTE — PROGRESS NOTES
Calling to see pt has a deductible in addition to medication cost.   Spoke to Sheryl at [facility]. She was unable to tell me if he has a deductible. She advised me to call the pharmacy to get price check.     Call to [facility] for price check on $252 for 90 day supply.       Call to pt and gave him cost for 90 day supply.   Did let pt know cost was higher due to deductible.     Pt ok with cost and will go .     Sheila Strickland RN 3:45 PM 10/06/20

## 2020-10-06 NOTE — LETTER
"10/6/2020    Dejon Downs MD  [address]  [place] [address]    RE: Tyrel Rene       Dear Colleague,    I had the pleasure of seeing Tyrel Rene in the [facility].    Tyrel Rene is a 74 year old male who is being evaluated via a billable video visit.  This visit is being conducted as a virtual visit due to the emphasis on mitigation of the COVID-19 virus pandemic. The clinician has decided that the risk of an in-office visit outweighs the benefit for this patient.     The patient has been notified of following:   \"This video visit will be conducted via a call between you and your physician/provider. We have found that certain health care needs can be provided without the need for an in-person physical exam.  This service lets us provide the care you need with a video conversation.  If a prescription is necessary we can send it directly to your pharmacy.  If lab work is needed we can place an order for that and you can then stop by our lab to have the test done at a later time.  If during the course of the call the physician/provider feels a video visit is not appropriate, you will not be charged for this service.\"       Video did not work.     I have reviewed and updated the patient's Past Medical History, Social History, Family History and Medication List.    Review Of Systems  Skin: negative  Eyes: negative  Ears/Nose/Throat: negative  Respiratory: SOB/PRINCE w/ADLs   Cardiovascular: Fatigue, dizziness   Gastrointestinal: negative  Genitourinary: negative  Musculoskeletal: negative  Neurologic: negative  Psychiatric: negative  Hematologic/Lymphatic/Immunologic: negative  Endocrine: negative  (ROS TAKEN BY Catarino Singer LPN PRIOR TO VISIT)    Vital Signs:   Wt: 169.4#  BP: 139/86 - sitting   BP: 146/89 - standing   HR: 65    Retaking:  BP sitting 114/ 66 sitting HR 71 bpm  BP standing     BP's at home over last 2 weeks.   /71;137/82, " 106/62    ALLERGIES  Aspirin and Nystatin    MEDICATIONS  Current Outpatient Medications   Medication Sig Dispense Refill     acetaminophen (TYLENOL) 500 MG tablet Take 1,000 mg by mouth every 8 hours as needed for mild pain       amiodarone (PACERONE) 200 MG tablet Take 1 tablet (200 mg) by mouth daily 90 tablet 1     ASPIRIN NOT PRESCRIBED (INTENTIONAL) continuous prn for other Please choose reason not prescribed, below       carvedilol (COREG) 6.25 MG tablet Take 1 tablet (6.25 mg) by mouth 2 times daily (with meals) 180 tablet 0     clopidogrel (PLAVIX) 75 MG tablet Take 1 tablet (75 mg) by mouth daily 90 tablet 3     digoxin (LANOXIN) 125 MCG tablet Take 1 tablet (125 mcg) by mouth six times a week Do not take Sundays 90 tablet 3     famotidine (PEPCID) 20 MG tablet Take 1 tablet (20 mg) by mouth 2 times daily 180 tablet 3     ipratropium (ATROVENT) 0.03 % nasal spray Spray 2 sprays into both nostrils every 12 hours 30 mL 5     lisinopril (ZESTRIL) 5 MG tablet Take 0.5 tablets (2.5 mg) by mouth At Bedtime 30 tablet 0     Multiple Vitamins-Minerals (PRESERVISION AREDS 2 PO) Take 1 capsule by mouth 2 times daily       nitroGLYcerin (NITROSTAT) 0.4 MG sublingual tablet DISSOLVE 1 TABLET UNDER THE TONGUE EVERY 5 MINUTES AS NEEDED FOR CHEST PAIN 25 tablet 0     rosuvastatin (CRESTOR) 20 MG tablet Take 1 tablet (20 mg) by mouth daily 90 tablet 0     sildenafil (VIAGRA) 100 MG tablet Take 1 tablet (100 mg) by mouth daily as needed Take 30 min to 4 hours before intercourse.  Never use with nitroglycerin, terazosin or doxazosin. 16 tablet 3     Vitamin D, Cholecalciferol, 1000 UNITS TABS Take 1,000 mg by mouth daily        warfarin ANTICOAGULANT (COUMADIN) 1 MG tablet Take 1 & 1/2 tablets (1.5 mg) every day or as directed. 135 tablet 0     warfarin ANTICOAGULANT (COUMADIN) 2.5 MG tablet Take 2.5 mg on Mondays and 1.25mg all other days or as directed by the anticoagulation clinic 75 tablet 3       CARDIOLOGY CLINIC /  C.O.R.E. CLINIC VISIT  (Heart Failure Specialty)    This visit was completed via telephone due to COVID-19 precautions.    I had the opportunity to speak to Marko over the phone for a cardiology clinic visit.  Due to the COVID-19 pandemic, this was done as a telephone.     HISTORY OF PRESENT ILLNESS:    It is my pleasure seeing Mr. Marko Rene, a delightful 74-year-old male, who follows in our clinic for years.    He follows with Dr. Newman, Dr. Costello and myself for his cardiomyopathy/CHF.     Marko suffered a large anterior MI many years ago.  EF has been around 25-35% over the years.  He underwent a 3-vessel bypass in 2012.  Only a few days days after his bypass he developed sustained fast ventricular tachycardia.  He underwent implantation of a [employer] ICD before hospital discharge.     The patient underwent atrial flutter ablation in 2011 which was complicated by a CVA.  In 2013 the patient underwent upgrade of his ICD to a CRT-D.  Around that time he developed atrial fibrillation which later became permanent.  Over the past few years, he has had complete AV block requiring 100% ventricular pacing.     He received an ICD shock in 11/2017 (for fast VT with syncope) and amiodarone was started. Unfortunately, he later developed VF storm and received 7 ICD shocks in 11/2018.  He underwent catheter ablation of VT in January 2019.  He had inducible fast VT at 220 bpm.  Substrate modification was performed mostly in the basal anterior LV.  He remained VT-free and amiodarone dose was decreased to 200 mg 6 days/week in 05/2020.  He had recurrence of VT with syncope and 2 ICD shocks in 07/2020.  Amiodarone was increased again to 200 mg daily.     Since early September we have recorded more than his usual nonsustained VT through his device.  Then on Saturday, 9/19/2020 he was sitting at his recliner when he lost consciousness.  His wife stated that he then jerked like as he had received a shock from his ICD.  He was out  for less than 1 minute.     In the emergency room ICD interrogation did not show sustained ventricular arrhythmia or ICD discharge. However he did have runs of wide QRS tachycardia recorded on ECG.     9/20 the patient has had multiple runs of wide QRS tachycardia and he was started on IV amiodarone.  This morning he still having nearly incessant runs of WCT despite IV amiodarone.  The rate of this tachycardia was about 120 bpm and the patient is unaware of these runs.     Marko lives with his wife in [place].  He is a non-smoker nondrinker.  He has not had chest pain recently. Of note, he underwent PCI of OM\2 in August 2020 in hopes to prevent further VT via complete revascularization.    Per Dr. Costello, the syncopal episode etiology on 9/19 remains unclear.  see his note for complete explanation.One potential explanation is prolonged (slow) VT at a rate below the previously set detection rate of 150 bpm, leading to cerebral hypoperfusion.     Digoxin was decreased from 7 days a week to 6 days/week due to dig level being somewhat high at 1.5 on admission.    Metoprolol back to his PTA carvedilol.  Prior to discharge Lisinopril was decreased to 2.5mg daily.     Today, he follows up. No more syncopal spells. BP as noted above. Denies chest pain, chest pressure. Denies ICD shocks since the hospital.         DIAGNOSTIC STUDIES:  Labs: Sodium 134, potassium 4.4, creatinine 1.26, INR 2.55  Echocardiogram: EF 25 to 30% with anterior wall motion abnormality, 1+ MR          IMPRESSION:  Delightful 74-year-old male with a long history of ischemic cardiomyopathy and recurrent ventricular tachycardia.  He was admitted this time after a syncopal event at home while sitting.  ICD interrogation showed no ventricular tachycardia >150 bpm or ICD therapy.  The etiology of the event remains unclear.       While hospitalized, the patient was noted to have multiple runs of slow ventricular tachycardia in the 120s to 130s.  Device  changes were made.         1. Syncope in the sitting position, unclear etiology.  One potential explanation is prolonged (slow) VT at a rate below the previously set detection rate of 150 bpm, leading to cerebral hypoperfusion.  2. ICD proarrhythmia.  Slow VT stopped immediately after the rate smoothing algorithm was inactivated.  3. Ischemic cardiomyopathy, chronic systolic CHF.  Digoxin was decreased from 7 days a week to 6 days/week due to dig level being somewhat high at 1.5 on admission.  Metoprolol was changed back to his carvedilol.  Lisinopril was decrease from 5 mg to 2.5 mg because of daily symptomatic orthostatic lightheadedness.  RECOMMENDATIONS:    1.Today we discussed trying Entresto. I spoke with Dr. Newman and he suggested to consider starting it if BP > 105 systolic. BP's are as above.   I ordered Entresto and asked Marko to call when he receives it in the mail and we can give him instructions.   The plan will be to stop lisinopril for 36 hours and start Entresto 1/2 tablet at HS.   Check BP three times per day. Call if BP < 90 or symptomatic    2. I will ask the [facility] nurse to let me know when the patient calls that he received the medication. Plan will be to start it during the week, not on a weekend.   3. Based on when he starts it, a bmp will need to be started and a follow up appt will need to be set up.       Thank you for the opportunity to be involved in this very pleasant patient's care please feel to contact me with any questions.      Hayde BERRY CNP   [facility]  Pager: [phone]  Text Page  (7:30am - 4pm M-F)       Thank you for allowing me to participate in the care of your patient.    Sincerely,     JAMAL Christie CNP     [facility]

## 2020-10-07 NOTE — PROGRESS NOTES
Called pharm, [facility], to let the pharmacist know they will be a be prescription for Entresto for pt    Sheila Strickland, AGAPITO 1:22 PM 10/07/20

## 2020-10-08 NOTE — PROGRESS NOTES
Entresto instructions:  1. Take lisinopril 10/10 at night   2. Stop lisinopril on 10/11.   3. Start Entresto 10/12 at night 1/2 tablet.   4. Continue to take Entresto 1/2 tablet daily at night  5. Take home BP three times per day and record.  6. Call [facility] RN on BP readings on 10/15.   7. If symptoms of throat closing or any allergic reaction stop immediately.     Discussed with Patient and wife.     Hayde BERRY, CNP

## 2020-10-08 NOTE — PROGRESS NOTES
Wife called to let us know they have picked up Entresto but have not started it.     Will update Prince Strickland, RN 4:08 PM 10/08/20

## 2020-10-09 NOTE — PROGRESS NOTES
Called and spoke w/ Marko about Entresto instructions.  Marko plans on stopping his lisinopril on Sat 10/10.  Starting Enstresto 10/12 Mon.  He wants to take at dinner time (around 6 pm) - 1/2 tablet.  Instructed him to call CORE RN line if become lightheaded, dizzy, drop in BP, SOB or other s/s.  He is planning to check BP's 3 x/day and will write down.  Will connect with Marko again on 10/15/20 to see how he is feeling and get his BP readings after starting the Entresto.     Future Appointments   Date Time Provider Department Center   10/28/2020 12:45 PM Parish Newman MD [facility] PSA CLIN   12/10/2020 12:00 AM MARQUEZ DCR2 [facility] PSA CLIN       Marizol Scales RN BSN   [facility] [place], [place]  C.O.RBINDU. Clinic Care Coordinator  10/09/20, 11:31 AM

## 2020-10-12 NOTE — PROGRESS NOTES
ANTICOAGULATION MANAGEMENT     Patient Name:  Tyrel Rene  Date:  10/12/2020    ASSESSMENT /SUBJECTIVE:    Today's INR result of 2.87 is therapeutic. Goal INR of 2.5-3.5      Warfarin dose taken: Warfarin taken as instructed    Diet: No new diet changes affecting INR    Medication changes/ interactions: No new medications/supplements affecting INR    Previous INR: Subtherapeutic     S/S of bleeding or thromboembolism: No    New injury or illness: No    Upcoming surgery, procedure or cardioversion: No    Additional findings: Tablet sized changed from 2.5 mg to 1 mg      PLAN:    Telephone call with Tyrel/Noris wife regarding INR result and instructed:     Warfarin Dosing Instructions: Continue your current warfarin dose 1.5 mg daily    Instructed patient to follow up no later than: 2 weeks  Orders given to [facility] ([phone]) Spoke with Candy and changed order to Finger Stick INR    Education provided: Monitoring for bleeding signs and symptoms and Monitoring for clotting signs and symptoms      Marko/Noris Wife verbalizes understanding and agrees to warfarin dosing plan.     Instructed to call the Anticoagulation Clinic for any changes, questions or concerns. (#[phone])        Radha Pierce RN      OBJECTIVE:  Recent labs: (last 7 days)     10/12/20  1030   INR 2.87*         No question data found.  Anticoagulation Summary  As of 10/12/2020    INR goal:  2.5-3.5   TTR:  49.0 % (11.6 mo)   INR used for dosin.87 (10/12/2020)   Warfarin maintenance plan:  1.5 mg (1 mg x 1.5) every day   Full warfarin instructions:  1.5 mg every day   Weekly warfarin total:  10.5 mg   No change documented:  Radha Pierce RN   Plan last modified:  Sheila Cid RN (10/5/2020)   Next INR check:  10/26/2020   Priority:  High   Target end date:  Indefinite    Indications    Long-term (current) use of anticoagulants [Z79.01] [Z79.01]  Cerebral artery occlusion with cerebral infarction (HCC) [I63.50]  [I63.50]  Cerebral infarction (H) [I63.9]             Anticoagulation Episode Summary     INR check location:      Preferred lab:  EXTERNAL LAB    Send INR reminders to:  HANS MCLEOD    Comments:  [facility] Patient goal should be 2.5-3.0 (5/13/20)      Anticoagulation Care Providers     Provider Role Specialty Phone number    Dejon Downs MD [facility] [phone]

## 2020-10-14 NOTE — PROGRESS NOTES
Called Mr. Rene, doing well on Entresto. /70 range. Instructed him to continue. Set up telephone visit for 10/20 at 1:50, messaged scheduling. Hayde BERRY, CNP

## 2020-10-15 NOTE — PROGRESS NOTES
Pt is scheduled for follow up appt.    Future Appointments   Date Time Provider Department Center   10/20/2020  1:50 PM Hayde Galvez APRN CNP [facility] PSA CLIN   10/28/2020 12:45 PM Parish Newman MD [facility] PSA CLIN   12/10/2020 12:00 AM MARQUEZ DCR2 [facility] PSA CLIN     Marizol Scales RN BSN   [place], [place]  C.O.R.E. Clinic Care Coordinator  10/15/20, 8:16 AM

## 2020-10-15 NOTE — PROGRESS NOTES
Chart reviewed for upcoming CORE visit w/Prnice Galvez NP on 10/20/20. Pt started Entresto 10/12/20. No BMP ordered. Spoke w/[facility] ([phone]) to coordinate lab draw prior to CORE visit. Order faxed to [phone].     Catarino Singer LPN  [facility].O.R.[facility]  [phone]

## 2020-10-16 NOTE — PROGRESS NOTES
Lab results received via fax.  BMP collected on 10/16/20 by [facility].  Already entered into chart.       Marizol Scales RN BSN  [place], [place]  DAYSI. Clinic Care Coordinator  10/16/20, 3:27 PM

## 2020-10-20 NOTE — LETTER
"10/20/2020    Dejon Downs MD  [address]  [facility] [address]    RE: Tyrel Rene       Dear Colleague,    I had the pleasure of seeing Tyrel Rene in the [facility].     TELEPHONE VISIT    Tyrel Rene is a 75 year old male who is being evaluated via a billable telephone visit.      The patient has been notified of following:     \"This telephone visit will be conducted via a call between you and your physician/provider. Given concern for spread of COVID 19 we are minimizing in person clinic visits when possible. We have found that certain health care needs can be provided without the need for a physical exam.  This service lets us provide the care you need with a short phone conversation.  If a prescription is necessary we can send it directly to your pharmacy.  If lab work is needed we can place an order for that and you can then stop by our lab to have the test done at a later time. If during the course of the call the physician/provider feels a telephone visit is not appropriate, you will not be charged for this service.\"       I have reviewed and updated the patient's Past Medical History, Social History, Family History and Medication List.      MEDICATIONS:  Current Outpatient Medications   Medication Sig Dispense Refill     acetaminophen (TYLENOL) 500 MG tablet Take 1,000 mg by mouth every 8 hours as needed for mild pain       amiodarone (PACERONE) 200 MG tablet Take 1 tablet (200 mg) by mouth daily 90 tablet 1     ASPIRIN NOT PRESCRIBED (INTENTIONAL) continuous prn for other Please choose reason not prescribed, below       carvedilol (COREG) 6.25 MG tablet Take 1 tablet (6.25 mg) by mouth 2 times daily (with meals) 180 tablet 0     clopidogrel (PLAVIX) 75 MG tablet Take 1 tablet (75 mg) by mouth daily 90 tablet 3     digoxin (LANOXIN) 125 MCG tablet Take 1 tablet (125 mcg) by mouth six times a week Do not take Sundays 90 tablet 3     famotidine (PEPCID) " 20 MG tablet Take 1 tablet (20 mg) by mouth 2 times daily 180 tablet 3     ipratropium (ATROVENT) 0.03 % nasal spray Spray 2 sprays into both nostrils every 12 hours 30 mL 5     Multiple Vitamins-Minerals (PRESERVISION AREDS 2 PO) Take 1 capsule by mouth 2 times daily       nitroGLYcerin (NITROSTAT) 0.4 MG sublingual tablet DISSOLVE 1 TABLET UNDER THE TONGUE EVERY 5 MINUTES AS NEEDED FOR CHEST PAIN (Patient not taking: Reported on 10/6/2020) 25 tablet 0     rosuvastatin (CRESTOR) 20 MG tablet Take 1 tablet (20 mg) by mouth daily 90 tablet 0     sacubitril-valsartan (ENTRESTO) 24-26 MG per tablet 1/2 tablet at hs 180 tablet 1     sildenafil (VIAGRA) 100 MG tablet Take 1 tablet (100 mg) by mouth daily as needed Take 30 min to 4 hours before intercourse.  Never use with nitroglycerin, terazosin or doxazosin. (Patient not taking: Reported on 10/6/2020) 16 tablet 3     Vitamin D, Cholecalciferol, 1000 UNITS TABS Take 1,000 mg by mouth daily        warfarin ANTICOAGULANT (COUMADIN) 1 MG tablet Take 1 & 1/2 tablets (1.5 mg) every day or as directed. 135 tablet 0     warfarin ANTICOAGULANT (COUMADIN) 2.5 MG tablet Take 2.5 mg on Mondays and 1.25mg all other days or as directed by the anticoagulation clinic 75 tablet 3       ALLERGIES  Aspirin and Nystatin    Review Of Systems  SOB/PRINCE w/minimal ALDs.   Dizziness, poor balance (no falls).   Fatigue, weakness - has been sedentary since September admission.     Vital Signs:   Wt: 170.6#  BP: 113/67  HR: 64  (ROS/VS TAKEN BY Catarino Singer LPN PRIOR TO VISIT)    Provider: Patient verbally consented to the phone service today: yes      CARDIOLOGY CLINIC / C.O.R.E. CLINIC VISIT  (Heart Failure Specialty)     This visit was completed via telephone due to COVID-19 precautions.     I had the opportunity to speak to Marko over the phone for a cardiology clinic visit.  Due to the COVID-19 pandemic, this was done as a telephone.      HISTORY OF PRESENT ILLNESS:    It is my pleasure  speaking with Mr. Marko Rene, a delightful 74-year-old male, who follows in our clinic for years.  He follows with Dr. Newman, Dr. Costello and myself for his cardiomyopathy/CHF. Marko lives with his wife in [place].  He is a non-smoker nondrinker.       Marko suffered a large anterior MI many years ago.  EF has been around 25-35% over the years.  He underwent a 3-vessel bypass in 2012.  Only a few days after his bypass he developed sustained fast ventricular tachycardia.  He underwent implantation of a [employer] ICD before hospital discharge.    The patient underwent atrial flutter ablation in 2011 which was complicated by a CVA.  In 2013 the patient underwent upgrade of his ICD to a CRT-D.  Around that time he developed atrial fibrillation which later became permanent.  Over the past few years, he has had complete AV block requiring 100% ventricular pacing.     He received an ICD shock in 11/2017 (for fast VT with syncope) and amiodarone was started. Unfortunately, he later developed VF storm and received 7 ICD shocks in 11/2018.  He underwent catheter ablation of VT in January 2019.  He had inducible fast VT at 220 bpm.  Substrate modification was performed mostly in the basal anterior LV.  He remained VT-free and amiodarone dose was decreased to 200 mg 6 days/week in 05/2020.  He had recurrence of VT with syncope and 2 ICD shocks in 07/2020.  Amiodarone was increased again to 200 mg daily.    August 2020, he underwent PCI of OM\2 in hopes to prevent further VT via complete revascularization.. He has not had chest pain recently    Since early September, 2020  we have recorded more than his usual nonsustained VT through his device. Then on Saturday, 9/19/2020 he was sitting at his recliner when he lost consciousness.  His wife stated that he then jerked like as he had received a shock from his ICD.  He was out for less than 1 minute.  In the emergency room ICD interrogation did not show sustained ventricular  arrhythmia or ICD discharge. However he did have runs of wide QRS tachycardia recorded on ECG.   the patient had multiple runs of wide QRS tachycardia and he was started on IV amiodarone.    He had incessant runs of WCT despite IV amiodarone.  The rate of this tachycardia was about 120 bpm and the patient was unaware of these runs.      Per Dr. Costello, the syncopal episode etiology on  remains unclear.  See his note for complete explanation.One potential explanation is prolonged (slow) VT at a rate below the previously set detection rate of 150 bpm, leading to cerebral hypoperfusion.   Digoxin was decreased from 7 days a week to 6 days/week due to dig level being somewhat high at 1.5 on admission.  Metoprolol was switched back to his PTA carvedilol.  Prior to discharge Lisinopril was decreased to 2.5mg daily.      10/12/20  I stopped lisinopril and 36 hours later started Entresto 24/26m/2 tablet at HS, and I asked him to take his BP's.     Today, 10/20/20 he follows up. No more syncopal spells. /70 - 156/70's. Denies chest pain, chest pressure. Denies ICD shocks since the hospital. Denies lightheadedness, increase shortness of breath, PND, orthopnea.         DIAGNOSTIC STUDIES:  Labs: Sodium 137, potassium 4.1, creatinine 1.29.  Echocardiogram: EF 25 to 30% with anterior wall motion abnormality, 1+ MR       IMPRESSION and PLAN  Delightful 74-year-old male with a long history of ischemic cardiomyopathy and recurrent ventricular tachycardia.  He was admitted after a syncopal event at home while sitting.  ICD interrogation showed no ventricular tachycardia >150 bpm or ICD therapy.  The etiology of the event remains unclear.    While hospitalized, the patient was noted to have multiple runs of slow ventricular tachycardia in the 120s to 130s.  Device changes were made.      1.  Coronary artery disease with history of extensive anterior, anteroseptal and apical transmural infarction.    2020  underwent coronary angiogram which showed severe proximal stenosis of OM2 s/p successful intervention with 1 drug eluting stent (Resolute Suraj 2.5x38mm)  -Successful rotational atherectomy (Rotapro 1.25mm edith) to mid LCx into OM2  -Ischemic cardiomyopathy due to severe native multivessel coronary artery disease    - 100% chronic total occlusion of proximal LAD (failed proximal-mid intervention in 2012)     - 100% chronic total occlusion of OM1      -Patent LIMA-LAD, SVG-OM1 bypass grafts      -Occluded SVG-OM2 bypass graft  He denies chest pain or shortness of breath.   Plan:  --continue Plavix for one year 8/2021.  --continue statin      2.  Severe ischemic cardiomyopathy with an ejection fraction of 25%-30%, with AICD firing x two 7/28/20 due to ventricular tachycardia and ventricular fibrillation. Amiodarone increased to daily.   Recent admission Digoxin was decreased from 7 days a week to 6 days/week due to dig level being somewhat high at 1.5 on admission.  Metoprolol was changed back to his carvedilol.  Lisinopril was decreased.  10/12 stopped lisinopril and 36 hours later started Entresto at 1/2 tablet as hs. Tolerating it well. Creat improved from 1.64 to 1.29  Plan:  --increase the dose to 1/2 tablet bid.  --Take BP twice a day until he sees Dr. Newman next week.  --call if BP < 90 or symptomatic.     3.  Chronic systolic heart failure with mild increase in heart failure symptoms recently. Today denies increase shortness of breath. Complains of fatigue. Weight stable.      4.  Recurrent sustained ventricular tachycardia.    He has undergone VT ablation and was maintained on amiodarone, but after decreasing the dose by only 200 mg a week, on 7/2020 he had recurrent ventricular tachycardia. Amiodarone was increased back to 7 days per week, 200 mg a day.   9/19/2020 Syncope in the sitting position, unclear etiology.  One potential explanation is prolonged (slow) VT at a rate below the previously set detection  rate of 150 bpm, leading to cerebral hypoperfusion.  ICD proarrhythmia.  Slow VT stopped immediately after the rate smoothing algorithm was inactivated.    5.  Chronic atrial fibrillation, on warfarin anticoagulation.      6.  Possible small left ventricular thrombus. Per Dr. Newman, it seemed quite unlikely that he would have a left ventricular thrombus with therapeutic anticoagulation consistently, although it remains possible.      7.  Chronic kidney disease, stage III, baseline typically 1.4 to 1.6.    Today creat. 1.29; after starting Entresto.     8.  Hypertension. /70  9.  Dyslipidemia.         Follow-up:   1. Return visit with Dr. Newman next week.   2. Follow up with Prince Galvez in 3 weeks.       GDMT:  Fluid status appeared euvolemic  ACEi/ARB: no  ARNI: yes, 10/12/20 started  Beta Blocker: yes  Aldosterone antagonist: no  SCD prophylaxis ICD no  Bi-V-ICD: yes  Anticoagulation: yes  Antiplatelet: yes  Amiodarone: yes   Lanoxin: yes 6 days per week.        Thank you for the opportunity to be involved in this very pleasant patient's care please feel to contact me with any questions.    I have reviewed the note as documented above.  This accurately captures the substance of my conversation with the patient.    Duration of Phone call: 16 minutes     Hayde BERRY, JILL  Pager [phone]      Thank you for allowing me to participate in the care of your patient.    Sincerely,     JAMAL Christie CNP     [facility]

## 2020-10-20 NOTE — PROGRESS NOTES
" TELEPHONE VISIT    Tyrel Rene is a 75 year old male who is being evaluated via a billable telephone visit.      The patient has been notified of following:     \"This telephone visit will be conducted via a call between you and your physician/provider. Given concern for spread of COVID 19 we are minimizing in person clinic visits when possible. We have found that certain health care needs can be provided without the need for a physical exam.  This service lets us provide the care you need with a short phone conversation.  If a prescription is necessary we can send it directly to your pharmacy.  If lab work is needed we can place an order for that and you can then stop by our lab to have the test done at a later time. If during the course of the call the physician/provider feels a telephone visit is not appropriate, you will not be charged for this service.\"       I have reviewed and updated the patient's Past Medical History, Social History, Family History and Medication List.      MEDICATIONS:  Current Outpatient Medications   Medication Sig Dispense Refill     acetaminophen (TYLENOL) 500 MG tablet Take 1,000 mg by mouth every 8 hours as needed for mild pain       amiodarone (PACERONE) 200 MG tablet Take 1 tablet (200 mg) by mouth daily 90 tablet 1     ASPIRIN NOT PRESCRIBED (INTENTIONAL) continuous prn for other Please choose reason not prescribed, below       carvedilol (COREG) 6.25 MG tablet Take 1 tablet (6.25 mg) by mouth 2 times daily (with meals) 180 tablet 0     clopidogrel (PLAVIX) 75 MG tablet Take 1 tablet (75 mg) by mouth daily 90 tablet 3     digoxin (LANOXIN) 125 MCG tablet Take 1 tablet (125 mcg) by mouth six times a week Do not take Sundays 90 tablet 3     famotidine (PEPCID) 20 MG tablet Take 1 tablet (20 mg) by mouth 2 times daily 180 tablet 3     ipratropium (ATROVENT) 0.03 % nasal spray Spray 2 sprays into both nostrils every 12 hours 30 mL 5     Multiple Vitamins-Minerals (PRESERVISION AREDS " 2 PO) Take 1 capsule by mouth 2 times daily       nitroGLYcerin (NITROSTAT) 0.4 MG sublingual tablet DISSOLVE 1 TABLET UNDER THE TONGUE EVERY 5 MINUTES AS NEEDED FOR CHEST PAIN (Patient not taking: Reported on 10/6/2020) 25 tablet 0     rosuvastatin (CRESTOR) 20 MG tablet Take 1 tablet (20 mg) by mouth daily 90 tablet 0     sacubitril-valsartan (ENTRESTO) 24-26 MG per tablet 1/2 tablet at hs 180 tablet 1     sildenafil (VIAGRA) 100 MG tablet Take 1 tablet (100 mg) by mouth daily as needed Take 30 min to 4 hours before intercourse.  Never use with nitroglycerin, terazosin or doxazosin. (Patient not taking: Reported on 10/6/2020) 16 tablet 3     Vitamin D, Cholecalciferol, 1000 UNITS TABS Take 1,000 mg by mouth daily        warfarin ANTICOAGULANT (COUMADIN) 1 MG tablet Take 1 & 1/2 tablets (1.5 mg) every day or as directed. 135 tablet 0     warfarin ANTICOAGULANT (COUMADIN) 2.5 MG tablet Take 2.5 mg on Mondays and 1.25mg all other days or as directed by the anticoagulation clinic 75 tablet 3       ALLERGIES  Aspirin and Nystatin    Review Of Systems  SOB/PRINCE w/minimal ALDs.   Dizziness, poor balance (no falls).   Fatigue, weakness - has been sedentary since September admission.     Vital Signs:   Wt: 170.6#  BP: 113/67  HR: 64  (ROS/VS TAKEN BY Catarino Singer LPN PRIOR TO VISIT)    Provider: Patient verbally consented to the phone service today: yes      CARDIOLOGY CLINIC / [facility] CLINIC VISIT  (Heart Failure Specialty)     This visit was completed via telephone due to COVID-19 precautions.     I had the opportunity to speak to Marko over the phone for a cardiology clinic visit.  Due to the COVID-19 pandemic, this was done as a telephone.      HISTORY OF PRESENT ILLNESS:    It is my pleasure speaking with Mr. Marko Rene, a delightful 74-year-old male, who follows in our clinic for years.  He follows with Dr. Newman, Dr. Costello and myself for his cardiomyopathy/CHF. Marko lives with his wife in [place].  He is a  non-smoker nondrinker.       Marko suffered a large anterior MI many years ago.  EF has been around 25-35% over the years.  He underwent a 3-vessel bypass in 2012.  Only a few days after his bypass he developed sustained fast ventricular tachycardia.  He underwent implantation of a [employer] ICD before hospital discharge.    The patient underwent atrial flutter ablation in 2011 which was complicated by a CVA.  In 2013 the patient underwent upgrade of his ICD to a CRT-D.  Around that time he developed atrial fibrillation which later became permanent.  Over the past few years, he has had complete AV block requiring 100% ventricular pacing.     He received an ICD shock in 11/2017 (for fast VT with syncope) and amiodarone was started. Unfortunately, he later developed VF storm and received 7 ICD shocks in 11/2018.  He underwent catheter ablation of VT in January 2019.  He had inducible fast VT at 220 bpm.  Substrate modification was performed mostly in the basal anterior LV.  He remained VT-free and amiodarone dose was decreased to 200 mg 6 days/week in 05/2020.  He had recurrence of VT with syncope and 2 ICD shocks in 07/2020.  Amiodarone was increased again to 200 mg daily.    August 2020, he underwent PCI of OM\2 in hopes to prevent further VT via complete revascularization.. He has not had chest pain recently    Since early September, 2020  we have recorded more than his usual nonsustained VT through his device. Then on Saturday, 9/19/2020 he was sitting at his recliner when he lost consciousness.  His wife stated that he then jerked like as he had received a shock from his ICD.  He was out for less than 1 minute.  In the emergency room ICD interrogation did not show sustained ventricular arrhythmia or ICD discharge. However he did have runs of wide QRS tachycardia recorded on ECG.  9/20 the patient had multiple runs of wide QRS tachycardia and he was started on IV amiodarone.    He had incessant runs of WCT  despite IV amiodarone.  The rate of this tachycardia was about 120 bpm and the patient was unaware of these runs.      Per Dr. Costello, the syncopal episode etiology on  remains unclear.  See his note for complete explanation.One potential explanation is prolonged (slow) VT at a rate below the previously set detection rate of 150 bpm, leading to cerebral hypoperfusion.   Digoxin was decreased from 7 days a week to 6 days/week due to dig level being somewhat high at 1.5 on admission.  Metoprolol was switched back to his PTA carvedilol.  Prior to discharge Lisinopril was decreased to 2.5mg daily.      10/12/20  I stopped lisinopril and 36 hours later started Entresto 24/26m/2 tablet at HS, and I asked him to take his BP's.     Today, 10/20/20 he follows up. No more syncopal spells. /70 - 156/70's. Denies chest pain, chest pressure. Denies ICD shocks since the hospital. Denies lightheadedness, increase shortness of breath, PND, orthopnea.         DIAGNOSTIC STUDIES:  Labs: Sodium 137, potassium 4.1, creatinine 1.29.  Echocardiogram: EF 25 to 30% with anterior wall motion abnormality, 1+ MR       IMPRESSION and PLAN  Delightful 74-year-old male with a long history of ischemic cardiomyopathy and recurrent ventricular tachycardia.  He was admitted after a syncopal event at home while sitting.  ICD interrogation showed no ventricular tachycardia >150 bpm or ICD therapy.  The etiology of the event remains unclear.    While hospitalized, the patient was noted to have multiple runs of slow ventricular tachycardia in the 120s to 130s.  Device changes were made.      1.  Coronary artery disease with history of extensive anterior, anteroseptal and apical transmural infarction.    2020 underwent coronary angiogram which showed severe proximal stenosis of OM2 s/p successful intervention with 1 drug eluting stent (Resolute [place] 2.5x38mm)  -Successful rotational atherectomy (Rotapro 1.25mm edith) to mid LCx into  OM2  -Ischemic cardiomyopathy due to severe native multivessel coronary artery disease    - 100% chronic total occlusion of proximal LAD (failed proximal-mid intervention in 2012)     - 100% chronic total occlusion of OM1      -Patent LIMA-LAD, SVG-OM1 bypass grafts      -Occluded SVG-OM2 bypass graft  He denies chest pain or shortness of breath.   Plan:  --continue Plavix for one year 8/2021.  --continue statin      2.  Severe ischemic cardiomyopathy with an ejection fraction of 25%-30%, with AICD firing x two 7/28/20 due to ventricular tachycardia and ventricular fibrillation. Amiodarone increased to daily.   Recent admission Digoxin was decreased from 7 days a week to 6 days/week due to dig level being somewhat high at 1.5 on admission.  Metoprolol was changed back to his carvedilol.  Lisinopril was decreased.  10/12 stopped lisinopril and 36 hours later started Entresto at 1/2 tablet as hs. Tolerating it well. Creat improved from 1.64 to 1.29  Plan:  --increase the dose to 1/2 tablet bid.  --Take BP twice a day until he sees Dr. Newman next week.  --call if BP < 90 or symptomatic.     3.  Chronic systolic heart failure with mild increase in heart failure symptoms recently. Today denies increase shortness of breath. Complains of fatigue. Weight stable.      4.  Recurrent sustained ventricular tachycardia.    He has undergone VT ablation and was maintained on amiodarone, but after decreasing the dose by only 200 mg a week, on 7/2020 he had recurrent ventricular tachycardia. Amiodarone was increased back to 7 days per week, 200 mg a day.   9/19/2020 Syncope in the sitting position, unclear etiology.  One potential explanation is prolonged (slow) VT at a rate below the previously set detection rate of 150 bpm, leading to cerebral hypoperfusion.  ICD proarrhythmia.  Slow VT stopped immediately after the rate smoothing algorithm was inactivated.    5.  Chronic atrial fibrillation, on warfarin anticoagulation.      6.   Possible small left ventricular thrombus. Per Dr. Newman, it seemed quite unlikely that he would have a left ventricular thrombus with therapeutic anticoagulation consistently, although it remains possible.      7.  Chronic kidney disease, stage III, baseline typically 1.4 to 1.6.    Today creat. 1.29; after starting Entresto.     8.  Hypertension. /70  9.  Dyslipidemia.                Follow-up:   1. Return visit with Dr. Newman next week.   2. Follow up with Prince Galvez in 3 weeks.                GDMT:  Fluid status appeared euvolemic  ACEi/ARB: no  ARNI: yes, 10/12/20 started  Beta Blocker: yes  Aldosterone antagonist: no  SCD prophylaxis ICD no  Bi-V-ICD: yes  Anticoagulation: yes  Antiplatelet: yes  Amiodarone: yes   Lanoxin: yes 6 days per week.          Thank you for the opportunity to be involved in this very pleasant patient's care please feel to contact me with any questions.      I have reviewed the note as documented above.  This accurately captures the substance of my conversation with the patient.    Duration of Phone call: 16 minutes     Hayde BERRY, CNP  Pager [phone]

## 2020-10-23 NOTE — PROGRESS NOTES
[facility] called to see what lab we are requesting. Did let him know we would like a BMP. Order faxed to [phone].   Sheila Strickland RN 3:01 PM 10/23/20

## 2020-10-23 NOTE — PROGRESS NOTES
Pt had appt w/ Prince Galvez CNP on 10/20/20.  Instructions were given to increase Entresto to 1/2 tablet twice a day.  Med rec updated to reflect increased dose.         Marizol Scales RN BSN   [place], [place]  [facility] Care Coordinator  10/23/20, 11:37 AM

## 2020-10-26 PROBLEM — I48.91 ATRIAL FIBRILLATION, UNSPECIFIED TYPE (H): Status: ACTIVE | Noted: 2020-01-01

## 2020-10-26 NOTE — PROGRESS NOTES
"ANTICOAGULATION MANAGEMENT     Patient Name:  Tyrel Rene  Date:  10/26/2020    ASSESSMENT /SUBJECTIVE:    Today's INR result of 2.55 is therapeutic. Goal INR of 2.5-3.5      Warfarin dose taken: Warfarin taken as instructed    Diet: No new diet changes affecting INR    Medication changes/ interactions: No new medications/supplements affecting INR    Previous INR: Therapeutic     S/S of bleeding or thromboembolism: No    New injury or illness: No    Upcoming surgery, procedure or cardioversion: No    Additional findings: None      PLAN:    Telephone call with  Melita, pts wife regarding INR result and instructed:     Warfarin Dosing Instructions: Continue your current warfarin dose 1.5 mg daily    Instructed patient to follow up no later than: 2 weeks  Orders given to [facility] ([phone])  Melita reports that the \"core nurse\" draws the INR and sends it to [facility].  States that has return appt with core nurse on 20; not in Epic at this time.  Called and spoke to Edward, who will investigate further.    Education provided: None required      Melita verbalizes understanding and agrees to warfarin dosing plan.    Instructed to call the Anticoagulation Clinic for any changes, questions or concerns. (#[phone])        María Conte, RN      OBJECTIVE:  Recent labs: (last 7 days)     10/26/20  1030   INR 2.55*         INR assessment THER    Recheck INR In: 2 WEEKS    INR Location Clinic      Anticoagulation Summary  As of 10/26/2020    INR goal:  2.5-3.5   TTR:  53.0 % (11.6 mo)   INR used for dosin.55 (10/26/2020)   Warfarin maintenance plan:  1.5 mg (1 mg x 1.5) every day   Full warfarin instructions:  1.5 mg every day   Weekly warfarin total:  10.5 mg   No change documented:  Krystal Frost RN   Plan last modified:  Sheila Cid, RN (10/5/2020)   Next INR check:  2020   Priority:  High   Target end date:  Indefinite    Indications    Long-term (current) " use of anticoagulants [Z79.01] [Z79.01]  Cerebral artery occlusion with cerebral infarction (HCC) [I63.50] [I63.50]  Cerebral infarction (H) [I63.9]             Anticoagulation Episode Summary     INR check location:      Preferred lab:  [facility]    Send INR reminders to:  HANS MCLEOD    Comments:  [facility] Patient goal should be 2.5-3.0 (5/13/20)      Anticoagulation Care Providers     Provider Role Specialty Phone number    Dejon Downs MD [facility] [phone]

## 2020-10-26 NOTE — PROGRESS NOTES
BMP order faxed to [facility] to be drawn on 11/9/20 prior to his 11/11/20 appt w/ Prince Galvez CNP.  Requested results be faxed back.     Future Appointments   Date Time Provider Department Center   10/28/2020 12:45 PM Parish Newman MD [facility]   11/11/2020 11:00 AM Hayde Galvez APRN CNP [facility]   12/10/2020 12:00 AM [facility]       Marizol Scales RN BSN  [place], [place]  C.[facility] Clinic Care Coordinator  10/26/20, 10:00 AM

## 2020-10-26 NOTE — PROGRESS NOTES
ANTICOAGULATION MANAGEMENT      Tyrel Rene due for annual renewal of referral to anticoagulation monitoring. Order pended for your review and signature.      ANTICOAGULATION SUMMARY      Warfarin indication(s)     cerebral artery occlusion with infarct    Heart valve present?  NO       Current goal range   INR: 2.5-3.5     Goal appropriate for indication? Request provider review of goal range due to 2.5-3.5 vs 2.5-3.0     Current duration of therapy Indefinite/long term therapy   Time in Therapeutic Range (TTR)  (Goal > 60%) 53 %       Office visit with referring provider's group within last year yes on 9/29/20       María Conte RN

## 2020-10-27 NOTE — PROGRESS NOTES
Pt's wife called to change the clinic visit with  tomorrow to a telephone visit. She and pt do not feel comfortable coming to clinic for a visit. She said pt had an INR and bmp drawn by [facility] yesterday. I only see the INR result in [facility]. I called [facility] and was told only an INR was drawn. The next order for a bmp and INR is 11/9. I called pt's wife back with an update. Shashi ALTMAN October 27, 2020, 10:51 AM

## 2020-10-28 NOTE — PROGRESS NOTES
Service Date: 10/28/2020      HISTORY OF PRESENT ILLNESS:  I had the opportunity to see Mr. Tyrel Rene in Cardiology Clinic today at [facility] in [place] for a telephone visit for reevaluation of chronic systolic heart failure and recent issues with recurrent ventricular tachycardia and ICD shocks.      I reviewed his extensive history in my recent note of 08/05/2020 and he has been seen several times in the [facility] Clinic since then for updates.  Basically, he has had a VT ablation and continued to have episodes of ventricular tachycardia requiring initiation of amiodarone.  Shortly after the amiodarone was decreased to 6 days a week, he had another episode of ventricular tachycardia requiring ICD shock.  I referred him for coronary angiography and he underwent stenting of an obtuse marginal vessel which initially seemed to be helpful.  Unfortunately, he then had another brief syncopal episode which was not clearly explained and then some slow VT with heart rates of about 120.  His amiodarone has been increased back to 7 days a week.      Today, he tells me that he has been doing well recently after initiating low-dose Entresto half a tablet twice daily.  He has been taking his blood pressures and they have been consistently in the 110-120 range systolic in the mornings and 140s-150s systolic in the evenings.  He continues to have dizziness that he clearly describes this as a vertigo-type balance issue, rather than lightheadedness.  I do not see a recent ICD interrogation, but his last device check on 09/10/2020 revealed only a few brief episodes of nonsustained VT in addition to his usual atrial fibrillation.      His vital signs reported today showed a blood pressure of 121/70, heart rate 64 and weight 170 pounds.      IMPRESSIONS:  Mr. Tyrel Rene is a 75-year-old gentleman with the following issues:   1.  Coronary artery disease with history of extensive anterior and apical infarction.  He  has undergone bypass surgery and recently had stenting of a second obtuse marginal artery.  There was no other significant residual coronary artery disease identified after that procedure.  He is not experiencing any angina recently.   2.  Severe ischemic cardiomyopathy with an ejection fraction of about 25%-30%.   3.  Chronic systolic heart failure with mild shortness of breath with exertion, but no overt signs of volume overload, such as lower extremity edema or abdominal distention.  He denies orthopnea.  Some of this could be due to deconditioning as well.   4.  Chronic dizziness, not apparently due to low blood pressures or orthostatic symptoms.   5.  Recurrent sustained ventricular tachycardia requiring ICD shocks.  He seems to be relatively stable now on oral amiodarone 200 mg daily.   6.  Chronic atrial fibrillation, on warfarin anticoagulation for stroke prevention.   7.  Stage III chronic kidney disease with stable creatinine of about 1.4.   8.  Hypertension.   9.  Dyslipidemia.      He seems to be doing reasonably well.  I encouraged him to increase his activity level.  I also recommended that he increase his Entresto to a full tablet of the low dose 24/26 mg twice daily.  We will follow up with him again in a few weeks to assess his tolerance of that medication and continue to increase it as possible based on his blood pressures.  I will plan to see him back again myself in about 3 months.      cc:      Dejon Downs MD    [facility]   [address] [place] [address]         LYNNE KAUFMAN MD, [facility]             D: 10/28/2020   T: 10/28/2020   MT: BERRY      Name:     ARTEM ELIZALDE   MRN:      [id number]        Account:      [id number]   :      1945           Service Date: 10/28/2020      Document: [id number]

## 2020-10-28 NOTE — LETTER
"10/28/2020    Dejon Downs MD  [address]  [facility] [address]    RE: Tyrel Rene       Dear Colleague,    I had the pleasure of seeing Tyrel Rene in the [facility].    Tyrel Rene is a 75 year old male who is being evaluated via a billable telephone visit.      The patient has been notified of following:     \"This telephone visit will be conducted via a call between you and your physician/provider. We have found that certain health care needs can be provided without the need for a physical exam.  This service lets us provide the care you need with a short phone conversation.  If a prescription is necessary we can send it directly to your pharmacy.  If lab work is needed we can place an order for that and you can then stop by our lab to have the test done at a later time.    Telephone visits are billed at different rates depending on your insurance coverage. During this emergency period, for some insurers they may be billed the same as an in-person visit.  Please reach out to your insurance provider with any questions.    If during the course of the call the physician/provider feels a telephone visit is not appropriate, you will not be charged for this service.\"    Patient has given verbal consent for Telephone visit?  Yes    What phone number would you like to be contacted at? [phone]    How would you like to obtain your AVS? Mail a copy    Vitals - Patient Reported  Systolic (Patient Reported): 121  Diastolic (Patient Reported): 70  Weight (Patient Reported): 77.5 kg (170 lb 12.8 oz)  Height (Patient Reported): 185.4 cm (6' 1\")  BMI (Based on Pt Reported Ht/Wt): 22.53  Pulse (Patient Reported): 64      Review Of Systems  Skin: NEGATIVE  Eyes:Ears/Nose/Throat: NEGATIVE  Respiratory: NEGATIVE  Cardiovascular: **dizziness  Gastrointestinal: NEGATIVE  Genitourinary:NEGATIVE   Musculoskeletal: NEGATIVE  Neurologic: NEGATIVE  Psychiatric: " NEGATIVE  Hematologic/Lymphatic/Immunologic: NEGATIVE  Endocrine:  NEGATIVE    Reviewed by CYDNEY Slade, 10/28/20    Phone call duration: 22 minutes    LYNNE KAUFMAN MD    HPI and Plan:   See dictation    No orders of the defined types were placed in this encounter.    No orders of the defined types were placed in this encounter.    There are no discontinued medications.      Encounter Diagnoses   Name Primary?     Chronic systolic congestive heart failure (H) Yes     Paroxysmal atrial fibrillation (H)      Ventricular fibrillation (H)      Syncope, unspecified syncope type      V-tach (H)      ICD (implantable cardioverter-defibrillator) in place      Ischemic cardiomyopathy      Stage 3a chronic kidney disease      Mixed hyperlipidemia      Essential hypertension        CURRENT MEDICATIONS:  Current Outpatient Medications   Medication Sig Dispense Refill     acetaminophen (TYLENOL) 500 MG tablet Take 1,000 mg by mouth every 8 hours as needed for mild pain       amiodarone (PACERONE) 200 MG tablet Take 1 tablet (200 mg) by mouth daily 90 tablet 1     carvedilol (COREG) 6.25 MG tablet Take 1 tablet (6.25 mg) by mouth 2 times daily (with meals) 180 tablet 0     clopidogrel (PLAVIX) 75 MG tablet Take 1 tablet (75 mg) by mouth daily 90 tablet 3     digoxin (LANOXIN) 125 MCG tablet Take 1 tablet (125 mcg) by mouth six times a week Do not take Sundays 90 tablet 3     famotidine (PEPCID) 20 MG tablet Take 1 tablet (20 mg) by mouth 2 times daily 180 tablet 3     ipratropium (ATROVENT) 0.03 % nasal spray Spray 2 sprays into both nostrils every 12 hours 30 mL 5     Multiple Vitamins-Minerals (PRESERVISION AREDS 2 PO) Take 1 capsule by mouth 2 times daily       nitroGLYcerin (NITROSTAT) 0.4 MG sublingual tablet DISSOLVE 1 TABLET UNDER THE TONGUE EVERY 5 MINUTES AS NEEDED FOR CHEST PAIN 25 tablet 0     rosuvastatin (CRESTOR) 20 MG tablet Take 1 tablet (20 mg) by mouth daily 90 tablet 0     sacubitril-valsartan  (ENTRESTO) 24-26 MG per tablet 1/2 tablet twice a day 180 tablet 1     Vitamin D, Cholecalciferol, 1000 UNITS TABS Take 1,000 mg by mouth daily        warfarin ANTICOAGULANT (COUMADIN) 1 MG tablet Take 1 & 1/2 tablets (1.5 mg) every day or as directed. 135 tablet 0     warfarin ANTICOAGULANT (COUMADIN) 2.5 MG tablet Take 2.5 mg on Mondays and 1.25mg all other days or as directed by the anticoagulation clinic 75 tablet 3     ASPIRIN NOT PRESCRIBED (INTENTIONAL) continuous prn for other Please choose reason not prescribed, below       sildenafil (VIAGRA) 100 MG tablet Take 1 tablet (100 mg) by mouth daily as needed Take 30 min to 4 hours before intercourse.  Never use with nitroglycerin, terazosin or doxazosin. (Patient not taking: Reported on 10/6/2020) 16 tablet 3       ALLERGIES     Allergies   Allergen Reactions     Aspirin      Other reaction(s): Aspirin Contraindication (for reporting)  Cardiologist recommended no aspirin as he is on Pradaxa     Nystatin Other (See Comments)     Make his mouth numb & swelling       PAST MEDICAL HISTORY:  Past Medical History:   Diagnosis Date     Atrial fibrillation (H)     s/p Cardioversion 3/14/2013     Atrial flutter (H)     S/p Aflutter ablation 1/11/2011     CAD (coronary artery disease)     CABG x3 10/2012- LIMA to distal LAD, SVG to OM1 & OM3; cath 10/2012- PTCA to second diagonal and mid LAD, BMS to mid LAD     Cardiogenic shock (H)      Cardiomyopathy (H)      ED (erectile dysfunction)      Hypertension      Neuropathy      SVT (supraventricular tachycardia) (H)     S/p dual chamber ICD 10/11/12- upgraded to BIV ICD 6/2013     Syncope        PAST SURGICAL HISTORY:  Past Surgical History:   Procedure Laterality Date     BYPASS GRAFT ARTERY CORONARY  10/2/2012    Procedure: BYPASS GRAFT ARTERY CORONARY;  Coronary Artery Bypass Graft x3 (LAD, Diag, OM) with Endovein Lincoln (On-Pump);  Surgeon: Yeyo Lyman MD;  Location:  OR     CARDIOVERSION  3/14/2013      CORONARY ANGIOGRAPHY ADULT ORDER  10/2/12     CORONARY ARTERY BYPASS  10/2/12    LIMA to LAD, SVG to OM1 and OM3     CV ANGIOGRAM CORONARY GRAFT N/A 8/11/2020    Procedure: Angiogram Coronary Graft;  Surgeon: Erin Weaver MD;  Location:  HEART CARDIAC CATH LAB     CV HEART CATHETERIZATION WITH POSSIBLE INTERVENTION N/A 8/11/2020    Procedure: Heart Catheterization with Possible Intervention;  Surgeon: Erin Weaver MD;  Location:  HEART CARDIAC CATH LAB     CV PCI ATHERECTOMY ORBITAL N/A 8/11/2020    Procedure: Percutaneous Coronary Intervention Atherectomy Rotational;  Surgeon: Erin Weaver MD;  Location:  HEART CARDIAC CATH LAB     EP ABLATION VT N/A 1/2/2019    Procedure: EP Ablation VT;  Surgeon: Suellen Costello MD;  Location:  HEART CARDIAC CATH LAB     EP COMPREHENSIVE EP STUDY N/A 1/2/2019    Procedure: EP Comprehensive EP Study;  Surgeon: Suellen Costello MD;  Location:  HEART CARDIAC CATH LAB     H ABLATION ATRIAL FLUTTER  1/11/2011     HAND SURGERY      table saw injury right hand     HEART CATH, ANGIOPLASTY  10/2/12    PTCA to second diagonal and mid LAD, BMS to mid LAD     HERNIA REPAIR       RELOCATE GENERATOR ICD/PACEMAKER         FAMILY HISTORY:  Family History   Problem Relation Age of Onset     Alcohol/Drug Father      Heart Disease Father 71     Alzheimer Disease Sister      Alcohol/Drug Sister      Alcohol/Drug Sister      Gastrointestinal Disease Sister      Obesity Sister      Hypertension Sister      Heart Disease Sister      Hypertension Sister      Heart Disease Daughter         afib     Arrhythmia Daughter      Multiple Sclerosis Daughter      Heart Disease Daughter         afib     Arrhythmia Daughter      Cancer Daughter         lymphoma     Aneurysm Mother        SOCIAL HISTORY:  Social History     Socioeconomic History     Marital status:      Spouse name: None     Number of children: None     Years of education:  None     Highest education level: None   Occupational History     None   Social Needs     Financial resource strain: None     Food insecurity     Worry: None     Inability: None     Transportation needs     Medical: None     Non-medical: None   Tobacco Use     Smoking status: Former Smoker     Packs/day: 1.00     Years: 14.00     Pack years: 14.00     Types: Cigarettes     Start date:      Quit date: 7/10/1972     Years since quittin.3     Smokeless tobacco: Never Used   Substance and Sexual Activity     Alcohol use: No     Drug use: No     Sexual activity: Yes     Partners: Female   Lifestyle     Physical activity     Days per week: None     Minutes per session: None     Stress: None   Relationships     Social connections     Talks on phone: None     Gets together: None     Attends Restorationism service: None     Active member of club or organization: None     Attends meetings of clubs or organizations: None     Relationship status: None     Intimate partner violence     Fear of current or ex partner: None     Emotionally abused: None     Physically abused: None     Forced sexual activity: None   Other Topics Concern     Parent/sibling w/ CABG, MI or angioplasty before 65F 55M? No      Service Not Asked     Blood Transfusions Not Asked     Caffeine Concern Yes     Comment: 4 cups coffee daily     Occupational Exposure Not Asked     Hobby Hazards Not Asked     Sleep Concern No     Stress Concern No     Weight Concern Yes     Comment: gain 2lbs     Special Diet Yes     Comment: low sodium     Back Care Not Asked     Exercise Yes     Comment: walking, doing sittups, played pickle ball in summer     Bike Helmet Not Asked     Seat Belt Yes     Self-Exams Not Asked   Social History Narrative     None       Physical Exam:  Vitals: There were no vitals taken for this visit.    General:  no apparent distress, normal body habitus, sitting upright.  ENT/Mouth:  membranes moist, no nasal discharge.  Normal head  shape, no apparent injury or laceration.  Eyes:  no scleral icterus, normal conjunctivae.  No observed jaundice.  Neck:  no apparent neck swelling.   Chest/Lungs:  No breathing difficulty while speaking.  No audible wheezing.  No cough during conversation.  Cardiovascular:  No obviously elevated jugular venous pressure.  No apparent edema bilaterally in LE.   Abdomen:  no obvious abdominal distention.   Extremities:  no apparent cyanosis.  Skin:  no xanthelasma.  No facial lacerations.  Neurologic:  Normal arm motion bilateral, no tremors.    Psychiatric:  Alert and oriented x3, calm demeanor    The rest of the comprehensive physical examination is deferred due to public health emergency video visit restrictions.        Recent Lab Results:  LIPID RESULTS:  Lab Results   Component Value Date    CHOL 116 01/07/2020    HDL 42 01/07/2020    LDL 52 01/07/2020    TRIG 111 01/07/2020    CHOLHDLRATIO 2.8 12/17/2014       LIVER ENZYME RESULTS:  Lab Results   Component Value Date    AST 24 10/05/2020    ALT 29 10/05/2020       CBC RESULTS:  Lab Results   Component Value Date    WBC 7.4 09/19/2020    RBC 4.28 (L) 09/19/2020    HGB 13.6 09/19/2020    HCT 39.5 (L) 09/19/2020    MCV 92 09/19/2020    MCH 31.8 09/19/2020    MCHC 34.4 09/19/2020    RDW 13.9 09/19/2020     09/19/2020       BMP RESULTS:  Lab Results   Component Value Date     10/16/2020    POTASSIUM 4.1 10/16/2020    CHLORIDE 106 10/16/2020    CO2 28 10/16/2020    ANIONGAP 3 10/16/2020    GLC 81 10/16/2020    BUN 16 10/16/2020    CR 1.29 (H) 10/16/2020    GFRESTIMATED 53 (L) 10/16/2020    GFRESTBLACK 62 10/16/2020    LORI 8.2 (L) 10/16/2020        A1C RESULTS:  Lab Results   Component Value Date    A1C 5.7 10/09/2012       INR RESULTS:  Lab Results   Component Value Date    INR 2.55 (H) 10/26/2020    INR 2.87 (H) 10/12/2020         Service Date: 10/28/2020      HISTORY OF PRESENT ILLNESS:  I had the opportunity to see . Tyrel Rene in Cardiology Clinic  today at [facility] in [place] for a telephone visit for reevaluation of chronic systolic heart failure and recent issues with recurrent ventricular tachycardia and ICD shocks.      I reviewed his extensive history in my recent note of 08/05/2020 and he has been seen several times in the [facility] since then for updates.  Basically, he has had a VT ablation and continued to have episodes of ventricular tachycardia requiring initiation of amiodarone.  Shortly after the amiodarone was decreased to 6 days a week, he had another episode of ventricular tachycardia requiring ICD shock.  I referred him for coronary angiography and he underwent stenting of an obtuse marginal vessel which initially seemed to be helpful.  Unfortunately, he then had another brief syncopal episode which was not clearly explained and then some slow VT with heart rates of about 120.  His amiodarone has been increased back to 7 days a week.      Today, he tells me that he has been doing well recently after initiating low-dose Entresto half a tablet twice daily.  He has been taking his blood pressures and they have been consistently in the 110-120 range systolic in the mornings and 140s-150s systolic in the evenings.  He continues to have dizziness that he clearly describes this as a vertigo-type balance issue, rather than lightheadedness.  I do not see a recent ICD interrogation, but his last device check on 09/10/2020 revealed only a few brief episodes of nonsustained VT in addition to his usual atrial fibrillation.      His vital signs reported today showed a blood pressure of 121/70, heart rate 64 and weight 170 pounds.      IMPRESSIONS:  Mr. Tyrel Rene is a 75-year-old gentleman with the following issues:   1.  Coronary artery disease with history of extensive anterior and apical infarction.  He has undergone bypass surgery and recently had stenting of a second obtuse marginal artery.  There was no other significant  residual coronary artery disease identified after that procedure.  He is not experiencing any angina recently.   2.  Severe ischemic cardiomyopathy with an ejection fraction of about 25%-30%.   3.  Chronic systolic heart failure with mild shortness of breath with exertion, but no overt signs of volume overload, such as lower extremity edema or abdominal distention.  He denies orthopnea.  Some of this could be due to deconditioning as well.   4.  Chronic dizziness, not apparently due to low blood pressures or orthostatic symptoms.   5.  Recurrent sustained ventricular tachycardia requiring ICD shocks.  He seems to be relatively stable now on oral amiodarone 200 mg daily.   6.  Chronic atrial fibrillation, on warfarin anticoagulation for stroke prevention.   7.  Stage III chronic kidney disease with stable creatinine of about 1.4.   8.  Hypertension.   9.  Dyslipidemia.      He seems to be doing reasonably well.  I encouraged him to increase his activity level.  I also recommended that he increase his Entresto to a full tablet of the low dose 24/26 mg twice daily.  We will follow up with him again in a few weeks to assess his tolerance of that medication and continue to increase it as possible based on his blood pressures.  I will plan to see him back again myself in about 3 months.      cc:      Dejon Downs MD    [facility]   [address] [place] [address]         LYNNE KAUFMAN MD, [facility]             D: 10/28/2020   T: 10/28/2020   MT: BERRY      Name:     ARTEM ELIZALDE   MRN:      [id number]        Account:      [id number]   :      1945           Service Date: 10/28/2020      Document: [id number]        Thank you for allowing me to participate in the care of your patient.    Sincerely,     LYNNE KAUFMAN MD     [facility]

## 2020-10-28 NOTE — PROGRESS NOTES
"Tyrel Rene is a 75 year old male who is being evaluated via a billable telephone visit.      The patient has been notified of following:     \"This telephone visit will be conducted via a call between you and your physician/provider. We have found that certain health care needs can be provided without the need for a physical exam.  This service lets us provide the care you need with a short phone conversation.  If a prescription is necessary we can send it directly to your pharmacy.  If lab work is needed we can place an order for that and you can then stop by our lab to have the test done at a later time.    Telephone visits are billed at different rates depending on your insurance coverage. During this emergency period, for some insurers they may be billed the same as an in-person visit.  Please reach out to your insurance provider with any questions.    If during the course of the call the physician/provider feels a telephone visit is not appropriate, you will not be charged for this service.\"    Patient has given verbal consent for Telephone visit?  Yes    What phone number would you like to be contacted at? [phone]    How would you like to obtain your AVS? Mail a copy    Vitals - Patient Reported  Systolic (Patient Reported): 121  Diastolic (Patient Reported): 70  Weight (Patient Reported): 77.5 kg (170 lb 12.8 oz)  Height (Patient Reported): 185.4 cm (6' 1\")  BMI (Based on Pt Reported Ht/Wt): 22.53  Pulse (Patient Reported): 64      Review Of Systems  Skin: NEGATIVE  Eyes:Ears/Nose/Throat: NEGATIVE  Respiratory: NEGATIVE  Cardiovascular: **dizziness  Gastrointestinal: NEGATIVE  Genitourinary:NEGATIVE   Musculoskeletal: NEGATIVE  Neurologic: NEGATIVE  Psychiatric: NEGATIVE  Hematologic/Lymphatic/Immunologic: NEGATIVE  Endocrine:  NEGATIVE    Reviewed by CYDNEY Slade, 10/28/20    Phone call duration: 22 minutes    LYNNE KAUFMAN MD    HPI and Plan:   See dictation    No orders of the defined types " were placed in this encounter.    No orders of the defined types were placed in this encounter.    There are no discontinued medications.      Encounter Diagnoses   Name Primary?     Chronic systolic congestive heart failure (H) Yes     Paroxysmal atrial fibrillation (H)      Ventricular fibrillation (H)      Syncope, unspecified syncope type      V-tach (H)      ICD (implantable cardioverter-defibrillator) in place      Ischemic cardiomyopathy      Stage 3a chronic kidney disease      Mixed hyperlipidemia      Essential hypertension        CURRENT MEDICATIONS:  Current Outpatient Medications   Medication Sig Dispense Refill     acetaminophen (TYLENOL) 500 MG tablet Take 1,000 mg by mouth every 8 hours as needed for mild pain       amiodarone (PACERONE) 200 MG tablet Take 1 tablet (200 mg) by mouth daily 90 tablet 1     carvedilol (COREG) 6.25 MG tablet Take 1 tablet (6.25 mg) by mouth 2 times daily (with meals) 180 tablet 0     clopidogrel (PLAVIX) 75 MG tablet Take 1 tablet (75 mg) by mouth daily 90 tablet 3     digoxin (LANOXIN) 125 MCG tablet Take 1 tablet (125 mcg) by mouth six times a week Do not take Sundays 90 tablet 3     famotidine (PEPCID) 20 MG tablet Take 1 tablet (20 mg) by mouth 2 times daily 180 tablet 3     ipratropium (ATROVENT) 0.03 % nasal spray Spray 2 sprays into both nostrils every 12 hours 30 mL 5     Multiple Vitamins-Minerals (PRESERVISION AREDS 2 PO) Take 1 capsule by mouth 2 times daily       nitroGLYcerin (NITROSTAT) 0.4 MG sublingual tablet DISSOLVE 1 TABLET UNDER THE TONGUE EVERY 5 MINUTES AS NEEDED FOR CHEST PAIN 25 tablet 0     rosuvastatin (CRESTOR) 20 MG tablet Take 1 tablet (20 mg) by mouth daily 90 tablet 0     sacubitril-valsartan (ENTRESTO) 24-26 MG per tablet 1/2 tablet twice a day 180 tablet 1     Vitamin D, Cholecalciferol, 1000 UNITS TABS Take 1,000 mg by mouth daily        warfarin ANTICOAGULANT (COUMADIN) 1 MG tablet Take 1 & 1/2 tablets (1.5 mg) every day or as directed.  135 tablet 0     warfarin ANTICOAGULANT (COUMADIN) 2.5 MG tablet Take 2.5 mg on Mondays and 1.25mg all other days or as directed by the anticoagulation clinic 75 tablet 3     ASPIRIN NOT PRESCRIBED (INTENTIONAL) continuous prn for other Please choose reason not prescribed, below       sildenafil (VIAGRA) 100 MG tablet Take 1 tablet (100 mg) by mouth daily as needed Take 30 min to 4 hours before intercourse.  Never use with nitroglycerin, terazosin or doxazosin. (Patient not taking: Reported on 10/6/2020) 16 tablet 3       ALLERGIES     Allergies   Allergen Reactions     Aspirin      Other reaction(s): Aspirin Contraindication (for reporting)  Cardiologist recommended no aspirin as he is on Pradaxa     Nystatin Other (See Comments)     Make his mouth numb & swelling       PAST MEDICAL HISTORY:  Past Medical History:   Diagnosis Date     Atrial fibrillation (H)     s/p Cardioversion 3/14/2013     Atrial flutter (H)     S/p Aflutter ablation 1/11/2011     CAD (coronary artery disease)     CABG x3 10/2012- LIMA to distal LAD, SVG to OM1 & OM3; cath 10/2012- PTCA to second diagonal and mid LAD, BMS to mid LAD     Cardiogenic shock (H)      Cardiomyopathy (H)      ED (erectile dysfunction)      Hypertension      Neuropathy      SVT (supraventricular tachycardia) (H)     S/p dual chamber ICD 10/11/12- upgraded to BIV ICD 6/2013     Syncope        PAST SURGICAL HISTORY:  Past Surgical History:   Procedure Laterality Date     BYPASS GRAFT ARTERY CORONARY  10/2/2012    Procedure: BYPASS GRAFT ARTERY CORONARY;  Coronary Artery Bypass Graft x3 (LAD, Diag, OM) with Endovein Barton (On-Pump);  Surgeon: Yeyo Lyman MD;  Location:  OR     CARDIOVERSION  3/14/2013     CORONARY ANGIOGRAPHY ADULT ORDER  10/2/12     CORONARY ARTERY BYPASS  10/2/12    LIMA to LAD, SVG to OM1 and OM3     CV ANGIOGRAM CORONARY GRAFT N/A 8/11/2020    Procedure: Angiogram Coronary Graft;  Surgeon: Erin Weaver MD;  Location:   HEART CARDIAC CATH LAB     CV HEART CATHETERIZATION WITH POSSIBLE INTERVENTION N/A 8/11/2020    Procedure: Heart Catheterization with Possible Intervention;  Surgeon: Erin Weaver MD;  Location:  HE[facility] CARDI[facility] CATH LAB     CV PCI ATHERECTOMY ORBITAL N/A 8/11/2020    Procedure: Percutaneous Coronary Intervention Atherectomy Rotational;  Surgeon: Erin Weaver MD;  Location:  HEART CARDIAC CATH LAB     EP ABLATION VT N/A 1/2/2019    Procedure: EP Ablation VT;  Surgeon: Suellen Costello MD;  Location:  HEART CARDIAC CATH LAB     EP COMPREHENSIVE EP STUDY N/A 1/2/2019    Procedure: EP Comprehensive EP Study;  Surgeon: Suellen Costello MD;  Location:  HEART CARDIAC CATH LAB     H ABLATION ATRIAL FLUTTER  1/11/2011     HAND SURGERY      table saw injury right hand     HEART CATH, ANGIOPLASTY  10/2/12    PTCA to second diagonal and mid LAD, BMS to mid LAD     HERNIA REPAIR       RELOCATE GENERATOR ICD/PACEMAKER         FAMILY HISTORY:  Family History   Problem Relation Age of Onset     Alcohol/Drug Father      Heart Disease Father 71     Alzheimer Disease Sister      Alcohol/Drug Sister      Alcohol/Drug Sister      Gastrointestinal Disease Sister      Obesity Sister      Hypertension Sister      Heart Disease Sister      Hypertension Sister      Heart Disease Daughter         afib     Arrhythmia Daughter      Multiple Sclerosis Daughter      Heart Disease Daughter         afib     Arrhythmia Daughter      Cancer Daughter         lymphoma     Aneurysm Mother        SOCIAL HISTORY:  Social History     Socioeconomic History     Marital status:      Spouse name: None     Number of children: None     Years of education: None     Highest education level: None   Occupational History     None   Social Needs     Financial resource strain: None     Food insecurity     Worry: None     Inability: None     Transportation needs     Medical: None     Non-medical: None   Tobacco  Use     Smoking status: Former Smoker     Packs/day: 1.00     Years: 14.00     Pack years: 14.00     Types: Cigarettes     Start date:      Quit date: 7/10/1972     Years since quittin.3     Smokeless tobacco: Never Used   Substance and Sexual Activity     Alcohol use: No     Drug use: No     Sexual activity: Yes     Partners: Female   Lifestyle     Physical activity     Days per week: None     Minutes per session: None     Stress: None   Relationships     Social connections     Talks on phone: None     Gets together: None     Attends [employer] service: None     Active member of club or organization: None     Attends meetings of clubs or organizations: None     Relationship status: None     Intimate partner violence     Fear of current or ex partner: None     Emotionally abused: None     Physically abused: None     Forced sexual activity: None   Other Topics Concern     Parent/sibling w/ CABG, MI or angioplasty before 65F 55M? No      Service Not Asked     Blood Transfusions Not Asked     Caffeine Concern Yes     Comment: 4 cups coffee daily     Occupational Exposure Not Asked     Hobby Hazards Not Asked     Sleep Concern No     Stress Concern No     Weight Concern Yes     Comment: gain 2lbs     Special Diet Yes     Comment: low sodium     Back Care Not Asked     Exercise Yes     Comment: walking, doing sittups, played [employer] ball in summer     Bike Helmet Not Asked     Seat Belt Yes     Self-Exams Not Asked   Social History Narrative     None       Physical Exam:  Vitals: There were no vitals taken for this visit.    General:  no apparent distress, normal body habitus, sitting upright.  ENT/Mouth:  membranes moist, no nasal discharge.  Normal head shape, no apparent injury or laceration.  Eyes:  no scleral icterus, normal conjunctivae.  No observed jaundice.  Neck:  no apparent neck swelling.   Chest/Lungs:  No breathing difficulty while speaking.  No audible wheezing.  No cough during  conversation.  Cardiovascular:  No obviously elevated jugular venous pressure.  No apparent edema bilaterally in LE.   Abdomen:  no obvious abdominal distention.   Extremities:  no apparent cyanosis.  Skin:  no xanthelasma.  No facial lacerations.  Neurologic:  Normal arm motion bilateral, no tremors.    Psychiatric:  Alert and oriented x3, calm demeanor    The rest of the comprehensive physical examination is deferred due to public health emergency video visit restrictions.        Recent Lab Results:  LIPID RESULTS:  Lab Results   Component Value Date    CHOL 116 01/07/2020    HDL 42 01/07/2020    LDL 52 01/07/2020    TRIG 111 01/07/2020    CHOLHDLRATIO 2.8 12/17/2014       LIVER ENZYME RESULTS:  Lab Results   Component Value Date    AST 24 10/05/2020    ALT 29 10/05/2020       CBC RESULTS:  Lab Results   Component Value Date    WBC 7.4 09/19/2020    RBC 4.28 (L) 09/19/2020    HGB 13.6 09/19/2020    HCT 39.5 (L) 09/19/2020    MCV 92 09/19/2020    MCH 31.8 09/19/2020    MCHC 34.4 09/19/2020    RDW 13.9 09/19/2020     09/19/2020       BMP RESULTS:  Lab Results   Component Value Date     10/16/2020    POTASSIUM 4.1 10/16/2020    CHLORIDE 106 10/16/2020    CO2 28 10/16/2020    ANIONGAP 3 10/16/2020    GLC 81 10/16/2020    BUN 16 10/16/2020    CR 1.29 (H) 10/16/2020    GFRESTIMATED 53 (L) 10/16/2020    GFRESTBLACK 62 10/16/2020    LORI 8.2 (L) 10/16/2020        A1C RESULTS:  Lab Results   Component Value Date    A1C 5.7 10/09/2012       INR RESULTS:  Lab Results   Component Value Date    INR 2.55 (H) 10/26/2020    INR 2.87 (H) 10/12/2020           CC  Parish Newman MD  [address]  [place] [address]

## 2020-11-09 NOTE — PROGRESS NOTES
ANTICOAGULATION MANAGEMENT     Patient Name:  Tyrel Rene  Date:  2020    ASSESSMENT /SUBJECTIVE:    Today's INR result of 2.84 is therapeutic. Goal INR of 2.0-3.0      Warfarin dose taken: Warfarin taken as instructed    Diet: No new diet changes affecting INR    Medication changes/ interactions: No new medications/supplements affecting INR, only change noted is patient is now taking full dose of entresto     Previous INR: Therapeutic 2.55    S/S of bleeding or thromboembolism: No    New injury or illness: No    Upcoming surgery, procedure or cardioversion: No    Additional findings: None      PLAN:    Telephone call with Sharekyler regarding INR result and instructed:     Warfarin Dosing Instructions: Continue your current warfarin dose 1.5mg daily    Instructed patient to follow up no later than: 3 weeks  Orders given to [facility] ([phone]), spoke with Edward    Education provided: Contact the anticoagulation clinic with any changes, questions or concerns at #[phone]       Sharel verbalizes understanding and agrees to warfarin dosing plan.    Instructed to call the Anticoagulation Clinic for any changes, questions or concerns. (#[phone])        Joana Mccoy RN CACP       OBJECTIVE:  Recent labs: (last 7 days)     20  1000   INR 2.84*         INR assessment THER    Recheck INR In: 3 WEEKS    INR Location Outside lab      Anticoagulation Summary  As of 2020    INR goal:  2.5-3.5   TTR:  57.0 % (11.6 mo)   INR used for dosin.84 (2020)   Warfarin maintenance plan:  1.5 mg (1 mg x 1.5) every day   Full warfarin instructions:  1.5 mg every day   Weekly warfarin total:  10.5 mg   No change documented:  Joana Mccoy RN   Plan last modified:  Sheila Cid RN (10/5/2020)   Next INR check:  2020   Priority:  High   Target end date:  Indefinite    Indications    Long-term (current) use of anticoagulants [Z79.01] [Z79.01]  Cerebral artery  occlusion with cerebral infarction (HCC) [I63.50] [I63.50]  Cerebral infarction (H) [I63.9]  Atrial fibrillation  unspecified type (H) [I48.91]             Anticoagulation Episode Summary     INR check location:      Preferred lab:  [facility]    Send INR reminders to:  HANS MCLEOD    Comments:  [facility] Patient goal should be 2.5-3.0 (5/13/20)      Anticoagulation Care Providers     Provider Role Specialty Phone number    Dejon Downs MD Referring Family Medicine [phone]

## 2020-11-10 NOTE — PROGRESS NOTES
" TELEPHONE VISIT    Tyrel Rene is a 75 year old male who is being evaluated via a billable telephone visit.      The patient has been notified of following:     \"This telephone visit will be conducted via a call between you and your physician/provider. Given concern for spread of COVID 19 we are minimizing in person clinic visits when possible. We have found that certain health care needs can be provided without the need for a physical exam.  This service lets us provide the care you need with a short phone conversation.  If a prescription is necessary we can send it directly to your pharmacy.  If lab work is needed we can place an order for that and you can then stop by our lab to have the test done at a later time. If during the course of the call the physician/provider feels a telephone visit is not appropriate, you will not be charged for this service.\"       I have reviewed and updated the patient's Past Medical History, Social History, Family History and Medication List.      MEDICATIONS:  Current Outpatient Medications   Medication Sig Dispense Refill     acetaminophen (TYLENOL) 500 MG tablet Take 1,000 mg by mouth every 8 hours as needed for mild pain       amiodarone (PACERONE) 200 MG tablet Take 1 tablet (200 mg) by mouth daily 90 tablet 1     ASPIRIN NOT PRESCRIBED (INTENTIONAL) continuous prn for other Please choose reason not prescribed, below       carvedilol (COREG) 6.25 MG tablet Take 1 tablet (6.25 mg) by mouth 2 times daily (with meals) 180 tablet 0     clopidogrel (PLAVIX) 75 MG tablet Take 1 tablet (75 mg) by mouth daily 90 tablet 3     digoxin (LANOXIN) 125 MCG tablet Take 1 tablet (125 mcg) by mouth six times a week Do not take Sundays 78 tablet 3     famotidine (PEPCID) 20 MG tablet TAKE 1 TABLET BY MOUTH TWICE A  tablet 3     ipratropium (ATROVENT) 0.03 % nasal spray Spray 2 sprays into both nostrils every 12 hours 30 mL 5     Multiple Vitamins-Minerals (PRESERVISION AREDS 2 PO) " "Take 1 capsule by mouth 2 times daily       nitroGLYcerin (NITROSTAT) 0.4 MG sublingual tablet DISSOLVE 1 TABLET UNDER THE TONGUE EVERY 5 MINUTES AS NEEDED FOR CHEST PAIN 25 tablet 0     rosuvastatin (CRESTOR) 20 MG tablet Take 1 tablet (20 mg) by mouth daily 90 tablet 0     sacubitril-valsartan (ENTRESTO) 24-26 MG per tablet Take 1 tablet by mouth 2 times daily 180 tablet 3     sildenafil (VIAGRA) 100 MG tablet Take 1 tablet (100 mg) by mouth daily as needed Take 30 min to 4 hours before intercourse.  Never use with nitroglycerin, terazosin or doxazosin. (Patient not taking: Reported on 10/6/2020) 16 tablet 3     Vitamin D, Cholecalciferol, 1000 UNITS TABS Take 1,000 mg by mouth daily        warfarin ANTICOAGULANT (COUMADIN) 1 MG tablet Take 1 & 1/2 tablets (1.5 mg) every day or as directed. 135 tablet 0     warfarin ANTICOAGULANT (COUMADIN) 2.5 MG tablet Take 2.5 mg on Mondays and 1.25mg all other days or as directed by the anticoagulation clinic 75 tablet 3       Vitals - Patient Reported 9/29/2020 10/28/2020 11/11/2020   Height (Patient Reported) - 6' 1\" 6' 1\"   Weight (Patient Reported) 172 lb 170 lb 12.8 oz 172 lb 6.4 oz   BMI (Based on Pt Reported Ht/Wt) - 22.53 kg/m2 22.75 kg/m2   Systolic (Patient Reported) 117 121 130   Diastolic (Patient Reported) 72 70 78   Pulse (Patient Reported) 65 64 64         ALLERGIES  Aspirin and Nystatin    Review Of Systems  Skin: negative  Eyes:  positive for glasses  Ears/Nose/Throat: negative  Respiratory:Dyspnea on exertion- walking  Cardiovascular: positive for Dizziness  Gastrointestinal: negative  Genitourinary: negative  Musculoskeletal: negative  Neurologic: negative  Psychiatric: negative  Hematologic/Lymphatic/Immunologic: negative  Endocrine: negative  (ROS TAKEN BY Catie LAMAS PRIOR TO VISIT)    Telephone:[phone]    Provider: Patient verbally consented to the phone service today: yes      CARDIOLOGY CLINIC / [facility] VISIT  (Heart Failure " Specialty)     This visit was completed via telephone due to COVID-19 precautions.    I had the opportunity to speak to Marko over the phone for a cardiology clinic visit.  Due to the COVID-19 pandemic, this visit was done as a telephone visit.    HISTORY OF PRESENT ILLNESS:  I had the opportunity to speak with Mr. Tyrel Rene today   Regarding his chronic systolic heart failure and recent issues with recurrent ventricular tachycardia and ICD shocks.      I reviewed his extensive history in my recent note of 08/05/2020 and he has been seen several times in the [facility] since then for updates.  Basically, he has had a VT ablation and continued to have episodes of ventricular tachycardia requiring initiation of amiodarone.  Shortly after the amiodarone was decreased to 6 days a week, he had another episode of ventricular tachycardia requiring ICD shock.  I referred him for coronary angiography and he underwent stenting of an obtuse marginal vessel which initially seemed to be helpful.  Unfortunately, he then had another brief syncopal episode which was not clearly explained and then some slow VT with heart rates of about 120.  His amiodarone has been increased back to 7 days a week.     Started Entresto 10/12/20, tolersated it well.      11/11/20 Today, he tells me that he has been doing well recently after initiating low-dose Entresto   He has been taking his blood pressures and they have been consistently in the 110-130 range systolic.  He continues to have dizziness that he clearly describes this as a vertigo-type balance issue, rather than lightheadedness.    ICD interrogation, 09/10/2020 revealed only a few brief episodes of nonsustained VT in addition to his usual atrial fibrillation.      His vital signs reported today the above results    Creat. 1.37       ROS:  12-pt ROS is negative except for as noted above.         DIAGNOSTIC STUDIES:  Creat. 1.37        IMPRESSIONS:  Mr. Tyrel Rene is a 75-year-old  gentleman with the following issues:   1.  Coronary artery disease with history of extensive anterior and apical infarction.  He has undergone bypass surgery and recently had stenting of a second obtuse marginal artery.  There was no other significant residual coronary artery disease identified after that procedure.  He is not experiencing any angina recently.   2.  Severe ischemic cardiomyopathy with an ejection fraction of about 25%-30%.   3.  Chronic systolic heart failure with mild shortness of breath with exertion, but no overt signs of volume overload, such as lower extremity edema or abdominal distention.  He denies orthopnea.  Some of this could be due to deconditioning as well.   4.  Chronic dizziness, not apparently due to low blood pressures or orthostatic symptoms.   5.  Recurrent sustained ventricular tachycardia requiring ICD shocks.  He seems to be relatively stable now on oral amiodarone 200 mg daily.   6.  Chronic atrial fibrillation, on warfarin anticoagulation for stroke prevention.   7.  Stage III chronic kidney disease with stable creatinine of about 1.37.   8.  Hypertension.   9.  Dyslipidemia.      He seems to be doing reasonably well.  I encouraged him to increase his activity level.  I also recommended that he increase his Entresto to 24/26 mg tablet: 1 tablet in am and 2 tablets in the pm.  We will follow up with him again in a few weeks to assess his tolerance of that medication and continue to increase it as possible based on his blood pressures.       GDMT:  Fluid status appeared euvolemic  ACEi/ARB: no  ARNI: yes, 10/12/20 started  Beta Blocker: yes  Aldosterone antagonist: no  SCD prophylaxis ICD no  Bi-V-ICD: yes  Anticoagulation: yes  Antiplatelet: yes  Amiodarone: yes   Lanoxin: yes 6 days per week.     Follow-up:   1. 11/30 labs via in home labs; I asked the nurse to fax the order.   2. Dec 2 telephone appt with Prince Galvez ( asked the  to call him to set up)     Thank  you for the opportunity to be involved in this very pleasant patient's care please feel to contact me with any questions.            I have reviewed the note as documented above.  This accurately captures the substance of my conversation with the patient.    Duration of Phone call: 22 minutes     Hayde BERRY CNP  Pager [phone]      cc:      Dejon Downs MD    [facility]   [address] [place] [address]

## 2020-11-11 NOTE — PATIENT INSTRUCTIONS
Call [facility] nurse for any questions or concerns Mon-Fri 8am-4pm:                                                [phone] [facility] nurse direct number                                 For concerns after hours: [phone]    Medication changes:   1. Increase Entresto to 1 tablet am and 2 tablets in the pm   Plan from today:   1. Call if dizzy or lightheaded.  2. Call if BP less than 90/60   3. Lab draw 11/30 through in home lab  4. Appt with Prince Galvez 12/2 (  will call you to schedule)

## 2020-11-11 NOTE — LETTER
"11/11/2020    Dejon Downs MD  [address]  [facility] [address]    RE: Tyrel Rene       Dear Colleague,    I had the pleasure of seeing Tyrel Rene in the [facility].     TELEPHONE VISIT    Tyrel Rene is a 75 year old male who is being evaluated via a billable telephone visit.      The patient has been notified of following:     \"This telephone visit will be conducted via a call between you and your physician/provider. Given concern for spread of COVID 19 we are minimizing in person clinic visits when possible. We have found that certain health care needs can be provided without the need for a physical exam.  This service lets us provide the care you need with a short phone conversation.  If a prescription is necessary we can send it directly to your pharmacy.  If lab work is needed we can place an order for that and you can then stop by our lab to have the test done at a later time. If during the course of the call the physician/provider feels a telephone visit is not appropriate, you will not be charged for this service.\"       I have reviewed and updated the patient's Past Medical History, Social History, Family History and Medication List.      MEDICATIONS:  Current Outpatient Medications   Medication Sig Dispense Refill     acetaminophen (TYLENOL) 500 MG tablet Take 1,000 mg by mouth every 8 hours as needed for mild pain       amiodarone (PACERONE) 200 MG tablet Take 1 tablet (200 mg) by mouth daily 90 tablet 1     ASPIRIN NOT PRESCRIBED (INTENTIONAL) continuous prn for other Please choose reason not prescribed, below       carvedilol (COREG) 6.25 MG tablet Take 1 tablet (6.25 mg) by mouth 2 times daily (with meals) 180 tablet 0     clopidogrel (PLAVIX) 75 MG tablet Take 1 tablet (75 mg) by mouth daily 90 tablet 3     digoxin (LANOXIN) 125 MCG tablet Take 1 tablet (125 mcg) by mouth six times a week Do not take Sundays 78 tablet 3     famotidine (PEPCID) " "20 MG tablet TAKE 1 TABLET BY MOUTH TWICE A  tablet 3     ipratropium (ATROVENT) 0.03 % nasal spray Spray 2 sprays into both nostrils every 12 hours 30 mL 5     Multiple Vitamins-Minerals (PRESERVISION AREDS 2 PO) Take 1 capsule by mouth 2 times daily       nitroGLYcerin (NITROSTAT) 0.4 MG sublingual tablet DISSOLVE 1 TABLET UNDER THE TONGUE EVERY 5 MINUTES AS NEEDED FOR CHEST PAIN 25 tablet 0     rosuvastatin (CRESTOR) 20 MG tablet Take 1 tablet (20 mg) by mouth daily 90 tablet 0     sacubitril-valsartan (ENTRESTO) 24-26 MG per tablet Take 1 tablet by mouth 2 times daily 180 tablet 3     sildenafil (VIAGRA) 100 MG tablet Take 1 tablet (100 mg) by mouth daily as needed Take 30 min to 4 hours before intercourse.  Never use with nitroglycerin, terazosin or doxazosin. (Patient not taking: Reported on 10/6/2020) 16 tablet 3     Vitamin D, Cholecalciferol, 1000 UNITS TABS Take 1,000 mg by mouth daily        warfarin ANTICOAGULANT (COUMADIN) 1 MG tablet Take 1 & 1/2 tablets (1.5 mg) every day or as directed. 135 tablet 0     warfarin ANTICOAGULANT (COUMADIN) 2.5 MG tablet Take 2.5 mg on Mondays and 1.25mg all other days or as directed by the anticoagulation clinic 75 tablet 3       Vitals - Patient Reported 9/29/2020 10/28/2020 11/11/2020   Height (Patient Reported) - 6' 1\" 6' 1\"   Weight (Patient Reported) 172 lb 170 lb 12.8 oz 172 lb 6.4 oz   BMI (Based on Pt Reported Ht/Wt) - 22.53 kg/m2 22.75 kg/m2   Systolic (Patient Reported) 117 121 130   Diastolic (Patient Reported) 72 70 78   Pulse (Patient Reported) 65 64 64         ALLERGIES  Aspirin and Nystatin    Review Of Systems  Skin: negative  Eyes:  positive for glasses  Ears/Nose/Throat: negative  Respiratory:Dyspnea on exertion- walking  Cardiovascular: positive for Dizziness  Gastrointestinal: negative  Genitourinary: negative  Musculoskeletal: negative  Neurologic: negative  Psychiatric: negative  Hematologic/Lymphatic/Immunologic: negative  Endocrine: " negative  (ROS TAKEN BY Catie LAMAS PRIOR TO VISIT)    Telephone:[phone]    Provider: Patient verbally consented to the phone service today: yes      CARDIOLOGY CLINIC / C.O.R.E. CLINIC VISIT  (Heart Failure Specialty)     This visit was completed via telephone due to COVID-19 precautions.    I had the opportunity to speak to Marko over the phone for a cardiology clinic visit.  Due to the COVID-19 pandemic, this visit was done as a telephone visit.    HISTORY OF PRESENT ILLNESS:  I had the opportunity to speak with Mr. Tyrel Rene today   Regarding his chronic systolic heart failure and recent issues with recurrent ventricular tachycardia and ICD shocks.      I reviewed his extensive history in my recent note of 08/05/2020 and he has been seen several times in the [facility].O.R.E. Clinic since then for updates.  Basically, he has had a VT ablation and continued to have episodes of ventricular tachycardia requiring initiation of amiodarone.  Shortly after the amiodarone was decreased to 6 days a week, he had another episode of ventricular tachycardia requiring ICD shock.  I referred him for coronary angiography and he underwent stenting of an obtuse marginal vessel which initially seemed to be helpful.  Unfortunately, he then had another brief syncopal episode which was not clearly explained and then some slow VT with heart rates of about 120.  His amiodarone has been increased back to 7 days a week.     Started Entresto 10/12/20, tolersated it well.      11/11/20 Today, he tells me that he has been doing well recently after initiating low-dose Entresto   He has been taking his blood pressures and they have been consistently in the 110-130 range systolic.  He continues to have dizziness that he clearly describes this as a vertigo-type balance issue, rather than lightheadedness.    ICD interrogation, 09/10/2020 revealed only a few brief episodes of nonsustained VT in addition to his usual atrial fibrillation.       His vital signs reported today the above results    Creat. 1.37       ROS:  12-pt ROS is negative except for as noted above.         DIAGNOSTIC STUDIES:  Creat. 1.37        IMPRESSIONS:  Mr. Tyrel Rene is a 75-year-old gentleman with the following issues:   1.  Coronary artery disease with history of extensive anterior and apical infarction.  He has undergone bypass surgery and recently had stenting of a second obtuse marginal artery.  There was no other significant residual coronary artery disease identified after that procedure.  He is not experiencing any angina recently.   2.  Severe ischemic cardiomyopathy with an ejection fraction of about 25%-30%.   3.  Chronic systolic heart failure with mild shortness of breath with exertion, but no overt signs of volume overload, such as lower extremity edema or abdominal distention.  He denies orthopnea.  Some of this could be due to deconditioning as well.   4.  Chronic dizziness, not apparently due to low blood pressures or orthostatic symptoms.   5.  Recurrent sustained ventricular tachycardia requiring ICD shocks.  He seems to be relatively stable now on oral amiodarone 200 mg daily.   6.  Chronic atrial fibrillation, on warfarin anticoagulation for stroke prevention.   7.  Stage III chronic kidney disease with stable creatinine of about 1.37.   8.  Hypertension.   9.  Dyslipidemia.      He seems to be doing reasonably well.  I encouraged him to increase his activity level.  I also recommended that he increase his Entresto to 24/26 mg tablet: 1 tablet in am and 2 tablets in the pm.  We will follow up with him again in a few weeks to assess his tolerance of that medication and continue to increase it as possible based on his blood pressures.       GDMT:  Fluid status appeared euvolemic  ACEi/ARB: no  ARNI: yes, 10/12/20 started  Beta Blocker: yes  Aldosterone antagonist: no  SCD prophylaxis ICD no  Bi-V-ICD: yes  Anticoagulation: yes  Antiplatelet: yes  Amiodarone:  yes   Lanoxin: yes 6 days per week.     Follow-up:   1. 11/30 labs via in home [facility]; I asked the nurse to fax the order.   2. Dec 2 telephone appt with Prince Galvez ( asked the  to call him to set up)     Thank you for the opportunity to be involved in this very pleasant patient's care please feel to contact me with any questions.    I have reviewed the note as documented above.  This accurately captures the substance of my conversation with the patient.    Duration of Phone call: 22 minutes     Hayde BERRY, CNP  Pager [phone]

## 2020-11-11 NOTE — PROGRESS NOTES
BMP, Pro NT- BNP order faxed to [facility] to be drawn on 11/30/20 prior to their 12/2/20 appt w/ Prince Galvez CNP.  Requested results be faxed back.     Future Appointments   Date Time Provider Department Center   12/2/2020 11:00 AM Hayde Galvez APRN CNP [facility]   12/10/2020 12:00 AM MARQUEZ DCR2 [facility]       Marizol Scales RN BSN  [facility] [place], [place]  C.O.R.E. Clinic Care Coordinator  11/11/20, 1:54 PM

## 2020-11-30 NOTE — PROGRESS NOTES
ANTICOAGULATION MANAGEMENT     Patient Name:  Tyrel Rene  Date:  2020    ASSESSMENT /SUBJECTIVE:    Today's INR result of 2.74 is therapeutic. Goal INR of 2.5-3.5      Warfarin dose taken: Warfarin taken as instructed    Diet: No new diet changes affecting INR    Medication changes/ interactions: No new medications/supplements affecting INR    Previous INR: Therapeutic     S/S of bleeding or thromboembolism: No    New injury or illness: No    Upcoming surgery, procedure or cardioversion: No    Additional findings: None      PLAN:    Telephone call with Tyrel regarding INR result and instructed:     Warfarin Dosing Instructions: Continue your current warfarin dose 1.5 mg daily    Instructed patient to follow up no later than: 4 weeks  Orders given to [facility] ([phone]), spoke with jeffrey    Education provided: None required      Marko verbalizes understanding and agrees to warfarin dosing plan.    Instructed to call the Anticoagulation Clinic for any changes, questions or concerns. (#[phone])        Shahnaz Montenegro RN      OBJECTIVE:  Recent labs: (last 7 days)     20  1130   INR 2.74*         No question data found.  Anticoagulation Summary  As of 2020    INR goal:  2.5-3.5   TTR:  63.0 % (11.6 mo)   INR used for dosin.74 (2020)   Warfarin maintenance plan:  1.5 mg (1 mg x 1.5) every day   Full warfarin instructions:  1.5 mg every day   Weekly warfarin total:  10.5 mg   No change documented:  Shahnaz Montenegro RN   Plan last modified:  Sheila Cid RN (10/5/2020)   Next INR check:  2020   Priority:  High   Target end date:  Indefinite    Indications    Long-term (current) use of anticoagulants [Z79.01] [Z79.01]  Cerebral artery occlusion with cerebral infarction (HCC) [I63.50] [I63.50]  Cerebral infarction (H) [I63.9]  Atrial fibrillation  unspecified type (H) [I48.91]             Anticoagulation Episode Summary     INR check location:      Preferred  lab:  EXTERNAL LAB    Send INR reminders to:  HANS MCLEOD    Comments:  [facility] Patient goal should be 2.5-3.0 (5/13/20)      Anticoagulation Care Providers     Provider Role Specialty Phone number    Dejon Downs MD Referring Family Medicine [phone]

## 2020-12-02 NOTE — LETTER
"12/2/2020    Dejon Downs MD  [address]  [facility] [address]    RE: Tyrel Rene       Dear Colleague,    I had the pleasure of seeing Tyrel Rene in the [facility].    Tyrel Rene is a 75 year old male who is being evaluated via a billable telephone visit.      The patient has been notified of following:     \"This telephone visit will be conducted via a call between you and your physician/provider. We have found that certain health care needs can be provided without the need for a physical exam.  This service lets us provide the care you need with a short phone conversation.  If a prescription is necessary we can send it directly to your pharmacy.  If lab work is needed we can place an order for that and you can then stop by our lab to have the test done at a later time.    Telephone visits are billed at different rates depending on your insurance coverage. During this emergency period, for some insurers they may be billed the same as an in-person visit.  Please reach out to your insurance provider with any questions.    If during the course of the call the physician/provider feels a telephone visit is not appropriate, you will not be charged for this service.\"    Patient has given verbal consent for Telephone visit?  Yes    What phone number would you like to be contacted at? [phone]    How would you like to obtain your AVS? MyChart    Review Of Systems  Skin: NEGATIVE  Eyes:Ears/Nose/Throat: Glasses  Respiratory: SOB with some activity, unchanged since LOV  Cardiovascular: Dizzy all the time, unchanged since LOV  Gastrointestinal: NEGATIVE  Genitourinary:NEGATIVE  Musculoskeletal: NEGATIVE  Neurologic:Stroke 2011, head injury in 2014, numbness/tinglining in R hand, unchanged since LOV  Psychiatric: NEGATIVE  Hematologic/Lymphatic/Immunologic: easy bruising from Coumadin  Endocrine:  NEGATIVE    Telephone number of patient: [phone]    Vitals - " "Patient Reported  Systolic (Patient Reported): 122  Diastolic (Patient Reported): 67  Weight (Patient Reported): 78.9 kg (174 lb)  Height (Patient Reported): 185.4 cm (6' 1\")  Pulse (Patient Reported): 64    TBotsTAD platt(AAMA) 12/3/20      CARDIOLOGY CLINIC / C.O.R.E. CLINIC VISIT  (Heart Failure Specialty)  DOS: 20    Tyrel Rene  : 1945  MRN: [id number]    Primary Cardiologist: Dr. Newman     Reason for Visit: Entresto titration/ HFrEF    This visit was completed via telephone due to COVID-19 precautions.     I had the opportunity to speak to Marko over the phone for a cardiology clinic visit.  Due to the COVID-19 pandemic, this visit was done as a telephone visit.     HISTORY OF PRESENT ILLNESS:  I had the opportunity to speak with Mr. Tyrel Rene today regarding his chronic systolic heart failure.     He has a history of chronic severe ischemic cardiomyopathy, chronic systolic heart failure and chronic atrial fibrillation.  Recently, he has had recurrent ventricular tachycardia requiring ICD shocks as well.      He has had a VT ablation and continued to have episodes of ventricular tachycardia requiring initiation of amiodarone.  Shortly after the amiodarone was decreased to 6 days a week, he had another episode of ventricular tachycardia requiring ICD shock.    He was referred for coronary angiography and he underwent stenting of an obtuse marginal vessel which initially seemed to be helpful.  Unfortunately, he then had another brief syncopal episode which was not clearly explained and then some slow VT with heart rates of about 120.  His amiodarone has been increased back to 7 days a week.      Started Entresto 10/12/20, tolerated it well.      20 Today, he tells me that he has been doing well. Blood pressures consistently in the 110-130 range systolic.  He continues to have dizziness that he clearly describes this as a vertigo-type balance issue, rather than lightheadedness. No change in " the dizziness since starting Entresto. Denies chest pain, chest pressure, neck or arm pain.   ICD interrogation, 09/10/2020 revealed only a few brief episodes of nonsustained VT in addition to his usual atrial fibrillation.     Denies any recent defibrillator shocks.  BP today 122/67.   Creat. 1.40     I have reviewed and updated the patient's Past Medical History, Social History, Family History and Medication List.        CURRENT MEDICATIONS:  Current Outpatient Medications   Medication Sig Dispense Refill     acetaminophen (TYLENOL) 500 MG tablet Take 1,000 mg by mouth every 8 hours as needed for mild pain       amiodarone (PACERONE) 200 MG tablet Take 1 tablet (200 mg) by mouth daily 90 tablet 1     ASPIRIN NOT PRESCRIBED (INTENTIONAL) continuous prn for other Please choose reason not prescribed, below       carvedilol (COREG) 6.25 MG tablet Take 1 tablet (6.25 mg) by mouth 2 times daily (with meals) 180 tablet 0     clopidogrel (PLAVIX) 75 MG tablet Take 1 tablet (75 mg) by mouth daily 90 tablet 3     digoxin (LANOXIN) 125 MCG tablet Take 1 tablet (125 mcg) by mouth six times a week Do not take Sundays 78 tablet 3     famotidine (PEPCID) 20 MG tablet TAKE 1 TABLET BY MOUTH TWICE A  tablet 3     ipratropium (ATROVENT) 0.03 % nasal spray Spray 2 sprays into both nostrils every 12 hours 30 mL 5     Multiple Vitamins-Minerals (PRESERVISION AREDS 2 PO) Take 1 capsule by mouth 2 times daily       nitroGLYcerin (NITROSTAT) 0.4 MG sublingual tablet DISSOLVE 1 TABLET UNDER THE TONGUE EVERY 5 MINUTES AS NEEDED FOR CHEST PAIN 25 tablet 0     rosuvastatin (CRESTOR) 20 MG tablet Take 1 tablet (20 mg) by mouth daily 90 tablet 0     sacubitril-valsartan (ENTRESTO) 24-26 MG per tablet 1 tablet in am and 2 tablets in pm or as directed 270 tablet 1     sildenafil (VIAGRA) 100 MG tablet Take 1 tablet (100 mg) by mouth daily as needed Take 30 min to 4 hours before intercourse.  Never use with nitroglycerin, terazosin or  doxazosin. 16 tablet 3     Vitamin D, Cholecalciferol, 1000 UNITS TABS Take 1,000 mg by mouth daily        warfarin ANTICOAGULANT (COUMADIN) 1 MG tablet Take 1 & 1/2 tablets (1.5 mg) every day or as directed. 135 tablet 0     warfarin ANTICOAGULANT (COUMADIN) 2.5 MG tablet Take 2.5 mg on Mondays and 1.25mg all other days or as directed by the anticoagulation clinic 75 tablet 3       ALLERGIES     Allergies   Allergen Reactions     Aspirin      Other reaction(s): Aspirin Contraindication (for reporting)  Cardiologist recommended no aspirin as he is on Pradaxa     Nystatin Other (See Comments)     Make his mouth numb & swelling       SOCIAL HISTORY: lives with his wife.     ROS:  12-pt ROS is negative except for as noted above.        DIAGNOSTIC STUDIES:  Recent Lab Results:  Results for STEVE ELIZALDE (MRN [id number]) as of 12/2/2020 11:07   Ref. Range 11/30/2020 11:30   Sodium Latest Ref Range: 133 - 144 mmol/L 135   Potassium Latest Ref Range: 3.4 - 5.3 mmol/L 3.8   Chloride Latest Ref Range: 94 - 109 mmol/L 103   Carbon Dioxide Latest Ref Range: 20 - 32 mmol/L 26   Urea Nitrogen Latest Ref Range: 7 - 30 mg/dL 22   Creatinine Latest Ref Range: 0.66 - 1.25 mg/dL 1.40 (H)   GFR Estimate Latest Ref Range: >60 mL/min/1.73_m2 48 (L)   GFR Estimate If Black Latest Ref Range: >60 mL/min/1.73_m2 56 (L)   Calcium Latest Ref Range: 8.5 - 10.1 mg/dL 8.4 (L)   Anion Gap Latest Ref Range: 3 - 14 mmol/L 6   N-Terminal Pro Bnp Latest Ref Range: 0 - 450 pg/mL 2,788 (H)           BMP RESULTS:  Lab Results   Component Value Date    CHOL 116 01/07/2020    HDL 42 01/07/2020    LDL 52 01/07/2020    TRIG 111 01/07/2020    CHOLHDLRATIO 2.8 12/17/2014 9/2020 Echocardiography:  The rhythm was paced.  There is anterior, septal, and apical wall akinesis.(thinned)  The left ventricle is moderately dilated.  The visual ejection fraction is estimated at 20-25%.  There is no thrombus seen in the left ventricle.  Mild aortic root  dilatation.  The right ventricular systolic pressure is approximated at 35mmHg plus the  right atrial pressure.  The inferior vena cava is normal.  Compared to the prior study dated 7/29/20, there have been no changes.           IMPRESSION:    Mr. Tyrel Rene is a 75-year-old gentleman with the following issues:   1.  Coronary artery disease with history of extensive anterior and apical infarction.  He has undergone bypass surgery and recently had stenting of a second obtuse marginal artery.  There was no other significant residual coronary artery disease identified after that procedure.  He is not experiencing any angina recently.   2.  Severe ischemic cardiomyopathy with an ejection fraction of about 25%-30%.   3.  Chronic systolic heart failure with mild shortness of breath with exertion, but no overt signs of volume overload, such as lower extremity edema or abdominal distention.  He denies orthopnea.  Some of this could be due to deconditioning as well.   4.  Chronic dizziness, not apparently due to low blood pressures or orthostatic symptoms.   5.  Recurrent sustained ventricular tachycardia requiring ICD shocks.  He seems to be relatively stable now on oral amiodarone 200 mg daily.   6.  Chronic atrial fibrillation, on warfarin anticoagulation for stroke prevention.   7.  Stage III chronic kidney disease with stable creatinine of about 1.37.   8.  Hypertension.   9.  Dyslipidemia.      He seems to be doing reasonably well.  Tolerated initiation of Entresto, current dose 24/26 mg tablet: 1 tablet in am and 2 tablets in the pm.Creat 1.4 stable. NT BNP 2788.   Plan:  --continue Entresto 1 tablet in am and 2 tablet pm  --call [facility] nurse if BP < 100 systolic or change in symptoms.           GDMT:  Fluid status appeared euvolemic  ACEi/ARB: no  ARNI: yes, 10/12/20 started  Beta Blocker: yes  Aldosterone antagonist: no  SCD prophylaxis ICD no  Bi-V-ICD: yes  Anticoagulation: yes  Antiplatelet: yes  Amiodarone: yes    Lanoxin: yes 6 days per week.      Follow-up:   1. Device check 12/10/20   2. Return visit with Dr. Newman in Jan. 2021 with a BMP, FLP   3. Follow up with Prince Galvez in April.        Thank you for the opportunity to be involved in this very pleasant patient's care please feel to contact me with any questions.      Hayde BERRY CNP   [facility]  Pager: [phone]  Text Page  (7:30am - 4pm M-F)         Phone call duration: 22 minutes minutes        Thank you for allowing me to participate in the care of your patient.    Sincerely,     JAMAL Christie CNP     [facility]

## 2020-12-02 NOTE — PROGRESS NOTES
Called and update Marko that I will fax BMP, FLP orders to have labs drawn on 1/18/20 by [facility] prior to his appt w/ Dr. Newman.  Will fax orders as it gets closer to appt so they do not lose the orders.  Sent myself reminder.  Marko voices understanding and denies further questions or concerns at this time.      Marizol Scales RN BSN   [place], [place]  CBALJINDER. Clinic Care Coordinator  12/02/20, 2:04 PM

## 2020-12-02 NOTE — PATIENT INSTRUCTIONS
Call [facility] nurse for any questions or concerns Mon-Fri 8am-4pm:                                                  [phone]                                       For concerns after hours:                                                 [phone]     Medication changes: no changes  Plan from today:   1. See Dr. Newman in Jan.   2. Call [facility] nurse Marizol, if BP < 100 or new symptoms.

## 2020-12-02 NOTE — PROGRESS NOTES
"Tyrel Rene is a 75 year old male who is being evaluated via a billable telephone visit.      The patient has been notified of following:     \"This telephone visit will be conducted via a call between you and your physician/provider. We have found that certain health care needs can be provided without the need for a physical exam.  This service lets us provide the care you need with a short phone conversation.  If a prescription is necessary we can send it directly to your pharmacy.  If lab work is needed we can place an order for that and you can then stop by our lab to have the test done at a later time.    Telephone visits are billed at different rates depending on your insurance coverage. During this emergency period, for some insurers they may be billed the same as an in-person visit.  Please reach out to your insurance provider with any questions.    If during the course of the call the physician/provider feels a telephone visit is not appropriate, you will not be charged for this service.\"    Patient has given verbal consent for Telephone visit?  Yes    What phone number would you like to be contacted at? [phone]    How would you like to obtain your AVS? MyChart    Review Of Systems  Skin: NEGATIVE  Eyes:Ears/Nose/Throat: Glasses  Respiratory: SOB with some activity, unchanged since LOV  Cardiovascular: Dizzy all the time, unchanged since LOV  Gastrointestinal: NEGATIVE  Genitourinary:NEGATIVE  Musculoskeletal: NEGATIVE  Neurologic:Stroke 2011, head injury in 2014, numbness/tinglining in R hand, unchanged since LOV  Psychiatric: NEGATIVE  Hematologic/Lymphatic/Immunologic: easy bruising from Coumadin  Endocrine:  NEGATIVE    Telephone number of patient: [phone]    Vitals - Patient Reported  Systolic (Patient Reported): 122  Diastolic (Patient Reported): 67  Weight (Patient Reported): 78.9 kg (174 lb)  Height (Patient Reported): 185.4 cm (6' 1\")  Pulse (Patient Reported): 64    [place], " TAD([facility]) 12/3/20      CARDIOLOGY CLINIC / C.O.R.E. CLINIC VISIT  (Heart Failure Specialty)  DOS: 20    Tyrel Rene  : 1945  MRN: [id number]    Primary Cardiologist: Dr. Newman     Reason for Visit: Entresto titration/ HFrEF    This visit was completed via telephone due to COVID-19 precautions.     I had the opportunity to speak to Marko over the phone for a cardiology clinic visit.  Due to the COVID-19 pandemic, this visit was done as a telephone visit.     HISTORY OF PRESENT ILLNESS:  I had the opportunity to speak with Mr. Tyrel Rene today regarding his chronic systolic heart failure.     He has a history of chronic severe ischemic cardiomyopathy, chronic systolic heart failure and chronic atrial fibrillation.  Recently, he has had recurrent ventricular tachycardia requiring ICD shocks as well.      He has had a VT ablation and continued to have episodes of ventricular tachycardia requiring initiation of amiodarone.  Shortly after the amiodarone was decreased to 6 days a week, he had another episode of ventricular tachycardia requiring ICD shock.    He was referred for coronary angiography and he underwent stenting of an obtuse marginal vessel which initially seemed to be helpful.  Unfortunately, he then had another brief syncopal episode which was not clearly explained and then some slow VT with heart rates of about 120.  His amiodarone has been increased back to 7 days a week.      Started Entresto 10/12/20, tolerated it well.      20 Today, he tells me that he has been doing well. Blood pressures consistently in the 110-130 range systolic.  He continues to have dizziness that he clearly describes this as a vertigo-type balance issue, rather than lightheadedness. No change in the dizziness since starting Entresto. Denies chest pain, chest pressure, neck or arm pain.   ICD interrogation, 09/10/2020 revealed only a few brief episodes of nonsustained VT in addition to his usual atrial  fibrillation.     Denies any recent defibrillator shocks.  BP today 122/67.   Creat. 1.40     I have reviewed and updated the patient's Past Medical History, Social History, Family History and Medication List.        CURRENT MEDICATIONS:  Current Outpatient Medications   Medication Sig Dispense Refill     acetaminophen (TYLENOL) 500 MG tablet Take 1,000 mg by mouth every 8 hours as needed for mild pain       amiodarone (PACERONE) 200 MG tablet Take 1 tablet (200 mg) by mouth daily 90 tablet 1     ASPIRIN NOT PRESCRIBED (INTENTIONAL) continuous prn for other Please choose reason not prescribed, below       carvedilol (COREG) 6.25 MG tablet Take 1 tablet (6.25 mg) by mouth 2 times daily (with meals) 180 tablet 0     clopidogrel (PLAVIX) 75 MG tablet Take 1 tablet (75 mg) by mouth daily 90 tablet 3     digoxin (LANOXIN) 125 MCG tablet Take 1 tablet (125 mcg) by mouth six times a week Do not take Sundays 78 tablet 3     famotidine (PEPCID) 20 MG tablet TAKE 1 TABLET BY MOUTH TWICE A  tablet 3     ipratropium (ATROVENT) 0.03 % nasal spray Spray 2 sprays into both nostrils every 12 hours 30 mL 5     Multiple Vitamins-Minerals (PRESERVISION AREDS 2 PO) Take 1 capsule by mouth 2 times daily       nitroGLYcerin (NITROSTAT) 0.4 MG sublingual tablet DISSOLVE 1 TABLET UNDER THE TONGUE EVERY 5 MINUTES AS NEEDED FOR CHEST PAIN 25 tablet 0     rosuvastatin (CRESTOR) 20 MG tablet Take 1 tablet (20 mg) by mouth daily 90 tablet 0     sacubitril-valsartan (ENTRESTO) 24-26 MG per tablet 1 tablet in am and 2 tablets in pm or as directed 270 tablet 1     sildenafil (VIAGRA) 100 MG tablet Take 1 tablet (100 mg) by mouth daily as needed Take 30 min to 4 hours before intercourse.  Never use with nitroglycerin, terazosin or doxazosin. 16 tablet 3     Vitamin D, Cholecalciferol, 1000 UNITS TABS Take 1,000 mg by mouth daily        warfarin ANTICOAGULANT (COUMADIN) 1 MG tablet Take 1 & 1/2 tablets (1.5 mg) every day or as directed. 135  tablet 0     warfarin ANTICOAGULANT (COUMADIN) 2.5 MG tablet Take 2.5 mg on Mondays and 1.25mg all other days or as directed by the anticoagulation clinic 75 tablet 3       ALLERGIES     Allergies   Allergen Reactions     Aspirin      Other reaction(s): Aspirin Contraindication (for reporting)  Cardiologist recommended no aspirin as he is on Pradaxa     Nystatin Other (See Comments)     Make his mouth numb & swelling       SOCIAL HISTORY: lives with his wife.     ROS:  12-pt ROS is negative except for as noted above.        DIAGNOSTIC STUDIES:  Recent Lab Results:  Results for STEVE ELIZALDE (MRN [id number]) as of 12/2/2020 11:07   Ref. Range 11/30/2020 11:30   Sodium Latest Ref Range: 133 - 144 mmol/L 135   Potassium Latest Ref Range: 3.4 - 5.3 mmol/L 3.8   Chloride Latest Ref Range: 94 - 109 mmol/L 103   Carbon Dioxide Latest Ref Range: 20 - 32 mmol/L 26   Urea Nitrogen Latest Ref Range: 7 - 30 mg/dL 22   Creatinine Latest Ref Range: 0.66 - 1.25 mg/dL 1.40 (H)   GFR Estimate Latest Ref Range: >60 mL/min/1.73_m2 48 (L)   GFR Estimate If Black Latest Ref Range: >60 mL/min/1.73_m2 56 (L)   Calcium Latest Ref Range: 8.5 - 10.1 mg/dL 8.4 (L)   Anion Gap Latest Ref Range: 3 - 14 mmol/L 6   N-Terminal Pro Bnp Latest Ref Range: 0 - 450 pg/mL 2,788 (H)           BMP RESULTS:  Lab Results   Component Value Date    CHOL 116 01/07/2020    HDL 42 01/07/2020    LDL 52 01/07/2020    TRIG 111 01/07/2020    CHOLHDLRATIO 2.8 12/17/2014 9/2020 Echocardiography:  The rhythm was paced.  There is anterior, septal, and apical wall akinesis.(thinned)  The left ventricle is moderately dilated.  The visual ejection fraction is estimated at 20-25%.  There is no thrombus seen in the left ventricle.  Mild aortic root dilatation.  The right ventricular systolic pressure is approximated at 35mmHg plus the  right atrial pressure.  The inferior vena cava is normal.  Compared to the prior study dated 7/29/20, there have been no  changes.           IMPRESSION:    Mr. Tyrel Rene is a 75-year-old gentleman with the following issues:   1.  Coronary artery disease with history of extensive anterior and apical infarction.  He has undergone bypass surgery and recently had stenting of a second obtuse marginal artery.  There was no other significant residual coronary artery disease identified after that procedure.  He is not experiencing any angina recently.   2.  Severe ischemic cardiomyopathy with an ejection fraction of about 25%-30%.   3.  Chronic systolic heart failure with mild shortness of breath with exertion, but no overt signs of volume overload, such as lower extremity edema or abdominal distention.  He denies orthopnea.  Some of this could be due to deconditioning as well.   4.  Chronic dizziness, not apparently due to low blood pressures or orthostatic symptoms.   5.  Recurrent sustained ventricular tachycardia requiring ICD shocks.  He seems to be relatively stable now on oral amiodarone 200 mg daily.   6.  Chronic atrial fibrillation, on warfarin anticoagulation for stroke prevention.   7.  Stage III chronic kidney disease with stable creatinine of about 1.37.   8.  Hypertension.   9.  Dyslipidemia.      He seems to be doing reasonably well.  Tolerated initiation of Entresto, current dose 24/26 mg tablet: 1 tablet in am and 2 tablets in the pm.Creat 1.4 stable. NT BNP 2788.   Plan:  --continue Entresto 1 tablet in am and 2 tablet pm  --call CORE nurse if BP < 100 systolic or change in symptoms.           GDMT:  Fluid status appeared euvolemic  ACEi/ARB: no  ARNI: yes, 10/12/20 started  Beta Blocker: yes  Aldosterone antagonist: no  SCD prophylaxis ICD no  Bi-V-ICD: yes  Anticoagulation: yes  Antiplatelet: yes  Amiodarone: yes   Lanoxin: yes 6 days per week.      Follow-up:   1. Device check 12/10/20   2. Return visit with Dr. Newman in Jan. 2021 with a BMP, FLP   3. Follow up with Prince Galvez in April.        Thank you for the  opportunity to be involved in this very pleasant patient's care please feel to contact me with any questions.      Hayde BERRY CNP   [facility] - Heart Clinic  Pager: [phone]  Text Page  (7:30am - 4pm M-F)         Phone call duration: 22 minutes minutes    JAMAL Christie CNP

## 2020-12-09 NOTE — PROGRESS NOTES
"Spoke to Marko regarding on how he is tolerating Entresto. He reports he feels well. SBP has been in the 120s. He continues to be dizzy when he is standing and walking. He describes it \"like a drunk walking past a \". Dizziness is not new nor is it worse since starting Entresto.     Sheila Strickland RN 4:15 PM 12/09/20    "

## 2020-12-10 NOTE — PROGRESS NOTES
Received alert from [facility] pharmacy stating Marko's insurance will only cover 2 tablets/day w/ Entresto.      His current Entresto dose is 24-26 mg.  He's to take 1 tablet in AM and 2 tablets in PM.      Marizol Scales RN BSN   [place], [place]  DAYSI. Clinic Care Coordinator  12/10/20, 9:45 AM

## 2020-12-10 NOTE — PROGRESS NOTES
Marizol Scales RN BSANSON   [place], [place]  MARI Clinic Care Coordinator  12/10/20, 1:07 PM

## 2020-12-10 NOTE — TELEPHONE ENCOUNTER
"Pt had routine remote device check completed today.  On review of arrhythmias, pt has had  25,223 ventricular arrhythmias recorded since setting changes on 9/21/20 with 18 NSVT and 12 VT-1 EGMs available.    - 18 available NSVT EGMs show 3-12 beats NSVT in the 120-170's.    - 12 available VT-1 EGMs show VT lasting from 39s to the longest at 12m36s. All VT-1 episodes show frequent short bursts of NSVT lasting 4-9 beats that are back to back in the 150-180's (see below for examples).        No therapies were delivered during any episode.    Per previous in-clinic checks, pt has no junctional escape rhythm at VVI 30.   HRs per histogram range from 60-90 with bump on histogram from 150-170 correlating with rates of ventricular arrhythmias (see below for histogram).    Pt's BiVP has decreased from 100% to RVP: 94%, LVP: 93% due to ventricular arrhythmias.     Pt has history of VT ablation and had recurrent VT post ablation.  He is on Amiodarone at 200mg daily following shock x 2 in 7/2020. Called and spoke with patient and he said that over all he has been feeling pretty good, but he will occasionally get the same sensation he had prior to getting shocked where he will get a head rush in the face and then his hands and arms will get tingly.  Pt states he has not missed any doses of his medications.        Per Dr. Costello [facility] consult note on 9/21/20 during pt's most recent hospitalization following loss of conciousness, pt's history is as follows:  \" [Marko] received an ICD shock in 11/2017 (for fast VT with syncope) and amiodarone was started.  Unfortunately, he later developed VF storm and received 7 ICD shocks in 11/2018.  He underwent catheter ablation of VT in January 2019.  He had inducible fast VT at 220 bpm.  Substrate modification was performed mostly in the basal anterior LV.  He remained VT-free and amiodarone dose was decreased to 200 mg 6 days/week in 05/2020.  He had recurrence of VT with syncope and 2 ICD " "shocks in 07/2020.  Amiodarone was increased again to 200 mg daily.     Since early September we have recorded more than his usual nonsustained VT through his device.  Then on Saturday, 9/19/2020 he was sitting at his recliner when he lost consciousness.  His wife stated that he then jerked like as he had received a shock from his ICD.  He was out for less than 1 minute.     In the emergency room ICD interrogation did not show sustained ventricular arrhythmia or ICD discharge.  However he did have runs of wide QRS tachycardia recorded on ECG.\"    The rate smoothing algorithm was turned off in the hospital since it was felt like this may have been contributing to patient's NSVT episodes.      Recommendations per Pravin on 9/21/20 were as follows:   1. Reprogram ICD to detect VT at 130 bpm.  This will be in the monitor zone only.    2. Inactivate the \"rate smoothing algorithm\".    3. If the patient continues to have nearly incessant NSVT, we would consider catheter ablation under general anesthesia.    Will route update to Dr. Costello for review and include primary cardiologist Dr. Newman.      Yaron RN        Therapy Parameters:         Histogram showing bump in rates for ventricular arrhythmias:         1st Example of VT-1 episode:               2nd episode of VT-1 episode (episode logged as 12m36s):               "

## 2020-12-11 NOTE — TELEPHONE ENCOUNTER
Spoke with Dr. Costello and he stated this could be a video visit or an in-person.  Called and spoke with patient and he is unable to complete a video visit (has tried on his laptop in the past and it has not worked and he has an old cellphone).  Pt would prefer to come into clinic.  Scheduled him to see Dr. Costello on 12/21/20 at 3:15.  Patient notified of visitor restrictions prior to visit.  Patient also instructed to avoid driving.  Patient verbalized understanding and agreement with plan and stated he has not been driving.     AGAPITO Marrufo

## 2020-12-11 NOTE — TELEPHONE ENCOUNTER
Thank you Kelly.  He is having lots of NSVT events...  I am afraid he may get more shocks.  Unfortunately, this is an elusive/difficult target for ablation.  Please arrange for f/up apt with me.    Adding mexiletine is an option.  He should avoid driving.     DI

## 2020-12-11 NOTE — TELEPHONE ENCOUNTER
Received a message on the triage line from after hours yesterday from a pharmacist at [facility] with concern for a drug contraindication with lisinopril.  Called and spoke to pharmacist to update  lisinopril was discontinued 10/6/20 by Prince Galvez CNP and was started on entresto. Pharmacist veirfied the lisinopril has been removed from the medication profile.

## 2020-12-14 NOTE — TELEPHONE ENCOUNTER
Spoke to daughter Laney and discussed that pt can either come into clinic or stay at home and explained that we need to have a reliable phone and laptop and to be done at home, but if pt would like to come in and have face to face and have wife and daughter listen in the is possible also.  Daughter will take to pt and wife and see what they would like to do. Will call back either A Fib nurse or Device nurse. Cassia

## 2020-12-14 NOTE — TELEPHONE ENCOUNTER
Pt daughter Mary called and stated that she wanted to talk about pt OV on Monday with Dr Costello as she needs to coordinate his ride.  Also has other questions for Dr Costello.  LM for pt felix Hernandez to call back. Cassia

## 2020-12-14 NOTE — PROGRESS NOTES
Received call on CORE RN line from wife Noris wanting to confirm Marko is to take the smaller dose of Entresto 24-26 tablet in am and then 1 of the larger dose pills (49-51 mg) in evening.  Confirmed with Noris that is correct.  She voices understanding and denies further questions or concerns at this time.     Marizol Scales, RN BSN   [facility] [place], [place]  C.O.R.E. Clinic Care Coordinator  12/14/20, 12:37 PM

## 2020-12-21 NOTE — LETTER
"12/21/2020      Dejon Downs MD  [address]  [facility] [address]      RE: Tyrel Rene       Dear Colleague,    I had the pleasure of seeing Tyrel Rene in the [facility].    Service Date: 12/21/2020      HISTORY OF PRESENT ILLNESS:    I had the pleasure seeing Mr. Marko Rene, a delightful 75-year-old gentleman, for ventricular tachycardia.      Marko has the following cardiac/medical problems:   A.  Severe ischemic cardiomyopathy after a large anterior MI several years ago.  Three-vessel CABG in 2012.  Most recent LVEF 25%.  Recent PCI of OM2 (08/2020).   B.  Unstable ventricular tachyarrhythmias.  ICD shock in 11/2017.  Amiodarone started.  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  VT ablation on 01/02/2019.  Amiodarone decreased to 6 days per week in 05/2020.  Recurrence of VT with syncope and 2 shocks in 07/2020.  Syncope without documented VT but with multiple nonsustained episodes of VT documented during hospital admission in 09/2020.  Partly related to a \"smoothing out\" algorithm incorporated in his ICD.  NSVT events decreased after the algorithm was disabled, though recent interrogation showed 25,000 nonsustained VT episodes in 80 days.   C.  Permanent atrial fibrillation and complete AV block.  On warfarin.  Prior atrial flutter ablation (2011).   D.  CRT ICD ([employer]).   E.  CVA following atrial flutter ablation in 2011.      I requested to see Marko today because of a recent Device Clinic check which showed 25,000 episodes of nonsustained VT in the past 80 days.  I became concerned he may on be the brink of developing sustained VT requiring more ICD shocks.  Marko has not been driving as per our instructions.      Overall, Marko has felt well recently;  no chest pain, unusual dyspnea, weight gain, lower extremity edema or other cardiac symptoms.        PHYSICAL EXAMINATION:   VITAL SIGNS:  Blood pressure 131/82, pulse 65 and regular, weight 83 kg, " height 185 cm.   GENERAL:  He is an extremely pleasant gentleman in no distress.   HEENT:  Head normocephalic.   NECK:  Supple with normal JVP.   LUNGS:  Clear.   CARDIOVASCULAR:  Regular rhythm without gallop, murmur or rub.   ABDOMEN:  Soft, nontender.   EXTREMITIES:  Warm and well perfused.        DIAGNOSTIC STUDIES:    Most recent echocardiogram in 2020 showed EF 20%-25% with anterior septal and apical akinesis and wall thinning, moderate LV enlargement.  Normal IVC.        IMPRESSION:   1.  Ventricular tachycardia.  Frequent nonsustained VT despite amiodarone 200 mg daily, on average >300 runs per day.   2.  Chronic ischemic cardiomyopathy, EF 20%-25%.  Chronic systolic CHF, NYHA class II-III.  Currently compensated.    3.  Permanent atrial fibrillation.  Complete AV block.  CRT-D in place.  On warfarin.      RECOMMENDATIONS:   A.  High NSVT burden and risk of developing sustained VT requiring ICD shocks.  Already on amiodarone 200 mg daily.  Repeat VT ablation given this type of substrate (nonsustained VT) may not be successful.  I recommended a trial of mexiletine at 150 mg b.i.d. (will increase to t.i.d. as needed) and keep monitoring VT burden via his ICD.   B.  I discussed with the patient potential side-effects of mexiletine (tremor, GI upset, nausea, diarrhea) and encouraged him to give us a call should he experience such issues.   C.  Follow-up with DAVE in 3-4 months to reassess VT burden and clinical response to mexiletine.      It was my pleasure seeing this delightful gentleman.   Time spent today was 25 minutes, more than 50% of the time was discussion.        ANN-MARIE FRANK MD, FACC           cc:   [place] [address]             D: 2020   T: 2020   MT: BRENT      Name:     ARTEM ELIZALDE   MRN:      [id number]        Account:      [id number]   :      1945           Service  Date: 12/21/2020      Document: [id number]            Outpatient Encounter Medications as of 12/21/2020   Medication Sig Dispense Refill     amiodarone (PACERONE) 200 MG tablet Take 1 tablet (200 mg) by mouth daily 90 tablet 1     ASPIRIN NOT PRESCRIBED (INTENTIONAL) continuous prn for other Please choose reason not prescribed, below       carvedilol (COREG) 6.25 MG tablet Take 1 tablet (6.25 mg) by mouth 2 times daily (with meals) 180 tablet 0     clopidogrel (PLAVIX) 75 MG tablet Take 1 tablet (75 mg) by mouth daily 90 tablet 3     digoxin (LANOXIN) 125 MCG tablet Take 1 tablet (125 mcg) by mouth six times a week Do not take Sundays 78 tablet 3     famotidine (PEPCID) 20 MG tablet TAKE 1 TABLET BY MOUTH TWICE A  tablet 3     mexiletine (MEXITIL) 150 MG capsule Take 1 capsule (150 mg) by mouth 2 times daily 60 capsule 4     Multiple Vitamins-Minerals (PRESERVISION AREDS 2 PO) Take 1 capsule by mouth 2 times daily       rosuvastatin (CRESTOR) 20 MG tablet Take 1 tablet (20 mg) by mouth daily 90 tablet 0     sacubitril-valsartan (ENTRESTO) 24-26 MG per tablet Take 1 tablet by mouth every morning 180 tablet 1     sacubitril-valsartan (ENTRESTO) 49-51 MG per tablet Take 1 tablet by mouth every evening 180 tablet 1     sildenafil (VIAGRA) 100 MG tablet Take 1 tablet (100 mg) by mouth daily as needed Take 30 min to 4 hours before intercourse.  Never use with nitroglycerin, terazosin or doxazosin. 16 tablet 3     Vitamin D, Cholecalciferol, 1000 UNITS TABS Take 1,000 mg by mouth daily        warfarin ANTICOAGULANT (COUMADIN) 2.5 MG tablet Take 2.5 mg on Mondays and 1.25mg all other days or as directed by the anticoagulation clinic 75 tablet 3     [DISCONTINUED] ipratropium (ATROVENT) 0.03 % nasal spray Spray 2 sprays into both nostrils every 12 hours 30 mL 5     [DISCONTINUED] warfarin ANTICOAGULANT (COUMADIN) 1 MG tablet Take 1 & 1/2 tablets (1.5 mg) every day or as directed. 135 tablet 0     acetaminophen (TYLENOL)  500 MG tablet Take 1,000 mg by mouth every 8 hours as needed for mild pain       nitroGLYcerin (NITROSTAT) 0.4 MG sublingual tablet DISSOLVE 1 TABLET UNDER THE TONGUE EVERY 5 MINUTES AS NEEDED FOR CHEST PAIN (Patient not taking: Reported on 12/21/2020) 25 tablet 0     No facility-administered encounter medications on file as of 12/21/2020.        Again, thank you for allowing me to participate in the care of your patient.      Sincerely,    Suellen Costello MD     [facility]

## 2020-12-21 NOTE — PROGRESS NOTES
HPI and Plan:   See dictation [id number]    Orders Placed This Encounter   Procedures     Follow-Up with Cardiac Advanced Practice Provider       Orders Placed This Encounter   Medications     mexiletine (MEXITIL) 150 MG capsule     Sig: Take 1 capsule (150 mg) by mouth 2 times daily     Dispense:  60 capsule     Refill:  4       There are no discontinued medications.      Encounter Diagnoses   Name Primary?     NSVT (nonsustained ventricular tachycardia) (H) Yes     Cardiomyopathy (H)      ICD (implantable cardioverter-defibrillator) in place        CURRENT MEDICATIONS:  Current Outpatient Medications   Medication Sig Dispense Refill     amiodarone (PACERONE) 200 MG tablet Take 1 tablet (200 mg) by mouth daily 90 tablet 1     ASPIRIN NOT PRESCRIBED (INTENTIONAL) continuous prn for other Please choose reason not prescribed, below       carvedilol (COREG) 6.25 MG tablet Take 1 tablet (6.25 mg) by mouth 2 times daily (with meals) 180 tablet 0     clopidogrel (PLAVIX) 75 MG tablet Take 1 tablet (75 mg) by mouth daily 90 tablet 3     digoxin (LANOXIN) 125 MCG tablet Take 1 tablet (125 mcg) by mouth six times a week Do not take Sundays 78 tablet 3     famotidine (PEPCID) 20 MG tablet TAKE 1 TABLET BY MOUTH TWICE A  tablet 3     ipratropium (ATROVENT) 0.03 % nasal spray Spray 2 sprays into both nostrils every 12 hours 30 mL 5     mexiletine (MEXITIL) 150 MG capsule Take 1 capsule (150 mg) by mouth 2 times daily 60 capsule 4     Multiple Vitamins-Minerals (PRESERVISION AREDS 2 PO) Take 1 capsule by mouth 2 times daily       rosuvastatin (CRESTOR) 20 MG tablet Take 1 tablet (20 mg) by mouth daily 90 tablet 0     sacubitril-valsartan (ENTRESTO) 24-26 MG per tablet Take 1 tablet by mouth every morning 180 tablet 1     sacubitril-valsartan (ENTRESTO) 49-51 MG per tablet Take 1 tablet by mouth every evening 180 tablet 1     sildenafil (VIAGRA) 100 MG tablet Take 1 tablet (100 mg) by mouth daily as needed Take 30 min to 4  hours before intercourse.  Never use with nitroglycerin, terazosin or doxazosin. 16 tablet 3     Vitamin D, Cholecalciferol, 1000 UNITS TABS Take 1,000 mg by mouth daily        warfarin ANTICOAGULANT (COUMADIN) 1 MG tablet Take 1 & 1/2 tablets (1.5 mg) every day or as directed. 135 tablet 0     warfarin ANTICOAGULANT (COUMADIN) 2.5 MG tablet Take 2.5 mg on Mondays and 1.25mg all other days or as directed by the anticoagulation clinic 75 tablet 3     acetaminophen (TYLENOL) 500 MG tablet Take 1,000 mg by mouth every 8 hours as needed for mild pain       nitroGLYcerin (NITROSTAT) 0.4 MG sublingual tablet DISSOLVE 1 TABLET UNDER THE TONGUE EVERY 5 MINUTES AS NEEDED FOR CHEST PAIN (Patient not taking: Reported on 12/21/2020) 25 tablet 0       ALLERGIES     Allergies   Allergen Reactions     Aspirin      Other reaction(s): Aspirin Contraindication (for reporting)  Cardiologist recommended no aspirin as he is on Pradaxa     Nystatin Other (See Comments)     Make his mouth numb & swelling       PAST MEDICAL HISTORY:  Past Medical History:   Diagnosis Date     Atrial fibrillation (H)     s/p Cardioversion 3/14/2013     Atrial flutter (H)     S/p Aflutter ablation 1/11/2011     CAD (coronary artery disease)     CABG x3 10/2012- LIMA to distal LAD, SVG to OM1 & OM3; cath 10/2012- PTCA to second diagonal and mid LAD, BMS to mid LAD     Cardiogenic shock (H)      Cardiomyopathy (H)      ED (erectile dysfunction)      Hypertension      Neuropathy      SVT (supraventricular tachycardia) (H)     S/p dual chamber ICD 10/11/12- upgraded to BIV ICD 6/2013     Syncope        PAST SURGICAL HISTORY:  Past Surgical History:   Procedure Laterality Date     BYPASS GRAFT ARTERY CORONARY  10/2/2012    Procedure: BYPASS GRAFT ARTERY CORONARY;  Coronary Artery Bypass Graft x3 (LAD, Diag, OM) with Endovein [place] (On-Pump);  Surgeon: Yeyo Lyman MD;  Location: [facility] OR     CARDIOVERSION  3/14/2013     CORONARY ANGIOGRAPHY ADULT ORDER   10/2/12     CORONARY ARTERY BYPASS  10/2/12    LIMA to LAD, SVG to OM1 and OM3     CV ANGIOGRAM CORONARY GRAFT N/A 8/11/2020    Procedure: Angiogram Coronary Graft;  Surgeon: Erin Weaver MD;  Location:  HEART CARDIAC CATH LAB     CV HEART CATHETERIZATION WITH POSSIBLE INTERVENTION N/A 8/11/2020    Procedure: Heart Catheterization with Possible Intervention;  Surgeon: Erin Weaver MD;  Location:  HEART CARDIAC CATH LAB     CV PCI ATHERECTOMY ORBITAL N/A 8/11/2020    Procedure: Percutaneous Coronary Intervention Atherectomy Rotational;  Surgeon: Erin Weaver MD;  Location:  HEART CARDIAC CATH LAB     EP ABLATION VT N/A 1/2/2019    Procedure: EP Ablation VT;  Surgeon: Suellen Costello MD;  Location:  HEART CARDIAC CATH LAB     EP COMPREHENSIVE EP STUDY N/A 1/2/2019    Procedure: EP Comprehensive EP Study;  Surgeon: Suellen Costello MD;  Location:  HEART CARDIAC CATH LAB     H ABLATION ATRIAL FLUTTER  1/11/2011     HAND SURGERY      table saw injury right hand     HEART CATH, ANGIOPLASTY  10/2/12    PTCA to second diagonal and mid LAD, BMS to mid LAD     HERNIA REPAIR       RELOCATE GENERATOR ICD/PACEMAKER         FAMILY HISTORY:  Family History   Problem Relation Age of Onset     Alcohol/Drug Father      Heart Disease Father 71     Alzheimer Disease Sister      Alcohol/Drug Sister      Alcohol/Drug Sister      Gastrointestinal Disease Sister      Obesity Sister      Hypertension Sister      Heart Disease Sister      Hypertension Sister      Heart Disease Daughter         afib     Arrhythmia Daughter      Multiple Sclerosis Daughter      Heart Disease Daughter         afib     Arrhythmia Daughter      Cancer Daughter         lymphoma     Aneurysm Mother        SOCIAL HISTORY:  Social History     Socioeconomic History     Marital status:      Spouse name: None     Number of children: None     Years of education: None     Highest education level:  None   Occupational History     None   Social Needs     Financial resource strain: None     Food insecurity     Worry: None     Inability: None     Transportation needs     Medical: None     Non-medical: None   Tobacco Use     Smoking status: Former Smoker     Packs/day: 1.00     Years: 14.00     Pack years: 14.00     Types: Cigarettes     Start date:      Quit date: 7/10/1972     Years since quittin.4     Smokeless tobacco: Never Used   Substance and Sexual Activity     Alcohol use: No     Drug use: No     Sexual activity: Yes     Partners: Female   Lifestyle     Physical activity     Days per week: None     Minutes per session: None     Stress: None   Relationships     Social connections     Talks on phone: None     Gets together: None     Attends [employer] service: None     Active member of club or organization: None     Attends meetings of clubs or organizations: None     Relationship status: None     Intimate partner violence     Fear of current or ex partner: None     Emotionally abused: None     Physically abused: None     Forced sexual activity: None   Other Topics Concern     Parent/sibling w/ CABG, MI or angioplasty before 65F 55M? No      Service Not Asked     Blood Transfusions Not Asked     Caffeine Concern Yes     Comment: 4 cups coffee daily     Occupational Exposure Not Asked     Hobby Hazards Not Asked     Sleep Concern No     Stress Concern No     Weight Concern Yes     Comment: gain 2lbs     Special Diet Yes     Comment: low sodium     Back Care Not Asked     Exercise Yes     Comment: walking, doing sittups, played pickle ball in summer     Bike Helmet Not Asked     Seat Belt Yes     Self-Exams Not Asked   Social History Narrative     None       Review of Systems:  Skin:  Negative       Eyes:  Positive for glasses    ENT:  Negative      Respiratory:  Positive for dyspnea on exertion SOB with some activity   Cardiovascular:    dizziness;Positive for;lightheadedness dizzy all the  time  Gastroenterology: Negative poor appetite;nausea;vomiting;heartburn;reflux;diarrhea treated  Genitourinary:  not assessed      Musculoskeletal:  Negative      Neurologic:  Positive for stroke;numbness or tingling of hands history of stroke in 2011, head injury in 2014, right hand has issues with feeling/identification  Psychiatric:  Negative      Heme/Lymph/Imm:  Positive for easy bruising coumadin  Endocrine:  Negative thyroid disorder;diabetes

## 2020-12-21 NOTE — LETTER
12/21/2020    Deojn Downs MD  [address]  [facility] [address]    RE: Tyrel Rene       Dear Colleague,    I had the pleasure of seeing Tyrel Rene in the [facility].    HPI and Plan:   See dictation [id number]    Orders Placed This Encounter   Procedures     Follow-Up with Cardiac Advanced Practice Provider       Orders Placed This Encounter   Medications     mexiletine (MEXITIL) 150 MG capsule     Sig: Take 1 capsule (150 mg) by mouth 2 times daily     Dispense:  60 capsule     Refill:  4       There are no discontinued medications.      Encounter Diagnoses   Name Primary?     NSVT (nonsustained ventricular tachycardia) (H) Yes     Cardiomyopathy (H)      ICD (implantable cardioverter-defibrillator) in place        CURRENT MEDICATIONS:  Current Outpatient Medications   Medication Sig Dispense Refill     amiodarone (PACERONE) 200 MG tablet Take 1 tablet (200 mg) by mouth daily 90 tablet 1     ASPIRIN NOT PRESCRIBED (INTENTIONAL) continuous prn for other Please choose reason not prescribed, below       carvedilol (COREG) 6.25 MG tablet Take 1 tablet (6.25 mg) by mouth 2 times daily (with meals) 180 tablet 0     clopidogrel (PLAVIX) 75 MG tablet Take 1 tablet (75 mg) by mouth daily 90 tablet 3     digoxin (LANOXIN) 125 MCG tablet Take 1 tablet (125 mcg) by mouth six times a week Do not take Sundays 78 tablet 3     famotidine (PEPCID) 20 MG tablet TAKE 1 TABLET BY MOUTH TWICE A  tablet 3     ipratropium (ATROVENT) 0.03 % nasal spray Spray 2 sprays into both nostrils every 12 hours 30 mL 5     mexiletine (MEXITIL) 150 MG capsule Take 1 capsule (150 mg) by mouth 2 times daily 60 capsule 4     Multiple Vitamins-Minerals (PRESERVISION AREDS 2 PO) Take 1 capsule by mouth 2 times daily       rosuvastatin (CRESTOR) 20 MG tablet Take 1 tablet (20 mg) by mouth daily 90 tablet 0     sacubitril-valsartan (ENTRESTO) 24-26 MG per tablet Take 1 tablet by mouth every morning  180 tablet 1     sacubitril-valsartan (ENTRESTO) 49-51 MG per tablet Take 1 tablet by mouth every evening 180 tablet 1     sildenafil (VIAGRA) 100 MG tablet Take 1 tablet (100 mg) by mouth daily as needed Take 30 min to 4 hours before intercourse.  Never use with nitroglycerin, terazosin or doxazosin. 16 tablet 3     Vitamin D, Cholecalciferol, 1000 UNITS TABS Take 1,000 mg by mouth daily        warfarin ANTICOAGULANT (COUMADIN) 1 MG tablet Take 1 & 1/2 tablets (1.5 mg) every day or as directed. 135 tablet 0     warfarin ANTICOAGULANT (COUMADIN) 2.5 MG tablet Take 2.5 mg on Mondays and 1.25mg all other days or as directed by the anticoagulation clinic 75 tablet 3     acetaminophen (TYLENOL) 500 MG tablet Take 1,000 mg by mouth every 8 hours as needed for mild pain       nitroGLYcerin (NITROSTAT) 0.4 MG sublingual tablet DISSOLVE 1 TABLET UNDER THE TONGUE EVERY 5 MINUTES AS NEEDED FOR CHEST PAIN (Patient not taking: Reported on 12/21/2020) 25 tablet 0       ALLERGIES     Allergies   Allergen Reactions     Aspirin      Other reaction(s): Aspirin Contraindication (for reporting)  Cardiologist recommended no aspirin as he is on Pradaxa     Nystatin Other (See Comments)     Make his mouth numb & swelling       PAST MEDICAL HISTORY:  Past Medical History:   Diagnosis Date     Atrial fibrillation (H)     s/p Cardioversion 3/14/2013     Atrial flutter (H)     S/p Aflutter ablation 1/11/2011     CAD (coronary artery disease)     CABG x3 10/2012- LIMA to distal LAD, SVG to OM1 & OM3; cath 10/2012- PTCA to second diagonal and mid LAD, BMS to mid LAD     Cardiogenic shock (H)      Cardiomyopathy (H)      ED (erectile dysfunction)      Hypertension      Neuropathy      SVT (supraventricular tachycardia) (H)     S/p dual chamber ICD 10/11/12- upgraded to BIV ICD 6/2013     Syncope        PAST SURGICAL HISTORY:  Past Surgical History:   Procedure Laterality Date     BYPASS GRAFT ARTERY CORONARY  10/2/2012    Procedure: BYPASS  GRAFT ARTERY CORONARY;  Coronary Artery Bypass Graft x3 (LAD, Diag, OM) with Endovein [place] (On-Pump);  Surgeon: Yeyo Lyman MD;  Location:  OR     CARDIOVERSION  3/14/2013     CORONARY ANGIOGRAPHY ADULT ORDER  10/2/12     CORONARY ARTERY BYPASS  10/2/12    LIMA to LAD, SVG to OM1 and OM3     CV ANGIOGRAM CORONARY GRAFT N/A 8/11/2020    Procedure: Angiogram Coronary Graft;  Surgeon: Erin Weaver MD;  Location:  HEART CARDIAC CATH LAB     CV HEART CATHETERIZATION WITH POSSIBLE INTERVENTION N/A 8/11/2020    Procedure: Heart Catheterization with Possible Intervention;  Surgeon: Erin Weaver MD;  Location:  HEART CARDIAC CATH LAB     CV PCI ATHERECTOMY ORBITAL N/A 8/11/2020    Procedure: Percutaneous Coronary Intervention Atherectomy Rotational;  Surgeon: Erin Weaver MD;  Location:  HEART CARDIAC CATH LAB     EP ABLATION VT N/A 1/2/2019    Procedure: EP Ablation VT;  Surgeon: Suellen Costello MD;  Location:  HEART CARDIAC CATH LAB     EP COMPREHENSIVE EP STUDY N/A 1/2/2019    Procedure: EP Comprehensive EP Study;  Surgeon: Suellen Costello MD;  Location:  HEART CARDIAC CATH LAB     H ABLATION ATRIAL FLUTTER  1/11/2011     HAND SURGERY      table saw injury right hand     HEART CATH, ANGIOPLASTY  10/2/12    PTCA to second diagonal and mid LAD, BMS to mid LAD     HERNIA REPAIR       RELOCATE GENERATOR ICD/PACEMAKER         FAMILY HISTORY:  Family History   Problem Relation Age of Onset     Alcohol/Drug Father      Heart Disease Father 71     Alzheimer Disease Sister      Alcohol/Drug Sister      Alcohol/Drug Sister      Gastrointestinal Disease Sister      Obesity Sister      Hypertension Sister      Heart Disease Sister      Hypertension Sister      Heart Disease Daughter         afib     Arrhythmia Daughter      Multiple Sclerosis Daughter      Heart Disease Daughter         afib     Arrhythmia Daughter      Cancer Daughter          lymphoma     Aneurysm Mother        SOCIAL HISTORY:  Social History     Socioeconomic History     Marital status:      Spouse name: None     Number of children: None     Years of education: None     Highest education level: None   Occupational History     None   Social Needs     Financial resource strain: None     Food insecurity     Worry: None     Inability: None     Transportation needs     Medical: None     Non-medical: None   Tobacco Use     Smoking status: Former Smoker     Packs/day: 1.00     Years: 14.00     Pack years: 14.00     Types: Cigarettes     Start date:      Quit date: 7/10/1972     Years since quittin.4     Smokeless tobacco: Never Used   Substance and Sexual Activity     Alcohol use: No     Drug use: No     Sexual activity: Yes     Partners: Female   Lifestyle     Physical activity     Days per week: None     Minutes per session: None     Stress: None   Relationships     Social connections     Talks on phone: None     Gets together: None     Attends [employer] service: None     Active member of club or organization: None     Attends meetings of clubs or organizations: None     Relationship status: None     Intimate partner violence     Fear of current or ex partner: None     Emotionally abused: None     Physically abused: None     Forced sexual activity: None   Other Topics Concern     Parent/sibling w/ CABG, MI or angioplasty before 65F 55M? No      Service Not Asked     Blood Transfusions Not Asked     Caffeine Concern Yes     Comment: 4 cups coffee daily     Occupational Exposure Not Asked     Hobby Hazards Not Asked     Sleep Concern No     Stress Concern No     Weight Concern Yes     Comment: gain 2lbs     Special Diet Yes     Comment: low sodium     Back Care Not Asked     Exercise Yes     Comment: walking, doing sittups, played pickle ball in summer     Bike Helmet Not Asked     Seat Belt Yes     Self-Exams Not Asked   Social History Narrative     None       Review  of Systems:  Skin:  Negative       Eyes:  Positive for glasses    ENT:  Negative      Respiratory:  Positive for dyspnea on exertion SOB with some activity   Cardiovascular:    dizziness;Positive for;lightheadedness dizzy all the time  Gastroenterology: Negative poor appetite;nausea;vomiting;heartburn;reflux;diarrhea treated  Genitourinary:  not assessed      Musculoskeletal:  Negative      Neurologic:  Positive for stroke;numbness or tingling of hands history of stroke in 2011, head injury in 2014, right hand has issues with feeling/identification  Psychiatric:  Negative      Heme/Lymph/Imm:  Positive for easy bruising coumadin  Endocrine:  Negative thyroid disorder;diabetes                    Thank you for allowing me to participate in the care of your patient.      Sincerely,     Suellen Costello MD     [facility]    cc:   Suellen Costello MD  [address]  [place] [address]

## 2020-12-21 NOTE — PATIENT INSTRUCTIONS
"Marko, your heart is having multiple episodes of ventricular tachycardia every day.  Fortunately, lately these events have been very brief (few seconds) but I am concerned that one of these days you may have a longer event that triggers your ICD to fire.    Because of this, I would like you to start a new medication called mexiletine.  This medication is typically taken 3 times per day, but let's have you take it twice per day to start with and see how you do.  If we need to add a third dose later on, we will do it.    Most common side-effects of mexiletine include tremor and GI upset.  This may include diarrhea, bloating or nausea.  Most people experience no side-effects.  If you experience any issues, please call us.     Because your arrhythmia is now \"nonsustained\", it would be rather difficult to treat with a second ablation, therefore I would not recommend ablation at this point.      "

## 2020-12-22 NOTE — PROGRESS NOTES
"Service Date: 12/21/2020      HISTORY OF PRESENT ILLNESS:    I had the pleasure seeing . Marko Rene, a delightful 75-year-old gentleman, for ventricular tachycardia.      Marko has the following cardiac/medical problems:   A.  Severe ischemic cardiomyopathy after a large anterior MI several years ago.  Three-vessel CABG in 2012.  Most recent LVEF 25%.  Recent PCI of OM2 (08/2020).   B.  Unstable ventricular tachyarrhythmias.  ICD shock in 11/2017.  Amiodarone started.  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  VT ablation on 01/02/2019.  Amiodarone decreased to 6 days per week in 05/2020.  Recurrence of VT with syncope and 2 shocks in 07/2020.  Syncope without documented VT but with multiple nonsustained episodes of VT documented during hospital admission in 09/2020.  Partly related to a \"smoothing out\" algorithm incorporated in his ICD.  NSVT events decreased after the algorithm was disabled, though recent interrogation showed 25,000 nonsustained VT episodes in 80 days.   C.  Permanent atrial fibrillation and complete AV block.  On warfarin.  Prior atrial flutter ablation (2011).   D.  CRT ICD (Novatris).   E.  CVA following atrial flutter ablation in 2011.      I requested to see Marko today because of a recent [facility] check which showed 25,000 episodes of nonsustained VT in the past 80 days.  I became concerned he may on be the brink of developing sustained VT requiring more ICD shocks.  Marko has not been driving as per our instructions.      Overall, Marko has felt well recently;  no chest pain, unusual dyspnea, weight gain, lower extremity edema or other cardiac symptoms.        PHYSICAL EXAMINATION:   VITAL SIGNS:  Blood pressure 131/82, pulse 65 and regular, weight 83 kg, height 185 cm.   GENERAL:  He is an extremely pleasant gentleman in no distress.   HEENT:  Head normocephalic.   NECK:  Supple with normal JVP.   LUNGS:  Clear.   CARDIOVASCULAR:  Regular rhythm without gallop, murmur or rub. "   ABDOMEN:  Soft, nontender.   EXTREMITIES:  Warm and well perfused.        DIAGNOSTIC STUDIES:    Most recent echocardiogram in 2020 showed EF 20%-25% with anterior septal and apical akinesis and wall thinning, moderate LV enlargement.  Normal IVC.        IMPRESSION:   1.  Ventricular tachycardia.  Frequent nonsustained VT despite amiodarone 200 mg daily, on average >300 runs per day.   2.  Chronic ischemic cardiomyopathy, EF 20%-25%.  Chronic systolic CHF, NYHA class II-III.  Currently compensated.    3.  Permanent atrial fibrillation.  Complete AV block.  CRT-D in place.  On warfarin.      RECOMMENDATIONS:   A.  High NSVT burden and risk of developing sustained VT requiring ICD shocks.  Already on amiodarone 200 mg daily.  Repeat VT ablation given this type of substrate (nonsustained VT) may not be successful.  I recommended a trial of mexiletine at 150 mg b.i.d. (will increase to t.i.d. as needed) and keep monitoring VT burden via his ICD.   B.  I discussed with the patient potential side-effects of mexiletine (tremor, GI upset, nausea, diarrhea) and encouraged him to give us a call should he experience such issues.   C.  Follow-up with DAVE in 3-4 months to reassess VT burden and clinical response to mexiletine.      It was my pleasure seeing this delightful gentleman.   Time spent today was 25 minutes, more than 50% of the time was discussion.        ANN-MARIE FRANK MD, [facility]           cc:   [place] [address]             D: 2020   T: 2020   MT: BRENT      Name:     ARTEM ELIZALDE   MRN:      [id number]        Account:      [id number]   :      1945           Service Date: 2020      Document: [id number]

## 2020-12-28 NOTE — PROGRESS NOTES
ANTICOAGULATION MANAGEMENT     Patient Name:  Tyrel Rene  Date:  2020    ASSESSMENT /SUBJECTIVE:    Today's INR result of 2.66 is therapeutic. Goal INR of 2.5-3.5      Warfarin dose taken: Warfarin taken as instructed    Diet: No new diet changes affecting INR    Medication changes/ interactions: No new medications/supplements affecting INR    Previous INR: Therapeutic     S/S of bleeding or thromboembolism: No    New injury or illness: No    Upcoming surgery, procedure or cardioversion: No    Additional findings: None      PLAN:    Telephone call with Tyrel regarding INR result and instructed:     Warfarin Dosing Instructions: Continue your current warfarin dose 1.5 mg daily    Instructed patient to follow up no later than: 5 weeks  Orders given to [facility] ([phone]), spoke with Edward    Education provided: None required      Marko verbalizes understanding and agrees to warfarin dosing plan.    Instructed to call the Anticoagulation Clinic for any changes, questions or concerns. (#[phone])        Shahnaz Montenegro RN      OBJECTIVE:  Recent labs: (last 7 days)     20  1315   INR 2.66*         No question data found.  Anticoagulation Summary  As of 2020    INR goal:  2.5-3.5   TTR:  70.4 % (11.6 mo)   INR used for dosin.66 (2020)   Warfarin maintenance plan:  1.5 mg (1 mg x 1.5) every day   Full warfarin instructions:  1.5 mg every day   Weekly warfarin total:  10.5 mg   No change documented:  Shahnaz Montenegro RN   Plan last modified:  Sheila Cid RN (10/5/2020)   Next INR check:  2021   Priority:  Maintenance   Target end date:  Indefinite    Indications    Long-term (current) use of anticoagulants [Z79.01] [Z79.01]  Cerebral artery occlusion with cerebral infarction (HCC) [I63.50] [I63.50]  Cerebral infarction (H) [I63.9]  Atrial fibrillation  unspecified type (H) [I48.91]             Anticoagulation Episode Summary     INR check location:       Preferred lab:  EXTERNAL [facility]    Send INR reminders to:  HANS MCLEOD    Comments:  [facility] Patient goal should be 2.5-3.0 (5/13/20)      Anticoagulation Care Providers     Provider Role Specialty Phone number    Dejon Downs MD Referring Family Medicine [phone]

## 2021-01-01 ENCOUNTER — PRE VISIT (OUTPATIENT)
Dept: UROLOGY | Facility: CLINIC | Age: 76
End: 2021-01-01

## 2021-01-01 ENCOUNTER — APPOINTMENT (OUTPATIENT)
Dept: CT IMAGING | Facility: CLINIC | Age: 76
DRG: 083 | End: 2021-01-01
Attending: PHYSICIAN ASSISTANT
Payer: COMMERCIAL

## 2021-01-01 ENCOUNTER — IMMUNIZATION (OUTPATIENT)
Dept: NURSING | Facility: CLINIC | Age: 76
End: 2021-01-01
Attending: INTERNAL MEDICINE
Payer: COMMERCIAL

## 2021-01-01 ENCOUNTER — TRANSFERRED RECORDS (OUTPATIENT)
Dept: HEALTH INFORMATION MANAGEMENT | Facility: CLINIC | Age: 76
End: 2021-01-01

## 2021-01-01 ENCOUNTER — ANTICOAGULATION THERAPY VISIT (OUTPATIENT)
Dept: INTERNAL MEDICINE | Facility: CLINIC | Age: 76
End: 2021-01-01

## 2021-01-01 ENCOUNTER — TELEPHONE (OUTPATIENT)
Dept: GERIATRICS | Facility: CLINIC | Age: 76
End: 2021-01-01

## 2021-01-01 ENCOUNTER — APPOINTMENT (OUTPATIENT)
Dept: CT IMAGING | Facility: CLINIC | Age: 76
DRG: 083 | End: 2021-01-01
Attending: EMERGENCY MEDICINE
Payer: COMMERCIAL

## 2021-01-01 ENCOUNTER — TELEPHONE (OUTPATIENT)
Dept: CARDIOLOGY | Facility: CLINIC | Age: 76
End: 2021-01-01

## 2021-01-01 ENCOUNTER — VIRTUAL VISIT (OUTPATIENT)
Dept: UROLOGY | Facility: CLINIC | Age: 76
End: 2021-01-01
Payer: COMMERCIAL

## 2021-01-01 ENCOUNTER — MEDICAL CORRESPONDENCE (OUTPATIENT)
Dept: HEALTH INFORMATION MANAGEMENT | Facility: CLINIC | Age: 76
End: 2021-01-01

## 2021-01-01 ENCOUNTER — ANCILLARY PROCEDURE (OUTPATIENT)
Dept: CARDIOLOGY | Facility: CLINIC | Age: 76
End: 2021-01-01
Attending: INTERNAL MEDICINE
Payer: COMMERCIAL

## 2021-01-01 ENCOUNTER — TELEPHONE (OUTPATIENT)
Dept: NEUROSURGERY | Facility: CLINIC | Age: 76
End: 2021-01-01

## 2021-01-01 ENCOUNTER — ANTICOAGULATION THERAPY VISIT (OUTPATIENT)
Dept: FAMILY MEDICINE | Facility: CLINIC | Age: 76
End: 2021-01-01

## 2021-01-01 ENCOUNTER — APPOINTMENT (OUTPATIENT)
Dept: PHYSICAL THERAPY | Facility: CLINIC | Age: 76
DRG: 083 | End: 2021-01-01
Attending: NURSE PRACTITIONER
Payer: COMMERCIAL

## 2021-01-01 ENCOUNTER — NURSING HOME VISIT (OUTPATIENT)
Dept: GERIATRICS | Facility: CLINIC | Age: 76
End: 2021-01-01
Payer: COMMERCIAL

## 2021-01-01 ENCOUNTER — CARE COORDINATION (OUTPATIENT)
Dept: CARDIOLOGY | Facility: CLINIC | Age: 76
End: 2021-01-01

## 2021-01-01 ENCOUNTER — APPOINTMENT (OUTPATIENT)
Dept: GENERAL RADIOLOGY | Facility: CLINIC | Age: 76
End: 2021-01-01
Attending: EMERGENCY MEDICINE
Payer: COMMERCIAL

## 2021-01-01 ENCOUNTER — APPOINTMENT (OUTPATIENT)
Dept: OCCUPATIONAL THERAPY | Facility: CLINIC | Age: 76
DRG: 083 | End: 2021-01-01
Attending: NURSE PRACTITIONER
Payer: COMMERCIAL

## 2021-01-01 ENCOUNTER — DOCUMENTATION ONLY (OUTPATIENT)
Dept: CARDIOLOGY | Facility: CLINIC | Age: 76
End: 2021-01-01

## 2021-01-01 ENCOUNTER — TELEPHONE (OUTPATIENT)
Dept: INTERNAL MEDICINE | Facility: CLINIC | Age: 76
End: 2021-01-01

## 2021-01-01 ENCOUNTER — APPOINTMENT (OUTPATIENT)
Dept: GENERAL RADIOLOGY | Facility: CLINIC | Age: 76
DRG: 083 | End: 2021-01-01
Attending: EMERGENCY MEDICINE
Payer: COMMERCIAL

## 2021-01-01 ENCOUNTER — APPOINTMENT (OUTPATIENT)
Dept: PHYSICAL THERAPY | Facility: CLINIC | Age: 76
DRG: 083 | End: 2021-01-01
Payer: COMMERCIAL

## 2021-01-01 ENCOUNTER — HOSPITAL ENCOUNTER (INPATIENT)
Facility: CLINIC | Age: 76
LOS: 2 days | Discharge: HOME | DRG: 083 | End: 2021-04-03
Attending: EMERGENCY MEDICINE | Admitting: HOSPITALIST
Payer: COMMERCIAL

## 2021-01-01 ENCOUNTER — IMMUNIZATION (OUTPATIENT)
Dept: NURSING | Facility: CLINIC | Age: 76
End: 2021-01-01
Payer: COMMERCIAL

## 2021-01-01 ENCOUNTER — HOSPITAL LABORATORY (OUTPATIENT)
Dept: OTHER | Facility: CLINIC | Age: 76
End: 2021-01-01

## 2021-01-01 ENCOUNTER — VIRTUAL VISIT (OUTPATIENT)
Dept: CARDIOLOGY | Facility: CLINIC | Age: 76
End: 2021-01-01
Payer: COMMERCIAL

## 2021-01-01 ENCOUNTER — APPOINTMENT (OUTPATIENT)
Dept: OCCUPATIONAL THERAPY | Facility: CLINIC | Age: 76
DRG: 083 | End: 2021-01-01
Payer: COMMERCIAL

## 2021-01-01 ENCOUNTER — HEALTH MAINTENANCE LETTER (OUTPATIENT)
Age: 76
End: 2021-01-01

## 2021-01-01 ENCOUNTER — DOCUMENTATION ONLY (OUTPATIENT)
Dept: INTERNAL MEDICINE | Facility: CLINIC | Age: 76
End: 2021-01-01

## 2021-01-01 ENCOUNTER — HOSPITAL ENCOUNTER (EMERGENCY)
Facility: CLINIC | Age: 76
Discharge: OTHER INSTITUTION | End: 2021-04-05
Attending: EMERGENCY MEDICINE | Admitting: EMERGENCY MEDICINE
Payer: COMMERCIAL

## 2021-01-01 VITALS
DIASTOLIC BLOOD PRESSURE: 64 MMHG | RESPIRATION RATE: 16 BRPM | SYSTOLIC BLOOD PRESSURE: 121 MMHG | HEART RATE: 66 BPM | OXYGEN SATURATION: 95 % | TEMPERATURE: 97.3 F

## 2021-01-01 VITALS
HEIGHT: 71 IN | BODY MASS INDEX: 25.48 KG/M2 | TEMPERATURE: 97.8 F | RESPIRATION RATE: 16 BRPM | OXYGEN SATURATION: 95 % | WEIGHT: 182 LBS | SYSTOLIC BLOOD PRESSURE: 113 MMHG | DIASTOLIC BLOOD PRESSURE: 63 MMHG | HEART RATE: 65 BPM

## 2021-01-01 VITALS — BODY MASS INDEX: 26.48 KG/M2 | WEIGHT: 185 LBS | HEIGHT: 70 IN

## 2021-01-01 VITALS
OXYGEN SATURATION: 91 % | TEMPERATURE: 99.2 F | HEART RATE: 65 BPM | SYSTOLIC BLOOD PRESSURE: 130 MMHG | BODY MASS INDEX: 25.93 KG/M2 | WEIGHT: 185.2 LBS | HEIGHT: 71 IN | DIASTOLIC BLOOD PRESSURE: 72 MMHG | RESPIRATION RATE: 18 BRPM

## 2021-01-01 VITALS
OXYGEN SATURATION: 93 % | DIASTOLIC BLOOD PRESSURE: 69 MMHG | RESPIRATION RATE: 18 BRPM | HEART RATE: 65 BPM | SYSTOLIC BLOOD PRESSURE: 126 MMHG | TEMPERATURE: 97.4 F

## 2021-01-01 VITALS
WEIGHT: 185.2 LBS | DIASTOLIC BLOOD PRESSURE: 62 MMHG | SYSTOLIC BLOOD PRESSURE: 113 MMHG | RESPIRATION RATE: 16 BRPM | HEART RATE: 65 BPM | BODY MASS INDEX: 25.93 KG/M2 | TEMPERATURE: 97.7 F | OXYGEN SATURATION: 91 % | HEIGHT: 71 IN

## 2021-01-01 VITALS
BODY MASS INDEX: 26.85 KG/M2 | RESPIRATION RATE: 18 BRPM | TEMPERATURE: 97.6 F | SYSTOLIC BLOOD PRESSURE: 149 MMHG | DIASTOLIC BLOOD PRESSURE: 85 MMHG | HEART RATE: 65 BPM | OXYGEN SATURATION: 95 % | HEIGHT: 71 IN | WEIGHT: 191.8 LBS

## 2021-01-01 VITALS
OXYGEN SATURATION: 92 % | DIASTOLIC BLOOD PRESSURE: 64 MMHG | BODY MASS INDEX: 25.93 KG/M2 | HEIGHT: 71 IN | WEIGHT: 185.2 LBS | SYSTOLIC BLOOD PRESSURE: 108 MMHG | RESPIRATION RATE: 16 BRPM | HEART RATE: 66 BPM | TEMPERATURE: 98 F

## 2021-01-01 VITALS
SYSTOLIC BLOOD PRESSURE: 136 MMHG | RESPIRATION RATE: 16 BRPM | DIASTOLIC BLOOD PRESSURE: 71 MMHG | TEMPERATURE: 97.9 F | HEART RATE: 64 BPM | OXYGEN SATURATION: 94 %

## 2021-01-01 VITALS
WEIGHT: 177 LBS | DIASTOLIC BLOOD PRESSURE: 58 MMHG | HEART RATE: 65 BPM | RESPIRATION RATE: 16 BRPM | TEMPERATURE: 97.4 F | HEIGHT: 73 IN | SYSTOLIC BLOOD PRESSURE: 105 MMHG | BODY MASS INDEX: 23.46 KG/M2 | OXYGEN SATURATION: 96 %

## 2021-01-01 VITALS
WEIGHT: 177 LBS | RESPIRATION RATE: 20 BRPM | BODY MASS INDEX: 23.35 KG/M2 | DIASTOLIC BLOOD PRESSURE: 66 MMHG | TEMPERATURE: 97.6 F | HEART RATE: 64 BPM | OXYGEN SATURATION: 98 % | SYSTOLIC BLOOD PRESSURE: 154 MMHG

## 2021-01-01 VITALS
BODY MASS INDEX: 25.93 KG/M2 | TEMPERATURE: 99.6 F | HEART RATE: 65 BPM | WEIGHT: 185.2 LBS | RESPIRATION RATE: 14 BRPM | DIASTOLIC BLOOD PRESSURE: 58 MMHG | SYSTOLIC BLOOD PRESSURE: 122 MMHG | OXYGEN SATURATION: 90 % | HEIGHT: 71 IN

## 2021-01-01 VITALS
TEMPERATURE: 98.5 F | HEART RATE: 64 BPM | OXYGEN SATURATION: 91 % | RESPIRATION RATE: 18 BRPM | DIASTOLIC BLOOD PRESSURE: 80 MMHG | SYSTOLIC BLOOD PRESSURE: 143 MMHG

## 2021-01-01 DIAGNOSIS — N18.30 STAGE 3 CHRONIC KIDNEY DISEASE, UNSPECIFIED WHETHER STAGE 3A OR 3B CKD (H): ICD-10-CM

## 2021-01-01 DIAGNOSIS — Z95.810 ICD (IMPLANTABLE CARDIOVERTER-DEFIBRILLATOR) IN PLACE: ICD-10-CM

## 2021-01-01 DIAGNOSIS — S06.5XAA SUBDURAL HEMATOMA (H): Primary | ICD-10-CM

## 2021-01-01 DIAGNOSIS — R53.83 LETHARGY: ICD-10-CM
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
## 2021-01-01 DIAGNOSIS — S52.021A CLOSED FRACTURE OF OLECRANON PROCESS OF RIGHT ULNA, INITIAL ENCOUNTER: ICD-10-CM

## 2021-01-01 DIAGNOSIS — I25.5 ISCHEMIC CARDIOMYOPATHY: ICD-10-CM

## 2021-01-01 DIAGNOSIS — I63.50 CEREBRAL ARTERY OCCLUSION WITH CEREBRAL INFARCTION (H): ICD-10-CM

## 2021-01-01 DIAGNOSIS — I63.9 CEREBRAL INFARCTION (H): ICD-10-CM

## 2021-01-01 DIAGNOSIS — I25.10 CORONARY ARTERY DISEASE INVOLVING NATIVE CORONARY ARTERY OF NATIVE HEART WITHOUT ANGINA PECTORIS: ICD-10-CM

## 2021-01-01 DIAGNOSIS — S06.5XAA SUBDURAL HEMATOMA (H): ICD-10-CM

## 2021-01-01 DIAGNOSIS — I48.91 ATRIAL FIBRILLATION, UNSPECIFIED TYPE (H): ICD-10-CM

## 2021-01-01 DIAGNOSIS — Z71.89 ADVANCED DIRECTIVES, COUNSELING/DISCUSSION: ICD-10-CM

## 2021-01-01 DIAGNOSIS — I47.29 NSVT (NONSUSTAINED VENTRICULAR TACHYCARDIA) (H): ICD-10-CM

## 2021-01-01 DIAGNOSIS — N39.0 URINARY TRACT INFECTION WITHOUT HEMATURIA, SITE UNSPECIFIED: ICD-10-CM

## 2021-01-01 DIAGNOSIS — R33.9 URINARY RETENTION: Primary | ICD-10-CM

## 2021-01-01 DIAGNOSIS — I60.9 SUBARACHNOID HEMORRHAGE (H): ICD-10-CM

## 2021-01-01 DIAGNOSIS — S52.021D CLOSED FRACTURE OF OLECRANON PROCESS OF RIGHT ULNA WITH ROUTINE HEALING, SUBSEQUENT ENCOUNTER: Primary | ICD-10-CM

## 2021-01-01 DIAGNOSIS — I47.20 VENTRICULAR TACHYCARDIA (H): ICD-10-CM

## 2021-01-01 DIAGNOSIS — I25.10 CORONARY ARTERY DISEASE INVOLVING NATIVE HEART WITHOUT ANGINA PECTORIS, UNSPECIFIED VESSEL OR LESION TYPE: Primary | ICD-10-CM

## 2021-01-01 DIAGNOSIS — I47.29 PAROXYSMAL VENTRICULAR TACHYCARDIA (H): Primary | ICD-10-CM

## 2021-01-01 DIAGNOSIS — R33.9 URINARY RETENTION: ICD-10-CM

## 2021-01-01 DIAGNOSIS — E78.2 MIXED HYPERLIPIDEMIA: ICD-10-CM

## 2021-01-01 DIAGNOSIS — I50.22 CHRONIC SYSTOLIC CONGESTIVE HEART FAILURE (H): ICD-10-CM

## 2021-01-01 DIAGNOSIS — S52.021D CLOSED FRACTURE OF OLECRANON PROCESS OF RIGHT ULNA WITH ROUTINE HEALING, SUBSEQUENT ENCOUNTER: ICD-10-CM

## 2021-01-01 DIAGNOSIS — I25.5 ISCHEMIC CARDIOMYOPATHY: Primary | ICD-10-CM

## 2021-01-01 DIAGNOSIS — Z79.01 LONG TERM CURRENT USE OF ANTICOAGULANT THERAPY: ICD-10-CM

## 2021-01-01 DIAGNOSIS — I49.01 VENTRICULAR FIBRILLATION (H): ICD-10-CM

## 2021-01-01 DIAGNOSIS — I48.0 PAROXYSMAL ATRIAL FIBRILLATION (H): ICD-10-CM

## 2021-01-01 DIAGNOSIS — I63.00 CEREBRAL INFARCTION DUE TO THROMBOSIS OF PRECEREBRAL ARTERY (H): ICD-10-CM

## 2021-01-01 DIAGNOSIS — I10 ESSENTIAL HYPERTENSION: ICD-10-CM

## 2021-01-01 DIAGNOSIS — I60.9 SUBARACHNOID HEMORRHAGE (H): Primary | ICD-10-CM

## 2021-01-01 DIAGNOSIS — J98.11 ATELECTASIS: ICD-10-CM

## 2021-01-01 DIAGNOSIS — R55 SYNCOPE, UNSPECIFIED SYNCOPE TYPE: ICD-10-CM

## 2021-01-01 DIAGNOSIS — I42.9 CARDIOMYOPATHY (H): Primary | ICD-10-CM

## 2021-01-01 DIAGNOSIS — Z79.899 LONG TERM CURRENT USE OF AMIODARONE: ICD-10-CM

## 2021-01-01 DIAGNOSIS — I42.9 CARDIOMYOPATHY (H): ICD-10-CM

## 2021-01-01 DIAGNOSIS — M54.16 LUMBAR RADICULAR PAIN: ICD-10-CM

## 2021-01-01 DIAGNOSIS — I25.10 CORONARY ARTERY DISEASE INVOLVING NATIVE HEART WITHOUT ANGINA PECTORIS, UNSPECIFIED VESSEL OR LESION TYPE: ICD-10-CM

## 2021-01-01 LAB
ALBUMIN SERPL-MCNC: 3 G/DL (ref 3.4–5)
ALP SERPL-CCNC: 78 U/L (ref 40–150)
ALT SERPL W P-5'-P-CCNC: 50 U/L (ref 0–70)
ANION GAP SERPL CALCULATED.3IONS-SCNC: 10 MMOL/L (ref 7–16)
ANION GAP SERPL CALCULATED.3IONS-SCNC: 12 MMOL/L (ref 7–16)
ANION GAP SERPL CALCULATED.3IONS-SCNC: 12 MMOL/L (ref 7–16)
ANION GAP SERPL CALCULATED.3IONS-SCNC: 13 MMOL/L (ref 7–16)
ANION GAP SERPL CALCULATED.3IONS-SCNC: 14 MMOL/L (ref 7–16)
ANION GAP SERPL CALCULATED.3IONS-SCNC: 3 MMOL/L (ref 3–14)
ANION GAP SERPL CALCULATED.3IONS-SCNC: 5 MMOL/L (ref 3–14)
ANION GAP SERPL CALCULATED.3IONS-SCNC: 7 MMOL/L (ref 3–14)
ANION GAP SERPL CALCULATED.3IONS-SCNC: 8 MMOL/L (ref 7–16)
APTT PPP: 36 SEC (ref 22–37)
AST SERPL W P-5'-P-CCNC: 47 U/L (ref 0–45)
BASOPHILS # BLD AUTO: 0.1 10E9/L (ref 0–0.2)
BASOPHILS NFR BLD AUTO: 0.6 %
BILIRUB SERPL-MCNC: 0.4 MG/DL (ref 0.2–1.3)
BUN SERPL-MCNC: 13 MG/DL (ref 7–30)
BUN SERPL-MCNC: 19 MG/DL (ref 7–30)
BUN SERPL-MCNC: 23 MG/DL (ref 7–30)
BUN SERPL-MCNC: 28 MG/DL (ref 7–26)
BUN SERPL-MCNC: 30 MG/DL (ref 7–26)
BUN SERPL-MCNC: 30 MG/DL (ref 7–26)
BUN SERPL-MCNC: 31 MG/DL (ref 7–26)
BUN SERPL-MCNC: 31 MG/DL (ref 7–26)
BUN SERPL-MCNC: 40 MG/DL (ref 7–26)
CALCIUM SERPL-MCNC: 8 MG/DL (ref 8.5–10.1)
CALCIUM SERPL-MCNC: 8.2 MG/DL (ref 8.5–10.1)
CALCIUM SERPL-MCNC: 8.4 MG/DL (ref 8.4–10.4)
CALCIUM SERPL-MCNC: 8.5 MG/DL (ref 8.5–10.1)
CALCIUM SERPL-MCNC: 8.7 MG/DL (ref 8.4–10.4)
CALCIUM SERPL-MCNC: 8.8 MG/DL (ref 8.4–10.4)
CALCIUM SERPL-MCNC: 9.2 MG/DL (ref 8.4–10.4)
CHLORIDE SERPL-SCNC: 100 MMOL/L (ref 94–109)
CHLORIDE SERPL-SCNC: 103 MMOL/L (ref 94–109)
CHLORIDE SERPL-SCNC: 105 MMOL/L (ref 94–109)
CHLORIDE SERPLBLD-SCNC: 103 MMOL/L (ref 98–109)
CHLORIDE SERPLBLD-SCNC: 94 MMOL/L (ref 98–109)
CHLORIDE SERPLBLD-SCNC: 95 MMOL/L (ref 98–109)
CHLORIDE SERPLBLD-SCNC: 95 MMOL/L (ref 98–109)
CHLORIDE SERPLBLD-SCNC: 96 MMOL/L (ref 98–109)
CHLORIDE SERPLBLD-SCNC: 98 MMOL/L (ref 98–109)
CHOLEST SERPL-MCNC: 97 MG/DL
CO2 SERPL-SCNC: 23 MMOL/L (ref 20–32)
CO2 SERPL-SCNC: 25 MMOL/L (ref 20–29)
CO2 SERPL-SCNC: 26 MMOL/L (ref 20–29)
CO2 SERPL-SCNC: 27 MMOL/L (ref 20–32)
CO2 SERPL-SCNC: 28 MMOL/L (ref 20–29)
CO2 SERPL-SCNC: 28 MMOL/L (ref 20–32)
CO2 SERPL-SCNC: 30 MMOL/L (ref 20–29)
CO2 SERPL-SCNC: 30 MMOL/L (ref 20–29)
CO2 SERPL-SCNC: 32 MMOL/L (ref 20–29)
CREAT SERPL-MCNC: 1.19 MG/DL (ref 0.66–1.25)
CREAT SERPL-MCNC: 1.3 MG/DL (ref 0.66–1.25)
CREAT SERPL-MCNC: 1.34 MG/DL (ref 0.73–1.18)
CREAT SERPL-MCNC: 1.36 MG/DL (ref 0.73–1.18)
CREAT SERPL-MCNC: 1.44 MG/DL (ref 0.66–1.25)
CREAT SERPL-MCNC: 1.48 MG/DL (ref 0.73–1.18)
CREAT SERPL-MCNC: 1.5 MG/DL (ref 0.73–1.18)
CREAT SERPL-MCNC: 1.51 MG/DL (ref 0.73–1.18)
CREAT SERPL-MCNC: 1.56 MG/DL (ref 0.73–1.18)
DEPRECATED CALCIDIOL+CALCIFEROL SERPL-MC: 26 UG/L (ref 20–75)
DIFFERENTIAL METHOD BLD: ABNORMAL
DIFFERENTIAL: ABNORMAL
EOSINOPHIL # BLD AUTO: 0.1 10E9/L (ref 0–0.7)
EOSINOPHIL NFR BLD AUTO: 1.2 %
ERYTHROCYTE [DISTWIDTH] IN BLOOD BY AUTOMATED COUNT: 14.5 % (ref 10–15)
ERYTHROCYTE [DISTWIDTH] IN BLOOD BY AUTOMATED COUNT: 14.7 % (ref 10–15)
ERYTHROCYTE [DISTWIDTH] IN BLOOD BY AUTOMATED COUNT: 14.9 % (ref 11.9–15.5)
ERYTHROCYTE [DISTWIDTH] IN BLOOD BY AUTOMATED COUNT: 15 % (ref 11.9–15.5)
ERYTHROCYTE [DISTWIDTH] IN BLOOD BY AUTOMATED COUNT: 15 % (ref 11.9–15.5)
ERYTHROCYTE [DISTWIDTH] IN BLOOD BY AUTOMATED COUNT: 15.2 % (ref 11.9–15.5)
GFR SERPL CREATININE-BSD FRML MDRD: 42 ML/MIN/1.73M2
GFR SERPL CREATININE-BSD FRML MDRD: 44 ML/MIN/1.73M2
GFR SERPL CREATININE-BSD FRML MDRD: 44 ML/MIN/1.73M2
GFR SERPL CREATININE-BSD FRML MDRD: 45 ML/MIN/1.73M2
GFR SERPL CREATININE-BSD FRML MDRD: 46 ML/MIN/{1.73_M2}
GFR SERPL CREATININE-BSD FRML MDRD: 50 ML/MIN/1.73M2
GFR SERPL CREATININE-BSD FRML MDRD: 51 ML/MIN/1.73M2
GFR SERPL CREATININE-BSD FRML MDRD: 52 ML/MIN/{1.73_M2}
GFR SERPL CREATININE-BSD FRML MDRD: 58 ML/MIN/{1.73_M2}
GLUCOSE SERPL-MCNC: 107 MG/DL (ref 70–100)
GLUCOSE SERPL-MCNC: 109 MG/DL (ref 70–100)
GLUCOSE SERPL-MCNC: 118 MG/DL (ref 70–100)
GLUCOSE SERPL-MCNC: 78 MG/DL (ref 70–99)
GLUCOSE SERPL-MCNC: 79 MG/DL (ref 70–100)
GLUCOSE SERPL-MCNC: 95 MG/DL (ref 70–100)
GLUCOSE SERPL-MCNC: 97 MG/DL (ref 70–100)
GLUCOSE SERPL-MCNC: 98 MG/DL (ref 70–99)
GLUCOSE SERPL-MCNC: 99 MG/DL (ref 70–99)
HCT VFR BLD AUTO: 34.4 % (ref 38.8–50)
HCT VFR BLD AUTO: 35.3 % (ref 38.8–50)
HCT VFR BLD AUTO: 35.7 % (ref 38.8–50)
HCT VFR BLD AUTO: 38.4 % (ref 40–53)
HCT VFR BLD AUTO: 40.2 % (ref 38.8–50)
HCT VFR BLD AUTO: 40.3 % (ref 40–53)
HDLC SERPL-MCNC: 46 MG/DL
HEMOGLOBIN: 11.6 G/DL (ref 13.5–17.5)
HEMOGLOBIN: 11.9 G/DL (ref 13.5–17.5)
HEMOGLOBIN: 12 G/DL (ref 13.5–17.5)
HEMOGLOBIN: 12.5 G/DL (ref 13.5–17.5)
HEMOGLOBIN: 13.4 G/DL (ref 13.5–17.5)
HGB BLD-MCNC: 12.8 G/DL (ref 13.3–17.7)
HGB BLD-MCNC: 13.2 G/DL (ref 13.3–17.7)
IMM GRANULOCYTES # BLD: 0.1 10E9/L (ref 0–0.4)
IMM GRANULOCYTES NFR BLD: 1 %
INR PPP: 1.09 (ref 0.86–1.14)
INR PPP: 1.16 (ref 0.86–1.14)
INR PPP: 1.23 (ref 0.86–1.14)
INR PPP: 1.26 (ref 0.86–1.14)
INR PPP: 2.64 (ref 0.86–1.14)
INR PPP: 2.81 (ref 0.86–1.14)
INR PPP: 2.9 (ref 0.9–1.1)
INR PPP: 3.03 (ref 0.86–1.14)
INTERPRETATION ECG - MUSE: NORMAL
LABORATORY COMMENT REPORT: NORMAL
LDLC SERPL CALC-MCNC: 32 MG/DL
LYMPHOCYTES # BLD AUTO: 2.1 10E9/L (ref 0.8–5.3)
LYMPHOCYTES NFR BLD AUTO: 23 %
MCH RBC QN AUTO: 29.7 PG (ref 26.5–33)
MCH RBC QN AUTO: 30 PG (ref 26.5–33)
MCH RBC QN AUTO: 30 PG (ref 27.6–33.3)
MCH RBC QN AUTO: 30.2 PG (ref 27.6–33.3)
MCH RBC QN AUTO: 30.3 PG (ref 27.6–33.3)
MCH RBC QN AUTO: 30.4 PG (ref 27.6–33.3)
MCHC RBC AUTO-ENTMCNC: 32.8 G/DL (ref 31.5–36.5)
MCHC RBC AUTO-ENTMCNC: 33.3 G/DL (ref 31.5–35.2)
MCHC RBC AUTO-ENTMCNC: 33.3 G/DL (ref 31.5–35.2)
MCHC RBC AUTO-ENTMCNC: 33.3 G/DL (ref 31.5–36.5)
MCHC RBC AUTO-ENTMCNC: 33.7 G/DL (ref 31.5–35.2)
MCHC RBC AUTO-ENTMCNC: 34 G/DL (ref 31.5–35.2)
MCV RBC AUTO: 89.4 FL (ref 80–100)
MCV RBC AUTO: 89.8 FL (ref 80–100)
MCV RBC AUTO: 90 FL (ref 78–100)
MCV RBC AUTO: 90.1 FL (ref 80–100)
MCV RBC AUTO: 90.6 FL (ref 80–100)
MCV RBC AUTO: 91 FL (ref 78–100)
MDC_IDC_EPISODE_DTM: NORMAL
MDC_IDC_EPISODE_DURATION: 13 S
MDC_IDC_EPISODE_DURATION: 7 S
MDC_IDC_EPISODE_DURATION: 8 S
MDC_IDC_EPISODE_DURATION: 8 S
MDC_IDC_EPISODE_DURATION: 9 S
MDC_IDC_EPISODE_DURATION: 9 S
MDC_IDC_EPISODE_ID: NORMAL
MDC_IDC_EPISODE_TYPE: NORMAL
MDC_IDC_LEAD_IMPLANT_DT: NORMAL
MDC_IDC_LEAD_LOCATION: NORMAL
MDC_IDC_LEAD_LOCATION_DETAIL_1: NORMAL
MDC_IDC_LEAD_MFG: NORMAL
MDC_IDC_LEAD_MODEL: NORMAL
MDC_IDC_LEAD_POLARITY_TYPE: NORMAL
MDC_IDC_LEAD_SERIAL: NORMAL
MDC_IDC_MSMT_BATTERY_STATUS: NORMAL
MDC_IDC_MSMT_CAP_CHARGE_DTM: NORMAL
MDC_IDC_MSMT_CAP_CHARGE_ENERGY: 41 J
MDC_IDC_MSMT_CAP_CHARGE_TIME: 12.98
MDC_IDC_MSMT_CAP_CHARGE_TIME: 6.41 S
MDC_IDC_MSMT_CAP_CHARGE_TYPE: NORMAL
MDC_IDC_MSMT_CAP_CHARGE_TYPE: NORMAL
MDC_IDC_MSMT_LEADCHNL_LV_IMPEDANCE_VALUE: 385 OHM
MDC_IDC_MSMT_LEADCHNL_LV_PACING_THRESHOLD_AMPLITUDE: 0.8 V
MDC_IDC_MSMT_LEADCHNL_LV_PACING_THRESHOLD_PULSEWIDTH: 0.5 MS
MDC_IDC_MSMT_LEADCHNL_LV_SENSING_INTR_AMPL: 10.3 MV
MDC_IDC_MSMT_LEADCHNL_RA_IMPEDANCE_VALUE: 530 OHM
MDC_IDC_MSMT_LEADCHNL_RA_PACING_THRESHOLD_AMPLITUDE: 0.4 V
MDC_IDC_MSMT_LEADCHNL_RA_PACING_THRESHOLD_PULSEWIDTH: 0.5 MS
MDC_IDC_MSMT_LEADCHNL_RA_SENSING_INTR_AMPL: 1.7 MV
MDC_IDC_MSMT_LEADCHNL_RV_IMPEDANCE_VALUE: 425 OHM
MDC_IDC_MSMT_LEADCHNL_RV_PACING_THRESHOLD_AMPLITUDE: 0.9 V
MDC_IDC_MSMT_LEADCHNL_RV_PACING_THRESHOLD_PULSEWIDTH: 0.5 MS
MDC_IDC_MSMT_LEADCHNL_RV_SENSING_INTR_AMPL: 12 MV
MDC_IDC_PG_IMPLANT_DTM: NORMAL
MDC_IDC_PG_MFG: NORMAL
MDC_IDC_PG_MODEL: NORMAL
MDC_IDC_PG_SERIAL: NORMAL
MDC_IDC_PG_TYPE: NORMAL
MDC_IDC_SESS_CLINIC_NAME: NORMAL
MDC_IDC_SESS_DTM: NORMAL
MDC_IDC_SESS_TYPE: NORMAL
MDC_IDC_SET_BRADY_AT_MODE_SWITCH_MODE: NORMAL
MDC_IDC_SET_BRADY_LOWRATE: 65 {BEATS}/MIN
MDC_IDC_SET_BRADY_MAX_SENSOR_RATE: 120 {BEATS}/MIN
MDC_IDC_SET_BRADY_MODE: NORMAL
MDC_IDC_SET_BRADY_PAV_DELAY_HIGH: 150 MS
MDC_IDC_SET_BRADY_PAV_DELAY_LOW: 150 MS
MDC_IDC_SET_BRADY_SAV_DELAY_LOW: 120 MS
MDC_IDC_SET_CRT_LVRV_DELAY: 0 MS
MDC_IDC_SET_CRT_PACED_CHAMBERS: NORMAL
MDC_IDC_SET_LEADCHNL_LV_PACING_AMPLITUDE: 1.5 V
MDC_IDC_SET_LEADCHNL_LV_PACING_ANODE_ELECTRODE_1: NORMAL
MDC_IDC_SET_LEADCHNL_LV_PACING_ANODE_LOCATION_1: NORMAL
MDC_IDC_SET_LEADCHNL_LV_PACING_CAPTURE_MODE: NORMAL
MDC_IDC_SET_LEADCHNL_LV_PACING_CATHODE_ELECTRODE_1: NORMAL
MDC_IDC_SET_LEADCHNL_LV_PACING_CATHODE_LOCATION_1: NORMAL
MDC_IDC_SET_LEADCHNL_LV_PACING_POLARITY: NORMAL
MDC_IDC_SET_LEADCHNL_LV_PACING_PULSEWIDTH: 0.5 MS
MDC_IDC_SET_LEADCHNL_LV_SENSING_ADAPTATION_MODE: NORMAL
MDC_IDC_SET_LEADCHNL_LV_SENSING_ANODE_ELECTRODE_1: NORMAL
MDC_IDC_SET_LEADCHNL_LV_SENSING_ANODE_LOCATION_1: NORMAL
MDC_IDC_SET_LEADCHNL_LV_SENSING_CATHODE_ELECTRODE_1: NORMAL
MDC_IDC_SET_LEADCHNL_LV_SENSING_CATHODE_LOCATION_1: NORMAL
MDC_IDC_SET_LEADCHNL_LV_SENSING_POLARITY: NORMAL
MDC_IDC_SET_LEADCHNL_LV_SENSING_SENSITIVITY: 1 MV
MDC_IDC_SET_LEADCHNL_RA_PACING_POLARITY: NORMAL
MDC_IDC_SET_LEADCHNL_RA_SENSING_ADAPTATION_MODE: NORMAL
MDC_IDC_SET_LEADCHNL_RA_SENSING_POLARITY: NORMAL
MDC_IDC_SET_LEADCHNL_RA_SENSING_SENSITIVITY: 0.15 MV
MDC_IDC_SET_LEADCHNL_RV_PACING_AMPLITUDE: 2.5 V
MDC_IDC_SET_LEADCHNL_RV_PACING_CAPTURE_MODE: NORMAL
MDC_IDC_SET_LEADCHNL_RV_PACING_POLARITY: NORMAL
MDC_IDC_SET_LEADCHNL_RV_PACING_PULSEWIDTH: 0.5 MS
MDC_IDC_SET_LEADCHNL_RV_SENSING_ADAPTATION_MODE: NORMAL
MDC_IDC_SET_LEADCHNL_RV_SENSING_POLARITY: NORMAL
MDC_IDC_SET_LEADCHNL_RV_SENSING_SENSITIVITY: 0.6 MV
MDC_IDC_SET_ZONE_DETECTION_INTERVAL: 273 MS
MDC_IDC_SET_ZONE_DETECTION_INTERVAL: 353 MS
MDC_IDC_SET_ZONE_DETECTION_INTERVAL: 462 MS
MDC_IDC_SET_ZONE_TYPE: NORMAL
MDC_IDC_SET_ZONE_VENDOR_TYPE: NORMAL
MDC_IDC_STAT_EPISODE_RECENT_COUNT: 14
MDC_IDC_STAT_EPISODE_RECENT_COUNT: 14
MDC_IDC_STAT_EPISODE_RECENT_COUNT: NORMAL
MDC_IDC_STAT_EPISODE_RECENT_COUNT_DTM_END: NORMAL
MDC_IDC_STAT_EPISODE_RECENT_COUNT_DTM_START: NORMAL
MDC_IDC_STAT_EPISODE_TOTAL_COUNT: 20
MDC_IDC_STAT_EPISODE_TOTAL_COUNT: 26
MDC_IDC_STAT_EPISODE_TOTAL_COUNT: NORMAL
MDC_IDC_STAT_EPISODE_TOTAL_COUNT_DTM_END: NORMAL
MDC_IDC_STAT_EPISODE_TYPE: NORMAL
MDC_IDC_STAT_EPISODE_VENDOR_TYPE: NORMAL
MDC_IDC_STAT_TACHYTHERAPY_ATP_DELIVERED_RECENT: 0
MDC_IDC_STAT_TACHYTHERAPY_ATP_DELIVERED_TOTAL: 8
MDC_IDC_STAT_TACHYTHERAPY_RECENT_DTM_END: NORMAL
MDC_IDC_STAT_TACHYTHERAPY_RECENT_DTM_START: NORMAL
MDC_IDC_STAT_TACHYTHERAPY_SHOCKS_ABORTED_RECENT: 0
MDC_IDC_STAT_TACHYTHERAPY_SHOCKS_ABORTED_TOTAL: 3
MDC_IDC_STAT_TACHYTHERAPY_SHOCKS_DELIVERED_RECENT: 0
MDC_IDC_STAT_TACHYTHERAPY_SHOCKS_DELIVERED_TOTAL: 10
MDC_IDC_STAT_TACHYTHERAPY_TOTAL_DTM_END: NORMAL
MONOCYTES # BLD AUTO: 0.9 10E9/L (ref 0–1.3)
MONOCYTES NFR BLD AUTO: 10.2 %
NEUTROPHILS # BLD AUTO: 5.9 10E9/L (ref 1.6–8.3)
NEUTROPHILS NFR BLD AUTO: 64 %
NONHDLC SERPL-MCNC: 51 MG/DL
NRBC # BLD AUTO: 0 10*3/UL
NRBC BLD AUTO-RTO: 0 /100
PLATELET # BLD AUTO: 169 10E9/L (ref 150–450)
PLATELET # BLD AUTO: 174 X10(9)/L (ref 150–450)
PLATELET # BLD AUTO: 184 10E9/L (ref 150–450)
PLATELET # BLD AUTO: 200 X10(9)/L (ref 150–450)
PLATELET # BLD AUTO: 204 10(9)/L (ref 150–450)
PLATELET # BLD AUTO: 335 X10(9)/L (ref 150–450)
POTASSIUM SERPL-SCNC: 3.3 MMOL/L (ref 3.5–5.1)
POTASSIUM SERPL-SCNC: 3.5 MMOL/L (ref 3.5–5.1)
POTASSIUM SERPL-SCNC: 3.6 MMOL/L (ref 3.5–5.1)
POTASSIUM SERPL-SCNC: 3.7 MMOL/L (ref 3.5–5.1)
POTASSIUM SERPL-SCNC: 3.7 MMOL/L (ref 3.5–5.1)
POTASSIUM SERPL-SCNC: 3.8 MMOL/L (ref 3.5–5.1)
POTASSIUM SERPL-SCNC: 4.1 MMOL/L (ref 3.4–5.3)
POTASSIUM SERPL-SCNC: 4.3 MMOL/L (ref 3.4–5.3)
POTASSIUM SERPL-SCNC: 4.3 MMOL/L (ref 3.4–5.3)
PROT SERPL-MCNC: 6.5 G/DL (ref 6.8–8.8)
RBC # BLD AUTO: 3.83 X10(12)/L (ref 4.32–5.72)
RBC # BLD AUTO: 3.94 10(12)/L (ref 4.32–5.72)
RBC # BLD AUTO: 3.95 X10(12)/L (ref 4.32–5.72)
RBC # BLD AUTO: 4.26 10E12/L (ref 4.4–5.9)
RBC # BLD AUTO: 4.45 10E12/L (ref 4.4–5.9)
RBC # BLD AUTO: 4.46 X10(12)/L (ref 4.32–5.72)
SARS-COV-2 RNA RESP QL NAA+PROBE: NEGATIVE
SODIUM SERPL-SCNC: 131 MMOL/L (ref 136–145)
SODIUM SERPL-SCNC: 132 MMOL/L (ref 133–144)
SODIUM SERPL-SCNC: 132 MMOL/L (ref 136–145)
SODIUM SERPL-SCNC: 133 MMOL/L (ref 133–144)
SODIUM SERPL-SCNC: 133 MMOL/L (ref 136–145)
SODIUM SERPL-SCNC: 136 MMOL/L (ref 133–144)
SODIUM SERPL-SCNC: 138 MMOL/L (ref 136–145)
SODIUM SERPL-SCNC: 140 MMOL/L (ref 136–145)
SODIUM SERPL-SCNC: 147 MMOL/L (ref 136–145)
SPECIMEN SOURCE: NORMAL
TRIGL SERPL-MCNC: 97 MG/DL
TROPONIN I SERPL-MCNC: 0.02 UG/L (ref 0–0.04)
TROPONIN I SERPL-MCNC: <0.015 UG/L (ref 0–0.04)
WBC # BLD AUTO: 13.3 10(9)/L (ref 3.5–10.5)
WBC # BLD AUTO: 14.8 X10(9)/L (ref 3.5–10.5)
WBC # BLD AUTO: 19.5 X10(9)/L (ref 3.5–10.5)
WBC # BLD AUTO: 9.3 10E9/L (ref 4–11)
WBC # BLD AUTO: 9.3 X10(9)/L (ref 3.5–10.5)
WBC # BLD AUTO: 9.9 10E9/L (ref 4–11)

## 2021-01-01 PROCEDURE — 250N000013 HC RX MED GY IP 250 OP 250 PS 637: Performed by: EMERGENCY MEDICINE

## 2021-01-01 PROCEDURE — 36415 COLL VENOUS BLD VENIPUNCTURE: CPT | Performed by: NURSE PRACTITIONER

## 2021-01-01 PROCEDURE — 99233 SBSQ HOSP IP/OBS HIGH 50: CPT | Performed by: INTERNAL MEDICINE

## 2021-01-01 PROCEDURE — 99203 OFFICE O/P NEW LOW 30 MIN: CPT | Mod: 95 | Performed by: PHYSICIAN ASSISTANT

## 2021-01-01 PROCEDURE — 97530 THERAPEUTIC ACTIVITIES: CPT | Mod: GP

## 2021-01-01 PROCEDURE — 97535 SELF CARE MNGMENT TRAINING: CPT | Mod: GO

## 2021-01-01 PROCEDURE — 93284 PRGRMG EVAL IMPLANTABLE DFB: CPT | Performed by: INTERNAL MEDICINE

## 2021-01-01 PROCEDURE — 250N000013 HC RX MED GY IP 250 OP 250 PS 637: Performed by: PHYSICIAN ASSISTANT

## 2021-01-01 PROCEDURE — 85730 THROMBOPLASTIN TIME PARTIAL: CPT | Performed by: NURSE PRACTITIONER

## 2021-01-01 PROCEDURE — 99310 SBSQ NF CARE HIGH MDM 45: CPT | Performed by: NURSE PRACTITIONER

## 2021-01-01 PROCEDURE — 70450 CT HEAD/BRAIN W/O DYE: CPT

## 2021-01-01 PROCEDURE — 96367 TX/PROPH/DG ADDL SEQ IV INF: CPT

## 2021-01-01 PROCEDURE — 82306 VITAMIN D 25 HYDROXY: CPT | Performed by: NURSE PRACTITIONER

## 2021-01-01 PROCEDURE — 85610 PROTHROMBIN TIME: CPT | Performed by: NURSE PRACTITIONER

## 2021-01-01 PROCEDURE — 99214 OFFICE O/P EST MOD 30 MIN: CPT | Mod: 95 | Performed by: NURSE PRACTITIONER

## 2021-01-01 PROCEDURE — 85027 COMPLETE CBC AUTOMATED: CPT | Performed by: NURSE PRACTITIONER

## 2021-01-01 PROCEDURE — 97116 GAIT TRAINING THERAPY: CPT | Mod: GP

## 2021-01-01 PROCEDURE — 99213 OFFICE O/P EST LOW 20 MIN: CPT | Mod: 95 | Performed by: NURSE PRACTITIONER

## 2021-01-01 PROCEDURE — 0001A PR COVID VAC PFIZER DIL RECON 30 MCG/0.3 ML IM: CPT

## 2021-01-01 PROCEDURE — G0463 HOSPITAL OUTPT CLINIC VISIT: HCPCS | Performed by: PHYSICIAN ASSISTANT

## 2021-01-01 PROCEDURE — 97162 PT EVAL MOD COMPLEX 30 MIN: CPT | Mod: GP

## 2021-01-01 PROCEDURE — 99207 PR NO CHARGE LOS: CPT | Performed by: INTERNAL MEDICINE

## 2021-01-01 PROCEDURE — 73502 X-RAY EXAM HIP UNI 2-3 VIEWS: CPT

## 2021-01-01 PROCEDURE — 250N000013 HC RX MED GY IP 250 OP 250 PS 637: Performed by: NURSE PRACTITIONER

## 2021-01-01 PROCEDURE — 80048 BASIC METABOLIC PNL TOTAL CA: CPT | Performed by: NURSE PRACTITIONER

## 2021-01-01 PROCEDURE — 250N000015 HC RX FACTOR IP MED 636 OP 636: Performed by: EMERGENCY MEDICINE

## 2021-01-01 PROCEDURE — 84484 ASSAY OF TROPONIN QUANT: CPT | Performed by: NURSE PRACTITIONER

## 2021-01-01 PROCEDURE — 70498 CT ANGIOGRAPHY NECK: CPT

## 2021-01-01 PROCEDURE — 99283 EMERGENCY DEPT VISIT LOW MDM: CPT

## 2021-01-01 PROCEDURE — 91300 PR COVID VAC PFIZER DIL RECON 30 MCG/0.3 ML IM: CPT

## 2021-01-01 PROCEDURE — 250N000011 HC RX IP 250 OP 636: Performed by: EMERGENCY MEDICINE

## 2021-01-01 PROCEDURE — 96366 THER/PROPH/DIAG IV INF ADDON: CPT

## 2021-01-01 PROCEDURE — 87635 SARS-COV-2 COVID-19 AMP PRB: CPT | Performed by: EMERGENCY MEDICINE

## 2021-01-01 PROCEDURE — 99309 SBSQ NF CARE MODERATE MDM 30: CPT | Performed by: NURSE PRACTITIONER

## 2021-01-01 PROCEDURE — 73080 X-RAY EXAM OF ELBOW: CPT | Mod: RT

## 2021-01-01 PROCEDURE — 99214 OFFICE O/P EST MOD 30 MIN: CPT | Mod: 95 | Performed by: INTERNAL MEDICINE

## 2021-01-01 PROCEDURE — 99239 HOSP IP/OBS DSCHRG MGMT >30: CPT | Performed by: INTERNAL MEDICINE

## 2021-01-01 PROCEDURE — 85610 PROTHROMBIN TIME: CPT | Performed by: EMERGENCY MEDICINE

## 2021-01-01 PROCEDURE — 72131 CT LUMBAR SPINE W/O DYE: CPT

## 2021-01-01 PROCEDURE — 99304 1ST NF CARE SF/LOW MDM 25: CPT | Performed by: INTERNAL MEDICINE

## 2021-01-01 PROCEDURE — 96375 TX/PRO/DX INJ NEW DRUG ADDON: CPT

## 2021-01-01 PROCEDURE — 80053 COMPREHEN METABOLIC PANEL: CPT | Performed by: EMERGENCY MEDICINE

## 2021-01-01 PROCEDURE — 120N000001 HC R&B MED SURG/OB

## 2021-01-01 PROCEDURE — 96365 THER/PROPH/DIAG IV INF INIT: CPT | Mod: 59

## 2021-01-01 PROCEDURE — 999N000226 HC STATISTIC SLP IP EVAL DEFER: Performed by: SPEECH-LANGUAGE PATHOLOGIST

## 2021-01-01 PROCEDURE — 12001 RPR S/N/AX/GEN/TRNK 2.5CM/<: CPT

## 2021-01-01 PROCEDURE — C9803 HOPD COVID-19 SPEC COLLECT: HCPCS

## 2021-01-01 PROCEDURE — 99443 PR PHYSICIAN TELEPHONE EVALUATION 21-30 MIN: CPT | Mod: 95 | Performed by: NURSE PRACTITIONER

## 2021-01-01 PROCEDURE — C9132 KCENTRA, PER I.U.: HCPCS | Performed by: EMERGENCY MEDICINE

## 2021-01-01 PROCEDURE — 99223 1ST HOSP IP/OBS HIGH 75: CPT | Mod: AI | Performed by: HOSPITALIST

## 2021-01-01 PROCEDURE — 85025 COMPLETE CBC W/AUTO DIFF WBC: CPT | Performed by: EMERGENCY MEDICINE

## 2021-01-01 PROCEDURE — 999N000128 HC STATISTIC PERIPHERAL IV START W/O US GUIDANCE

## 2021-01-01 PROCEDURE — 0002A PR COVID VAC PFIZER DIL RECON 30 MCG/0.3 ML IM: CPT

## 2021-01-01 PROCEDURE — 84484 ASSAY OF TROPONIN QUANT: CPT | Performed by: EMERGENCY MEDICINE

## 2021-01-01 PROCEDURE — 93005 ELECTROCARDIOGRAM TRACING: CPT

## 2021-01-01 PROCEDURE — 99285 EMERGENCY DEPT VISIT HI MDM: CPT | Mod: 25

## 2021-01-01 PROCEDURE — 70450 CT HEAD/BRAIN W/O DYE: CPT | Mod: 77

## 2021-01-01 PROCEDURE — 36415 COLL VENOUS BLD VENIPUNCTURE: CPT | Performed by: EMERGENCY MEDICINE

## 2021-01-01 PROCEDURE — 250N000009 HC RX 250: Performed by: EMERGENCY MEDICINE

## 2021-01-01 PROCEDURE — 99222 1ST HOSP IP/OBS MODERATE 55: CPT | Performed by: PHYSICIAN ASSISTANT

## 2021-01-01 PROCEDURE — 72125 CT NECK SPINE W/O DYE: CPT

## 2021-01-01 PROCEDURE — 0HQFXZZ REPAIR RIGHT HAND SKIN, EXTERNAL APPROACH: ICD-10-PCS | Performed by: EMERGENCY MEDICINE

## 2021-01-01 PROCEDURE — 97165 OT EVAL LOW COMPLEX 30 MIN: CPT | Mod: GO

## 2021-01-01 PROCEDURE — 258N000003 HC RX IP 258 OP 636: Performed by: EMERGENCY MEDICINE

## 2021-01-01 RX ORDER — DIPHENHYDRAMINE HYDROCHLORIDE AND LIDOCAINE HYDROCHLORIDE AND ALUMINUM HYDROXIDE AND MAGNESIUM HYDRO
10 KIT EVERY 6 HOURS PRN
Status: DISCONTINUED | OUTPATIENT
Start: 2021-01-01 | End: 2021-01-01 | Stop reason: HOSPADM

## 2021-01-01 RX ORDER — OXYCODONE HYDROCHLORIDE 5 MG/1
2.5 TABLET ORAL 4 TIMES DAILY PRN
Qty: 12 TABLET | Refills: 0 | Status: SHIPPED | OUTPATIENT
Start: 2021-01-01 | End: 2021-01-01

## 2021-01-01 RX ORDER — CARVEDILOL 6.25 MG/1
6.25 TABLET ORAL 2 TIMES DAILY WITH MEALS
Status: DISCONTINUED | OUTPATIENT
Start: 2021-01-01 | End: 2021-01-01 | Stop reason: HOSPADM

## 2021-01-01 RX ORDER — HYDROMORPHONE HYDROCHLORIDE 1 MG/ML
0.5 INJECTION, SOLUTION INTRAMUSCULAR; INTRAVENOUS; SUBCUTANEOUS ONCE
Status: COMPLETED | OUTPATIENT
Start: 2021-01-01 | End: 2021-01-01

## 2021-01-01 RX ORDER — IPRATROPIUM BROMIDE 21 UG/1
2 SPRAY, METERED NASAL EVERY 12 HOURS
Status: DISCONTINUED | OUTPATIENT
Start: 2021-01-01 | End: 2021-01-01 | Stop reason: HOSPADM

## 2021-01-01 RX ORDER — WARFARIN SODIUM 1 MG/1
TABLET ORAL
Qty: 135 TABLET | Refills: 0 | Status: ON HOLD | OUTPATIENT
Start: 2021-01-01 | End: 2021-01-01

## 2021-01-01 RX ORDER — LIDOCAINE 40 MG/G
CREAM TOPICAL
Status: DISCONTINUED | OUTPATIENT
Start: 2021-01-01 | End: 2021-01-01 | Stop reason: HOSPADM

## 2021-01-01 RX ORDER — IOPAMIDOL 755 MG/ML
70 INJECTION, SOLUTION INTRAVASCULAR ONCE
Status: COMPLETED | OUTPATIENT
Start: 2021-01-01 | End: 2021-01-01

## 2021-01-01 RX ORDER — ROSUVASTATIN CALCIUM 20 MG/1
20 TABLET, COATED ORAL DAILY
Qty: 90 TABLET | Refills: 3 | Status: SHIPPED | OUTPATIENT
Start: 2021-01-01 | End: 2021-01-01

## 2021-01-01 RX ORDER — OXYCODONE HYDROCHLORIDE 5 MG/1
5 TABLET ORAL ONCE
Status: COMPLETED | OUTPATIENT
Start: 2021-01-01 | End: 2021-01-01

## 2021-01-01 RX ORDER — POLYETHYLENE GLYCOL 3350 17 G/17G
17 POWDER, FOR SOLUTION ORAL DAILY PRN
Status: DISCONTINUED | OUTPATIENT
Start: 2021-01-01 | End: 2021-01-01 | Stop reason: HOSPADM

## 2021-01-01 RX ORDER — MEXILETINE HYDROCHLORIDE 150 MG/1
150 CAPSULE ORAL 3 TIMES DAILY
Status: DISCONTINUED | OUTPATIENT
Start: 2021-01-01 | End: 2021-01-01 | Stop reason: HOSPADM

## 2021-01-01 RX ORDER — VITAMIN B COMPLEX
1000 TABLET ORAL DAILY
Status: DISCONTINUED | OUTPATIENT
Start: 2021-01-01 | End: 2021-01-01 | Stop reason: DRUGHIGH

## 2021-01-01 RX ORDER — ONDANSETRON 2 MG/ML
4 INJECTION INTRAMUSCULAR; INTRAVENOUS EVERY 6 HOURS PRN
Status: DISCONTINUED | OUTPATIENT
Start: 2021-01-01 | End: 2021-01-01 | Stop reason: HOSPADM

## 2021-01-01 RX ORDER — AMOXICILLIN 250 MG
2 CAPSULE ORAL 2 TIMES DAILY
Status: DISCONTINUED | OUTPATIENT
Start: 2021-01-01 | End: 2021-01-01 | Stop reason: HOSPADM

## 2021-01-01 RX ORDER — HYDRALAZINE HYDROCHLORIDE 20 MG/ML
10-20 INJECTION INTRAMUSCULAR; INTRAVENOUS
Status: DISCONTINUED | OUTPATIENT
Start: 2021-01-01 | End: 2021-01-01 | Stop reason: HOSPADM

## 2021-01-01 RX ORDER — GABAPENTIN 100 MG/1
100 CAPSULE ORAL 2 TIMES DAILY
Qty: 20 CAPSULE | Refills: 0 | Status: SHIPPED | OUTPATIENT
Start: 2021-01-01 | End: 2021-01-01

## 2021-01-01 RX ORDER — ACETAMINOPHEN 325 MG/1
975 TABLET ORAL ONCE
Status: COMPLETED | OUTPATIENT
Start: 2021-01-01 | End: 2021-01-01

## 2021-01-01 RX ORDER — ONDANSETRON 4 MG/1
4 TABLET, ORALLY DISINTEGRATING ORAL EVERY 6 HOURS PRN
Status: DISCONTINUED | OUTPATIENT
Start: 2021-01-01 | End: 2021-01-01 | Stop reason: HOSPADM

## 2021-01-01 RX ORDER — DIGOXIN 125 MCG
125 TABLET ORAL
Status: DISCONTINUED | OUTPATIENT
Start: 2021-01-01 | End: 2021-01-01 | Stop reason: HOSPADM

## 2021-01-01 RX ORDER — TORSEMIDE 10 MG/1
10 TABLET ORAL DAILY
Start: 2021-01-01 | End: 2021-01-01

## 2021-01-01 RX ORDER — LABETALOL HYDROCHLORIDE 5 MG/ML
10-40 INJECTION, SOLUTION INTRAVENOUS EVERY 10 MIN PRN
Status: DISCONTINUED | OUTPATIENT
Start: 2021-01-01 | End: 2021-01-01 | Stop reason: HOSPADM

## 2021-01-01 RX ORDER — NALOXONE HYDROCHLORIDE 0.4 MG/ML
0.2 INJECTION, SOLUTION INTRAMUSCULAR; INTRAVENOUS; SUBCUTANEOUS
Status: DISCONTINUED | OUTPATIENT
Start: 2021-01-01 | End: 2021-01-01 | Stop reason: HOSPADM

## 2021-01-01 RX ORDER — MEXILETINE HYDROCHLORIDE 150 MG/1
150 CAPSULE ORAL 3 TIMES DAILY
Qty: 270 CAPSULE | Refills: 3 | Status: SHIPPED | OUTPATIENT
Start: 2021-01-01 | End: 2021-01-01

## 2021-01-01 RX ORDER — LEVOFLOXACIN 500 MG/1
500 TABLET, FILM COATED ORAL DAILY
Qty: 8 TABLET | Refills: 0
Start: 2021-01-01 | End: 2021-01-01

## 2021-01-01 RX ORDER — CARVEDILOL 6.25 MG/1
6.25 TABLET ORAL 2 TIMES DAILY WITH MEALS
Qty: 180 TABLET | Refills: 3 | Status: SHIPPED | OUTPATIENT
Start: 2021-01-01 | End: 2021-01-01

## 2021-01-01 RX ORDER — VITAMIN B COMPLEX
1 TABLET ORAL DAILY
COMMUNITY
End: 2021-01-01

## 2021-01-01 RX ORDER — NALOXONE HYDROCHLORIDE 0.4 MG/ML
0.4 INJECTION, SOLUTION INTRAMUSCULAR; INTRAVENOUS; SUBCUTANEOUS
Status: DISCONTINUED | OUTPATIENT
Start: 2021-01-01 | End: 2021-01-01 | Stop reason: HOSPADM

## 2021-01-01 RX ORDER — AMOXICILLIN 250 MG
1 CAPSULE ORAL 2 TIMES DAILY
Status: DISCONTINUED | OUTPATIENT
Start: 2021-01-01 | End: 2021-01-01 | Stop reason: HOSPADM

## 2021-01-01 RX ORDER — ACETAMINOPHEN 325 MG/1
975 TABLET ORAL EVERY 8 HOURS
Status: DISCONTINUED | OUTPATIENT
Start: 2021-01-01 | End: 2021-01-01 | Stop reason: HOSPADM

## 2021-01-01 RX ORDER — FAMOTIDINE 20 MG/1
20 TABLET, FILM COATED ORAL 2 TIMES DAILY
Status: DISCONTINUED | OUTPATIENT
Start: 2021-01-01 | End: 2021-01-01 | Stop reason: HOSPADM

## 2021-01-01 RX ORDER — GABAPENTIN 100 MG/1
100 CAPSULE ORAL ONCE
Status: COMPLETED | OUTPATIENT
Start: 2021-01-01 | End: 2021-01-01

## 2021-01-01 RX ORDER — ROSUVASTATIN CALCIUM 20 MG/1
20 TABLET, COATED ORAL AT BEDTIME
Status: DISCONTINUED | OUTPATIENT
Start: 2021-01-01 | End: 2021-01-01 | Stop reason: HOSPADM

## 2021-01-01 RX ORDER — CHOLECALCIFEROL (VITAMIN D3) 50 MCG
50 TABLET ORAL DAILY
Status: DISCONTINUED | OUTPATIENT
Start: 2021-01-01 | End: 2021-01-01 | Stop reason: HOSPADM

## 2021-01-01 RX ORDER — AMIODARONE HYDROCHLORIDE 200 MG/1
200 TABLET ORAL DAILY
Qty: 90 TABLET | Refills: 1 | Status: SHIPPED | OUTPATIENT
Start: 2021-01-01 | End: 2021-01-01

## 2021-01-01 RX ORDER — MORPHINE SULFATE 100 MG/5ML
2.5 SOLUTION ORAL
Qty: 30 ML | Refills: 0 | Status: SHIPPED | OUTPATIENT
Start: 2021-01-01

## 2021-01-01 RX ORDER — DOCUSATE SODIUM 100 MG/1
100 CAPSULE, LIQUID FILLED ORAL 2 TIMES DAILY
Qty: 30 CAPSULE | Refills: 0 | Status: SHIPPED | OUTPATIENT
Start: 2021-01-01 | End: 2021-01-01

## 2021-01-01 RX ORDER — TORSEMIDE 10 MG/1
20 TABLET ORAL DAILY
Start: 2021-01-01 | End: 2021-01-01

## 2021-01-01 RX ORDER — LORAZEPAM 2 MG/ML
1 CONCENTRATE ORAL EVERY 4 HOURS PRN
Start: 2021-01-01

## 2021-01-01 RX ORDER — VIT C/E/ZN/COPPR/LUTEIN/ZEAXAN 60 MG-6 MG
1 CAPSULE ORAL 2 TIMES DAILY
Status: DISCONTINUED | OUTPATIENT
Start: 2021-01-01 | End: 2021-01-01 | Stop reason: HOSPADM

## 2021-01-01 RX ORDER — TAMSULOSIN HYDROCHLORIDE 0.4 MG/1
0.4 CAPSULE ORAL PRN
COMMUNITY
End: 2021-01-01

## 2021-01-01 RX ORDER — AMIODARONE HYDROCHLORIDE 200 MG/1
200 TABLET ORAL DAILY
Status: DISCONTINUED | OUTPATIENT
Start: 2021-01-01 | End: 2021-01-01 | Stop reason: HOSPADM

## 2021-01-01 RX ORDER — HALOPERIDOL 0.5 MG/1
0.5 TABLET ORAL EVERY 6 HOURS PRN
Qty: 15 TABLET | Refills: 0 | Status: SHIPPED | OUTPATIENT
Start: 2021-01-01 | End: 2021-01-01

## 2021-01-01 RX ADMIN — MEXILETINE HYDROCHLORIDE 150 MG: 150 CAPSULE ORAL at 01:22

## 2021-01-01 RX ADMIN — FAMOTIDINE 20 MG: 20 TABLET ORAL at 20:22

## 2021-01-01 RX ADMIN — AMIODARONE HYDROCHLORIDE 200 MG: 200 TABLET ORAL at 13:03

## 2021-01-01 RX ADMIN — ACETAMINOPHEN 975 MG: 325 TABLET, FILM COATED ORAL at 00:08

## 2021-01-01 RX ADMIN — Medication 50 MCG: at 09:02

## 2021-01-01 RX ADMIN — MEXILETINE HYDROCHLORIDE 150 MG: 150 CAPSULE ORAL at 09:02

## 2021-01-01 RX ADMIN — DIGOXIN 125 MCG: 125 TABLET ORAL at 13:04

## 2021-01-01 RX ADMIN — FAMOTIDINE 20 MG: 20 TABLET ORAL at 13:03

## 2021-01-01 RX ADMIN — FAMOTIDINE 20 MG: 20 TABLET ORAL at 08:55

## 2021-01-01 RX ADMIN — IPRATROPIUM BROMIDE 2 SPRAY: 21 SPRAY, METERED NASAL at 09:38

## 2021-01-01 RX ADMIN — Medication 1000 UNITS: at 08:57

## 2021-01-01 RX ADMIN — Medication 1 CAPSULE: at 21:35

## 2021-01-01 RX ADMIN — Medication 1000 UNITS: at 13:04

## 2021-01-01 RX ADMIN — Medication 1 CAPSULE: at 20:22

## 2021-01-01 RX ADMIN — SACUBITRIL AND VALSARTAN 1 TABLET: 49; 51 TABLET, FILM COATED ORAL at 13:03

## 2021-01-01 RX ADMIN — SACUBITRIL AND VALSARTAN 1 TABLET: 49; 51 TABLET, FILM COATED ORAL at 09:02

## 2021-01-01 RX ADMIN — DIGOXIN 125 MCG: 125 TABLET ORAL at 09:38

## 2021-01-01 RX ADMIN — Medication 1 CAPSULE: at 08:53

## 2021-01-01 RX ADMIN — ACETAMINOPHEN 975 MG: 325 TABLET, FILM COATED ORAL at 09:01

## 2021-01-01 RX ADMIN — SENNOSIDES AND DOCUSATE SODIUM 2 TABLET: 8.6; 5 TABLET ORAL at 20:22

## 2021-01-01 RX ADMIN — ROSUVASTATIN CALCIUM 20 MG: 20 TABLET, FILM COATED ORAL at 21:35

## 2021-01-01 RX ADMIN — CARVEDILOL 6.25 MG: 6.25 TABLET, FILM COATED ORAL at 09:02

## 2021-01-01 RX ADMIN — IOPAMIDOL 70 ML: 755 INJECTION, SOLUTION INTRAVENOUS at 03:38

## 2021-01-01 RX ADMIN — ACETAMINOPHEN 975 MG: 325 TABLET, FILM COATED ORAL at 01:22

## 2021-01-01 RX ADMIN — PHYTONADIONE 10 MG: 10 INJECTION, EMULSION INTRAMUSCULAR; INTRAVENOUS; SUBCUTANEOUS at 05:10

## 2021-01-01 RX ADMIN — HYDROMORPHONE HYDROCHLORIDE 0.5 MG: 1 INJECTION, SOLUTION INTRAMUSCULAR; INTRAVENOUS; SUBCUTANEOUS at 05:04

## 2021-01-01 RX ADMIN — OXYCODONE HYDROCHLORIDE 5 MG: 5 TABLET ORAL at 13:26

## 2021-01-01 RX ADMIN — SACUBITRIL AND VALSARTAN 1 TABLET: 49; 51 TABLET, FILM COATED ORAL at 20:22

## 2021-01-01 RX ADMIN — SACUBITRIL AND VALSARTAN 1 TABLET: 49; 51 TABLET, FILM COATED ORAL at 21:35

## 2021-01-01 RX ADMIN — CARVEDILOL 6.25 MG: 6.25 TABLET, FILM COATED ORAL at 17:42

## 2021-01-01 RX ADMIN — MEXILETINE HYDROCHLORIDE 150 MG: 150 CAPSULE ORAL at 11:25

## 2021-01-01 RX ADMIN — GABAPENTIN 100 MG: 100 CAPSULE ORAL at 13:26

## 2021-01-01 RX ADMIN — CARVEDILOL 6.25 MG: 6.25 TABLET, FILM COATED ORAL at 13:03

## 2021-01-01 RX ADMIN — MEXILETINE HYDROCHLORIDE 150 MG: 150 CAPSULE ORAL at 21:35

## 2021-01-01 RX ADMIN — FAMOTIDINE 20 MG: 20 TABLET ORAL at 21:35

## 2021-01-01 RX ADMIN — SENNOSIDES AND DOCUSATE SODIUM 1 TABLET: 8.6; 5 TABLET ORAL at 08:54

## 2021-01-01 RX ADMIN — ACETAMINOPHEN 975 MG: 325 TABLET, FILM COATED ORAL at 17:41

## 2021-01-01 RX ADMIN — ACETAMINOPHEN 975 MG: 325 TABLET, FILM COATED ORAL at 09:36

## 2021-01-01 RX ADMIN — DIGOXIN 125 MCG: 125 TABLET ORAL at 08:53

## 2021-01-01 RX ADMIN — ACETAMINOPHEN 975 MG: 325 TABLET, FILM COATED ORAL at 08:55

## 2021-01-01 RX ADMIN — AMIODARONE HYDROCHLORIDE 200 MG: 200 TABLET ORAL at 09:02

## 2021-01-01 RX ADMIN — FAMOTIDINE 20 MG: 20 TABLET ORAL at 09:01

## 2021-01-01 RX ADMIN — MEXILETINE HYDROCHLORIDE 150 MG: 150 CAPSULE ORAL at 17:41

## 2021-01-01 RX ADMIN — ACETAMINOPHEN 975 MG: 325 TABLET, FILM COATED ORAL at 17:33

## 2021-01-01 RX ADMIN — ROSUVASTATIN CALCIUM 20 MG: 20 TABLET, FILM COATED ORAL at 23:03

## 2021-01-01 RX ADMIN — MEXILETINE HYDROCHLORIDE 150 MG: 150 CAPSULE ORAL at 13:56

## 2021-01-01 RX ADMIN — SODIUM CHLORIDE 100 ML: 9 INJECTION, SOLUTION INTRAVENOUS at 03:38

## 2021-01-01 RX ADMIN — IPRATROPIUM BROMIDE 2 SPRAY: 21 SPRAY, METERED NASAL at 21:42

## 2021-01-01 RX ADMIN — IPRATROPIUM BROMIDE 2 SPRAY: 21 SPRAY, METERED NASAL at 11:24

## 2021-01-01 RX ADMIN — PROTHROMBIN, COAGULATION FACTOR VII HUMAN, COAGULATION FACTOR IX HUMAN, COAGULATION FACTOR X HUMAN, PROTEIN C, PROTEIN S HUMAN, AND WATER 2264 UNITS: KIT at 03:59

## 2021-01-01 RX ADMIN — AMIODARONE HYDROCHLORIDE 200 MG: 200 TABLET ORAL at 08:56

## 2021-01-01 RX ADMIN — CARVEDILOL 6.25 MG: 6.25 TABLET, FILM COATED ORAL at 08:56

## 2021-01-01 RX ADMIN — SENNOSIDES AND DOCUSATE SODIUM 1 TABLET: 8.6; 5 TABLET ORAL at 09:37

## 2021-01-01 RX ADMIN — SACUBITRIL AND VALSARTAN 1 TABLET: 49; 51 TABLET, FILM COATED ORAL at 08:57

## 2021-01-01 RX ADMIN — Medication 50 MCG: at 10:24

## 2021-01-01 RX ADMIN — Medication 1 CAPSULE: at 09:00

## 2021-01-01 ASSESSMENT — ENCOUNTER SYMPTOMS
FEVER: 0
VOMITING: 0
BACK PAIN: 1
BACK PAIN: 1
COUGH: 0
DIARRHEA: 0

## 2021-01-01 ASSESSMENT — ACTIVITIES OF DAILY LIVING (ADL)
ADLS_ACUITY_SCORE: 12
ADLS_ACUITY_SCORE: 10
ADLS_ACUITY_SCORE: 9
ADLS_ACUITY_SCORE: 10
ADLS_ACUITY_SCORE: 12
ADLS_ACUITY_SCORE: 10
ADLS_ACUITY_SCORE: 12

## 2021-01-01 ASSESSMENT — MIFFLIN-ST. JEOR
SCORE: 1570.4
SCORE: 1587.19
SCORE: 1581.75
SCORE: 1572.68
SCORE: 1587.19
SCORE: 1617.13
SCORE: 1587.19
SCORE: 1587.19
SCORE: 1563.6

## 2021-01-05 NOTE — TELEPHONE ENCOUNTER
Per Prince Galvez, CNP -BMP, FLP order faxed to [facility] to be drawn on 1/18/21 prior to their 1/20/21 appt w/ Dr. Newman.  Requested results be faxed to [facility] for Dr Newman appt on 1/20/21.     Future Appointments   Date Time Provider Department Center   1/20/2021  1:30 PM Parish Newman MD [facility] PSA CLIN   1/26/2021  2:20 PM Dejon Downs MD [facility] OX   3/11/2021  2:00 PM [facility] DCR2 [facility] PSA CLIN       Marizol Scales RN BSN  [place], [place]  C.O.R.E. Clinic Care Coordinator  01/05/21, 9:53 AM

## 2021-01-05 NOTE — TELEPHONE ENCOUNTER
----- Message from Marizol Scales RN sent at 1/4/2021 10:20 AM CST -----  Regarding: RE: blood work    ----- Message -----  From: Marizol Scales RN  Sent: 1/4/2021  To: Marizol Scales RN  Subject: FW: blood work                                   Fax BMP, FLP order to [facility] to be drawn 1/18 prior to EE 1/20 appt.  Told pt I would fax order later so they do not lose it.   Félix  ----- Message -----  From: Hayde Galvez APRN CNP  Sent: 12/2/2020  11:27 AM CST  To: Marizol Scales RN  Subject: blood work                                       S,  I had a virtual visit with Mr. Rene. I asked him to set up an appt with EE. They did not set up the lab, which I was thinking they will have it done at their home ( they have a service)   Can you confirm this with him  He needs a bmp, FLP. In Jan. Thx devika

## 2021-01-11 NOTE — PROGRESS NOTES
Received VM forwarded from [facility] Team 7 from wife Sobia asking if Dr. Newman appt on 1/20 was virtual or in person and if labs have been set up yet with [facility] to be drawn prior to 1/20's appt.       Called wife Noris back and informed her Dr. Newman appt is scheduled as in clinic and lab orders have been faxed to [facility] to be drawn prior to 1/20.  She would like to change appt to telephone d/t COVID-19 risk.  Appt has been changed.     Future Appointments   Date Time Provider Department Center   1/20/2021  1:30 PM Parish Newmna MD [facility] PSA CLIN   1/26/2021  2:20 PM Dejon Downs MD [facility]   3/11/2021  2:00 PM [facility] DCR2 [facility] PSA CLIN       Marizol Scales RN BSN   [facility] [place], [place]  C.O.R.E. Clinic Care Coordinator  01/11/21, 4:02 PM

## 2021-01-15 NOTE — PROGRESS NOTES
Scheduled remote device check on 12/10/20  revealed numerous NSVT/VT events. (25,000 in 80 days). No therapies delivered. Patient was then asked to come in to see EP. He saw Dr Costello on 12-26, at that time it was decided he would stop amiodarone and try Mexiletine to quiet VT. Since Dec 27 th pt has had (device has about 8 days on counters before med change), he has had 8,933 NSVT, No episode logged as monitored VT or in VT therapy zones. He has order pending for F/U with EP DAVE to re assess. He is seeing Dr Newman next week.  AGAPITO Valiente

## 2021-01-18 NOTE — PROGRESS NOTES
Received faxed BMP, FLP and INR lab results from [facility] received. Results are in Epic. Patient has appt 1/21/21 w/Dr. Newman. Sent original results report to [facility] to scan.  Berenice Khan RN on 1/18/2021 at 4:34 PM

## 2021-01-18 NOTE — PROGRESS NOTES
ANTICOAGULATION MANAGEMENT     Patient Name:  Tyrel Rene  Date:  1/18/2021    ASSESSMENT /SUBJECTIVE:    Today's INR result of 3.03 is therapeutic. Goal INR of 2.5-3.5      Warfarin dose taken: Warfarin taken as instructed    Diet: No new diet changes affecting INR    Medication changes/ interactions: No new medications/supplements affecting INR    Previous INR: Therapeutic     S/S of bleeding or thromboembolism: No    New injury or illness: No    Upcoming surgery, procedure or cardioversion: No    Additional findings: None      PLAN:    Telephone call with Tyrel regarding INR result and instructed:     Warfarin Dosing Instructions: Continue your current warfarin dose 1.5 mg daily    Instructed patient to follow up no later than: 4 weeks  Orders given to [facility] ([phone])    Education provided: None required      Marko verbalizes understanding and agrees to warfarin dosing plan.    Instructed to call the Anticoagulation Clinic for any changes, questions or concerns. (#[phone])        María Conte RN      OBJECTIVE:  Recent labs: (last 7 days)     01/18/21  1100   INR 3.03*         INR assessment THER    Recheck INR In: 2 WEEKS    INR Location Clinic      Anticoagulation Summary  As of 1/18/2021    INR goal:  2.5-3.5   TTR:  70.4 % (11.6 mo)   INR used for dosing:  3.03 (1/18/2021)   Warfarin maintenance plan:  1.5 mg (1 mg x 1.5) every day   Full warfarin instructions:  1.5 mg every day   Weekly warfarin total:  10.5 mg   No change documented:  Krystal Frost RN   Plan last modified:  Sheila Cid, RN (10/5/2020)   Next INR check:  2/15/2021   Priority:  Maintenance   Target end date:  Indefinite    Indications    Long-term (current) use of anticoagulants [Z79.01] [Z79.01]  Cerebral artery occlusion with cerebral infarction (HCC) [I63.50] [I63.50]  Cerebral infarction (H) [I63.9]  Atrial fibrillation  unspecified type (H) [I48.91]             Anticoagulation Episode  Summary     INR check location:      Preferred lab:  EXTERNAL LAB    Send INR reminders to:  HANS MCLEOD    Comments:  [facility] Patient goal should be 2.5-3.0 (5/13/20)      Anticoagulation Care Providers     Provider Role Specialty Phone number    Dejon Downs MD Referring Family Medicine [phone]

## 2021-01-20 NOTE — PROGRESS NOTES
Marko is a 75 year old who is being evaluated via a billable telephone visit.      Review Of Systems  Skin: NEGATIVE  Eyes:Ears/Nose/Throat: NEGATIVE  Respiratory: some PRINCE  Cardiovascular:no issues  Gastrointestinal: NEGATIVE  Genitourinary:CKD   Musculoskeletal: NEGATIVE  Neurologic: NEGATIVE  Psychiatric: NEGATIVE  Hematologic/Lymphatic/Immunologic: NEGATIVE  Endocrine:  NEGATIVE  Vitals - Patient Reported  Systolic (Patient Reported): 122  Diastolic (Patient Reported): 60  Weight (Patient Reported): 78.9 kg (174 lb)  Pulse (Patient Reported): 64        Telephone number of patient: [phone]    Emani Ly LPN    What phone number would you like to be contacted at? [phone]  How would you like to obtain your AVS? Vo[employer]       Phone call duration: 30 minutes    HPI and Plan:   See dictation    Orders Placed This Encounter   Procedures     Basic metabolic panel     Hemoglobin     Basic metabolic panel     [facility]-Up with [facility] - DAVE visit     Follow-Up with [facility] - Return MD visit     Orders Placed This Encounter   Medications     sacubitril-valsartan (ENTRESTO) 49-51 MG per tablet     Sig: Take 1 tablet by mouth 2 times daily     Dispense:  180 tablet     Refill:  3     Medications Discontinued During This Encounter   Medication Reason     sacubitril-valsartan (ENTRESTO) 24-26 MG per tablet      sacubitril-valsartan (ENTRESTO) 49-51 MG per tablet          Encounter Diagnoses   Name Primary?     Coronary artery disease involving native heart without angina pectoris, unspecified vessel or lesion type Yes     Chronic systolic congestive heart failure (H)      Ischemic cardiomyopathy      NSVT (nonsustained ventricular tachycardia) (H)      ICD (implantable cardioverter-defibrillator) in place      Mixed hyperlipidemia      Paroxysmal atrial fibrillation (H)      Syncope, unspecified syncope type        CURRENT MEDICATIONS:  Current Outpatient Medications   Medication Sig Dispense Refill      acetaminophen (TYLENOL) 500 MG tablet Take 1,000 mg by mouth every 8 hours as needed for mild pain       amiodarone (PACERONE) 200 MG tablet Take 1 tablet (200 mg) by mouth daily 90 tablet 1     carvedilol (COREG) 6.25 MG tablet Take 1 tablet (6.25 mg) by mouth 2 times daily (with meals) 180 tablet 0     clopidogrel (PLAVIX) 75 MG tablet Take 1 tablet (75 mg) by mouth daily 90 tablet 3     digoxin (LANOXIN) 125 MCG tablet Take 1 tablet (125 mcg) by mouth six times a week Do not take Sundays 78 tablet 3     famotidine (PEPCID) 20 MG tablet TAKE 1 TABLET BY MOUTH TWICE A  tablet 3     ipratropium (ATROVENT) 0.03 % nasal spray SPRAY 2 SPRAYS INTO BOTH NOSTRILS EVERY 12 HOURS 30 mL 8     mexiletine (MEXITIL) 150 MG capsule Take 1 capsule (150 mg) by mouth 2 times daily 60 capsule 4     Multiple Vitamins-Minerals (PRESERVISION AREDS 2 PO) Take 1 capsule by mouth 2 times daily       rosuvastatin (CRESTOR) 20 MG tablet Take 1 tablet (20 mg) by mouth daily 90 tablet 0     sacubitril-valsartan (ENTRESTO) 49-51 MG per tablet Take 1 tablet by mouth 2 times daily 180 tablet 3     sildenafil (VIAGRA) 100 MG tablet Take 1 tablet (100 mg) by mouth daily as needed Take 30 min to 4 hours before intercourse.  Never use with nitroglycerin, terazosin or doxazosin. 16 tablet 3     Vitamin D, Cholecalciferol, 1000 UNITS TABS Take 1,000 mg by mouth daily        warfarin ANTICOAGULANT (COUMADIN) 1 MG tablet TAKE 1 & 1/2 TABLETS (1.5 MG) EVERY DAY OR AS DIRECTED. 135 tablet 0     warfarin ANTICOAGULANT (COUMADIN) 2.5 MG tablet Take 2.5 mg on Mondays and 1.25mg all other days or as directed by the anticoagulation clinic 75 tablet 3     ASPIRIN NOT PRESCRIBED (INTENTIONAL) continuous prn for other Please choose reason not prescribed, below       nitroGLYcerin (NITROSTAT) 0.4 MG sublingual tablet DISSOLVE 1 TABLET UNDER THE TONGUE EVERY 5 MINUTES AS NEEDED FOR CHEST PAIN (Patient not taking: Reported on 12/21/2020) 25 tablet 0        ALLERGIES     Allergies   Allergen Reactions     Aspirin      Other reaction(s): Aspirin Contraindication (for reporting)  Cardiologist recommended no aspirin as he is on Pradaxa     Nystatin Other (See Comments)     Make his mouth numb & swelling       PAST MEDICAL HISTORY:  Past Medical History:   Diagnosis Date     Atrial fibrillation (H)     s/p Cardioversion 3/14/2013     Atrial flutter (H)     S/p Aflutter ablation 1/11/2011     CAD (coronary artery disease)     CABG x3 10/2012- LIMA to distal LAD, SVG to OM1 & OM3; cath 10/2012- PTCA to second diagonal and mid LAD, BMS to mid LAD     Cardiogenic shock (H)      Cardiomyopathy (H)      ED (erectile dysfunction)      Hypertension      Neuropathy      SVT (supraventricular tachycardia) (H)     S/p dual chamber ICD 10/11/12- upgraded to BIV ICD 6/2013     Syncope        PAST SURGICAL HISTORY:  Past Surgical History:   Procedure Laterality Date     BYPASS GRAFT ARTERY CORONARY  10/2/2012    Procedure: BYPASS GRAFT ARTERY CORONARY;  Coronary Artery Bypass Graft x3 (LAD, Diag, OM) with Endovein [place] (On-Pump);  Surgeon: Yeyo Lyman MD;  Location:  OR     CARDIOVERSION  3/14/2013     CORONARY ANGIOGRAPHY ADULT ORDER  10/2/12     CORONARY ARTERY BYPASS  10/2/12    LIMA to LAD, SVG to OM1 and OM3     CV ANGIOGRAM CORONARY GRAFT N/A 8/11/2020    Procedure: Angiogram Coronary Graft;  Surgeon: Erin Weaver MD;  Location:  HEART CARDIAC CATH LAB     CV HEART CATHETERIZATION WITH POSSIBLE INTERVENTION N/A 8/11/2020    Procedure: Heart Catheterization with Possible Intervention;  Surgeon: Erin Weaver MD;  Location:  HEART CARDIAC CATH LAB     CV PCI ATHERECTOMY ORBITAL N/A 8/11/2020    Procedure: Percutaneous Coronary Intervention Atherectomy Rotational;  Surgeon: Erin Weaver MD;  Location:  HEART CARDIAC CATH LAB     EP ABLATION VT N/A 1/2/2019    Procedure: EP Ablation VT;  Surgeon: Suellen Costello,  MD;  Location:  HEART CARDIAC CATH LAB     EP COMPREHENSIVE EP STUDY N/A 2019    Procedure: EP Comprehensive EP Study;  Surgeon: Suellen Costello MD;  Location:  HEART CARDIAC CATH LAB     H ABLATION ATRIAL FLUTTER  2011     HAND SURGERY      table saw injury right hand     HEART CATH, ANGIOPLASTY  10/2/12    PTCA to second diagonal and mid LAD, BMS to mid LAD     HERNIA REPAIR       RELOCATE GENERATOR ICD/PACEMAKER         FAMILY HISTORY:  Family History   Problem Relation Age of Onset     Alcohol/Drug Father      Heart Disease Father 71     Alzheimer Disease Sister      Alcohol/Drug Sister      Alcohol/Drug Sister      Gastrointestinal Disease Sister      Obesity Sister      Hypertension Sister      Heart Disease Sister      Hypertension Sister      Heart Disease Daughter         afib     Arrhythmia Daughter      Multiple Sclerosis Daughter      Heart Disease Daughter         afib     Arrhythmia Daughter      Cancer Daughter         lymphoma     Aneurysm Mother        SOCIAL HISTORY:  Social History     Socioeconomic History     Marital status:      Spouse name: Not on file     Number of children: Not on file     Years of education: Not on file     Highest education level: Not on file   Occupational History     Not on file   Social Needs     Financial resource strain: Not on file     Food insecurity     Worry: Not on file     Inability: Not on file     Transportation needs     Medical: Not on file     Non-medical: Not on file   Tobacco Use     Smoking status: Former Smoker     Packs/day: 1.00     Years: 14.00     Pack years: 14.00     Types: Cigarettes     Start date:      Quit date: 7/10/1972     Years since quittin.5     Smokeless tobacco: Never Used   Substance and Sexual Activity     Alcohol use: No     Drug use: No     Sexual activity: Yes     Partners: Female   Lifestyle     Physical activity     Days per week: Not on file     Minutes per session: Not on file      Stress: Not on file   Relationships     Social connections     Talks on phone: Not on file     Gets together: Not on file     Attends Mandaen service: Not on file     Active member of club or organization: Not on file     Attends meetings of clubs or organizations: Not on file     Relationship status: Not on file     Intimate partner violence     Fear of current or ex partner: Not on file     Emotionally abused: Not on file     Physically abused: Not on file     Forced sexual activity: Not on file   Other Topics Concern     Parent/sibling w/ CABG, MI or angioplasty before 65F 55M? No      Service Not Asked     Blood Transfusions Not Asked     Caffeine Concern Yes     Comment: 4 cups coffee daily     Occupational Exposure Not Asked     Hobby Hazards Not Asked     Sleep Concern No     Stress Concern No     Weight Concern Yes     Comment: gain 2lbs     Special Diet Yes     Comment: low sodium     Back Care Not Asked     Exercise Yes     Comment: walking, doing sittups, played pickle ball in summer     Bike Helmet Not Asked     Seat Belt Yes     Self-Exams Not Asked   Social History Narrative     Not on file       Physical Exam:  Vitals: There were no vitals taken for this visit.    Constitutional:           Skin:             Head:           Eyes:           Lymph:      ENT:           Neck:           Respiratory:            Cardiac:                                                           GI:           Extremities and Muscular Skeletal:                 Neurological:           Psych:         Recent Lab Results:  LIPID RESULTS:  Lab Results   Component Value Date    CHOL 97 01/18/2021    HDL 46 01/18/2021    LDL 32 01/18/2021    TRIG 97 01/18/2021    CHOLHDLRATIO 2.8 12/17/2014       LIVER ENZYME RESULTS:  Lab Results   Component Value Date    AST 24 10/05/2020    ALT 29 10/05/2020       CBC RESULTS:  Lab Results   Component Value Date    WBC 7.4 09/19/2020    RBC 4.28 (L) 09/19/2020    HGB 13.6 09/19/2020     HCT 39.5 (L) 09/19/2020    MCV 92 09/19/2020    MCH 31.8 09/19/2020    MCHC 34.4 09/19/2020    RDW 13.9 09/19/2020     09/19/2020       BMP RESULTS:  Lab Results   Component Value Date     01/18/2021    POTASSIUM 4.3 01/18/2021    CHLORIDE 105 01/18/2021    CO2 28 01/18/2021    ANIONGAP 3 01/18/2021    GLC 78 01/18/2021    BUN 13 01/18/2021    CR 1.44 (H) 01/18/2021    GFRESTIMATED 46 (L) 01/18/2021    GFRESTBLACK 54 (L) 01/18/2021    LORI 8.5 01/18/2021        A1C RESULTS:  Lab Results   Component Value Date    A1C 5.7 10/09/2012       INR RESULTS:  Lab Results   Component Value Date    INR 3.03 (H) 01/18/2021    INR 2.66 (H) 12/28/2020           CC  Parish Newman MD  [address]  [place] [id number]

## 2021-01-20 NOTE — LETTER
1/20/2021    Dejon Dowsn MD  [address]  [facility] [address]    RE: Tyrel Rene       Dear Colleague,    I had the pleasure of seeing Tyrel Rene in the [facility].    Marko is a 75 year old who is being evaluated via a billable telephone visit.      Review Of Systems  Skin: NEGATIVE  Eyes:Ears/Nose/Throat: NEGATIVE  Respiratory: some PRINCE  Cardiovascular:no issues  Gastrointestinal: NEGATIVE  Genitourinary:CKD   Musculoskeletal: NEGATIVE  Neurologic: NEGATIVE  Psychiatric: NEGATIVE  Hematologic/Lymphatic/Immunologic: NEGATIVE  Endocrine:  NEGATIVE  Vitals - Patient Reported  Systolic (Patient Reported): 122  Diastolic (Patient Reported): 60  Weight (Patient Reported): 78.9 kg (174 lb)  Pulse (Patient Reported): 64        Telephone number of patient: [phone]    Emani Ly LPN    What phone number would you like to be contacted at? [phone]  How would you like to obtain your AVS? MyChart       Phone call duration: 30 minutes    HPI and Plan:   See dictation    Orders Placed This Encounter   Procedures     Basic metabolic panel     Hemoglobin     Basic metabolic panel     Follow-Up with [facility] - DAVE visit     Follow-Up with [facility] - Return MD visit     Orders Placed This Encounter   Medications     sacubitril-valsartan (ENTRESTO) 49-51 MG per tablet     Sig: Take 1 tablet by mouth 2 times daily     Dispense:  180 tablet     Refill:  3     Medications Discontinued During This Encounter   Medication Reason     sacubitril-valsartan (ENTRESTO) 24-26 MG per tablet      sacubitril-valsartan (ENTRESTO) 49-51 MG per tablet          Encounter Diagnoses   Name Primary?     Coronary artery disease involving native heart without angina pectoris, unspecified vessel or lesion type Yes     Chronic systolic congestive heart failure (H)      Ischemic cardiomyopathy      NSVT (nonsustained ventricular tachycardia) (H)      ICD (implantable  cardioverter-defibrillator) in place      Mixed hyperlipidemia      Paroxysmal atrial fibrillation (H)      Syncope, unspecified syncope type        CURRENT MEDICATIONS:  Current Outpatient Medications   Medication Sig Dispense Refill     acetaminophen (TYLENOL) 500 MG tablet Take 1,000 mg by mouth every 8 hours as needed for mild pain       amiodarone (PACERONE) 200 MG tablet Take 1 tablet (200 mg) by mouth daily 90 tablet 1     carvedilol (COREG) 6.25 MG tablet Take 1 tablet (6.25 mg) by mouth 2 times daily (with meals) 180 tablet 0     clopidogrel (PLAVIX) 75 MG tablet Take 1 tablet (75 mg) by mouth daily 90 tablet 3     digoxin (LANOXIN) 125 MCG tablet Take 1 tablet (125 mcg) by mouth six times a week Do not take Sundays 78 tablet 3     famotidine (PEPCID) 20 MG tablet TAKE 1 TABLET BY MOUTH TWICE A  tablet 3     ipratropium (ATROVENT) 0.03 % nasal spray SPRAY 2 SPRAYS INTO BOTH NOSTRILS EVERY 12 HOURS 30 mL 8     mexiletine (MEXITIL) 150 MG capsule Take 1 capsule (150 mg) by mouth 2 times daily 60 capsule 4     Multiple Vitamins-Minerals (PRESERVISION AREDS 2 PO) Take 1 capsule by mouth 2 times daily       rosuvastatin (CRESTOR) 20 MG tablet Take 1 tablet (20 mg) by mouth daily 90 tablet 0     sacubitril-valsartan (ENTRESTO) 49-51 MG per tablet Take 1 tablet by mouth 2 times daily 180 tablet 3     sildenafil (VIAGRA) 100 MG tablet Take 1 tablet (100 mg) by mouth daily as needed Take 30 min to 4 hours before intercourse.  Never use with nitroglycerin, terazosin or doxazosin. 16 tablet 3     Vitamin D, Cholecalciferol, 1000 UNITS TABS Take 1,000 mg by mouth daily        warfarin ANTICOAGULANT (COUMADIN) 1 MG tablet TAKE 1 & 1/2 TABLETS (1.5 MG) EVERY DAY OR AS DIRECTED. 135 tablet 0     warfarin ANTICOAGULANT (COUMADIN) 2.5 MG tablet Take 2.5 mg on Mondays and 1.25mg all other days or as directed by the anticoagulation clinic 75 tablet 3     ASPIRIN NOT PRESCRIBED (INTENTIONAL) continuous prn for other  Please choose reason not prescribed, below       nitroGLYcerin (NITROSTAT) 0.4 MG sublingual tablet DISSOLVE 1 TABLET UNDER THE TONGUE EVERY 5 MINUTES AS NEEDED FOR CHEST PAIN (Patient not taking: Reported on 12/21/2020) 25 tablet 0       ALLERGIES     Allergies   Allergen Reactions     Aspirin      Other reaction(s): Aspirin Contraindication (for reporting)  Cardiologist recommended no aspirin as he is on Pradaxa     Nystatin Other (See Comments)     Make his mouth numb & swelling       PAST MEDICAL HISTORY:  Past Medical History:   Diagnosis Date     Atrial fibrillation (H)     s/p Cardioversion 3/14/2013     Atrial flutter (H)     S/p Aflutter ablation 1/11/2011     CAD (coronary artery disease)     CABG x3 10/2012- LIMA to distal LAD, SVG to OM1 & OM3; cath 10/2012- PTCA to second diagonal and mid LAD, BMS to mid LAD     Cardiogenic shock (H)      Cardiomyopathy (H)      ED (erectile dysfunction)      Hypertension      Neuropathy      SVT (supraventricular tachycardia) (H)     S/p dual chamber ICD 10/11/12- upgraded to BIV ICD 6/2013     Syncope        PAST SURGICAL HISTORY:  Past Surgical History:   Procedure Laterality Date     BYPASS GRAFT ARTERY CORONARY  10/2/2012    Procedure: BYPASS GRAFT ARTERY CORONARY;  Coronary Artery Bypass Graft x3 (LAD, Diag, OM) with Endovein [place] (On-Pump);  Surgeon: Yeyo Lyman MD;  Location:  OR     CARDIOVERSION  3/14/2013     CORONARY ANGIOGRAPHY ADULT ORDER  10/2/12     CORONARY ARTERY BYPASS  10/2/12    LIMA to LAD, SVG to OM1 and OM3     CV ANGIOGRAM CORONARY GRAFT N/A 8/11/2020    Procedure: Angiogram Coronary Graft;  Surgeon: Erin Weaver MD;  Location:  HEART CARDIAC CATH LAB     CV HEART CATHETERIZATION WITH POSSIBLE INTERVENTION N/A 8/11/2020    Procedure: Heart Catheterization with Possible Intervention;  Surgeon: Erin Weaver MD;  Location:  HEART CARDIAC CATH LAB     CV PCI ATHERECTOMY ORBITAL N/A 8/11/2020    Procedure:  Percutaneous Coronary Intervention Atherectomy Rotational;  Surgeon: Erin Weaver MD;  Location:  HEART CARDIAC CATH LAB     EP ABLATION VT N/A 2019    Procedure: EP Ablation VT;  Surgeon: Suellen Costello MD;  Location:  HEART CARDIAC CATH LAB     EP COMPREHENSIVE EP STUDY N/A 2019    Procedure: EP Comprehensive EP Study;  Surgeon: Suellen Costello MD;  Location:  HEART CARDIAC CATH LAB     H ABLATION ATRIAL FLUTTER  2011     HAND SURGERY      table saw injury right hand     HEART CATH, ANGIOPLASTY  10/2/12    PTCA to second diagonal and mid LAD, BMS to mid LAD     HERNIA REPAIR       RELOCATE GENERATOR ICD/PACEMAKER         FAMILY HISTORY:  Family History   Problem Relation Age of Onset     Alcohol/Drug Father      Heart Disease Father 71     Alzheimer Disease Sister      Alcohol/Drug Sister      Alcohol/Drug Sister      Gastrointestinal Disease Sister      Obesity Sister      Hypertension Sister      Heart Disease Sister      Hypertension Sister      Heart Disease Daughter         afib     Arrhythmia Daughter      Multiple Sclerosis Daughter      Heart Disease Daughter         afib     Arrhythmia Daughter      Cancer Daughter         lymphoma     Aneurysm Mother        SOCIAL HISTORY:  Social History     Socioeconomic History     Marital status:      Spouse name: Not on file     Number of children: Not on file     Years of education: Not on file     Highest education level: Not on file   Occupational History     Not on file   Social Needs     Financial resource strain: Not on file     Food insecurity     Worry: Not on file     Inability: Not on file     Transportation needs     Medical: Not on file     Non-medical: Not on file   Tobacco Use     Smoking status: Former Smoker     Packs/day: 1.00     Years: 14.00     Pack years: 14.00     Types: Cigarettes     Start date:      Quit date: 7/10/1972     Years since quittin.5     Smokeless tobacco:  Never Used   Substance and Sexual Activity     Alcohol use: No     Drug use: No     Sexual activity: Yes     Partners: Female   Lifestyle     Physical activity     Days per week: Not on file     Minutes per session: Not on file     Stress: Not on file   Relationships     Social connections     Talks on phone: Not on file     Gets together: Not on file     Attends Orthodoxy service: Not on file     Active member of club or organization: Not on file     Attends meetings of clubs or organizations: Not on file     Relationship status: Not on file     Intimate partner violence     Fear of current or ex partner: Not on file     Emotionally abused: Not on file     Physically abused: Not on file     Forced sexual activity: Not on file   Other Topics Concern     Parent/sibling w/ CABG, MI or angioplasty before 65F 55M? No      Service Not Asked     Blood Transfusions Not Asked     Caffeine Concern Yes     Comment: 4 cups coffee daily     Occupational Exposure Not Asked     Hobby Hazards Not Asked     Sleep Concern No     Stress Concern No     Weight Concern Yes     Comment: gain 2lbs     Special Diet Yes     Comment: low sodium     Back Care Not Asked     Exercise Yes     Comment: walking, doing sittups, played pickle ball in summer     Bike Helmet Not Asked     Seat Belt Yes     Self-Exams Not Asked   Social History Narrative     Not on file       Physical Exam:  Vitals: There were no vitals taken for this visit.    Constitutional:           Skin:             Head:           Eyes:           Lymph:      ENT:           Neck:           Respiratory:            Cardiac:                                                           GI:           Extremities and Muscular Skeletal:                 Neurological:           Psych:         Recent Lab Results:  LIPID RESULTS:  Lab Results   Component Value Date    CHOL 97 01/18/2021    HDL 46 01/18/2021    LDL 32 01/18/2021    TRIG 97 01/18/2021    CHOLHDLRATIO 2.8 12/17/2014        LIVER ENZYME RESULTS:  Lab Results   Component Value Date    AST 24 10/05/2020    ALT 29 10/05/2020       CBC RESULTS:  Lab Results   Component Value Date    WBC 7.4 09/19/2020    RBC 4.28 (L) 09/19/2020    HGB 13.6 09/19/2020    HCT 39.5 (L) 09/19/2020    MCV 92 09/19/2020    MCH 31.8 09/19/2020    MCHC 34.4 09/19/2020    RDW 13.9 09/19/2020     09/19/2020       BMP RESULTS:  Lab Results   Component Value Date     01/18/2021    POTASSIUM 4.3 01/18/2021    CHLORIDE 105 01/18/2021    CO2 28 01/18/2021    ANIONGAP 3 01/18/2021    GLC 78 01/18/2021    BUN 13 01/18/2021    CR 1.44 (H) 01/18/2021    GFRESTIMATED 46 (L) 01/18/2021    GFRESTBLACK 54 (L) 01/18/2021    LORI 8.5 01/18/2021        A1C RESULTS:  Lab Results   Component Value Date    A1C 5.7 10/09/2012       INR RESULTS:  Lab Results   Component Value Date    INR 3.03 (H) 01/18/2021    INR 2.66 (H) 12/28/2020         Service Date: 01/20/2021      TELEPHONE VISIT      PRIMARY CARE PHYSICIAN:  Dejon Cervantes MD      HISTORY OF PRESENT ILLNESS:  I had the opportunity to see Mr. Marko Rene in Cardiology Clinic today at [facility] in [place] for a telephone visit in the [facility] for reevaluation of chronic systolic heart failure.      I last saw Mr. Rene in October and he was doing well from a heart failure standpoint without shortness of breath or low blood pressure issues.  He had recently restarted his Entresto and I increased his dose further, up to 24/26 mg p.o. b.i.d.  Since then, he has met with us in the [facility] and increased his Entresto again so that he is now taking 49/51 mg in the evening and staying on the lower dose of 24/26 mg in the morning.  He is tolerating that without lightheadedness problems.      He does not use a diuretic on a regular basis.  He monitors his weight carefully and watches for shortness of breath and swelling and takes a dose of torsemide on a fairly rare basis.  He tells me that  he used torsemide only a couple of times in the last year.  He does have mild stage III chronic kidney disease with a baseline creatinine of about 1.4.  I reviewed his laboratory studies with him that were done 2 days ago and his creatinine again is stable at 1.4 with a normal potassium 4.3.  His cholesterol numbers were excellent on that date as well, with an LDL of 32.      Lately, the most difficult issue has been recurrent ventricular tachycardia and ICD shocks.  He has been working with Dr. Costello in Electrophysiology and recently started mexiletine, in addition to amiodarone to help quiet down the frequent episodes of nonsustained ventricular tachycardia, which were likely indicating a greater likelihood of more sustained ventricular tachycardia requiring shocks.  He seems to be tolerating the mexiletine relatively well.  He does not seem to have any side effects from that.  However, he has had a few episodes where he has felt the sudden lightheadedness and tingling in his extremities, which often precedes an ICD shock.  Fortunately, he did not get shocked or lose consciousness.      PHYSICAL EXAMINATION:  Today, his blood pressure reported from home is 122/60, heart rate 64 and weight 174 pounds and very stable.      IMPRESSIONS:  Mr. Tyrel Rene is a 75-year-old gentleman with the following issues:   1.  Coronary artery disease with a history of extensive anterior and apical infarction and bypass surgery.  He recently underwent stenting of his second obtuse marginal artery this summer.  He did not have any other significant residual ischemic coronary disease identified after that procedure.  He is not having any angina.   2.  Severe ischemic cardiomyopathy with a stable ejection fraction of 25%-30%.   3.  Chronic systolic heart failure with mild shortness of breath with exertion, but no signs of volume overload or heart failure exacerbation.   4.  Recurrent ventricular tachycardia requiring ICD shocks.  He  recently was found to have very frequent episodes of nonsustained ventricular tachycardia and mexiletine was added to his amiodarone.  He has not had any ICD shocks, but we are awaiting another ICD interrogation to determine whether he continues to have frequent episodes of nonsustained VT.   5.  Chronic atrial fibrillation, on warfarin for stroke prevention and carvedilol for heart rate control.   6.  Mild stage III chronic kidney disease.   7.  Hypertension.   8.  Dyslipidemia.      I suggested that we could try increasing his Entresto further to 49/51 mg p.o. b.i.d. and sent him a new prescription for that.  Overall, I think his heart failure condition seems to be quite stable and we have to keep an eye on his arrhythmia issues primarily.  He is due for another ICD check in March and then will meet with Dr. Costello again to discuss those results.  I will have him meet with the [facility] again in March as well, where he will see Prince Galvez.  We might be able to increase his carvedilol a bit next if he has continued to tolerate the higher-dose Entresto.      Lynne Kaufman MD      cc:   Dejon Downs MD   [facility] [address] [facility]   [address] [place] [address]         LYNNE KAUFMAN MD, [facility]          D: 2021   T: 2021   MT: al      Name:     ARTEM ELIZALDE   MRN:      [id number]        Account:      [id number]   :      1945           Service Date: 2021      Document: [id number]        Thank you for allowing me to participate in the care of your patient.    Sincerely,     LYNNE KAUFMAN MD     [facility]

## 2021-01-20 NOTE — PROGRESS NOTES
Service Date: 01/20/2021      TELEPHONE VISIT      PRIMARY CARE PHYSICIAN:  Dejon Cervantes MD      HISTORY OF PRESENT ILLNESS:  I had the opportunity to see Mr. Marko Rene in Cardiology Clinic today at [facility] in [place] for a telephone visit in the [facility] for reevaluation of chronic systolic heart failure.      I last saw Mr. Rene in October and he was doing well from a heart failure standpoint without shortness of breath or low blood pressure issues.  He had recently restarted his Entresto and I increased his dose further, up to 24/26 mg p.o. b.i.d.  Since then, he has met with us in the [facility] and increased his Entresto again so that he is now taking 49/51 mg in the evening and staying on the lower dose of 24/26 mg in the morning.  He is tolerating that without lightheadedness problems.      He does not use a diuretic on a regular basis.  He monitors his weight carefully and watches for shortness of breath and swelling and takes a dose of torsemide on a fairly rare basis.  He tells me that he used torsemide only a couple of times in the last year.  He does have mild stage III chronic kidney disease with a baseline creatinine of about 1.4.  I reviewed his laboratory studies with him that were done 2 days ago and his creatinine again is stable at 1.4 with a normal potassium 4.3.  His cholesterol numbers were excellent on that date as well, with an LDL of 32.      Lately, the most difficult issue has been recurrent ventricular tachycardia and ICD shocks.  He has been working with Dr. Costello in Electrophysiology and recently started mexiletine, in addition to amiodarone to help quiet down the frequent episodes of nonsustained ventricular tachycardia, which were likely indicating a greater likelihood of more sustained ventricular tachycardia requiring shocks.  He seems to be tolerating the mexiletine relatively well.  He does not seem to have any side effects from that.  However,  he has had a few episodes where he has felt the sudden lightheadedness and tingling in his extremities, which often precedes an ICD shock.  Fortunately, he did not get shocked or lose consciousness.      PHYSICAL EXAMINATION:  Today, his blood pressure reported from home is 122/60, heart rate 64 and weight 174 pounds and very stable.      IMPRESSIONS:  Mr. Tyrel Rene is a 75-year-old gentleman with the following issues:   1.  Coronary artery disease with a history of extensive anterior and apical infarction and bypass surgery.  He recently underwent stenting of his second obtuse marginal artery this summer.  He did not have any other significant residual ischemic coronary disease identified after that procedure.  He is not having any angina.   2.  Severe ischemic cardiomyopathy with a stable ejection fraction of 25%-30%.   3.  Chronic systolic heart failure with mild shortness of breath with exertion, but no signs of volume overload or heart failure exacerbation.   4.  Recurrent ventricular tachycardia requiring ICD shocks.  He recently was found to have very frequent episodes of nonsustained ventricular tachycardia and mexiletine was added to his amiodarone.  He has not had any ICD shocks, but we are awaiting another ICD interrogation to determine whether he continues to have frequent episodes of nonsustained VT.   5.  Chronic atrial fibrillation, on warfarin for stroke prevention and carvedilol for heart rate control.   6.  Mild stage III chronic kidney disease.   7.  Hypertension.   8.  Dyslipidemia.      I suggested that we could try increasing his Entresto further to 49/51 mg p.o. b.i.d. and sent him a new prescription for that.  Overall, I think his heart failure condition seems to be quite stable and we have to keep an eye on his arrhythmia issues primarily.  He is due for another ICD check in March and then will meet with Dr. Costello again to discuss those results.  I will have him meet with the [facility]  Clinic again in March as well, where he will see Prince Galvez.  We might be able to increase his carvedilol a bit next if he has continued to tolerate the higher-dose Entresto.      Lynen Kaufman MD      cc:   Dejon Downs MD   [facility] [address] [facility]   [address] [place] [address]         LYNNE KAUFMAN MD, [facility]             D: 2021   T: 2021   MT: al      Name:     ARTEM ELIZALDE   MRN:      [id number]        Account:      [id number]   :      1945           Service Date: 2021      Document: [id number]

## 2021-02-15 NOTE — PROGRESS NOTES
ANTICOAGULATION MANAGEMENT     Patient Name:  Tryel Rene  Date:  2/15/2021    ASSESSMENT /SUBJECTIVE:    Today's INR result of 2.64 is therapeutic. Goal INR of 2.0-3.0      Warfarin dose taken: Warfarin taken as instructed    Diet: No new diet changes affecting INR    Medication changes/ interactions: No new medications/supplements affecting INR    Previous INR: Therapeutic     S/S of bleeding or thromboembolism: No    New injury or illness: No    Upcoming surgery, procedure or cardioversion: No    Additional findings: None      PLAN:    Telephone call with  pts wife regarding INR result and instructed:     Warfarin Dosing Instructions: Continue your current warfarin dose 1.5 mg daily    Instructed patient to follow up no later than: 4 weeks  Orders given to [facility] ([phone])    Education provided: None required      Sharel verbalizes understanding and agrees to warfarin dosing plan.    Instructed to call the Anticoagulation Clinic for any changes, questions or concerns. (#[phone])        María Conte RN      OBJECTIVE:  Recent labs: (last 7 days)     02/15/21  1130   INR 2.64*         INR assessment THER    Recheck INR In: 4 WEEKS    INR Location Clinic      Anticoagulation Summary  As of 2/15/2021    INR goal:  2.5-3.5   TTR:  70.7 % (11.8 mo)   INR used for dosin.64 (2/15/2021)   Warfarin maintenance plan:  1.5 mg (1 mg x 1.5) every day   Full warfarin instructions:  1.5 mg every day   Weekly warfarin total:  10.5 mg   Plan last modified:  Sheila Cid RN (10/5/2020)   Next INR check:  3/15/2021   Priority:  Maintenance   Target end date:  Indefinite    Indications    Long-term (current) use of anticoagulants [Z79.01] [Z79.01]  Cerebral artery occlusion with cerebral infarction (HCC) [I63.50] [I63.50]  Cerebral infarction (H) [I63.9]  Atrial fibrillation  unspecified type (H) [I48.91]             Anticoagulation Episode Summary     INR check location:      Preferred  lab:  EXTERNAL LAB    Send INR reminders to:  HANS MCLEOD    Comments:  [facility] Patient goal should be 2.5-3.0 (5/13/20)      Anticoagulation Care Providers     Provider Role Specialty Phone number    Dejon Downs MD Referring Family Medicine [phone]

## 2021-02-17 NOTE — PROGRESS NOTES
"Marko is a 75 year old who is being evaluated via a billable telephone visit.      What phone number would you like to be contacted at? [phone]  How would you like to obtain your AVS? Mail a copy      Vitals - Patient Reported  Systolic (Patient Reported): 134  Diastolic (Patient Reported): 74  Weight (Patient Reported): 80.5 kg (177 lb 6.4 oz)  Height (Patient Reported): 185.4 cm (6' 1\")  BMI (Based on Pt Reported Ht/Wt): 23.4  Pulse (Patient Reported): 64    Review Of Systems  Skin: NEGATIVE  Eyes:Ears/Nose/Throat: NEGATIVE  Respiratory: **SOB on exertion  Cardiovascular: NEGATIVE  Gastrointestinal: NEGATIVE  Genitourinary:NEGATIVE   Musculoskeletal: NEGATIVE  Neurologic: NEGATIVE  Psychiatric: NEGATIVE  Hematologic/Lymphatic/Immunologic: NEGATIVE  Endocrine:  NEGATIVE    Reviewed by CYDNEY Jackson, 12/17/20    DAVE NOTE:  This visit was completed via telephone due to COVID-19 precautions.  The patient provided consent for a telephone visit.  I had the pleasure speaking to Marko Rene as part of a telephone visit today.    The patient is a delightful 74 yo male with the following chronic medical issues:  1. Severe Ischemic cardiomyopathy after a large anterior MI several years ago.  Three-vessel CABG in 2012.  Most recent LVEF 25%.  Recent PCI of OM2 (08/2020).   2. Unstable ventricular tachyarrhythmia's. ICD shock in 11/2017.  Amiodarone started.  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  VT ablation on 01/02/2019.  Amiodarone decreased to 6 days per week in 05/2020.  Recurrence of VT with syncope and 2 shocks in 07/2020.  Syncope without documented VT but with multiple nonsustained episodes of VT documented during hospital admission in 09/2020.  Partly related to a \"smoothing out\" algorithm incorporated in his ICD.  NSVT events decreased after the algorithm was disabled, though recent interrogation showed 25,000 nonsustained VT episodes in 80 days. On Amiodarone and recently was started on " Mexilitine  3. Permanent atrial fibrillation with complete AV block. On warfarin.  4. Atrial flutter ablation with ablation 2011  5. CRT-D ( [place])  6. CVA following atrial flutter ablation 2011  7. CKD III  8. HTN  9. Dyslipidemia     It is my pleasure having a phone visit with Steve today.  This is during the COVID-19 pandemic.  He did not feel comfortable coming into the clinic.  In December, he was noted to have significant amount of nonsustained ventricular tachycardia despite amiodarone therapy.  On average she has greater than 300 runs per day.   initiated mexiletine to his amiodarone therapy.  Since that time he has felt well.  He denies chest pain, shortness of breath, lightheadedness, dizziness, or edema.    DIAGNOSTIC STUDIES:  Labs:    Results for STEVE ELIZALDE (MRN [id number]) as of 2/17/2021 12:40   Ref. Range 1/18/2021 11:00   Sodium Latest Ref Range: 133 - 144 mmol/L 136   Potassium Latest Ref Range: 3.4 - 5.3 mmol/L 4.3   Chloride Latest Ref Range: 94 - 109 mmol/L 105   Carbon Dioxide Latest Ref Range: 20 - 32 mmol/L 28   Urea Nitrogen Latest Ref Range: 7 - 30 mg/dL 13   Creatinine Latest Ref Range: 0.66 - 1.25 mg/dL 1.44 (H)   GFR Estimate Latest Ref Range: >60 mL/min/1.73_m2 46 (L)   GFR Estimate If Black Latest Ref Range: >60 mL/min/1.73_m2 54 (L)   Calcium Latest Ref Range: 8.5 - 10.1 mg/dL 8.5   Anion Gap Latest Ref Range: 3 - 14 mmol/L 3   Cholesterol Latest Ref Range: <200 mg/dL 97   HDL Cholesterol Latest Ref Range: >39 mg/dL 46   LDL Cholesterol Calculated Latest Ref Range: <100 mg/dL 32   Non HDL Cholesterol Latest Ref Range: <130 mg/dL 51   Triglycerides Latest Ref Range: <150 mg/dL 97   Glucose Latest Ref Range: 70 - 99 mg/dL 78   INR Latest Ref Range: 0.86 - 1.14  3.03 (H)     Device Check 12/2020:    [place] [employer] (CRT-D) Remote Device Check  AP: 0%  RVP: 94%  LVP: 93%   Mode: VVIR    Presenting Rhythm: AFib with BiVP rhythm in the 60's   Heart Rate: Stable with  adequate variability.  HRs per histogram range from 60-90 with bump on histogram from 150-170 correlating with rates of ventricular arrhythmias below.  Sensing: Not obtained  Pacing Threshold: Not obtained   Impedance: WNL   Battery Status: Estimated at 7 months remaining   Atrial Arrhythmia: Permanent AFib on Warfarin  Ventricular Arrhythmia: 25,223 ventricular arrhythmias recorded since setting changes on 9/21/20 with 18 NSVT and 12 VT-1 EGMs available.  Per previous in-clinic checks, pt has no junctional escape rhythm at VVI 30. 18 available NSVT EGMs show 3-12 beats NSVT in the 120-170's.  12 available VT-1 EGMs show VT lasting from 39s to the longest at 12m36s. All VT-1 episodes show frequent short bursts of NSVT lasting 4-9 beats that are back to back in the 150-180's.  Called and spoke with patient and he said that over all he has been feeling pretty good, but he will occasionally get the same sensation he had prior to getting shocked where he will get a head rush in the face and then his hands and arms will get tingly.   Pt has history of VT ablation in 1/2019 with recurrent VT and is on Amiodarone and Coreg.  Pt states he has not missed any doses of his medications.     ATP: 0  Shocks: 0     Care Plan: Due for in-clinic device check in 3 months, order entered.  Scheduled to follow-up with Dr. Newman on 1/20/21. Will route update to Dr. Costello and Dr. Newman re: increase in ventricular arrhythmias as above.  In-clinic device check scheduled for 3/11/21.  AGAPITO Marrufo     Echocardiogram 9/2020:    The rhythm was paced.  There is anterior, septal, and apical wall akinesis.(thinned)  The left ventricle is moderately dilated.  The visual ejection fraction is estimated at 20-25%.  There is no thrombus seen in the left ventricle.  Mild aortic root dilatation.  The right ventricular systolic pressure is approximated at 35mmHg plus the  right atrial pressure.  The inferior vena cava is normal.  Compared to the prior  study dated 7/29/20, there have been no changes.      IMPRESSION:  1. Ventricular tachycardia. Doing well on Mexiletine 150 mg bid and amiodarone 200 mg every day.  2. Chronic ischemic cardiomyopathy EF 20-25%  3. PAF and complete AVB. CRT-D on warfarin  It is my pleasure having a phone visit with Marko today.    RECOMMENDATIONS:  1. Continue amiodarone 200 mg daily and mexiletine 150 mg twice daily  2. I will add hepatic function and TSH to his lab work in April  3. His next remote check is in March.  We can reassess his NSVT at that time.    Lauren Jorgensen NP, APRN CNP        Telephone visit documentation  Start: 10:35 am  End: 10:55 am  Time: 20  Including chart prep and documentation and arranging follow up

## 2021-02-17 NOTE — LETTER
"2/17/2021    Dejon Downs MD  [address]  [facility] [address]    RE: Tyrel Rene       Dear Colleague,    I had the pleasure of seeing Tyrel Rene in the [facility].    Marko is a 75 year old who is being evaluated via a billable telephone visit.      What phone number would you like to be contacted at? [phone]  How would you like to obtain your AVS? Mail a copy      Vitals - Patient Reported  Systolic (Patient Reported): 134  Diastolic (Patient Reported): 74  Weight (Patient Reported): 80.5 kg (177 lb 6.4 oz)  Height (Patient Reported): 185.4 cm (6' 1\")  BMI (Based on Pt Reported Ht/Wt): 23.4  Pulse (Patient Reported): 64    Review Of Systems  Skin: NEGATIVE  Eyes:Ears/Nose/Throat: NEGATIVE  Respiratory: **SOB on exertion  Cardiovascular: NEGATIVE  Gastrointestinal: NEGATIVE  Genitourinary:NEGATIVE   Musculoskeletal: NEGATIVE  Neurologic: NEGATIVE  Psychiatric: NEGATIVE  Hematologic/Lymphatic/Immunologic: NEGATIVE  Endocrine:  NEGATIVE    Reviewed by CYDNEY Jackson, 12/17/20    DAVE NOTE:  This visit was completed via telephone due to COVID-19 precautions.  The patient provided consent for a telephone visit.  I had the pleasure speaking to Marko Anju as part of a telephone visit today.    The patient is a delightful 76 yo male with the following chronic medical issues:  1. Severe Ischemic cardiomyopathy after a large anterior MI several years ago.  Three-vessel CABG in 2012.  Most recent LVEF 25%.  Recent PCI of OM2 (08/2020).   2. Unstable ventricular tachyarrhythmia's. ICD shock in 11/2017.  Amiodarone started.  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  VT ablation on 01/02/2019.  Amiodarone decreased to 6 days per week in 05/2020.  Recurrence of VT with syncope and 2 shocks in 07/2020.  Syncope without documented VT but with multiple nonsustained episodes of VT documented during hospital admission in 09/2020.  Partly " "related to a \"smoothing out\" algorithm incorporated in his ICD.  NSVT events decreased after the algorithm was disabled, though recent interrogation showed 25,000 nonsustained VT episodes in 80 days. On Amiodarone and recently was started on Mexilitine  3. Permanent atrial fibrillation with complete AV block. On warfarin.  4. Atrial flutter ablation with ablation 2011  5. CRT-D ( [place])  6. CVA following atrial flutter ablation 2011  7. CKD III  8. HTN  9. Dyslipidemia     It is my pleasure having a phone visit with Steve today.  This is during the COVID-19 pandemic.  He did not feel comfortable coming into the clinic.  In December, he was noted to have significant amount of nonsustained ventricular tachycardia despite amiodarone therapy.  On average she has greater than 300 runs per day.   initiated mexiletine to his amiodarone therapy.  Since that time he has felt well.  He denies chest pain, shortness of breath, lightheadedness, dizziness, or edema.    DIAGNOSTIC STUDIES:  Labs:    Results for STEVE ELIZALDE (MRN [id number]) as of 2/17/2021 12:40   Ref. Range 1/18/2021 11:00   Sodium Latest Ref Range: 133 - 144 mmol/L 136   Potassium Latest Ref Range: 3.4 - 5.3 mmol/L 4.3   Chloride Latest Ref Range: 94 - 109 mmol/L 105   Carbon Dioxide Latest Ref Range: 20 - 32 mmol/L 28   Urea Nitrogen Latest Ref Range: 7 - 30 mg/dL 13   Creatinine Latest Ref Range: 0.66 - 1.25 mg/dL 1.44 (H)   GFR Estimate Latest Ref Range: >60 mL/min/1.73_m2 46 (L)   GFR Estimate If Black Latest Ref Range: >60 mL/min/1.73_m2 54 (L)   Calcium Latest Ref Range: 8.5 - 10.1 mg/dL 8.5   Anion Gap Latest Ref Range: 3 - 14 mmol/L 3   Cholesterol Latest Ref Range: <200 mg/dL 97   HDL Cholesterol Latest Ref Range: >39 mg/dL 46   LDL Cholesterol Calculated Latest Ref Range: <100 mg/dL 32   Non HDL Cholesterol Latest Ref Range: <130 mg/dL 51   Triglycerides Latest Ref Range: <150 mg/dL 97   Glucose Latest Ref Range: 70 - 99 mg/dL 78   INR Latest " Ref Range: 0.86 - 1.14  3.03 (H)     Device Check 12/2020:    [place] [employer] (CRT-D) Remote Device Check  AP: 0%  RVP: 94%  LVP: 93%   Mode: VVIR    Presenting Rhythm: AFib with BiVP rhythm in the 60's   Heart Rate: Stable with adequate variability.  HRs per histogram range from 60-90 with bump on histogram from 150-170 correlating with rates of ventricular arrhythmias below.  Sensing: Not obtained  Pacing Threshold: Not obtained   Impedance: WNL   Battery Status: Estimated at 7 months remaining   Atrial Arrhythmia: Permanent AFib on Warfarin  Ventricular Arrhythmia: 25,223 ventricular arrhythmias recorded since setting changes on 9/21/20 with 18 NSVT and 12 VT-1 EGMs available.  Per previous in-clinic checks, pt has no junctional escape rhythm at VVI 30. 18 available NSVT EGMs show 3-12 beats NSVT in the 120-170's.  12 available VT-1 EGMs show VT lasting from 39s to the longest at 12m36s. All VT-1 episodes show frequent short bursts of NSVT lasting 4-9 beats that are back to back in the 150-180's.  Called and spoke with patient and he said that over all he has been feeling pretty good, but he will occasionally get the same sensation he had prior to getting shocked where he will get a head rush in the face and then his hands and arms will get tingly.   Pt has history of VT ablation in 1/2019 with recurrent VT and is on Amiodarone and Coreg.  Pt states he has not missed any doses of his medications.     ATP: 0  Shocks: 0     Care Plan: Due for in-clinic device check in 3 months, order entered.  Scheduled to follow-up with Dr. Newman on 1/20/21. Will route update to Dr. Costello and Dr. Newman re: increase in ventricular arrhythmias as above.  In-clinic device check scheduled for 3/11/21.  AGAPITO Marrufo     Echocardiogram 9/2020:    The rhythm was paced.  There is anterior, septal, and apical wall akinesis.(thinned)  The left ventricle is moderately dilated.  The visual ejection fraction is estimated at  20-25%.  There is no thrombus seen in the left ventricle.  Mild aortic root dilatation.  The right ventricular systolic pressure is approximated at 35mmHg plus the  right atrial pressure.  The inferior vena cava is normal.  Compared to the prior study dated 7/29/20, there have been no changes.      IMPRESSION:  1. Ventricular tachycardia. Doing well on Mexiletine 150 mg bid and amiodarone 200 mg every day.  2. Chronic ischemic cardiomyopathy EF 20-25%  3. PAF and complete AVB. CRT-D on warfarin  It is my pleasure having a phone visit with Marko today.    RECOMMENDATIONS:  1. Continue amiodarone 200 mg daily and mexiletine 150 mg twice daily  2. I will add hepatic function and TSH to his lab work in April  3. His next remote check is in March.  We can reassess his NSVT at that time.    Lauren Jorgensen NP, JAMAL MELCHOR        Telephone visit documentation  Start: 10:35 am  End: 10:55 am  Time: 20  Including chart prep and documentation and arranging follow up        Thank you for allowing me to participate in the care of your patient.    Sincerely,     Lauren Jorgensen NP, APRN CNP     [facility]

## 2021-02-17 NOTE — PATIENT INSTRUCTIONS
Call my nurse with any questions or concerns:  [phone]  *If you have concerns after hours, please call [phone], option 2 to speak with on call Cardiologist.

## 2021-03-11 NOTE — TELEPHONE ENCOUNTER
Patient seen in device clinic for annual in office device check.     Patient was seen by Dr. Costello on 12/21/2020 due to high NSVT burden and risk for developing sustained VT requiring shocks. At that time patient was started on mexiletine 150mg b.i.d. Patient continues to take 200mg of amiodarone daily and had a video visit with Lauren Jorgensen on 2/17/2021 and is scheduled for follow up with Lauren Jorgensen on 4/15/2021 with labs.     Patients underlying rhythm today was Afib with junctional escape 39bpm    5,596 VT episodes logged since 1/15/2021. 2 EGMs logged as VT initially show Afib with short burst of NSVT then VT with V rates in the 150-160s (patients underlying historically Afib with CHB with no junctional escape at VVI 30) both VT episodes logged on same day 2/1/2021 consecutively. 2 remaining EGMs show Afib with 3-4 beat burst of NSVT with V rates in the 130-160s. Patient does not correlate symptoms with episodes logged.     Patient states he is asymptomatic while sitting down, however, does feel dizzy when he is up and moving. Per patient this has been on-going and remains unchanged since mexiletine prescribed by Dr. Costello in December 2020. Patient continues to take both amiodarone and mexiletine as prescribed. Will send update to Dr. Costello regarding continued episodes of VT and patients dizziness.

## 2021-03-11 NOTE — PROGRESS NOTES
[employer] [employer] CRT-D Device Check  Patient seen in clinic for device evaluation and iterative programming.   AP: N/A %    BiVP: 95 %    Mode: VVIR   Underlying Rhythm: Afib with VS 39bpm  Heart Rate: Stable with adequate variability, patient denies activity intolerance     Sensing: WNL     Pacing Threshold: Stable     Impedance: Stable     Battery Status: 5 months     Device Site: Well healed     Atrial Arrhythmia: Chronic Afib, patient takes coumadin     Ventricular Arrhythmia: 5,596 VT episodes logged since 1/15/2021.   ATP: 0    Shocks: 0    Setting Change: None     Care Plan: Remote device check in 1 month to assess battery longevity. Patient is scheduled to follow up with Lauren Jorgensen on 4/15/2021.       I have reviewed and interpreted the device interrogation, settings, programming and nurse's summary. The device is functioning within normal device parameters. I agree with the current findings, assessment and plan.

## 2021-03-12 NOTE — TELEPHONE ENCOUNTER
His orthostatic dizziness is not likely due to NSVT...    He has a fair amount of NSVT on a daily basis and depite amio- 200 mg daily and mexiletine 150 mg bid.      Rx options are limited.  Ablation is not a great option for this type of arrhythmia.     Let's increase mexiletine to 150 mg tid and keep monitoring the VT burden.     DI

## 2021-03-12 NOTE — TELEPHONE ENCOUNTER
Called patient today to review Dr. Costello' recommendation. Patient will increase his mexiletine from 150mg BID to 150mg TID. Patient states understanding of plan. Sent an updated Rx to patient's pharmacy and informed him that he can pick this up when he runs out of his current supply. Patient is scheduled for a courtesy device check and visit with Lauren Jorgensen on 4/15/21. Will reassess VT episode at that time. Asked patient to call sooner with any concerns.

## 2021-03-15 NOTE — PROGRESS NOTES
03/15/21 10:00 AM    Patient was not put on the Home INR schedule for today. They will put him on for tomorrow, 3/16/21.    Karli Ramirez, RN, BSN, PHN  Anticoagulation Clinic   [phone]

## 2021-03-18 NOTE — PROGRESS NOTES
Wife states that Tyrel had an INR lab done on Tuesday by [facility] and didn't get a call on dosing.    We did not receive a lab result from [facility].  Called and left a message for [facility] to give us a call back on patient's INR from Tuesday.    Radha Pierce, RN  Anticoagulation Nurse - [facility], [place]

## 2021-03-19 NOTE — PROGRESS NOTES
ANTICOAGULATION MANAGEMENT     Patient Name:  Tyrel Rene  Date:  3/19/2021    ASSESSMENT /SUBJECTIVE:    Today's INR result of 2.9 is therapeutic. Goal INR of 2.5-3.5      Warfarin dose taken: Warfarin taken as instructed    Diet: No new diet changes affecting INR    Medication changes/ interactions: No new medications/supplements affecting INR    Previous INR: Therapeutic     S/S of bleeding or thromboembolism: No    New injury or illness: No    Upcoming surgery, procedure or cardioversion: No    Additional findings: None       PLAN:    Telephone call with Tyrel regarding INR result and instructed:     Warfarin Dosing Instructions: Continue your current warfarin dose 1.5 mg daily    Instructed patient to follow up no later than: 4 weeks  Orders given to [facility] ([phone])    Education provided: Monitoring for bleeding signs and symptoms and Monitoring for clotting signs and symptoms      Marko verbalizes understanding and agrees to warfarin dosing plan.    Instructed to call the Anticoagulation Clinic for any changes, questions or concerns. (#[phone])        Radha Pierce RN      OBJECTIVE:  Recent labs: (last 7 days)     21   INR 2.9*         No question data found.  Anticoagulation Summary  As of 3/18/2021    INR goal:  2.5-3.5   TTR:  76.9 % (11.7 mo)   INR used for dosin.9 (3/16/2021)   Warfarin maintenance plan:  1.5 mg (1 mg x 1.5) every day   Full warfarin instructions:  1.5 mg every day   Weekly warfarin total:  10.5 mg   No change documented:  Radha Pierce RN   Plan last modified:  Sheila Cid RN (10/5/2020)   Next INR check:  4/15/2021   Priority:  Maintenance   Target end date:  Indefinite    Indications    Long-term (current) use of anticoagulants [Z79.01] [Z79.01]  Cerebral artery occlusion with cerebral infarction (HCC) [I63.50] [I63.50]  Cerebral infarction (H) [I63.9]  Atrial fibrillation  unspecified type (H) [I48.91]             Anticoagulation  Episode Summary     INR check location:      Preferred lab:  EXTER[facility]    Send INR reminders to:  HANS MCLEOD    Comments:  [facility] Patient goal should be 2.5-3.0 (5/13/20)      Anticoagulation Care Providers     Provider Role Specialty Phone number    Dejon Downs MD Referring Family Medicine [phone]

## 2021-03-30 NOTE — PATIENT INSTRUCTIONS
The patient has 3 upcoming appointments at [facility] [place].  He has not had any further syncopal episodes.  He is feeling well.  A question on his vitamin D came up at his hospitalization.  I cannot find anywhere where we have ever checked a vitamin D level on him.  I did put an order in for his next blood draw to get a vitamin D level checked.  He buys that over-the-counter so I am not sure if it was actually prescribed for him or if someone just suggested it would be prudent for him to take a vitamin D tablet.  He will check back in January for his annual wellness exam.  He has attempted video visits in the past and would prefer that milieu.  If that is unsuccessful we would have to make a face-to-face visit to accomplish his annual wellness exam.  
yes

## 2021-04-01 PROBLEM — S06.5XAA SUBDURAL HEMATOMA (H): Status: ACTIVE | Noted: 2021-01-01

## 2021-04-01 PROBLEM — I60.9 SUBARACHNOID HEMORRHAGE (H): Status: ACTIVE | Noted: 2021-01-01

## 2021-04-01 PROBLEM — S52.021A CLOSED FRACTURE OF OLECRANON PROCESS OF RIGHT ULNA, INITIAL ENCOUNTER: Status: ACTIVE | Noted: 2021-01-01
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
## 2021-04-01 NOTE — PROGRESS NOTES
RECEIVING UNIT ED HANDOFF REVIEW    ED Nurse Handoff Report was reviewed by: Mounika Myles RN on April 1, 2021 at 5:41 AM

## 2021-04-01 NOTE — H&P
[facility]    History and Physical - Hospitalist Service       Date of Admission:  4/1/2021    Assessment & Plan   Tyrel Rene is a 75 year old male with a past medical history of CAD s/p CABG x3 2012, severe ischemic cardiomyopathy last EF 20-25%, NSVT with ICD placement, atrial fibrillation, atrial flutter, HTN, episodes of syncope, and CKD stage III admitted on 4/1/2021 with a loss of consciousness and fall down 5 stairs. He was found to have a 4mm acute parafalcine subdural hematoma along the right tentorial leaflet and a small volume subarachnoid hemorrhage in the right paraclinoid area.    Subdural Hematoma  Small Volume Subarachnoid Hemorrhage  On Chronic Anticoagulation  Patient had a loss of coconsciousness with a fall down 5 stairs. Patient reports getting up to go to bed when he became dizzy on the stairs and lost his balance. He recalls reaching for the side rail on the stairs but does not recall events following until he was in the ambulance.   CT head findings include acute right parafalcine subdural hematoma measuring up to 4 mm in thickness which extends along the right tentorial leaflet where it measures up to 2 mm in thickness, small volume subarachnoid hemorrhage in the right paraclinoid area, extending into the prepontine cistern, along the interhemispheric fissure and into the left sylvian fissure and trace blood products in the occipital horns.  Scalp laceration   He is anticoagulated on warfarin which has been reversed with vitamin K and Kcentra. INR on admission 2.81.  Neurosurgery following. CTA negative for aneurysm.  -Admit to station 73 (ok per neurosurgery)  -Neuro checks q2hr  -Repeat Head CT at 1200  -Neurosurgery consult  -SBP <150mmHg per neurosurgery  -PRN antihypertensives to maintain SBP <150  -PT, OT, Speech therapy  -NPO except medications pending CT imaging at 12pm; if neurosurgery okay with initiating diet start Cardiac <2gm Na diet    Mildly  Displaced Fracture Right Olecranon Process with like Hemarthrosis  Right arm is splinted in a sling  -Non weight bearing right arm  -Orthopedic surgery consulted  -Tylenol 975mg PO q8hr  -Oxycodone 2.5-5mg PO q4hr PRN for moderate to severe pain    Syncope and Collapse  History of Frequent NSVT s/p PPM/ICD  History of VT storm  Atrial Fibrillation with Hx of complete AV block s/p PPM/ICD  Patient reports getting up to go to bed when he became dizzy on the stairs and lost his balance. He recalls reaching for the side rail on the stairs but does not recall events following until he was in the ambulance. He reports frequently feeling dizzy which he attributes to his medications. He has a history of V tach with ICD firing. PPM/ICD was interrogated in the ED which did indicate a couple 3-4 beat episodes of V tach earlier in the evening but did not seem to coincide with the timing of the fall. He did have an admission 9/2020 with syncope and collapse with ICD interrogation not noting any episodes of VT however while on telemetry monitoring the patient had frequent episodes of NSVT. EP did adjust settings during that hospitalization due to suspected episodes of device-induced proarrhythmia. He follows closely with cardiology on amiodarone and recently initiated on Mexilitine.  -Monitor on telemetry  -Continue PTA Amiodarone  -Continue PTA Digoxin daily except Sunday  -Continue PTA Mexilitine     CAD s/p BONITA to OM 2 8/20 and CABG x3 2012  Severe Ischemic Cardiomyopathy, last EF 20-25% 9/20  HFrEF  Anterior STEMI in 2012 requiring an emergent 3 vessel CABG. BONITA to OM2 8/20/20.  Patient reports weight increasing last week with orthopnea. He took torsemide on 3/26 and reported a nearly 10lb weight loss overnight. He reports his weight started to increased again following but then decreased again without additional dosing. He denies any orthopnea currently.   -Continue PTA Coreg  -Hold warfarin  -Hold Plavix due to  subdural/subarchanoid   -Telemetry monitoring  -Intake and output  -Daily weight     Acute Lower Back Pain  Patient reports acute lower back pain which he describes to the middle of his lower back. Gross motor movement intact. Denies any numbness or tingling.  CT lumbar spine negative for fracture  Pelvic xray negative for fracture  -Tylenol 975mg PO q8hr  -Oxycodone 2.5-5mg PO q4hr PRN for moderate to severe pain    CKD, Stage III  Stable renal function  Patient did receive contrast dye this admission  -Avoid nephrotoxins  -BMP in AM  -Intake and output  -Daily weight    Hyponatremia, Mild  Na 132 on admission  Patient reports following a very strict low sodium diet. He reports <2gm per day but most days <1gm. He reports recent orthopnea with weight gain which improved following a dose of PRN torsemide on 3/26/21.   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 atrial fibrillation with low ejection fraction  On warfarin reversed due to subdural and subarachnoid hemorrhage  -Continue Rosuvastatin   -Hold Warfarin d/t subdural and subarachnoid hemorrhage     Diet:   NPO except medications  DVT Prophylaxis: Pneumatic Compression Devices  Resendiz Catheter: not present  Code Status:   Special code, CPR, defibrillation, medications; DNI         Disposition Plan   Expected discharge: >2 midnights with evaluation of neurosurgery and orthopedics    Entered: JAMAL Serna CNP 04/01/2021, 7:17 AM     The patient's care was discussed with the Attending Physician, Dr. Duvall.    JAMAL Serna CNP  [facility]  Contact information available via [facility] Paging/Directory      ______________________________________________________________________    Chief Complaint   Fall    History is obtained from the patient and electronic health record    History of Present Illness   Tyrel Rene is a 75 year old male with a past medical  history of CAD s/p CABG x3 2012, severe ischemic cardiomyopathy last EF 20-25%, NSVT with ICD placement, atrial fibrillation, atrial flutter, HTN, episodes of syncope, and CKD stage III admitted on 4/1/2021 with a loss of consciousness and fall down 5 stairs. He was found to have a 4mm acute parafalcine subdural hematoma along the right tentorial leaflet and a small volume subarachnoid hemorrhage in the right paraclinoid area.  Patient reports getting up to go to bed when he became dizzy on the stairs and lost his balance. He recalls reaching for the side rail on the stairs but does not recall events following until he was in the ambulance. He reports frequently feeling dizzy which he attributes to his medications. He does endorse new onset lower back pain since the fall which imaging was negative for acute fracture. He was found to have a right olecranon process fracture. He has a scalp laceration and right hand laceration which were sutured by ED provider.   He has a history of V tach with ICD firing. PPM/ICD was interrogated in the ED which did indicate a couple 3-4 beat episodes of V tach earlier in the evening but did not seem to coincide with the timing of the fall. He denies any chest pain or palpations. He reports normally he does not feel any palpations with his VT episodes. He does not recall his ICD firing recently.   Patient reports weight increasing last week with orthopnea. He took torsemide on 3/26 and reported a nearly 10lb weight loss overnight. He reports his weight started to increased again following but then decreased again without additional dosing. He denies any orthopnea currently.   He denies any fever, chills, sore throat, headache, shortness of breath, nausea, vomiting, diarrhea, numbness, tingling, orthopnea, chest pain, or palpations.    Review of Systems    The 10 point Review of Systems is negative other than noted in the HPI or here.     Past Medical History    I have reviewed this  patient's medical history and updated it with pertinent information if needed.   Past Medical History:   Diagnosis Date     Atrial fibrillation (H)     s/p Cardioversion 3/14/2013     Atrial flutter (H)     S/p Aflutter ablation 1/11/2011     CAD (coronary artery disease)     CABG x3 10/2012- LIMA to distal LAD, SVG to OM1 & OM3; cath 10/2012- PTCA to second diagonal and mid LAD, BMS to mid LAD     Cardiogenic shock (H)      Cardiomyopathy (H)      ED (erectile dysfunction)      Hypertension      Neuropathy      SVT (supraventricular tachycardia) (H)     S/p dual chamber ICD 10/11/12- upgraded to BIV ICD 6/2013     Syncope        Past Surgical History   I have reviewed this patient's surgical history and updated it with pertinent information if needed.  Past Surgical History:   Procedure Laterality Date     BYPASS GRAFT ARTERY CORONARY  10/2/2012    Procedure: BYPASS GRAFT ARTERY CORONARY;  Coronary Artery Bypass Graft x3 (LAD, Diag, OM) with Endovein [place] (On-Pump);  Surgeon: Yeyo Lyman MD;  Location:  OR     CARDIOVERSION  3/14/2013     CORONARY ANGIOGRAPHY ADULT ORDER  10/2/12     CORONARY ARTERY BYPASS  10/2/12    LIMA to LAD, SVG to OM1 and OM3     CV ANGIOGRAM CORONARY GRAFT N/A 8/11/2020    Procedure: Angiogram Coronary Graft;  Surgeon: Erin Weaver MD;  Location:  HEART CARDIAC CATH LAB     CV HEART CATHETERIZATION WITH POSSIBLE INTERVENTION N/A 8/11/2020    Procedure: Heart Catheterization with Possible Intervention;  Surgeon: Erin Weaver MD;  Location:  HEART CARDIAC CATH LAB     CV PCI ATHERECTOMY ORBITAL N/A 8/11/2020    Procedure: Percutaneous Coronary Intervention Atherectomy Rotational;  Surgeon: Erin Weaver MD;  Location:  HEART CARDIAC CATH LAB     EP ABLATION VT N/A 1/2/2019    Procedure: EP Ablation VT;  Surgeon: Suellen Costello MD;  Location:  HEART CARDIAC CATH LAB     EP COMPREHENSIVE EP STUDY N/A 1/2/2019    Procedure: EP  Comprehensive EP Study;  Surgeon: Suellen Costello MD;  Location:  HEART CARDIAC CATH LAB     H ABLATION ATRIAL FLUTTER  2011     HAND SURGERY      table saw injury right hand     HEART CATH, ANGIOPLASTY  10/2/12    PTCA to second diagonal and mid LAD, BMS to mid LAD     HERNIA REPAIR       RELOCATE GENERATOR ICD/PACEMAKER         Social History   I have reviewed this patient's social history and updated it with pertinent information if needed.  Social History     Tobacco Use     Smoking status: Former Smoker     Packs/day: 1.00     Years: 14.00     Pack years: 14.00     Types: Cigarettes     Start date:      Quit date: 7/10/1972     Years since quittin.7     Smokeless tobacco: Never Used   Substance Use Topics     Alcohol use: No     Drug use: No       Family History   I have reviewed this patient's family history and updated it with pertinent information if needed.  Family History   Problem Relation Age of Onset     Alcohol/Drug Father      Heart Disease Father 71     Alzheimer Disease Sister      Alcohol/Drug Sister      Alcohol/Drug Sister      Gastrointestinal Disease Sister      Obesity Sister      Hypertension Sister      Heart Disease Sister      Hypertension Sister      Heart Disease Daughter         afib     Arrhythmia Daughter      Multiple Sclerosis Daughter      Heart Disease Daughter         afib     Arrhythmia Daughter      Cancer Daughter         lymphoma     Aneurysm Mother        Prior to Admission Medications   Prior to Admission Medications   Prescriptions Last Dose Informant Patient Reported? Taking?   ASPIRIN NOT PRESCRIBED (INTENTIONAL)  Spouse/Significant Other Yes No   Sig: continuous prn for other Please choose reason not prescribed, below   Multiple Vitamins-Minerals (PRESERVISION AREDS 2 PO)  Spouse/Significant Other Yes No   Sig: Take 1 capsule by mouth 2 times daily   Vitamin D, Cholecalciferol, 1000 UNITS TABS  Spouse/Significant Other Yes No   Sig: Take  1,000 mg by mouth daily    acetaminophen (TYLENOL) 500 MG tablet  Spouse/Significant Other Yes No   Sig: Take 1,000 mg by mouth every 8 hours as needed for mild pain   amiodarone (PACERONE) 200 MG tablet   No No   Sig: Take 1 tablet (200 mg) by mouth daily   carvedilol (COREG) 6.25 MG tablet   No No   Sig: Take 1 tablet (6.25 mg) by mouth 2 times daily (with meals)   clopidogrel (PLAVIX) 75 MG tablet  Spouse/Significant Other No No   Sig: Take 1 tablet (75 mg) by mouth daily   digoxin (LANOXIN) 125 MCG tablet   No No   Sig: Take 1 tablet (125 mcg) by mouth six times a week Do not take Sundays   famotidine (PEPCID) 20 MG tablet   No No   Sig: TAKE 1 TABLET BY MOUTH TWICE A DAY   ipratropium (ATROVENT) 0.03 % nasal spray   No No   Sig: SPRAY 2 SPRAYS INTO BOTH NOSTRILS EVERY 12 HOURS   mexiletine (MEXITIL) 150 MG capsule   No No   Sig: Take 1 capsule (150 mg) by mouth 3 times daily   nitroGLYcerin (NITROSTAT) 0.4 MG sublingual tablet  Spouse/Significant Other No No   Sig: DISSOLVE 1 TABLET UNDER THE TONGUE EVERY 5 MINUTES AS NEEDED FOR CHEST PAIN   rosuvastatin (CRESTOR) 20 MG tablet   No No   Sig: Take 1 tablet (20 mg) by mouth daily   sacubitril-valsartan (ENTRESTO) 49-51 MG per tablet   No No   Sig: Take 1 tablet by mouth 2 times daily   sildenafil (VIAGRA) 100 MG tablet  Spouse/Significant Other No No   Sig: Take 1 tablet (100 mg) by mouth daily as needed Take 30 min to 4 hours before intercourse.  Never use with nitroglycerin, terazosin or doxazosin.   warfarin ANTICOAGULANT (COUMADIN) 1 MG tablet   No No   Sig: TAKE 1 & 1/2 TABLETS (1.5 MG) EVERY DAY OR AS DIRECTED.   warfarin ANTICOAGULANT (COUMADIN) 2.5 MG tablet  Spouse/Significant Other No No   Sig: Take 2.5 mg on Mondays and 1.25mg all other days or as directed by the anticoagulation clinic      Facility-Administered Medications: None     Allergies   Allergies   Allergen Reactions     Aspirin      Other reaction(s): Aspirin Contraindication (for  reporting)  Cardiologist recommended no aspirin as he is on Pradaxa     Nystatin Other (See Comments)     Make his mouth numb & swelling       Physical Exam   Vital Signs: Temp: (P) 98.1  F (36.7  C) Temp src: (P) Oral BP: (P) 129/69 Pulse: (P) 65   Resp: (P) 18 SpO2: (P) 95 % O2 Device: (P) None (Room air)    Weight: 173 lbs 0 oz    Constitutional: Awake, alert, cooperative, no apparent distress.  Eyes: Conjunctiva normal. Pupils 3mm reactive to light. EOM intact.  HEENT: Ecchymosis to left side of tongue. Moist mucous membranes, normal dentition.  Respiratory: Clear to auscultation bilaterally, no crackles or wheezing.  Cardiovascular: Regular rate and rhythm, normal S1 and S2, and no murmur noted.   GI: Soft, non-distended, non-tender, normal bowel sounds.  Skin: Right arm splint and in sling. Right hand and posterior scalp lacerations. No rashes, no cyanosis, no edema.  Musculoskeletal: Right arm splint and in sling. No addition joint redness or swelling.  Neurologic: No focal deficits. No pronotor drift. Tongue midline. Smile symmetrical. No numbness or tingling. Alert and oriented x4. Cranial nerves 2-12 intact, normal strength and sensation.  Psychiatric: Alert, oriented to person, place and time, no obvious anxiety or depression.    Data   Data reviewed today: I reviewed all medications, new labs and imaging results over the last 24 hours. I personally reviewed the EKG tracing showing atrial sensed v-pacing 100% with underlying atrial fibrillation and the head CT image(s) showing subdural and small arachnoid bleed.    Recent Labs   Lab 04/01/21  0029   WBC 9.3   HGB 13.2*   MCV 91      INR 2.81*   *   POTASSIUM 4.3   CHLORIDE 100   CO2 27   BUN 19   CR 1.30*   ANIONGAP 5   LORI 8.0*   GLC 99   ALBUMIN 3.0*   PROTTOTAL 6.5*   BILITOTAL 0.4   ALKPHOS 78   ALT 50   AST 47*   TROPI <0.015     Recent Results (from the past 24 hour(s))   Elbow XR, G/E 3 views, right    Narrative    EXAM: XR ELBOW RT  G/E 3 VW  LOCATION: [facility]  DATE/TIME: 4/1/2021 2:09 AM    INDICATION: Fall, pain.  COMPARISON: None.      Impression    IMPRESSION: There is a mildly displaced fracture of the olecranon process of the proximal ulna. There is a moderate resultant elbow joint effusion consistent with a hemarthrosis. No dislocation.   Head CT w/o contrast    Narrative    EXAM: CT HEAD W/O CONTRAST, CT CERVICAL SPINE W/O CONTRAST  LOCATION: [facility]  DATE/TIME: 4/1/2021 1:56 AM    INDICATION: Traumatic injury. Pain.  COMPARISON: None.  TECHNIQUE:   1) Routine CT Head without IV contrast. Multiplanar reformats. Dose reduction techniques were used.  2) Routine CT Cervical Spine without IV contrast. Multiplanar reformats. Dose reduction techniques were used.    FINDINGS:   HEAD CT:   INTRACRANIAL CONTENTS: There is an acute right parafalcine subdural hematoma measuring up to 4 mm at the level of the parietal lobes. This subdural hematoma extends along the right tentorial leaflet where it measures approximately 2 mm. Small volume   subarachnoid hemorrhage in the right paraclinoid region and interpeduncular cistern as well as the prepontine cistern. Small volume subarachnoid hemorrhage extending into the left sylvian fissure. No parenchymal hemorrhage is identified. Trace blood   products in the occipital horns. No CT evidence of acute infarct. Mild to moderate presumed chronic small vessel ischemic changes. Chronic infarcts in the left middle frontal gyrus, left frontal operculum and left parietal lobe. Chronic infarcts in the   right middle frontal gyrus and left cerebellar hemisphere. Mild to moderate generalized volume loss. No hydrocephalus.     VISUALIZED ORBITS/SINUSES/MASTOIDS: No intraorbital abnormality. Mild mucosal thickening scattered about the paranasal sinuses. No middle ear or mastoid effusion.    BONES/SOFT TISSUES: Left parietal scalp hematoma. No fracture.    CERVICAL SPINE CT:    VERTEBRA: Normal vertebral body heights and alignment. No fracture or posttraumatic subluxation.     CANAL/FORAMINA: No significant central canal stenosis. Moderate bilateral neural foraminal narrowing at C3-C4, C5-C6 and C6-C7.    PARASPINAL: No extraspinal abnormality. Visualized lung fields are clear.      Impression    IMPRESSION:  HEAD CT:  1.  Acute right parafalcine subdural hematoma measuring up to 4 mm in thickness. This extends along the right tentorial leaflet where it measures up to 2 mm in thickness.  2.  Small volume subarachnoid hemorrhage in the right paraclinoid area, extending into the prepontine cistern, along the interhemispheric fissure and into the left sylvian fissure.  3.  Trace blood products in the occipital horns.  4.  Age-related changes with multifocal areas of chronic infarction as detailed above.  5.  Posterior left parietal scalp hematoma. No fracture.    CERVICAL SPINE CT:  1.  No CT evidence for acute fracture or post traumatic subluxation.  2.  Moderate multilevel neural foraminal narrowing as detailed above.    Exam discussed with Dr. Ness at 2:38 AM.   CT Cervical Spine w/o Contrast    Narrative    EXAM: CT HEAD W/O CONTRAST, CT CERVICAL SPINE W/O CONTRAST  LOCATION: [facility]  DATE/TIME: 4/1/2021 1:56 AM    INDICATION: Traumatic injury. Pain.  COMPARISON: None.  TECHNIQUE:   1) Routine CT Head without IV contrast. Multiplanar reformats. Dose reduction techniques were used.  2) Routine CT Cervical Spine without IV contrast. Multiplanar reformats. Dose reduction techniques were used.    FINDINGS:   HEAD CT:   INTRACRANIAL CONTENTS: There is an acute right parafalcine subdural hematoma measuring up to 4 mm at the level of the parietal lobes. This subdural hematoma extends along the right tentorial leaflet where it measures approximately 2 mm. Small volume   subarachnoid hemorrhage in the right paraclinoid region and interpeduncular cistern as well as the  prepontine cistern. Small volume subarachnoid hemorrhage extending into the left sylvian fissure. No parenchymal hemorrhage is identified. Trace blood   products in the occipital horns. No CT evidence of acute infarct. Mild to moderate presumed chronic small vessel ischemic changes. Chronic infarcts in the left middle frontal gyrus, left frontal operculum and left parietal lobe. Chronic infarcts in the   right middle frontal gyrus and left cerebellar hemisphere. Mild to moderate generalized volume loss. No hydrocephalus.     VISUALIZED ORBITS/SINUSES/MASTOIDS: No intraorbital abnormality. Mild mucosal thickening scattered about the paranasal sinuses. No middle ear or mastoid effusion.    BONES/SOFT TISSUES: Left parietal scalp hematoma. No fracture.    CERVICAL SPINE CT:   VERTEBRA: Normal vertebral body heights and alignment. No fracture or posttraumatic subluxation.     CANAL/FORAMINA: No significant central canal stenosis. Moderate bilateral neural foraminal narrowing at C3-C4, C5-C6 and C6-C7.    PARASPINAL: No extraspinal abnormality. Visualized lung fields are clear.      Impression    IMPRESSION:  HEAD CT:  1.  Acute right parafalcine subdural hematoma measuring up to 4 mm in thickness. This extends along the right tentorial leaflet where it measures up to 2 mm in thickness.  2.  Small volume subarachnoid hemorrhage in the right paraclinoid area, extending into the prepontine cistern, along the interhemispheric fissure and into the left sylvian fissure.  3.  Trace blood products in the occipital horns.  4.  Age-related changes with multifocal areas of chronic infarction as detailed above.  5.  Posterior left parietal scalp hematoma. No fracture.    CERVICAL SPINE CT:  1.  No CT evidence for acute fracture or post traumatic subluxation.  2.  Moderate multilevel neural foraminal narrowing as detailed above.    Exam discussed with Dr. Ness at 2:38 AM.   Lumbar spine CT w/o contrast    Narrative    EXAM: CT  LUMBAR SPINE W/O CONTRAST  LOCATION: [facility]  DATE/TIME: 4/1/2021 1:56 AM    INDICATION: Traumatic injury. Pain.  COMPARISON: None.  TECHNIQUE: Routine CT Lumbar Spine without IV contrast. Multiplanar reformats. Dose reduction techniques were used.     FINDINGS:  VERTEBRA: Vertebral body height is normal. There is 3 mm anterolisthesis of L5 on S1. No fracture or posttraumatic subluxation.     CANAL/FORAMINA: Moderate to severe right neural foraminal narrowing at L4-L5. No high-grade central canal stenosis.    PARASPINAL: No extraspinal abnormality.      Impression    IMPRESSION:  1.  No acute fracture or subluxation.  2.  Moderate to severe right neural foraminal narrowing at L4-L5.   CTA Head Neck with Contrast    Narrative    EXAM: CTA  HEAD NECK WITH CONTRAST  LOCATION: [facility]  DATE/TIME: 4/1/2021 3:37 AM    INDICATION: Intracranial hemorrhage. Evaluate for aneurysm.  COMPARISON: Head CT and cervical spine CT 04/01/2021, CTA head and neck 12/21/2014, report from angiogram dated 01/12/2011.  CONTRAST: 70 mL [id number].  TECHNIQUE: Head and neck CT angiogram with IV contrast. Axial helical CT images of the head and neck vessels obtained during the arterial phase of intravenous contrast administration. Axial 2D reconstructed images and multiplanar 3D MIP reconstructed   images of the head and neck vessels were performed by the technologist. Dose reduction techniques were used. All stenosis measurements made according to NASCET criteria unless otherwise specified.    FINDINGS:     HEAD CTA:  ANTERIOR CIRCULATION: No stenosis/occlusion, aneurysm, or high flow vascular malformation. Standard [place] anatomy.    POSTERIOR CIRCULATION: Nondominant right vertebral artery is occluded as it enters the dura. There is some retrograde filling of the distal right vertebral artery. Normal left vertebral artery.     DURAL VENOUS SINUSES: Expected enhancement of the major dural venous  sinuses.    NECK CTA:  RIGHT CAROTID: Atherosclerotic plaque results in less than 50% stenosis in the right ICA. No dissection. Mild luminal irregularity.    LEFT CAROTID: No measurable stenosis or dissection. There is a tiny medially directed pseudoaneurysm measuring 2 mm (series 10 image 299) and an additional 2 mm laterally directed aneurysm (series 10 image 325. Luminal irregularity of the ICA suggests   fibromuscular dysplasia.    VERTEBRAL ARTERIES: The nondominant right vertebral artery is only intermittently seen consistent with age-indeterminate stenosis and multifocal occlusion. Dominant left vertebral artery is widely patent into the head.    AORTIC ARCH: Classic aortic arch anatomy with no significant stenosis at the origin of the great vessels.    NONVASCULAR STRUCTURES: Unremarkable.      Impression    IMPRESSION:     HEAD CTA:   1.  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 There is some retrograde filling of the distal right vertebral artery.  2.  Otherwise unremarkable. No evidence for aneurysm.    NECK CTA:  1.  The nondominant right vertebral artery is only intermittently seen consistent with age-indeterminate multifocal stenosis or occlusion.  2.  Extensive tortuosity of the internal carotid arteries bilaterally with some luminal irregularity consistent with a previously described fibromuscular dysplasia.  3.  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 One of these is described on the conventional angiogram from 01/12/2011.               XR Pelvis w Hip Right 1 View    Narrative    EXAM: XR PELVIS AND HIP RIGHT 1 VIEW  LOCATION: [facility]  DATE/TIME: 4/1/2021 3:40 AM    INDICATION: Fall  COMPARISON: None.      Impression    IMPRESSION: No fracture or dislocation. 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 Moderate degenerative disc changes in the spine.

## 2021-04-01 NOTE — PROGRESS NOTES
04/01/21 1000   Quick Adds   Type of Visit Initial PT Evaluation   Living Environment   People in home spouse   Current Living Arrangements house   Home Accessibility stairs within home   Number of Stairs, Main Entrance none   Stair Railings, Main Entrance railing on right side (ascending)   Number of Stairs, Within Home, Primary 5;7   Stair Railings, Within Home, Primary railing on right side (ascending)   Transportation Anticipated family or friend will provide   Living Environment Comments 5 stairs down; 7 stairs up in split level home   Self-Care   Usual Activity Tolerance excellent   Current Activity Tolerance poor   Disability/Function   Hearing Difficulty or Deaf no   Wear Glasses or Blind no   Concentrating, Remembering or Making Decisions Difficulty no   Difficulty Communicating no   Walking or Climbing Stairs Difficulty no   Dressing/Bathing Difficulty no   Doing Errands Independently Difficulty (such as shopping) yes   Fall history within last six months yes   Number of times patient has fallen within last six months 1   Change in Functional Status Since Onset of Current Illness/Injury yes   General Information   Onset of Illness/Injury or Date of Surgery 03/31/21   Referring Physician Dejon Downs   Patient/Family Therapy Goals Statement (PT) To return home safely   Pertinent History of Current Problem (include personal factors and/or comorbidities that impact the POC) S/p fall, R elbow fx, lumboscral brusie, subarachnoid hematoma   Existing Precautions/Restrictions no known precautions/restrictions   Weight-Bearing Status - LUE full weight-bearing   Weight-Bearing Status - RUE nonweight-bearing   Weight-Bearing Status - LLE full weight-bearing   Weight-Bearing Status - RLE full weight-bearing   General Observations R UE soft cast + sling; posterior inion abrasion; low back pain    Cognition   Orientation Status (Cognition) oriented x 4   Affect/Mental Status (Cognition) WNL   Follows Commands  (Cognition) WNL   Pain Assessment   Patient Currently in Pain No   Integumentary/Edema   Integumentary/Edema Comments Inion abrasion   Posture    Posture Forward head position;Kyphosis   Strength   Strength Comments L UE global: 2/5 due to soft cast   Bed Mobility   Bed Mobility supine-sit;sit-supine   Supine-Sit [place] (Bed Mobility) minimum assist (75% patient effort)   Sit-Supine [place] (Bed Mobility) minimum assist (75% patient effort)   Bed Mobility Limitations decreased ability to use arms for pushing/pulling;decreased ability to use legs for bridging/pushing   Impairments Contributing to Impaired Bed Mobility pain;decreased strength   Sit-Stand Transfer   Sit-Stand [place] (Transfers) moderate assist (50% patient effort)   Assistive Device (Sit-Stand Transfers) cane, quad   Sit/Stand Transfer Comments cueing for L UE push off from seated position    Gait/Stairs (Locomotion)   [place] Level (Gait) contact guard   Assistive Device (Gait) cane, quad   Distance in Feet (Required for LE Total Joints) 150'   Pattern (Gait) step-to   Deviations/Abnormal Patterns (Gait) antalgic;base of support, narrow;tao decreased;gait speed decreased;scissoring;stride length decreased;weight shifting decreased   Maintains Weight-bearing Status (Gait) able to maintain   Negotiation (Stairs) other (see comments)   Comment (Gait/Stairs) deferred stairs this session due to pain    Balance   Balance other (describe)   Balance Comments Fair- standing balance, 2 instances of LOB during amb causing minimal assist x 1 to prevent full LOB   Clinical Impression   Criteria for Skilled Therapeutic Intervention yes, treatment indicated   PT Diagnosis (PT) Gait deficit, transfer deficit, amb deficit   Influenced by the following impairments pain, weakness, impaired R LE ROM   Functional limitations due to impairments s/p fall    Clinical Presentation Evolving/Changing   Clinical Presentation Rationale multilple affected  sites w/ evolving pain symptoms   Clinical Decision Making (Complexity) moderate complexity   Therapy Frequency (PT) 1x/week   Predicted Duration of Therapy Intervention (days/wks) 5 days   Planned Therapy Interventions (PT) balance training;bed mobility training;gait training;neuromuscular re-education;stair training;strengthening   Anticipated Equipment Needs at Discharge (PT) cane, quad   Risk & Benefits of therapy have been explained care plan/treatment goals reviewed   Clinical Impression Comments CGA x 1 during amb; min-mod assist x 1 during general transfer due to pain and unsteadiness   PT Discharge Planning    PT Discharge Recommendation (DC Rec) Transitional Care Facility   PT Rationale for DC Rec Increased asssitance during all transfers; would benefit from continued care to improve functional independence   PT Brief overview of current status  minimum assist x 1 during transfers; CGA x 1 for safety during amb   Total Evaluation Time   Total Evaluation Time (Minutes) 21

## 2021-04-01 NOTE — ED TRIAGE NOTES
[facility]  ED Arrival Note      Means of Arrival: EMS  Comes from: Home    Story: Fall tonight, wife heard him. +LOC. Takes blood thinners, has a pace maker. Laceration in the back of the head. Sore elbow and hand        EMS/PD Interventions: EKG  EMS Medications: N/A      Directed to: Main ED  Belongings: In room locker and Remain with patient

## 2021-04-01 NOTE — ED NOTES
[facility]  ED Nurse Handoff Report    ED Chief complaint: Fall      ED Diagnosis:   Final diagnoses:   Subdural hematoma (H)   Subarachnoid hemorrhage (H)   Fall on stairs, initial encounter   Closed fracture of olecranon process of right ulna, initial encounter       Code Status: to be addressed by admitting doctor    Allergies:   Allergies   Allergen Reactions     Aspirin      Other reaction(s): Aspirin Contraindication (for reporting)  Cardiologist recommended no aspirin as he is on Pradaxa     Nystatin Other (See Comments)     Make his mouth numb & swelling       Patient Story: Pt's wife heard him fall. Pt on blood thinners fell backwards down about 5 stairs after loss of consciousness. He remembers starting the fall. He states back pain and R elbow pain.pt has scrape on back of head and on knuckles of both hands. He denies cough, fever, vomiting, and diarrhea.  Focused Assessment: Pt reports back pain, reports pain in R elbow. Pt has swollen R hand with scraped knuckles on R hand and L hand. Pt alert and orientated x4, follows commands and speecH clear.    Treatments and/or interventions provided: IV Kcentra, IV Phytonadine, IV dilaudid 0.5mg  Patient's response to treatments and/or interventions: wound cleans and dressing to R hand, wound cleanse scrape back of head, splint R Arm    To be done/followed up on inpatient unit:  unknown    Does this patient have any cognitive concerns?: none    Activity level - Baseline/Home:  Independent  Activity Level - Current:   Stand with assist x2    Patient's Preferred language: English   Needed?: No    Isolation: None  Infection: Not Applicable  Patient tested for COVID 19 prior to admission: YES  Bariatric?: No    Vital Signs:   Vitals:    04/01/21 0017 04/01/21 0030 04/01/21 0100 04/01/21 0130   BP: (!) 145/90 (!) 143/86 (!) 146/82 (!) 143/84   Pulse: 65 65 65 65   Resp: 16 18 18 18   Temp: 97.6  F (36.4  C)      TempSrc: Oral      SpO2: 95%  "96% 97% 97%   Weight: 78.5 kg (173 lb)      Height: 1.854 m (6' 1\")          Cardiac Rhythm:     Was the PSS-3 completed:   Yes  What interventions are required if any?               Family Comments: Wife bedside informed of visiting hours  OBS brochure/video discussed/provided to patient/family: n/a              Name of person given brochure if not patient: n/a              Relationship to patient: n/a    For the majority of the shift this patient's behavior was Green.   Behavioral interventions performed were none.    ED NURSE PHONE NUMBER: *[phone]       "

## 2021-04-01 NOTE — PLAN OF CARE
Patient here with SDH/SAH after syncopal fall with LOC, patient alert x 4, right arm in splint d/t elbow fx, fingers on right hand swollen but good pulses, right lower extremity slight weaker d/t right hip pain.patient has abrasion on back of head with small amount of drainage. Patient up with 1 assist GB. Patient scores green on aggression tool. Discharge plan to TCU pending work up.

## 2021-04-01 NOTE — PROGRESS NOTES
04/01/21 1544   Quick Adds   Type of Visit Initial Occupational Therapy Evaluation   Living Environment   People in home spouse   Current Living Arrangements house   Home Accessibility stairs within home   Number of Stairs, Within Home, Primary 5;7   Transportation Anticipated family or friend will provide   Living Environment Comments Split level home. Kitchen on main level but no bathroom. Needs to go up/down for bathroom.    Self-Care   Usual Activity Tolerance excellent   Current Activity Tolerance moderate   Activity/Exercise/Self-Care Comment Pt reports complete indep prior to fall. Completing all self-cares, assisting with IADLs and driving.    Disability/Function   Hearing Difficulty or Deaf no   Wear Glasses or Blind yes   Vision Management glasses   Concentrating, Remembering or Making Decisions Difficulty no   Difficulty Communicating no   Difficulty Eating/Swallowing no   Walking or Climbing Stairs Difficulty yes   Walking or Climbing Stairs ambulation difficulty, assistance 1 person   Dressing/Bathing Difficulty yes   Dressing/Bathing dressing difficulty, assistance 1 person   Toileting issues yes   Toileting Assistance toileting difficulty, assistance 1 person   Doing Errands Independently Difficulty (such as shopping) yes   Fall history within last six months yes   Number of times patient has fallen within last six months 1   Change in Functional Status Since Onset of Current Illness/Injury yes   General Information   Onset of Illness/Injury or Date of Surgery 03/31/21   Referring Physician Ana Gaines APRN CNP   Patient/Family Therapy Goal Statement (OT) Return home   Additional Occupational Profile Info/Pertinent History of Current Problem Tyrel Rene is a 75 year old male on Coumadin with history of atrial fibrillation, atrial flutter, cardiomyopathy, coronary artery disease, hyperlipidemia, and hypertension who presents with fall. He fell backwards down about 5 stairs after loss of  consciousness. He remembers starting the fall. He states back pain. Also, he states lightheadedness, dizziness, and a black and blue nose which are all baseline. He denies cough, fever, vomiting, and diarrhea. S/p fall, R elbow fx, lumboscral brusie, subarachnoid hematoma   Existing Precautions/Restrictions fall   Cognitive Status Examination   Orientation Status orientation to person, place and time   Affect/Mental Status (Cognitive) WNL   Follows Commands WNL   Visual Perception   Visual Impairment/Limitations WNL;corrective lenses for distance   Sensory   Sensory Quick Adds No deficits were identified   Integumentary/Edema   Integumentary/Edema other (describe)   Integumentary/Edema Comments swollen/bruised R hand, bruise on posterior head from fall   Range of Motion Comprehensive   General Range of Motion upper extremity range of motion deficits identified   Comment, General Range of Motion L UE WFL, R UE in elbow immobilizer cast   Strength Comprehensive (MMT)   General Manual Muscle Testing (MMT) Assessment upper extremity strength deficits identified   Comment, General Manual Muscle Testing (MMT) Assessment L UE 5/5, R UE NT   Coordination   Upper Extremity Coordination No deficits were identified   Bed Mobility   Bed Mobility supine-sit;sit-supine   Supine-Sit [place] (Bed Mobility) minimum assist (75% patient effort)   Transfers   Transfers sit-stand transfer;toilet transfer   Sit-Stand Transfer   Sit-Stand [place] (Transfers) contact guard   Toilet Transfer   Type (Toilet Transfer) sit-stand;stand-sit   [place] Level (Toilet Transfer) minimum assist (75% patient effort)   Activities of Daily Living   BADL Assessment/Intervention lower body dressing;feeding   Lower Body Dressing Assessment/Training   [place] Level (Lower Body Dressing) moderate assist (50% patient effort)   Eating/Self Feeding   [place] Level (Feeding) set up   Clinical Impression   Criteria for Skilled Therapeutic  Interventions Met (OT) yes;meets criteria;skilled treatment is necessary   OT Diagnosis Impaired ADLs and functional mobility   OT Problem List-Impairments impacting ADL problems related to;activity tolerance impaired;balance;strength;range of motion (ROM)   Assessment of Occupational Performance 1-3 Performance Deficits   Identified Performance Deficits dressing, bed mobility   Planned Therapy Interventions (OT) ADL retraining;bed mobility training;strengthening   Clinical Decision Making Complexity (OT) low complexity   Therapy Frequency (OT) Daily   Predicted Duration of Therapy 2   Risk & Benefits of therapy have been explained evaluation/treatment results reviewed;care plan/treatment goals reviewed   OT Discharge Planning    OT Discharge Recommendation (DC Rec) Home with assist;home with outpatient occupational therapy   OT Rationale for DC Rec Pt is slightly below baseline for functional mobility and self-care. Pt is requiring CGA for mobility and mod-max A for dressing. Wife present for session and available to assist as needed at home. Once medically managed (dizziness under control), pt able to return home with assist from wife. Outpatient OT recommended for elbow once cast removed.    Total Evaluation Time (Minutes)   Total Evaluation Time (Minutes) 10

## 2021-04-01 NOTE — PHARMACY-ADMISSION MEDICATION HISTORY
Pharmacy Medication History  Admission medication history interview status for the 4/1/2021  admission is complete. See EPIC admission navigator for prior to admission medications     Location of Interview: Phone  Medication history sources: Patient's family/friend (Spouse Sharel)    Significant changes made to the medication list:  Removed Warfarn 2.5 mg    In the past week, patient estimated taking medication this percent of the time: greater than 90%    Additional medication history information:   AC Clinic Visit 3-18-21  Goal INR 2.5-3.5  INR 2.9  Dose Warfarin 1.5 mg daily    Medication reconciliation completed by provider prior to medication history? No    Time spent in this activity: 20 min    Prior to Admission medications    Medication Sig Last Dose Taking? Auth Provider   acetaminophen (TYLENOL) 500 MG tablet Take 1,000 mg by mouth every 8 hours as needed for mild pain  at prn Yes Unknown, Entered By History   amiodarone (PACERONE) 200 MG tablet Take 1 tablet (200 mg) by mouth daily 3/30/2021 at hs Yes Parish Newman MD   carvedilol (COREG) 6.25 MG tablet Take 1 tablet (6.25 mg) by mouth 2 times daily (with meals) 3/31/2021 at am Yes Lauren Jorgensen APRN CNP   clopidogrel (PLAVIX) 75 MG tablet Take 1 tablet (75 mg) by mouth daily 3/31/2021 at am Yes Rehan Seymour MD   digoxin (LANOXIN) 125 MCG tablet Take 1 tablet (125 mcg) by mouth six times a week Do not take Sundays 3/31/2021 at am Yes Parish Newman MD   famotidine (PEPCID) 20 MG tablet TAKE 1 TABLET BY MOUTH TWICE A DAY 3/31/2021 at am Yes Dejon Downs MD   ipratropium (ATROVENT) 0.03 % nasal spray SPRAY 2 SPRAYS INTO BOTH NOSTRILS EVERY 12 HOURS 3/31/2021 at am Yes Dejon Downs MD   mexiletine (MEXITIL) 150 MG capsule Take 1 capsule (150 mg) by mouth 3 times daily 3/31/2021 at Unknown time Yes Suellen Costello MD   Multiple Vitamins-Minerals (PRESERVISION AREDS 2 PO) Take 1 capsule by mouth 2 times daily  3/31/2021 at am Yes Reported, Patient   nitroGLYcerin (NITROSTAT) 0.4 MG sublingual tablet DISSOLVE 1 TABLET UNDER THE TONGUE EVERY 5 MINUTES AS NEEDED FOR CHEST PAIN  at prn Yes Dejon Downs MD   rosuvastatin (CRESTOR) 20 MG tablet Take 1 tablet (20 mg) by mouth daily 3/30/2021 at hs Yes Lauren Jorgensen APRN CNP   sacubitril-valsartan (ENTRESTO) 49-51 MG per tablet Take 1 tablet by mouth 2 times daily 3/31/2021 at am Yes Parish Newman MD   sildenafil (VIAGRA) 100 MG tablet Take 1 tablet (100 mg) by mouth daily as needed Take 30 min to 4 hours before intercourse.  Never use with nitroglycerin, terazosin or doxazosin.  at prn Yes Dejon Downs MD   Vitamin D, Cholecalciferol, 1000 UNITS TABS Take 1,000 mg by mouth daily  3/31/2021 at am Yes Reported, Patient   warfarin ANTICOAGULANT (COUMADIN) 1 MG tablet TAKE 1 & 1/2 TABLETS (1.5 MG) EVERY DAY OR AS DIRECTED. 3/30/2021 at hs Yes Dejon Downs MD   ASPIRIN NOT PRESCRIBED (INTENTIONAL) continuous prn for other Please choose reason not prescribed, below   Reported, Patient       The information provided in this note is only as accurate as the sources available at the time of update(s)

## 2021-04-01 NOTE — ED PROVIDER NOTES
History   Chief Complaint:  Fall       The history is provided by the patient and the spouse.      Tyrel Rene is a 75 year old male on Coumadin with history of atrial fibrillation, atrial flutter, cardiomyopathy, coronary artery disease, hyperlipidemia, and hypertension who presents with fall. He fell backwards down about 5 stairs after loss of consciousness. He remembers starting the fall. He states back pain. Also, he states lightheadedness, dizziness, and a black and blue nose which are all baseline. He denies cough, fever, vomiting, and diarrhea.    Per his wife, he has a defibrillator, pacemaker, severe heart issues, and ventriclar fibrulaiton episodes intermittently. Soon after the fall, he answered her questions although he does not remember talking to EMS personnel. She notes his hands and the back of his head are bleeding.     Review of Systems   Constitutional: Negative for fever.        Fall.    Respiratory: Negative for cough.    Gastrointestinal: Negative for diarrhea and vomiting.   Musculoskeletal: Positive for back pain.   Neurological: Positive for syncope.   All other systems reviewed and are negative.      Allergies:  Aspirin  Nystatin    Medications:  Coumadin   Amiodarone   Aspirin   Carvedilol   Digoxin   Famotidine   Mexiletine   Nitroglycerin   Rosuvastatin   Sacubitril-valsartan   Sildenafil     Past Medical History:    Atrial fibrillation    Atrial flutter   Coronary artery disease    Cardiogenic shock   Cardiomyopathy   Erectile dysfunction   Hypertension   Neuropathy   Supraventricular tachycardia (H)   Nonsustained ventricular tachycardia (H)   Right Lobe Pneumonia  Ventriclar fibrillations   Chronic kidney disease    Mixed hyperlipidemia   Paroxysmal ventricular tachycardia   Congestive heart failure   Diplopia  Cerebral artery occulusion with cerebral infarction  Anemia   STEMI (ST elevated myocardial Infarction)     Past Surgical History:    Bypass graft artery coronary  "  Cardioversion  Coronary angiography   Coronary artery bypass  CV angiogram coronary graft   CV heart catheterization with possible intervention  CV PCI atherectomy orbital   EP Ablation VT  EP comprehensive study   H ablation atrial flutter  Angioplasty   Hernia repair  Relocate generator ICD/pacemaker    Family History:    Father: Alcohol/drug, heart disease   Sister:  Alzheimer disease,  Alcohol/drug, gastrointestinal disease , obesity, heart disease, hypertension  Mother: Aneurysm   Daughter: cancer, arrhythmia, Atrial fibrillation, Multiple sclerosis, hypertension, heart disease     Social History:  Arrival via EMS   Presents with wife.     Physical Exam     Patient Vitals for the past 24 hrs:   BP Temp Temp src Pulse Resp SpO2 Height Weight   04/01/21 0610 105/67 -- -- 62 18 94 % -- --   04/01/21 0535 105/67 -- -- 65 -- -- -- --   04/01/21 0530 115/68 -- -- 65 -- -- -- --   04/01/21 0300 107/60 -- -- 66 -- 93 % -- --   04/01/21 0130 (!) 143/84 -- -- 65 18 97 % -- --   04/01/21 0100 (!) 146/82 -- -- 65 18 97 % -- --   04/01/21 0030 (!) 143/86 -- -- 65 18 96 % -- --   04/01/21 0017 (!) 145/90 97.6  F (36.4  C) Oral 65 16 95 % 1.854 m (6' 1\") 78.5 kg (173 lb)       Physical Exam  General: Appears well-developed and well-nourished.   Head: abrasion to posterior scalp  Mouth/Throat: Oropharynx is clear and moist.   Eyes: Conjunctivae are normal. Pupils are equal, round, and reactive to light.   Neck: Normal range of motion. No tenderness  CV: Normal rate and regular rhythm.    Resp: Effort normal and breath sounds normal. No respiratory distress.   GI: Soft. There is no tenderness.  No rebound or guarding.  Normal bowel sounds.    MSK: +tenderness and swelling to right elbow.  +lower lumbar tenderness.  No tenderness to remainder of extremities.  Neuro: The patient is alert and oriented to person, place, and time.  PERRLA, EOMI, strength in upper/lower extremities normal and symmetrical. Sensation normal. Speech " normal.  GCS 15  Skin: Skin is warm and dry. Superficial skin tear and abrasion to posterior right hand.   Psych: normal mood and affect. behavior is normal.       Emergency Department Course     ECG:  ECG taken at 0021, ECG read at 0151  Atrial- sensed ventricular paced rhythm  With prolonged AV conduction   Abnormal ECG  Rate 65 bpm. KS interval 314 ms. QRS duration 172 ms. QT/QTc 454/472 ms. P-R-T axes 139 143 113.    Imaging:  XR Pelvis w Hip Right 1 View  IMPRESSION: No fracture or dislocation. 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 Moderate degenerative disc changes in the spine.  Reading per radiology    CTA Head Neck with Contrast  IMPRESSION:     HEAD CTA:   1.  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 There is some retrograde filling of the distal right vertebral artery.  2.  Otherwise unremarkable. No evidence for aneurysm.    NECK CTA:  1.  The nondominant right vertebral artery is only intermittently seen consistent with age-indeterminate multifocal stenosis or occlusion.  2.  Extensive tortuosity of the internal carotid arteries bilaterally with some luminal irregularity consistent with a previously described fibromuscular dysplasia.  3.  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 One of these is described on the conventional angiogram from 01/12/2011.      Reading per radiology    Lumbar spine CT w/o contrast  IMPRESSION:  1.  No acute fracture or subluxation.  2.  Moderate to severe right neural foraminal narrowing at L4-L5.  Reading per radiology    CT Cervical Spine w/o Contrast  IMPRESSION:  HEAD CT:  1.  Acute right parafalcine subdural hematoma measuring up to 4 mm in thickness. This extends along the right tentorial leaflet where it measures up to 2 mm in thickness.  2.  Small volume subarachnoid hemorrhage in the right paraclinoid area, extending into the prepontine cistern, along the interhemispheric  fissure and into the left sylvian fissure.  3.  Trace blood products in the occipital horns.  4.  Age-related changes with multifocal areas of chronic infarction as detailed above.  5.  Posterior left parietal scalp hematoma. No fracture.    CERVICAL SPINE CT:  1.  No CT evidence for acute fracture or post traumatic subluxation.  2.  Moderate multilevel neural foraminal narrowing as detailed above.  Exam discussed with Dr. Ness at 2:38 AM.  Reading per radiology    Head CT w/o contrast  IMPRESSION:  HEAD CT:  1.  Acute right parafalcine subdural hematoma measuring up to 4 mm in thickness. This extends along the right tentorial leaflet where it measures up to 2 mm in thickness.  2.  Small volume subarachnoid hemorrhage in the right paraclinoid area, extending into the prepontine cistern, along the interhemispheric fissure and into the left sylvian fissure.  3.  Trace blood products in the occipital horns.  4.  Age-related changes with multifocal areas of chronic infarction as detailed above.  5.  Posterior left parietal scalp hematoma. No fracture.    CERVICAL SPINE CT:  1.  No CT evidence for acute fracture or post traumatic subluxation.  2.  Moderate multilevel neural foraminal narrowing as detailed above.  Exam discussed with Dr. Ness at 2:38 AM.  Reading per radiology    Elbow XR, G/E 3 views, right  IMPRESSION: There is a mildly displaced fracture of the olecranon process of the proximal ulna. There is a moderate resultant elbow joint effusion consistent with a hemarthrosis. No dislocation.  Reading per radiology    CT Head w/o Contrast    (Results Pending)    Laboratory:  CBC: WBC 9.3 , HGB 13.2 (L) ,    CMP:  (L), GFR 52 (L), Calcium 8.0 (L), Albumin  3.0 (L) Protein Total  6.5 (L),  AST 47 (L) Creatinine 1.30 (H)  o/w WNL  Troponin: (Collected 0029) <0.015   INR: 2.81 (H)   Asymptomatic COVID19 Virus PCR by nasopharyngeal swab Negative      Procedures       Laceration Repair         LACERATION:  A superficial clean 1 cm laceration.      LOCATION:  Posterior right hand      FUNCTION:  Distally sensation, circulation, motor and tendon function are intact.      PREPARATION:  Irrigation with Shur Clens      DEBRIDEMENT:  no debridement      CLOSURE:  Wound was closed with steristrips         Posterior long arm Splint Placement     Posterior Long arm splint was applied with my assistance and after placement I checked and adjusted the fit to ensure proper positioning. Patient was more comfortable with splint in place. Sensation and circulation are intact after splint placement.    Emergency Department Course:    Reviewed:  I reviewed nursing notes, vitals, past medical history and care everywhere    Assessments:  0147 I obtained history and examined the patient as noted above.   0300 I rechecked the patient and explained findings.   0412 I returned to recheck the patient and explain findings.  0530 I returned to recheck the patient    Consults:   0604: I spoke with Dr. Mireles of the Hospitalist service from [facility] regarding patient's presentation, findings, and plan of care.    Interventions:  0122 Tylenol 975mg Oral   0359 Kcentra 2,264 units IV   0504 Dilaudid 0.5mg IV    0510 Aqua-mephyton 10mg IV     Disposition:  The patient was admitted to the hospital under the care of Dr. Mireles .     Impression & Plan       Medical Decision Making:  Tyrel Rene is a 75-year-old gentleman who presents after a fall down the stairs.  His wife questions whether he may have had a very brief loss of consciousness.  He was able to stand and ambulate with the help of EMS afterwards.  My evaluation did have an abrasion to the posterior scalp along with some tenderness to the right elbow and skin tear to the right hand.  CT scan of the head was obtained and this did show signs of subdural and subarachnoid.  CTA did not show any signs of an aneurysmal bleed.  Patient does have a history of  episodes of V. tach apparently so I interrogate the pacemaker.  While there was a couple of episodes of 3 or 4 beat V. tach this seems to be earlier in the evening and when the patient had symptoms is likely not related to the fall.  Patient does report chronic dizziness and difficulty with balance.  I did speak with neurosurgery who agreed with admitting patient to the neuro floor.  Patient was given reversal of his Coumadin he remained stable through his time in the ER.  Skin tears to the hands were cleaned and dressed with Steri-Strips the patient was placed in a splint to the right arm.    Covid-19  Tyrel Rene was evaluated during a global COVID-19 pandemic, which necessitated consideration that the patient might be at risk for infection with the SARS-CoV-2 virus that causes COVID-19.   Applicable protocols for evaluation were followed during the patient's care.   COVID-19 was considered as part of the patient's evaluation. The plan for testing is:  a test was obtained during this visit.    Diagnosis:    ICD-10-CM    1. Subdural hematoma (H)  S06.5X9A Asymptomatic SARS-CoV-2 COVID-19 Virus (Coronavirus) by PCR     CANCELED: INR   2. Subarachnoid hemorrhage (H)  I60.9    3. Fall on stairs, initial encounter  W10.9XXA    4. Closed fracture of olecranon process of right ulna, initial encounter  S52.021A        Scribe Disclosure:  JOEL Joe Borrero, am serving as a scribe at 1:37 AM on 4/1/2021 to document services personally performed by Dejon Ness MD based on my observations and the provider's statements to me.          Dejon Ness MD  04/01/21 0656

## 2021-04-01 NOTE — PLAN OF CARE
Pt arrived from ED via cart at 0620. A&Ox4. Neuros: RUE limited mobility due to elbow fx and RLE pain from R hip/back pain due to fall. VSS. Tele. Settled in bed. Denies pain at this time. Plan for repeat head CT at noon today. Continue to monitor.

## 2021-04-01 NOTE — PROGRESS NOTES
Contacted regarding ICH on head CT after fall down stairs in 74 yo male anti-coagulated on Coumadin and Plavix.  Neuro intact per ER.    Head CT:  IMPRESSION:  HEAD CT:  1.  Acute right parafalcine subdural hematoma measuring up to 4 mm in thickness. This extends along the right tentorial leaflet where it measures up to 2 mm in thickness.  2.  Small volume subarachnoid hemorrhage in the right paraclinoid area, extending into the prepontine cistern, along the interhemispheric fissure and into the left sylvian fissure.  3.  Trace blood products in the occipital horns.  4.  Age-related changes with multifocal areas of chronic infarction as detailed above.  5.  Posterior left parietal scalp hematoma. No fracture.    RECOMMENDATIONS:  CTA to be sure no aneurysm.  Reverse coagulopathy with Kcentra and Vit K.  Admit to neuro floor.  Every 2 hour neuro checks until follow up head CT.  Repeat head CT this afternoon (ordered for noon)  BP less than 150.    CRISS Delgado  [facility] Neurosurgery  [address] [address]    Tel [phone]  Pager [phone]

## 2021-04-01 NOTE — CONSULTS
[facility]    Orthopedic Consultation    Tyrel Rene MRN# [id number]   Age: 75 year old YOB: 1945     Date of Admission: 4/1/2021    Reason for consult: Right olecranon fracture       Requesting physician: Ana Gaines       Level of consult: Consult, follow and place orders           Assessment and Plan:   Assessment:   Right olecranon fracture        Plan:   Non-operative management recommended   Keep posterior splint in place  Sling for comfort  NWB right UE  Elevate as able  Follow-up with Dr Morrison in 2 weeks at [facility] for recheck.  Call [phone] for appt and questions.             Chief Complaint:   Right arm pain          History of Present Illness:   This patient is a 75 year old male who presented to the ED following a fall.    Patient reports getting up to go to bed when he became dizzy on the stairs and lost his balance. He recalls reaching for the side rail on the stairs but does not recall events following until he was in the ambulance. He reports frequently feeling dizzy which he attributes to his medications.  He was found to have a right olecranon process fracture which was splinted in the ED. He has a scalp laceration and right hand laceration which were sutured by ED provider.           Past Medical History:     Past Medical History:   Diagnosis Date     Atrial fibrillation (H)     s/p Cardioversion 3/14/2013     Atrial flutter (H)     S/p Aflutter ablation 1/11/2011     CAD (coronary artery disease)     CABG x3 10/2012- LIMA to distal LAD, SVG to OM1 & OM3; cath 10/2012- PTCA to second diagonal and mid LAD, BMS to mid LAD     Cardiogenic shock (H)      Cardiomyopathy (H)      ED (erectile dysfunction)      Hypertension      Neuropathy      SVT (supraventricular tachycardia) (H)     S/p dual chamber ICD 10/11/12- upgraded to BIV ICD 6/2013     Syncope              Past Surgical History:     Past Surgical History:   Procedure Laterality Date      BYPASS GRAFT ARTERY CORONARY  10/2/2012    Procedure: BYPASS GRAFT ARTERY CORONARY;  Coronary Artery Bypass Graft x3 (LAD, Diag, OM) with Endovein [place] (On-Pump);  Surgeon: Yeyo Lyman MD;  Location:  OR     CARDIOVERSION  3/14/2013     CORONARY ANGIOGRAPHY ADULT ORDER  10/2/12     CORONARY ARTERY BYPASS  10/2/12    LIMA to LAD, SVG to OM1 and OM3     CV ANGIOGRAM CORONARY GRAFT N/A 2020    Procedure: Angiogram Coronary Graft;  Surgeon: Erin Weaver MD;  Location:  HEART CARDIAC CATH LAB     CV HEART CATHETERIZATION WITH POSSIBLE INTERVENTION N/A 2020    Procedure: Heart Catheterization with Possible Intervention;  Surgeon: Erin Weaver MD;  Location:  HEART CARDIAC CATH LAB     CV PCI ATHERECTOMY ORBITAL N/A 2020    Procedure: Percutaneous Coronary Intervention Atherectomy Rotational;  Surgeon: Erin Weaver MD;  Location:  HEART CARDIAC CATH LAB     EP ABLATION VT N/A 2019    Procedure: EP Ablation VT;  Surgeon: Suellen Costello MD;  Location:  HEART CARDIAC CATH LAB     EP COMPREHENSIVE EP STUDY N/A 2019    Procedure: EP Comprehensive EP Study;  Surgeon: Suellen Costello MD;  Location:  HEART CARDIAC CATH LAB     H ABLATION ATRIAL FLUTTER  2011     HAND SURGERY      table saw injury right hand     HEART CATH, ANGIOPLASTY  10/2/12    PTCA to second diagonal and mid LAD, BMS to mid LAD     HERNIA REPAIR       RELOCATE GENERATOR ICD/PACEMAKER               Social History:     Social History     Tobacco Use     Smoking status: Former Smoker     Packs/day: 1.00     Years: 14.00     Pack years: 14.00     Types: Cigarettes     Start date:      Quit date: 7/10/1972     Years since quittin.7     Smokeless tobacco: Never Used   Substance Use Topics     Alcohol use: No             Family History:     Family History   Problem Relation Age of Onset     Alcohol/Drug Father      Heart Disease Father 71      Alzheimer Disease Sister      Alcohol/Drug Sister      Alcohol/Drug Sister      Gastrointestinal Disease Sister      Obesity Sister      Hypertension Sister      Heart Disease Sister      Hypertension Sister      Heart Disease Daughter         afib     Arrhythmia Daughter      Multiple Sclerosis Daughter      Heart Disease Daughter         afib     Arrhythmia Daughter      Cancer Daughter         lymphoma     Aneurysm Mother              Immunizations:     VACCINE/DOSE   Diptheria   DPT   DTAP   HBIG   Hepatitis A   Hepatitis B   HIB   Influenza   Measles   Meningococcal   MMR   Mumps   Pneumococcal   Polio   Rubella   Small Pox   TDAP   Varicella   Zoster             Allergies:     Allergies   Allergen Reactions     Aspirin      Other reaction(s): Aspirin Contraindication (for reporting)  Cardiologist recommended no aspirin as he is on Pradaxa     Nystatin Other (See Comments)     Make his mouth numb & swelling             Medications:     Current Facility-Administered Medications   Medication     acetaminophen (TYLENOL) tablet 975 mg     amiodarone (PACERONE) tablet 200 mg     carvedilol (COREG) tablet 6.25 mg     digoxin (LANOXIN) tablet 125 mcg     famotidine (PEPCID) tablet 20 mg     HOLD: warfarin (COUMADIN) therapy     hydrALAZINE (APRESOLINE) injection 10-20 mg     labetalol (NORMODYNE/TRANDATE) injection 10-40 mg     lidocaine (LMX4) cream     lidocaine 1 % 0.1-1 mL     magic mouthwash suspension (diphenhydramine, lidocaine, aluminum-magnesium & simethicone)     Medication Instruction     mexiletine (MEXITIL) capsule 150 mg     multivitamin  with lutein (OCUVITE WITH LTEIN) per capsule 1 capsule     naloxone (NARCAN) injection 0.2 mg    Or     naloxone (NARCAN) injection 0.4 mg    Or     naloxone (NARCAN) injection 0.2 mg    Or     naloxone (NARCAN) injection 0.4 mg     ondansetron (ZOFRAN-ODT) ODT tab 4 mg    Or     ondansetron (ZOFRAN) injection 4 mg     oxyCODONE IR (ROXICODONE) half-tab 2.5-5 mg      "polyethylene glycol (MIRALAX) Packet 17 g     rosuvastatin (CRESTOR) tablet 20 mg     sacubitril-valsartan (ENTRESTO) 49-51 MG per tablet 1 tablet     senna-docusate (SENOKOT-S/PERICOLACE) 8.6-50 MG per tablet 1 tablet    Or     senna-docusate (SENOKOT-S/PERICOLACE) 8.6-50 MG per tablet 2 tablet     sodium chloride (PF) 0.9% PF flush 3 mL     sodium chloride (PF) 0.9% PF flush 3 mL     sodium chloride (PF) 0.9% PF flush 3 mL     Vitamin D3 (CHOLECALCIFEROL) tablet 1,000 Units             Review of Systems:   ROS:  10 point ROS neg other than the symptoms noted above in the HPI.          Physical Exam:   All vitals have been reviewed  Patient Vitals for the past 24 hrs:   BP Temp Temp src Pulse Resp SpO2 Height Weight   04/01/21 1115 134/70 97.3  F (36.3  C) Oral 65 17 97 % -- --   04/01/21 1100 126/82 -- -- 65 -- -- -- --   04/01/21 0733 132/69 97.9  F (36.6  C) Oral 65 17 95 % -- --   04/01/21 0638 129/69 98.1  F (36.7  C) Oral 65 18 95 % -- --   04/01/21 0610 105/67 -- -- 62 18 94 % -- --   04/01/21 0535 105/67 -- -- 65 -- -- -- --   04/01/21 0530 115/68 -- -- 65 -- -- -- --   04/01/21 0300 107/60 -- -- 66 -- 93 % -- --   04/01/21 0130 (!) 143/84 -- -- 65 18 97 % -- --   04/01/21 0100 (!) 146/82 -- -- 65 18 97 % -- --   04/01/21 0030 (!) 143/86 -- -- 65 18 96 % -- --   04/01/21 0017 (!) 145/90 97.6  F (36.4  C) Oral 65 16 95 % 1.854 m (6' 1\") 78.5 kg (173 lb)       Intake/Output Summary (Last 24 hours) at 4/1/2021 1419  Last data filed at 4/1/2021 0600  Gross per 24 hour   Intake --   Output 200 ml   Net -200 ml         Physical Exam   Temp: 97.3  F (36.3  C) Temp src: Oral BP: 134/70 Pulse: 65   Resp: 17 SpO2: 97 % O2 Device: None (Room air)    Vital Signs with Ranges  Temp:  [97.3  F (36.3  C)-98.1  F (36.7  C)] 97.3  F (36.3  C)  Pulse:  [62-66] 65  Resp:  [16-18] 17  BP: (105-146)/(60-90) 134/70  SpO2:  [93 %-97 %] 97 %  173 lbs 0 oz    Constitutional: Pleasant, alert, appropriate, following commands.  HEENT: " Head atraumatic normocephalic.   Respiratory: Unlabored breathing no audible wheeze  Cardiovascular: Regular rate and rhythm per pulses   GI: Abdomen soft nontender nondistended.  Lymph/Hematologic: No lymphadenopathy in areas examined  Genitourinary:  No bell  Skin: No rashes, no cyanosis, no edema.  Musculoskeletal: Right upper extremity in long arm splint.  Able to flex/extend fingers.  Full sensation to light touch.  Brisk cap refill.    Neurologic: normal without focal findings, mental status, speech normal  Neuropsychiatric: appears stable           Data:   All laboratory data reviewed  Results for orders placed or performed during the hospital encounter of 04/01/21   Head CT w/o contrast     Status: None    Narrative    EXAM: CT HEAD W/O CONTRAST, CT CERVICAL SPINE W/O CONTRAST  LOCATION: [facility]  DATE/TIME: 4/1/2021 1:56 AM    INDICATION: Traumatic injury. Pain.  COMPARISON: None.  TECHNIQUE:   1) Routine CT Head without IV contrast. Multiplanar reformats. Dose reduction techniques were used.  2) Routine CT Cervical Spine without IV contrast. Multiplanar reformats. Dose reduction techniques were used.    FINDINGS:   HEAD CT:   INTRACRANIAL CONTENTS: There is an acute right parafalcine subdural hematoma measuring up to 4 mm at the level of the parietal lobes. This subdural hematoma extends along the right tentorial leaflet where it measures approximately 2 mm. Small volume   subarachnoid hemorrhage in the right paraclinoid region and interpeduncular cistern as well as the prepontine cistern. Small volume subarachnoid hemorrhage extending into the left sylvian fissure. No parenchymal hemorrhage is identified. Trace blood   products in the occipital horns. No CT evidence of acute infarct. Mild to moderate presumed chronic small vessel ischemic changes. Chronic infarcts in the left middle frontal gyrus, left frontal operculum and left parietal lobe. Chronic infarcts in the   right middle  frontal gyrus and left cerebellar hemisphere. Mild to moderate generalized volume loss. No hydrocephalus.     VISUALIZED ORBITS/SINUSES/MASTOIDS: No intraorbital abnormality. Mild mucosal thickening scattered about the paranasal sinuses. No middle ear or mastoid effusion.    BONES/SOFT TISSUES: Left parietal scalp hematoma. No fracture.    CERVICAL SPINE CT:   VERTEBRA: Normal vertebral body heights and alignment. No fracture or posttraumatic subluxation.     CANAL/FORAMINA: No significant central canal stenosis. Moderate bilateral neural foraminal narrowing at C3-C4, C5-C6 and C6-C7.    PARASPINAL: No extraspinal abnormality. Visualized lung fields are clear.      Impression    IMPRESSION:  HEAD CT:  1.  Acute right parafalcine subdural hematoma measuring up to 4 mm in thickness. This extends along the right tentorial leaflet where it measures up to 2 mm in thickness.  2.  Small volume subarachnoid hemorrhage in the right paraclinoid area, extending into the prepontine cistern, along the interhemispheric fissure and into the left sylvian fissure.  3.  Trace blood products in the occipital horns.  4.  Age-related changes with multifocal areas of chronic infarction as detailed above.  5.  Posterior left parietal scalp hematoma. No fracture.    CERVICAL SPINE CT:  1.  No CT evidence for acute fracture or post traumatic subluxation.  2.  Moderate multilevel neural foraminal narrowing as detailed above.    Exam discussed with Dr. Ness at 2:38 AM.   CT Cervical Spine w/o Contrast     Status: None    Narrative    EXAM: CT HEAD W/O CONTRAST, CT CERVICAL SPINE W/O CONTRAST  LOCATION: [facility]  DATE/TIME: 4/1/2021 1:56 AM    INDICATION: Traumatic injury. Pain.  COMPARISON: None.  TECHNIQUE:   1) Routine CT Head without IV contrast. Multiplanar reformats. Dose reduction techniques were used.  2) Routine CT Cervical Spine without IV contrast. Multiplanar reformats. Dose reduction techniques were  used.    FINDINGS:   HEAD CT:   INTRACRANIAL CONTENTS: There is an acute right parafalcine subdural hematoma measuring up to 4 mm at the level of the parietal lobes. This subdural hematoma extends along the right tentorial leaflet where it measures approximately 2 mm. Small volume   subarachnoid hemorrhage in the right paraclinoid region and interpeduncular cistern as well as the prepontine cistern. Small volume subarachnoid hemorrhage extending into the left sylvian fissure. No parenchymal hemorrhage is identified. Trace blood   products in the occipital horns. No CT evidence of acute infarct. Mild to moderate presumed chronic small vessel ischemic changes. Chronic infarcts in the left middle frontal gyrus, left frontal operculum and left parietal lobe. Chronic infarcts in the   right middle frontal gyrus and left cerebellar hemisphere. Mild to moderate generalized volume loss. No hydrocephalus.     VISUALIZED ORBITS/SINUSES/MASTOIDS: No intraorbital abnormality. Mild mucosal thickening scattered about the paranasal sinuses. No middle ear or mastoid effusion.    BONES/SOFT TISSUES: Left parietal scalp hematoma. No fracture.    CERVICAL SPINE CT:   VERTEBRA: Normal vertebral body heights and alignment. No fracture or posttraumatic subluxation.     CANAL/FORAMINA: No significant central canal stenosis. Moderate bilateral neural foraminal narrowing at C3-C4, C5-C6 and C6-C7.    PARASPINAL: No extraspinal abnormality. Visualized lung fields are clear.      Impression    IMPRESSION:  HEAD CT:  1.  Acute right parafalcine subdural hematoma measuring up to 4 mm in thickness. This extends along the right tentorial leaflet where it measures up to 2 mm in thickness.  2.  Small volume subarachnoid hemorrhage in the right paraclinoid area, extending into the prepontine cistern, along the interhemispheric fissure and into the left sylvian fissure.  3.  Trace blood products in the occipital horns.  4.  Age-related changes with  multifocal areas of chronic infarction as detailed above.  5.  Posterior left parietal scalp hematoma. No fracture.    CERVICAL SPINE CT:  1.  No CT evidence for acute fracture or post traumatic subluxation.  2.  Moderate multilevel neural foraminal narrowing as detailed above.    Exam discussed with Dr. Ness at 2:38 AM.   Lumbar spine CT w/o contrast     Status: None    Narrative    EXAM: CT LUMBAR SPINE W/O CONTRAST  LOCATION: [facility]  DATE/TIME: 4/1/2021 1:56 AM    INDICATION: Traumatic injury. Pain.  COMPARISON: None.  TECHNIQUE: Routine CT Lumbar Spine without IV contrast. Multiplanar reformats. Dose reduction techniques were used.     FINDINGS:  VERTEBRA: Vertebral body height is normal. There is 3 mm anterolisthesis of L5 on S1. No fracture or posttraumatic subluxation.     CANAL/FORAMINA: Moderate to severe right neural foraminal narrowing at L4-L5. No high-grade central canal stenosis.    PARASPINAL: No extraspinal abnormality.      Impression    IMPRESSION:  1.  No acute fracture or subluxation.  2.  Moderate to severe right neural foraminal narrowing at L4-L5.   Elbow XR, G/E 3 views, right     Status: None    Narrative    EXAM: XR ELBOW RT G/E 3 VW  LOCATION: [facility]  DATE/TIME: 4/1/2021 2:09 AM    INDICATION: Fall, pain.  COMPARISON: None.      Impression    IMPRESSION: There is a mildly displaced fracture of the olecranon process of the proximal ulna. There is a moderate resultant elbow joint effusion consistent with a hemarthrosis. No dislocation.   CTA Head Neck with Contrast     Status: None    Narrative    EXAM: CTA  HEAD NECK WITH CONTRAST  LOCATION: [facility]  DATE/TIME: 4/1/2021 3:37 AM    INDICATION: Intracranial hemorrhage. Evaluate for aneurysm.  COMPARISON: Head CT and cervical spine CT 04/01/2021, CTA head and neck 12/21/2014, report from angiogram dated 01/12/2011.  CONTRAST: 70 mL [id number].  TECHNIQUE: Head and neck CT angiogram  with IV contrast. Axial helical CT images of the head and neck vessels obtained during the arterial phase of intravenous contrast administration. Axial 2D reconstructed images and multiplanar 3D MIP reconstructed   images of the head and neck vessels were performed by the technologist. Dose reduction techniques were used. All stenosis measurements made according to NASCET criteria unless otherwise specified.    FINDINGS:     HEAD CTA:  ANTERIOR CIRCULATION: No stenosis/occlusion, aneurysm, or high flow vascular malformation. Standard [place] anatomy.    POSTERIOR CIRCULATION: Nondominant right vertebral artery is occluded as it enters the dura. There is some retrograde filling of the distal right vertebral artery. Normal left vertebral artery.     DURAL VENOUS SINUSES: Expected enhancement of the major dural venous sinuses.    NECK CTA:  RIGHT CAROTID: Atherosclerotic plaque results in less than 50% stenosis in the right ICA. No dissection. Mild luminal irregularity.    LEFT CAROTID: No measurable stenosis or dissection. There is a tiny medially directed pseudoaneurysm measuring 2 mm (series 10 image 299) and an additional 2 mm laterally directed aneurysm (series 10 image 325. Luminal irregularity of the ICA suggests   fibromuscular dysplasia.    VERTEBRAL ARTERIES: The nondominant right vertebral artery is only intermittently seen consistent with age-indeterminate stenosis and multifocal occlusion. Dominant left vertebral artery is widely patent into the head.    AORTIC ARCH: Classic aortic arch anatomy with no significant stenosis at the origin of the great vessels.    NONVASCULAR STRUCTURES: Unremarkable.      Impression    IMPRESSION:     HEAD CTA:   1.  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 There is some retrograde filling of the distal right vertebral artery.  2.  Otherwise unremarkable. No evidence for aneurysm.    NECK CTA:  1.  The nondominant right vertebral  artery is only intermittently seen consistent with age-indeterminate multifocal stenosis or occlusion.  2.  Extensive tortuosity of the internal carotid arteries bilaterally with some luminal irregularity consistent with a previously described fibromuscular dysplasia.  3.  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 One of these is described on the conventional angiogram from 01/12/2011.               XR Pelvis w Hip Right 1 View     Status: None    Narrative    EXAM: XR PELVIS AND HIP RIGHT 1 VIEW  LOCATION: [facility]  DATE/TIME: 4/1/2021 3:40 AM    INDICATION: Fall  COMPARISON: None.      Impression    IMPRESSION: No fracture or dislocation. 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 Moderate degenerative disc changes in the spine.   CT Head w/o Contrast     Status: None (Preliminary result)    Narrative    CT SCAN OF THE HEAD WITHOUT CONTRAST April 1, 2021 12:37 PM     HISTORY: Subarachnoid hemorrhage (SAH), follow up. Subdural hematoma.    TECHNIQUE: Axial images of the head and coronal reformations without  IV contrast material. Radiation dose for this scan was reduced using  automated exposure control, adjustment of the mA and/or kV according  to patient size, or iterative reconstruction technique.    COMPARISON: CT head dated 4/1/2021.    FINDINGS: Mildly increased density and extent of the previously  demonstrated right parafalcine subdural hematoma extending along the  superior aspect of the right tentorial leaflet measuring up to  approximately 4-5 mm. Interval increase in scattered subarachnoid  hemorrhage in the right cingulate sulcus, left sylvian fissure, and in  the basal cisterns, including in the right parasellar region,  prepontine cistern, right ambient cistern, and extending posteriorly  into the quadrigeminal cistern and the left ambient cistern. Now  apparent is a hemorrhagic parenchymal contusion involving the  paramedian  aspect of the anterior inferior left frontal lobe (series 3  image 12) with parenchymal hematoma measuring 14 mm x 10 mm x 12 mm.  Newly apparent bilateral acute cerebral convexity subdural hematomas  measuring up to 5-6 mm in thickness on the left and 3-4 mm in  thickness on the right (series 7 image 22-25). There is no significant  midline shift/herniation.    Chronic areas of encephalomalacia involving the right and left frontal  lobe, left parietal lobe, and left occipital lobe are unchanged from  prior. There is mild generalized brain parenchymal volume loss and  mild patchy hypoattenuation in the cerebral white matter likely  representing chronic small vessel ischemic disease. The ventricles are  normal in size and configuration without evidence for hydrocephalus.  Scattered intracranial atherosclerotic calcifications are again seen.    The bilateral orbits appear within normal limits. The visualized  paranasal sinuses and mastoids are clear. The visualized aspects of  the calvarium and skull base appear grossly intact.      Impression    IMPRESSION:  1. Interval increase in scattered small to moderate volume  subarachnoid hemorrhage, as described.  2. Newly apparent small left paramedian frontal lobe hemorrhagic  parenchymal contusion.  3. New small hypodense acute bilateral cerebral convexity subdural  hematomas.  4. Mildly increased density and extent of the previously seen thin  right parafalcine subdural hematoma.  5. Unchanged multifocal areas of chronic encephalomalacia in the  cerebral hemispheres.  6. No significant midline shift/herniation.    The findings were discussed with Kelly ALTMAN, the patient's nurse, by  myself at approximately 1:15 PM on 4/1/2021. She agreed to promptly  relay the findings to the neurosurgery service.     Comprehensive metabolic panel     Status: Abnormal   Result Value Ref Range    Sodium 132 (L) 133 - 144 mmol/L    Potassium 4.3 3.4 - 5.3 mmol/L    Chloride 100 94 - 109 mmol/L     Carbon Dioxide 27 20 - 32 mmol/L    Anion Gap 5 3 - 14 mmol/L    Glucose 99 70 - 99 mg/dL    Urea Nitrogen 19 7 - 30 mg/dL    Creatinine 1.30 (H) 0.66 - 1.25 mg/dL    GFR Estimate 52 (L) >60 mL/min/[1.73_m2]    GFR Estimate If Black 61 >60 mL/min/[1.73_m2]    Calcium 8.0 (L) 8.5 - 10.1 mg/dL    Bilirubin Total 0.4 0.2 - 1.3 mg/dL    Albumin 3.0 (L) 3.4 - 5.0 g/dL    Protein Total 6.5 (L) 6.8 - 8.8 g/dL    Alkaline Phosphatase 78 40 - 150 U/L    ALT 50 0 - 70 U/L    AST 47 (H) 0 - 45 U/L   Troponin I     Status: None   Result Value Ref Range    Troponin I ES <0.015 0.000 - 0.045 ug/L   CBC with platelets differential     Status: Abnormal   Result Value Ref Range    WBC 9.3 4.0 - 11.0 10e9/L    RBC Count 4.45 4.4 - 5.9 10e12/L    Hemoglobin 13.2 (L) 13.3 - 17.7 g/dL    Hematocrit 40.3 40.0 - 53.0 %    MCV 91 78 - 100 fl    MCH 29.7 26.5 - 33.0 pg    MCHC 32.8 31.5 - 36.5 g/dL    RDW 14.5 10.0 - 15.0 %    Platelet Count 184 150 - 450 10e9/L    Diff Method Automated Method     % Neutrophils 64.0 %    % Lymphocytes 23.0 %    % Monocytes 10.2 %    % Eosinophils 1.2 %    % Basophils 0.6 %    % Immature Granulocytes 1.0 %    Nucleated RBCs 0 0 /100    Absolute Neutrophil 5.9 1.6 - 8.3 10e9/L    Absolute Lymphocytes 2.1 0.8 - 5.3 10e9/L    Absolute Monocytes 0.9 0.0 - 1.3 10e9/L    Absolute Eosinophils 0.1 0.0 - 0.7 10e9/L    Absolute Basophils 0.1 0.0 - 0.2 10e9/L    Abs Immature Granulocytes 0.1 0 - 0.4 10e9/L    Absolute Nucleated RBC 0.0    INR     Status: Abnormal   Result Value Ref Range    INR 2.81 (H) 0.86 - 1.14   Asymptomatic SARS-CoV-2 COVID-19 Virus (Coronavirus) by PCR     Status: None    Specimen: Nasopharyngeal   Result Value Ref Range    SARS-CoV-2 Virus Specimen Source Nasopharyngeal     SARS-CoV-2 PCR Result NEGATIVE     SARS-CoV-2 PCR Comment (Note)    Troponin I     Status: None   Result Value Ref Range    Troponin I ES 0.016 0.000 - 0.045 ug/L   INR     Status: Abnormal   Result Value Ref Range    INR  1.26 (H) 0.86 - 1.14   EKG 12 lead     Status: None   Result Value Ref Range    Interpretation ECG Click View Image link to view waveform and result    Neurosurgery IP Consult: Patient to be seen: Routine within 24 hrs; subdural and subarachnoid s/p fall, neurosurgery awake; Consultant may enter orders: Yes; Requesting provider? Hospitalist (if different from attending physician); Name: Ana Reese.     Status: None ()    Renetta Holt PA-C     4/1/2021 12:03 PM  [facility]    Neurosurgery Consultation     Date of Admission:  4/1/2021  Date of Consult (When I saw the patient): 04/01/21    Assessment & Plan   Tyrel Rene is a 75 year old male who was admitted on 4/1/2021.   I was asked to see the patient for acute right parafalcine SDH   and small SAH.    Active Problems:    Subarachnoid hemorrhage (H) and right subdural hematoma    Plan:   1. Presently no neurosurgical intervention indicated   2. Repeat head CT today for further evaluation if stable okay to   advance diet    3.INR elevated and treated by hospitalist with Vitamin K and   Kcentra pending repeat labs           Closed fracture of olecranon process of right ulna, initial   encounter    Plan: orthopedics consulted       I have discussed the following assessment and plan with Dr. Park   who is in agreement with initial plan and will follow up with   further consultation recommendations.    Renetta Rincon PA-C  [facility] Neurosurgery  [address] [address]  Tel [phone]          Code Status    Special Code    Reason for Consult   Reason for consult: I was asked by Dr. Gaines to evaluate this   patient for acute SDH and SAH s/p fall.    Primary Care Physician   Dejon Downs    Chief Complaint   S/p fall and LOC     History is obtained from the patient    History of Present Illness   Tyrel Rene is a 75 year old male who presents with  noted right   SDH and traumatic SAH after a fall on his stairs. He states that   he was walking down the stairs when he fell hitting the back of   his head and right arm. He states that he became dizzy and lost   his balance. He is unsure of the rest of the events as he   believes he lost consciousness. The next thing he recalls was   being in the ambulance on the way to the ER. He presently states   that he is doing well.   He states that he lives at home with his wife and that she must   of called EMS. He is unsure of how long he was unconscious for.   He does not note any similar symptoms or episodes in the past. He   presently denies any headaches, nausea, emesis, or visual   changes. He notes pain to the laceration on the back of his head   as well as neck stiffness. He denies any radicular upper or lower   extremity pain, numbness, tingling, or weakness. He denies gait   instability or urinary incontinence.     Past Medical History   I have reviewed this patient's medical history and updated it   with pertinent information if needed.   Past Medical History:   Diagnosis Date     Atrial fibrillation (H)     s/p Cardioversion 3/14/2013     Atrial flutter (H)     S/p Aflutter ablation 1/11/2011     CAD (coronary artery disease)     CABG x3 10/2012- LIMA to distal LAD, SVG to OM1 & OM3; cath   10/2012- PTCA to second diagonal and mid LAD, BMS to mid LAD     Cardiogenic shock (H)      Cardiomyopathy (H)      ED (erectile dysfunction)      Hypertension      Neuropathy      SVT (supraventricular tachycardia) (H)     S/p dual chamber ICD 10/11/12- upgraded to BIV ICD 6/2013     Syncope        Past Surgical History   I have reviewed this patient's surgical history and updated it   with pertinent information if needed.  Past Surgical History:   Procedure Laterality Date     BYPASS GRAFT ARTERY CORONARY  10/2/2012    Procedure: BYPASS GRAFT ARTERY CORONARY;  Coronary Artery Bypass   Graft x3 (LAD, Diag, OM) with Endovein  [place] (On-Pump);    Surgeon: Yeyo Lyman MD;  Location:  OR     CARDIOVERSION  3/14/2013     CORONARY ANGIOGRAPHY ADULT ORDER  10/2/12     CORONARY ARTERY BYPASS  10/2/12    LIMA to LAD, SVG to OM1 and OM3     CV ANGIOGRAM CORONARY GRAFT N/A 8/11/2020    Procedure: Angiogram Coronary Graft;  Surgeon: Erin Weaver MD;  Location:  HEART CARDIAC CATH LAB     CV HEART CATHETERIZATION WITH POSSIBLE INTERVENTION N/A   8/11/2020    Procedure: Heart Catheterization with Possible Intervention;    Surgeon: Erin Weaver MD;  Location:  HEART CARDIAC   CATH LAB     CV PCI ATHERECTOMY ORBITAL N/A 8/11/2020    Procedure: Percutaneous Coronary Intervention Atherectomy   Rotational;  Surgeon: Erin Weaver MD;  Location:    HEART CARDIAC CATH LAB     EP ABLATION VT N/A 1/2/2019    Procedure: EP Ablation VT;  Surgeon: Suellen Costello MD;  Location:  HEART CARDIAC CATH LAB     EP COMPREHENSIVE EP STUDY N/A 1/2/2019    Procedure: EP Comprehensive EP Study;  Surgeon: Suellen Costello MD;  Location:  HEART CARDIAC CATH LAB     H ABLATION ATRIAL FLUTTER  1/11/2011     HAND SURGERY      table saw injury right hand     HEART CATH, ANGIOPLASTY  10/2/12    PTCA to second diagonal and mid LAD, BMS to mid LAD     HERNIA REPAIR       RELOCATE GENERATOR ICD/PACEMAKER         Prior to Admission Medications   Prior to Admission Medications   Prescriptions Last Dose Informant Patient Reported? Taking?   ASPIRIN NOT PRESCRIBED (INTENTIONAL)  Spouse/Significant Other   Yes No   Sig: continuous prn for other Please choose reason not   prescribed, below   Multiple Vitamins-Minerals (PRESERVISION AREDS 2 PO) 3/31/2021 at   am Spouse/Significant Other Yes Yes   Sig: Take 1 capsule by mouth 2 times daily   Vitamin D, Cholecalciferol, 1000 UNITS TABS 3/31/2021 at am   Spouse/Significant Other Yes Yes   Sig: Take 1,000 mg by mouth daily    acetaminophen (TYLENOL) 500 MG  tablet  at prn Spouse/Significant   Other Yes Yes   Sig: Take 1,000 mg by mouth every 8 hours as needed for mild pain     amiodarone (PACERONE) 200 MG tablet 3/30/2021 at hs   Spouse/Significant Other No Yes   Sig: Take 1 tablet (200 mg) by mouth daily   carvedilol (COREG) 6.25 MG tablet 3/31/2021 at am   Spouse/Significant Other No Yes   Sig: Take 1 tablet (6.25 mg) by mouth 2 times daily (with meals)   clopidogrel (PLAVIX) 75 MG tablet 3/31/2021 at am   Spouse/Significant Other No Yes   Sig: Take 1 tablet (75 mg) by mouth daily   digoxin (LANOXIN) 125 MCG tablet 3/31/2021 at am   Spouse/Significant Other No Yes   Sig: Take 1 tablet (125 mcg) by mouth six times a week Do not   take Sundays   famotidine (PEPCID) 20 MG tablet 3/31/2021 at am   Spouse/Significant Other No Yes   Sig: TAKE 1 TABLET BY MOUTH TWICE A DAY   ipratropium (ATROVENT) 0.03 % nasal spray 3/31/2021 at am   Spouse/Significant Other No Yes   Sig: SPRAY 2 SPRAYS INTO BOTH NOSTRILS EVERY 12 HOURS   mexiletine (MEXITIL) 150 MG capsule 3/31/2021 at Unknown time   Spouse/Significant Other No Yes   Sig: Take 1 capsule (150 mg) by mouth 3 times daily   nitroGLYcerin (NITROSTAT) 0.4 MG sublingual tablet  at prn   Spouse/Significant Other No Yes   Sig: DISSOLVE 1 TABLET UNDER THE TONGUE EVERY 5 MINUTES AS NEEDED   FOR CHEST PAIN   rosuvastatin (CRESTOR) 20 MG tablet 3/30/2021 at hs   Spouse/Significant Other No Yes   Sig: Take 1 tablet (20 mg) by mouth daily   sacubitril-valsartan (ENTRESTO) 49-51 MG per tablet 3/31/2021 at   am Spouse/Significant Other No Yes   Sig: Take 1 tablet by mouth 2 times daily   sildenafil (VIAGRA) 100 MG tablet  at prn Spouse/Significant   Other No Yes   Sig: Take 1 tablet (100 mg) by mouth daily as needed Take 30 min   to 4 hours before intercourse.  Never use with nitroglycerin,   terazosin or doxazosin.   warfarin ANTICOAGULANT (COUMADIN) 1 MG tablet 3/30/2021 at hs   Spouse/Significant Other No Yes   Sig: TAKE 1 & 1/2 TABLETS  (1.5 MG) EVERY DAY OR AS DIRECTED.      Facility-Administered Medications: None     Allergies   Allergies   Allergen Reactions     Aspirin      Other reaction(s): Aspirin Contraindication (for reporting)  Cardiologist recommended no aspirin as he is on Pradaxa     Nystatin Other (See Comments)     Make his mouth numb & swelling       Social History   I have reviewed this patient's social history and updated it with   pertinent information if needed. Tyrel Rene  reports that he   quit smoking about 48 years ago. His smoking use included   cigarettes. He started smoking about 63 years ago. He has a 14.00   pack-year smoking history. He has never used smokeless tobacco.   He reports that he does not drink alcohol or use drugs.    Family History   I have reviewed this patient's family history and updated it with   pertinent information if needed.   Family History   Problem Relation Age of Onset     Alcohol/Drug Father      Heart Disease Father 71     Alzheimer Disease Sister      Alcohol/Drug Sister      Alcohol/Drug Sister      Gastrointestinal Disease Sister      Obesity Sister      Hypertension Sister      Heart Disease Sister      Hypertension Sister      Heart Disease Daughter         afib     Arrhythmia Daughter      Multiple Sclerosis Daughter      Heart Disease Daughter         afib     Arrhythmia Daughter      Cancer Daughter         lymphoma     Aneurysm Mother        Review of Systems   12 point review of systems completed and only positives present   in HPI     Physical Exam   Temp: 97.3  F (36.3  C) Temp src: Oral BP: 134/70 Pulse: 65     Resp: 17 SpO2: 97 % O2 Device: None (Room air)    Vital Signs with Ranges  Temp:  [97.3  F (36.3  C)-98.1  F (36.7  C)] 97.3  F (36.3  C)  Pulse:  [62-66] 65  Resp:  [16-18] 17  BP: (105-146)/(60-90) 134/70  SpO2:  [93 %-97 %] 97 %  173 lbs 0 oz     , Blood pressure 134/70, pulse 65, temperature 97.3  F (36.3    C), temperature source Oral, resp. rate 17, height 1.854 m  "(6'   1\"), weight 78.5 kg (173 lb), SpO2 97 %.  173 lbs 0 oz  HEENT:  Normocephalic, atraumatic.  PERRLA.  EOM s intact.   Neck:  Supple, non-tender, without lymphadenopathy.  Heart:  No peripheral edema  Lungs:  No SOB  Abdomen:  Soft, non-tender, non-distended.  Normal bowel sounds.  Skin:  Warm and dry, good capillary refill.  Extremities:  Good radial and dorsalis pedis pulses bilaterally,   no edema, cyanosis or clubbing.    NEUROLOGICAL EXAMINATION:   Mental status:  Alert and Oriented x 3, speech is fluent.  Cranial nerves:  II-XII intact.   Motor:  Strength is 5/5 throughout the upper and lower   extremities right upper extremity strength testing not completed   secondary to elbow fracture and arm splint in place   Deltoids:  Right: 5/5   Left:  5/5  Biceps:  Right: NT/5   Left:  5/5  Triceps: Right: NT/5   Left:  5/5                       Wrist Extensors: Right: NT/5   Left:  5/5        Wrist Flexors:Right: NT/5   Left:  5/5             Hand : Right: 4-/5   Left:  5/5         Hip Flexor: Right: 5/5   Left:  5/5                 Hip Adductor:Right: 5/5   Left:  5/5               Hip Abductor: Right: 5/5   Left:  5/5              Gastroc Soleus:Right: 5/5   Left:  5/5        Tib/Ant:Right: 5/5   Left:  5/5                        EHL:Right: 5/5   Left:  5/5                     Sensation:  Intact throughout unable to test right upper   extremity except for shoulder and hand secondary to split   placement   Reflexes:  Negative Babinski.  Negative Clonus.    Coordination:  Smooth finger to nose testing.   Negative pronator   drift.  Smooth tandem walking  Gait:  Stable, no difficulty with heel or toe walking.    Cervical examination reveals good range of motion.  No tenderness   to palpation of the cervical spine or paraspinous muscles   bilaterally.     Lumbar examination reveals no tenderness of the spine or   paraspinous muscles.   Negative SI joint, sciatic notch or   greater trochanteric tenderness to " palpation bilaterally.    Straight leg raise is negative bilaterally.       Data     CBC RESULTS:   Recent Labs   Lab Test 04/01/21  0029   WBC 9.3   RBC 4.45   HGB 13.2*   HCT 40.3   MCV 91   MCH 29.7   MCHC 32.8   RDW 14.5        Basic Metabolic Panel:  Lab Results   Component Value Date     04/01/2021      Lab Results   Component Value Date    POTASSIUM 4.3 04/01/2021     Lab Results   Component Value Date    CHLORIDE 100 04/01/2021     Lab Results   Component Value Date    LORI 8.0 04/01/2021     Lab Results   Component Value Date    CO2 27 04/01/2021     Lab Results   Component Value Date    BUN 19 04/01/2021     Lab Results   Component Value Date    CR 1.30 04/01/2021     Lab Results   Component Value Date    GLC 99 04/01/2021     INR:  Lab Results   Component Value Date    INR 2.81 04/01/2021       Imaging:   Ct head, cervical, and lumbar spine personally reviewed and noted   small right parafalcine SDH without midline shift and small   traumatic SAH in right paraclinoid area, no cervical or lumbar   acute abnormalities noted multi-level dengenerative changes   without fractures     Renetta Rincon PA-C  [facility]surgery  [address] [place] [id number]  Tel [phone]            Attestation:  I have reviewed today's vital signs, notes, medications, labs and imaging with Dr. Morrison.  Amount of time performed on this consult: 30 minutes.    Dione Pagan PA-C

## 2021-04-01 NOTE — CONSULTS
[facility]    Neurosurgery Consultation     Date of Admission:  4/1/2021  Date of Consult (When I saw the patient): 04/01/21    Assessment & Plan   Tyrel Rene is a 75 year old male who was admitted on 4/1/2021. I was asked to see the patient for acute right parafalcine SDH and small SAH.    Active Problems:    Subarachnoid hemorrhage (H) and right subdural hematoma    Plan:   1. Presently no neurosurgical intervention indicated   2. Repeat head CT today for further evaluation if stable okay to advance diet    3.INR elevated and treated by hospitalist with Vitamin K and Kcentra pending repeat labs           Closed fracture of olecranon process of right ulna, initial encounter    Plan: orthopedics consulted       I have discussed the following assessment and plan with Dr. Park who is in agreement with initial plan and will follow up with further consultation recommendations.    Renetta Rincon PA-C  [facility] Neurosurgery  [address] [address]  Tel [phone]          Code Status    Special Code    Reason for Consult   Reason for consult: I was asked by Dr. Gaines to evaluate this patient for acute SDH and SAH s/p fall.    Primary Care Physician   Dejon Downs    Chief Complaint   S/p fall and LOC     History is obtained from the patient    History of Present Illness   Tyrel Rene is a 75 year old male who presents with noted right SDH and traumatic SAH after a fall on his stairs. He states that he was walking down the stairs when he fell hitting the back of his head and right arm. He states that he became dizzy and lost his balance. He is unsure of the rest of the events as he believes he lost consciousness. The next thing he recalls was being in the ambulance on the way to the ER. He presently states that he is doing well.   He states that he lives at home with his wife and that she must of called EMS. He is  unsure of how long he was unconscious for. He does not note any similar symptoms or episodes in the past. He presently denies any headaches, nausea, emesis, or visual changes. He notes pain to the laceration on the back of his head as well as neck stiffness. He denies any radicular upper or lower extremity pain, numbness, tingling, or weakness. He denies gait instability or urinary incontinence.     Past Medical History   I have reviewed this patient's medical history and updated it with pertinent information if needed.   Past Medical History:   Diagnosis Date     Atrial fibrillation (H)     s/p Cardioversion 3/14/2013     Atrial flutter (H)     S/p Aflutter ablation 1/11/2011     CAD (coronary artery disease)     CABG x3 10/2012- LIMA to distal LAD, SVG to OM1 & OM3; cath 10/2012- PTCA to second diagonal and mid LAD, BMS to mid LAD     Cardiogenic shock (H)      Cardiomyopathy (H)      ED (erectile dysfunction)      Hypertension      Neuropathy      SVT (supraventricular tachycardia) (H)     S/p dual chamber ICD 10/11/12- upgraded to BIV ICD 6/2013     Syncope        Past Surgical History   I have reviewed this patient's surgical history and updated it with pertinent information if needed.  Past Surgical History:   Procedure Laterality Date     BYPASS GRAFT ARTERY CORONARY  10/2/2012    Procedure: BYPASS GRAFT ARTERY CORONARY;  Coronary Artery Bypass Graft x3 (LAD, Diag, OM) with Endovein Cutler (On-Pump);  Surgeon: Yeyo Lyman MD;  Location:  OR     CARDIOVERSION  3/14/2013     CORONARY ANGIOGRAPHY ADULT ORDER  10/2/12     CORONARY ARTERY BYPASS  10/2/12    LIMA to LAD, SVG to OM1 and OM3     CV ANGIOGRAM CORONARY GRAFT N/A 8/11/2020    Procedure: Angiogram Coronary Graft;  Surgeon: Erin Weaver MD;  Location:  HEART CARDIAC CATH LAB     CV HEART CATHETERIZATION WITH POSSIBLE INTERVENTION N/A 8/11/2020    Procedure: Heart Catheterization with Possible Intervention;  Surgeon: Owen  Erin Lane MD;  Location:  HEART CARDIAC CATH LAB     CV PCI ATHERECTOMY ORBITAL N/A 8/11/2020    Procedure: Percutaneous Coronary Intervention Atherectomy Rotational;  Surgeon: Erin Weaver MD;  Location:  HEART CARDIAC CATH LAB     EP ABLATION VT N/A 1/2/2019    Procedure: EP Ablation VT;  Surgeon: Suellen Costello MD;  Location:  HEART CARDIAC CATH LAB     EP COMPREHENSIVE EP STUDY N/A 1/2/2019    Procedure: EP Comprehensive EP Study;  Surgeon: Suellen Costello MD;  Location:  HEART CARDIAC CATH LAB     H ABLATION ATRIAL FLUTTER  1/11/2011     HAND SURGERY      table saw injury right hand     HEART CATH, ANGIOPLASTY  10/2/12    PTCA to second diagonal and mid LAD, BMS to mid LAD     HERNIA REPAIR       RELOCATE GENERATOR ICD/PACEMAKER         Prior to Admission Medications   Prior to Admission Medications   Prescriptions Last Dose Informant Patient Reported? Taking?   ASPIRIN NOT PRESCRIBED (INTENTIONAL)  Spouse/Significant Other Yes No   Sig: continuous prn for other Please choose reason not prescribed, below   Multiple Vitamins-Minerals (PRESERVISION AREDS 2 PO) 3/31/2021 at am Spouse/Significant Other Yes Yes   Sig: Take 1 capsule by mouth 2 times daily   Vitamin D, Cholecalciferol, 1000 UNITS TABS 3/31/2021 at am Spouse/Significant Other Yes Yes   Sig: Take 1,000 mg by mouth daily    acetaminophen (TYLENOL) 500 MG tablet  at prn Spouse/Significant Other Yes Yes   Sig: Take 1,000 mg by mouth every 8 hours as needed for mild pain   amiodarone (PACERONE) 200 MG tablet 3/30/2021 at hs Spouse/Significant Other No Yes   Sig: Take 1 tablet (200 mg) by mouth daily   carvedilol (COREG) 6.25 MG tablet 3/31/2021 at am Spouse/Significant Other No Yes   Sig: Take 1 tablet (6.25 mg) by mouth 2 times daily (with meals)   clopidogrel (PLAVIX) 75 MG tablet 3/31/2021 at am Spouse/Significant Other No Yes   Sig: Take 1 tablet (75 mg) by mouth daily   digoxin (LANOXIN) 125 MCG  tablet 3/31/2021 at am Spouse/Significant Other No Yes   Sig: Take 1 tablet (125 mcg) by mouth six times a week Do not take Sundays   famotidine (PEPCID) 20 MG tablet 3/31/2021 at am Spouse/Significant Other No Yes   Sig: TAKE 1 TABLET BY MOUTH TWICE A DAY   ipratropium (ATROVENT) 0.03 % nasal spray 3/31/2021 at am Spouse/Significant Other No Yes   Sig: SPRAY 2 SPRAYS INTO BOTH NOSTRILS EVERY 12 HOURS   mexiletine (MEXITIL) 150 MG capsule 3/31/2021 at Unknown time Spouse/Significant Other No Yes   Sig: Take 1 capsule (150 mg) by mouth 3 times daily   nitroGLYcerin (NITROSTAT) 0.4 MG sublingual tablet  at prn Spouse/Significant Other No Yes   Sig: DISSOLVE 1 TABLET UNDER THE TONGUE EVERY 5 MINUTES AS NEEDED FOR CHEST PAIN   rosuvastatin (CRESTOR) 20 MG tablet 3/30/2021 at hs Spouse/Significant Other No Yes   Sig: Take 1 tablet (20 mg) by mouth daily   sacubitril-valsartan (ENTRESTO) 49-51 MG per tablet 3/31/2021 at am Spouse/Significant Other No Yes   Sig: Take 1 tablet by mouth 2 times daily   sildenafil (VIAGRA) 100 MG tablet  at prn Spouse/Significant Other No Yes   Sig: Take 1 tablet (100 mg) by mouth daily as needed Take 30 min to 4 hours before intercourse.  Never use with nitroglycerin, terazosin or doxazosin.   warfarin ANTICOAGULANT (COUMADIN) 1 MG tablet 3/30/2021 at hs Spouse/Significant Other No Yes   Sig: TAKE 1 & 1/2 TABLETS (1.5 MG) EVERY DAY OR AS DIRECTED.      Facility-Administered Medications: None     Allergies   Allergies   Allergen Reactions     Aspirin      Other reaction(s): Aspirin Contraindication (for reporting)  Cardiologist recommended no aspirin as he is on Pradaxa     Nystatin Other (See Comments)     Make his mouth numb & swelling       Social History   I have reviewed this patient's social history and updated it with pertinent information if needed. Tyrel Rene  reports that he quit smoking about 48 years ago. His smoking use included cigarettes. He started smoking about 63 years  "ago. He has a 14.00 pack-year smoking history. He has never used smokeless tobacco. He reports that he does not drink alcohol or use drugs.    Family History   I have reviewed this patient's family history and updated it with pertinent information if needed.   Family History   Problem Relation Age of Onset     Alcohol/Drug Father      Heart Disease Father 71     Alzheimer Disease Sister      Alcohol/Drug Sister      Alcohol/Drug Sister      Gastrointestinal Disease Sister      Obesity Sister      Hypertension Sister      Heart Disease Sister      Hypertension Sister      Heart Disease Daughter         afib     Arrhythmia Daughter      Multiple Sclerosis Daughter      Heart Disease Daughter         afib     Arrhythmia Daughter      Cancer Daughter         lymphoma     Aneurysm Mother        Review of Systems   12 point review of systems completed and only positives present in HPI     Physical Exam   Temp: 97.3  F (36.3  C) Temp src: Oral BP: 134/70 Pulse: 65   Resp: 17 SpO2: 97 % O2 Device: None (Room air)    Vital Signs with Ranges  Temp:  [97.3  F (36.3  C)-98.1  F (36.7  C)] 97.3  F (36.3  C)  Pulse:  [62-66] 65  Resp:  [16-18] 17  BP: (105-146)/(60-90) 134/70  SpO2:  [93 %-97 %] 97 %  173 lbs 0 oz     , Blood pressure 134/70, pulse 65, temperature 97.3  F (36.3  C), temperature source Oral, resp. rate 17, height 1.854 m (6' 1\"), weight 78.5 kg (173 lb), SpO2 97 %.  173 lbs 0 oz  HEENT:  Normocephalic, atraumatic.  PERRLA.  EOM s intact.   Neck:  Supple, non-tender, without lymphadenopathy.  Heart:  No peripheral edema  Lungs:  No SOB  Abdomen:  Soft, non-tender, non-distended.  Normal bowel sounds.  Skin:  Warm and dry, good capillary refill.  Extremities:  Good radial and dorsalis pedis pulses bilaterally, no edema, cyanosis or clubbing.    NEUROLOGICAL EXAMINATION:   Mental status:  Alert and Oriented x 3, speech is fluent.  Cranial nerves:  II-XII intact.   Motor:  Strength is 5/5 throughout the upper and " lower extremities right upper extremity strength testing not completed secondary to elbow fracture and arm splint in place   Deltoids:  Right: 5/5   Left:  5/5  Biceps:  Right: NT/5   Left:  5/5  Triceps: Right: NT/5   Left:  5/5                       Wrist Extensors: Right: NT/5   Left:  5/5        Wrist Flexors:Right: NT/5   Left:  5/5             Hand : Right: 4-/5   Left:  5/5         Hip Flexor: Right: 5/5   Left:  5/5                 Hip Adductor:Right: 5/5   Left:  5/5               Hip Abductor: Right: 5/5   Left:  5/5              Gastroc Soleus:Right: 5/5   Left:  5/5        Tib/Ant:Right: 5/5   Left:  5/5                        EHL:Right: 5/5   Left:  5/5                     Sensation:  Intact throughout unable to test right upper extremity except for shoulder and hand secondary to split placement   Reflexes:  Negative Babinski.  Negative Clonus.    Coordination:  Smooth finger to nose testing.   Negative pronator drift.  Smooth tandem walking  Gait:  Stable, no difficulty with heel or toe walking.    Cervical examination reveals good range of motion.  No tenderness to palpation of the cervical spine or paraspinous muscles bilaterally.     Lumbar examination reveals no tenderness of the spine or paraspinous muscles.   Negative SI joint, sciatic notch or greater trochanteric tenderness to palpation bilaterally.  Straight leg raise is negative bilaterally.       Data     CBC RESULTS:   Recent Labs   Lab Test 04/01/21  0029   WBC 9.3   RBC 4.45   HGB 13.2*   HCT 40.3   MCV 91   MCH 29.7   MCHC 32.8   RDW 14.5        Basic Metabolic Panel:  Lab Results   Component Value Date     04/01/2021      Lab Results   Component Value Date    POTASSIUM 4.3 04/01/2021     Lab Results   Component Value Date    CHLORIDE 100 04/01/2021     Lab Results   Component Value Date    LORI 8.0 04/01/2021     Lab Results   Component Value Date    CO2 27 04/01/2021     Lab Results   Component Value Date    BUN 19  04/01/2021     Lab Results   Component Value Date    CR 1.30 04/01/2021     Lab Results   Component Value Date    GLC 99 04/01/2021     INR:  Lab Results   Component Value Date    INR 2.81 04/01/2021       Imaging:   Ct head, cervical, and lumbar spine personally reviewed and noted small right parafalcine SDH without midline shift and small traumatic SAH in right paraclinoid area, no cervical or lumbar acute abnormalities noted multi-level dengenerative changes without fractures     Renetta Rincon PA-C  [facility]surgery  [address] [address]  Tel [phone]

## 2021-04-01 NOTE — PLAN OF CARE
Neurosurgery plan of care    Repeat head CT reviewed personally by myself and Dr. Park noted progression of now bilateral acute SDH and small left paramedian frontal lobe parenchymal contusion and increase in scattered traumatic SAH, no midline shift noted      Per nurse patient clinically stable   INR 1.26       Plan:   Patient discussed with Dr. Park   No emergent neurosurgical intervention presently indicated   Advance diet as tolerated per hospitalist  Hold all anticoagulants   Will repeat head CT tomorrow morning      Renetta Rincon PA-C  [facility] Neurosurgery  [address] [address]  Tel [phone]

## 2021-04-01 NOTE — ED NOTES
Bed: ED27  Expected date: 4/1/21  Expected time: 12:08 AM  Means of arrival: Ambulance  Comments:  [address] 75M fall, head pain

## 2021-04-02 NOTE — CONSULTS
Care Management Initial Consult    General Information  Assessment completed with: Patient, Spouse or significant other,    Type of CM/SW Visit: Initial Assessment    Primary Care Provider verified and updated as needed: Yes   Readmission within the last 30 days: no previous admission in last 30 days      Reason for Consult: discharge planning  Advance Care Planning: Advance Care Planning Reviewed: no concerns identified          Communication Assessment  Patient's communication style: spoken language (English or Bilingual)    Hearing Difficulty or Deaf: no   Wear Glasses or Blind: yes    Cognitive  Cognitive/Neuro/Behavioral: WDL  Level of Consciousness: alert  Arousal Level: arouses to voice  Orientation: oriented x 4  Mood/Behavior: calm, cooperative  Best Language: 0 - No aphasia  Speech: clear, spontaneous, logical    Living Environment:   People in home: spouse  Sharel  Current living Arrangements: house      Able to return to prior arrangements: yes  Living Arrangement Comments: plans on staying on one level    Family/Social Support:  Care provided by: self  Provides care for:    Marital Status:   Wife  Sharel       Description of Support System: Supportive, Involved    Support Assessment: Adequate family and caregiver support    Current Resources:   Patient receiving home care services: No     Community Resources: Other (see comment)(In Home lab service for lab draws)  Equipment currently used at home:    Supplies currently used at home:      Employment/Financial:  Employment Status:          Financial Concerns: No concerns identified           Lifestyle & Psychosocial Needs:        Socioeconomic History     Marital status:      Spouse name: Not on file     Number of children: Not on file     Years of education: Not on file     Highest education level: Not on file     Tobacco Use     Smoking status: Former Smoker     Packs/day: 1.00     Years: 14.00     Pack years: 14.00     Types: Cigarettes      Start date:      Quit date: 7/10/1972     Years since quittin.7     Smokeless tobacco: Never Used   Substance and Sexual Activity     Alcohol use: No     Drug use: No     Sexual activity: Yes     Partners: Female       Functional Status:  Prior to admission patient needed assistance:              Mental Health Status:     n/a     Chemical Dependency Status:      n/a    Values/Beliefs:  Spiritual, Cultural Beliefs, Taoism Practices, Values that affect care:                 Additional Information:  Met with patient and spouse for discharge planning.  Patient and spouse live in a house with multi levels.  Patient admitted after an episode of dizziness and a fall down stairs at home.  He is being evaluated by Neurosurgery, Orthopedics, Cardiology and therapies.  PT recommends home with OPPT.  Patient and spouse not sure they want OPPT.  Per spouse, they have multiple stairs in home and he will be staying on one level.  She would prefer he not have to leave the home to go to OP therapy, but they understand he would need to be home bound in order to receive home PT.  They will wait until ready for discharge to determine if he will need ongoing therapy.  Spouse will be with him at home and intends to help him with ADL's and walking on the stairs as needed.  They would like follow up appointments scheduled.  Spouse also stated he has in home lab service for INR draws and other lab draws.    See AVS for Ortho and PCP follow up appointments.    Sharon Fournier RN

## 2021-04-02 NOTE — PLAN OF CARE
Pt here with right SDH, traumatic SAH, and right olecranon fracture after a fall on his stairs. A+Ox4. VSS on RA. SBP Parameters <150. Tele: 100% V-Paced. Neuros intact, ex RLE 4/5 motor strength (Pt states weaker d/t fall that lead to hospital admission), moderate right hand  (Splinted), and finger to nose/heel to shin are slow, but deliberate. C/O 1-2/10 lower back pain- Taking scheduled tylenol, declined further intervention. Up with Ax1 and GB. Regular diet, thin liquids. Takes pills whole. Abrasion to back of head is DAVID with minimal drainage. Pacemaker in place. Splint and sling to RUE d/t fracture. Scoring Green on the Aggression Stop Light Tool. Repeat head CT completed this morning, results pending. Discharge pending- PT recommending TCU and OT home with assist and OP OT.

## 2021-04-02 NOTE — PLAN OF CARE
Patient admitted with right traumatic SDH/SAH, & right olecranon fx. VSS, tele 100% V-paced, interrogated in ED yesterday, results in chart. Tolerating regular diet, voiding adequately, BM today. Up with assist of on/GB, orthostatics completed. Neuro's with RLE 4/5, performed shoulder shrug but no other neuro's with RUE, moderate grasp. Sling and splint in place, F/U in 2 weeks with ortho. Slight pain in lower back, bruise noted right above coccyx. Plan for discharge to home with outpatient OT/PT tomorrow.

## 2021-04-02 NOTE — PROGRESS NOTES
[facility]    Medicine Progress Note - Hospitalist Service       Date of Admission:  4/1/2021  Assessment & Plan       Tyrel Rene is a 75 year old male with a past medical history of CAD s/p CABG x3 2012, severe ischemic cardiomyopathy last EF 20-25%, NSVT with ICD placement, atrial fibrillation, atrial flutter, HTN, episodes of syncope, and CKD stage III admitted on 4/1/2021 with a loss of consciousness and fall down 5 stairs. He was found to have a 4mm acute parafalcine subdural hematoma along the right tentorial leaflet and a small volume subarachnoid hemorrhage in the right paraclinoid area.     Subdural Hematoma  Small Volume Subarachnoid Hemorrhage  Scalp laceration   Anticoagulated on warfarin INR on admission 2.81.  Patient had a loss of coconsciousness with a fall down 5 stairs. Patient reports getting up to go to bed when he became dizzy on the stairs and lost his balance. He recalls reaching for the side rail on the stairs but does not recall events following until he was in the ambulance.   * CT head findings include acute right parafalcine subdural hematoma measuring up to 4 mm in thickness which extends along the right tentorial leaflet where it measures up to 2 mm in thickness, small volume subarachnoid hemorrhage in the right paraclinoid area, extending into the prepontine cistern, along the interhemispheric fissure and into the left sylvian fissure and trace blood products in the occipital horns.  * CTA negative for aneurysm.    - Neuro checks liberalized to q 4 hours.  - F/u CT AM of 04/02/21: decreased density but slightly increased volume of bilateral subdural hematohygromas.  - Neurosurgery consult  - SBP <150mmHg per neurosurgery  - PRN antihypertensives to maintain SBP <150  - PT, OT, Speech therapy  - NPO except medications pending CT imaging at 12pm; if neurosurgery okay with initiating diet start Cardiac <2gm Na diet     Syncope and Collapse  History of Frequent  NSVT s/p PPM/ICD  History of VT storm  Atrial Fibrillation with Hx of complete AV block s/p PPM/ICD  Patient reports getting up to go to bed when he became dizzy on the stairs and lost his balance. He recalls reaching for the side rail on the stairs but does not recall events following until he was in the ambulance. He reports frequently feeling dizzy which he attributes to his medications.  * PPM/ICD was interrogated in the ED which did indicate a couple 3-4 beat episodes of V tach earlier in the evening but did not seem to coincide with the timing of the fall. Also admitted in 9/2020 with syncope and collapse with ICD interrogation not noting any episodes of VT however while on telemetry monitoring the patient had frequent episodes of NSVT. EP adjusted settings during that hospitalization due to suspected episodes of device-induced proarrhythmia. He follows closely with cardiology on amiodarone and recently initiated on Mexilitine.    - Monitor on telemetry  - Cardiology consulted to review interrogation and comment on whether they are concerned for arrhythmia as cause of syncope.   - Continue PTA Amiodarone, digoxin  - Continue PTA Mexilitine (started recently).      CAD s/p BONITA to OM 2 8/20 and CABG x3 2012  Severe Ischemic Cardiomyopathy, last EF 20-25% 9/20  HFrEF  Anterior STEMI in 2012 requiring an emergent 3 vessel CABG. BONITA to OM2 8/20/20.  Patient reports weight increasing last week with orthopnea. He took torsemide on 3/26 and reported a nearly 10lb weight loss overnight. He reports his weight started to increased again following but then decreased again without additional dosing. He denies any orthopnea currently.     - Continue PTA Coreg  - Holding  Warfarin (INR 1.09 on 04/02/21)   - Holding Plavix   - Cardiology consulted given BONITA in the last year. Currently holding all anti-platelets. Please comment on risk.   - Telemetry monitoring   -  Continue PTA coreg, digoxin, sacubitril-valsartan,  rosuvastatin   -  On PRN torsemide at home. Appears euvolemic now.   -  Strict I and O's. Daily weight, current weight 78.5 down from prior weights. No swelling, lungs clear.     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 Again appreciate cardiology's input regarding risk of holding anticoagulation right now.     Mildly Displaced Fracture Right Olecranon Process with like Hemarthrosis  - Orthopedics consult appreciated.  - Non weight bearing right arm  - Non-operative management recommended   - Keep posterior splint in place  - Sling for comfort  - NWB right UE  - Elevate as able  - Follow-up with Dr Morrison in 2 weeks at [facility] for recheck.  Call [phone] for appt and questions.   - Tylenol 975mg PO q8hr  - Oxycodone 2.5-5mg PO q4hr PRN for moderate to severe pain     Acute Lower Back Pain  Patient reports acute lower back pain which he describes to the middle of his lower back. Gross motor movement intact. Denies any numbness or tingling.  CT lumbar spine negative for fracture  Pelvic xray negative for fracture    -Tylenol 975mg PO q8hr  -Oxycodone 2.5-5mg PO q4hr PRN for moderate to severe pain     CKD, Stage III, baseline Cr ~ 1.5  Stable renal function  Patient did receive contrast dye this admission    - Avoid nephrotoxins    Recent Labs   Lab 04/02/21  0848 04/01/21  0029   CR 1.19 1.30*          Hyponatremia, Mild. RESOLVED Na 132 on admission. Reports following a very strict low sodium diet. Appears euvolemic. Resolved without IVF or other intervention.            Diet: Regular Diet Adult    DVT Prophylaxis: Pneumatic Compression Devices  Resendiz Catheter: not present  Code Status:   Special code, CPR, defibrillation, medications; DNI         Disposition Plan   Expected discharge: 2 days,  recommended to prior living arrangement once neurosurgery clears for discharge and we have plan regarding anticoagulation and anti-platelets. .  Entered: Polina Chan MD 04/02/2021, 10:27 AM       The patient's care was discussed with the Patient and Patient's Family, patient's wife.     Polina Chan MD  Hospitalist Service  [facility]  Contact information available via [facility] Paging/Directory    ______________________________________________________________________    Interval History   Patient is feeling better today. Back pain improved. No headache, weakness, numbness or visual disturbance. No CP or SOB.     Data reviewed today: I reviewed all medications, new labs and imaging results over the last 24 hours. I personally reviewed the EKG tracing showing paced rythmn .    Physical Exam   Vital Signs: Temp: 97.7  F (36.5  C) Temp src: Oral BP: 125/69 Pulse: 65   Resp: 20 SpO2: 96 % O2 Device: None (Room air)    Weight: 173 lbs 0 oz  Constitutional: NAD,   Neuropsyche:  Very pleasant alert and oriented, answers questions appropriately. Speech normal, face symmetric and moving all 4 extremities without gross focal neurological deficit.   Respiratory:  Breathing comfortably, good air exchange, no wheezes, no crackles.   Cardiovascular:  Regular rate and rhythm did not appreciate murmur, displaced and prominent apical impulse, no edema.  GI:  soft, NT/ND, BS normal  Skin/Integumen:  No acute rash or sign of bleeding.         Data   Recent Labs   Lab 04/02/21  0848 04/02/21  0055 04/01/21  2245 04/01/21  1526 04/01/21  1006 04/01/21  1006 04/01/21  0029   WBC 9.9  --   --   --   --   --  9.3   HGB 12.8*  --   --   --   --   --  13.2*   MCV 90  --   --   --   --   --  91     --   --   --   --   --  184   INR 1.09  --  1.16* 1.23*   < >  --  2.81*     --   --   --   --   --  132*   POTASSIUM 4.1  --   --   --   --   --  4.3   CHLORIDE 103  --   --   --   --   --  100   CO2 23   --   --   --   --   --  27   BUN 23  --   --   --   --   --  19   CR 1.19  --   --   --   --   --  1.30*   ANIONGAP 7  --   --   --   --   --  5   LORI 8.2*  --   --   --   --   --  8.0*   GLC 98  --   --   --   --   --  99   ALBUMIN  --   --   --   --   --   --  3.0*   PROTTOTAL  --   --   --   --   --   --  6.5*   BILITOTAL  --   --   --   --   --   --  0.4   ALKPHOS  --   --   --   --   --   --  78   ALT  --   --   --   --   --   --  50   AST  --   --   --   --   --   --  47*   TROPI  --  <0.015  --  <0.015  --  0.016 <0.015    < > = values in this interval not displayed.     Recent Results (from the past 24 hour(s))   CT Head w/o Contrast    Narrative    CT SCAN OF THE HEAD WITHOUT CONTRAST April 1, 2021 12:37 PM     HISTORY: Subarachnoid hemorrhage (SAH), follow up. Subdural hematoma.    TECHNIQUE: Axial images of the head and coronal reformations without  IV contrast material. Radiation dose for this scan was reduced using  automated exposure control, adjustment of the mA and/or kV according  to patient size, or iterative reconstruction technique.    COMPARISON: CT head dated 4/1/2021.    FINDINGS: Mildly increased density and extent of the previously  demonstrated right parafalcine subdural hematoma extending along the  superior aspect of the right tentorial leaflet measuring up to  approximately 4-5 mm. Interval increase in scattered subarachnoid  hemorrhage in the right cingulate sulcus, left sylvian fissure, and in  the basal cisterns, including in the right parasellar region,  prepontine cistern, right ambient cistern, and extending posteriorly  into the quadrigeminal cistern and the left ambient cistern. Now  apparent is a hemorrhagic parenchymal contusion involving the  paramedian aspect of the anterior inferior left frontal lobe (series 3  image 12) with parenchymal hematoma measuring 14 mm x 10 mm x 12 mm.  Newly apparent bilateral acute cerebral convexity subdural hematomas  measuring up to 5-6 mm in  thickness on the left and 3-4 mm in  thickness on the right (series 7 image 22-25). There is no significant  midline shift/herniation.    Chronic areas of encephalomalacia involving the right and left frontal  lobe, left parietal lobe, and left occipital lobe are unchanged from  prior. There is mild generalized brain parenchymal volume loss and  mild patchy hypoattenuation in the cerebral white matter likely  representing chronic small vessel ischemic disease. The ventricles are  normal in size and configuration without evidence for hydrocephalus.  Scattered intracranial atherosclerotic calcifications are again seen.    The bilateral orbits appear within normal limits. The visualized  paranasal sinuses and mastoids are clear. The visualized aspects of  the calvarium and skull base appear grossly intact.      Impression    IMPRESSION:  1. Interval increase in scattered small to moderate volume  subarachnoid hemorrhage, as described.  2. Newly apparent small left paramedian inferior frontal lobe  hemorrhagic parenchymal contusion.  3. New small hypodense acute bilateral cerebral convexity subdural  hematomas.  4. Mildly increased density and extent of the previously seen thin  right parafalcine/tentorial subdural hematoma.  5. Unchanged multifocal areas of chronic encephalomalacia in the  cerebral hemispheres.  6. No significant midline shift/herniation.    The findings were discussed with Kelly ALTMAN, the patient's nurse, by  myself at approximately 1:15 PM on 4/1/2021. She agreed to promptly  relay the findings to the neurosurgery service.    SUSU CHIU MD   CT Head w/o Contrast    Narrative    EXAM: CT HEAD W/O CONTRAST  LOCATION: [facility]  DATE/TIME: 4/2/2021 6:35 AM    INDICATION: Follow-up intracranial hemorrhage  COMPARISON: 04/01/2020 01/12/2008 and 2:05 AM  TECHNIQUE: Routine CT Head without IV contrast. Multiplanar reformats. Dose reduction techniques were used.    FINDINGS:  INTRACRANIAL  CONTENTS: Interval decreased density with slightly increased volume of the subdural collections along the bilateral cerebral hemispheres, measuring 6 mm on the left and 5 mm on the right. No adverse associated mass effect. Thin subdural   hemorrhages along the falx and right tentorium are unchanged. Scattered subarachnoid hemorrhage is the stable. Stable hemorrhagic contusion in the inferior left frontal lobe. Focal hemorrhage along the medial right temporal lobe may have a component of   parenchymal hemorrhage, but is grossly unchanged from the prior exam. No CT evidence of acute infarct. Multifocal chronic infarcts in the bilateral cerebral hemispheres and left cerebellum. Mild presumed chronic small vessel ischemic changes. Mild   generalized volume loss. No hydrocephalus.     VISUALIZED ORBITS/SINUSES/MASTOIDS: No intraorbital abnormality. Trace mucosal thickening in the right sphenoid sinus. No middle ear or mastoid effusion.    BONES/SOFT TISSUES: No acute abnormality.      Impression    IMPRESSION:  1.  Decreased density, but slightly increased volume of the subdural collections along the bilateral cerebral hemispheres. 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 No adverse associated mass effect.  2.  Otherwise, no significant interval change from the prior exam.     Medications     - MEDICATION INSTRUCTIONS -         acetaminophen  975 mg Oral Q8H     amiodarone  200 mg Oral Daily     carvedilol  6.25 mg Oral BID w/meals     digoxin  125 mcg Oral Once per day on Mon Tue Wed Thu Fri Sat     famotidine  20 mg Oral BID     ipratropium  2 spray Both Nostrils Q12H     mexiletine  150 mg Oral TID     multivitamin  with lutein  1 capsule Oral BID     rosuvastatin  20 mg Oral At Bedtime     sacubitril-valsartan  1 tablet Oral BID     senna-docusate  1 tablet Oral BID    Or     senna-docusate  2 tablet Oral BID     sodium chloride (PF)  3 mL Intracatheter Q8H     cholecalciferol  50 mcg Oral Daily

## 2021-04-02 NOTE — PROGRESS NOTES
"Neurosurgery Progress     Dx:     Acute right parafalcine SDH and small SAH.    Neuro stable.     TODAY'S PLAN:     Mr. Rene is felling well this morning. We have liberalized his neuro checks to Q4 hours and encouraged him to continue to participate with therapy. Will continue to follow.      In the event that patient's symptoms worsen or change we would appreciate being contacted. We did discuss signs of a worsening problem that he should seek being evaluated.     We did review the above information with the patient whom agrees with the plan and did verbalize understanding.   ________________________________________________________________     Mr. Rene overall feels well and denies any significant discomfort. Tolerating regular diet without n/v.     /69 (BP Location: Left arm)   Pulse 65   Temp 97.7  F (36.5  C) (Oral)   Resp 20   Ht 6' 1\" (1.854 m)   Wt 173 lb (78.5 kg)   SpO2 96%   BMI 22.82 kg/m       Pt in bed. He appears comfortable and in no apparent distress, moving all extremities.   CN II-XII intact, alert and appropriate with conversation and following commands.   Able to name 3/3 objects.   Finger to nose slow and accurate.   Rapid hand movements intact.   Absent for upper and lower extremity drift.   Bilateral upper and lower extremities 5/5. Right olecranon fracture - in brace. DTR's wnl. Sensation intact throughout. Normal ROM.   Calves soft and non-tender.     All pertinent labs and updated imaging reviewed in [facility].     CT HEAD W/O CONTRAST - 4/2/2021 6:35 AM  1.  Decreased density, but slightly increased volume of the subdural collections along the bilateral cerebral hemispheres. 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 No adverse associated mass effect.  2.  Otherwise, no significant interval change from the prior exam.    Estevan Marquez PA-C   Neurosurgery   [phone] (P)         "

## 2021-04-02 NOTE — PROGRESS NOTES
Patient presented to ED after a fall. Device was interrogated.  Programmed VVIR 65/120. Underlying permanent AF with a CHB. Bi V pacing at 92%. Log book shows pt had 6 brief NSVT lasting just 3-4 beats. These occurred on March 31-between 1031 hours to 1035 hours. No further events followed.   Lead measurement obtained were all stable. Battery is estimated at approx. 3 month remaining (Unitl it triggers recommended replacement indicator). No cause for fall noted on interrogation. Patient was on warfarin until presentation to ED. AGAPITO Valiente

## 2021-04-02 NOTE — CONSULTS
"[facility]    Cardiology Consultation     Date of Admission:  4/1/2021    Assessment & Plan     This is a 75 year old male patient of Dr. Mathews with CKD and HTN with the below cardiac history:    A.  Severe ischemic cardiomyopathy after a large anterior MI several years ago.  Three-vessel CABG in 2012.  Most recent LVEF 25%.  Recent PCI of OM2 (08/2020).   B.  Unstable ventricular tachyarrhythmias.  ICD shock in 11/2017.  Amiodarone started.  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  VT ablation on 01/02/2019.  Amiodarone decreased to 6 days per week in 05/2020.  Recurrence of VT with syncope and 2 shocks in 07/2020.  Syncope without documented VT but with multiple nonsustained episodes of VT documented during hospital admission in 09/2020.  Partly related to a \"smoothing out\" algorithm incorporated in his ICD.  NSVT events decreased after the algorithm was disabled, though recent interrogation showed 25,000 nonsustained VT episodes in 80 days.   C.  Permanent atrial fibrillation and complete AV block.  On warfarin.  Prior atrial flutter ablation (2011).   D.  CRT ICD ([employer]).   E.  CVA following atrial flutter ablation in 2011.     He was admitted on 4/1/2021 with a loss of consciousness and fall down 5 stairs. He was found to have a 4mm acute parafalcine subdural hematoma along the right tentorial leaflet and a small volume subarachnoid hemorrhage in the right paraclinoid area. Cardiology consulted for syncope given history.     His last interrogation was as follows on 3/11/2021 and last seen by Dr. Costello on 12/21/2021 who was concerned that Mr. Rene had 25,000 episodes of nonsustained VT in prior 80 days despite amiodarone 200 mg daily (was having average 300 runs per day). Heart failure was thought to be compensated at that time. Mexiletine 150 mg tid was started.     [employer] CRT-D Device Check  Patient seen in clinic for device evaluation and " iterative programming.   AP: N/A %    BiVP: 95 %    Mode: VVIR   Underlying Rhythm: Afib with CHB with junctional escape at VVI 30   Heart Rate: Stable with adequate variability, patient denies activity intolerance     Sensing: WNL     Pacing Threshold: Stable     Impedance: Stable     Battery Status: 5 months     Device Site: Well healed     Atrial Arrhythmia: Chronic Afib, patient takes coumadin     Ventricular Arrhythmia: 5,596 VT episodes logged since 1/15/2021. 2 EGMs logged as VT initially show Afib with short burst of NSVT then VT with V rates in the 150-160s (patients underlying historically Afib with CHB with no junctional escape at VVI 30) both VT episodes logged on same day 2/1/2021 consecutively. 2 remaining EGMs show Afib with 3-4 beat burst of NSVT with V rates in the 130-160s. Patient does not correlate symptoms with episodes logged. Patient states he is asymptomatic while sitting down, however, does feel dizzy when he is up and moving. Per patient this has been on-going and remains unchanged since mexiletine prescribed by Dr. Costello in December 2020. Patient continues to take both amiodarone and mexiletine as prescribed. Will send update to Dr. Costello regarding continued episodes of VT and patients dizziness.   ATP: 0    Shocks: 0    Setting Change: None     Care Plan: Remote device check in 1 month to assess battery longevity. Patient is scheduled to follow up with Lauren Jorgensen on 4/15/2021. Encouraged patient to call with any questions or concerns. Clinic card given.   BAKARI RN     Plan:  1. STAT device interrogation   2. If increased burden of VT may consider loading amiodarone, or increasing mexilitine, and consideration of VT ablation after I discuss interrogation results with primary electrophysiologist Dr. Costello   3. Please monitor on telemetry and notify us of any prolonged VT  4. Trend cardiac enzymes  5. Electrolyte monitoring     Addendum:    Device report - no significant VT at time of  "event. Ddx orthostatic hypotension, vasovagal. Has EP follow up in 1-2 weeks. Will sign off. Please page with questions.         Sheila Sanchez MD    Code Status    Special Code    Reason for Consult     Syncope    Primary Care Physician   *Dejon Ajay Yareli    Chief Complaint   Syncope     History of Present Illness     This is a 75 year old male patient of Dr. Newman and Pravin with CKD and HTN with the below cardiac history:    A.  Severe ischemic cardiomyopathy after a large anterior MI several years ago.  Three-vessel CABG in 2012.  Most recent LVEF 25%.  Recent PCI of OM2 (08/2020).   B.  Unstable ventricular tachyarrhythmias.  ICD shock in 11/2017.  Amiodarone started.  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  VT ablation on 01/02/2019.  Amiodarone decreased to 6 days per week in 05/2020.  Recurrence of VT with syncope and 2 shocks in 07/2020.  Syncope without documented VT but with multiple nonsustained episodes of VT documented during hospital admission in 09/2020.  Partly related to a \"smoothing out\" algorithm incorporated in his ICD.  NSVT events decreased after the algorithm was disabled, though recent interrogation showed 25,000 nonsustained VT episodes in 80 days.   C.  Permanent atrial fibrillation and complete AV block.  On warfarin.  Prior atrial flutter ablation (2011).   D.  CRT ICD ([employer]).   E.  CVA following atrial flutter ablation in 2011.       He was admitted on 4/1/2021 with a loss of consciousness and fall down 5 stairs. He was found to have a 4mm acute parafalcine subdural hematoma along the right tentorial leaflet and a small volume subarachnoid hemorrhage in the right paraclinoid area. Cardiology consulted for syncope given history.     His last interrogation was as follows on 3/11/2021 and last seen by Dr. Costello on 12/21/2021 who was concerned that Mr. Rene had 25,000 episodes of nonsustained VT in prior 80 days despite amiodarone 200 mg daily (was having average " 300 runs per day). Heart failure was thought to be compensated at that time.     [employer] Ener[employer] CRT-D Device Check  Patient seen in clinic for device evaluation and iterative programming.   AP: N/A %    BiVP: 95 %    Mode: VVIR   Underlying Rhythm: Afib with CHB with junctional escape at VVI 30   Heart Rate: Stable with adequate variability, patient denies activity intolerance     Sensing: WNL     Pacing Threshold: Stable     Impedance: Stable     Battery Status: 5 months     Device Site: Well healed     Atrial Arrhythmia: Chronic Afib, patient takes coumadin     Ventricular Arrhythmia: 5,596 VT episodes logged since 1/15/2021. 2 EGMs logged as VT initially show Afib with short burst of NSVT then VT with V rates in the 150-160s (patients underlying historically Afib with CHB with no junctional escape at VVI 30) both VT episodes logged on same day 2/1/2021 consecutively. 2 remaining EGMs show Afib with 3-4 beat burst of NSVT with V rates in the 130-160s. Patient does not correlate symptoms with episodes logged. Patient states he is asymptomatic while sitting down, however, does feel dizzy when he is up and moving. Per patient this has been on-going and remains unchanged since mexiletine prescribed by Dr. Costello in December 2020. Patient continues to take both amiodarone and mexiletine as prescribed. Will send update to Dr. Costello regarding continued episodes of VT and patients dizziness.   ATP: 0    Shocks: 0    Setting Change: None     Care Plan: Remote device check in 1 month to assess battery longevity. Patient is scheduled to follow up with Lauren Jorgensen on 4/15/2021. Encouraged patient to call with any questions or concerns. Clinic card given.   BAKARI, RN           Past Medical History   I have reviewed this patient's medical history and updated it with pertinent information if needed.   Past Medical History:   Diagnosis Date     Atrial fibrillation (H)     s/p Cardioversion 3/14/2013     Atrial  flutter (H)     S/p Aflutter ablation 1/11/2011     CAD (coronary artery disease)     CABG x3 10/2012- LIMA to distal LAD, SVG to OM1 & OM3; cath 10/2012- PTCA to second diagonal and mid LAD, BMS to mid LAD     Cardiogenic shock (H)      Cardiomyopathy (H)      ED (erectile dysfunction)      Hypertension      Neuropathy      SVT (supraventricular tachycardia) (H)     S/p dual chamber ICD 10/11/12- upgraded to BIV ICD 6/2013     Syncope        Past Surgical History   I have reviewed this patient's surgical history and updated it with pertinent information if needed.  Past Surgical History:   Procedure Laterality Date     BYPASS GRAFT ARTERY CORONARY  10/2/2012    Procedure: BYPASS GRAFT ARTERY CORONARY;  Coronary Artery Bypass Graft x3 (LAD, Diag, OM) with Endovein Stanford (On-Pump);  Surgeon: Yeyo Lyman MD;  Location:  OR     CARDIOVERSION  3/14/2013     CORONARY ANGIOGRAPHY ADULT ORDER  10/2/12     CORONARY ARTERY BYPASS  10/2/12    LIMA to LAD, SVG to OM1 and OM3     CV ANGIOGRAM CORONARY GRAFT N/A 8/11/2020    Procedure: Angiogram Coronary Graft;  Surgeon: Erin Weaver MD;  Location:  HEART CARDIAC CATH LAB     CV HEART CATHETERIZATION WITH POSSIBLE INTERVENTION N/A 8/11/2020    Procedure: Heart Catheterization with Possible Intervention;  Surgeon: Erin Weaver MD;  Location:  HEART CARDIAC CATH LAB     CV PCI ATHERECTOMY ORBITAL N/A 8/11/2020    Procedure: Percutaneous Coronary Intervention Atherectomy Rotational;  Surgeon: Erin Weaver MD;  Location: [facility] CARDIAC CATH LAB     EP ABLATION VT N/A 1/2/2019    Procedure: EP Ablation VT;  Surgeon: Suellen Costello MD;  Location: [facility] CARDIAC CATH LAB     EP COMPREHENSIVE EP STUDY N/A 1/2/2019    Procedure: EP Comprehensive EP Study;  Surgeon: Suellen Costello MD;  Location:  HEART CARDIAC CATH LAB     H ABLATION ATRIAL FLUTTER  1/11/2011     HAND SURGERY      table saw injury right  hand     HEART CATH, ANGIOPLASTY  10/2/12    PTCA to second diagonal and mid LAD, BMS to mid LAD     HERNIA REPAIR       RELOCATE GENERATOR ICD/PACEMAKER         Prior to Admission Medications   Prior to Admission Medications   Prescriptions Last Dose Informant Patient Reported? Taking?   ASPIRIN NOT PRESCRIBED (INTENTIONAL)  Spouse/Significant Other Yes No   Sig: continuous prn for other Please choose reason not prescribed, below   Multiple Vitamins-Minerals (PRESERVISION AREDS 2 PO) 3/31/2021 at am Spouse/Significant Other Yes Yes   Sig: Take 1 capsule by mouth 2 times daily   Vitamin D, Cholecalciferol, 1000 UNITS TABS 3/31/2021 at am Spouse/Significant Other Yes Yes   Sig: Take 1,000 mg by mouth daily    acetaminophen (TYLENOL) 500 MG tablet  at prn Spouse/Significant Other Yes Yes   Sig: Take 1,000 mg by mouth every 8 hours as needed for mild pain   amiodarone (PACERONE) 200 MG tablet 3/30/2021 at hs Spouse/Significant Other No Yes   Sig: Take 1 tablet (200 mg) by mouth daily   carvedilol (COREG) 6.25 MG tablet 3/31/2021 at am Spouse/Significant Other No Yes   Sig: Take 1 tablet (6.25 mg) by mouth 2 times daily (with meals)   clopidogrel (PLAVIX) 75 MG tablet 3/31/2021 at am Spouse/Significant Other No Yes   Sig: Take 1 tablet (75 mg) by mouth daily   digoxin (LANOXIN) 125 MCG tablet 3/31/2021 at am Spouse/Significant Other No Yes   Sig: Take 1 tablet (125 mcg) by mouth six times a week Do not take Sundays   famotidine (PEPCID) 20 MG tablet 3/31/2021 at am Spouse/Significant Other No Yes   Sig: TAKE 1 TABLET BY MOUTH TWICE A DAY   ipratropium (ATROVENT) 0.03 % nasal spray 3/31/2021 at am Spouse/Significant Other No Yes   Sig: SPRAY 2 SPRAYS INTO BOTH NOSTRILS EVERY 12 HOURS   mexiletine (MEXITIL) 150 MG capsule 3/31/2021 at Unknown time Spouse/Significant Other No Yes   Sig: Take 1 capsule (150 mg) by mouth 3 times daily   nitroGLYcerin (NITROSTAT) 0.4 MG sublingual tablet  at prn Spouse/Significant Other No  Yes   Sig: DISSOLVE 1 TABLET UNDER THE TONGUE EVERY 5 MINUTES AS NEEDED FOR CHEST PAIN   rosuvastatin (CRESTOR) 20 MG tablet 3/30/2021 at hs Spouse/Significant Other No Yes   Sig: Take 1 tablet (20 mg) by mouth daily   sacubitril-valsartan (ENTRESTO) 49-51 MG per tablet 3/31/2021 at am Spouse/Significant Other No Yes   Sig: Take 1 tablet by mouth 2 times daily   sildenafil (VIAGRA) 100 MG tablet  at prn Spouse/Significant Other No Yes   Sig: Take 1 tablet (100 mg) by mouth daily as needed Take 30 min to 4 hours before intercourse.  Never use with nitroglycerin, terazosin or doxazosin.   warfarin ANTICOAGULANT (COUMADIN) 1 MG tablet 3/30/2021 at hs Spouse/Significant Other No Yes   Sig: TAKE 1 & 1/2 TABLETS (1.5 MG) EVERY DAY OR AS DIRECTED.      Facility-Administered Medications: None     Allergies   Allergies   Allergen Reactions     Aspirin      Other reaction(s): Aspirin Contraindication (for reporting)  Cardiologist recommended no aspirin as he is on Pradaxa     Nystatin Other (See Comments)     Make his mouth numb & swelling       Social History   I have reviewed this patient's social history and updated it with pertinent information if needed. Tyrel Rene  reports that he quit smoking about 48 years ago. His smoking use included cigarettes. He started smoking about 63 years ago. He has a 14.00 pack-year smoking history. He has never used smokeless tobacco. He reports that he does not drink alcohol or use drugs.    Family History   I have reviewed this patient's family history and updated it with pertinent information if needed.   Family History   Problem Relation Age of Onset     Alcohol/Drug Father      Heart Disease Father 71     Alzheimer Disease Sister      Alcohol/Drug Sister      Alcohol/Drug Sister      Gastrointestinal Disease Sister      Obesity Sister      Hypertension Sister      Heart Disease Sister      Hypertension Sister      Heart Disease Daughter         afib     Arrhythmia Daughter       Multiple Sclerosis Daughter      Heart Disease Daughter         afib     Arrhythmia Daughter      Cancer Daughter         lymphoma     Aneurysm Mother        Review of Systems   The 10 point Review of Systems is negative other than noted in the HPI or here.   Physical Exam   Temp: 97.7  F (36.5  C) Temp src: Oral BP: 125/69 Pulse: 65   Resp: 20 SpO2: 96 % O2 Device: None (Room air)    Vital Signs with Ranges  Temp:  [97.3  F (36.3  C)-97.9  F (36.6  C)] 97.7  F (36.5  C)  Pulse:  [65] 65  Resp:  [16-20] 20  BP: (101-134)/(51-82) 125/69  SpO2:  [95 %-98 %] 96 %  173 lbs 0 oz      Data   Results for orders placed or performed during the hospital encounter of 04/01/21 (from the past 24 hour(s))   CT Head w/o Contrast    Narrative    CT SCAN OF THE HEAD WITHOUT CONTRAST April 1, 2021 12:37 PM     HISTORY: Subarachnoid hemorrhage (SAH), follow up. Subdural hematoma.    TECHNIQUE: Axial images of the head and coronal reformations without  IV contrast material. Radiation dose for this scan was reduced using  automated exposure control, adjustment of the mA and/or kV according  to patient size, or iterative reconstruction technique.    COMPARISON: CT head dated 4/1/2021.    FINDINGS: Mildly increased density and extent of the previously  demonstrated right parafalcine subdural hematoma extending along the  superior aspect of the right tentorial leaflet measuring up to  approximately 4-5 mm. Interval increase in scattered subarachnoid  hemorrhage in the right cingulate sulcus, left sylvian fissure, and in  the basal cisterns, including in the right parasellar region,  prepontine cistern, right ambient cistern, and extending posteriorly  into the quadrigeminal cistern and the left ambient cistern. Now  apparent is a hemorrhagic parenchymal contusion involving the  paramedian aspect of the anterior inferior left frontal lobe (series 3  image 12) with parenchymal hematoma measuring 14 mm x 10 mm x 12 mm.  Newly apparent  bilateral acute cerebral convexity subdural hematomas  measuring up to 5-6 mm in thickness on the left and 3-4 mm in  thickness on the right (series 7 image 22-25). There is no significant  midline shift/herniation.    Chronic areas of encephalomalacia involving the right and left frontal  lobe, left parietal lobe, and left occipital lobe are unchanged from  prior. There is mild generalized brain parenchymal volume loss and  mild patchy hypoattenuation in the cerebral white matter likely  representing chronic small vessel ischemic disease. The ventricles are  normal in size and configuration without evidence for hydrocephalus.  Scattered intracranial atherosclerotic calcifications are again seen.    The bilateral orbits appear within normal limits. The visualized  paranasal sinuses and mastoids are clear. The visualized aspects of  the calvarium and skull base appear grossly intact.      Impression    IMPRESSION:  1. Interval increase in scattered small to moderate volume  subarachnoid hemorrhage, as described.  2. Newly apparent small left paramedian inferior frontal lobe  hemorrhagic parenchymal contusion.  3. New small hypodense acute bilateral cerebral convexity subdural  hematomas.  4. Mildly increased density and extent of the previously seen thin  right parafalcine/tentorial subdural hematoma.  5. Unchanged multifocal areas of chronic encephalomalacia in the  cerebral hemispheres.  6. No significant midline shift/herniation.    The findings were discussed with Kelly ALTMAN, the patient's nurse, by  myself at approximately 1:15 PM on 4/1/2021. She agreed to promptly  relay the findings to the neurosurgery service.    SUSU CHIU MD   INR   Result Value Ref Range    INR 1.26 (H) 0.86 - 1.14   Troponin I   Result Value Ref Range    Troponin I ES <0.015 0.000 - 0.045 ug/L   INR   Result Value Ref Range    INR 1.23 (H) 0.86 - 1.14   INR   Result Value Ref Range    INR 1.16 (H) 0.86 - 1.14   Troponin I   Result Value  Ref Range    Troponin I ES <0.015 0.000 - 0.045 ug/L   CT Head w/o Contrast    Narrative    EXAM: CT HEAD W/O CONTRAST  LOCATION: [facility]  DATE/TIME: 4/2/2021 6:35 AM    INDICATION: Follow-up intracranial hemorrhage  COMPARISON: 04/01/2020 01/12/2008 and 2:05 AM  TECHNIQUE: Routine CT Head without IV contrast. Multiplanar reformats. Dose reduction techniques were used.    FINDINGS:  INTRACRANIAL CONTENTS: Interval decreased density with slightly increased volume of the subdural collections along the bilateral cerebral hemispheres, measuring 6 mm on the left and 5 mm on the right. No adverse associated mass effect. Thin subdural   hemorrhages along the falx and right tentorium are unchanged. Scattered subarachnoid hemorrhage is the stable. Stable hemorrhagic contusion in the inferior left frontal lobe. Focal hemorrhage along the medial right temporal lobe may have a component of   parenchymal hemorrhage, but is grossly unchanged from the prior exam. No CT evidence of acute infarct. Multifocal chronic infarcts in the bilateral cerebral hemispheres and left cerebellum. Mild presumed chronic small vessel ischemic changes. Mild   generalized volume loss. No hydrocephalus.     VISUALIZED ORBITS/SINUSES/MASTOIDS: No intraorbital abnormality. Trace mucosal thickening in the right sphenoid sinus. No middle ear or mastoid effusion.    BONES/SOFT TISSUES: No acute abnormality.      Impression    IMPRESSION:  1.  Decreased density, but slightly increased volume of the subdural collections along the bilateral cerebral hemispheres. 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 No adverse associated mass effect.  2.  Otherwise, no significant interval change from the prior exam.   CBC with platelets   Result Value Ref Range    WBC 9.9 4.0 - 11.0 10e9/L    RBC Count 4.26 (L) 4.4 - 5.9 10e12/L    Hemoglobin 12.8 (L) 13.3 - 17.7 g/dL    Hematocrit 38.4 (L) 40.0 - 53.0 %    MCV 90 78 - 100 fl    MCH 30.0 26.5 -  33.0 pg    MCHC 33.3 31.5 - 36.5 g/dL    RDW 14.7 10.0 - 15.0 %    Platelet Count 169 150 - 450 10e9/L   Basic metabolic panel   Result Value Ref Range    Sodium 133 133 - 144 mmol/L    Potassium 4.1 3.4 - 5.3 mmol/L    Chloride 103 94 - 109 mmol/L    Carbon Dioxide 23 20 - 32 mmol/L    Anion Gap 7 3 - 14 mmol/L    Glucose 98 70 - 99 mg/dL    Urea Nitrogen 23 7 - 30 mg/dL    Creatinine 1.19 0.66 - 1.25 mg/dL    GFR Estimate 58 (L) >60 mL/min/[1.73_m2]    GFR Estimate If Black 68 >60 mL/min/[1.73_m2]    Calcium 8.2 (L) 8.5 - 10.1 mg/dL   INR   Result Value Ref Range    INR 1.09 0.86 - 1.14   Partial thromboplastin time   Result Value Ref Range    PTT 36 22 - 37 sec     *Note: Due to a large number of results and/or encounters for the requested time period, some results have not been displayed. A complete set of results can be found in Results Review.

## 2021-04-02 NOTE — PROVIDER NOTIFICATION
Text sent to Dr. Sanchez to notify that patient had ICD interrogated yesterday in ED and report is being placed in Epic. Wondering if she would still like repeat interrogation. Awaiting return call.

## 2021-04-03 NOTE — PROGRESS NOTES
Pt here with traumatic SAH/SDH and R olecranon fx. A&Ox4. Neuros intact. VSS on RA. Tele V paced. Reg diet, thin liquids. Takes pills whole. Up with SBA, GB. Denies pain, scheduled tylenol given. R arm wrapped and in sling.  Scalp lac DAVID, dried drainage. Plan for probable discharge home today with OP PT/OT, nursing cont to monitor.

## 2021-04-03 NOTE — PROGRESS NOTES
Cardiology progress note:    I was called by the hospital medicine team regarding recommendations for therapeutic anticoagulation in the setting of his subdural hemorrhage.  Please review the consultation note from yesterday for full details of patient presentation.  In short, the patient is being discharged today following an admission for subdural and small subarachnoid hemorrhage.  He was previously on Coumadin and Plavix.  He does have an extensive cardiac history that is well outlined on the initial consultation note.    I discussed the benefits and risks for holding therapeutic anticoagulation with the neurosurgical team.  Given his current concerns with a slight increase in subdural fluid collections, I agree with the neurosurgical team that anticoagulation should be held including his Plavix and Coumadin.  He will be followed up in the outpatient neurosurgical clinic in 2 weeks to reassess his neurologic status.  At that point, they can determine if anticoagulation could be resumed.  At this time I would suggest discontinuing Plavix as its been greater than 8 months since his most recent PCI.  If anticoagulation cannot be resumed we could consider the initiation of low-dose baby aspirin 81 mg when deemed appropriate from a neurosurgical perspective.  I agree that there are risks involved with holding anticoagulation given his atrial fibrillation and decreased ejection fraction, however we need to balance this with the risks for recurrent hemorrhage.    I discussed the above plan with the neurosurgical team, hospital medicine team and we are all in agreement with holding anticoagulation with short-term follow-up in the neurosurgical clinic to reassess.  If anticoagulation can be resumed this can be done with monotherapy warfarin and Plavix can be discontinued.  Alternatively, can consider low-dose baby aspirin 81 mg if warfarin cannot be restarted.

## 2021-04-03 NOTE — PLAN OF CARE
Pt here with subarachnoid hemorrhage. A&O x 4. Neuros intact. VSS. Regular diet, thin liquids. Takes pills whole with water.  Up with SBA. Pain 1/10, did not want interventions.  Pt uses urinal to void, voided x3 on shift without difficulty. Pt scoring green on the Aggression Stop Light Tool. Plan to discharge home tomorrow with PT/OT.

## 2021-04-03 NOTE — PROGRESS NOTES
piv removed.  Discharge instructions reviewed with pt and pt family.  All questions answered.  Teach back used.  All pt belongings gathered prior to discharge.  Pt to discharge ~1500 when transport is available.

## 2021-04-03 NOTE — DISCHARGE SUMMARY
[facility]  Hospitalist Discharge Summary      Date of Admission:  4/1/2021  Date of Discharge:  4/3/2021  Discharging Provider: Polina Chan MD      Discharge Diagnoses   Subdural Hematoma  Small Volume Subarachnoid Hemorrhage  Scalp laceration   Anticoagulated on warfarin INR on admission 2.81.  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  The   following labs/tests are recommended: BMP, hgb.  Follow up with Neurosurgery in 2 weeks with a head CT prior to that   appointment.             Unresulted Labs Ordered in the Past 30 Days of this Admission     No orders found from 3/2/2021 to 4/2/2021.          Discharge Disposition   Discharged to home  Condition at discharge: Good      Hospital Course         Tyrel Rene is a 75 year old male with a past medical history of CAD s/p CABG x3 2012, severe ischemic cardiomyopathy last EF 20-25%, NSVT with ICD placement, atrial fibrillation, atrial flutter, HTN, episodes of syncope, and CKD stage III admitted on 4/1/2021 with a loss of consciousness and fall down 5 stairs. He was found to have a 4mm acute parafalcine subdural hematoma along the right tentorial leaflet and a small volume subarachnoid hemorrhage in the right paraclinoid area.     Subdural Hematoma  Small Volume  Subarachnoid Hemorrhage  Scalp laceration - wound closed with steri-strips in ED.   Anticoagulated on warfarin INR on admission 2.81.  Patient had a loss of coconsciousness with a fall down 5 stairs. Patient reports getting up to go to bed when he became dizzy on the stairs and lost his balance. He recalls reaching for the side rail on the stairs but does not recall events following until he was in the ambulance.   * CT head findings include acute right parafalcine subdural hematoma measuring up to 4 mm in thickness which extends along the right tentorial leaflet where it measures up to 2 mm in thickness, small volume subarachnoid hemorrhage in the right paraclinoid area, extending into the prepontine cistern, along the interhemispheric fissure and into the left sylvian fissure and trace blood products in the occipital horns.  * CTA negative for aneurysm.    - F/u CT AM of 04/02/21: decreased density but slightly increased volume of bilateral subdural hematohygromas.  - SBP <150 mmHg per neurosurgery and BP controlled on home medications during his stay.   - Therapies assessed and recommended outpatient OT.    - Case reviewed by cardiology and neurosurgery and recommended discharge to home with follow up with neurosurgery in 2 weeks. Hold clopidogrel and coumadin until follow up. At that time they will decided whether to consider resuming coumadin or start baby aspirin.      Syncope and Collapse  History of Frequent NSVT s/p PPM/ICD  History of VT storm  Atrial Fibrillation with Hx of complete AV block s/p PPM/ICD  Patient reports getting up to go to bed when he became dizzy on the stairs and lost his balance. He recalls reaching for the side rail on the stairs but does not recall events following until he was in the ambulance. He reports frequently feeling dizzy which he attributes to his medications.  * PPM/ICD was interrogated in the ED which did indicate a couple 3-4 beat episodes of V tach earlier in the  evening but did not seem to coincide with the timing of the fall. Also admitted in 9/2020 with syncope and collapse with ICD interrogation not noting any episodes of VT however while on telemetry monitoring the patient had frequent episodes of NSVT. EP adjusted settings during that hospitalization due to suspected episodes of device-induced proarrhythmia. He follows closely with cardiology on amiodarone and recently initiated on Mexilitine.    - Cardiology reviewed and syncope suspect syncope secondary to vasovagal spell or orthostatic hypotension.   - Continue PTA Amiodarone, digoxin  - Continue PTA Mexilitine (started recently).      CAD s/p BONITA to OM 2 8/20 and CABG x3 2012  Severe Ischemic Cardiomyopathy, last EF 20-25% 9/20  HFrEF  Anterior STEMI in 2012 requiring an emergent 3 vessel CABG. BONITA to OM2 8/20/20.  Patient reports weight increasing last week with orthopnea. He took torsemide on 3/26 and reported a nearly 10lb weight loss overnight. He reports his weight started to increased again following but then decreased again without additional dosing. He denies any orthopnea currently.     - Continue PTA Coreg  - Holding  Warfarin (INR 1.09 on 04/02/21) and  Plavix   - As above, cardiology and neurosurgery discussed case given his higher risk of stroke or MI off anticoagulation and anti-platelets. He will follow up with NSG in 2 weeks to determine ultimate timing of resumption coumadin and starting low dose ASA.   -  Continue PTA coreg, digoxin, sacubitril-valsartan, rosuvastatin   -  On PRN torsemide at home. Appeared euvolemic throughout his stay. Patient will trend weights at home.  -  No swelling, lungs clear.     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 Again appreciate cardiology's input  regarding risk of holding anticoagulation right now.     Mildly Displaced Fracture Right Olecranon Process with like Hemarthrosis  - Orthopedics consult appreciated.  - Non weight bearing right arm  - Non-operative management recommended   - Keep posterior splint in place  - Sling for comfort  - NWB right UE  - Elevate as able  - Follow-up with Dr Morrison in 2 weeks at [facility] for recheck.  Call [phone] for appt and questions.   - PTA tylenol for pain.   - Did not require Oxycodone during his stay, so none prescribed at discharge.      Acute Lower Back Pain, resolved  Patient reports acute lower back pain which he describes to the middle of his lower back. Gross motor movement intact. Denies any numbness or tingling.  CT lumbar spine negative for fracture  Pelvic xray negative for fracture    -Tylenol 975mg PO q8hr     CKD, Stage III, baseline Cr ~ 1.5  Stable renal function  Patient did receive contrast dye this admission    - Avoid nephrotoxins    Recent Labs   Lab 04/02/21  0848 04/01/21  0029   CR 1.19 1.30*          Hyponatremia, Mild. RESOLVED Na 132 on admission. Reports following a very strict low sodium diet. Appears euvolemic. Resolved without IVF or other intervention.           Consultations This Hospital Stay   NEUROSURGERY IP CONSULT  PHYSICAL THERAPY ADULT IP CONSULT  OCCUPATIONAL THERAPY ADULT IP CONSULT  SPEECH LANGUAGE PATH ADULT IP CONSULT  ORTHOPEDIC SURGERY IP CONSULT  NEUROSURGERY IP CONSULT  CARDIOLOGY IP CONSULT  CARDIOLOGY IP CONSULT  CARE MANAGEMENT / SOCIAL WORK IP CONSULT  CARDIOLOGY IP CONSULT    Code Status   Special Code    Time Spent on this Encounter   I, Polina Chan MD, personally saw the patient today and spent greater than 30 minutes discharging this patient.       Polina Chan MD  [place] [address] [address]  [facility] [place] [address]  Phone:  [phone]  ______________________________________________________________________    Physical Exam   Vital Signs: Temp: 97.4  F (36.3  C) Temp src: Oral BP: 105/58 Pulse: 65   Resp: 16 SpO2: 96 % O2 Device: None (Room air)    Weight: 177 lbs 0 oz  Constitutional:    NAD,   Neuropsyche:  Very pleasant alert and oriented, answers questions appropriately. Speech normal, face symmetric and moving all 4 extremities without gross focal neurological deficit.   Respiratory:        Breathing comfortably, good air exchange, no wheezes, no crackles.   Cardiovascular:  Regular rate and rhythm did not appreciate murmur, displaced and prominent apical impulse, no edema.  GI:  soft, NT/ND, BS normal  Skin/Integumen:  No acute rash or sign of bleeding.       Primary Care Physician   Dejon Downs    Discharge Orders      Occupational Therapy Referral      Discharge Instructions    Right elbow instructions:  Keep posterior splint in place.  Keep clean and dry.  Do not get wet and nothing down splint to itch.   Sling for comfort  NWB right UE  Elevate as able  Follow-up with Dr Morrison in 2 weeks at [facility] for recheck.  Call [phone] for appt and questions.    An appointment has been scheduled for April 19 at 1:20 p.m.  [facility]  [address] [place] [address], [address]  [place] [address]     Reason for your hospital stay    You were admitted with a head bleed after passing out and hitting your head. Your pacemaker was interrogated and no arrhythmia was found. You are to hold your plavix and coumadin until follow up with neurosurgery. Cardiology is aware of this recommendation.     Follow-up and recommended labs and tests     Follow up with primary care provider, Dejon Downs, within 7 days to evaluate medication change and for hospital follow- up.  The following labs/tests are recommended: BMP, hgb.  Follow up with Neurosurgery in 2 weeks with a head CT prior to that appointment.     Activity    Your  activity upon discharge: activity restrictions per neurosurgery and orthopedics.     Diet    Low salt diet as before.       Significant Results and Procedures   Most Recent 3 CBC's:  Recent Labs   Lab Test 04/02/21  0848 04/01/21  0029 09/19/20  2219   WBC 9.9 9.3 7.4   HGB 12.8* 13.2* 13.6   MCV 90 91 92    184 202     Most Recent 3 BMP's:  Recent Labs   Lab Test 04/02/21  0848 04/01/21  0029 01/18/21  1100    132* 136   POTASSIUM 4.1 4.3 4.3   CHLORIDE 103 100 105   CO2 23 27 28   BUN 23 19 13   CR 1.19 1.30* 1.44*   ANIONGAP 7 5 3   LORI 8.2* 8.0* 8.5   GLC 98 99 78     Most Recent 2 LFT's:  Recent Labs   Lab Test 04/01/21  0029 10/05/20  1100   AST 47* 24   ALT 50 29   ALKPHOS 78 75   BILITOTAL 0.4 0.5     Most Recent 3 INR's:  Recent Labs   Lab Test 04/02/21  0848 04/01/21  2245 04/01/21  1526   INR 1.09 1.16* 1.23*     Most Recent 3 Troponin's:  Recent Labs   Lab Test 04/02/21  0055 04/01/21  1526 04/01/21  1006   TROPI <0.015 <0.015 0.016     Vitamin D level 26,   Results for orders placed or performed during the hospital encounter of 04/01/21   Head CT w/o contrast    Narrative    EXAM: CT HEAD W/O CONTRAST, CT CERVICAL SPINE W/O CONTRAST  LOCATION: [facility]  DATE/TIME: 4/1/2021 1:56 AM    INDICATION: Traumatic injury. Pain.  COMPARISON: None.  TECHNIQUE:   1) Routine CT Head without IV contrast. Multiplanar reformats. Dose reduction techniques were used.  2) Routine CT Cervical Spine without IV contrast. Multiplanar reformats. Dose reduction techniques were used.    FINDINGS:   HEAD CT:   INTRACRANIAL CONTENTS: There is an acute right parafalcine subdural hematoma measuring up to 4 mm at the level of the parietal lobes. This subdural hematoma extends along the right tentorial leaflet where it measures approximately 2 mm. Small volume   subarachnoid hemorrhage in the right paraclinoid region and interpeduncular cistern as well as the prepontine cistern. Small volume subarachnoid  hemorrhage extending into the left sylvian fissure. No parenchymal hemorrhage is identified. Trace blood   products in the occipital horns. No CT evidence of acute infarct. Mild to moderate presumed chronic small vessel ischemic changes. Chronic infarcts in the left middle frontal gyrus, left frontal operculum and left parietal lobe. Chronic infarcts in the   right middle frontal gyrus and left cerebellar hemisphere. Mild to moderate generalized volume loss. No hydrocephalus.     VISUALIZED ORBITS/SINUSES/MASTOIDS: No intraorbital abnormality. Mild mucosal thickening scattered about the paranasal sinuses. No middle ear or mastoid effusion.    BONES/SOFT TISSUES: Left parietal scalp hematoma. No fracture.    CERVICAL SPINE CT:   VERTEBRA: Normal vertebral body heights and alignment. No fracture or posttraumatic subluxation.     CANAL/FORAMINA: No significant central canal stenosis. Moderate bilateral neural foraminal narrowing at C3-C4, C5-C6 and C6-C7.    PARASPINAL: No extraspinal abnormality. Visualized lung fields are clear.      Impression    IMPRESSION:  HEAD CT:  1.  Acute right parafalcine subdural hematoma measuring up to 4 mm in thickness. This extends along the right tentorial leaflet where it measures up to 2 mm in thickness.  2.  Small volume subarachnoid hemorrhage in the right paraclinoid area, extending into the prepontine cistern, along the interhemispheric fissure and into the left sylvian fissure.  3.  Trace blood products in the occipital horns.  4.  Age-related changes with multifocal areas of chronic infarction as detailed above.  5.  Posterior left parietal scalp hematoma. No fracture.    CERVICAL SPINE CT:  1.  No CT evidence for acute fracture or post traumatic subluxation.  2.  Moderate multilevel neural foraminal narrowing as detailed above.    Exam discussed with Dr. Ness at 2:38 AM.   CT Cervical Spine w/o Contrast    Narrative    EXAM: CT HEAD W/O CONTRAST, CT CERVICAL SPINE W/O  CONTRAST  LOCATION: [facility]  DATE/TIME: 4/1/2021 1:56 AM    INDICATION: Traumatic injury. Pain.  COMPARISON: None.  TECHNIQUE:   1) Routine CT Head without IV contrast. Multiplanar reformats. Dose reduction techniques were used.  2) Routine CT Cervical Spine without IV contrast. Multiplanar reformats. Dose reduction techniques were used.    FINDINGS:   HEAD CT:   INTRACRANIAL CONTENTS: There is an acute right parafalcine subdural hematoma measuring up to 4 mm at the level of the parietal lobes. This subdural hematoma extends along the right tentorial leaflet where it measures approximately 2 mm. Small volume   subarachnoid hemorrhage in the right paraclinoid region and interpeduncular cistern as well as the prepontine cistern. Small volume subarachnoid hemorrhage extending into the left sylvian fissure. No parenchymal hemorrhage is identified. Trace blood   products in the occipital horns. No CT evidence of acute infarct. Mild to moderate presumed chronic small vessel ischemic changes. Chronic infarcts in the left middle frontal gyrus, left frontal operculum and left parietal lobe. Chronic infarcts in the   right middle frontal gyrus and left cerebellar hemisphere. Mild to moderate generalized volume loss. No hydrocephalus.     VISUALIZED ORBITS/SINUSES/MASTOIDS: No intraorbital abnormality. Mild mucosal thickening scattered about the paranasal sinuses. No middle ear or mastoid effusion.    BONES/SOFT TISSUES: Left parietal scalp hematoma. No fracture.    CERVICAL SPINE CT:   VERTEBRA: Normal vertebral body heights and alignment. No fracture or posttraumatic subluxation.     CANAL/FORAMINA: No significant central canal stenosis. Moderate bilateral neural foraminal narrowing at C3-C4, C5-C6 and C6-C7.    PARASPINAL: No extraspinal abnormality. Visualized lung fields are clear.      Impression    IMPRESSION:  HEAD CT:  1.  Acute right parafalcine subdural hematoma measuring up to 4 mm in thickness.  This extends along the right tentorial leaflet where it measures up to 2 mm in thickness.  2.  Small volume subarachnoid hemorrhage in the right paraclinoid area, extending into the prepontine cistern, along the interhemispheric fissure and into the left sylvian fissure.  3.  Trace blood products in the occipital horns.  4.  Age-related changes with multifocal areas of chronic infarction as detailed above.  5.  Posterior left parietal scalp hematoma. No fracture.    CERVICAL SPINE CT:  1.  No CT evidence for acute fracture or post traumatic subluxation.  2.  Moderate multilevel neural foraminal narrowing as detailed above.    Exam discussed with Dr. Ness at 2:38 AM.   Lumbar spine CT w/o contrast    Narrative    EXAM: CT LUMBAR SPINE W/O CONTRAST  LOCATION: [facility]  DATE/TIME: 4/1/2021 1:56 AM    INDICATION: Traumatic injury. Pain.  COMPARISON: None.  TECHNIQUE: Routine CT Lumbar Spine without IV contrast. Multiplanar reformats. Dose reduction techniques were used.     FINDINGS:  VERTEBRA: Vertebral body height is normal. There is 3 mm anterolisthesis of L5 on S1. No fracture or posttraumatic subluxation.     CANAL/FORAMINA: Moderate to severe right neural foraminal narrowing at L4-L5. No high-grade central canal stenosis.    PARASPINAL: No extraspinal abnormality.      Impression    IMPRESSION:  1.  No acute fracture or subluxation.  2.  Moderate to severe right neural foraminal narrowing at L4-L5.   Elbow XR, G/E 3 views, right    Narrative    EXAM: XR ELBOW RT G/E 3 VW  LOCATION: [facility]  DATE/TIME: 4/1/2021 2:09 AM    INDICATION: Fall, pain.  COMPARISON: None.      Impression    IMPRESSION: There is a mildly displaced fracture of the olecranon process of the proximal ulna. There is a moderate resultant elbow joint effusion consistent with a hemarthrosis. No dislocation.   CTA Head Neck with Contrast    Narrative    EXAM: CTA  HEAD NECK WITH CONTRAST  LOCATION: [facility]  DATE/TIME: 4/1/2021 3:37 AM    INDICATION: Intracranial hemorrhage. Evaluate for aneurysm.  COMPARISON: Head CT and cervical spine CT 04/01/2021, CTA head and neck 12/21/2014, report from angiogram dated 01/12/2011.  CONTRAST: 70 mL [id number].  TECHNIQUE: Head and neck CT angiogram with IV contrast. Axial helical CT images of the head and neck vessels obtained during the arterial phase of intravenous contrast administration. Axial 2D reconstructed images and multiplanar 3D MIP reconstructed   images of the head and neck vessels were performed by the technologist. Dose reduction techniques were used. All stenosis measurements made according to NASCET criteria unless otherwise specified.    FINDINGS:     HEAD CTA:  ANTERIOR CIRCULATION: No stenosis/occlusion, aneurysm, or high flow vascular malformation. Standard [place] anatomy.    POSTERIOR CIRCULATION: Nondominant right vertebral artery is occluded as it enters the dura. There is some retrograde filling of the distal right vertebral artery. Normal left vertebral artery.     DURAL VENOUS SINUSES: Expected enhancement of the major dural venous sinuses.    NECK CTA:  RIGHT CAROTID: Atherosclerotic plaque results in less than 50% stenosis in the right ICA. No dissection. Mild luminal irregularity.    LEFT CAROTID: No measurable stenosis or dissection. There is a tiny medially directed pseudoaneurysm measuring 2 mm (series 10 image 299) and an additional 2 mm laterally directed aneurysm (series 10 image 325. Luminal irregularity of the ICA suggests   fibromuscular dysplasia.    VERTEBRAL ARTERIES: The nondominant right vertebral artery is only intermittently seen consistent with age-indeterminate stenosis and multifocal occlusion. Dominant left vertebral artery is widely patent into the head.    AORTIC ARCH: Classic aortic arch anatomy with no significant stenosis at the origin of the great vessels.    NONVASCULAR STRUCTURES: Unremarkable.       Impression    IMPRESSION:     HEAD CTA:   1.  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 There is some retrograde filling of the distal right vertebral artery.  2.  Otherwise unremarkable. No evidence for aneurysm.    NECK CTA:  1.  The nondominant right vertebral artery is only intermittently seen consistent with age-indeterminate multifocal stenosis or occlusion.  2.  Extensive tortuosity of the internal carotid arteries bilaterally with some luminal irregularity consistent with a previously described fibromuscular dysplasia.  3.  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 One of these is described on the conventional angiogram from 01/12/2011.               XR Pelvis w Hip Right 1 View    Narrative    EXAM: XR PELVIS AND HIP RIGHT 1 VIEW  LOCATION: [facility]  DATE/TIME: 4/1/2021 3:40 AM    INDICATION: Fall  COMPARISON: None.      Impression    IMPRESSION: No fracture or dislocation. 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 Moderate degenerative disc changes in the spine.   CT Head w/o Contrast    Narrative    CT SCAN OF THE HEAD WITHOUT CONTRAST April 1, 2021 12:37 PM     HISTORY: Subarachnoid hemorrhage (SAH), follow up. Subdural hematoma.    TECHNIQUE: Axial images of the head and coronal reformations without  IV contrast material. Radiation dose for this scan was reduced using  automated exposure control, adjustment of the mA and/or kV according  to patient size, or iterative reconstruction technique.    COMPARISON: CT head dated 4/1/2021.    FINDINGS: Mildly increased density and extent of the previously  demonstrated right parafalcine subdural hematoma extending along the  superior aspect of the right tentorial leaflet measuring up to  approximately 4-5 mm. Interval increase in scattered subarachnoid  hemorrhage in the right cingulate sulcus, left sylvian fissure, and in  the basal cisterns, including in  the right parasellar region,  prepontine cistern, right ambient cistern, and extending posteriorly  into the quadrigeminal cistern and the left ambient cistern. Now  apparent is a hemorrhagic parenchymal contusion involving the  paramedian aspect of the anterior inferior left frontal lobe (series 3  image 12) with parenchymal hematoma measuring 14 mm x 10 mm x 12 mm.  Newly apparent bilateral acute cerebral convexity subdural hematomas  measuring up to 5-6 mm in thickness on the left and 3-4 mm in  thickness on the right (series 7 image 22-25). There is no significant  midline shift/herniation.    Chronic areas of encephalomalacia involving the right and left frontal  lobe, left parietal lobe, and left occipital lobe are unchanged from  prior. There is mild generalized brain parenchymal volume loss and  mild patchy hypoattenuation in the cerebral white matter likely  representing chronic small vessel ischemic disease. The ventricles are  normal in size and configuration without evidence for hydrocephalus.  Scattered intracranial atherosclerotic calcifications are again seen.    The bilateral orbits appear within normal limits. The visualized  paranasal sinuses and mastoids are clear. The visualized aspects of  the calvarium and skull base appear grossly intact.      Impression    IMPRESSION:  1. Interval increase in scattered small to moderate volume  subarachnoid hemorrhage, as described.  2. Newly apparent small left paramedian inferior frontal lobe  hemorrhagic parenchymal contusion.  3. New small hypodense acute bilateral cerebral convexity subdural  hematomas.  4. Mildly increased density and extent of the previously seen thin  right parafalcine/tentorial subdural hematoma.  5. Unchanged multifocal areas of chronic encephalomalacia in the  cerebral hemispheres.  6. No significant midline shift/herniation.    The findings were discussed with Kelly ALTMAN, the patient's nurse, by  myself at approximately 1:15 PM on  4/1/2021. She agreed to promptly  relay the findings to the neurosurgery service.    SUSU CHIU MD   CT Head w/o Contrast    Narrative    EXAM: CT HEAD W/O CONTRAST  LOCATION: [facility]  DATE/TIME: 4/2/2021 6:35 AM    INDICATION: Follow-up intracranial hemorrhage  COMPARISON: 04/01/2020 01/12/2008 and 2:05 AM  TECHNIQUE: Routine CT Head without IV contrast. Multiplanar reformats. Dose reduction techniques were used.    FINDINGS:  INTRACRANIAL CONTENTS: Interval decreased density with slightly increased volume of the subdural collections along the bilateral cerebral hemispheres, measuring 6 mm on the left and 5 mm on the right. No adverse associated mass effect. Thin subdural   hemorrhages along the falx and right tentorium are unchanged. Scattered subarachnoid hemorrhage is the stable. Stable hemorrhagic contusion in the inferior left frontal lobe. Focal hemorrhage along the medial right temporal lobe may have a component of   parenchymal hemorrhage, but is grossly unchanged from the prior exam. No CT evidence of acute infarct. Multifocal chronic infarcts in the bilateral cerebral hemispheres and left cerebellum. Mild presumed chronic small vessel ischemic changes. Mild   generalized volume loss. No hydrocephalus.     VISUALIZED ORBITS/SINUSES/MASTOIDS: No intraorbital abnormality. Trace mucosal thickening in the right sphenoid sinus. No middle ear or mastoid effusion.    BONES/SOFT TISSUES: No acute abnormality.      Impression    IMPRESSION:  1.  Decreased density, but slightly increased volume of the subdural collections along the bilateral cerebral hemispheres. 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 No adverse associated mass effect.  2.  Otherwise, no significant interval change from the prior exam.     *Note: Due to a large number of results and/or encounters for the requested time period, some results have not been displayed. A complete set of results can be found in Results  Review.       Discharge Medications   Current Discharge Medication List      CONTINUE these medications which have NOT CHANGED    Details   acetaminophen (TYLENOL) 500 MG tablet Take 1,000 mg by mouth every 8 hours as needed for mild pain      amiodarone (PACERONE) 200 MG tablet Take 1 tablet (200 mg) by mouth daily  Qty: 90 tablet, Refills: 1    Associated Diagnoses: Ventricular fibrillation (H)      carvedilol (COREG) 6.25 MG tablet Take 1 tablet (6.25 mg) by mouth 2 times daily (with meals)  Qty: 180 tablet, Refills: 3    Associated Diagnoses: Ventricular tachycardia (H)      digoxin (LANOXIN) 125 MCG tablet Take 1 tablet (125 mcg) by mouth six times a week Do not take Sundays  Qty: 78 tablet, Refills: 3    Associated Diagnoses: Paroxysmal atrial fibrillation (H)      famotidine (PEPCID) 20 MG tablet TAKE 1 TABLET BY MOUTH TWICE A DAY  Qty: 180 tablet, Refills: 3    Comments: DX Code Needed  PATIENT REQUESTED.  Associated Diagnoses: Anemia, unspecified type      ipratropium (ATROVENT) 0.03 % nasal spray SPRAY 2 SPRAYS INTO BOTH NOSTRILS EVERY 12 HOURS  Qty: 30 mL, Refills: 8    Associated Diagnoses: Chronic rhinitis      mexiletine (MEXITIL) 150 MG capsule Take 1 capsule (150 mg) by mouth 3 times daily  Qty: 270 capsule, Refills: 3    Associated Diagnoses: NSVT (nonsustained ventricular tachycardia) (H)      Multiple Vitamins-Minerals (PRESERVISION AREDS 2 PO) Take 1 capsule by mouth 2 times daily      nitroGLYcerin (NITROSTAT) 0.4 MG sublingual tablet DISSOLVE 1 TABLET UNDER THE TONGUE EVERY 5 MINUTES AS NEEDED FOR CHEST PAIN  Qty: 25 tablet, Refills: 0    Associated Diagnoses: Postsurgical aortocoronary bypass status      rosuvastatin (CRESTOR) 20 MG tablet Take 1 tablet (20 mg) by mouth daily  Qty: 90 tablet, Refills: 3    Associated Diagnoses: Mixed hyperlipidemia      sacubitril-valsartan (ENTRESTO) 49-51 MG per tablet Take 1 tablet by mouth 2 times daily  Qty: 180 tablet, Refills: 3    Associated Diagnoses:  Chronic systolic congestive heart failure (H)      sildenafil (VIAGRA) 100 MG tablet Take 1 tablet (100 mg) by mouth daily as needed Take 30 min to 4 hours before intercourse.  Never use with nitroglycerin, terazosin or doxazosin.  Qty: 16 tablet, Refills: 3    Associated Diagnoses: Erectile dysfunction, unspecified erectile dysfunction type      Vitamin D, Cholecalciferol, 1000 UNITS TABS Take 1,000 Units by mouth daily          STOP taking these medications       ASPIRIN NOT PRESCRIBED (INTENTIONAL) Comments:   Reason for Stopping:         clopidogrel (PLAVIX) 75 MG tablet Comments:   Reason for Stopping:         warfarin ANTICOAGULANT (COUMADIN) 1 MG tablet Comments:   Reason for Stopping:             Allergies   Allergies   Allergen Reactions     Aspirin      Other reaction(s): Aspirin Contraindication (for reporting)  Cardiologist recommended no aspirin as he is on Pradaxa     Nystatin Other (See Comments)     Make his mouth numb & swelling

## 2021-04-03 NOTE — PROGRESS NOTES
"Neurosurgery Progress      Dx:      Acute right parafalcine SDH and small SAH previously on Coumadin and Plavix.     Neuro stable.      TODAY'S PLAN:      Mr. Rene is felling well this morning. Okay to discharge from our standpoint.  Continue to hold Plavix and Coumadin. Discussed with cardiology.   Plan to see him back in our NS clinic in 2 weeks with head CT day of appt.      In the event that patient's symptoms worsen or change we would appreciate being contacted. We did discuss signs of a worsening problem that he should seek being evaluated.      We did review the above information with the patient whom agrees with the plan and did verbalize understanding.   ________________________________________________________________      Mr. Rene overall feels well and denies any significant discomfort. Tolerating regular diet without n/v.      /69 (BP Location: Left arm)   Pulse 65   Temp 97.7  F (36.5  C) (Oral)   Resp 20   Ht 6' 1\" (1.854 m)   Wt 173 lb (78.5 kg)   SpO2 96%   BMI 22.82 kg/m        Pt in bed. He appears comfortable and in no apparent distress, moving all extremities.   CN II-XII intact, alert and appropriate with conversation and following commands.   Able to name 3/3 objects.   Finger to nose slow and accurate.   Rapid hand movements intact.   Absent for upper and lower extremity drift.   Bilateral upper and lower extremities 5/5. Right olecranon fracture - in brace. DTR's wnl. Sensation intact throughout. Normal ROM.   Calves soft and non-tender.      All pertinent labs and updated imaging reviewed in [facility].      CT HEAD W/O CONTRAST - 4/2/2021 6:35 AM  1.  Decreased density, but slightly increased volume of the subdural collections along the bilateral cerebral hemispheres. 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 No adverse associated mass effect.  2.  Otherwise, no significant interval change from the prior exam.    [facility] Les [facility], [facility]  [facility]  [address]  [address]  [place] [address]    Tel [phone]  Pager [phone]

## 2021-04-04 NOTE — PLAN OF CARE
Physical Therapy Discharge Summary    Reason for therapy discharge:    Discharged to home with home therapy.    Progress towards therapy goal(s). See goals on Care Plan in [facility] electronic health record for goal details.  Goals partially met.  Barriers to achieving goals:   discharge from facility.    Therapy recommendation(s):    Continued therapy is recommended.  Rationale/Recommendations:  T further increase independence with mobility.

## 2021-04-04 NOTE — DISCHARGE SUMMARY
Occupational Therapy Discharge Summary    Reason for therapy discharge:    Discharged to home.    Progress towards therapy goal(s). See goals on Care Plan in [facility] electronic health record for goal details.  Goals met    Therapy recommendation(s):    No further therapy is recommended.

## 2021-04-05 NOTE — ED TRIAGE NOTES
Pt fell on 3/31 and broke his arm and had a head injury and pain to right side of bottom.  Spent 2 days in hospital.  Pt returning via EMS today due to worsening pain to buttock.

## 2021-04-05 NOTE — ED NOTES
I was asked to assist this patient with TCU placement.  This patient was just discharged 4/3/21 s/p fall with a subdural hematoma.    I spoke with this patient and his wife and sent referrals to [facility] and [facility].  [facility] reviewed and accepted. I let them know this patient's wife is declining. This patients wife was considering going home with HC or going home and getting TCU placement from home.  I explained she could do those options but TCU from home will take approx 2 weeks.  [facility] called and could accept this patient.  This patient and his wife spoke with their daughters and they all agreed that they would go to [facility] TCU.  I faxed the notes, face sheet, orders, med list, scripts x3, and the PAS to [facility].  I also made a packet of that information and I gave it to this patients wife with instructions to turn it into [facility].  This patients wife agreed.  His daughter is providing transportation.  I updated the ER staff on the above plan.    Pt/family was provided with the Medicare Compare list for SNF.  Discussed associated Medicare star ratings to assist with choice for referrals/discharge planning Yes    Education was given to pt/family that star ratings are updated/maintained by Medicare and can be reviewed by visiting www.medicare.gov Yes    PAS-RR ?   D: Per DHS regulation, SW completed and submitted PAS-RR to [place] [employer] Direct Connect via the Senior LinkAge Line. PAS-RR confirmation # is : [id number]  I: CC spoke with patient/wife, Sharel and they are aware a PAS-RR has been submitted. CC reviewed with patient/Sharel that they may be contacted for a follow up appointment within 10 days of hospital discharge if their SNF stay is < 30 days. Contact information for Senior LinkAge Line was also provided. ?   A: Patient/Sharel verbalized understanding.   P: Further questions may be directed to [facility] LinkAge Line at #[phone], option #4 for PAS-RR staff.

## 2021-04-05 NOTE — ED PROVIDER NOTES
History   Chief Complaint:  Back Pain     The history is provided by the patient.      Tyrel Rene is a 75 year old male with history of admission on 5 days ago for subdural hematoma after a fall, who presents with after worsening back/buttock pain.     Patient is a complicated recent past medical history that includes admission to the hospital due to fall and head injury reversal of Coumadin due to traumatic subdural and subarachnoid.  Patient states his back was not bothering so much while he was in the hospital but is developed worsening pain localizes in the right lower back and radiates to the right leg.  Patient is a difficulty ambulating.  He also has a splint on his right arm after injury in his fall a few days ago.  Patient presents with wife as there is a concern that he is not doing well at home.  He was offered a TCU admit at the time of discharge but family was attempting to care for him at home but at this point feel maybe it is too much and that a TCU might be appropriate.    Review of Systems   Musculoskeletal: Positive for back pain.   All other systems reviewed and are negative.      Allergies:  Aspirin  Nystatin    Medications:  Amiodarone   Aspirin   Carvedilol   Digoxin   Famotidine   Mexiletine   Nitroglycerin   Rosuvastatin   Sacubitril-valsartan   Sildenafil      Past Medical History:    Atrial fibrillation    Atrial flutter   Coronary artery disease    Cardiogenic shock   Cardiomyopathy   Erectile dysfunction   Hypertension   Neuropathy   Supraventricular tachycardia (H)   Nonsustained ventricular tachycardia (H)   Right Lobe Pneumonia  Ventriclar fibrillations   Chronic kidney disease    Mixed hyperlipidemia   Paroxysmal ventricular tachycardia   Congestive heart failure   Diplopia  Cerebral artery occulusion with cerebral infarction  Anemia   STEMI (ST elevated myocardial Infarction)      Past Surgical History:    Bypass graft artery coronary   Cardioversion  Coronary angiography    Coronary artery bypass  CV angiogram coronary graft   CV heart catheterization with possible intervention  CV PCI atherectomy orbital   EP Ablation VT  EP comprehensive study   H ablation atrial flutter  Angioplasty   Hernia repair  Relocate generator ICD/pacemaker     Family History:    Father: Alcohol/drug, heart disease   Sister:  Alzheimer disease,  Alcohol/drug, gastrointestinal disease , obesity, heart disease, hypertension  Mother: Aneurysm   Daughter: cancer, arrhythmia, Atrial fibrillation, Multiple sclerosis, hypertension, heart disease      Social History:  Arrival via EMS   Presents with wife  PCP: Dejon Downs    Physical Exam     Patient Vitals for the past 24 hrs:   BP Temp Pulse Resp SpO2 Weight   04/05/21 1245 (!) 145/80 97.6  F (36.4  C) 65 16 98 % 80.3 kg (177 lb)       Physical Exam  Vitals signs and nursing note reviewed.   HENT:      Head: Normocephalic.      Mouth/Throat:      Mouth: Mucous membranes are moist.   Cardiovascular:      Rate and Rhythm: Normal rate.      Pulses: Normal pulses.   Pulmonary:      Effort: Pulmonary effort is normal.   Abdominal:      General: Abdomen is flat.   Musculoskeletal:      Comments: Ecchymosis along the right lower back and right hip.  No step-off or deformity noted.    There is a long-arm splint on the right arm.  This was not removed   Skin:     General: Skin is warm.      Capillary Refill: Capillary refill takes less than 2 seconds.   Neurological:      General: No focal deficit present.      Mental Status: He is alert.         Emergency Department Course     Imaging:  XR Pelvis w hip right:  No radiographic evidence of an acute fracture. Recommend pelvis MRI if there is no contraindication to assess for an occult nondisplaced fracture if there is high clinical suspicion.      Reading per radiology      Emergency Department Course:  Reviewed:  I reviewed the patient's nursing notes, vitals, past medical records, Care Everywhere.      Assessments:  1312 I performed an assessment and examination of the patient as noted above.      1700 The patient was signed out to Dr. Calrson, oncoming ED physician, pending placement.     Interventions:  1326 Oxycodone 5 mg, PO   1326 Gabapentin, 100 mg, PO    Disposition:  The patient was signed out to Dr. Carlson pending placement.       Impression & Plan   Medical Decision Making:  Tyrel Rene is a 75 year old male who presents with right low back and leg right leg pain.  Repeat x-rays confirm no fracture. Unable to perform MRI of spine due to permanent pacemaker use  Due to severe pain would recommend admission to TCU due to patient's failure to thrive as an outpatient.  Consultation from the ER care manager was obtained and a placement was found at a TCU.  We will offer oxycodone and low-dose and Neurontin as a bridge to be admitted there.       Diagnosis:    ICD-10-CM    1. Lumbar radicular pain  M54.16        Discharge Medications:  Discharge Medication List as of 4/5/2021  4:10 PM      START taking these medications    Details   docusate sodium (COLACE) 100 MG capsule Take 1 capsule (100 mg) by mouth 2 times daily While taking oxycodone, Disp-30 capsule, R-0, Local Print      gabapentin (NEURONTIN) 100 MG capsule Take 1 capsule (100 mg) by mouth 2 times daily, Disp-20 capsule, R-0, Local Print         oxycodone 2.5mg q 4-6 hrs prn pain    Scribe Disclosure:  I, Bhavna Mendes, am serving as a scribe at 1:09 PM on 4/5/2021 to document services personally performed by Ghanshyam Williamson MD based on my observations and the provider's statements to me.           Ghanshyam Williamson MD  04/07/21 1553

## 2021-04-05 NOTE — ED NOTES
Pt's wife given white admission envelope for pt's transfer to TCU. Wife has called her children to come and  patient. They plan to drive by private car to the TCU. Pt states he uses a walker or cane to ambulate. Plan is to place pt in wheelchair from bed to the car and then plan for TCU admission

## 2021-04-05 NOTE — ED NOTES
Bed: FT01  Expected date:   Expected time:   Means of arrival:   Comments:  515  75 M fall/back pain  1234

## 2021-04-05 NOTE — TELEPHONE ENCOUNTER
ANTICOAGULATION  MANAGEMENT: Discharge Review    Tyrel Rene chart reviewed for anticoagulation continuity of care    Emergency room visit on 4/5 for Lumbar radicular pain.    Discharge disposition: TCU    Results:    Recent labs: (last 7 days)     04/01/21  0029 04/01/21  1250 04/01/21  1526 04/01/21  2245 04/02/21  0848   INR 2.81* 1.26* 1.23* 1.16* 1.09     Anticoagulation inpatient management:     not applicable     Anticoagulation discharge instructions:     Warfarin dosing: home regimen continued   Bridging: No   INR goal change: No      Medication changes affecting anticoagulation: Pain Meds    Additional factors affecting anticoagulation: No    Plan     No adjustment to anticoagulation plan needed    Recommended follow up is scheduled    No adjustment to Anticoagulation Calendar was required    Radha Pierce RN

## 2021-04-06 NOTE — PROGRESS NOTES
[place] [facility]  PRIMARY CARE PROVIDER AND CLINIC:  Dejon Downs MD, [address] [facility] [address] / [facility] [address]  Chief Complaint   Patient presents with     Hospital F/U     [place] Medical Record Number:  [id number]  Place of Service where encounter took place:  [facility] (U) [25]    Tyrel Rene  is a 75 year old  (1945), admitted to the above facility from  [facility]. Hospital stay 4/1/21 through 4/3/21. [facility] 4/1/21-4/5/21.  .  Admitted to this facility for  rehab, medical management and nursing care.    HPI:    HPI information obtained from: facility chart records, facility staff and patient report.   Brief Summary of Hospital Course:   PMH of CAD s/p CABG X 3 2012, ischemic cardiomyopathy with EF of 20-25%, NSVT with ICD placement, atrial fib, HTN, epidodes of syncope, CKD who was admitted for syncope and fall down stairs.   Subdural hematoma/small volume subarachnoid hemorrhage: scalp laceration, on coumadin with INR 2.81 on admission.   Hospital findings: CT head findings include acute right parafalcine subdural hematoma measuring up to 4 mm in thickness which extends along the right tentorial leaflet where it measures up to 2 mm in thickness, small volume subarachnoid hemorrhage in the right paraclinoid area, extending into the prepontine cistern, along the interhemispheric fissure and into the left sylvian fissure and trace blood products in the occipital horns.  * CTA negative for aneurysm  Neurosurgery and cardiology followed, BP control, hold plavix and coumadin until 2 week f/u as OP.   Syncope  Hx of frequent NSVT s/p PPM/ICD: hx of VT storm and atrial fib: cardiology interrogatted PPM/ICD, EP adjusted settings during hospitalization, cardiology suspect syncope rt vasovagal spell or orthostatic hypotension. Continue PTA amiodarone, digoxin and mexilitine (started recently)   CAD s/p BONITA to OM 8/20 and  CABG X 3 2012, ischemic cardiomyopathy with EF 20-25%, HFrEF: patient taking torsemide prn at home, did not receive torsemide during hospitalization, euvolemic throughout stay  Hx of CVA: embolic CVA due to atrial fib with low EF: coumadin reversed due to subarachnoid hemorrhage, f/u as OP in 2 weeks for further recommendations on restarting coumadin and possibly low dose ASA  Fx of right olecranon process: mildly displaced, Dr. Baumann consulted, recommend NWB to right arm, non operative, sling for comfort and f/u in 2 weeks at [facility].   CKD: creat ~1.5 baseline, stable throughout stay  Updates on Status Since Skilled nursing Admission: On exam today patient is resting in bed, states he is having some pain in right leg from fall, seems to start in buttocks and radiate down leg, patient feels that moving more will improve pain, no c/o pain in right elbow, states he has occasional frontal headache, no pain in head at time of exam, denies N/V, states he does have dizziness when he stands, that was present PTA, some PRINCE, denies CP, palpitations, SOB at rest, states he slept well, no other c/o at this time.     CODE STATUS/ADVANCE DIRECTIVES DISCUSSION:   CPR/Full code   Patient's living condition: lives with spouse  ALLERGIES: Aspirin and Nystatin  PAST MEDICAL HISTORY:  has a past medical history of Atrial fibrillation (H), Atrial flutter (H), CAD (coronary artery disease), Cardiogenic shock (H), Cardiomyopathy (H), ED (erectile dysfunction), Hypertension, Neuropathy, SVT (supraventricular tachycardia) (H), and Syncope.  PAST SURGICAL HISTORY:   has a past surgical history that includes hernia repair; Hand surgery; Bypass graft artery coronary (10/2/2012); Relocate Generator ICD/Pacemaker; coronary artery bypass (10/2/12); Coronary Angiography Adult Order (10/2/12); Heart Cath, Angioplasty (10/2/12); EP Ablation atrial flutter (1/11/2011); Cardioversion (3/14/2013); Ablation Ventricular Tachycardia (VT) (N/A,  1/2/2019); Comprehensive Electrophysiology Study (N/A, 1/2/2019); Heart Catheterization with Possible Intervention (N/A, 8/11/2020); Percutaneous Coronary Intervention Atherectomy Orbital (N/A, 8/11/2020); and Angiogram Coronary Graft (N/A, 8/11/2020).  FAMILY HISTORY: family history includes Alcohol/Drug in his father, sister, and sister; Alzheimer Disease in his sister; Aneurysm in his mother; Arrhythmia in his daughter and daughter; Cancer in his daughter; Gastrointestinal Disease in his sister; Heart Disease in his daughter, daughter, and sister; Heart Disease (age of onset: 71) in his father; Hypertension in his sister and sister; Multiple Sclerosis in his daughter; Obesity in his sister.  SOCIAL HISTORY:   reports that he quit smoking about 48 years ago. His smoking use included cigarettes. He started smoking about 63 years ago. He has a 14.00 pack-year smoking history. He has never used smokeless tobacco. He reports that he does not drink alcohol or use drugs.    Post Discharge Medication Reconciliation Status: discharge medications reconciled, continue medications without change    Current Outpatient Medications   Medication Sig Dispense Refill     acetaminophen (TYLENOL) 500 MG tablet Take 1,000 mg by mouth every 8 hours as needed for mild pain       amiodarone (PACERONE) 200 MG tablet Take 1 tablet (200 mg) by mouth daily 90 tablet 1     carvedilol (COREG) 6.25 MG tablet Take 1 tablet (6.25 mg) by mouth 2 times daily (with meals) 180 tablet 3     digoxin (LANOXIN) 125 MCG tablet Take 1 tablet (125 mcg) by mouth six times a week Do not take Sundays 78 tablet 3     docusate sodium (COLACE) 100 MG capsule Take 1 capsule (100 mg) by mouth 2 times daily While taking oxycodone 30 capsule 0     famotidine (PEPCID) 20 MG tablet TAKE 1 TABLET BY MOUTH TWICE A  tablet 3     gabapentin (NEURONTIN) 100 MG capsule Take 1 capsule (100 mg) by mouth 2 times daily 20 capsule 0     ipratropium (ATROVENT) 0.03 % nasal  spray SPRAY 2 SPRAYS INTO BOTH NOSTRILS EVERY 12 HOURS 30 mL 8     mexiletine (MEXITIL) 150 MG capsule Take 1 capsule (150 mg) by mouth 3 times daily 270 capsule 3     Multiple Vitamins-Minerals (PRESERVISION AREDS 2 PO) Take 1 capsule by mouth 2 times daily       nitroGLYcerin (NITROSTAT) 0.4 MG sublingual tablet DISSOLVE 1 TABLET UNDER THE TONGUE EVERY 5 MINUTES AS NEEDED FOR CHEST PAIN 25 tablet 0     oxyCODONE (ROXICODONE) 5 MG tablet Take 0.5 tablets (2.5 mg) by mouth 4 times daily as needed for pain 12 tablet 0     rosuvastatin (CRESTOR) 20 MG tablet Take 1 tablet (20 mg) by mouth daily 90 tablet 3     sacubitril-valsartan (ENTRESTO) 49-51 MG per tablet Take 1 tablet by mouth 2 times daily 180 tablet 3     sildenafil (VIAGRA) 100 MG tablet Take 1 tablet (100 mg) by mouth daily as needed Take 30 min to 4 hours before intercourse.  Never use with nitroglycerin, terazosin or doxazosin. (Patient not taking: Reported on 4/6/2021) 16 tablet 3     Vitamin D, Cholecalciferol, 1000 UNITS TABS Take 1,000 Units by mouth daily            ROS:  10 point ROS of systems including Constitutional, Eyes, Respiratory, Cardiovascular, Gastroenterology, Genitourinary, Integumentary, Musculoskeletal, Psychiatric were all negative except for pertinent positives noted in my HPI.    Vitals:  /69   Pulse 65   Temp 97.4  F (36.3  C)   Resp 18   SpO2 93%   Exam:  GENERAL APPEARANCE:  Alert, in no distress  ENT:  Mouth and posterior oropharynx normal, moist mucous membranes, [place]  EYES:  EOM, conjunctivae, lids, pupils and irises normal, PERRL  RESP:  no respiratory distress  CV:  peripheral edema trace+ in LE bilaterally  ABDOMEN:  abdomen round  M/S:   patient resting in bed, right arm in sling, able to move all 4 extremities  SKIN:  Inspection of skin and subcutaneous tissue baseline  NEURO:   speech WNL  PSYCH:  affect and mood normal    Lab/Diagnostic data:    Most Recent 3 CBC's:  Recent Labs   Lab Test 04/02/21  0848  04/01/21  0029 09/19/20  2219   WBC 9.9 9.3 7.4   HGB 12.8* 13.2* 13.6   MCV 90 91 92    184 202     Most Recent 3 BMP's:  Recent Labs   Lab Test 04/02/21  0848 04/01/21  0029 01/18/21  1100    132* 136   POTASSIUM 4.1 4.3 4.3   CHLORIDE 103 100 105   CO2 23 27 28   BUN 23 19 13   CR 1.19 1.30* 1.44*   ANIONGAP 7 5 3   LORI 8.2* 8.0* 8.5   GLC 98 99 78       ASSESSMENT/PLAN:  Subdural hematoma (H)  Subarachnoid hemorrhage (H)  Syncope, unspecified syncope type  Acute/ongoing: PT and OT, f/u with neurosurgery and cardiology in 2 weeks, hold coumadin and plavix until f/u, possibly DC plavix and start low dose ASA.     Closed fracture of olecranon process of right ulna with routine healing, subsequent encounter  Acute/ongoing: NWB RUE, sling for comfort, f/u with Dr. Baumann in 2 weeks, tylenol 1000mg TID scheduled, oxycodone 2.5mg QID prn for pain.     Atrial fibrillation, unspecified type (H)  Ischemic cardiomyopathy  Essential hypertension  Coronary artery disease involving native coronary artery of native heart without angina pectoris  Stage 3 chronic kidney disease, unspecified whether stage 3a or 3b CKD  Acute/ongoing: daily weights, vitals daily and prn, BMP follow, continue entresto 49/51mg BID, crestor 20mg QD, mexitil 150mg TID, digoxin 125mcg 6 X week, coreg 6.25mg BID, amiodarone 200mg QD        Orders written by provider at facility  Hgb and BMP on Thursday    Total time spent with patient visit at the skilled nursing facility was 35 min including patient visit, review of past records and wife at bedside. Greater than 50% of total time spent with counseling and coordinating care due to discussed POC, medications and f/u appt recommended for ortho, cardiology and neurosurgery in 2 weeks, ortho appt has been scheduled already, will make sure neuro surgery gets scheduled. Discussed medications, continue to hold coumadin and plavix will resume on recommendations of neurosurgery and cardiology.  No other concerns at this time. .     Electronically signed by:  Tonya Lynn Haase, APRN CNP

## 2021-04-06 NOTE — PROGRESS NOTES
"Discharge instructions \"Follow up with Neurosurgery in 2 weeks with a head CT prior to that appointment\"    Order placed  "

## 2021-04-06 NOTE — LETTER
4/6/2021        RE: Tyrel Rene  [address] [place] [address]        [place] [facility]  PRIMARY CARE PROVIDER AND CLINIC:  Dejon Downs MD, [address] [facility] [place] [address]  Chief Complaint   Patient presents with     Hospital F/U     [place] Medical Record Number:  [id number]  Place of Service where encounter took place:  [facility] (U) [25]    Tyrel Rene  is a 75 year old  (1945), admitted to the above facility from  [facility]. Hospital stay 4/1/21 through 4/3/21. [facility] 4/1/21-4/5/21.  .  Admitted to this facility for  rehab, medical management and nursing care.    HPI:    HPI information obtained from: facility chart records, facility staff and patient report.   Brief Summary of Hospital Course:   PMH of CAD s/p CABG X 3 2012, ischemic cardiomyopathy with EF of 20-25%, NSVT with ICD placement, atrial fib, HTN, epidodes of syncope, CKD who was admitted for syncope and fall down stairs.   Subdural hematoma/small volume subarachnoid hemorrhage: scalp laceration, on coumadin with INR 2.81 on admission.   Hospital findings: CT head findings include acute right parafalcine subdural hematoma measuring up to 4 mm in thickness which extends along the right tentorial leaflet where it measures up to 2 mm in thickness, small volume subarachnoid hemorrhage in the right paraclinoid area, extending into the prepontine cistern, along the interhemispheric fissure and into the left sylvian fissure and trace blood products in the occipital horns.  * CTA negative for aneurysm  Neurosurgery and cardiology followed, BP control, hold plavix and coumadin until 2 week f/u as OP.   Syncope  Hx of frequent NSVT s/p PPM/ICD: hx of VT storm and atrial fib: cardiology interrogatted PPM/ICD, EP adjusted settings during hospitalization, cardiology suspect syncope rt vasovagal spell or orthostatic hypotension.  Continue PTA amiodarone, digoxin and mexilitine (started recently)   CAD s/p BONITA to OM 8/20 and CABG X 3 2012, ischemic cardiomyopathy with EF 20-25%, HFrEF: patient taking torsemide prn at home, did not receive torsemide during hospitalization, euvolemic throughout stay  Hx of CVA: embolic CVA due to atrial fib with low EF: coumadin reversed due to subarachnoid hemorrhage, f/u as OP in 2 weeks for further recommendations on restarting coumadin and possibly low dose ASA  Fx of right olecranon process: mildly displaced, Dr. Baumann consulted, recommend NWB to right arm, non operative, sling for comfort and f/u in 2 weeks at [facility].   CKD: creat ~1.5 baseline, stable throughout stay  Updates on Status Since Skilled nursing Admission: On exam today patient is resting in bed, states he is having some pain in right leg from fall, seems to start in buttocks and radiate down leg, patient feels that moving more will improve pain, no c/o pain in right elbow, states he has occasional frontal headache, no pain in head at time of exam, denies N/V, states he does have dizziness when he stands, that was present PTA, some PRINCE, denies CP, palpitations, SOB at rest, states he slept well, no other c/o at this time.     CODE STATUS/ADVANCE DIRECTIVES DISCUSSION:   CPR/Full code   Patient's living condition: lives with spouse  ALLERGIES: Aspirin and Nystatin  PAST MEDICAL HISTORY:  has a past medical history of Atrial fibrillation (H), Atrial flutter (H), CAD (coronary artery disease), Cardiogenic shock (H), Cardiomyopathy (H), ED (erectile dysfunction), Hypertension, Neuropathy, SVT (supraventricular tachycardia) (H), and Syncope.  PAST SURGICAL HISTORY:   has a past surgical history that includes hernia repair; Hand surgery; Bypass graft artery coronary (10/2/2012); Relocate Generator ICD/Pacemaker; coronary artery bypass (10/2/12); Coronary Angiography Adult Order (10/2/12); Heart Cath, Angioplasty (10/2/12); EP Ablation atrial  flutter (1/11/2011); Cardioversion (3/14/2013); Ablation Ventricular Tachycardia (VT) (N/A, 1/2/2019); Comprehensive Electrophysiology Study (N/A, 1/2/2019); Heart Catheterization with Possible Intervention (N/A, 8/11/2020); Percutaneous Coronary Intervention Atherectomy Orbital (N/A, 8/11/2020); and Angiogram Coronary Graft (N/A, 8/11/2020).  FAMILY HISTORY: family history includes Alcohol/Drug in his father, sister, and sister; Alzheimer Disease in his sister; Aneurysm in his mother; Arrhythmia in his daughter and daughter; Cancer in his daughter; Gastrointestinal Disease in his sister; Heart Disease in his daughter, daughter, and sister; Heart Disease (age of onset: 71) in his father; Hypertension in his sister and sister; Multiple Sclerosis in his daughter; Obesity in his sister.  SOCIAL HISTORY:   reports that he quit smoking about 48 years ago. His smoking use included cigarettes. He started smoking about 63 years ago. He has a 14.00 pack-year smoking history. He has never used smokeless tobacco. He reports that he does not drink alcohol or use drugs.    Post Discharge Medication Reconciliation Status: discharge medications reconciled, continue medications without change    Current Outpatient Medications   Medication Sig Dispense Refill     acetaminophen (TYLENOL) 500 MG tablet Take 1,000 mg by mouth every 8 hours as needed for mild pain       amiodarone (PACERONE) 200 MG tablet Take 1 tablet (200 mg) by mouth daily 90 tablet 1     carvedilol (COREG) 6.25 MG tablet Take 1 tablet (6.25 mg) by mouth 2 times daily (with meals) 180 tablet 3     digoxin (LANOXIN) 125 MCG tablet Take 1 tablet (125 mcg) by mouth six times a week Do not take Sundays 78 tablet 3     docusate sodium (COLACE) 100 MG capsule Take 1 capsule (100 mg) by mouth 2 times daily While taking oxycodone 30 capsule 0     famotidine (PEPCID) 20 MG tablet TAKE 1 TABLET BY MOUTH TWICE A  tablet 3     gabapentin (NEURONTIN) 100 MG capsule Take 1  capsule (100 mg) by mouth 2 times daily 20 capsule 0     ipratropium (ATROVENT) 0.03 % nasal spray SPRAY 2 SPRAYS INTO BOTH NOSTRILS EVERY 12 HOURS 30 mL 8     mexiletine (MEXITIL) 150 MG capsule Take 1 capsule (150 mg) by mouth 3 times daily 270 capsule 3     Multiple Vitamins-Minerals (PRESERVISION AREDS 2 PO) Take 1 capsule by mouth 2 times daily       nitroGLYcerin (NITROSTAT) 0.4 MG sublingual tablet DISSOLVE 1 TABLET UNDER THE TONGUE EVERY 5 MINUTES AS NEEDED FOR CHEST PAIN 25 tablet 0     oxyCODONE (ROXICODONE) 5 MG tablet Take 0.5 tablets (2.5 mg) by mouth 4 times daily as needed for pain 12 tablet 0     rosuvastatin (CRESTOR) 20 MG tablet Take 1 tablet (20 mg) by mouth daily 90 tablet 3     sacubitril-valsartan (ENTRESTO) 49-51 MG per tablet Take 1 tablet by mouth 2 times daily 180 tablet 3     sildenafil (VIAGRA) 100 MG tablet Take 1 tablet (100 mg) by mouth daily as needed Take 30 min to 4 hours before intercourse.  Never use with nitroglycerin, terazosin or doxazosin. (Patient not taking: Reported on 4/6/2021) 16 tablet 3     Vitamin D, Cholecalciferol, 1000 UNITS TABS Take 1,000 Units by mouth daily            ROS:  10 point ROS of systems including Constitutional, Eyes, Respiratory, Cardiovascular, Gastroenterology, Genitourinary, Integumentary, Musculoskeletal, Psychiatric were all negative except for pertinent positives noted in my HPI.    Vitals:  /69   Pulse 65   Temp 97.4  F (36.3  C)   Resp 18   SpO2 93%   Exam:  GENERAL APPEARANCE:  Alert, in no distress  ENT:  Mouth and posterior oropharynx normal, moist mucous membranes, [place]  EYES:  EOM, conjunctivae, lids, pupils and irises normal, PERRL  RESP:  no respiratory distress  CV:  peripheral edema trace+ in LE bilaterally  ABDOMEN:  abdomen round  M/S:   patient resting in bed, right arm in sling, able to move all 4 extremities  SKIN:  Inspection of skin and subcutaneous tissue baseline  NEURO:   speech WNL  PSYCH:  affect and mood  normal    Lab/Diagnostic data:    Most Recent 3 CBC's:  Recent Labs   Lab Test 04/02/21  0848 04/01/21  0029 09/19/20  2219   WBC 9.9 9.3 7.4   HGB 12.8* 13.2* 13.6   MCV 90 91 92    184 202     Most Recent 3 BMP's:  Recent Labs   Lab Test 04/02/21  0848 04/01/21  0029 01/18/21  1100    132* 136   POTASSIUM 4.1 4.3 4.3   CHLORIDE 103 100 105   CO2 23 27 28   BUN 23 19 13   CR 1.19 1.30* 1.44*   ANIONGAP 7 5 3   LORI 8.2* 8.0* 8.5   GLC 98 99 78       ASSESSMENT/PLAN:  Subdural hematoma (H)  Subarachnoid hemorrhage (H)  Syncope, unspecified syncope type  Acute/ongoing: PT and OT, f/u with neurosurgery and cardiology in 2 weeks, hold coumadin and plavix until f/u, possibly DC plavix and start low dose ASA.     Closed fracture of olecranon process of right ulna with routine healing, subsequent encounter  Acute/ongoing: NWB PATRICK, sling for comfort, f/u with Dr. Baumann in 2 weeks, tylenol 1000mg TID scheduled, oxycodone 2.5mg QID prn for pain.     Atrial fibrillation, unspecified type (H)  Ischemic cardiomyopathy  Essential hypertension  Coronary artery disease involving native coronary artery of native heart without angina pectoris  Stage 3 chronic kidney disease, unspecified whether stage 3a or 3b CKD  Acute/ongoing: daily weights, vitals daily and prn, BMP follow, continue entresto 49/51mg BID, crestor 20mg QD, mexitil 150mg TID, digoxin 125mcg 6 X week, coreg 6.25mg BID, amiodarone 200mg QD        Orders written by provider at facility  Hgb and BMP on Thursday    Total time spent with patient visit at the skilled nursing facility was 35 min including patient visit, review of past records and wife at bedside. Greater than 50% of total time spent with counseling and coordinating care due to discussed POC, medications and f/u appt recommended for ortho, cardiology and neurosurgery in 2 weeks, ortho appt has been scheduled already, will make sure neuro surgery gets scheduled. Discussed medications,  continue to hold coumadin and plavix will resume on recommendations of neurosurgery and cardiology. No other concerns at this time. .     Electronically signed by:  Tonya Lynn Haase, APRN CNP                         Sincerely,        Tonya Lynn Haase, APRN CNP

## 2021-04-12 NOTE — PROGRESS NOTES
[place] [facility]  PRIMARY CARE PROVIDER AND CLINIC:  Dejon Downs MD, [address] [facility] [address] / [facility] [id number]    Pt was seen by Dr Montana at the [facility] on 4/10/21 for an initial TCU visit    Pt  is a 75 year old  (1945), admitted to the above facility from  [facility]. Hospital stay 4/1/21 through 4/3/21. [facility] 4/1/21-4/5/21.     Admitted to this facility for  rehab, medical management and nursing care     Hospital course was reviewed by me, is as per the hospital discharge summary and NP note.    Discussed with family member who was present      PMH of CAD s/p CABG X 3 2012, ischemic cardiomyopathy with EF of 20-25%, NSVT with ICD placement, atrial fib, HTN, syncope, CKD who was admitted for syncopal episode with fall down stairs    Pt was found to have a subdural hematoma/small volume subarachnoid hemorrhage: scalp laceration, on coumadin with INR 2.81 on admission.     Hospital findings: CT head findings include acute right parafalcine subdural hematoma measuring up to 4 mm in thickness which extends along the right tentorial leaflet where it measures up to 2 mm in thickness, small volume subarachnoid hemorrhage in the right paraclinoid area, extending into the prepontine cistern, along the interhemispheric fissure and into the left sylvian fissure and trace blood products in the occipital horns.   CTA negative for aneurysm  Neurosurgery and cardiology consulted. Plavix and coumadin are to be held until 2 week f/u as OP.     Medical issues addressed:    Syncope  Hx of frequent NSVT s/p PPM/ICD: hx of VT storm and atrial fib: cardiology interrogatted PPM/ICD, EP adjusted settings during hospitalization, cardiology suspect syncope rt vasovagal spell or orthostatic hypotension. Continue PTA amiodarone, digoxin and mexilitine     CAD s/p BONITA to OM 8/20 and CABG X 3 2012, ischemic cardiomyopathy with EF 20-25%, HFrEF:  patient taking torsemide prn at home, did not receive torsemide during hospitalization, euvolemic throughout stay    Hx of CVA: embolic CVA due to atrial fib with low EF: coumadin reversed due to subarachnoid hemorrhage, f/u as OP in 2 weeks for further recommendations on restarting coumadin and possibly low dose ASA    Fx of right olecranon process: mildly displaced, Dr. Baumann consulted, recommend NWB to right arm, non operative, sling for comfort and f/u in 2 weeks at [facility].     CKD: creat ~1.5 baseline, stable throughout stay    Updates on Status Since Skilled nursing Admission:   Pt states he feels fatigued, denies HA,  chest pain, SOB  He has minimal discomfort R elbow  His legs feel weak. He notes mild dizziness when he starts to stand.   He has not started walking in therapy per family  Family notes speech is softer, more hesitant than usueal      CODE STATUS/ADVANCE DIRECTIVES DISCUSSION:   CPR/Full code   Patient's living condition: lives with spouse  ALLERGIES: Aspirin and Nystatin  PAST MEDICAL HISTORY:  has a past medical history of Atrial fibrillation (H), Atrial flutter (H), CAD (coronary artery disease), Cardiogenic shock (H), Cardiomyopathy (H), ED (erectile dysfunction), Hypertension, Neuropathy, SVT (supraventricular tachycardia) (H), and Syncope.  PAST SURGICAL HISTORY:   has a past surgical history that includes hernia repair; Hand surgery; Bypass graft artery coronary (10/2/2012); Relocate Generator ICD/Pacemaker; coronary artery bypass (10/2/12); Coronary Angiography Adult Order (10/2/12); Heart Cath, Angioplasty (10/2/12); EP Ablation atrial flutter (1/11/2011); Cardioversion (3/14/2013); Ablation Ventricular Tachycardia (VT) (N/A, 1/2/2019); Comprehensive Electrophysiology Study (N/A, 1/2/2019); Heart Catheterization with Possible Intervention (N/A, 8/11/2020); Percutaneous Coronary Intervention Atherectomy Orbital (N/A, 8/11/2020); and Angiogram Coronary Graft (N/A, 8/11/2020).  FAMILY  HISTORY: family history includes Alcohol/Drug in his father, sister, and sister; Alzheimer Disease in his sister; Aneurysm in his mother; Arrhythmia in his daughter and daughter; Cancer in his daughter; Gastrointestinal Disease in his sister; Heart Disease in his daughter, daughter, and sister; Heart Disease (age of onset: 71) in his father; Hypertension in his sister and sister; Multiple Sclerosis in his daughter; Obesity in his sister.  SOCIAL HISTORY:   reports that he quit smoking about 48 years ago. His smoking use included cigarettes. He started smoking about 63 years ago. He has a 14.00 pack-year smoking history. He has never used smokeless tobacco. He reports that he does not drink alcohol or use drugs.        Current Outpatient Medications   Medication Sig Dispense Refill     acetaminophen (TYLENOL) 500 MG tablet Take 1,000 mg by mouth every 8 hours as needed for mild pain       amiodarone (PACERONE) 200 MG tablet Take 1 tablet (200 mg) by mouth daily 90 tablet 1     carvedilol (COREG) 6.25 MG tablet Take 1 tablet (6.25 mg) by mouth 2 times daily (with meals) 180 tablet 3     digoxin (LANOXIN) 125 MCG tablet Take 1 tablet (125 mcg) by mouth six times a week Do not take Sundays 78 tablet 3     docusate sodium (COLACE) 100 MG capsule Take 1 capsule (100 mg) by mouth 2 times daily While taking oxycodone 30 capsule 0     famotidine (PEPCID) 20 MG tablet TAKE 1 TABLET BY MOUTH TWICE A  tablet 3     gabapentin (NEURONTIN) 100 MG capsule Take 1 capsule (100 mg) by mouth 2 times daily 20 capsule 0     ipratropium (ATROVENT) 0.03 % nasal spray SPRAY 2 SPRAYS INTO BOTH NOSTRILS EVERY 12 HOURS 30 mL 8     mexiletine (MEXITIL) 150 MG capsule Take 1 capsule (150 mg) by mouth 3 times daily 270 capsule 3     Multiple Vitamins-Minerals (PRESERVISION AREDS 2 PO) Take 1 capsule by mouth 2 times daily       nitroGLYcerin (NITROSTAT) 0.4 MG sublingual tablet DISSOLVE 1 TABLET UNDER THE TONGUE EVERY 5 MINUTES AS NEEDED  FOR CHEST PAIN 25 tablet 0     oxyCODONE (ROXICODONE) 5 MG tablet Take 0.5 tablets (2.5 mg) by mouth 4 times daily as needed for pain 12 tablet 0     rosuvastatin (CRESTOR) 20 MG tablet Take 1 tablet (20 mg) by mouth daily 90 tablet 3     sacubitril-valsartan (ENTRESTO) 49-51 MG per tablet Take 1 tablet by mouth 2 times daily 180 tablet 3         ROS:  10 point ROS neg except as noted above      Exam:  GENERAL APPEARANCE:  Alert, in no distress, sitting up in bed, well appearing  ENT:  Oral mucosa moist  EYES:  No eye redness or drainage  RESP:  Lungs clear  CV:  RRR   ABDOMEN: soft, non-distendedd  M/S:   R arm in sling  No LE edema  SKIN:  No rash  NEURO:  Alert, fully oriented, speech hesitant. Face symmetric. No LE weakness as assessed with Pt lying in bed  PSYCH:  affect and mood normal        Most Recent 3 CBC's:  Recent Labs   Lab Test 04/08/21 04/02/21  0848 04/01/21  0029 09/19/20  2219   WBC  --  9.9 9.3 7.4   HGB 12.5* 12.8* 13.2* 13.6   MCV  --  90 91 92   PLT  --  169 184 202     Most Recent 3 BMP's:  Recent Labs   Lab Test 04/02/21  0848 04/01/21  0029 01/18/21  1100    132* 136   POTASSIUM 4.1 4.3 4.3   CHLORIDE 103 100 105   CO2 23 27 28   BUN 23 19 13   CR 1.19 1.30* 1.44*   ANIONGAP 7 5 3   LORI 8.2* 8.0* 8.5   GLC 98 99 78       ASSESSMENT/PLAN:    Subdural hematoma (H)  Subarachnoid hemorrhage (H)  Syncope, unspecified syncope type, possible vasovagal  Plan therapies.  Neurosurgery and cardiology f/u. Warfarin and plavix currently on hold      Closed fracture of olecranon process of right ulna with routine healing, subsequent encounter  Acute/ongoing: SHARI NGUYEN, marliing for comfort, f/u with Dr. Baumann in 2 weeks, tylenol 1000mg TID scheduled, oxycodone 2.5mg QID prn for pain.     Atrial fibrillation, unspecified type (H)  Ischemic cardiomyopathy  Essential hypertension  Coronary artery disease involving native coronary artery of native heart without angina pectoris  Stage 3 chronic kidney  disease, unspecified whether stage 3a or 3b CKD  CV status stable  Plan monitor vitals (including orthostatic BP), wt, BMP exam.  Pt remains off Torsemide. Cardiology f/u.  Warfarin on hold, as above        Rafael Montana MD

## 2021-04-13 NOTE — LETTER
"    4/13/2021        RE: Tyrel Rene  [address] [place] [address]        [place] [facility]  [place] Medical Record Number:  [id number]  Place of Service where encounter took place:  [facility] ([facility]) [25]  Chief Complaint   Patient presents with     Nursing Home Acute       HPI:    Tyrel Rene  is a 75 year old (1945), who is being seen today for an episodic care visit.  HPI information obtained from: facility chart records, facility staff, patient report and [facility] chart review. Today's concern is:  Subdural/subarachnoid hematoma: on exam today patient sitting up in w/c states today is a little \"tough\" c/o some headache, he is alert, no other changes, ongoing dizziness with standing but \"no worse than usual\" denies N/V, states slept well last night.   Right elbow Fx: continues in splint, states right elbow painful this AM, just took pain medication, patient working on walking with a quad cane states that isn't going too well, therapy notes state patient walking up to 40 feet with CGA, unsteady gait at times.   HTN/cardiomyopathy/CAD/atrial fib: weight increased from 180.7lbs on admit to 192.6lbs currently, patient takes torsemide prn at home, wife at bedside states torsemide works well for him, patient has f/u appt with [facility] clinic on Thursday. Denies SOB, LE edema minimal, wife states he can have swelling in left hand when he has fluid retention and currently has no swelling in left hand. BP as follows: 126/77, 149/85, 142/80 with HR in 60's.   CKD: creat 1.04 on 4/11/21      Past Medical and Surgical History reviewed in Epic today.    MEDICATIONS:    Current Outpatient Medications   Medication Sig Dispense Refill     acetaminophen (TYLENOL) 500 MG tablet Take 1,000 mg by mouth every 8 hours as needed for mild pain       amiodarone (PACERONE) 200 MG tablet Take 1 tablet (200 mg) by mouth daily 90 tablet 1     carvedilol (COREG) 6.25 MG tablet Take 1 tablet " "(6.25 mg) by mouth 2 times daily (with meals) 180 tablet 3     digoxin (LANOXIN) 125 MCG tablet Take 1 tablet (125 mcg) by mouth six times a week Do not take Sundays 78 tablet 3     docusate sodium (COLACE) 100 MG capsule Take 1 capsule (100 mg) by mouth 2 times daily While taking oxycodone 30 capsule 0     famotidine (PEPCID) 20 MG tablet TAKE 1 TABLET BY MOUTH TWICE A  tablet 3     gabapentin (NEURONTIN) 100 MG capsule Take 1 capsule (100 mg) by mouth 2 times daily 20 capsule 0     ipratropium (ATROVENT) 0.03 % nasal spray SPRAY 2 SPRAYS INTO BOTH NOSTRILS EVERY 12 HOURS 30 mL 8     mexiletine (MEXITIL) 150 MG capsule Take 1 capsule (150 mg) by mouth 3 times daily 270 capsule 3     Multiple Vitamins-Minerals (PRESERVISION AREDS 2 PO) Take 1 capsule by mouth 2 times daily       nitroGLYcerin (NITROSTAT) 0.4 MG sublingual tablet DISSOLVE 1 TABLET UNDER THE TONGUE EVERY 5 MINUTES AS NEEDED FOR CHEST PAIN 25 tablet 0     oxyCODONE (ROXICODONE) 5 MG tablet Take 0.5 tablets (2.5 mg) by mouth 4 times daily as needed for pain 12 tablet 0     rosuvastatin (CRESTOR) 20 MG tablet Take 1 tablet (20 mg) by mouth daily 90 tablet 3     sacubitril-valsartan (ENTRESTO) 49-51 MG per tablet Take 1 tablet by mouth 2 times daily 180 tablet 3         REVIEW OF SYSTEMS:  10 point ROS of systems including Constitutional, Eyes, Respiratory, Cardiovascular, Gastroenterology, Genitourinary, Integumentary, Musculoskeletal, Psychiatric were all negative except for pertinent positives noted in my HPI.    Objective:  BP (!) 149/85   Pulse 65   Temp 97.6  F (36.4  C)   Resp 18   Ht 1.803 m (5' 11\")   Wt 87 kg (191 lb 12.8 oz)   SpO2 95%   BMI 26.75 kg/m    Exam:  GENERAL APPEARANCE:  Alert, in no distress  ENT:  Mouth and posterior oropharynx normal, moist mucous membranes, [place]  EYES:  EOM, conjunctivae, lids, pupils and irises normal, PERRL  RESP:  no respiratory distress, lungs CTA no adventitious sounds appreciated  CV: HR " regular, peripheral edema trace+ in LE bilaterally  ABDOMEN:  abdomen round  M/S:   patient resting in bed, right arm in sling, able to move all 4 extremities  SKIN:  Inspection of skin and subcutaneous tissue baseline  NEURO:   speech WNL  PSYCH:  affect and mood normal    Labs:     Most Recent 3 CBC's:  Recent Labs   Lab Test 04/08/21 04/02/21  0848 04/01/21  0029 09/19/20  2219   WBC  --  9.9 9.3 7.4   HGB 12.5* 12.8* 13.2* 13.6   MCV  --  90 91 92   PLT  --  169 184 202     Most Recent 3 BMP's:  Recent Labs   Lab Test 04/02/21  0848 04/01/21  0029 01/18/21  1100    132* 136   POTASSIUM 4.1 4.3 4.3   CHLORIDE 103 100 105   CO2 23 27 28   BUN 23 19 13   CR 1.19 1.30* 1.44*   ANIONGAP 7 5 3   LORI 8.2* 8.0* 8.5   GLC 98 99 78       ASSESSMENT/PLAN:  Subdural hematoma (H)  Subarachnoid hemorrhage (H)  Syncope, unspecified syncope type  Acute/ongoing: PT and OT, f/u with neurosurgery and cardiology in 2 weeks,  Has appt with [facility] clinic on 4/15/21   hold coumadin and plavix until f/u, possibly DC plavix and start low dose ASA.      Closed fracture of olecranon process of right ulna with routine healing, subsequent encounter  Acute/ongoing: NWCLAYTNO NGUYEN, sling for comfort, f/u with Dr. Baumann in 2 weeks, tylenol 1000mg TID scheduled, oxycodone 2.5mg QID prn for pain.      Atrial fibrillation, unspecified type (H)  Ischemic cardiomyopathy  Essential hypertension  Coronary artery disease involving native coronary artery of native heart without angina pectoris  Stage 3 chronic kidney disease, unspecified whether stage 3a or 3b CKD  Acute/ongoing: daily weights, vitals daily and prn, BMP follow, continue entresto 49/51mg BID, crestor 20mg QD, mexitil 150mg TID, digoxin 125mcg 6 X week, coreg 6.25mg BID, amiodarone 200mg QD  Torsemide 20mg PO today and tomorrow, BMP in AM          Orders written by provider at facility  torsdmide 20mg today and tomorrow  BMP in AM    Total time spent with patient visit at the skilled  nursing facility was 35 min including patient visit and review of past records. Greater than 50% of total time spent with counseling and coordinating care due to discussed weight gain and POC with [facility] clinic, patient takes torsemide at home prn, discussed giving torsemide 20mg today and tomorrow and check labs in AM, patient has [facility] clinic appt tomorrow, wife at bedside medication changes and labs discussed. Encouraged patient to use oxycodone prn for pain control. .  Electronically signed by:  Tonya Lynn Haase, APRN CNP               Sincerely,        Tonya Lynn Haase, APRN CNP

## 2021-04-13 NOTE — TELEPHONE ENCOUNTER
Call from the patient's wife requesting call back to check on reason for [facility] call.  She reports that his warfarin has been discontinued after a subarachnoid hemorrhage from a fall on 4/1/21.    Marko is currently at [facility]; meds and labs managed by geriatric staff. Krystal Daniel RN April 13, 2021 1:06 PM

## 2021-04-13 NOTE — PROGRESS NOTES
[facility]    Called by patients nurse(Parish)@ [facility] re:labs. I updated him that orders were faxed, confirmed labs needed and fax#. He will look for those orders.     He then reported that patient has had weight gain and the facility provider ordered torsemide 20mg/d X2days(Tues 4/13 and Wed 4/14)    Current facility weights:   4/13 192.6   4/12 191.8   4/11 185   4/9 184.9   4/8 185.7   4/7 183.5   4/6 180.7(first weight post hospital DC)    He stated patient has not sx. He has no SOB, lungs clear, and no swelling. He does notice some increased fatigue w/therapies. As patient sees Lauren Jorgensen on thurs 415, will forward to her. Nurse had no other questions, concerns.  Jessica Hyatt RN on 4/13/2021 at 2:12 PM

## 2021-04-13 NOTE — PROGRESS NOTES
"[place] [facility]  [place] Medical Record Number:  [id number]  Place of Service where encounter took place:  [facility] (TCU) [25]  Chief Complaint   Patient presents with     Nursing Home Acute       HPI:    Tyrel Rene  is a 75 year old (1945), who is being seen today for an episodic care visit.  HPI information obtained from: facility chart records, facility staff, patient report and [facility] chart review. Today's concern is:  Subdural/subarachnoid hematoma: on exam today patient sitting up in w/c states today is a little \"tough\" c/o some headache, he is alert, no other changes, ongoing dizziness with standing but \"no worse than usual\" denies N/V, states slept well last night.   Right elbow Fx: continues in splint, states right elbow painful this AM, just took pain medication, patient working on walking with a quad cane states that isn't going too well, therapy notes state patient walking up to 40 feet with CGA, unsteady gait at times.   HTN/cardiomyopathy/CAD/atrial fib: weight increased from 180.7lbs on admit to 192.6lbs currently, patient takes torsemide prn at home, wife at bedside states torsemide works well for him, patient has f/u appt with [facility] clinic on Thursday. Denies SOB, LE edema minimal, wife states he can have swelling in left hand when he has fluid retention and currently has no swelling in left hand. BP as follows: 126/77, 149/85, 142/80 with HR in 60's.   CKD: creat 1.04 on 4/11/21      Past Medical and Surgical History reviewed in Epic today.    MEDICATIONS:    Current Outpatient Medications   Medication Sig Dispense Refill     acetaminophen (TYLENOL) 500 MG tablet Take 1,000 mg by mouth every 8 hours as needed for mild pain       amiodarone (PACERONE) 200 MG tablet Take 1 tablet (200 mg) by mouth daily 90 tablet 1     carvedilol (COREG) 6.25 MG tablet Take 1 tablet (6.25 mg) by mouth 2 times daily (with meals) 180 tablet 3     digoxin (LANOXIN) 125 MCG tablet " "Take 1 tablet (125 mcg) by mouth six times a week Do not take Sundays 78 tablet 3     docusate sodium (COLACE) 100 MG capsule Take 1 capsule (100 mg) by mouth 2 times daily While taking oxycodone 30 capsule 0     famotidine (PEPCID) 20 MG tablet TAKE 1 TABLET BY MOUTH TWICE A  tablet 3     gabapentin (NEURONTIN) 100 MG capsule Take 1 capsule (100 mg) by mouth 2 times daily 20 capsule 0     ipratropium (ATROVENT) 0.03 % nasal spray SPRAY 2 SPRAYS INTO BOTH NOSTRILS EVERY 12 HOURS 30 mL 8     mexiletine (MEXITIL) 150 MG capsule Take 1 capsule (150 mg) by mouth 3 times daily 270 capsule 3     Multiple Vitamins-Minerals (PRESERVISION AREDS 2 PO) Take 1 capsule by mouth 2 times daily       nitroGLYcerin (NITROSTAT) 0.4 MG sublingual tablet DISSOLVE 1 TABLET UNDER THE TONGUE EVERY 5 MINUTES AS NEEDED FOR CHEST PAIN 25 tablet 0     oxyCODONE (ROXICODONE) 5 MG tablet Take 0.5 tablets (2.5 mg) by mouth 4 times daily as needed for pain 12 tablet 0     rosuvastatin (CRESTOR) 20 MG tablet Take 1 tablet (20 mg) by mouth daily 90 tablet 3     sacubitril-valsartan (ENTRESTO) 49-51 MG per tablet Take 1 tablet by mouth 2 times daily 180 tablet 3         REVIEW OF SYSTEMS:  10 point ROS of systems including Constitutional, Eyes, Respiratory, Cardiovascular, Gastroenterology, Genitourinary, Integumentary, Musculoskeletal, Psychiatric were all negative except for pertinent positives noted in my HPI.    Objective:  BP (!) 149/85   Pulse 65   Temp 97.6  F (36.4  C)   Resp 18   Ht 1.803 m (5' 11\")   Wt 87 kg (191 lb 12.8 oz)   SpO2 95%   BMI 26.75 kg/m    Exam:  GENERAL APPEARANCE:  Alert, in no distress  ENT:  Mouth and posterior oropharynx normal, moist mucous membranes, [place]  EYES:  EOM, conjunctivae, lids, pupils and irises normal, PERRL  RESP:  no respiratory distress, lungs CTA no adventitious sounds appreciated  CV: HR regular, peripheral edema trace+ in LE bilaterally  ABDOMEN:  abdomen round  M/S:   patient resting " in bed, right arm in sling, able to move all 4 extremities  SKIN:  Inspection of skin and subcutaneous tissue baseline  NEURO:   speech WNL  PSYCH:  affect and mood normal    Labs:     Most Recent 3 CBC's:  Recent Labs   Lab Test 04/08/21 04/02/21  0848 04/01/21  0029 09/19/20  2219   WBC  --  9.9 9.3 7.4   HGB 12.5* 12.8* 13.2* 13.6   MCV  --  90 91 92   PLT  --  169 184 202     Most Recent 3 BMP's:  Recent Labs   Lab Test 04/02/21  0848 04/01/21  0029 01/18/21  1100    132* 136   POTASSIUM 4.1 4.3 4.3   CHLORIDE 103 100 105   CO2 23 27 28   BUN 23 19 13   CR 1.19 1.30* 1.44*   ANIONGAP 7 5 3   LORI 8.2* 8.0* 8.5   GLC 98 99 78       ASSESSMENT/PLAN:  Subdural hematoma (H)  Subarachnoid hemorrhage (H)  Syncope, unspecified syncope type  Acute/ongoing: PT and OT, f/u with neurosurgery and cardiology in 2 weeks,  Has appt with [facility] clinic on 4/15/21   hold coumadin and plavix until f/u, possibly DC plavix and start low dose ASA.      Closed fracture of olecranon process of right ulna with routine healing, subsequent encounter  Acute/ongoing: NWCLAYTON NGUYEN, sling for comfort, f/u with Dr. Baumann in 2 weeks, tylenol 1000mg TID scheduled, oxycodone 2.5mg QID prn for pain.      Atrial fibrillation, unspecified type (H)  Ischemic cardiomyopathy  Essential hypertension  Coronary artery disease involving native coronary artery of native heart without angina pectoris  Stage 3 chronic kidney disease, unspecified whether stage 3a or 3b CKD  Acute/ongoing: daily weights, vitals daily and prn, BMP follow, continue entresto 49/51mg BID, crestor 20mg QD, mexitil 150mg TID, digoxin 125mcg 6 X week, coreg 6.25mg BID, amiodarone 200mg QD  Torsemide 20mg PO today and tomorrow, BMP in AM          Orders written by provider at facility  torsdmide 20mg today and tomorrow  BMP in AM    Total time spent with patient visit at the [facility] was 35 min including patient visit and review of past records. Greater than 50% of  total time spent with counseling and coordinating care due to discussed weight gain and POC with [facility] clinic, patient takes torsemide at home prn, discussed giving torsemide 20mg today and tomorrow and check labs in AM, patient has [facility] clinic appt tomorrow, wife at bedside medication changes and labs discussed. Encouraged patient to use oxycodone prn for pain control. .  Electronically signed by:  Tonya Lynn Haase, APRN CNP

## 2021-04-13 NOTE — PROGRESS NOTES
[facility]    Mr. Rene's wife left a  requesting coordination of labs at [facility] as they've converted his 4/15 CORE follow-up to a virtual visit.    Reviewed orders for chronic systolic HF I50.22.:  -hepatic panel  -TSH reflex  -BMP  -Hgb  -NTproBNP    Called [facility] ([phone]) and confirmed fax number ([phone]). Will ask in clinic RN to fax orders with request to draw 4/13 or 4/14.    Future Appointments   Date Time Provider Department Center   4/15/2021 12:00 AM MARQUEZ DCR2 [facility]   4/15/2021  1:50 PM Lauren Jorgensen, APRN CNP [facility]   4/21/2021 12:45 PM SHCT1 [facility]   4/21/2021  2:00 PM Aliya Tristan, NP [facility]     Jadyn Paredes RN BSN   9:45 AM 04/13/21

## 2021-04-13 NOTE — PROGRESS NOTES
[facility]  Faxed lab orders to [facility] to be drawn today 4/13 or tomorrow 4/14, for review at 4/15 CORE visit.     Silvia Lockwood RN, BSN, CHFN  04/13/21 at 10:04 AM

## 2021-04-15 NOTE — PROGRESS NOTES
"Marko is a 75 year old who is being evaluated via a billable telephone visit.      [phone]  CYDNEY Peña    What phone number would you like to be contacted at? [phone]  How would you like to obtain your AVS? Mail a copy    Weight     DAVE NOTE:  This visit was completed via telephone due to COVID-19 precautions.  The patient provided consent for a telephone visit.  I had the pleasure speaking to Tyrel Rene as part of a telephone visit today.    The patient is a Tyrel Rene with the following chronic medical issues:    1. Severe ischemic cardiomyopathy after a large anterior MI several years ago.  Three-vessel CABG in 2012.  Most recent LVEF 25%.  Recent PCI of OM2 (08/2020).     2.  Unstable ventricular tachyarrhythmias.    ICD shock in 11/2017.  Amiodarone started.  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  VT ablation on 01/02/2019.  Amiodarone decreased to 6 days per week in 05/2020.  Recurrence of VT with syncope and 2 shocks in 07/2020.  Syncope without documented VT but with multiple nonsustained episodes of VT documented during hospital admission in 09/2020.  Partly related to a \"smoothing out\" algorithm incorporated in his ICD.  NSVT events decreased after the algorithm was disabled.    3.  Permanent atrial fibrillation and complete AV block.  On warfarin.  Prior atrial flutter ablation (2011).     4.  CRT ICD ([employer]).     5.  CVA following atrial flutter ablation in 2011.    6. CKD III     7. Fall with hospital admission for a subdural hematoma, scalp laceration.  ICD was interrogated in the ED which did indicate a couple 3-4 beat episodes of V tach earlier in the evening but did not seem to coincide with the timing of the fall.     In TCU after suffering a fall. Pt was admitted 4/1-4/3. Pt's wife states he was awake and alert shortly after the fall, but not sure if he was syncopal. Fell backwards on the stairs suffering a head/ back injury, and mildly displaced fracture right " olecranon process with like hemarthrosis.   He has had weight fluctuation from 180 pounds to up to 191.8 pounds.  He was placed on torsemide 20 mg for 2 days.  He did diurese 5 pounds, however, the diuretic was not scheduled.  He has also has been suffering from urinary retention.  The nurse manager, Parish, recently BladderScan him for 800 cc.  Currently Marko denies chest pain, shortness of breath, but does have mild edema.  He is fairly sedentary.  He is struggling with headaches and ambulation per Mrs. Rene.     DIAGNOSTIC STUDIES:  Labs:    4/15: hemoglobin of 12.5, potassium 3.4, sodium 134, BUN 25, creatinine 1.33, TSH 1.56, and proBNP 830.    [employer] Ener[employer] CRT-D Device Check 3/11/21  Patient seen in clinic for device evaluation and iterative programming.   AP: N/A %    BiVP: 95 %    Mode: VVIR   Underlying Rhythm: Afib with CHB with junctional escape at VVI 30   Heart Rate: Stable with adequate variability, patient denies activity intolerance     Sensing: WNL     Pacing Threshold: Stable     Impedance: Stable     Battery Status: 5 months     Device Site: Well healed     Atrial Arrhythmia: Chronic Afib, patient takes coumadin     Ventricular Arrhythmia: 5,596 VT episodes logged since 1/15/2021. 2 EGMs logged as VT initially show Afib with short burst of NSVT then VT with V rates in the 150-160s (patients underlying historically Afib with CHB with no junctional escape at VVI 30) both VT episodes logged on same day 2/1/2021 consecutively. 2 remaining EGMs show Afib with 3-4 beat burst of NSVT with V rates in the 130-160s. Patient does not correlate symptoms with episodes logged. Patient states he is asymptomatic while sitting down, however, does feel dizzy when he is up and moving. Per patient this has been on-going and remains unchanged since mexiletine prescribed by Dr. Costello in December 2020. Patient continues to take both amiodarone and mexiletine as prescribed. Will send update to Dr. Costello  regarding continued episodes of VT and patients dizziness.   ATP: 0    Shocks: 0    Setting Change: None     Care Plan: Remote device check in 1 month to assess battery longevity. Patient is scheduled to follow up with Lauren Jorgensen on 4/15/2021. Encouraged patient to call with any questions or concerns. Clinic card given.   BAKARI RN     Angiogram 8/2020:    1. Severe proximal stenosis of OM2 s/p successful intervention with 1 drug eluting stent (Resolute [place] 2.5x38mm)  2. Successful rotational atherectomy (Rotapro 1.25mm edith) to mid LCx into OM2  3. Ischemic cardiomyopathy due to severe native multivessel coronary artery disease                - 100% chronic total occlusion of proximal LAD (failed proximal-mid intervention in 2012)                - 100% chronic total occlusion of OM1  4. Patent LIMA-LAD, SVG-OM1 bypass grafts  5. Occluded SVG-OM2 bypass graft  6. 6Fr Angioseal closure of RFA      Echo Interpretation Summary 9/2020     The rhythm was paced.  There is anterior, septal, and apical wall akinesis.(thinned)  The left ventricle is moderately dilated.  The visual ejection fraction is estimated at 20-25%.  There is no thrombus seen in the left ventricle.  Mild aortic root dilatation.  The right ventricular systolic pressure is approximated at 35mmHg plus the right atrial pressure.  The inferior vena cava is normal.  Compared to the prior study dated 7/29/20, there have been no changes.    IMPRESSION:  1. Syncope suffering a subdural/subarachnoid hematoma.  Patient still having mild headaches.  Also struggling with ambulation per wife.   2. Right elbow fracture.  Patient feels he is doing well with the fracture at this time.  3. Cardiomyopathy.  Admission weight 180, peak weight 191.8, weight yesterday 185.  Patient has done well with as needed torsemide in the past.  4. Urinary retention.  Parish ALTMAN will discuss with Ms. Nik SALVADOR about indwelling Resendiz catheter and urology consult.  Patient currently being  straight cath as needed  5. History of VT stable on mexiletine and amiodarone with previous VT ablation as noted above  6. Stage III chronic kidney disease stable  7. 8/2020: CAD with severe proximal stenosis of OM2 s/p successful intervention with 1 drug eluting stent (Resolute Suraj 2.5x38mm) and successful rotational atherectomy (Rotapro 1.25mm edith) to mid LCx into OM2. Plavix was held during last admission until follow up next week with neurology. If Plavix cannot be restarted then perhaps asa 81 mg every day can be in the near future. Awaiting neurology recommendation.  8. Afib-permanent. Warfarin on hold r/t subarachnoid/subdural hematoma.    RECOMMENDATIONS:  1. Patient's fall/questionable syncope was not related to a ventricular arrhythmia.  Device was interrogated and did not correlate with timing.  There is some question of whether the patient became hypotensive and symptomatic which led to the fall.  2. His weight is still up 5 pounds from baseline.  Starting tomorrow, I would like to initiate torsemide 10 mg daily.  A BMP will be done next Tuesday.  I will have a phone visit with him next Thursday.  He will also be assessed by Gris SALVADOR tomorrow.  Once his weight is back to baseline, I think it would be reasonable to change him back to as needed torsemide.  3. Slightly hypokalemic based on labs.  Instead of initiation of potassium I have asked that he either be served orange juice or banana on a daily basis.    I will have a repeat phone visit next Thursday with Mr. Rene.  If he has questions or concerns in the meantime of asked that they please contact us.  Thank you for including me in his care    Lauren Jorgensen, NP, APRN CNP        Telephone visit documentation  Start: 1:40 pm  End: 2:20 pm  Time: 40 minutes

## 2021-04-15 NOTE — LETTER
"4/15/2021    Dejon Downs MD  [address]  [facility] [address]    RE: Tyrel Rene       Dear Colleague,    I had the pleasure of seeing Tyrel Rene in the [facility].    Marko is a 75 year old who is being evaluated via a billable telephone visit.      [phone]  CYDNEY Peña    What phone number would you like to be contacted at? [phone]  How would you like to obtain your AVS? Mail a copy    Weight     DAVE NOTE:  This visit was completed via telephone due to COVID-19 precautions.  The patient provided consent for a telephone visit.  I had the pleasure speaking to Tyrel Rene as part of a telephone visit today.    The patient is a Tyrel Rene with the following chronic medical issues:    1. Severe ischemic cardiomyopathy after a large anterior MI several years ago.  Three-vessel CABG in 2012.  Most recent LVEF 25%.  Recent PCI of OM2 (08/2020).     2.  Unstable ventricular tachyarrhythmias.    ICD shock in 11/2017.  Amiodarone started.  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  VT ablation on 01/02/2019.  Amiodarone decreased to 6 days per week in 05/2020.  Recurrence of VT with syncope and 2 shocks in 07/2020.  Syncope without documented VT but with multiple nonsustained episodes of VT documented during hospital admission in 09/2020.  Partly related to a \"smoothing out\" algorithm incorporated in his ICD.  NSVT events decreased after the algorithm was disabled.    3.  Permanent atrial fibrillation and complete AV block.  On warfarin.  Prior atrial flutter ablation (2011).     4.  CRT ICD ([employer]).     5.  CVA following atrial flutter ablation in 2011.    6. CKD III     7. Fall with hospital admission for a subdural hematoma, scalp laceration.  ICD was interrogated in the ED which did indicate a couple 3-4 beat episodes of V tach earlier in the evening but did not seem to coincide with the timing of the fall.     In " TCU after suffering a fall. Pt was admitted 4/1-4/3. Pt's wife states he was awake and alert shortly after the fall, but not sure if he was syncopal. Fell backwards on the stairs suffering a head/ back injury, and mildly displaced fracture right olecranon process with like hemarthrosis.   He has had weight fluctuation from 180 pounds to up to 191.8 pounds.  He was placed on torsemide 20 mg for 2 days.  He did diurese 5 pounds, however, the diuretic was not scheduled.  He has also has been suffering from urinary retention.  The nurse manager, Parish, recently BladderScan him for 800 cc.  Currently Marko denies chest pain, shortness of breath, but does have mild edema.  He is fairly sedentary.  He is struggling with headaches and ambulation per Mrs. Rene.     DIAGNOSTIC STUDIES:  Labs:    4/15: hemoglobin of 12.5, potassium 3.4, sodium 134, BUN 25, creatinine 1.33, TSH 1.56, and proBNP 830.    [employer] Ener[employer] CRT-D Device Check 3/11/21  Patient seen in clinic for device evaluation and iterative programming.   AP: N/A %    BiVP: 95 %    Mode: VVIR   Underlying Rhythm: Afib with CHB with junctional escape at VVI 30   Heart Rate: Stable with adequate variability, patient denies activity intolerance     Sensing: WNL     Pacing Threshold: Stable     Impedance: Stable     Battery Status: 5 months     Device Site: Well healed     Atrial Arrhythmia: Chronic Afib, patient takes coumadin     Ventricular Arrhythmia: 5,596 VT episodes logged since 1/15/2021. 2 EGMs logged as VT initially show Afib with short burst of NSVT then VT with V rates in the 150-160s (patients underlying historically Afib with CHB with no junctional escape at VVI 30) both VT episodes logged on same day 2/1/2021 consecutively. 2 remaining EGMs show Afib with 3-4 beat burst of NSVT with V rates in the 130-160s. Patient does not correlate symptoms with episodes logged. Patient states he is asymptomatic while sitting down, however, does feel  dizzy when he is up and moving. Per patient this has been on-going and remains unchanged since mexiletine prescribed by Dr. Costello in December 2020. Patient continues to take both amiodarone and mexiletine as prescribed. Will send update to Dr. Costello regarding continued episodes of VT and patients dizziness.   ATP: 0    Shocks: 0    Setting Change: None     Care Plan: Remote device check in 1 month to assess battery longevity. Patient is scheduled to follow up with Lauren Jorgensen on 4/15/2021. Encouraged patient to call with any questions or concerns. Clinic card given.   BAKARI RN     Angiogram 8/2020:    1. Severe proximal stenosis of OM2 s/p successful intervention with 1 drug eluting stent (Resolute [place] 2.5x38mm)  2. Successful rotational atherectomy (Rotapro 1.25mm edith) to mid LCx into OM2  3. Ischemic cardiomyopathy due to severe native multivessel coronary artery disease                - 100% chronic total occlusion of proximal LAD (failed proximal-mid intervention in 2012)                - 100% chronic total occlusion of OM1  4. Patent LIMA-LAD, SVG-OM1 bypass grafts  5. Occluded SVG-OM2 bypass graft  6. 6Fr Angioseal closure of RFA      Echo Interpretation Summary 9/2020     The rhythm was paced.  There is anterior, septal, and apical wall akinesis.(thinned)  The left ventricle is moderately dilated.  The visual ejection fraction is estimated at 20-25%.  There is no thrombus seen in the left ventricle.  Mild aortic root dilatation.  The right ventricular systolic pressure is approximated at 35mmHg plus the right atrial pressure.  The inferior vena cava is normal.  Compared to the prior study dated 7/29/20, there have been no changes.    IMPRESSION:  1. Syncope suffering a subdural/subarachnoid hematoma.  Patient still having mild headaches.  Also struggling with ambulation per wife.   2. Right elbow fracture.  Patient feels he is doing well with the fracture at this time.  3. Cardiomyopathy.  Admission weight  180, peak weight 191.8, weight yesterday 185.  Patient has done well with as needed torsemide in the past.  4. Urinary retention.  Parish ALTMAN will discuss with Ms. Nik SALAVDOR about indwelling Resendiz catheter and urology consult.  Patient currently being straight cath as needed  5. History of VT stable on mexiletine and amiodarone with previous VT ablation as noted above  6. Stage III chronic kidney disease stable  7. 8/2020: CAD with severe proximal stenosis of OM2 s/p successful intervention with 1 drug eluting stent (Resolute [place] 2.5x38mm) and successful rotational atherectomy (Rotapro 1.25mm edith) to mid LCx into OM2. Plavix was held during last admission until follow up next week with neurology. If Plavix cannot be restarted then perhaps asa 81 mg every day can be in the near future. Awaiting neurology recommendation.  8. Afib-permanent. Warfarin on hold r/t subarachnoid/subdural hematoma.    RECOMMENDATIONS:  1. Patient's fall/questionable syncope was not related to a ventricular arrhythmia.  Device was interrogated and did not correlate with timing.  There is some question of whether the patient became hypotensive and symptomatic which led to the fall.  2. His weight is still up 5 pounds from baseline.  Starting tomorrow, I would like to initiate torsemide 10 mg daily.  A BMP will be done next Tuesday.  I will have a phone visit with him next Thursday.  He will also be assessed by Gris SALVADOR tomorrow.  Once his weight is back to baseline, I think it would be reasonable to change him back to as needed torsemide.  3. Slightly hypokalemic based on labs.  Instead of initiation of potassium I have asked that he either be served orange juice or banana on a daily basis.    I will have a repeat phone visit next Thursday with Mr. Rene.  If he has questions or concerns in the meantime of asked that they please contact us.  Thank you for including me in his care    Lauren Jorgensen NP, APRN CNP        Telephone visit  documentation  Start: 1:40 pm  End: 2:20 pm  Time: 40 minutes    Thank you for allowing me to participate in the care of your patient.      Sincerely,     Lauren Jorgensen NP, APRN [facility]    cc:   Parish Newman MD  [address]  [place] [address]

## 2021-04-22 NOTE — LETTER
4/22/2021    Dejon Downs MD  [address]  [facility] [address]    RE: Tyrel Rene       Dear Colleague,    I had the pleasure of seeing Tyrel Rene in the [facility].    Marko is a 75 year old who is being evaluated via a billable telephone visit.    Review Of Systems  Skin: NEGATIVE  Eyes:Ears/Nose/Throat: NEGATIVE  Respiratory: NEGATIVE  Cardiovascular: ankles a little swollen (nurse reports +1)  Gastrointestinal: NEGATIVE  Genitourinary:NEGATIVE  Musculoskeletal: NEGATIVE  Neurologic: NEGATIVE  Psychiatric: NEGATIVE  Hematologic/Lymphatic/Immunologic: NEGATIVE  Endocrine:  NEGATIVE    Vitals - Patient Reported  Systolic (Patient Reported): 132  Diastolic (Patient Reported): 71  Weight (Patient Reported): 82.6 kg (182 lb)  SpO2 (Patient Reported): 94  Temperature (Patient Reported): 97.6  F (36.4  C)  Pulse (Patient Reported): 65      Telephone number of patient: [phone]    How [facility] you [facility] to [facility] your [facility]? [facility]        [phone] Parish  -  at 11:33am to let him know I have been trying unseccussfully to get ahold of Marko.    Two episodes of hypotension yesterday 95/55, two days ago 95/58.   Astrid currently in, urology f/up  Weight today 182; 181.4 lbs, 185 (4/21)    SDavis, CMA 04/22/21       DAVE NOTE:  This visit was completed via telephone due to COVID-19 precautions.  The patient provided consent for a telephone visit.  I had the pleasure speaking to Marko Rene as part of a telephone visit today.    The patient is a delightful 74 yo male with the following chronic medical issues:    1. Severe ischemic cardiomyopathy after a large anterior MI several years ago.  Three-vessel CABG in 2012.  Most recent LVEF 25%.  Recent PCI of OM2 (08/2020). Plavix and asa on hold r/t to head bleed. Awaiting neurology recommendation when Plavix can be reinitiated.    2.  Unstable ventricular tachyarrhythmias.    ICD shock in  "11/2017.  Amiodarone started.  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  VT ablation on 01/02/2019.  Amiodarone decreased to 6 days per week in 05/2020.  Recurrence of VT with syncope and 2 shocks in 07/2020.  Syncope without documented VT but with multiple nonsustained episodes of VT documented during hospital admission in 09/2020.  Partly related to a \"smoothing out\" algorithm incorporated in his ICD.  NSVT events decreased after the algorithm was disabled.     3.  Permanent atrial fibrillation and complete AV block.  On warfarin.  Prior atrial flutter ablation (2011).      4.  CRT ICD ([place] [employer]).      5.  CVA following atrial flutter ablation in 2011.     6. CKD III     7. Fall with hospital admission for a subdural hematoma, scalp laceration.  ICD was interrogated in the ED which did indicate a couple 3-4 beat episodes of V tach earlier in the evening but did not seem to coincide with the timing of the fall    It is my pleasure having a phone visit with Mr. Rene and his nurse from TCU.  At our visit last week I did place him on low-dose torsemide 10 mg daily.  He was requiring straight catheterizations and yesterday did receive an indwelling Resendiz catheter.  His weight is down about 9 pounds with a peak weight of 191 and weight today at 181.  He continues to be slightly hypotensive with systolic pressures in the high 90s, but no complaints of lightheadedness.  I do have concerns because he has had increased falls including the last admission where he suffered a subdural hematoma and scalp laceration.    Currently denies chest pain, shortness of breath, lightheadedness, dizziness.  His peripheral edema has improved per his nurse since initiating torsemide.  Labs are stable and noted below.      DIAGNOSTIC STUDIES:  Labs:    Component      Latest Ref Rng & Units 4/20/2021   Sodium      136 - 145 mmol/L 133 (A)   Potassium      3.5 - 5.1 mmol/L 3.6   Chloride      98 - 109 mmol/L 95 (A)   Carbon " Dioxide      20 - 29 mmol/L 26   Anion Gap      7 - 16 mmol/L 12   Glucose      70 - 100 mg/dL 79   Urea Nitrogen      7 - 26 mg/dL 30 (A)   Creatinine      0.73 - 1.18 mg/dL 1.34 (A)   Calcium      8.4 - 10.4 mg/dL 8.4   GFR Estimate      >60 ml/min/1.73m2 51 (A)     Device check 3/21:   [employer] CRT-D Device Check  Patient seen in clinic for device evaluation and iterative programming.   AP: N/A %    BiVP: 95 %    Mode: VVIR   Underlying Rhythm: Afib with CHB with junctional escape at VVI 30   Heart Rate: Stable with adequate variability, patient denies activity intolerance     Sensing: WNL     Pacing Threshold: Stable     Impedance: Stable     Battery Status: 5 months     Device Site: Well healed     Atrial Arrhythmia: Chronic Afib, patient takes coumadin     Ventricular Arrhythmia: 5,596 VT episodes logged since 1/15/2021. 2 EGMs logged as VT initially show Afib with short burst of NSVT then VT with V rates in the 150-160s (patients underlying historically Afib with CHB with no junctional escape at VVI 30) both VT episodes logged on same day 2/1/2021 consecutively. 2 remaining EGMs show Afib with 3-4 beat burst of NSVT with V rates in the 130-160s. Patient does not correlate symptoms with episodes logged. Patient states he is asymptomatic while sitting down, however, does feel dizzy when he is up and moving. Per patient this has been on-going and remains unchanged since mexiletine prescribed by Dr. Costello in December 2020. Patient continues to take both amiodarone and mexiletine as prescribed. Will send update to Dr. Costello regarding continued episodes of VT and patients dizziness.   ATP: 0    Shocks: 0    Setting Change: None     Care Plan: Remote device check in 1 month to assess battery longevity. Patient is scheduled to follow up with Lauren Jorgensen on 4/15/2021. Encouraged patient to call with any questions or concerns. Clinic card given.   BAKARI RN     Echocardiogram 9/2020:    The rhythm  was paced.  There is anterior, septal, and apical wall akinesis.(thinned)  The left ventricle is moderately dilated.  The visual ejection fraction is estimated at 20-25%.  There is no thrombus seen in the left ventricle.  Mild aortic root dilatation.  The right ventricular systolic pressure is approximated at 35mmHg plus the right atrial pressure.  The inferior vena cava is normal.  Compared to the prior study dated 7/29/20, there have been no changes    IMPRESSION:  1. Syncope suffering a subdural, subarachnoid hematoma.  Patient continues to have intermittent headaches.  Currently in TCU to help increase strength and improve ambulation.  2. Right elbow fracture from fall.  3. Cardiomyopathy.  Admission weight 180 pounds, peak weight 191.8 pounds, weight today 181 pounds.  4. Urinary retention requiring indwelling Resendiz catheter.  Urology consult is ordered.  5. VT stable on mexiletine and amiodarone.  Previous VT ablations are noted above.  Again no VT was documented at the time of his last fall.  6. Stage III chronic kidney disease  7. History of coronary artery disease 8/2020: CAD with severe proximal stenosis of OM 2 with successful intervention receiving drug-eluting stent and successful rotational arthrectomy to the mid left circumflex into the OM 2.  Plavix remains on hold after his fall.  He does have a follow-up CT of his head next week and is seeing neurology.  I am hopeful we can place him on some form of antiplatelet in the near future.  We are awaiting neurology recommendation.  8. Permanent A. fib.  Currently not on anticoagulation due to subarachnoid/subdural hematoma.    RECOMMENDATIONS:  1. If systolic blood pressure is consistently below 100 mmHg then his Entresto will need to be decreased.  Patient has suffered several falls as noted above.  2. Follow-up with me in 1 month with a BMP  3. Patient currently getting fruit with all meals for potassium supplementation.  Patient may need low-dose of  potassium chloride at time of discharge from TCU.  4. Patient's TCU nurse will send a fax of blood pressures next week.  Again if he is consistently hypotensive medications need to be adjusted accordingly.    Lauren Jorgensen NP, APRN CNP    Telephone visit documentation  Start: 11:45 am  End: 12:15 pm  Time: 30    Thank you for allowing me to participate in the care of your patient.      Sincerely,     Lauren Jorgensen NP, APRN CNP     [facility]    cc:   No referring provider defined for this encounter.

## 2021-04-22 NOTE — PATIENT INSTRUCTIONS
Call my nurse with any questions or concerns:  [phone]  *If you have concerns after hours, please call [phone], option 2 to speak with on call Cardiologist.    1. Medication changes from today:      No changes  Please fax b/p readings. IF <100 mmHg consistently then will decrease Entresto  Will send a message to neurology IF/WHEN OAC can be reinitiated.       2. Lab Results:     Results for STEVE ELIZALDE (MRN [id number]) as of 4/22/2021 12:13   Ref. Range 4/20/2021 00:00   Sodium Latest Ref Range: 136 - 145 mmol/L 133 (A)   Potassium Latest Ref Range: 3.5 - 5.1 mmol/L 3.6   Chloride Latest Ref Range: 98 - 109 mmol/L 95 (A)   Carbon Dioxide Latest Ref Range: 20 - 29 mmol/L 26   Urea Nitrogen Latest Ref Range: 7 - 26 mg/dL 30 (A)   Creatinine Latest Ref Range: 0.73 - 1.18 mg/dL 1.34 (A)   GFR Estimate Latest Ref Range: >60 ml/min/1.73m2 51 (A)   Calcium Latest Ref Range: 8.4 - 10.4 mg/dL 8.4   Anion Gap Latest Ref Range: 7 - 16 mmol/L 12   Glucose Latest Ref Range: 70 - 100 mg/dL 79

## 2021-04-22 NOTE — PROGRESS NOTES
Marko is a 75 year old who is being evaluated via a billable telephone visit.    Review Of Systems  Skin: NEGATIVE  Eyes:Ears/Nose/Throat: NEGATIVE  Respiratory: NEGATIVE  Cardiovascular: ankles a little swollen (nurse reports +1)  Gastrointestinal: NEGATIVE  Genitourinary:NEGATIVE  Musculoskeletal: NEGATIVE  Neurologic: NEGATIVE  Psychiatric: NEGATIVE  Hematologic/Lymphatic/Immunologic: NEGATIVE  Endocrine:  NEGATIVE    Vitals - Patient Reported  Systolic (Patient Reported): 132  Diastolic (Patient Reported): 71  Weight (Patient Reported): 82.6 kg (182 lb)  SpO2 (Patient Reported): 94  Temperature (Patient Reported): 97.6  F (36.4  C)  Pulse (Patient Reported): 65      Telephone number of patient: [phone]    How would you like to obtain your AVS? MyChart        [phone] Parish  -  at 11:33am to let him know I have been trying unseccussfully to get ahold of Marko.    Two episodes of hypotension yesterday 95/55, two days ago 95/58.   Astrid currently in, urology f/up  Weight today 182; 181.4 lbs, 185 (4/21)    SDavis, CMA 04/22/21       DAVE NOTE:  This visit was completed via telephone due to COVID-19 precautions.  The patient provided consent for a telephone visit.  I had the pleasure speaking to Marko Rene as part of a telephone visit today.    The patient is a delightful 74 yo male with the following chronic medical issues:    1. Severe ischemic cardiomyopathy after a large anterior MI several years ago.  Three-vessel CABG in 2012.  Most recent LVEF 25%.  Recent PCI of OM2 (08/2020). Plavix and asa on hold r/t to head bleed. Awaiting neurology recommendation when Plavix can be reinitiated.    2.  Unstable ventricular tachyarrhythmias.    ICD shock in 11/2017.  Amiodarone started.  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  VT ablation on 01/02/2019.  Amiodarone decreased to 6 days per week in 05/2020.  Recurrence of VT with syncope and 2 shocks in 07/2020.  Syncope without documented VT but  "with multiple nonsustained episodes of VT documented during hospital admission in 09/2020.  Partly related to a \"smoothing out\" algorithm incorporated in his ICD.  NSVT events decreased after the algorithm was disabled.     3.  Permanent atrial fibrillation and complete AV block.  On warfarin.  Prior atrial flutter ablation (2011).      4.  CRT ICD ([employer] [employer]).      5.  CVA following atrial flutter ablation in 2011.     6. CKD III     7. Fall with hospital admission for a subdural hematoma, scalp laceration.  ICD was interrogated in the ED which did indicate a couple 3-4 beat episodes of V tach earlier in the evening but did not seem to coincide with the timing of the fall    It is my pleasure having a phone visit with Mr. Rene and his nurse from TCU.  At our visit last week I did place him on low-dose torsemide 10 mg daily.  He was requiring straight catheterizations and yesterday did receive an indwelling Resendiz catheter.  His weight is down about 9 pounds with a peak weight of 191 and weight today at 181.  He continues to be slightly hypotensive with systolic pressures in the high 90s, but no complaints of lightheadedness.  I do have concerns because he has had increased falls including the last admission where he suffered a subdural hematoma and scalp laceration.    Currently denies chest pain, shortness of breath, lightheadedness, dizziness.  His peripheral edema has improved per his nurse since initiating torsemide.  Labs are stable and noted below.      DIAGNOSTIC STUDIES:  Labs:    Component      Latest Ref Rng & Units 4/20/2021   Sodium      136 - 145 mmol/L 133 (A)   Potassium      3.5 - 5.1 mmol/L 3.6   Chloride      98 - 109 mmol/L 95 (A)   Carbon Dioxide      20 - 29 mmol/L 26   Anion Gap      7 - 16 mmol/L 12   Glucose      70 - 100 mg/dL 79   Urea Nitrogen      7 - 26 mg/dL 30 (A)   Creatinine      0.73 - 1.18 mg/dL 1.34 (A)   Calcium      8.4 - 10.4 mg/dL 8.4   GFR Estimate      >60 ml/min/1.73m2 51 " (A)     Device check 3/21:   [employer] CRT-D Device Check  Patient seen in clinic for device evaluation and iterative programming.   AP: N/A %    BiVP: 95 %    Mode: VVIR   Underlying Rhythm: Afib with CHB with junctional escape at VVI 30   Heart Rate: Stable with adequate variability, patient denies activity intolerance     Sensing: WNL     Pacing Threshold: Stable     Impedance: Stable     Battery Status: 5 months     Device Site: Well healed     Atrial Arrhythmia: Chronic Afib, patient takes coumadin     Ventricular Arrhythmia: 5,596 VT episodes logged since 1/15/2021. 2 EGMs logged as VT initially show Afib with short burst of NSVT then VT with V rates in the 150-160s (patients underlying historically Afib with CHB with no junctional escape at VVI 30) both VT episodes logged on same day 2/1/2021 consecutively. 2 remaining EGMs show Afib with 3-4 beat burst of NSVT with V rates in the 130-160s. Patient does not correlate symptoms with episodes logged. Patient states he is asymptomatic while sitting down, however, does feel dizzy when he is up and moving. Per patient this has been on-going and remains unchanged since mexiletine prescribed by Dr. Costello in December 2020. Patient continues to take both amiodarone and mexiletine as prescribed. Will send update to Dr. Costello regarding continued episodes of VT and patients dizziness.   ATP: 0    Shocks: 0    Setting Change: None     Care Plan: Remote device check in 1 month to assess battery longevity. Patient is scheduled to follow up with Lauren Jorgensen on 4/15/2021. Encouraged patient to call with any questions or concerns. Clinic card given.   BAKARI RN     Echocardiogram 9/2020:    The rhythm was paced.  There is anterior, septal, and apical wall akinesis.(thinned)  The left ventricle is moderately dilated.  The visual ejection fraction is estimated at 20-25%.  There is no thrombus seen in the left ventricle.  Mild aortic root dilatation.  The right  ventricular systolic pressure is approximated at 35mmHg plus the right atrial pressure.  The inferior vena cava is normal.  Compared to the prior study dated 7/29/20, there have been no changes    IMPRESSION:  1. Syncope suffering a subdural, subarachnoid hematoma.  Patient continues to have intermittent headaches.  Currently in TCU to help increase strength and improve ambulation.  2. Right elbow fracture from fall.  3. Cardiomyopathy.  Admission weight 180 pounds, peak weight 191.8 pounds, weight today 181 pounds.  4. Urinary retention requiring indwelling Resendiz catheter.  Urology consult is ordered.  5. VT stable on mexiletine and amiodarone.  Previous VT ablations are noted above.  Again no VT was documented at the time of his last fall.  6. Stage III chronic kidney disease  7. History of coronary artery disease 8/2020: CAD with severe proximal stenosis of OM 2 with successful intervention receiving drug-eluting stent and successful rotational arthrectomy to the mid left circumflex into the OM 2.  Plavix remains on hold after his fall.  He does have a follow-up CT of his head next week and is seeing neurology.  I am hopeful we can place him on some form of antiplatelet in the near future.  We are awaiting neurology recommendation.  8. Permanent A. fib.  Currently not on anticoagulation due to subarachnoid/subdural hematoma.    RECOMMENDATIONS:  1. If systolic blood pressure is consistently below 100 mmHg then his Entresto will need to be decreased.  Patient has suffered several falls as noted above.  2. Follow-up with me in 1 month with a BMP  3. Patient currently getting fruit with all meals for potassium supplementation.  Patient may need low-dose of potassium chloride at time of discharge from TCU.  4. Patient's TCU nurse will send a fax of blood pressures next week.  Again if he is consistently hypotensive medications need to be adjusted accordingly.    Lauren Jorgensen, NP, APRN CNP    Telephone visit  documentation  Start: 11:45 am  End: 12:15 pm  Time: 30

## 2021-04-22 NOTE — PROGRESS NOTES
[facility]  [place] Medical Record Number:  [id number]  Place of Service where encounter took place:  [facility] ([facility]) [25]  Chief Complaint   Patient presents with     Nursing Home Acute       HPI:    Tyrel Rene  is a 75 year old (1945), who is being seen today for an episodic care visit.  HPI information obtained from: facility chart records, facility staff, patient report and [facility] chart review. Today's concern is:  Subdural/subarachnoid hematoma: on exam today patient sitting up in w/c states he is feeling well today, he is alert, denies pain or discomfort  Right elbow Fx: continues in splint, states right elbow denies pain at time of exam, patient walking up to 38 feet using a hemiwalker with SBA to CGA  HTN/cardiomyopathy/CAD/atrial fib: weight increased from 180.7lbs on admit to 192.6lbs today patient weight is 184.3lbs stable, patient denies SOB, cough, congestion BP as follows: 120/69, 87/54, 1134/63  with HR in 60's.   CKD: creat 1.34 on 4/20/21    Past Medical and Surgical History reviewed in Epic today.    MEDICATIONS:    Current Outpatient Medications   Medication Sig Dispense Refill     acetaminophen (TYLENOL) 500 MG tablet Take 1,000 mg by mouth every 8 hours as needed for mild pain       amiodarone (PACERONE) 200 MG tablet Take 1 tablet (200 mg) by mouth daily 90 tablet 1     carvedilol (COREG) 6.25 MG tablet Take 1 tablet (6.25 mg) by mouth 2 times daily (with meals) 180 tablet 3     digoxin (LANOXIN) 125 MCG tablet Take 1 tablet (125 mcg) by mouth six times a week Do not take Sundays 78 tablet 3     docusate sodium (COLACE) 100 MG capsule Take 1 capsule (100 mg) by mouth 2 times daily While taking oxycodone 30 capsule 0     famotidine (PEPCID) 20 MG tablet TAKE 1 TABLET BY MOUTH TWICE A  tablet 3     gabapentin (NEURONTIN) 100 MG capsule Take 1 capsule (100 mg) by mouth 2 times daily 20 capsule 0     ipratropium (ATROVENT) 0.03 % nasal spray  "SPRAY 2 SPRAYS INTO BOTH NOSTRILS EVERY 12 HOURS 30 mL 8     mexiletine (MEXITIL) 150 MG capsule Take 1 capsule (150 mg) by mouth 3 times daily 270 capsule 3     Multiple Vitamins-Minerals (PRESERVISION AREDS 2 PO) Take 1 capsule by mouth 2 times daily       nitroGLYcerin (NITROSTAT) 0.4 MG sublingual tablet DISSOLVE 1 TABLET UNDER THE TONGUE EVERY 5 MINUTES AS NEEDED FOR CHEST PAIN 25 tablet 0     oxyCODONE (ROXICODONE) 5 MG tablet Take 0.5 tablets (2.5 mg) by mouth 4 times daily as needed for pain 12 tablet 0     rosuvastatin (CRESTOR) 20 MG tablet Take 1 tablet (20 mg) by mouth daily 90 tablet 3     sacubitril-valsartan (ENTRESTO) 49-51 MG per tablet Take 1 tablet by mouth 2 times daily 180 tablet 3     tamsulosin (FLOMAX) 0.4 MG capsule Take 0.4 mg by mouth as needed       torsemide (DEMADEX) 10 MG tablet Take 1 tablet (10 mg) by mouth daily       Vitamin D3 (CHOLECALCIFEROL) 25 mcg (1000 units) tablet Take 1 tablet by mouth daily           REVIEW OF SYSTEMS:  10 point ROS of systems including Constitutional, Eyes, Respiratory, Cardiovascular, Gastroenterology, Genitourinary, Integumentary, Musculoskeletal, Psychiatric were all negative except for pertinent positives noted in my HPI.    Objective:  /63   Pulse 65   Temp 97.8  F (36.6  C)   Resp 16   Ht 1.803 m (5' 11\")   Wt 82.6 kg (182 lb)   SpO2 95%   BMI 25.38 kg/m    Exam:  GENERAL APPEARANCE:  Alert, in no distress  ENT:  Mouth and posterior oropharynx normal, moist mucous membranes, [place]  EYES:  EOM, conjunctivae, lids, pupils and irises normal, PERRL  RESP:  no respiratory distress, lungs CTA no adventitious sounds appreciated  CV: HR regular, peripheral edema trace+ in LE bilaterally  ABDOMEN:  abdomen round  M/S:   patient resting in bed, right arm in sling, able to move all 4 extremities  SKIN:  Inspection of skin and subcutaneous tissue baseline  NEURO:   speech WNL  PSYCH:  affect and mood normal    Labs:     Most Recent 3 CBC's:  Recent " Labs   Lab Test 04/08/21 04/02/21  0848 04/01/21  0029 09/19/20  2219   WBC  --  9.9 9.3 7.4   HGB 12.5* 12.8* 13.2* 13.6   MCV  --  90 91 92   PLT  --  169 184 202     Most Recent 3 BMP's:  Recent Labs   Lab Test 04/20/21 04/02/21  0848 04/01/21  0029   * 133 132*   POTASSIUM 3.6 4.1 4.3   CHLORIDE 95* 103 100   CO2 26 23 27   BUN 30* 23 19   CR 1.34* 1.19 1.30*   ANIONGAP 12 7 5   LORI 8.4 8.2* 8.0*   GLC 79 98 99       ASSESSMENT/PLAN:  Subdural hematoma (H)  Subarachnoid hemorrhage (H)  Syncope, unspecified syncope type  Acute/ongoing: PT and OT, f/u with neurosurgery and cardiology as directed   hold coumadin and plavix until f/u, possibly DC plavix and start low dose ASA.      Closed fracture of olecranon process of right ulna with routine healing, subsequent encounter  Acute/ongoing: NWB RUE, sling for comfort, f/u with Dr. Baumann as directed,   tylenol 1000mg TID scheduled, oxycodone 2.5mg QID prn for pain.      Atrial fibrillation, unspecified type (H)  Ischemic cardiomyopathy  Essential hypertension  Coronary artery disease involving native coronary artery of native heart without angina pectoris  Stage 3 chronic kidney disease, unspecified whether stage 3a or 3b CKD  Acute/ongoing: daily weights, vitals daily and prn, BMP follow, continue entresto 49/51mg BID, crestor 20mg QD, mexitil 150mg TID, digoxin 125mcg 6 X week, coreg 6.25mg BID, amiodarone 200mg QD  Torsemide 10mg PO QD      Urinary retention:   Acute/ongoing: bell catheter inserted, patient needs to follow up with urology, continue flomax 0.4mg QD       Order  No new orders today    Total time spent with patient visit at the skilled nursing facility was 35 min including patient visit and review of past records. Greater than 50% of total time spent with counseling and coordinating care due to discussed patient medications, weight is stable, patient denies SOB states he is feeling well, continues to have Urinary retention, bell inserted,  patient needs to f/u with urology for further testing. .  Electronically signed by:  Tonya Lynn Haase, APRN CNP

## 2021-04-23 NOTE — TELEPHONE ENCOUNTER
Family is concerned about the need to keep the CT scan on 4-28.  They feel that the head injury is the least of their concern with patients recovery at this point.  Patient can not ambulate, has a cath, and broken elbow, along with some heart issues.  Family is concerned about transporting patient from the hospital to clinic in the event there is an emergency.     Family has asked that a call be placed to Parish, case coordinator at [facility] to discuss patient progress to determine if appt/CT is needed.  Parish can be reached at [phone]

## 2021-04-23 NOTE — TELEPHONE ENCOUNTER
Attempted to reach out to [facility] case  manger as request from family member , no answer. Left voice message for [facility]  to call clinic back to further discuss.

## 2021-04-26 NOTE — LETTER
4/26/2021        RE: Tyrel Rene  [address] [place] [address]        [facility]  [facility] Medical Record Number:  [id number]  Place of Service where encounter took place:  [facility] (U) [25]  Chief Complaint   Patient presents with     Nursing Home Acute       HPI:    Tyrel Rene  is a 75 year old (1945), who is being seen today for an episodic care visit.  HPI information obtained from: facility chart records, facility staff, patient report and [facility] chart review. Today's concern is:  Hallucinations/lethargy/fever: patient temp this AM was 99.2, patient has some hallucinations per nursing last night a UA/UC was ordered, On exam today patient lethargic, resting in bed, does answer questions appropriately, wife is at bedside, patient has no c/o pain, cp, palpitations, SOB  Subdural/subarachnoid hematoma: on exam today patient resting in bed, opens eyes to verbal stimuli, answers questions appropriately, denies pain or discomfort  Right elbow Fx: continues in splint, states right elbow denies pain at time of exam, patient walking up to 38 feet using a hemiwalker with SBA to CGA  HTN/cardiomyopathy/CAD/atrial fib: weight increased from 180.7lbs on admit to 192.6lbs today patient weight is 185.2lbs trending up again, patient denies SOB, cough, congestion BP as follows: 130/70, 132/72, 145/75 with HR in 60's.   CKD: creat 1.34 on 4/20/21    Past Medical and Surgical History reviewed in Epic today.    MEDICATIONS:    Current Outpatient Medications   Medication Sig Dispense Refill     acetaminophen (TYLENOL) 500 MG tablet Take 1,000 mg by mouth every 8 hours as needed for mild pain       amiodarone (PACERONE) 200 MG tablet Take 1 tablet (200 mg) by mouth daily 90 tablet 1     carvedilol (COREG) 6.25 MG tablet Take 1 tablet (6.25 mg) by mouth 2 times daily (with meals) 180 tablet 3     digoxin (LANOXIN) 125 MCG tablet Take 1 tablet (125 mcg) by mouth six  "times a week Do not take Sundays 78 tablet 3     docusate sodium (COLACE) 100 MG capsule Take 1 capsule (100 mg) by mouth 2 times daily While taking oxycodone 30 capsule 0     famotidine (PEPCID) 20 MG tablet TAKE 1 TABLET BY MOUTH TWICE A  tablet 3     gabapentin (NEURONTIN) 100 MG capsule Take 1 capsule (100 mg) by mouth 2 times daily 20 capsule 0     ipratropium (ATROVENT) 0.03 % nasal spray SPRAY 2 SPRAYS INTO BOTH NOSTRILS EVERY 12 HOURS 30 mL 8     mexiletine (MEXITIL) 150 MG capsule Take 1 capsule (150 mg) by mouth 3 times daily 270 capsule 3     Multiple Vitamins-Minerals (PRESERVISION AREDS 2 PO) Take 1 capsule by mouth 2 times daily       nitroGLYcerin (NITROSTAT) 0.4 MG sublingual tablet DISSOLVE 1 TABLET UNDER THE TONGUE EVERY 5 MINUTES AS NEEDED FOR CHEST PAIN 25 tablet 0     oxyCODONE (ROXICODONE) 5 MG tablet Take 0.5 tablets (2.5 mg) by mouth 4 times daily as needed for pain 12 tablet 0     rosuvastatin (CRESTOR) 20 MG tablet Take 1 tablet (20 mg) by mouth daily 90 tablet 3     sacubitril-valsartan (ENTRESTO) 49-51 MG per tablet Take 1 tablet by mouth 2 times daily 180 tablet 3     tamsulosin (FLOMAX) 0.4 MG capsule Take 0.4 mg by mouth as needed       torsemide (DEMADEX) 10 MG tablet Take 1 tablet (10 mg) by mouth daily       Vitamin D3 (CHOLECALCIFEROL) 25 mcg (1000 units) tablet Take 1 tablet by mouth daily           REVIEW OF SYSTEMS:  10 point ROS of systems including Constitutional, Eyes, Respiratory, Cardiovascular, Gastroenterology, Genitourinary, Integumentary, Musculoskeletal, Psychiatric were all negative except for pertinent positives noted in my HPI.    Objective:  /72   Pulse 65   Temp 99.2  F (37.3  C)   Resp 18   Ht 1.803 m (5' 11\")   Wt 84 kg (185 lb 3.2 oz)   SpO2 91%   BMI 25.83 kg/m    Exam:  GENERAL APPEARANCE: lethargic,  in no distress  ENT:  Mouth and posterior oropharynx normal, moist mucous membranes, [place]  EYES:  EOM, conjunctivae, lids, pupils and irises " normal, PERRL  RESP:  respiratory effort and palpation of chest normal, lungs clear to auscultation , no respiratory distress  CV:  Palpation and auscultation of heart done , regular rate and rhythm, no murmur, rub, or gallop, peripheral edema trace+ in LE bilaterally  ABDOMEN:  normal bowel sounds, soft, nontender, no hepatosplenomegaly or other masses  M/S:   patient resting in bed, able to move all 4 extremities, right arm in splint  SKIN:  Inspection of skin and subcutaneous tissue baseline  NEURO:   speech wnl  PSYCH:  affect and mood normal    Labs:     Most Recent 3 CBC's:  Recent Labs   Lab Test 04/08/21 04/02/21  0848 04/01/21  0029 09/19/20  2219   WBC  --  9.9 9.3 7.4   HGB 12.5* 12.8* 13.2* 13.6   MCV  --  90 91 92   PLT  --  169 184 202     Most Recent 3 BMP's:  Recent Labs   Lab Test 04/20/21 04/02/21  0848 04/01/21  0029   * 133 132*   POTASSIUM 3.6 4.1 4.3   CHLORIDE 95* 103 100   CO2 26 23 27   BUN 30* 23 19   CR 1.34* 1.19 1.30*   ANIONGAP 12 7 5   LORI 8.4 8.2* 8.0*   GLC 79 98 99         ASSESSMENT/PLAN:  Urinary retention:   Acute/ongoing: bell catheter in place,   UA/UC pending,  patient needs to follow up with urology, continue flomax 0.4mg QD  Give rocephin 1gm IM now and again in AM 4/27 while waiting for UA/UC  CBC and BMP in AM    Subdural hematoma (H)  Subarachnoid hemorrhage (H)  Syncope, unspecified syncope type  Acute/ongoing: PT and OT, f/u with neurosurgery and cardiology as directed   hold coumadin and plavix until f/u, possibly DC plavix and start low dose ASA.      Closed fracture of olecranon process of right ulna with routine healing, subsequent encounter  Acute/ongoing: NWB RUE, sling for comfort, f/u with Dr. Baumann as directed,   tylenol 1000mg TID scheduled, oxycodone 2.5mg QID prn for pain.      Atrial fibrillation, unspecified type (H)  Ischemic cardiomyopathy  Essential hypertension  Coronary artery disease involving native coronary artery of native heart  without angina pectoris  Stage 3 chronic kidney disease, unspecified whether stage 3a or 3b CKD  Acute/ongoing: daily weights, vitals daily and prn, BMP follow, continue entresto 49/51mg BID, crestor 20mg QD, mexitil 150mg TID, digoxin 125mcg 6 X week, coreg 6.25mg BID, amiodarone 200mg QD  Increase Torsemide to 20mg PO QD         Orders  CBC and BMP in AM  Rocephin 1gm IM now and again tomorrow AM  increase torsemide to 20mg QD    Total time spent with patient visit at the [facility] nursing facility was 40 minutes including patient visit and review of past records. Greater than 50% of total time spent with counseling and coordinating care due to discussed patient change in condition, likley UTI, UA/UC pending at this time will give IM Rocephin and wait for UC results, in meantime will monitor closely, patient wife at bedside verablized agreement with POC. encouraged patient to drink fluids and get up today out of bed and move some with therapy. .  Electronically signed by:  Tonya Lynn Haase, APRN CNP               Sincerely,        Tonya Lynn Haase, APRN CNP

## 2021-04-26 NOTE — PROGRESS NOTES
[place] [facility]  [place] Medical Record Number:  [id number]  Place of Service where encounter took place:  [facility] (U) [25]  Chief Complaint   Patient presents with     Nursing Home Acute       HPI:    Tyrel Rene  is a 75 year old (1945), who is being seen today for an episodic care visit.  HPI information obtained from: facility chart records, facility staff, patient report and [facility] chart review. Today's concern is:  Hallucinations/lethargy/fever: patient temp this AM was 99.2, patient has some hallucinations per nursing last night a UA/UC was ordered, On exam today patient lethargic, resting in bed, does answer questions appropriately, wife is at bedside, patient has no c/o pain, cp, palpitations, SOB  Subdural/subarachnoid hematoma: on exam today patient resting in bed, opens eyes to verbal stimuli, answers questions appropriately, denies pain or discomfort  Right elbow Fx: continues in splint, states right elbow denies pain at time of exam, patient walking up to 38 feet using a hemiwalker with SBA to CGA  HTN/cardiomyopathy/CAD/atrial fib: weight increased from 180.7lbs on admit to 192.6lbs today patient weight is 185.2lbs trending up again, patient denies SOB, cough, congestion BP as follows: 130/70, 132/72, 145/75 with HR in 60's.   CKD: creat 1.34 on 4/20/21    Past Medical and Surgical History reviewed in Epic today.    MEDICATIONS:    Current Outpatient Medications   Medication Sig Dispense Refill     acetaminophen (TYLENOL) 500 MG tablet Take 1,000 mg by mouth every 8 hours as needed for mild pain       amiodarone (PACERONE) 200 MG tablet Take 1 tablet (200 mg) by mouth daily 90 tablet 1     carvedilol (COREG) 6.25 MG tablet Take 1 tablet (6.25 mg) by mouth 2 times daily (with meals) 180 tablet 3     digoxin (LANOXIN) 125 MCG tablet Take 1 tablet (125 mcg) by mouth six times a week Do not take Sundays 78 tablet 3     docusate sodium (COLACE) 100 MG capsule  "Take 1 capsule (100 mg) by mouth 2 times daily While taking oxycodone 30 capsule 0     famotidine (PEPCID) 20 MG tablet TAKE 1 TABLET BY MOUTH TWICE A  tablet 3     gabapentin (NEURONTIN) 100 MG capsule Take 1 capsule (100 mg) by mouth 2 times daily 20 capsule 0     ipratropium (ATROVENT) 0.03 % nasal spray SPRAY 2 SPRAYS INTO BOTH NOSTRILS EVERY 12 HOURS 30 mL 8     mexiletine (MEXITIL) 150 MG capsule Take 1 capsule (150 mg) by mouth 3 times daily 270 capsule 3     Multiple Vitamins-Minerals (PRESERVISION AREDS 2 PO) Take 1 capsule by mouth 2 times daily       nitroGLYcerin (NITROSTAT) 0.4 MG sublingual tablet DISSOLVE 1 TABLET UNDER THE TONGUE EVERY 5 MINUTES AS NEEDED FOR CHEST PAIN 25 tablet 0     oxyCODONE (ROXICODONE) 5 MG tablet Take 0.5 tablets (2.5 mg) by mouth 4 times daily as needed for pain 12 tablet 0     rosuvastatin (CRESTOR) 20 MG tablet Take 1 tablet (20 mg) by mouth daily 90 tablet 3     sacubitril-valsartan (ENTRESTO) 49-51 MG per tablet Take 1 tablet by mouth 2 times daily 180 tablet 3     tamsulosin (FLOMAX) 0.4 MG capsule Take 0.4 mg by mouth as needed       torsemide (DEMADEX) 10 MG tablet Take 1 tablet (10 mg) by mouth daily       Vitamin D3 (CHOLECALCIFEROL) 25 mcg (1000 units) tablet Take 1 tablet by mouth daily           REVIEW OF SYSTEMS:  10 point ROS of systems including Constitutional, Eyes, Respiratory, Cardiovascular, Gastroenterology, Genitourinary, Integumentary, Musculoskeletal, Psychiatric were all negative except for pertinent positives noted in my HPI.    Objective:  /72   Pulse 65   Temp 99.2  F (37.3  C)   Resp 18   Ht 1.803 m (5' 11\")   Wt 84 kg (185 lb 3.2 oz)   SpO2 91%   BMI 25.83 kg/m    Exam:  GENERAL APPEARANCE: lethargic,  in no distress  ENT:  Mouth and posterior oropharynx normal, moist mucous membranes, [place]  EYES:  EOM, conjunctivae, lids, pupils and irises normal, PERRL  RESP:  respiratory effort and palpation of chest normal, lungs clear to " auscultation , no respiratory distress  CV:  Palpation and auscultation of heart done , regular rate and rhythm, no murmur, rub, or gallop, peripheral edema trace+ in LE bilaterally  ABDOMEN:  normal bowel sounds, soft, nontender, no hepatosplenomegaly or other masses  M/S:   patient resting in bed, able to move all 4 extremities, right arm in splint  SKIN:  Inspection of skin and subcutaneous tissue baseline  NEURO:   speech wnl  PSYCH:  affect and mood normal    Labs:     Most Recent 3 CBC's:  Recent Labs   Lab Test 04/08/21 04/02/21  0848 04/01/21  0029 09/19/20  2219   WBC  --  9.9 9.3 7.4   HGB 12.5* 12.8* 13.2* 13.6   MCV  --  90 91 92   PLT  --  169 184 202     Most Recent 3 BMP's:  Recent Labs   Lab Test 04/20/21 04/02/21  0848 04/01/21  0029   * 133 132*   POTASSIUM 3.6 4.1 4.3   CHLORIDE 95* 103 100   CO2 26 23 27   BUN 30* 23 19   CR 1.34* 1.19 1.30*   ANIONGAP 12 7 5   LORI 8.4 8.2* 8.0*   GLC 79 98 99         ASSESSMENT/PLAN:  Urinary retention:   Acute/ongoing: bell catheter in place,   UA/UC pending,  patient needs to follow up with urology, continue flomax 0.4mg QD  Give rocephin 1gm IM now and again in AM 4/27 while waiting for UA/UC  CBC and BMP in AM    Subdural hematoma (H)  Subarachnoid hemorrhage (H)  Syncope, unspecified syncope type  Acute/ongoing: PT and OT, f/u with neurosurgery and cardiology as directed   hold coumadin and plavix until f/u, possibly DC plavix and start low dose ASA.      Closed fracture of olecranon process of right ulna with routine healing, subsequent encounter  Acute/ongoing: NWB RUE, sling for comfort, f/u with Dr. Baumann as directed,   tylenol 1000mg TID scheduled, oxycodone 2.5mg QID prn for pain.      Atrial fibrillation, unspecified type (H)  Ischemic cardiomyopathy  Essential hypertension  Coronary artery disease involving native coronary artery of native heart without angina pectoris  Stage 3 chronic kidney disease, unspecified whether stage 3a or 3b  CKD  Acute/ongoing: daily weights, vitals daily and prn, BMP follow, continue entresto 49/51mg BID, crestor 20mg QD, mexitil 150mg TID, digoxin 125mcg 6 X week, coreg 6.25mg BID, amiodarone 200mg QD  Increase Torsemide to 20mg PO QD         Orders  CBC and BMP in AM  Rocephin 1gm IM now and again tomorrow AM  increase torsemide to 20mg QD    Total time spent with patient visit at the [facility] nursing facility was 40 minutes including patient visit and review of past records. Greater than 50% of total time spent with counseling and coordinating care due to discussed patient change in condition, saige UTI, UA/UC pending at this time will give IM Rocephin and wait for UC results, in meantime will monitor closely, patient wife at bedside verablized agreement with POC. encouraged patient to drink fluids and get up today out of bed and move some with therapy. .  Electronically signed by:  Tonya Lynn Haase, APRN CNP

## 2021-04-26 NOTE — TELEPHONE ENCOUNTER
Spoke to RN Manager [facility] at [facility] regarding patient condition. He states that over the weekend, patient had a change in cognition and has been hallucinating. He states their provider ordered a UA and started him on antibiotics. He spoke to patients wife regarding follow up with head CT/clinic appointment regarding its importance.   Writer asked about status, patient would be able to utilize w/c transport to clinic for follow up and [facility] could coordinate this.  LVM with wife to give clinic call back to discuss.   [facility]ish asks that on day of appt if AVS can be faxed to [phone].

## 2021-04-26 NOTE — TELEPHONE ENCOUNTER
Patient is still in [facility] for Rehab, and not doing very well this week. Family is wondering how urgent this appointment is for 4-28-21. Family would like to have a returned call regarding condition and need for appointment.

## 2021-04-26 NOTE — TELEPHONE ENCOUNTER
Change in status; feels lethargic, weak, confused, not baseline, family present, changed status from full to DNR today, POLST signed, do not want sent to ER, has bell, potential UTI, T 97.7, VS WNL.  -UA/UC

## 2021-04-27 NOTE — TELEPHONE ENCOUNTER
MEDICAL RECORDS REQUEST   [facility]  Urology Clinic  [address] [place] [address]  PHONE: [phone]  Fax: [phone]        FUTURE VISIT INFORMATION                                                   Tyrel Rene, : 1945 scheduled for future visit at [facility]    APPOINTMENT INFORMATION:    Date: 21    Provider:  Dr. Rdz    Reason for Visit/Diagnosis: urinary retention     REFERRAL INFORMATION:    Referring provider:      Specialty:     Referring providers clinic:      Clinic contact number:      RECORDS REQUESTED FOR VISIT                                                     NOTES  STATUS/DETAILS   OFFICE NOTE from referring provider  no   OFFICE NOTE from other specialist  no   DISCHARGE SUMMARY from hospital  no   DISCHARGE REPORT from the ER  no   OPERATIVE REPORT  no   MEDICATION LIST  yes   LABS     URINALYSIS (UA)  no   URINE CYTOLOGY  no

## 2021-04-27 NOTE — TELEPHONE ENCOUNTER
Patient temp this afternoon was 101.0 has since resolved. Per nursing, has had some hallucinations that is new. Very lethargic and unable to self feed, poor oral intake today.     UA/UC was done today, resident started on rocephin 1 gram today and tomorrow. Crackles in lung bases.     Resident DNR/DNI as of last night. WBC today 19.5 and sodium 131, creatinine 1.48. Sodium has historically been low (133 4/2021).     Talked with spouse Sobia on the phone this evening regarding plan of care. She would like him to stay at facility on current IM rocephin. Does not feel he would benefit from hospitalization. Agreed to further labs and chest x-ray.     Plan:  1.) Chest x-ray   2.) CBC and BMP tomorrow  3.) Hold torsemide until PCP can evaluate tomorrow   4.) Blood cultures x2 and lactic acid in AM    Spouse would like to be updated tomorrow.     [facility]

## 2021-04-27 NOTE — LETTER
4/27/2021        RE: Tyrel Rene  [address] [place] [address]        [facility]  [place] Medical Record Number:  [id number]  Place of Service where encounter took place:  [facility] (U) [25]  Chief Complaint   Patient presents with     Nursing Home Acute       HPI:    Tyrel Rene  is a 75 year old (1945), who is being seen today for an episodic care visit.  HPI information obtained from: facility chart records, facility staff, patient report and [facility] chart review. Today's concern is:  lethargy/fever: patient temp this AM was 97.7, T max 100.2 yesterday at 2pm on exam today patient sitting up in bed, alert, pleasant much improved from yesterday, denies pain, discomfort, SOB, cough, congestion, chills, he did eat breakfast, wife at bedside states he is much better today and does not remember yesterday;   Subdural/subarachnoid hematoma: alert, no c/o headache, pain or discomfort  Right elbow Fx: continues in splint, states right elbow denies pain at time of exam, patient walking up to 38 feet using a hemiwalker with SBA to CGA  HTN/cardiomyopathy/CAD/atrial fib: weight increased from 180.7s on admit to 192.6lbs on 4/13 current weight is 185.2lbs patient denies SOB, cough, congestion BP as follows: 113/62, 115/63, 112/62 with HR in 60's.   CKD: creat 1.34 on 4/20/21 today is 1.5    Past Medical and Surgical History reviewed in Epic today.    MEDICATIONS:    Current Outpatient Medications   Medication Sig Dispense Refill     acetaminophen (TYLENOL) 500 MG tablet Take 1,000 mg by mouth every 8 hours as needed for mild pain       amiodarone (PACERONE) 200 MG tablet Take 1 tablet (200 mg) by mouth daily 90 tablet 1     carvedilol (COREG) 6.25 MG tablet Take 1 tablet (6.25 mg) by mouth 2 times daily (with meals) 180 tablet 3     digoxin (LANOXIN) 125 MCG tablet Take 1 tablet (125 mcg) by mouth six times a week Do not take Sundays 78 tablet 3     docusate  "sodium (COLACE) 100 MG capsule Take 1 capsule (100 mg) by mouth 2 times daily While taking oxycodone 30 capsule 0     famotidine (PEPCID) 20 MG tablet TAKE 1 TABLET BY MOUTH TWICE A  tablet 3     gabapentin (NEURONTIN) 100 MG capsule Take 1 capsule (100 mg) by mouth 2 times daily 20 capsule 0     ipratropium (ATROVENT) 0.03 % nasal spray SPRAY 2 SPRAYS INTO BOTH NOSTRILS EVERY 12 HOURS 30 mL 8     mexiletine (MEXITIL) 150 MG capsule Take 1 capsule (150 mg) by mouth 3 times daily 270 capsule 3     Multiple Vitamins-Minerals (PRESERVISION AREDS 2 PO) Take 1 capsule by mouth 2 times daily       nitroGLYcerin (NITROSTAT) 0.4 MG sublingual tablet DISSOLVE 1 TABLET UNDER THE TONGUE EVERY 5 MINUTES AS NEEDED FOR CHEST PAIN 25 tablet 0     oxyCODONE (ROXICODONE) 5 MG tablet Take 0.5 tablets (2.5 mg) by mouth 4 times daily as needed for pain 12 tablet 0     rosuvastatin (CRESTOR) 20 MG tablet Take 1 tablet (20 mg) by mouth daily 90 tablet 3     sacubitril-valsartan (ENTRESTO) 49-51 MG per tablet Take 1 tablet by mouth 2 times daily 180 tablet 3     tamsulosin (FLOMAX) 0.4 MG capsule Take 0.4 mg by mouth as needed       torsemide (DEMADEX) 10 MG tablet Take 2 tablets (20 mg) by mouth daily       Vitamin D3 (CHOLECALCIFEROL) 25 mcg (1000 units) tablet Take 1 tablet by mouth daily           REVIEW OF SYSTEMS:  10 point ROS of systems including Constitutional, Eyes, Respiratory, Cardiovascular, Gastroenterology, Genitourinary, Integumentary, Musculoskeletal, Psychiatric were all negative except for pertinent positives noted in my HPI.    Objective:  /62   Pulse 65   Temp 97.7  F (36.5  C)   Resp 16   Ht 1.803 m (5' 11\")   Wt 84 kg (185 lb 3.2 oz)   SpO2 91%   BMI 25.83 kg/m    Exam:  GENERAL APPEARANCE: alert  in no distress  ENT:  Mouth and posterior oropharynx normal, moist mucous membranes, [place]  EYES:  EOM, conjunctivae, lids, pupils and irises normal, PERRL  RESP:  respiratory effort and palpation of " chest normal, lungs clear to auscultation , no respiratory distress  CV:  Palpation and auscultation of heart done , regular rate and rhythm, no murmur, rub, or gallop, peripheral edema trace+ in LE bilaterally  ABDOMEN:  normal bowel sounds, soft, nontender, no hepatosplenomegaly or other masses  M/S:   patient resting in bed, able to move all 4 extremities, right arm in splint  SKIN:  Inspection of skin and subcutaneous tissue baseline  NEURO:   speech wnl  PSYCH:  affect and mood normal    Labs:     Most Recent 3 CBC's:  Recent Labs   Lab Test 04/27/21 04/26/21 04/08/21 04/02/21  0848   WBC 14.8* 19.5*  --  9.9   HGB 11.6* 12.0* 12.5* 12.8*   MCV 89.8 89.4  --  90    200  --  169     Most Recent 3 BMP's:  Recent Labs   Lab Test 04/26/21 04/20/21 04/02/21  0848   * 133* 133   POTASSIUM 3.7 3.6 4.1   CHLORIDE 94* 95* 103   CO2 25 26 23   BUN 28* 30* 23   CR 1.48* 1.34* 1.19   ANIONGAP 12 12 7   LORI 8.7 8.4 8.2*   * 79 98       ASSESSMENT/PLAN:  Urinary retention:   Acute/ongoing: bell catheter in place,   UA/UC pending,  patient needs to follow up with urology, continue flomax 0.4mg QD  Give rocephin 1gm IM 4/26 and 4/27  while waiting for UA/UC if UC not done tomorrow will give another 1gm IM rocephin  CBC today 19 early AM down to 14 later in AM 2 labs ordered one by on call last night and one by this NP yesterday AM     Subdural hematoma (H)  Subarachnoid hemorrhage (H)  Syncope, unspecified syncope type  Acute/ongoing: PT and OT, f/u with neurosurgery and cardiology as directed   hold coumadin and plavix until f/u, possibly DC plavix and start low dose ASA.      Closed fracture of olecranon process of right ulna with routine healing, subsequent encounter  Acute/ongoing: NWB RUE, sling for comfort, f/u with Dr. Baumann as directed,   tylenol 1000mg TID scheduled, oxycodone 2.5mg QID prn for pain.      Atrial fibrillation, unspecified type (H)  Ischemic cardiomyopathy  Essential  hypertension  Coronary artery disease involving native coronary artery of native heart without angina pectoris  Stage 3 chronic kidney disease, unspecified whether stage 3a or 3b CKD  Acute/ongoing: daily weights, vitals daily and prn, BMP follow, continue entresto 49/51mg BID, crestor 20mg QD, mexitil 150mg TID, digoxin 125mcg 6 X week, coreg 6.25mg BID, amiodarone 200mg QD  Increase restart Torsemide to 20mg PO QD in AM        orders  IS QID and prn  Restart torsemide 20mg QD in AM  Give another 1gm Rocephin IM in AM while waiting for UC    Total time spent with patient visit at the [facility] nursing [facility] was 35 min including patient visit and review of past records. Greater than 50% of total time spent with counseling and coordinating care due to discussed medication and rocephin injections with wife at bedside, discussed clinical improvement, waiting for UC results to start appropriate oral antibiotic, CXR shows some hazy airspace opacities, will have patient do IS QID and prn, continue to monitor SaO2.  Electronically signed by:  Tonya Lynn Haase, APRN CNP               Sincerely,        Tonya Lynn Haase, APRN CNP

## 2021-04-27 NOTE — PROGRESS NOTES
[facility]  [place] Medical Record Number:  [id number]  Place of Service where encounter took place:  [facility] (TCU) [25]  Chief Complaint   Patient presents with     Nursing Home Acute       HPI:    Tyrel Rene  is a 75 year old (1945), who is being seen today for an episodic care visit.  HPI information obtained from: facility chart records, facility staff, patient report and [facility] chart review. Today's concern is:  lethargy/fever: patient temp this AM was 97.7, T max 100.2 yesterday at 2pm on exam today patient sitting up in bed, alert, pleasant much improved from yesterday, denies pain, discomfort, SOB, cough, congestion, chills, he did eat breakfast, wife at bedside states he is much better today and does not remember yesterday;   Subdural/subarachnoid hematoma: alert, no c/o headache, pain or discomfort  Right elbow Fx: continues in splint, states right elbow denies pain at time of exam, patient walking up to 38 feet using a hemiwalker with SBA to CGA  HTN/cardiomyopathy/CAD/atrial fib: weight increased from 180.7s on admit to 192.6lbs on 4/13 current weight is 185.2lbs patient denies SOB, cough, congestion BP as follows: 113/62, 115/63, 112/62 with HR in 60's.   CKD: creat 1.34 on 4/20/21 today is 1.5    Past Medical and Surgical History reviewed in Epic today.    MEDICATIONS:    Current Outpatient Medications   Medication Sig Dispense Refill     acetaminophen (TYLENOL) 500 MG tablet Take 1,000 mg by mouth every 8 hours as needed for mild pain       amiodarone (PACERONE) 200 MG tablet Take 1 tablet (200 mg) by mouth daily 90 tablet 1     carvedilol (COREG) 6.25 MG tablet Take 1 tablet (6.25 mg) by mouth 2 times daily (with meals) 180 tablet 3     digoxin (LANOXIN) 125 MCG tablet Take 1 tablet (125 mcg) by mouth six times a week Do not take Sundays 78 tablet 3     docusate sodium (COLACE) 100 MG capsule Take 1 capsule (100 mg) by mouth 2 times daily While taking  "oxycodone 30 capsule 0     famotidine (PEPCID) 20 MG tablet TAKE 1 TABLET BY MOUTH TWICE A  tablet 3     gabapentin (NEURONTIN) 100 MG capsule Take 1 capsule (100 mg) by mouth 2 times daily 20 capsule 0     ipratropium (ATROVENT) 0.03 % nasal spray SPRAY 2 SPRAYS INTO BOTH NOSTRILS EVERY 12 HOURS 30 mL 8     mexiletine (MEXITIL) 150 MG capsule Take 1 capsule (150 mg) by mouth 3 times daily 270 capsule 3     Multiple Vitamins-Minerals (PRESERVISION AREDS 2 PO) Take 1 capsule by mouth 2 times daily       nitroGLYcerin (NITROSTAT) 0.4 MG sublingual tablet DISSOLVE 1 TABLET UNDER THE TONGUE EVERY 5 MINUTES AS NEEDED FOR CHEST PAIN 25 tablet 0     oxyCODONE (ROXICODONE) 5 MG tablet Take 0.5 tablets (2.5 mg) by mouth 4 times daily as needed for pain 12 tablet 0     rosuvastatin (CRESTOR) 20 MG tablet Take 1 tablet (20 mg) by mouth daily 90 tablet 3     sacubitril-valsartan (ENTRESTO) 49-51 MG per tablet Take 1 tablet by mouth 2 times daily 180 tablet 3     tamsulosin (FLOMAX) 0.4 MG capsule Take 0.4 mg by mouth as needed       torsemide (DEMADEX) 10 MG tablet Take 2 tablets (20 mg) by mouth daily       Vitamin D3 (CHOLECALCIFEROL) 25 mcg (1000 units) tablet Take 1 tablet by mouth daily           REVIEW OF SYSTEMS:  10 point ROS of systems including Constitutional, Eyes, Respiratory, Cardiovascular, Gastroenterology, Genitourinary, Integumentary, Musculoskeletal, Psychiatric were all negative except for pertinent positives noted in my HPI.    Objective:  /62   Pulse 65   Temp 97.7  F (36.5  C)   Resp 16   Ht 1.803 m (5' 11\")   Wt 84 kg (185 lb 3.2 oz)   SpO2 91%   BMI 25.83 kg/m    Exam:  GENERAL APPEARANCE: alert  in no distress  ENT:  Mouth and posterior oropharynx normal, moist mucous membranes, [place]  EYES:  EOM, conjunctivae, lids, pupils and irises normal, PERRL  RESP:  respiratory effort and palpation of chest normal, lungs clear to auscultation , no respiratory distress  CV:  Palpation and " auscultation of heart done , regular rate and rhythm, no murmur, rub, or gallop, peripheral edema trace+ in LE bilaterally  ABDOMEN:  normal bowel sounds, soft, nontender, no hepatosplenomegaly or other masses  M/S:   patient resting in bed, able to move all 4 extremities, right arm in splint  SKIN:  Inspection of skin and subcutaneous tissue baseline  NEURO:   speech wnl  PSYCH:  affect and mood normal    Labs:     Most Recent 3 CBC's:  Recent Labs   Lab Test 04/27/21 04/26/21 04/08/21 04/02/21  0848   WBC 14.8* 19.5*  --  9.9   HGB 11.6* 12.0* 12.5* 12.8*   MCV 89.8 89.4  --  90    200  --  169     Most Recent 3 BMP's:  Recent Labs   Lab Test 04/26/21 04/20/21 04/02/21  0848   * 133* 133   POTASSIUM 3.7 3.6 4.1   CHLORIDE 94* 95* 103   CO2 25 26 23   BUN 28* 30* 23   CR 1.48* 1.34* 1.19   ANIONGAP 12 12 7   LORI 8.7 8.4 8.2*   * 79 98       ASSESSMENT/PLAN:  Urinary retention:   Acute/ongoing: bell catheter in place,   UA/UC pending,  patient needs to follow up with urology, continue flomax 0.4mg QD  Give rocephin 1gm IM 4/26 and 4/27  while waiting for UA/UC if UC not done tomorrow will give another 1gm IM rocephin  CBC today 19 early AM down to 14 later in AM 2 labs ordered one by on call last night and one by this NP yesterday AM     Subdural hematoma (H)  Subarachnoid hemorrhage (H)  Syncope, unspecified syncope type  Acute/ongoing: PT and OT, f/u with neurosurgery and cardiology as directed   hold coumadin and plavix until f/u, possibly DC plavix and start low dose ASA.      Closed fracture of olecranon process of right ulna with routine healing, subsequent encounter  Acute/ongoing: NWB RUE, sling for comfort, f/u with Dr. Baumann as directed,   tylenol 1000mg TID scheduled, oxycodone 2.5mg QID prn for pain.      Atrial fibrillation, unspecified type (H)  Ischemic cardiomyopathy  Essential hypertension  Coronary artery disease involving native coronary artery of native heart without  angina pectoris  Stage 3 chronic kidney disease, unspecified whether stage 3a or 3b CKD  Acute/ongoing: daily weights, vitals daily and prn, BMP follow, continue entresto 49/51mg BID, crestor 20mg QD, mexitil 150mg TID, digoxin 125mcg 6 X week, coreg 6.25mg BID, amiodarone 200mg QD  Increase restart Torsemide to 20mg PO QD in AM        orders  IS QID and prn  Restart torsemide 20mg QD in AM  Give another 1gm Rocephin IM in AM while waiting for UC    Total time spent with patient visit at the [facility] nursing [facility] was 35 min including patient visit and review of past records. Greater than 50% of total time spent with counseling and coordinating care due to discussed medication and rocephin injections with wife at bedside, discussed clinical improvement, waiting for UC results to start appropriate oral antibiotic, CXR shows some hazy airspace opacities, will have patient do IS QID and prn, continue to monitor SaO2.  Electronically signed by:  Tonya Lynn Haase, APRN CNP

## 2021-04-27 NOTE — TELEPHONE ENCOUNTER
Attempted to reach out to patient, no answer. Left voice message for patient to call clinic back to further discuss. LVM for Parish.

## 2021-04-28 NOTE — PROGRESS NOTES
[facility]    Incoming message from nurse at patients facility w/update. Patient currently being treated for UTI, receiving rocephin. In addition facility provider has increased his torsemide to 20mg/d. Nurse did not request a call back, but if any concerns call [phone].  Jessica Hyatt RN on 4/28/2021 at 3:20 PM

## 2021-04-29 NOTE — PROGRESS NOTES
ANTICOAGULATION  MANAGEMENT     Interacting Medication Review    Interacting medication(s): Levofloxacin (Levaquin) with warfarin.    Duration: 4/29 to 5/7    Indication: urinary retention    New medication?: Yes, interaction may increase INR and risk of bleeding       PLAN     Patient in TCU and they will manage INR    Patient was not contacted    No adjustment to Anticoagulation Calendar was required    Shahnaz Montenegro RN

## 2021-04-29 NOTE — PROGRESS NOTES
[facility]    Incoming fax w/recent BP/HR/weights. Document sent to scanning.  Jessica Hyatt RN on 4/29/2021 at 11:53 AM

## 2021-04-29 NOTE — TELEPHONE ENCOUNTER
"Received VM from pt's wife Sobia. She says that pt is currently at [facility] since 4/5 after being hospitalized for a fall. He is very ill and has pneumonia and a UTI. She would not want him to get shocked right now and she has just signed a DNR. She is wondering about getting his tachy therapies turned off.     Chart reviewed. Pt has a virtual visit with Lauren SALVADOR for [facility] on 4/22/2021. The last MD she saw in our clinic was Dr. Newman on 1/20/2021. Pt is not in hospice care, but a note in the chart from yesterday 4/27/2021 says: \"Family cancelled appts as patient has been moved into comfort care as of 4-26-21.\"  Also, a note from 4/26/2021 says: \"Resident DNR/DNI as of last night.\"      Called wife Sobia back. Pt is unable to travel to the clinic for a device appointment. Sobia gave me the phone number for Parish at [facility]: [phone]. The address of the [facility] is:    [address] [address]      I called Parish, he is the nurse manager for pt's unit at [place]. He didn't answer, so left a detailed VM that we are looking for an MD order to turn off ICD, asked him to call me back at Device RN number to discuss further.   "

## 2021-04-29 NOTE — PROGRESS NOTES
[facility]  [place] Medical Record Number:  [id number]  Place of Service where encounter took place:  [facility] (TCU) [25]  Chief Complaint   Patient presents with     Nursing Home Acute       HPI:    Tyrel Rene  is a 75 year old (1945), who is being seen today for an episodic care visit.  HPI information obtained from: facility chart records, facility staff, patient report and [facility] chart review. Today's concern is:  lethargy/fever: patient temp this AM was 99.6 low grade, he is more lethargic today, wife at bedside states patient had hallucinations yesterday and more lethargic yesterday as denies pain at time of exam but wife states patient just took oxycodone for right sided rib pain and some back pain, discussed treatment at length with wife, will start oral abx levaquin that will cover urine and lungs, some patchy infiltrates noted on CXR from 4/27, discussed if this treatment is not effective if wife would want patient hospitalized and she declined, she wants to have him comfort care, will continue to monitor, if no improvement or decline over next day or two will talk about hospice.   Subdural/subarachnoid hematoma: alert, no c/o headache, pain or discomfort  Right elbow Fx: continues in splint, states right elbow denies pain at time of exam  HTN/cardiomyopathy/CAD/atrial fib: weight increased from 180.7s on admit to 192.6lbs on 4/13 current weight is 185.2lbs patient denies SOB, cough, congestion BP as follows: 106/58, 122/58, 108/62 with HR in 60's.   CKD: creat 1.34 on 4/20/21 today is 1.5  Labs pending for day    Past Medical and Surgical History reviewed in Epic today.    MEDICATIONS:    Current Outpatient Medications   Medication Sig Dispense Refill     acetaminophen (TYLENOL) 500 MG tablet Take 1,000 mg by mouth every 8 hours as needed for mild pain       amiodarone (PACERONE) 200 MG tablet Take 1 tablet (200 mg) by mouth daily 90 tablet 1     carvedilol  "(COREG) 6.25 MG tablet Take 1 tablet (6.25 mg) by mouth 2 times daily (with meals) 180 tablet 3     digoxin (LANOXIN) 125 MCG tablet Take 1 tablet (125 mcg) by mouth six times a week Do not take Sundays 78 tablet 3     docusate sodium (COLACE) 100 MG capsule Take 1 capsule (100 mg) by mouth 2 times daily While taking oxycodone 30 capsule 0     famotidine (PEPCID) 20 MG tablet TAKE 1 TABLET BY MOUTH TWICE A  tablet 3     gabapentin (NEURONTIN) 100 MG capsule Take 1 capsule (100 mg) by mouth 2 times daily 20 capsule 0     ipratropium (ATROVENT) 0.03 % nasal spray SPRAY 2 SPRAYS INTO BOTH NOSTRILS EVERY 12 HOURS 30 mL 8     mexiletine (MEXITIL) 150 MG capsule Take 1 capsule (150 mg) by mouth 3 times daily 270 capsule 3     Multiple Vitamins-Minerals (PRESERVISION AREDS 2 PO) Take 1 capsule by mouth 2 times daily       nitroGLYcerin (NITROSTAT) 0.4 MG sublingual tablet DISSOLVE 1 TABLET UNDER THE TONGUE EVERY 5 MINUTES AS NEEDED FOR CHEST PAIN 25 tablet 0     oxyCODONE (ROXICODONE) 5 MG tablet Take 0.5 tablets (2.5 mg) by mouth 4 times daily as needed for pain 12 tablet 0     rosuvastatin (CRESTOR) 20 MG tablet Take 1 tablet (20 mg) by mouth daily 90 tablet 3     sacubitril-valsartan (ENTRESTO) 49-51 MG per tablet Take 1 tablet by mouth 2 times daily 180 tablet 3     tamsulosin (FLOMAX) 0.4 MG capsule Take 0.4 mg by mouth as needed       torsemide (DEMADEX) 10 MG tablet Take 2 tablets (20 mg) by mouth daily       Vitamin D3 (CHOLECALCIFEROL) 25 mcg (1000 units) tablet Take 1 tablet by mouth daily           REVIEW OF SYSTEMS:  10 point ROS of systems including Constitutional, Eyes, Respiratory, Cardiovascular, Gastroenterology, Genitourinary, Integumentary, Musculoskeletal, Psychiatric were all negative except for pertinent positives noted in my HPI.    Objective:  /58   Pulse 65   Temp 99.6  F (37.6  C)   Resp 14   Ht 1.803 m (5' 11\")   Wt 84 kg (185 lb 3.2 oz)   SpO2 90%   BMI 25.83 kg/m  "   Exam:  GENERAL APPEARANCE:  in no distress  ENT:  Mouth and posterior oropharynx normal, moist mucous membranes, [place]  EYES:  EOM, conjunctivae, lids, pupils and irises normal, PERRL  RESP:  no respiratory distress, diminished breath sounds bases bilaterally, exam limited patient not taking deep breaths, exp wheeze noted left lungs  CV:  Palpation and auscultation of heart done , regular rate and rhythm, no murmur, rub, or gallop, no edema  ABDOMEN:  normal bowel sounds, soft, nontender, no hepatosplenomegaly or other masses  M/S:   patient resting in bed  SKIN:  Inspection of skin and subcutaneous tissue baseline  NEURO:   lethargic, resting in bed, responds to verbal stimuli    Labs:     Most Recent 3 CBC's:  Recent Labs   Lab Test 04/27/21 04/26/21 04/08/21 04/02/21  0848   WBC 14.8* 19.5*  --  9.9   HGB 11.6* 12.0* 12.5* 12.8*   MCV 89.8 89.4  --  90    200  --  169     Most Recent 3 BMP's:  Recent Labs   Lab Test 04/27/21 04/26/21 04/20/21   * 131* 133*   POTASSIUM 3.5 3.7 3.6   CHLORIDE 96* 94* 95*   CO2 28 25 26   BUN 31* 28* 30*   CR 1.50* 1.48* 1.34*   ANIONGAP 8 12 12   LORI 8.4 8.7 8.4   * 118* 79       ASSESSMENT/PLAN:  Urinary retention:   UTI:   Acute/ongoing: bell catheter in place,   UC shows multiple bacterial morphologies,  patient needs to follow up with urology, continue flomax 0.4mg QD  Given rocephin 1gm IM 4/26, 4/27 and 4/28 start levaquin 500mg qD for 8 days starting today 4/29  CBC pending for today  Discussed advanced directives with wife at bedside, want comfort care focus, no hospitalization, will see if oral abx and current treatment improves condition but if not will transition to hospice/comfort care only     Subdural hematoma (H)  Subarachnoid hemorrhage (H)  Syncope, unspecified syncope type  Acute/ongoing: PT and OT, f/u with neurosurgery and cardiology as directed   hold coumadin and plavix until f/u, possibly DC plavix and start low dose ASA.      Closed  fracture of olecranon process of right ulna with routine healing, subsequent encounter  Acute/ongoing: NWB RUE, sling for comfort, f/u with Dr. Baumann as directed,   tylenol 1000mg TID scheduled, oxycodone 2.5mg QID prn for pain.      Atrial fibrillation, unspecified type (H)  Ischemic cardiomyopathy  Essential hypertension  Coronary artery disease involving native coronary artery of native heart without angina pectoris  Stage 3 chronic kidney disease, unspecified whether stage 3a or 3b CKD  Acute/ongoing: daily weights, vitals daily and prn, BMP follow, continue entresto 49/51mg BID, crestor 20mg QD, mexitil 150mg TID, digoxin 125mcg 6 X week, coreg 6.25mg BID, amiodarone 200mg QD  Increase restart Torsemide to 20mg PO QD in AM        orders  levaquin 500mg QD for 8 days    Total time spent with patient visit at the skilled nursing facility was 35 min including patient visit and review of past records. Greater than 50% of total time spent with counseling and coordinating care due to discussed goals of care and current treatment with wife at bedside see above.  Electronically signed by:  Tonya Lynn Haase, APRN CNP

## 2021-04-29 NOTE — TELEPHONE ENCOUNTER
Parish called back and left . I called Parish again and spoke with him. I asked if he could help me get an order from pt's MD at [facility] to turn off ICD therapies. Parish said his MD at [facility] is a [facility] provider, Dr. Rafael Montana, and Parish asked that I just send a telephone encounter note to Dr. Montana in [facility] asking for the order.     Parish said when we have arranged with [employer] rep to turn off tachy therapies to call and let Parish know the date and time. The rep can go to [facility], 2nd floor, and enter at the back side of the building (they are currently using only one entrance due to covid19).

## 2021-04-29 NOTE — LETTER
4/29/2021        RE: Tyrel Rene  [address] [place] [address]        [facility]  [facility] Medical Record Number:  [id number]  Place of Service where encounter took place:  [facility] (U) [25]  Chief Complaint   Patient presents with     Nursing Home Acute       HPI:    Tyrel Rene  is a 75 year old (1945), who is being seen today for an episodic care visit.  HPI information obtained from: facility chart records, facility staff, patient report and [facility] chart review. Today's concern is:  lethargy/fever: patient temp this AM was 99.6 low grade, he is more lethargic today, wife at bedside states patient had hallucinations yesterday and more lethargic yesterday as denies pain at time of exam but wife states patient just took oxycodone for right sided rib pain and some back pain, discussed treatment at length with wife, will start oral abx levaquin that will cover urine and lungs, some patchy infiltrates noted on CXR from 4/27, discussed if this treatment is not effective if wife would want patient hospitalized and she declined, she wants to have him comfort care, will continue to monitor, if no improvement or decline over next day or two will talk about hospice.   Subdural/subarachnoid hematoma: alert, no c/o headache, pain or discomfort  Right elbow Fx: continues in splint, states right elbow denies pain at time of exam  HTN/cardiomyopathy/CAD/atrial fib: weight increased from 180.7s on admit to 192.6lbs on 4/13 current weight is 185.2lbs patient denies SOB, cough, congestion BP as follows: 106/58, 122/58, 108/62 with HR in 60's.   CKD: creat 1.34 on 4/20/21 today is 1.5  Labs pending for day    Past Medical and Surgical History reviewed in Epic today.    MEDICATIONS:    Current Outpatient Medications   Medication Sig Dispense Refill     acetaminophen (TYLENOL) 500 MG tablet Take 1,000 mg by mouth every 8 hours as needed for mild pain        amiodarone (PACERONE) 200 MG tablet Take 1 tablet (200 mg) by mouth daily 90 tablet 1     carvedilol (COREG) 6.25 MG tablet Take 1 tablet (6.25 mg) by mouth 2 times daily (with meals) 180 tablet 3     digoxin (LANOXIN) 125 MCG tablet Take 1 tablet (125 mcg) by mouth six times a week Do not take Sundays 78 tablet 3     docusate sodium (COLACE) 100 MG capsule Take 1 capsule (100 mg) by mouth 2 times daily While taking oxycodone 30 capsule 0     famotidine (PEPCID) 20 MG tablet TAKE 1 TABLET BY MOUTH TWICE A  tablet 3     gabapentin (NEURONTIN) 100 MG capsule Take 1 capsule (100 mg) by mouth 2 times daily 20 capsule 0     ipratropium (ATROVENT) 0.03 % nasal spray SPRAY 2 SPRAYS INTO BOTH NOSTRILS EVERY 12 HOURS 30 mL 8     mexiletine (MEXITIL) 150 MG capsule Take 1 capsule (150 mg) by mouth 3 times daily 270 capsule 3     Multiple Vitamins-Minerals (PRESERVISION AREDS 2 PO) Take 1 capsule by mouth 2 times daily       nitroGLYcerin (NITROSTAT) 0.4 MG sublingual tablet DISSOLVE 1 TABLET UNDER THE TONGUE EVERY 5 MINUTES AS NEEDED FOR CHEST PAIN 25 tablet 0     oxyCODONE (ROXICODONE) 5 MG tablet Take 0.5 tablets (2.5 mg) by mouth 4 times daily as needed for pain 12 tablet 0     rosuvastatin (CRESTOR) 20 MG tablet Take 1 tablet (20 mg) by mouth daily 90 tablet 3     sacubitril-valsartan (ENTRESTO) 49-51 MG per tablet Take 1 tablet by mouth 2 times daily 180 tablet 3     tamsulosin (FLOMAX) 0.4 MG capsule Take 0.4 mg by mouth as needed       torsemide (DEMADEX) 10 MG tablet Take 2 tablets (20 mg) by mouth daily       Vitamin D3 (CHOLECALCIFEROL) 25 mcg (1000 units) tablet Take 1 tablet by mouth daily           REVIEW OF SYSTEMS:  10 point ROS of systems including Constitutional, Eyes, Respiratory, Cardiovascular, Gastroenterology, Genitourinary, Integumentary, Musculoskeletal, Psychiatric were all negative except for pertinent positives noted in my HPI.    Objective:  /58   Pulse 65   Temp 99.6  F (37.6  C)    "Resp 14   Ht 1.803 m (5' 11\")   Wt 84 kg (185 lb 3.2 oz)   SpO2 90%   BMI 25.83 kg/m    Exam:  GENERAL APPEARANCE:  in no distress  ENT:  Mouth and posterior oropharynx normal, moist mucous membranes, [place]  EYES:  EOM, conjunctivae, lids, pupils and irises normal, PERRL  RESP:  no respiratory distress, diminished breath sounds bases bilaterally, exam limited patient not taking deep breaths, exp wheeze noted left lungs  CV:  Palpation and auscultation of heart done , regular rate and rhythm, no murmur, rub, or gallop, no edema  ABDOMEN:  normal bowel sounds, soft, nontender, no hepatosplenomegaly or other masses  M/S:   patient resting in bed  SKIN:  Inspection of skin and subcutaneous tissue baseline  NEURO:   lethargic, resting in bed, responds to verbal stimuli    Labs:     Most Recent 3 CBC's:  Recent Labs   Lab Test 04/27/21 04/26/21 04/08/21 04/02/21  0848   WBC 14.8* 19.5*  --  9.9   HGB 11.6* 12.0* 12.5* 12.8*   MCV 89.8 89.4  --  90    200  --  169     Most Recent 3 BMP's:  Recent Labs   Lab Test 04/27/21 04/26/21 04/20/21   * 131* 133*   POTASSIUM 3.5 3.7 3.6   CHLORIDE 96* 94* 95*   CO2 28 25 26   BUN 31* 28* 30*   CR 1.50* 1.48* 1.34*   ANIONGAP 8 12 12   LORI 8.4 8.7 8.4   * 118* 79       ASSESSMENT/PLAN:  Urinary retention:   UTI:   Acute/ongoing: bell catheter in place,   UC shows multiple bacterial morphologies,  patient needs to follow up with urology, continue flomax 0.4mg QD  Given rocephin 1gm IM 4/26, 4/27 and 4/28 start levaquin 500mg qD for 8 days starting today 4/29  CBC pending for today  Discussed advanced directives with wife at bedside, want comfort care focus, no hospitalization, will see if oral abx and current treatment improves condition but if not will transition to hospice/comfort care only     Subdural hematoma (H)  Subarachnoid hemorrhage (H)  Syncope, unspecified syncope type  Acute/ongoing: PT and OT, f/u with neurosurgery and cardiology as directed   hold " coumadin and plavix until f/u, possibly DC plavix and start low dose ASA.      Closed fracture of olecranon process of right ulna with routine healing, subsequent encounter  Acute/ongoing: NWB RUE, sling for comfort, f/u with Dr. Baumann as directed,   tylenol 1000mg TID scheduled, oxycodone 2.5mg QID prn for pain.      Atrial fibrillation, unspecified type (H)  Ischemic cardiomyopathy  Essential hypertension  Coronary artery disease involving native coronary artery of native heart without angina pectoris  Stage 3 chronic kidney disease, unspecified whether stage 3a or 3b CKD  Acute/ongoing: daily weights, vitals daily and prn, BMP follow, continue entresto 49/51mg BID, crestor 20mg QD, mexitil 150mg TID, digoxin 125mcg 6 X week, coreg 6.25mg BID, amiodarone 200mg QD  Increase restart Torsemide to 20mg PO QD in AM        orders  levaquin 500mg QD for 8 days    Total time spent with patient visit at the skilled nursing facility was 35 min including patient visit and review of past records. Greater than 50% of total time spent with counseling and coordinating care due to discussed goals of care and current treatment with wife at bedside see above.  Electronically signed by:  Tonya Lynn Haase, APRN CNP               Sincerely,        Tonya Lynn Haase, APRN CNP

## 2021-04-30 NOTE — PROGRESS NOTES
TALKED TO LYNNE.  *SEND LINK TO HIS CELL PHONE*  USE THIS NUMBER: [phone]    PVR WAS FAILED MANY TIMES AND HAS FAILED TOV  PRIOR PVRS WERE IN THE 700S  PT HAS A CATH IN NOW  URINE IN THE BAG IS CLEAR  PTS OVERALL IS DECLINING.  PT HAD Pneumonia AND UTI  FAMILY IS LEANING TO HOSPICE  CAN LEAVE CATH IN?  PT HAD IM ROCEPHIN  DID NOT DO MED HX BC A WORKER CHECKED IN PT.        Marko is a 75 year old who is being evaluated via a billable video visit.      How would you like to obtain your AVS? MyChart  If the video visit is dropped, the invitation should be resent by: Text to cell phone: [phone]  Will anyone else be joining your video visit? No      Video Start Time: 10:59 AM  Video-Visit Details    Type of service:  Video Visit    Video End Time:11:08 AM    Originating Location (pt. Location): Home    Distant Location (provider location):  [facility] [place]     Platform used for Video Visit: Doximity      CC: retention    HPI:  Tyrel Rene is a 75 year old male who presents via billable video visit in consultation from Tonya Haase, CNP for evaluation of the above. Recent hospitalization and subsequent transfer to NH. Resendiz placed 10 days ago for persistently elevated PVRs. Urine now clear. Tolerating the Resendiz well and not getting out of bed. May be transitioning to Hospice soon.     Past Medical History:   Diagnosis Date     Atrial fibrillation (H)     s/p Cardioversion 3/14/2013     Atrial flutter (H)     S/p Aflutter ablation 1/11/2011     CAD (coronary artery disease)     CABG x3 10/2012- LIMA to distal LAD, SVG to OM1 & OM3; cath 10/2012- PTCA to second diagonal and mid LAD, BMS to mid LAD     Cardiogenic shock (H)      Cardiomyopathy (H)      ED (erectile dysfunction)      Hypertension      Neuropathy      SVT (supraventricular tachycardia) (H)     S/p dual chamber ICD 10/11/12- upgraded to BIV ICD 6/2013     Syncope        Past Surgical History:   Procedure Laterality Date     BYPASS  GRAFT ARTERY CORONARY  10/2/2012    Procedure: BYPASS GRAFT ARTERY CORONARY;  Coronary Artery Bypass Graft x3 (LAD, Diag, OM) with Endovein Dexter (On-Pump);  Surgeon: Yeyo Lyman MD;  Location:  OR     CARDIOVERSION  3/14/2013     CORONARY ANGIOGRAPHY ADULT ORDER  10/2/12     CORONARY ARTERY BYPASS  10/2/12    LIMA to LAD, SVG to OM1 and OM3     CV ANGIOGRAM CORONARY GRAFT N/A 8/11/2020    Procedure: Angiogram Coronary Graft;  Surgeon: Erin Weaver MD;  Location:  HEART CARDIAC CATH LAB     CV HEART CATHETERIZATION WITH POSSIBLE INTERVENTION N/A 8/11/2020    Procedure: Heart Catheterization with Possible Intervention;  Surgeon: Erin Weaver MD;  Location:  HEART CARDIAC CATH LAB     CV PCI ATHERECTOMY ORBITAL N/A 8/11/2020    Procedure: Percutaneous Coronary Intervention Atherectomy Rotational;  Surgeon: Erin Weaver MD;  Location:  HEART CARDIAC CATH LAB     EP ABLATION VT N/A 1/2/2019    Procedure: EP Ablation VT;  Surgeon: Suellen Costello MD;  Location:  HEART CARDIAC CATH LAB     EP COMPREHENSIVE EP STUDY N/A 1/2/2019    Procedure: EP Comprehensive EP Study;  Surgeon: Suellen Costello MD;  Location:  HEART CARDIAC CATH LAB     H ABLATION ATRIAL FLUTTER  1/11/2011     HAND SURGERY      table saw injury right hand     HEART CATH, ANGIOPLASTY  10/2/12    PTCA to second diagonal and mid LAD, BMS to mid LAD     HERNIA REPAIR       RELOCATE GENERATOR ICD/PACEMAKER         Social History     Socioeconomic History     Marital status:      Spouse name: Not on file     Number of children: Not on file     Years of education: Not on file     Highest education level: Not on file   Occupational History     Not on file   Social Needs     Financial resource strain: Not on file     Food insecurity     Worry: Not on file     Inability: Not on file     Transportation needs     Medical: Not on file     Non-medical: Not on file   Tobacco Use      Smoking status: Former Smoker     Packs/day: 1.00     Years: 14.00     Pack years: 14.00     Types: Cigarettes     Start date:      Quit date: 7/10/1972     Years since quittin.8     Smokeless tobacco: Never Used   Substance and Sexual Activity     Alcohol use: No     Drug use: No     Sexual activity: Yes     Partners: Female   Lifestyle     Physical activity     Days per week: Not on file     Minutes per session: Not on file     Stress: Not on file   Relationships     Social connections     Talks on phone: Not on file     Gets together: Not on file     Attends Sikh service: Not on file     Active member of club or organization: Not on file     Attends meetings of clubs or organizations: Not on file     Relationship status: Not on file     Intimate partner violence     Fear of current or ex partner: Not on file     Emotionally abused: Not on file     Physically abused: Not on file     Forced sexual activity: Not on file   Other Topics Concern     Parent/sibling w/ CABG, MI or angioplasty before 65F 55M? No      Service Not Asked     Blood Transfusions Not Asked     Caffeine Concern Yes     Comment: 4 cups coffee daily     Occupational Exposure Not Asked     Hobby Hazards Not Asked     Sleep Concern No     Stress Concern No     Weight Concern Yes     Comment: gain 2lbs     Special Diet Yes     Comment: low sodium     Back Care Not Asked     Exercise Yes     Comment: walking, doing sittups, played pickle ball in summer     Bike Helmet Not Asked     Seat Belt Yes     Self-Exams Not Asked   Social History Narrative     Not on file       Family History   Problem Relation Age of Onset     Alcohol/Drug Father      Heart Disease Father 71     Alzheimer Disease Sister      Alcohol/Drug Sister      Alcohol/Drug Sister      Gastrointestinal Disease Sister      Obesity Sister      Hypertension Sister      Heart Disease Sister      Hypertension Sister      Heart Disease Daughter         afib      "Arrhythmia Daughter      Multiple Sclerosis Daughter      Heart Disease Daughter         afib     Arrhythmia Daughter      Cancer Daughter         lymphoma     Aneurysm Mother        ROS:14 point ROS neg other than the symptoms noted above in the HPI.    Allergies   Allergen Reactions     Aspirin      Other reaction(s): Aspirin Contraindication (for reporting)  Cardiologist recommended no aspirin as he is on Pradaxa     Nystatin Other (See Comments)     Make his mouth numb & swelling       Current Outpatient Medications   Medication     acetaminophen (TYLENOL) 500 MG tablet     amiodarone (PACERONE) 200 MG tablet     carvedilol (COREG) 6.25 MG tablet     digoxin (LANOXIN) 125 MCG tablet     docusate sodium (COLACE) 100 MG capsule     famotidine (PEPCID) 20 MG tablet     gabapentin (NEURONTIN) 100 MG capsule     ipratropium (ATROVENT) 0.03 % nasal spray     levofloxacin (LEVAQUIN) 500 MG tablet     mexiletine (MEXITIL) 150 MG capsule     Multiple Vitamins-Minerals (PRESERVISION AREDS 2 PO)     nitroGLYcerin (NITROSTAT) 0.4 MG sublingual tablet     oxyCODONE (ROXICODONE) 5 MG tablet     rosuvastatin (CRESTOR) 20 MG tablet     sacubitril-valsartan (ENTRESTO) 49-51 MG per tablet     tamsulosin (FLOMAX) 0.4 MG capsule     torsemide (DEMADEX) 10 MG tablet     Vitamin D3 (CHOLECALCIFEROL) 25 mcg (1000 units) tablet     No current facility-administered medications for this visit.          PEx:   Height 1.778 m (5' 10\"), weight 83.9 kg (185 lb).    PSYCH: NAD  : Bell with clear urine    A/P: Tyrel Rene is a 75 year old male with persistent urinary retention  -Maintain bell. Change every 30 days. Can stop Flomax.   Reviewed with staff.   -Call if concerns.     Polina Waite PA-C  [facility]                  "

## 2021-04-30 NOTE — LETTER
4/30/2021        RE: Tyrel Rene  [address] [place] [address]        [facility]  [place] Medical Record Number:  [id number]  Place of Service where encounter took place:  [facility] (U) [25]  Chief Complaint   Patient presents with     Nursing Home Acute       HPI:    Tyrel Rene  is a 75 year old (1945), who is being seen today for an episodic care visit.  HPI information obtained from: facility chart records, facility staff, patient report and [facility] chart review. Today's concern is:  lethargy/fever: patient resting in bed, alert, no c/o pain or discomfort, denies SOB, cough, congestion, fever, he does have O2 on at time of exam, daughters are at bedside, no concerns noted, patient is much more alert today on exam than he was yesterday, nursing state oxycodone was administered this   AM for pain, patient c/o positional back pain, encouraged nursing to use prn tylenol and repositioning first, get patient out of bed and moving.   Subdural/subarachnoid hematoma: alert, no c/o headache, pain or discomfort  Right elbow Fx: denies pain at time of exam  HTN/cardiomyopathy/CAD/atrial fib: weight increased from 180.7s on admit to 192.6lbs on 4/13 current weight is 185.2lbs patient denies SOB, cough, congestion BP as follows: 112/61, 108/64 with HR in 60's.   CKD: creat 1.5    Past Medical and Surgical History reviewed in Epic today.    MEDICATIONS:    Current Outpatient Medications   Medication Sig Dispense Refill     acetaminophen (TYLENOL) 500 MG tablet Take 1,000 mg by mouth every 8 hours as needed for mild pain       amiodarone (PACERONE) 200 MG tablet Take 1 tablet (200 mg) by mouth daily 90 tablet 1     carvedilol (COREG) 6.25 MG tablet Take 1 tablet (6.25 mg) by mouth 2 times daily (with meals) 180 tablet 3     digoxin (LANOXIN) 125 MCG tablet Take 1 tablet (125 mcg) by mouth six times a week Do not take Sundays 78 tablet 3     docusate sodium  "(COLACE) 100 MG capsule Take 1 capsule (100 mg) by mouth 2 times daily While taking oxycodone 30 capsule 0     famotidine (PEPCID) 20 MG tablet TAKE 1 TABLET BY MOUTH TWICE A  tablet 3     gabapentin (NEURONTIN) 100 MG capsule Take 1 capsule (100 mg) by mouth 2 times daily 20 capsule 0     ipratropium (ATROVENT) 0.03 % nasal spray SPRAY 2 SPRAYS INTO BOTH NOSTRILS EVERY 12 HOURS 30 mL 8     levofloxacin (LEVAQUIN) 500 MG tablet Take 1 tablet (500 mg) by mouth daily for 8 days 8 tablet 0     mexiletine (MEXITIL) 150 MG capsule Take 1 capsule (150 mg) by mouth 3 times daily 270 capsule 3     Multiple Vitamins-Minerals (PRESERVISION AREDS 2 PO) Take 1 capsule by mouth 2 times daily       nitroGLYcerin (NITROSTAT) 0.4 MG sublingual tablet DISSOLVE 1 TABLET UNDER THE TONGUE EVERY 5 MINUTES AS NEEDED FOR CHEST PAIN 25 tablet 0     oxyCODONE (ROXICODONE) 5 MG tablet Take 0.5 tablets (2.5 mg) by mouth 4 times daily as needed for pain 12 tablet 0     rosuvastatin (CRESTOR) 20 MG tablet Take 1 tablet (20 mg) by mouth daily 90 tablet 3     sacubitril-valsartan (ENTRESTO) 49-51 MG per tablet Take 1 tablet by mouth 2 times daily 180 tablet 3     tamsulosin (FLOMAX) 0.4 MG capsule Take 0.4 mg by mouth as needed       torsemide (DEMADEX) 10 MG tablet Take 2 tablets (20 mg) by mouth daily       Vitamin D3 (CHOLECALCIFEROL) 25 mcg (1000 units) tablet Take 1 tablet by mouth daily           REVIEW OF SYSTEMS:  10 point ROS of systems including Constitutional, Eyes, Respiratory, Cardiovascular, Gastroenterology, Genitourinary, Integumentary, Musculoskeletal, Psychiatric were all negative except for pertinent positives noted in my HPI.    Objective:  /64   Pulse 66   Temp 98  F (36.7  C)   Resp 16   Ht 1.803 m (5' 11\")   Wt 84 kg (185 lb 3.2 oz)   SpO2 92%   BMI 25.83 kg/m    Exam:  GENERAL APPEARANCE: alert,  in no distress  ENT:  Mouth and posterior oropharynx normal, moist mucous membranes, [place]  EYES:  EOM, " conjunctivae, lids, pupils and irises normal, PERRL  RESP:  no respiratory distress, diminished breath sounds bases bilaterally, no adventitious sounds  CV:  Palpation and auscultation of heart done , regular rate and rhythm, no murmur, rub, or gallop, no edema  ABDOMEN:  normal bowel sounds, soft, nontender, no hepatosplenomegaly or other masses  M/S:   patient resting in bed  SKIN:  Inspection of skin and subcutaneous tissue baseline  NEURO:   lethargic, resting in bed, responds to verbal stimuli    Labs:     Most Recent 3 CBC's:  Recent Labs   Lab Test 04/27/21 04/26/21 04/08/21 04/02/21  0848   WBC 14.8* 19.5*  --  9.9   HGB 11.6* 12.0* 12.5* 12.8*   MCV 89.8 89.4  --  90    200  --  169     Most Recent 3 BMP's:  Recent Labs   Lab Test 04/27/21 04/26/21 04/20/21   * 131* 133*   POTASSIUM 3.5 3.7 3.6   CHLORIDE 96* 94* 95*   CO2 28 25 26   BUN 31* 28* 30*   CR 1.50* 1.48* 1.34*   ANIONGAP 8 12 12   LORI 8.4 8.7 8.4   * 118* 79       ASSESSMENT/PLAN:  Urinary retention:   UTI:   Acute/ongoing: bell catheter in place,   UC shows multiple bacterial morphologies,  patient needs to follow up with urology, continue flomax 0.4mg QD  Given rocephin 1gm IM 4/26, 4/27 and 4/28 start levaquin 500mg qD for 8 days started  4/29 improved today  CBC on Monday     Subdural hematoma (H)  Subarachnoid hemorrhage (H)  Syncope, unspecified syncope type  Acute/ongoing: PT and OT, f/u with neurosurgery and cardiology as directed   hold coumadin and plavix until f/u     Closed fracture of olecranon process of right ulna with routine healing, subsequent encounter  Acute/ongoing: NWB MICKE, sling for comfort, f/u with Dr. Baumann as directed,   tylenol 1000mg TID scheduled, oxycodone 2.5mg QID prn for pain.      Atrial fibrillation, unspecified type (H)  Ischemic cardiomyopathy  Essential hypertension  Coronary artery disease involving native coronary artery of native heart without angina pectoris  Stage 3 chronic  kidney disease, unspecified whether stage 3a or 3b CKD  Acute/ongoing: daily weights, vitals daily and prn, BMP follow, continue entresto 49/51mg BID, crestor 20mg QD, mexitil 150mg TID, digoxin 125mcg 6 X week, coreg 6.25mg BID, amiodarone 200mg QD  DC torsemide  BMP and CBC on Monday        Orders  DC torsemide  BMP and CBC on Monday    Total time spent with patient visit at the [facility] nursing [facility] was 35 min including patient visit and review of past records. Greater than 50% of total time spent with counseling and coordinating care due to discussed oral antibiotics at bedside, will continue to monitor closely, labs on monday, wife reached out to cardiology to have ICD deactiviated and have patient be comfort care focus only, will continue abx encouraged to wait until Monday to reassess condition. .  Electronically signed by:  Tonya Lynn Haase, APRN CNP               Sincerely,        Tonya Lynn Haase, APRN CNP

## 2021-04-30 NOTE — LETTER
4/30/2021       RE: Tyrel Rene  [address] [place] [address]     Dear Colleague,    Thank you for referring your patient, Tyrel Rene, to the [facility] [place] at [facility]. Please see a copy of my visit note below.    TALKED TO LYNNE.  *SEND LINK TO HIS CELL PHONE*  USE THIS NUMBER: [phone]    PVR WAS FAILED MANY TIMES AND HAS FAILED TOV  PRIOR PVRS WERE IN THE 700S  PT HAS A CATH IN NOW  URINE IN THE BAG IS CLEAR  PTS OVERALL IS DECLINING.  PT HAD Pneumonia AND UTI  FAMILY IS LEANING TO HOSPICE  CAN LEAVE CATH IN?  PT HAD IM ROCEPHIN  DID NOT DO MED HX BC A WORKER CHECKED IN PT.        Marko is a 75 year old who is being evaluated via a billable video visit.      How would you like to obtain your AVS? MyChart  If the video visit is dropped, the invitation should be resent by: Text to cell phone: [phone]  Will anyone else be joining your video visit? No      Video Start Time: 10:59 AM  Video-Visit Details    Type of service:  Video Visit    Video End Time:11:08 AM    Originating Location (pt. Location): Home    Distant Location (provider location):  [facility] [place]     Platform used for Video Visit: [employer]      CC: retention    HPI:  Tyrel Rene is a 75 year old male who presents via billable video visit in consultation from Tonya Haase, CNP for evaluation of the above. Recent hospitalization and subsequent transfer to NH. Resendiz placed 10 days ago for persistently elevated PVRs. Urine now clear. Tolerating the Resendiz well and not getting out of bed. May be transitioning to Hospice soon.     Past Medical History:   Diagnosis Date     Atrial fibrillation (H)     s/p Cardioversion 3/14/2013     Atrial flutter (H)     S/p Aflutter ablation 1/11/2011     CAD (coronary artery disease)     CABG x3 10/2012- LIMA to distal LAD, SVG to OM1 & OM3; cath 10/2012- PTCA to second diagonal and mid LAD, BMS to  mid LAD     Cardiogenic shock (H)      Cardiomyopathy (H)      ED (erectile dysfunction)      Hypertension      Neuropathy      SVT (supraventricular tachycardia) (H)     S/p dual chamber ICD 10/11/12- upgraded to BIV ICD 6/2013     Syncope        Past Surgical History:   Procedure Laterality Date     BYPASS GRAFT ARTERY CORONARY  10/2/2012    Procedure: BYPASS GRAFT ARTERY CORONARY;  Coronary Artery Bypass Graft x3 (LAD, Diag, OM) with Endovein [place] (On-Pump);  Surgeon: Yeyo Lyman MD;  Location:  OR     CARDIOVERSION  3/14/2013     CORONARY ANGIOGRAPHY ADULT ORDER  10/2/12     CORONARY ARTERY BYPASS  10/2/12    LIMA to LAD, SVG to OM1 and OM3     CV ANGIOGRAM CORONARY GRAFT N/A 8/11/2020    Procedure: Angiogram Coronary Graft;  Surgeon: Erin Weaver MD;  Location:  HEART CARDIAC CATH LAB     CV HEART CATHETERIZATION WITH POSSIBLE INTERVENTION N/A 8/11/2020    Procedure: Heart Catheterization with Possible Intervention;  Surgeon: Erin Weaver MD;  Location:  HEART CARDIAC CATH LAB     CV PCI ATHERECTOMY ORBITAL N/A 8/11/2020    Procedure: Percutaneous Coronary Intervention Atherectomy Rotational;  Surgeon: Erin Weaver MD;  Location:  HEART CARDIAC CATH LAB     EP ABLATION VT N/A 1/2/2019    Procedure: EP Ablation VT;  Surgeon: Suellen Costello MD;  Location:  HEART CARDIAC CATH LAB     EP COMPREHENSIVE EP STUDY N/A 1/2/2019    Procedure: EP Comprehensive EP Study;  Surgeon: Suellen Costello MD;  Location:  HEART CARDIAC CATH LAB     H ABLATION ATRIAL FLUTTER  1/11/2011     HAND SURGERY      table saw injury right hand     HEART CATH, ANGIOPLASTY  10/2/12    PTCA to second diagonal and mid LAD, BMS to mid LAD     HERNIA REPAIR       RELOCATE GENERATOR ICD/PACEMAKER         Social History     Socioeconomic History     Marital status:      Spouse name: Not on file     Number of children: Not on file     Years of education: Not  on file     Highest education level: Not on file   Occupational History     Not on file   Social Needs     Financial resource strain: Not on file     Food insecurity     Worry: Not on file     Inability: Not on file     Transportation needs     Medical: Not on file     Non-medical: Not on file   Tobacco Use     Smoking status: Former Smoker     Packs/day: 1.00     Years: 14.00     Pack years: 14.00     Types: Cigarettes     Start date:      Quit date: 7/10/1972     Years since quittin.8     Smokeless tobacco: Never Used   Substance and Sexual Activity     Alcohol use: No     Drug use: No     Sexual activity: Yes     Partners: Female   Lifestyle     Physical activity     Days per week: Not on file     Minutes per session: Not on file     Stress: Not on file   Relationships     Social connections     Talks on phone: Not on file     Gets together: Not on file     Attends [employer] service: Not on file     Active member of club or organization: Not on file     Attends meetings of clubs or organizations: Not on file     Relationship status: Not on file     Intimate partner violence     Fear of current or ex partner: Not on file     Emotionally abused: Not on file     Physically abused: Not on file     Forced sexual activity: Not on file   Other Topics Concern     Parent/sibling w/ CABG, MI or angioplasty before 65F 55M? No      Service Not Asked     Blood Transfusions Not Asked     Caffeine Concern Yes     Comment: 4 cups coffee daily     Occupational Exposure Not Asked     Hobby Hazards Not Asked     Sleep Concern No     Stress Concern No     Weight Concern Yes     Comment: gain 2lbs     Special Diet Yes     Comment: low sodium     Back Care Not Asked     Exercise Yes     Comment: walking, doing sittups, played pickle ball in summer     Bike Helmet Not Asked     Seat Belt Yes     Self-Exams Not Asked   Social History Narrative     Not on file       Family History   Problem Relation Age of Onset      "Alcohol/Drug Father      Heart Disease Father 71     Alzheimer Disease Sister      Alcohol/Drug Sister      Alcohol/Drug Sister      Gastrointestinal Disease Sister      Obesity Sister      Hypertension Sister      Heart Disease Sister      Hypertension Sister      Heart Disease Daughter         afib     Arrhythmia Daughter      Multiple Sclerosis Daughter      Heart Disease Daughter         afib     Arrhythmia Daughter      Cancer Daughter         lymphoma     Aneurysm Mother        ROS:14 point ROS neg other than the symptoms noted above in the HPI.    Allergies   Allergen Reactions     Aspirin      Other reaction(s): Aspirin Contraindication (for reporting)  Cardiologist recommended no aspirin as he is on Pradaxa     Nystatin Other (See Comments)     Make his mouth numb & swelling       Current Outpatient Medications   Medication     acetaminophen (TYLENOL) 500 MG tablet     amiodarone (PACERONE) 200 MG tablet     carvedilol (COREG) 6.25 MG tablet     digoxin (LANOXIN) 125 MCG tablet     docusate sodium (COLACE) 100 MG capsule     famotidine (PEPCID) 20 MG tablet     gabapentin (NEURONTIN) 100 MG capsule     ipratropium (ATROVENT) 0.03 % nasal spray     levofloxacin (LEVAQUIN) 500 MG tablet     mexiletine (MEXITIL) 150 MG capsule     Multiple Vitamins-Minerals (PRESERVISION AREDS 2 PO)     nitroGLYcerin (NITROSTAT) 0.4 MG sublingual tablet     oxyCODONE (ROXICODONE) 5 MG tablet     rosuvastatin (CRESTOR) 20 MG tablet     sacubitril-valsartan (ENTRESTO) 49-51 MG per tablet     tamsulosin (FLOMAX) 0.4 MG capsule     torsemide (DEMADEX) 10 MG tablet     Vitamin D3 (CHOLECALCIFEROL) 25 mcg (1000 units) tablet     No current facility-administered medications for this visit.          PEx:   Height 1.778 m (5' 10\"), weight 83.9 kg (185 lb).    PSYCH: NAD  : Bell with clear urine    A/P: Tyrel Rene is a 75 year old male with persistent urinary retention  -Maintain bell. Change every 30 days. Can stop Flomax. "   Reviewed with staff.   -Call if concerns.     Polina Waite PA-C  [facility]

## 2021-04-30 NOTE — PROGRESS NOTES
[place]  [place] Medical Record Number:  [id number]  Place of Service where encounter took place:  [facility] (TCU) [25]  Chief Complaint   Patient presents with     Nursing Home Acute       HPI:    Tyrel Rene  is a 75 year old (1945), who is being seen today for an episodic care visit.  HPI information obtained from: facility chart records, facility staff, patient report and [facility] chart review. Today's concern is:  lethargy/fever: patient resting in bed, alert, no c/o pain or discomfort, denies SOB, cough, congestion, fever, he does have O2 on at time of exam, daughters are at bedside, no concerns noted, patient is much more alert today on exam than he was yesterday, nursing state oxycodone was administered this   AM for pain, patient c/o positional back pain, encouraged nursing to use prn tylenol and repositioning first, get patient out of bed and moving.   Subdural/subarachnoid hematoma: alert, no c/o headache, pain or discomfort  Right elbow Fx: denies pain at time of exam  HTN/cardiomyopathy/CAD/atrial fib: weight increased from 180.7s on admit to 192.6lbs on 4/13 current weight is 185.2lbs patient denies SOB, cough, congestion BP as follows: 112/61, 108/64 with HR in 60's.   CKD: creat 1.5    Past Medical and Surgical History reviewed in Epic today.    MEDICATIONS:    Current Outpatient Medications   Medication Sig Dispense Refill     acetaminophen (TYLENOL) 500 MG tablet Take 1,000 mg by mouth every 8 hours as needed for mild pain       amiodarone (PACERONE) 200 MG tablet Take 1 tablet (200 mg) by mouth daily 90 tablet 1     carvedilol (COREG) 6.25 MG tablet Take 1 tablet (6.25 mg) by mouth 2 times daily (with meals) 180 tablet 3     digoxin (LANOXIN) 125 MCG tablet Take 1 tablet (125 mcg) by mouth six times a week Do not take Sundays 78 tablet 3     docusate sodium (COLACE) 100 MG capsule Take 1 capsule (100 mg) by mouth 2 times daily While taking oxycodone 30  "capsule 0     famotidine (PEPCID) 20 MG tablet TAKE 1 TABLET BY MOUTH TWICE A  tablet 3     gabapentin (NEURONTIN) 100 MG capsule Take 1 capsule (100 mg) by mouth 2 times daily 20 capsule 0     ipratropium (ATROVENT) 0.03 % nasal spray SPRAY 2 SPRAYS INTO BOTH NOSTRILS EVERY 12 HOURS 30 mL 8     levofloxacin (LEVAQUIN) 500 MG tablet Take 1 tablet (500 mg) by mouth daily for 8 days 8 tablet 0     mexiletine (MEXITIL) 150 MG capsule Take 1 capsule (150 mg) by mouth 3 times daily 270 capsule 3     Multiple Vitamins-Minerals (PRESERVISION AREDS 2 PO) Take 1 capsule by mouth 2 times daily       nitroGLYcerin (NITROSTAT) 0.4 MG sublingual tablet DISSOLVE 1 TABLET UNDER THE TONGUE EVERY 5 MINUTES AS NEEDED FOR CHEST PAIN 25 tablet 0     oxyCODONE (ROXICODONE) 5 MG tablet Take 0.5 tablets (2.5 mg) by mouth 4 times daily as needed for pain 12 tablet 0     rosuvastatin (CRESTOR) 20 MG tablet Take 1 tablet (20 mg) by mouth daily 90 tablet 3     sacubitril-valsartan (ENTRESTO) 49-51 MG per tablet Take 1 tablet by mouth 2 times daily 180 tablet 3     tamsulosin (FLOMAX) 0.4 MG capsule Take 0.4 mg by mouth as needed       torsemide (DEMADEX) 10 MG tablet Take 2 tablets (20 mg) by mouth daily       Vitamin D3 (CHOLECALCIFEROL) 25 mcg (1000 units) tablet Take 1 tablet by mouth daily           REVIEW OF SYSTEMS:  10 point ROS of systems including Constitutional, Eyes, Respiratory, Cardiovascular, Gastroenterology, Genitourinary, Integumentary, Musculoskeletal, Psychiatric were all negative except for pertinent positives noted in my HPI.    Objective:  /64   Pulse 66   Temp 98  F (36.7  C)   Resp 16   Ht 1.803 m (5' 11\")   Wt 84 kg (185 lb 3.2 oz)   SpO2 92%   BMI 25.83 kg/m    Exam:  GENERAL APPEARANCE: alert,  in no distress  ENT:  Mouth and posterior oropharynx normal, moist mucous membranes, [place]  EYES:  EOM, conjunctivae, lids, pupils and irises normal, PERRL  RESP:  no respiratory distress, diminished breath " sounds bases bilaterally, no adventitious sounds  CV:  Palpation and auscultation of heart done , regular rate and rhythm, no murmur, rub, or gallop, no edema  ABDOMEN:  normal bowel sounds, soft, nontender, no hepatosplenomegaly or other masses  M/S:   patient resting in bed  SKIN:  Inspection of skin and subcutaneous tissue baseline  NEURO:   lethargic, resting in bed, responds to verbal stimuli    Labs:     Most Recent 3 CBC's:  Recent Labs   Lab Test 04/27/21 04/26/21 04/08/21 04/02/21  0848   WBC 14.8* 19.5*  --  9.9   HGB 11.6* 12.0* 12.5* 12.8*   MCV 89.8 89.4  --  90    200  --  169     Most Recent 3 BMP's:  Recent Labs   Lab Test 04/27/21 04/26/21 04/20/21   * 131* 133*   POTASSIUM 3.5 3.7 3.6   CHLORIDE 96* 94* 95*   CO2 28 25 26   BUN 31* 28* 30*   CR 1.50* 1.48* 1.34*   ANIONGAP 8 12 12   LORI 8.4 8.7 8.4   * 118* 79       ASSESSMENT/PLAN:  Urinary retention:   UTI:   Acute/ongoing: bell catheter in place,   UC shows multiple bacterial morphologies,  patient needs to follow up with urology, continue flomax 0.4mg QD  Given rocephin 1gm IM 4/26, 4/27 and 4/28 start levaquin 500mg qD for 8 days started  4/29 improved today  CBC on Monday     Subdural hematoma (H)  Subarachnoid hemorrhage (H)  Syncope, unspecified syncope type  Acute/ongoing: PT and OT, f/u with neurosurgery and cardiology as directed   hold coumadin and plavix until f/u     Closed fracture of olecranon process of right ulna with routine healing, subsequent encounter  Acute/ongoing: [place]CLAYTON NGUYEN, sling for comfort, f/u with Dr. Baumann as directed,   tylenol 1000mg TID scheduled, oxycodone 2.5mg QID prn for pain.      Atrial fibrillation, unspecified type (H)  Ischemic cardiomyopathy  Essential hypertension  Coronary artery disease involving native coronary artery of native heart without angina pectoris  Stage 3 chronic kidney disease, unspecified whether stage 3a or 3b CKD  Acute/ongoing: daily weights, vitals daily and  prn, BMP follow, continue entresto 49/51mg BID, crestor 20mg QD, mexitil 150mg TID, digoxin 125mcg 6 X week, coreg 6.25mg BID, amiodarone 200mg QD  DC torsemide  BMP and CBC on Monday        Orders  DC torsemide  BMP and CBC on Monday    Total time spent with patient visit at the [facility] was 35 min including patient visit and review of past records. Greater than 50% of total time spent with counseling and coordinating care due to discussed oral antibiotics at bedside, will continue to monitor closely, labs on monday, wife reached out to cardiology to have ICD deactiviated and have patient be comfort care focus only, will continue abx encouraged to wait until Monday to reassess condition. .  Electronically signed by:  Tonya Lynn Haase, APRN CNP

## 2021-05-01 NOTE — TELEPHONE ENCOUNTER
Nursing called to report lab results from today.  Resident's health declining.  Is on Levofloxacin for a UTI.  Reported that he has lost 14 lbs since 4/25     Nursing was focusing on his health versus telling the results.      WBC = 13.3  HgB = 11.9    Na = 138  K+ = 3.3  BUN = 30  Cr = 1.51      Trying to order a K+ supplement and asked if that could wait until Monday because he is on a lot of cardiac medications already.  This NP did not feel it could wait.  If he does not take it, that is one thing, but it needed to be ordered.    Orders:  KCL 10meq po daily for hypokalemia  MOnday - CBC and BMP  Discontinue colace as can not crush or chew  Senna-S 2 tabs daily for constipation.    Electronically signed by Corrina Keller RN, CNP

## 2021-05-02 NOTE — TELEPHONE ENCOUNTER
Had BP drop through day, am was 112/65, noon 98/61, now 88/48, has not been ambulatory, HR's all 65 range, on oxygen, sats 92-93%, on coreg BID, already given.   -hold pm carvedilol, place parameters hold SBP <110   -push fluids

## 2021-05-03 NOTE — LETTER
5/3/2021        RE: Tyrel Rene  [address] [place] [address]        [place]  [place] Medical Record Number:  [id number]  Place of Service where encounter took place:  [facility] (U) [25]  Chief Complaint   Patient presents with     Nursing Home Acute       HPI:    Tyrel Rene  is a 75 year old (1945), who is being seen today for an episodic care visit.  HPI information obtained from: facility chart records, facility staff, patient report and [facility] chart review. Today's concern is:  lethargy/fever: patient resting in bed, alert, able to answer questions appropriately, denies SOB, cough, congestion, patient is on O2, SaO2 is 95% on 2 liters,  no c/o pain or discomfort, wife is at bedside, states patient having intermittent hallucinations but hallucinations overall have decreased  Subdural/subarachnoid hematoma: alert, no c/o headache, pain or discomfort  Right elbow Fx: denies pain at time of exam  Urinary retention: continues with bell catheter, UC shows > 100,000 multiple bacterial morphologies on levaquin, afebrile   HTN/cardiomyopathy/CAD/atrial fib: weight increased from 180.7s on admit to 192.6lbs on 4/13 current weight is 185.2lbs patient denies SOB, cough, congestion BP as follows: 121/64, 95/56, 98/61 with HR in 60's.   CKD: creat 1.5    Past Medical and Surgical History reviewed in Epic today.    MEDICATIONS:    Current Outpatient Medications   Medication Sig Dispense Refill     acetaminophen (TYLENOL) 500 MG tablet Take 1,000 mg by mouth every 8 hours as needed for mild pain       amiodarone (PACERONE) 200 MG tablet Take 1 tablet (200 mg) by mouth daily 90 tablet 1     carvedilol (COREG) 6.25 MG tablet Take 1 tablet (6.25 mg) by mouth 2 times daily (with meals) 180 tablet 3     digoxin (LANOXIN) 125 MCG tablet Take 1 tablet (125 mcg) by mouth six times a week Do not take Sundays 78 tablet 3     docusate sodium (COLACE) 100 MG capsule  Take 1 capsule (100 mg) by mouth 2 times daily While taking oxycodone 30 capsule 0     famotidine (PEPCID) 20 MG tablet TAKE 1 TABLET BY MOUTH TWICE A  tablet 3     gabapentin (NEURONTIN) 100 MG capsule Take 1 capsule (100 mg) by mouth 2 times daily 20 capsule 0     ipratropium (ATROVENT) 0.03 % nasal spray SPRAY 2 SPRAYS INTO BOTH NOSTRILS EVERY 12 HOURS 30 mL 8     levofloxacin (LEVAQUIN) 500 MG tablet Take 1 tablet (500 mg) by mouth daily for 8 days 8 tablet 0     mexiletine (MEXITIL) 150 MG capsule Take 1 capsule (150 mg) by mouth 3 times daily 270 capsule 3     Multiple Vitamins-Minerals (PRESERVISION AREDS 2 PO) Take 1 capsule by mouth 2 times daily       nitroGLYcerin (NITROSTAT) 0.4 MG sublingual tablet DISSOLVE 1 TABLET UNDER THE TONGUE EVERY 5 MINUTES AS NEEDED FOR CHEST PAIN 25 tablet 0     oxyCODONE (ROXICODONE) 5 MG tablet Take 0.5 tablets (2.5 mg) by mouth 4 times daily as needed for pain 12 tablet 0     rosuvastatin (CRESTOR) 20 MG tablet Take 1 tablet (20 mg) by mouth daily 90 tablet 3     sacubitril-valsartan (ENTRESTO) 49-51 MG per tablet Take 1 tablet by mouth 2 times daily 180 tablet 3     tamsulosin (FLOMAX) 0.4 MG capsule Take 0.4 mg by mouth as needed       Vitamin D3 (CHOLECALCIFEROL) 25 mcg (1000 units) tablet Take 1 tablet by mouth daily           REVIEW OF SYSTEMS:  10 point ROS of systems including Constitutional, Eyes, Respiratory, Cardiovascular, Gastroenterology, Genitourinary, Integumentary, Musculoskeletal, Psychiatric were all negative except for pertinent positives noted in my HPI.    Objective:  /64   Pulse 66   Temp 97.3  F (36.3  C)   Resp 16   SpO2 95%   Exam:  GENERAL APPEARANCE:  Alert, in no distress  ENT:  Mouth and posterior oropharynx normal, moist mucous membranes, [place]  EYES:  EOM, conjunctivae, lids, pupils and irises normal, PERRL  RESP:  respiratory effort and palpation of chest normal, lungs clear to auscultation , no respiratory distress, diminished  breath sounds bases bilaterally  CV:  Palpation and auscultation of heart done , regular rate and rhythm, no murmur, rub, or gallop, no edema  ABDOMEN:  normal bowel sounds, soft, nontender, no hepatosplenomegaly or other masses  M/S:   patient resting in bed, able to move all 4 extremities  SKIN:  Inspection of skin and subcutaneous tissue baseline  NEURO:   speech WNL  PSYCH:  affect and mood normal    Labs:     Most Recent 3 CBC's:  Recent Labs   Lab Test 04/30/21 04/27/21 04/26/21   WBC 13.3* 14.8* 19.5*   HGB 11.9* 11.6* 12.0*   MCV 90.6 89.8 89.4    174 200     Most Recent 3 BMP's:  Recent Labs   Lab Test 04/30/21 04/27/21 04/26/21    132* 131*   POTASSIUM 3.3* 3.5 3.7   CHLORIDE 95* 96* 94*   CO2 30* 28 25   BUN 30* 31* 28*   CR 1.51* 1.50* 1.48*   ANIONGAP 13 8 12   LORI 8.4 8.4 8.7   GLC 97 109* 118*       ASSESSMENT/PLAN:  Urinary retention:   UTI:   Acute/ongoing: bell catheter in place,   UC shows multiple bacterial morphologies,  patient needs to follow up with urology, continue flomax 0.4mg QD  Given rocephin 1gm IM 4/26, 4/27 and 4/28 start levaquin 500mg qD for 8 days  CBC wbc trending down,      Subdural hematoma (H)  Subarachnoid hemorrhage (H)  Syncope, unspecified syncope type  Acute/ongoing: PT and OT, f/u with neurosurgery and cardiology as directed   hold coumadin and plavix until f/u     Closed fracture of olecranon process of right ulna with routine healing, subsequent encounter  Acute/ongoing: NWB RUE, sling for comfort, f/u with Dr. Baumann as directed,   tylenol 1000mg TID scheduled, oxycodone 2.5mg QID prn for pain.      Atrial fibrillation, unspecified type (H)  Ischemic cardiomyopathy  Essential hypertension  Coronary artery disease involving native coronary artery of native heart without angina pectoris  Stage 3 chronic kidney disease, unspecified whether stage 3a or 3b CKD  Acute/ongoing: daily weights, vitals daily and prn, BMP follow, continue entresto 49/51mg BID,  crestor 20mg QD, mexitil 150mg TID, digoxin 125mcg 6 X week, coreg 6.25mg BID, amiodarone 200mg QD  No further torsemide  BMP and CBC pending for today        orders  No new orders today      Electronically signed by:  Tonya Lynn Haase, APRN CNP               Sincerely,        Tonya Lynn Haase, APRN CNP

## 2021-05-03 NOTE — PROGRESS NOTES
[place] [facility]  [place] Medical Record Number:  [id number]  Place of Service where encounter took place:  [facility] (TCU) [25]  Chief Complaint   Patient presents with     Nursing Home Acute       HPI:    Tyrel Rene  is a 75 year old (1945), who is being seen today for an episodic care visit.  HPI information obtained from: facility chart records, facility staff, patient report and [facility] chart review. Today's concern is:  lethargy/fever: patient resting in bed, alert, able to answer questions appropriately, denies SOB, cough, congestion, patient is on O2, SaO2 is 95% on 2 liters,  no c/o pain or discomfort, wife is at bedside, states patient having intermittent hallucinations but hallucinations overall have decreased  Subdural/subarachnoid hematoma: alert, no c/o headache, pain or discomfort  Right elbow Fx: denies pain at time of exam  Urinary retention: continues with bell catheter, UC shows > 100,000 multiple bacterial morphologies on levaquin, afebrile   HTN/cardiomyopathy/CAD/atrial fib: weight increased from 180.7s on admit to 192.6lbs on 4/13 current weight is 185.2lbs patient denies SOB, cough, congestion BP as follows: 121/64, 95/56, 98/61 with HR in 60's.   CKD: creat 1.5    Past Medical and Surgical History reviewed in Epic today.    MEDICATIONS:    Current Outpatient Medications   Medication Sig Dispense Refill     acetaminophen (TYLENOL) 500 MG tablet Take 1,000 mg by mouth every 8 hours as needed for mild pain       amiodarone (PACERONE) 200 MG tablet Take 1 tablet (200 mg) by mouth daily 90 tablet 1     carvedilol (COREG) 6.25 MG tablet Take 1 tablet (6.25 mg) by mouth 2 times daily (with meals) 180 tablet 3     digoxin (LANOXIN) 125 MCG tablet Take 1 tablet (125 mcg) by mouth six times a week Do not take Sundays 78 tablet 3     docusate sodium (COLACE) 100 MG capsule Take 1 capsule (100 mg) by mouth 2 times daily While taking oxycodone 30 capsule 0      famotidine (PEPCID) 20 MG tablet TAKE 1 TABLET BY MOUTH TWICE A  tablet 3     gabapentin (NEURONTIN) 100 MG capsule Take 1 capsule (100 mg) by mouth 2 times daily 20 capsule 0     ipratropium (ATROVENT) 0.03 % nasal spray SPRAY 2 SPRAYS INTO BOTH NOSTRILS EVERY 12 HOURS 30 mL 8     levofloxacin (LEVAQUIN) 500 MG tablet Take 1 tablet (500 mg) by mouth daily for 8 days 8 tablet 0     mexiletine (MEXITIL) 150 MG capsule Take 1 capsule (150 mg) by mouth 3 times daily 270 capsule 3     Multiple Vitamins-Minerals (PRESERVISION AREDS 2 PO) Take 1 capsule by mouth 2 times daily       nitroGLYcerin (NITROSTAT) 0.4 MG sublingual tablet DISSOLVE 1 TABLET UNDER THE TONGUE EVERY 5 MINUTES AS NEEDED FOR CHEST PAIN 25 tablet 0     oxyCODONE (ROXICODONE) 5 MG tablet Take 0.5 tablets (2.5 mg) by mouth 4 times daily as needed for pain 12 tablet 0     rosuvastatin (CRESTOR) 20 MG tablet Take 1 tablet (20 mg) by mouth daily 90 tablet 3     sacubitril-valsartan (ENTRESTO) 49-51 MG per tablet Take 1 tablet by mouth 2 times daily 180 tablet 3     tamsulosin (FLOMAX) 0.4 MG capsule Take 0.4 mg by mouth as needed       Vitamin D3 (CHOLECALCIFEROL) 25 mcg (1000 units) tablet Take 1 tablet by mouth daily           REVIEW OF SYSTEMS:  10 point ROS of systems including Constitutional, Eyes, Respiratory, Cardiovascular, Gastroenterology, Genitourinary, Integumentary, Musculoskeletal, Psychiatric were all negative except for pertinent positives noted in my HPI.    Objective:  /64   Pulse 66   Temp 97.3  F (36.3  C)   Resp 16   SpO2 95%   Exam:  GENERAL APPEARANCE:  Alert, in no distress  ENT:  Mouth and posterior oropharynx normal, moist mucous membranes, [place]  EYES:  EOM, conjunctivae, lids, pupils and irises normal, PERRL  RESP:  respiratory effort and palpation of chest normal, lungs clear to auscultation , no respiratory distress, diminished breath sounds bases bilaterally  CV:  Palpation and auscultation of heart done ,  regular rate and rhythm, no murmur, rub, or gallop, no edema  ABDOMEN:  normal bowel sounds, soft, nontender, no hepatosplenomegaly or other masses  M/S:   patient resting in bed, able to move all 4 extremities  SKIN:  Inspection of skin and subcutaneous tissue baseline  NEURO:   speech WNL  PSYCH:  affect and mood normal    Labs:     Most Recent 3 CBC's:  Recent Labs   Lab Test 04/30/21 04/27/21 04/26/21   WBC 13.3* 14.8* 19.5*   HGB 11.9* 11.6* 12.0*   MCV 90.6 89.8 89.4    174 200     Most Recent 3 BMP's:  Recent Labs   Lab Test 04/30/21 04/27/21 04/26/21    132* 131*   POTASSIUM 3.3* 3.5 3.7   CHLORIDE 95* 96* 94*   CO2 30* 28 25   BUN 30* 31* 28*   CR 1.51* 1.50* 1.48*   ANIONGAP 13 8 12   LORI 8.4 8.4 8.7   GLC 97 109* 118*       ASSESSMENT/PLAN:  Urinary retention:   UTI:   Acute/ongoing: bell catheter in place,   UC shows multiple bacterial morphologies,  patient needs to follow up with urology, continue flomax 0.4mg QD  Given rocephin 1gm IM 4/26, 4/27 and 4/28 start levaquin 500mg qD for 8 days  CBC wbc trending down,      Subdural hematoma (H)  Subarachnoid hemorrhage (H)  Syncope, unspecified syncope type  Acute/ongoing: PT and OT, f/u with neurosurgery and cardiology as directed   hold coumadin and plavix until f/u     Closed fracture of olecranon process of right ulna with routine healing, subsequent encounter  Acute/ongoing: NWB RUE, sling for comfort, f/u with Dr. Baumann as directed,   tylenol 1000mg TID scheduled, oxycodone 2.5mg QID prn for pain.      Atrial fibrillation, unspecified type (H)  Ischemic cardiomyopathy  Essential hypertension  Coronary artery disease involving native coronary artery of native heart without angina pectoris  Stage 3 chronic kidney disease, unspecified whether stage 3a or 3b CKD  Acute/ongoing: daily weights, vitals daily and prn, BMP follow, continue entresto 49/51mg BID, crestor 20mg QD, mexitil 150mg TID, digoxin 125mcg 6 X week, coreg 6.25mg BID,  amiodarone 200mg QD  No further torsemide  BMP and CBC pending for today        orders  No new orders today      Electronically signed by:  Tonya Lynn Haase, APRN CNP

## 2021-05-04 NOTE — TELEPHONE ENCOUNTER
Since I have not heard back from patient's primary MD at [facility], will ask Dr. Newman for an order to turn off ICD tachy therapies and detection.

## 2021-05-05 NOTE — TELEPHONE ENCOUNTER
Per Dr. Newman's routing comment:       Contacted Kunal with [employer], waiting to hear back regarding their current plan for turning off tachy therapies at a care facility during covid19.     ADDENDUM 8:45AM. Kunal has a rep who will go to [facility] today to turn off tachy therapies. All information given to Kunal, and the rep will call Parish at [facility] to let him know when he will be there.     I called patient's wife Sharel and let her know the plan, she agrees with the plan and states understanding.

## 2021-05-06 NOTE — PROGRESS NOTES
[place] [facility]  [place] Medical Record Number:  [id number]  Place of Service where encounter took place:  [facility] (TCU) [25]  Chief Complaint   Patient presents with     Nursing Home Acute       HPI:    Tyrel Rene  is a 75 year old (1945), who is being seen today for an episodic care visit.  HPI information obtained from: facility chart records, facility staff, patient report and [facility] chart review. Today's concern is:  lethargy/fever: patient resting in bed, alert, able to answer questions appropriately, c/o fatigue, denies SOB, cough, congestion, patient is on O2, SaO2 is 96% on 1 liter today  no c/o pain or discomfort, wife is at bedside, discussed goals of care and wife and patient agree comfort care focus, wanting a hospice consult. Patient is afebrile  Dysphagia: diet downgraded to nectar thick and pureed, patient having trouble eating that diet, food not appealing,   Subdural/subarachnoid hematoma: alert, no c/o headache, pain or discomfort  Right elbow Fx: denies pain at time of exam  Urinary retention: continues with bell catheter,  afebrile   HTN/cardiomyopathy/CAD/atrial fib: weight  patient denies SOB, cough, congestion BP stable,  with HR in 60's.   CKD: creat 1.5    Past Medical and Surgical History reviewed in Epic today.    MEDICATIONS:    Current Outpatient Medications   Medication Sig Dispense Refill     acetaminophen (TYLENOL) 500 MG tablet Take 1,000 mg by mouth every 8 hours as needed for mild pain       amiodarone (PACERONE) 200 MG tablet Take 1 tablet (200 mg) by mouth daily 90 tablet 1     carvedilol (COREG) 6.25 MG tablet Take 1 tablet (6.25 mg) by mouth 2 times daily (with meals) 180 tablet 3     digoxin (LANOXIN) 125 MCG tablet Take 1 tablet (125 mcg) by mouth six times a week Do not take Sundays 78 tablet 3     docusate sodium (COLACE) 100 MG capsule Take 1 capsule (100 mg) by mouth 2 times daily While taking oxycodone 30 capsule 0      famotidine (PEPCID) 20 MG tablet TAKE 1 TABLET BY MOUTH TWICE A  tablet 3     gabapentin (NEURONTIN) 100 MG capsule Take 1 capsule (100 mg) by mouth 2 times daily 20 capsule 0     ipratropium (ATROVENT) 0.03 % nasal spray SPRAY 2 SPRAYS INTO BOTH NOSTRILS EVERY 12 HOURS 30 mL 8     levofloxacin (LEVAQUIN) 500 MG tablet Take 1 tablet (500 mg) by mouth daily for 8 days 8 tablet 0     mexiletine (MEXITIL) 150 MG capsule Take 1 capsule (150 mg) by mouth 3 times daily 270 capsule 3     nitroGLYcerin (NITROSTAT) 0.4 MG sublingual tablet DISSOLVE 1 TABLET UNDER THE TONGUE EVERY 5 MINUTES AS NEEDED FOR CHEST PAIN 25 tablet 0     oxyCODONE (ROXICODONE) 5 MG tablet Take 0.5 tablets (2.5 mg) by mouth 4 times daily as needed for pain 12 tablet 0     sacubitril-valsartan (ENTRESTO) 49-51 MG per tablet Take 1 tablet by mouth 2 times daily 180 tablet 3     tamsulosin (FLOMAX) 0.4 MG capsule Take 0.4 mg by mouth as needed           REVIEW OF SYSTEMS:  10 point ROS of systems including Constitutional, Eyes, Respiratory, Cardiovascular, Gastroenterology, Genitourinary, Integumentary, Musculoskeletal, Psychiatric were all negative except for pertinent positives noted in my HPI.    Objective:  /71   Pulse 64   Temp 97.9  F (36.6  C)   Resp 16   SpO2 94%   Exam:  GENERAL APPEARANCE:  Alert, in no distress  ENT:  Mouth and posterior oropharynx normal, moist mucous membranes, [place]  EYES:  EOM, conjunctivae, lids, pupils and irises normal, PERRL  RESP:  respiratory effort and palpation of chest normal, lungs clear to auscultation , no respiratory distress, diminished breath sounds bases bilaterally  CV:  Palpation and auscultation of heart done , regular rate and rhythm, no murmur, rub, or gallop, no edema  ABDOMEN:  normal bowel sounds, soft, nontender, no hepatosplenomegaly or other masses  M/S:   patient resting in bed, able to move all 4 extremities  SKIN:  Inspection of skin and subcutaneous tissue baseline  NEURO:    speech WNL  PSYCH:  affect and mood normal    Labs:     Most Recent 3 CBC's:  Recent Labs   Lab Test 05/03/21 04/30/21 04/27/21   WBC 9.3 13.3* 14.8*   HGB 13.4* 11.9* 11.6*   MCV 90.1 90.6 89.8    204 174     Most Recent 3 BMP's:  Recent Labs   Lab Test 05/05/21 05/03/21 04/30/21   * 140 138   POTASSIUM 3.7 3.8 3.3*   CHLORIDE 103 98 95*   CO2 30* 32* 30*   BUN 31* 40* 30*   CR 1.36* 1.56* 1.51*   ANIONGAP 14 10 13   LORI 9.2 8.8 8.4   GLC 95 107* 97       ASSESSMENT/PLAN:  Advanced directives discussion/counseling:   Discussed at bedside with patient present, want comfort care focus, no further Abx, decrease medications as patient having difficulty swallowing medications, ongoing weakness, wife would like to consult [facility] team.     Urinary retention:   UTI:   Acute/ongoing: bell catheter in place,   UC shows multiple bacterial morphologies,  patient needs to follow up with urology, continue flomax 0.4mg QD  Given rocephin 1gm IM 4/26, 4/27 and 4/28 start levaquin 500mg qD for 8 days, last day tomorrow, will DC today as wish for hospice care and no further Abx. ,      Subdural hematoma (H)  Subarachnoid hemorrhage (H)  Syncope, unspecified syncope type  Acute/ongoing: comfort care focus   hold coumadin and plavix until f/u     Closed fracture of olecranon process of right ulna with routine healing, subsequent encounter  Acute/ongoing: NWB RUE, sling for comfort,    tylenol prn  oxycodone 2.5mg QID prn for pain.      Atrial fibrillation, unspecified type (H)  Ischemic cardiomyopathy  Essential hypertension  Coronary artery disease involving native coronary artery of native heart without angina pectoris  Stage 3 chronic kidney disease, unspecified whether stage 3a or 3b CKD  Acute/ongoing: no further labs, continue entresto 49/51mg BID,  mexitil 150mg TID, digoxin 125mcg 6 X week, coreg 6.25mg BID, amiodarone 200mg QD until hospice on board  DC crestor  No further torsemide      Order:    [facility] for end stage CHF, cardiomyopathy  DC crestor, MVI, vitamin D  OK to use tylenol suppository 650mg pr q 4 hours prn for pain, discomfort    Total time spent with patient visit at the skilled nursing facility was 40 minutes, discssed comfort care focus, medications, will DC levaquin, and MVI will look at other medications as well, discussed which hospice team, patient wife wants [facility], no further labs, no further diuretic.  including patient visit and review of past records. Greater than 50% of total time spent with counseling and coordinating care due to ,\..  Electronically signed by:  Tonya Lynn Haase, APRN CNP

## 2021-05-07 NOTE — LETTER
5/7/2021        RE: Tyrel Rene  [address] [place] [address]        [place] [facility]  [place] [facility] Record Number:  [id number]  Place of Service where encounter took place:  [facility] (U) [25]  Chief Complaint   Patient presents with     Nursing Home Acute       HPI:    Tyrel Rene  is a 75 year old (1945), who is being seen today for an episodic care visit.  HPI information obtained from: facility chart records, facility staff, patient report and [facility] chart review. Today's concern is:  lethargy/fever: patient resting in bed, alert, able to answer questions appropriately, c/o fatigue, denies SOB, cough, congestion, patient is on O2, SaO2 is 96% on 1 liter today  no c/o pain or discomfort, wife is at bedside, discussed goals of care and wife and patient agree comfort care focus, wanting a hospice consult. Patient is afebrile  Dysphagia: diet downgraded to nectar thick and pureed, patient having trouble eating that diet, food not appealing,   Subdural/subarachnoid hematoma: alert, no c/o headache, pain or discomfort  Right elbow Fx: denies pain at time of exam  Urinary retention: continues with bell catheter,  afebrile   HTN/cardiomyopathy/CAD/atrial fib: weight  patient denies SOB, cough, congestion BP stable,  with HR in 60's.   CKD: creat 1.5    Past Medical and Surgical History reviewed in Epic today.    MEDICATIONS:    Current Outpatient Medications   Medication Sig Dispense Refill     acetaminophen (TYLENOL) 500 MG tablet Take 1,000 mg by mouth every 8 hours as needed for mild pain       amiodarone (PACERONE) 200 MG tablet Take 1 tablet (200 mg) by mouth daily 90 tablet 1     carvedilol (COREG) 6.25 MG tablet Take 1 tablet (6.25 mg) by mouth 2 times daily (with meals) 180 tablet 3     digoxin (LANOXIN) 125 MCG tablet Take 1 tablet (125 mcg) by mouth six times a week Do not take Sundays 78 tablet 3     docusate sodium (COLACE) 100 MG capsule  Take 1 capsule (100 mg) by mouth 2 times daily While taking oxycodone 30 capsule 0     famotidine (PEPCID) 20 MG tablet TAKE 1 TABLET BY MOUTH TWICE A  tablet 3     gabapentin (NEURONTIN) 100 MG capsule Take 1 capsule (100 mg) by mouth 2 times daily 20 capsule 0     ipratropium (ATROVENT) 0.03 % nasal spray SPRAY 2 SPRAYS INTO BOTH NOSTRILS EVERY 12 HOURS 30 mL 8     levofloxacin (LEVAQUIN) 500 MG tablet Take 1 tablet (500 mg) by mouth daily for 8 days 8 tablet 0     mexiletine (MEXITIL) 150 MG capsule Take 1 capsule (150 mg) by mouth 3 times daily 270 capsule 3     nitroGLYcerin (NITROSTAT) 0.4 MG sublingual tablet DISSOLVE 1 TABLET UNDER THE TONGUE EVERY 5 MINUTES AS NEEDED FOR CHEST PAIN 25 tablet 0     oxyCODONE (ROXICODONE) 5 MG tablet Take 0.5 tablets (2.5 mg) by mouth 4 times daily as needed for pain 12 tablet 0     sacubitril-valsartan (ENTRESTO) 49-51 MG per tablet Take 1 tablet by mouth 2 times daily 180 tablet 3     tamsulosin (FLOMAX) 0.4 MG capsule Take 0.4 mg by mouth as needed           REVIEW OF SYSTEMS:  10 point ROS of systems including Constitutional, Eyes, Respiratory, Cardiovascular, Gastroenterology, Genitourinary, Integumentary, Musculoskeletal, Psychiatric were all negative except for pertinent positives noted in my HPI.    Objective:  /71   Pulse 64   Temp 97.9  F (36.6  C)   Resp 16   SpO2 94%   Exam:  GENERAL APPEARANCE:  Alert, in no distress  ENT:  Mouth and posterior oropharynx normal, moist mucous membranes, [place]  EYES:  EOM, conjunctivae, lids, pupils and irises normal, PERRL  RESP:  respiratory effort and palpation of chest normal, lungs clear to auscultation , no respiratory distress, diminished breath sounds bases bilaterally  CV:  Palpation and auscultation of heart done , regular rate and rhythm, no murmur, rub, or gallop, no edema  ABDOMEN:  normal bowel sounds, soft, nontender, no hepatosplenomegaly or other masses  M/S:   patient resting in bed, able to move  all 4 extremities  SKIN:  Inspection of skin and subcutaneous tissue baseline  NEURO:   speech WNL  PSYCH:  affect and mood normal    Labs:     Most Recent 3 CBC's:  Recent Labs   Lab Test 05/03/21 04/30/21 04/27/21   WBC 9.3 13.3* 14.8*   HGB 13.4* 11.9* 11.6*   MCV 90.1 90.6 89.8    204 174     Most Recent 3 BMP's:  Recent Labs   Lab Test 05/05/21 05/03/21 04/30/21   * 140 138   POTASSIUM 3.7 3.8 3.3*   CHLORIDE 103 98 95*   CO2 30* 32* 30*   BUN 31* 40* 30*   CR 1.36* 1.56* 1.51*   ANIONGAP 14 10 13   LORI 9.2 8.8 8.4   GLC 95 107* 97       ASSESSMENT/PLAN:  Advanced directives discussion/counseling:   Discussed at bedside with patient present, want comfort care focus, no further Abx, decrease medications as patient having difficulty swallowing medications, ongoing weakness, wife would like to consult [facility] team.     Urinary retention:   UTI:   Acute/ongoing: bell catheter in place,   UC shows multiple bacterial morphologies,  patient needs to follow up with urology, continue flomax 0.4mg QD  Given rocephin 1gm IM 4/26, 4/27 and 4/28 start levaquin 500mg qD for 8 days, last day tomorrow, will DC today as wish for hospice care and no further Abx. ,      Subdural hematoma (H)  Subarachnoid hemorrhage (H)  Syncope, unspecified syncope type  Acute/ongoing: comfort care focus   hold coumadin and plavix until f/u     Closed fracture of olecranon process of right ulna with routine healing, subsequent encounter  Acute/ongoing: NWB RURUSTY, sling for comfort,    tylenol prn  oxycodone 2.5mg QID prn for pain.      Atrial fibrillation, unspecified type (H)  Ischemic cardiomyopathy  Essential hypertension  Coronary artery disease involving native coronary artery of native heart without angina pectoris  Stage 3 chronic kidney disease, unspecified whether stage 3a or 3b CKD  Acute/ongoing: no further labs, continue entresto 49/51mg BID,  mexitil 150mg TID, digoxin 125mcg 6 X week, coreg 6.25mg BID,  amiodarone 200mg QD until hospice on board  DC crestor  No further torsemide      Order:   [facility] for end stage CHF, cardiomyopathy  DC crestor, MVI, vitamin D  OK to use tylenol suppository 650mg pr q 4 hours prn for pain, discomfort    Total time spent with patient visit at the skilled nursing facility was 40 minutes, discssed comfort care focus, medications, will DC levaquin, and MVI will look at other medications as well, discussed which hospice team, patient wife wants [facility], no further labs, no further diuretic.  including patient visit and review of past records. Greater than 50% of total time spent with counseling and coordinating care due to ,\..  Electronically signed by:  Tonya Lynn Haase, APRN CNP               Sincerely,        Tonya Lynn Haase, APRN CNP

## 2021-05-09 NOTE — TELEPHONE ENCOUNTER
Actively dying, family at the bedside.   Having difficulty administering oxycodone tablets, family requesting roxanol    Discontinue oxycodone    Pain  Morphine 20 mg/ml  Give Morphine 2.5 mg or 0.125ml every 2 hours PRN pain or dyspnea ok to use from [employer] until supply arrives.     restless  Give Haldol 0.5 mg every 6 hours as needed for restlessness, ok to take from [employer] until supply arrives.     Call if symptoms are not improving    Electronically signed by  Chloé Mishra CNP

## 2021-05-10 NOTE — PROGRESS NOTES
[place] [facility]  [facility] Record Number:  [id number]  Place of Service where encounter took place:  [facility] (U) [25]  Chief Complaint   Patient presents with     Nursing Home Acute       HPI:    Tyrel Rene  is a 75 year old (1945), who is being seen today for an episodic care visit.  HPI information obtained from: facility chart records, facility staff and patient report. Today's concern is:  Patient resting in bed, appears comfortable, is moving some, picking in the air, eyes closed, respirations easy, no audible congestion. Patient wife at bedside states he has not eating in 3 days, unable to swallow anything since yesterday, appears comfortable if he gets morphine on regular basis, wife states she has been at bedside to make sure he gets morphine for comfort.     Past Medical and Surgical History reviewed in Epic today.    MEDICATIONS:    Current Outpatient Medications   Medication Sig Dispense Refill     acetaminophen (TYLENOL) 500 MG tablet Take 1,000 mg by mouth every 8 hours as needed for mild pain       amiodarone (PACERONE) 200 MG tablet Take 1 tablet (200 mg) by mouth daily 90 tablet 1     carvedilol (COREG) 6.25 MG tablet Take 1 tablet (6.25 mg) by mouth 2 times daily (with meals) 180 tablet 3     digoxin (LANOXIN) 125 MCG tablet Take 1 tablet (125 mcg) by mouth six times a week Do not take Sundays 78 tablet 3     docusate sodium (COLACE) 100 MG capsule Take 1 capsule (100 mg) by mouth 2 times daily While taking oxycodone 30 capsule 0     famotidine (PEPCID) 20 MG tablet TAKE 1 TABLET BY MOUTH TWICE A  tablet 3     gabapentin (NEURONTIN) 100 MG capsule Take 1 capsule (100 mg) by mouth 2 times daily 20 capsule 0     haloperidol (HALDOL) 0.5 MG tablet Take 1 tablet (0.5 mg) by mouth every 6 hours as needed for agitation 15 tablet 0     ipratropium (ATROVENT) 0.03 % nasal spray SPRAY 2 SPRAYS INTO BOTH NOSTRILS EVERY 12 HOURS 30 mL 8     mexiletine  (MEXITIL) 150 MG capsule Take 1 capsule (150 mg) by mouth 3 times daily 270 capsule 3     morphine sulfate, high concentrate, (ROXANOL-CONCENTRATED) 20 MG/ML concentrated solution Take 0.125 mLs (2.5 mg) by mouth every 2 hours as needed for shortness of breath / dyspnea or breakthrough pain 30 mL 0     nitroGLYcerin (NITROSTAT) 0.4 MG sublingual tablet DISSOLVE 1 TABLET UNDER THE TONGUE EVERY 5 MINUTES AS NEEDED FOR CHEST PAIN 25 tablet 0     oxyCODONE (ROXICODONE) 5 MG tablet Take 0.5 tablets (2.5 mg) by mouth 4 times daily as needed for pain 12 tablet 0     sacubitril-valsartan (ENTRESTO) 49-51 MG per tablet Take 1 tablet by mouth 2 times daily 180 tablet 3     tamsulosin (FLOMAX) 0.4 MG capsule Take 0.4 mg by mouth as needed           REVIEW OF SYSTEMS:  Unobtainable secondary to cognitive impairment.     Objective:  BP (!) 143/80   Pulse 64   Temp 98.5  F (36.9  C)   Resp 18   SpO2 91%   Exam:  General: Patient resting in bed comfortably, does not respond to verbal stimuli, unresponsive   Eyes closed  Lungs: Respirations easy, no audible congestion  Heart: no edema, HR RRR tachy  Patient moving arms, picking at air at time of exam.   Patient actively dying.     Labs:     Most Recent 3 CBC's:  Recent Labs   Lab Test 05/03/21 04/30/21 04/27/21   WBC 9.3 13.3* 14.8*   HGB 13.4* 11.9* 11.6*   MCV 90.1 90.6 89.8    204 174     Most Recent 3 BMP's:  Recent Labs   Lab Test 05/05/21 05/03/21 04/30/21   * 140 138   POTASSIUM 3.7 3.8 3.3*   CHLORIDE 103 98 95*   CO2 30* 32* 30*   BUN 31* 40* 30*   CR 1.36* 1.56* 1.51*   ANIONGAP 14 10 13   LORI 9.2 8.8 8.4   GLC 95 107* 97       ASSESSMENT/PLAN:  Urinary retention:   UTI:   Acute/ongoing: bell catheter in place,    Subdural hematoma (H)  Subarachnoid hemorrhage (H)  Syncope, unspecified syncope type  Acute/ongoing: comfort care focus  Closed fracture of olecranon process of right ulna with routine healing, subsequent encounter  Atrial fibrillation,  unspecified type (H)  Ischemic cardiomyopathy  Essential hypertension  Coronary artery disease involving native coronary artery of native heart without angina pectoris  Stage 3 chronic kidney disease, unspecified whether stage 3a or 3b CKD  Acute/ongoing:  DC all oral medications  Continue roxanol 2.5mg sl q 2 hours scheduled  Add ativan intensol 1mg q 4 hours prn for atigation  Tylenol suppository if temp > 100       Orders  Morphine sulfate 2.5mg sl q 2 hours scheduled  Ativan sl 1mg q 4hours prn for agitation  DC all other oral medications  Tylenol suppository for tem > 100    Total time spent with patient visit at the skilled nursing facility was 40 including patient visit and review of past records. Greater than 50% of total time spent with counseling and coordinating care due to wife at bedside, discussed stages of dying, patient appears to be in actively dying stage, he is comfortable, wife would like to hold hospice as she feels patient is being managed at this time, will schedule morphine and DC all other oral medications. patient wife comfortable with POC, goal comfort, will be able to increase morphine if needed. .  Electronically signed by:  Tonya Lynn Haase, APRN CNP

## 2021-05-10 NOTE — LETTER
5/10/2021        RE: Tyrel Rene  [address] [place] [address]        [place] [facility]  [place] [facility] Record Number:  [id number]  Place of Service where encounter took place:  [facility] ([facility]) [25]  Chief Complaint   Patient presents with     Nursing Home Acute       HPI:    Tyrel Rene  is a 75 year old (1945), who is being seen today for an episodic care visit.  HPI information obtained from: facility chart records, facility staff and patient report. Today's concern is:  Patient resting in bed, appears comfortable, is moving some, picking in the air, eyes closed, respirations easy, no audible congestion. Patient wife at bedside states he has not eating in 3 days, unable to swallow anything since yesterday, appears comfortable if he gets morphine on regular basis, wife states she has been at bedside to make sure he gets morphine for comfort.     Past Medical and Surgical History reviewed in Epic today.    MEDICATIONS:    Current Outpatient Medications   Medication Sig Dispense Refill     acetaminophen (TYLENOL) 500 MG tablet Take 1,000 mg by mouth every 8 hours as needed for mild pain       amiodarone (PACERONE) 200 MG tablet Take 1 tablet (200 mg) by mouth daily 90 tablet 1     carvedilol (COREG) 6.25 MG tablet Take 1 tablet (6.25 mg) by mouth 2 times daily (with meals) 180 tablet 3     digoxin (LANOXIN) 125 MCG tablet Take 1 tablet (125 mcg) by mouth six times a week Do not take Sundays 78 tablet 3     docusate sodium (COLACE) 100 MG capsule Take 1 capsule (100 mg) by mouth 2 times daily While taking oxycodone 30 capsule 0     famotidine (PEPCID) 20 MG tablet TAKE 1 TABLET BY MOUTH TWICE A  tablet 3     gabapentin (NEURONTIN) 100 MG capsule Take 1 capsule (100 mg) by mouth 2 times daily 20 capsule 0     haloperidol (HALDOL) 0.5 MG tablet Take 1 tablet (0.5 mg) by mouth every 6 hours as needed for agitation 15 tablet 0     ipratropium (ATROVENT) 0.03 %  nasal spray SPRAY 2 SPRAYS INTO BOTH NOSTRILS EVERY 12 HOURS 30 mL 8     mexiletine (MEXITIL) 150 MG capsule Take 1 capsule (150 mg) by mouth 3 times daily 270 capsule 3     morphine sulfate, high concentrate, (ROXANOL-CONCENTRATED) 20 MG/ML concentrated solution Take 0.125 mLs (2.5 mg) by mouth every 2 hours as needed for shortness of breath / dyspnea or breakthrough pain 30 mL 0     nitroGLYcerin (NITROSTAT) 0.4 MG sublingual tablet DISSOLVE 1 TABLET UNDER THE TONGUE EVERY 5 MINUTES AS NEEDED FOR CHEST PAIN 25 tablet 0     oxyCODONE (ROXICODONE) 5 MG tablet Take 0.5 tablets (2.5 mg) by mouth 4 times daily as needed for pain 12 tablet 0     sacubitril-valsartan (ENTRESTO) 49-51 MG per tablet Take 1 tablet by mouth 2 times daily 180 tablet 3     tamsulosin (FLOMAX) 0.4 MG capsule Take 0.4 mg by mouth as needed           REVIEW OF SYSTEMS:  Unobtainable secondary to cognitive impairment.     Objective:  BP (!) 143/80   Pulse 64   Temp 98.5  F (36.9  C)   Resp 18   SpO2 91%   Exam:  General: Patient resting in bed comfortably, does not respond to verbal stimuli, unresponsive   Eyes closed  Lungs: Respirations easy, no audible congestion  Heart: no edema, HR RRR tachy  Patient moving arms, picking at air at time of exam.   Patient actively dying.     Labs:     Most Recent 3 CBC's:  Recent Labs   Lab Test 05/03/21 04/30/21 04/27/21   WBC 9.3 13.3* 14.8*   HGB 13.4* 11.9* 11.6*   MCV 90.1 90.6 89.8    204 174     Most Recent 3 BMP's:  Recent Labs   Lab Test 05/05/21 05/03/21 04/30/21   * 140 138   POTASSIUM 3.7 3.8 3.3*   CHLORIDE 103 98 95*   CO2 30* 32* 30*   BUN 31* 40* 30*   CR 1.36* 1.56* 1.51*   ANIONGAP 14 10 13   LORI 9.2 8.8 8.4   GLC 95 107* 97       ASSESSMENT/PLAN:  Urinary retention:   UTI:   Acute/ongoing: bell catheter in place,    Subdural hematoma (H)  Subarachnoid hemorrhage (H)  Syncope, unspecified syncope type  Acute/ongoing: comfort care focus  Closed fracture of olecranon process  of right ulna with routine healing, subsequent encounter  Atrial fibrillation, unspecified type (H)  Ischemic cardiomyopathy  Essential hypertension  Coronary artery disease involving native coronary artery of native heart without angina pectoris  Stage 3 chronic kidney disease, unspecified whether stage 3a or 3b CKD  Acute/ongoing:  DC all oral medications  Continue roxanol 2.5mg sl q 2 hours scheduled  Add ativan intensol 1mg q 4 hours prn for atigation  Tylenol suppository if temp > 100       Orders  Morphine sulfate 2.5mg sl q 2 hours scheduled  Ativan sl 1mg q 4hours prn for agitation  DC all other oral medications  Tylenol suppository for tem > 100    Total time spent with patient visit at the skilled nursing facility was 40 including patient visit and review of past records. Greater than 50% of total time spent with counseling and coordinating care due to wife at bedside, discussed stages of dying, patient appears to be in actively dying stage, he is comfortable, wife would like to hold hospice as she feels patient is being managed at this time, will schedule morphine and DC all other oral medications. patient wife comfortable with POC, goal comfort, will be able to increase morphine if needed. .  Electronically signed by:  Tonya Lynn Haase, APRN CNP               Sincerely,        Tonya Lynn Haase, APRN CNP

## 2021-05-10 NOTE — TELEPHONE ENCOUNTER
Received call from patient's daughter Mary Argueta stating that patient is actively dying and has questions about turning off Marko's pacemaker. Chart reviewed. Received an order from Dr. Newman on 5/5/21 to turn off tachy therapy and this was done the same day by [employer]. We have documentation from [employer] confirming this.   Mary is wondering about turning off the pacemaker portion of Marko's device as well. Patient's underlying rhythm is Afib with CHB and a junctional in the 30s. Reviewed with Mary that we do not turn off pacemakers at the end of life. We discussed that the pacemaker would not prevent the patient from passing but will help to keep him comfortable.   Mary states understanding. Asked that she call back with any additional questions.

## 2021-05-14 ENCOUNTER — CARE COORDINATION (OUTPATIENT)
Dept: CARDIOLOGY | Facility: CLINIC | Age: 76
End: 2021-05-14

## 2021-05-14 NOTE — PROGRESS NOTES
Call from wife with update that patient passed away on 5/12/2021 at [facility]. Wife Felipel reports patient fell at home 4/1 and was sent to [facility] 4/5/2021.    Update sent to scheduling on death.     Wife Betsy copeland family has discussed and would like to donate money to [facility] in memory of Tyrel. Will staff message to team to address.   Morena Deng RN 05/14/21 10:22 AM

## 2021-12-18 NOTE — PROGRESS NOTES
ANTICOAGULATION FOLLOW-UP CLINIC VISIT    Patient Name:  Tyrel Rene  Date:  2019  Contact Type:  Face to Face    SUBJECTIVE:     Patient Findings     Positives:   Other complaints (had ablation)           OBJECTIVE    INR Protime   Date Value Ref Range Status   2019 1.5 (A) 0.86 - 1.14 Final       ASSESSMENT / PLAN  INR assessment SUB    Recheck INR In: 3 DAYS    INR Location Clinic      Anticoagulation Summary  As of 2019    INR goal:   2.0-2.5   TTR:   44.8 % (2.7 y)   INR used for dosin.5! (2019)   Warfarin maintenance plan:   2.5 mg (2.5 mg x 1) every Mon, Thu; 1.25 mg (2.5 mg x 0.5) all other days   Full warfarin instructions:   : 2.5 mg; : 2.5 mg; Otherwise 2.5 mg every Mon, Thu; 1.25 mg all other days   Weekly warfarin total:   11.25 mg   Plan last modified:   Doreen Finley RN (2018)   Next INR check:   2019   Target end date:   Indefinite    Indications    Long-term (current) use of anticoagulants [Z79.01] [Z79.01]  Cerebral artery occlusion with cerebral infarction (HCC) [I63.50] [I63.50]  Cerebral infarction (H) [I63.9]             Anticoagulation Episode Summary     INR check location:   [facility]    Preferred lab:       Send INR reminders to:   [facility] INR/PROTIME    Comments:         Anticoagulation Care Providers     Provider Role Specialty Phone number    Dejon Downs MD [facility] [phone]            See the Encounter Report to view Anticoagulation Flowsheet and Dosing Calendar (Go to Encounters tab in chart review, and find the Anticoagulation Therapy Visit)        Doreen Finley, RN                 
End of Shift Note    Bedside shift change report given to  Leeann Dupree RN (oncoming nurse) by Zeb Guzman RN (offgoing nurse). Report included the following information SBAR, Kardex, Intake/Output, MAR and Recent Results    Shift worked:  7a to 7p     Shift summary and any significant changes:     Dressing to finger fell off, dry dressing placed on finger. Ortho NP saw patient. Remains with guards at bedside. IV re-sited. Concerns for physician to address: Pain control      Zone phone for oncoming shift:   [phone]       Activity:  Activity Level: Up with Assistance  Number times ambulated in hallways past shift: 0, ambulated in room  Number of times OOB to chair past shift: 0, sits upright in bed. Cardiac:   Cardiac Monitoring: No      Cardiac Rhythm: Sinus Rhythm    Access:   Current line(s): PIV     Genitourinary:   Urinary status: voiding    Respiratory:   O2 Device: None (Room air)  Chronic home O2 use?: NO  Incentive spirometer at bedside: N/A     GI:  Last Bowel Movement Date: 12/17/21  Current diet:  DIET ONE TIME MESSAGE  ADULT ORAL NUTRITION SUPPLEMENT Breakfast, Lunch, Dinner; Standard High Calorie/High Protein  ADULT DIET Regular  DIET ONE TIME MESSAGE  Passing flatus: YES  Tolerating current diet: YES       Pain Management:   Patient states pain is manageable on current regimen: YES    Skin:  Bill Score: 20  Interventions: increase time out of bed    Patient Safety:  Fall Score:  Total Score: 1  Interventions: gripper socks  High Fall Risk: Yes    Length of Stay:  Expected LOS: 4d 0h  Actual LOS: [address] Rod[address]guez RN
Left arm;

## 2022-06-09 ENCOUNTER — DOCUMENTATION ONLY (OUTPATIENT)
Dept: ANTICOAGULATION | Facility: CLINIC | Age: 77
End: 2022-06-09
Payer: COMMERCIAL

## 2022-06-09 DIAGNOSIS — Z79.01 LONG TERM CURRENT USE OF ANTICOAGULANT THERAPY: Primary | ICD-10-CM

## 2022-06-09 DIAGNOSIS — I48.91 ATRIAL FIBRILLATION, UNSPECIFIED TYPE (H): ICD-10-CM

## 2022-06-09 DIAGNOSIS — I63.50 CEREBRAL ARTERY OCCLUSION WITH CEREBRAL INFARCTION (H): ICD-10-CM

## 2022-06-09 DIAGNOSIS — I63.9 CEREBRAL INFARCTION (H): ICD-10-CM

## 2022-06-09 NOTE — PROGRESS NOTES
ANTICOAGULATION  MANAGEMENT    Tyrel Rene is being discharged from the [facility] Anticoagulation Management Program ([facility]).    Reason for discharge:     Anticoagulation episode resolved, ACC referral closed and Standing order discontinued    Patient passed    Shahnaz Montenegro RN

## 2022-10-11 NOTE — PROGRESS NOTES
ANTICOAGULATION MANAGEMENT     Patient Name:  Tyrel Rene  Date:  8/24/2020    ASSESSMENT /SUBJECTIVE:    Today's INR result of 3.2 is supratherapeutic. Goal INR of 2.5-3.0      Warfarin dose taken: Warfarin taken as previously instructed    Diet: No new diet changes affecting INR    Medication changes/ interactions: No new medications/supplements affecting INR    Previous INR: Therapeutic     S/S of bleeding or thromboembolism: No    New injury or illness: No    Upcoming surgery, procedure or cardioversion: No    Additional findings: None      PLAN:    Spoke with Tyrel regarding INR result and instructed:     Warfarin Dosing Instructions: Continue your current warfarin dose 2.5 mg Mon mg and 1.25 mg ROW    Instructed patient to follow up no later than: 1 week  Orders given to [facility] ([phone])    Education provided: Target INR goal and significance of current INR result      Marko verbalizes understanding and agrees to warfarin dosing plan.    Instructed to call the Anticoagulation Clinic for any changes, questions or concerns. (#[phone])        Leatha Jewell RN      OBJECTIVE:  Recent labs: (last 7 days)     08/20/20 08/24/20  1252   INR 1.8* 3.2*         No question data found.  Anticoagulation Summary  As of 8/24/2020    INR goal:   2.5-3.5   TTR:   44.2 % (11.8 mo)   INR used for dosing:   3.2 (8/24/2020)   Warfarin maintenance plan:   2.5 mg (2.5 mg x 1) every Mon; 1.25 mg (2.5 mg x 0.5) all other days   Full warfarin instructions:   2.5 mg every Mon; 1.25 mg all other days   Weekly warfarin total:   10 mg   Plan last modified:   Leatha Jewell, RN (7/22/2020)   Next INR check:   8/31/2020   Priority:   High   Target end date:   Indefinite    Indications    Long-term (current) use of anticoagulants [Z79.01] [Z79.01]  Cerebral artery occlusion with cerebral infarction (HCC) [I63.50] [I63.50]  Cerebral infarction (H) [I63.9]             Anticoagulation Episode Summary     INR check  This is a chronic problem.  Patient came in requesting a change on her losartan.  She states her daughter who is a pharmacist warned her that it can cause elevated potassium and kidney damage.  Patient was worried about that.  On review of her labs are her potassium is always been normal.  She was changed to this as her amlodipine caused peripheral edema.  She has been on lisinopril in the past and also had adverse reactions to that.  After I discussed the medication with the patient she decided to stay on it.   location:       Preferred lab:   EXTERNAL LAB    Send INR reminders to:   HANS MCLEOD    Comments:   [facility] Patient goal should be 2.5-3.0 (5/13/20)      Anticoagulation Care Providers     Provider Role Specialty Phone number    Dejon Downs MD [facility] [phone]

## 2022-10-21 NOTE — PLAN OF CARE
SLP: ST orders received, chart reviewed and discussed with pt/screened swallow function during lunch meal. Pt and wife denied any changes in speech/language/swallow function. Pt consumed leafy salad and hot turkey with no oral deficits and no overt s/sx of aspiration. Noted small laceration on left lingual surface. Pt denied any pain or difficulty with chewing. Pt in agreement with no IP SLP services at this time.    DISCHARGE

## 2023-02-28 NOTE — TELEPHONE ENCOUNTER
ANTICOAGULATION MANAGEMENT     Patient Name:  Tyrel Rene  Date:  2020    ASSESSMENT /SUBJECTIVE:      Today's INR result of 2.1 is therapeutic. Goal INR of 2.0-3.0      Warfarin dose taken: Warfarin taken as previously instructed    Diet: No new diet changes affecting INR    Medication changes/ interactions: Interaction between augmentin and warfarin may be affecting INR, finished dose today    Previous INR: Therapeutic     S/S of bleeding or thromboembolism: No    New injury or illness:  Yes: pneumonia caused admission to [facility] -20    Upcoming surgery, procedure or cardioversion:  No    Additional findings: None      PLAN:    Spoke with Tyrel regarding INR result and instructed:     Warfarin Dosing Instructions: Continue your current warfarin dose    Instructed patient to follow up no later than: 1 week  ACC RN visit scheduled    Education provided: Yes: affect of antibitoics and cold symptoms on INR      Tyrel verbalizes understanding and agrees to warfarin dosing plan.    Instructed to call the Anticoagulation Clinic for any changes, questions or concerns. (#[phone])        OBJECTIVE:  INR   Date Value Ref Range Status   2020 2.1 (A) 0.90 - 1.10 Final             Anticoagulation Summary  As of 2020    INR goal:   2.0-2.5   TTR:   28.6 % (11.9 mo)   INR used for dosin.1 (2020)   Warfarin maintenance plan:   2.5 mg (2.5 mg x 1) every Mon; 1.25 mg (2.5 mg x 0.5) all other days   Full warfarin instructions:   2.5 mg every Mon; 1.25 mg all other days   Weekly warfarin total:   10 mg   Plan last modified:   Doreen Finley RN (3/22/2019)   Next INR check:   2020   Priority:   Critical   Target end date:   Indefinite    Indications    Long-term (current) use of anticoagulants [Z79.01] [Z79.01]  Cerebral artery occlusion with cerebral infarction (HCC) [I63.50] [I63.50]  Cerebral infarction (H) [I63.9]             Anticoagulation Episode Summary     INR  check location:   Anticoagulation Clinic    Preferred lab:       Send INR reminders to:   HANS ALONZO    Comments:         Anticoagulation Care Providers     Provider Role Specialty Phone number    Dejon Downs MD [facility] [phone]          Improved

## 2024-05-06 NOTE — LETTER
4/23/2021        RE: Tyrel Rene  [address] [facility] [address] [place] [address]        [facility]  [facility] Record Number:  [id number]  Place of Service where encounter took place:  [facility] ([facility]) [25]  Chief Complaint   Patient presents with     Nursing Home Acute       HPI:    Tyrel Rene  is a 75 year old (1945), who is being seen today for an episodic care visit.  HPI information obtained from: facility chart records, facility staff, patient report and [facility] chart review. Today's concern is:  Subdural/subarachnoid hematoma: on exam today patient sitting up in w/c states he is feeling well today, he is alert, denies pain or discomfort  Right elbow Fx: continues in splint, states right elbow denies pain at time of exam, patient walking up to 38 feet using a hemiwalker with SBA to CGA  HTN/cardiomyopathy/CAD/atrial fib: weight increased from 180.7lbs on admit to 192.6lbs today patient weight is 184.3lbs stable, patient denies SOB, cough, congestion BP as follows: 120/69, 87/54, 1134/63  with HR in 60's.   CKD: creat 1.34 on 4/20/21    Past Medical and Surgical History reviewed in Epic today.    MEDICATIONS:    Current Outpatient Medications   Medication Sig Dispense Refill     acetaminophen (TYLENOL) 500 MG tablet Take 1,000 mg by mouth every 8 hours as needed for mild pain       amiodarone (PACERONE) 200 MG tablet Take 1 tablet (200 mg) by mouth daily 90 tablet 1     carvedilol (COREG) 6.25 MG tablet Take 1 tablet (6.25 mg) by mouth 2 times daily (with meals) 180 tablet 3     digoxin (LANOXIN) 125 MCG tablet Take 1 tablet (125 mcg) by mouth six times a week Do not take Sundays 78 tablet 3     docusate sodium (COLACE) 100 MG capsule Take 1 capsule (100 mg) by mouth 2 times daily While taking oxycodone 30 capsule 0     famotidine (PEPCID) 20 MG tablet TAKE 1 TABLET BY MOUTH TWICE A  tablet 3     gabapentin (NEURONTIN) 100 MG capsule Take 1 capsule  -- DO NOT REPLY / DO NOT REPLY ALL --  -- Message is from Engagement Center Operations (ECO) --    General Patient Message: Patient is calling back regarding denial of heart monitor by insurance. Patient states his insurance informed him to advise PCP to resubmit the request for the heart monitor and the reason for needing it for that length of time. Patient also states he has been experiencing high bp for the last 4 days. Please contact patient when available       Caller Information         Type Contact Phone/Fax    05/02/2024 11:46 AM CDT Phone (Incoming) Karthik Simeon (Self) [phone] (M)    05/06/2024 02:07 PM CDT Phone (Incoming) Karthik Simeon (Self) [phone] (M)          Alternative phone number:     Can a detailed message be left? Yes    Message Turnaround:                "(100 mg) by mouth 2 times daily 20 capsule 0     ipratropium (ATROVENT) 0.03 % nasal spray SPRAY 2 SPRAYS INTO BOTH NOSTRILS EVERY 12 HOURS 30 mL 8     mexiletine (MEXITIL) 150 MG capsule Take 1 capsule (150 mg) by mouth 3 times daily 270 capsule 3     Multiple Vitamins-Minerals (PRESERVISION AREDS 2 PO) Take 1 capsule by mouth 2 times daily       nitroGLYcerin (NITROSTAT) 0.4 MG sublingual tablet DISSOLVE 1 TABLET UNDER THE TONGUE EVERY 5 MINUTES AS NEEDED FOR CHEST PAIN 25 tablet 0     oxyCODONE (ROXICODONE) 5 MG tablet Take 0.5 tablets (2.5 mg) by mouth 4 times daily as needed for pain 12 tablet 0     rosuvastatin (CRESTOR) 20 MG tablet Take 1 tablet (20 mg) by mouth daily 90 tablet 3     sacubitril-valsartan (ENTRESTO) 49-51 MG per tablet Take 1 tablet by mouth 2 times daily 180 tablet 3     tamsulosin (FLOMAX) 0.4 MG capsule Take 0.4 mg by mouth as needed       torsemide (DEMADEX) 10 MG tablet Take 1 tablet (10 mg) by mouth daily       Vitamin D3 (CHOLECALCIFEROL) 25 mcg (1000 units) tablet Take 1 tablet by mouth daily           REVIEW OF SYSTEMS:  10 point ROS of systems including Constitutional, Eyes, Respiratory, Cardiovascular, Gastroenterology, Genitourinary, Integumentary, Musculoskeletal, Psychiatric were all negative except for pertinent positives noted in my HPI.    Objective:  /63   Pulse 65   Temp 97.8  F (36.6  C)   Resp 16   Ht 1.803 m (5' 11\")   Wt 82.6 kg (182 lb)   SpO2 95%   BMI 25.38 kg/m    Exam:  GENERAL APPEARANCE:  Alert, in no distress  ENT:  Mouth and posterior oropharynx normal, moist mucous membranes, [place]  EYES:  EOM, conjunctivae, lids, pupils and irises normal, PERRL  RESP:  no respiratory distress, lungs CTA no adventitious sounds appreciated  CV: HR regular, peripheral edema trace+ in LE bilaterally  ABDOMEN:  abdomen round  M/S:   patient resting in bed, right arm in sling, able to move all 4 extremities  SKIN:  Inspection of skin and subcutaneous tissue " baseline  NEURO:   speech WNL  PSYCH:  affect and mood normal    Labs:     Most Recent 3 CBC's:  Recent Labs   Lab Test 04/08/21 04/02/21  0848 04/01/21  0029 09/19/20  2219   WBC  --  9.9 9.3 7.4   HGB 12.5* 12.8* 13.2* 13.6   MCV  --  90 91 92   PLT  --  169 184 202     Most Recent 3 BMP's:  Recent Labs   Lab Test 04/20/21 04/02/21  0848 04/01/21  0029   * 133 132*   POTASSIUM 3.6 4.1 4.3   CHLORIDE 95* 103 100   CO2 26 23 27   BUN 30* 23 19   CR 1.34* 1.19 1.30*   ANIONGAP 12 7 5   LORI 8.4 8.2* 8.0*   GLC 79 98 99       ASSESSMENT/PLAN:  Subdural hematoma (H)  Subarachnoid hemorrhage (H)  Syncope, unspecified syncope type  Acute/ongoing: PT and OT, f/u with neurosurgery and cardiology as directed   hold coumadin and plavix until f/u, possibly DC plavix and start low dose ASA.      Closed fracture of olecranon process of right ulna with routine healing, subsequent encounter  Acute/ongoing: NWB RUE, sling for comfort, f/u with Dr. Baumann as directed,   tylenol 1000mg TID scheduled, oxycodone 2.5mg QID prn for pain.      Atrial fibrillation, unspecified type (H)  Ischemic cardiomyopathy  Essential hypertension  Coronary artery disease involving native coronary artery of native heart without angina pectoris  Stage 3 chronic kidney disease, unspecified whether stage 3a or 3b CKD  Acute/ongoing: daily weights, vitals daily and prn, BMP follow, continue entresto 49/51mg BID, crestor 20mg QD, mexitil 150mg TID, digoxin 125mcg 6 X week, coreg 6.25mg BID, amiodarone 200mg QD  Torsemide 10mg PO QD      Urinary retention:   Acute/ongoing: bell catheter inserted, patient needs to follow up with urology, continue flomax 0.4mg QD       Order  No new orders today    Total time spent with patient visit at the [facility] nursing facility was 35 min including patient visit and review of past records. Greater than 50% of total time spent with counseling and coordinating care due to discussed patient medications, weight is  stable, patient denies SOB states he is feeling well, continues to have Urinary retention, bell inserted, patient needs to f/u with urology for further testing. .  Electronically signed by:  Tonya Lynn Haase, APRN CNP               Sincerely,        Tonya Lynn Haase, APRN CNP

## 2024-12-13 NOTE — ED NOTES
"hypertension who presents the hospital with weakness, was recently diagnosed with COVID, found to have acute kidney injury creatinine of 2.48 which is now trending down to 1.7.  I was told patient is confused, therefore unable to obtain reliable review of systems. We are consulted for management of acute kidney injury on chronic kidney disease.     Past Medical History  She has a past medical history of Abnormal metabolic state due to diabetes mellitus (Multi) (11/24/2023), Diabetes mellitus (Multi), DVT (deep venous thrombosis) (Multi), History of deep venous thrombosis (01/24/2024), and Hypertension.    Surgical History  She has a past surgical history that includes Invasive Vascular Procedure (N/A, 1/10/2024).     Social History  She reports that she has never smoked. She has never been exposed to tobacco smoke. She has never used smokeless tobacco. She reports that she does not drink alcohol and does not use drugs.    Family History  No family history on file.     Allergies  Nadolol, Niacin, Simvastatin, and Lovastatin    Review of Systems   Unable to obtain as I was told patient is confused     Physical Exam  Seen through the window, patient does not appear to be in any distress, rest of physical exam was deferred in order to limit exposure to the virus.         I&O 24HR  No intake or output data in the 24 hours ending 12/13/24 1332    Vitals 24HR  Heart Rate:  [61-84]   Temperature:  [36.3 °C (97.3 °F)-36.4 °C (97.5 °F)]   Respirations:  [18]   BP: (108-156)/(50-80)   Height:  [160 cm (5' 3\")]   Weight:  [66.8 kg (147 lb 4.3 oz)]   Pulse Ox:  [92 %-99 %]       Relevant Results  Results for orders placed or performed during the hospital encounter of 12/12/24 " Critical Care Time (RN) Mins: 140   (from the past 24 hours)   POCT GLUCOSE   Result Value Ref Range    POCT Glucose 41 (L) 74 - 99 mg/dL   CBC and Auto Differential   Result Value Ref Range    WBC 6.7 4.4 - 11.3 x10*3/uL    nRBC 0.0 0.0 - 0.0 /100 WBCs    RBC 5.12 4.00 - 5.20 x10*6/uL    Hemoglobin 15.3 12.0 - 16.0 g/dL    Hematocrit 45.9 36.0 - 46.0 %    MCV 90 80 - 100 fL    MCH 29.9 26.0 - 34.0 pg    MCHC 33.3 32.0 - 36.0 g/dL    RDW 13.3 11.5 - 14.5 %    Platelets 238 150 - 450 x10*3/uL    Neutrophils % 69.8 40.0 - 80.0 %    Immature Granulocytes %, Automated 0.6 0.0 - 0.9 %    Lymphocytes % 18.2 13.0 - 44.0 %    Monocytes % 10.4 2.0 - 10.0 %    Eosinophils % 0.6 0.0 - 6.0 %    Basophils % 0.4 0.0 - 2.0 %    Neutrophils Absolute 4.70 1.60 - 5.50 x10*3/uL    Immature Granulocytes Absolute, Automated 0.04 0.00 - 0.50 x10*3/uL    Lymphocytes Absolute 1.23 0.80 - 3.00 x10*3/uL    Monocytes Absolute 0.70 0.05 - 0.80 x10*3/uL    Eosinophils Absolute 0.04 0.00 - 0.40 x10*3/uL    Basophils Absolute 0.03 0.00 - 0.10 x10*3/uL   Comprehensive metabolic panel   Result Value Ref Range    Glucose 141 (H) 74 - 99 mg/dL    Sodium 132 (L) 136 - 145 mmol/L    Potassium 4.2 3.5 - 5.3 mmol/L    Chloride 97 (L) 98 - 107 mmol/L    Bicarbonate 23 21 - 32 mmol/L    Anion Gap 16 10 - 20 mmol/L    Urea Nitrogen 60 (H) 6 - 23 mg/dL    Creatinine 2.48 (H) 0.50 - 1.05 mg/dL    eGFR 18 (L) >60 mL/min/1.73m*2    Calcium 9.2 8.6 - 10.3 mg/dL    Albumin 4.3 3.4 - 5.0 g/dL    Alkaline Phosphatase 68 33 - 136 U/L    Total Protein 8.6 (H) 6.4 - 8.2 g/dL    AST 26 9 - 39 U/L    Bilirubin, Total 0.6 0.0 - 1.2 mg/dL    ALT 18 7 - 45 U/L   Magnesium   Result Value Ref Range    Magnesium 2.23 1.60 - 2.40 mg/dL   B-type natriuretic peptide   Result Value Ref Range    BNP 59 0 - 99 pg/mL   Troponin I, High Sensitivity, Initial   Result Value Ref Range    Troponin I, High Sensitivity 13 0 - 13 ng/L   Hemoglobin A1C   Result Value Ref Range    Hemoglobin A1C 6.7 (H) See comment %     Estimated Average Glucose 146 Not Established mg/dL   POCT GLUCOSE   Result Value Ref Range    POCT Glucose 129 (H) 74 - 99 mg/dL   Sars-CoV-2 PCR   Result Value Ref Range    Coronavirus 2019, PCR Detected (A) Not Detected   Influenza A, and B PCR   Result Value Ref Range    Flu A Result Not Detected Not Detected    Flu B Result Not Detected Not Detected   RSV PCR   Result Value Ref Range    RSV PCR Not Detected Not Detected   Troponin, High Sensitivity, 1 Hour   Result Value Ref Range    Troponin I, High Sensitivity 11 0 - 13 ng/L   ECG 12 lead   Result Value Ref Range    Ventricular Rate 60 BPM    QRS Duration 136 ms    QT Interval 506 ms    QTC Calculation(Bazett) 506 ms    R Axis 36 degrees    T Axis 68 degrees    QRS Count 10 beats    Q Onset 212 ms    P Onset 124 ms    P Offset 157 ms    T Offset 465 ms    QTC Fredericia 506 ms   Urinalysis with Reflex Culture and Microscopic   Result Value Ref Range    Color, Urine Yellow Light-Yellow, Yellow, Dark-Yellow    Appearance, Urine Turbid (N) Clear    Specific Gravity, Urine 1.015 1.005 - 1.035    pH, Urine 5.5 5.0, 5.5, 6.0, 6.5, 7.0, 7.5, 8.0    Protein, Urine 30 (1+) (A) NEGATIVE, 10 (TRACE), 20 (TRACE) mg/dL    Glucose, Urine Normal Normal mg/dL    Blood, Urine 0.03 (TRACE) (A) NEGATIVE    Ketones, Urine NEGATIVE NEGATIVE mg/dL    Bilirubin, Urine NEGATIVE NEGATIVE    Urobilinogen, Urine Normal Normal mg/dL    Nitrite, Urine NEGATIVE NEGATIVE    Leukocyte Esterase, Urine 500 [place]/µL (A) NEGATIVE   Extra Urine Gray Tube   Result Value Ref Range    Extra Tube Hold for add-ons.    Microscopic Only, Urine   Result Value Ref Range    WBC, Urine >50 (A) 1-5, NONE /HPF    WBC Clumps, Urine FEW Reference range not established. /HPF    RBC, Urine NONE NONE, 1-2, 3-5 /HPF    Squamous Epithelial Cells, Urine 1-9 (SPARSE) Reference range not established. /HPF    Bacteria, Urine 4+ (A) NONE SEEN /HPF    Mucus, Urine 1+ Reference range not established. /LPF    Hyaline Casts,  Urine 3+ (A) NONE /LPF   POCT GLUCOSE   Result Value Ref Range    POCT Glucose 58 (L) 74 - 99 mg/dL   POCT GLUCOSE   Result Value Ref Range    POCT Glucose 48 (L) 74 - 99 mg/dL   POCT GLUCOSE   Result Value Ref Range    POCT Glucose 137 (H) 74 - 99 mg/dL   POCT GLUCOSE   Result Value Ref Range    POCT Glucose 293 (H) 74 - 99 mg/dL   POCT GLUCOSE   Result Value Ref Range    POCT Glucose 297 (H) 74 - 99 mg/dL   POCT GLUCOSE   Result Value Ref Range    POCT Glucose 215 (H) 74 - 99 mg/dL   CBC   Result Value Ref Range    WBC 6.0 4.4 - 11.3 x10*3/uL    nRBC 0.0 0.0 - 0.0 /100 WBCs    RBC 4.21 4.00 - 5.20 x10*6/uL    Hemoglobin 12.5 12.0 - 16.0 g/dL    Hematocrit 38.1 36.0 - 46.0 %    MCV 91 80 - 100 fL    MCH 29.7 26.0 - 34.0 pg    MCHC 32.8 32.0 - 36.0 g/dL    RDW 13.2 11.5 - 14.5 %    Platelets 168 150 - 450 x10*3/uL   Basic metabolic panel   Result Value Ref Range    Glucose 203 (H) 74 - 99 mg/dL    Sodium 132 (L) 136 - 145 mmol/L    Potassium 4.5 3.5 - 5.3 mmol/L    Chloride 105 98 - 107 mmol/L    Bicarbonate 20 (L) 21 - 32 mmol/L    Anion Gap 12 10 - 20 mmol/L    Urea Nitrogen 52 (H) 6 - 23 mg/dL    Creatinine 1.76 (H) 0.50 - 1.05 mg/dL    eGFR 28 (L) >60 mL/min/1.73m*2    Calcium 8.0 (L) 8.6 - 10.3 mg/dL   POCT GLUCOSE   Result Value Ref Range    POCT Glucose 158 (H) 74 - 99 mg/dL          Assessment/Plan   Acute kidney injury likely secondary to volume depletion and also on ACE inhibitor and thiazide diuretic at home, which is improving, however rule out other etiologies  Chronic kidney disease stage III (baseline creatinine 1-1.3)  Diabetes mellitus type 2  Hypertension    Plan: Check CK level, check renal ultrasound, monitor urine culture results.  Hold ACE inhibitor and hydrochlorothiazide.  Change IV fluids to half-normal saline with 75 mEq  of sodium bicarbonate and monitor renal function closely.  Thank you for your consultation.    Win Ramesh MD

## 2025-01-16 NOTE — PROGRESS NOTES
Continued Stay SW/CM Assessment/Plan of Care Note   Active Substitute Decision Maker (SDM)    There are no active Substitute Decision Maker (SDM) on file.     Progress note:  1045 Spoke with Anastasiia at [facility]. She expressed how Pt is nearing the end of his 60 days (medicare) and explained how if Pt does want to return past the 60 days, they can see if [employer] (secondary insurance) will  the daily copay (around $450/day). She is questioning whether Pt will be able to tolerate IRP with everything going on at this point. CM explained that care team here has the same concerns, and a palliative consult is to occur tomorrow 1/17.     1455 Had a lengthly conversation with Pt's grandson Ravi, who is the healthcare agent #1 listed on Pt's POA-HC document (not currently activated). He wanted to know if there is anything he should be doing at this point, whether its looking into nursing homes for long term care or trying to obtain \"control\" of Pt's finances to help out. CM explained that since Pt is currently still his own person, he (Ravi) will not be able to do anything with Pt's finances.  We discussed palliative consult for tomorrow, and how discharge planning conversations may change after that consult. CM did brielfy touch on various insurance pieces, including medicare coverage for CALLY at a SNF if he no longer wants to go to IRP, long term care coverage if he is not able to return home, and Medicare coverage for hospice but not room/board at a facility, and more.  Ravi does seem to understand and is appreciative of the conversation and explanations, and also understands that Pt is still his own person as of right now and therefore his own decision maker.     See SW/CM flowsheets for other objective data.    Disposition Recommendations:  Preliminary discharge destination: Planned Discharge Destination: Rehabilitation/Skilled Care  SW/CM recommendation for discharge: Intensive therapy program    Discharge Plan/Needs:  HPI and Plan:   See dictation    Orders Placed This Encounter   Procedures     Basic metabolic panel     Basic metabolic panel     Follow-Up with Electrophysiologist     Follow-[facility] with [facility] - Return MD visit     Follow-Up with [facility]       Orders Placed This Encounter   Medications     ASPIRIN NOT PRESCRIBED (INTENTIONAL)     Sig: continuous prn for other Please choose reason not prescribed, below     lisinopril (PRINIVIL/ZESTRIL) 5 MG tablet     Sig: Take 1 tablet (5 mg) by mouth At Bedtime     Dispense:  180 tablet     Refill:  3       Medications Discontinued During This Encounter   Medication Reason     ASPIRIN NOT PRESCRIBED (INTENTIONAL) Stopped by Patient     lisinopril (PRINIVIL,ZESTRIL) 5 MG tablet Reorder         Encounter Diagnoses   Name Primary?     Chronic systolic congestive heart failure (H) Yes     Ventricular tachycardia (H)      Coronary artery disease involving native coronary artery of native heart without angina pectoris      ICD (implantable cardioverter-defibrillator) in place      Chronic atrial fibrillation (H)        CURRENT MEDICATIONS:  Current Outpatient Prescriptions   Medication Sig Dispense Refill     ASPIRIN NOT PRESCRIBED (INTENTIONAL) continuous prn for other Please choose reason not prescribed, below       lisinopril (PRINIVIL/ZESTRIL) 5 MG tablet Take 1 tablet (5 mg) by mouth At Bedtime 180 tablet 3     carvedilol (COREG) 3.125 MG tablet Take 2 tablets (6.25 mg) by mouth 2 times daily (with meals) 360 tablet 3     ipratropium (ATROVENT) 0.03 % spray Spray 2 sprays into both nostrils every 8 hours as needed for rhinitis 30 mL 11     amiodarone (PACERONE/CODARONE) 200 MG tablet Take 1 tablet (200 mg) by mouth daily 30 tablet 0     WARFARIN SODIUM PO Take 2.5 mg by mouth daily 2.5 mg m.w.f & 1.25 row       WARFARIN SODIUM PO Take 1.25 mg by mouth daily 2.5 mg m.w.f & 1.25 row       digoxin (LANOXIN) 125 MCG tablet Take 1 tablet (125 mcg) by mouth daily 90 tablet 3        Continued Care and Services - Admitted Since 12/19/2024       Destination  Coordination complete.      Service Provider Request Status Selected Services Address Phone Fax    [facility]  Selected Inpatient Rehabilitation [address], [facility] [id number] [phone]                  Continued Care and Services - Linked Episodes Includes continued care and service providers from the active episodes linked to this encounter, which are listed below      Care Transitions Episode start date: 12/19/2024   There are no active outsourced providers for this episode.               Devices/ Equipment that need to be arranged for discharge: None at this time     Prior To Hospitalization:    Living Situation: Alone and residing at Apartment      Support Systems: Family members   Home Devices/Equipment: CPAP/BiPAP machine (T)            Mobility Assist Devices: Crutches   Type of Service Prior to Hospitalization: Nurse (specify)               Patient/Family discharge goal (s):  Intensive therapy program     Prior Function  Ambulation in the Home: Modified  Independent (12/20/24 1000 : Delilah Shah, PT)     Current Function  Last Filed Values         Value Time User    Current Function  significantly below baseline level of function 1/15/2025  1:23 PM Clyde Alcantar, PT    Therapy Impairments  safety awareness; activity tolerance; pain; balance; cognition; strength; executive functioning; edema; skin integrity 1/15/2025  1:23 PM Clyde Alcantar, PT    ADLs Requiring Support  bed mobility; transfers; ambulation 1/15/2025  1:23 PM Clyde Alcantar, PT        Therapy Recommendations for Discharge:   PT:      Last Filed Values         Value Time User    PT Discharge Needs  therapy 5 or more times per week 1/14/2025 12:30 PM Lisa Duque, LILIAN        OT:       Last Filed Values         Value Time User    OT Discharge Needs  therapy 5 or more times per week 1/16/2025 12:59  rosuvastatin (CRESTOR) 20 MG tablet Take 1 tablet (20 mg) by mouth daily 30 tablet 11     NITROSTAT 0.4 MG sublingual tablet DISSOLVE 1 TABLET UNDER THE TONGUE EVERY 5 MINUTES AS NEEDED FOR CHEST PAIN 25 tablet 0     Vitamin D, Cholecalciferol, 1000 UNITS TABS Take 1,000 mg by mouth daily        Multiple Vitamins-Minerals (PRESERVISION AREDS 2 PO) Take 1 capsule by mouth 2 times daily       sildenafil (VIAGRA) 100 MG tablet Take 1 tablet (100 mg) by mouth daily as needed for erectile dysfunction Take 30 min to 4 hours before intercourse.  Never use with nitroglycerin, terazosin or doxazosin. 16 tablet 3     ranitidine (ZANTAC) 150 MG tablet Take 1 tablet (150 mg) by mouth 2 times daily 180 tablet      [DISCONTINUED] lisinopril (PRINIVIL,ZESTRIL) 5 MG tablet Take 1 tablet (5 mg) by mouth 2 times daily 180 tablet 3       ALLERGIES     Allergies   Allergen Reactions     Nystatin Other (See Comments)     Make his mouth numb & swelling       PAST MEDICAL HISTORY:  Past Medical History:   Diagnosis Date     Atrial fibrillation (H)     s/p Cardioversion 3/14/2013     Atrial flutter (H)     S/p Aflutter ablation 1/11/2011     CAD (coronary artery disease)     CABG x3 10/2012- LIMA to distal LAD, SVG to OM1 & OM3; cath 10/2012- PTCA to second diagonal and mid LAD, BMS to mid LAD     Cardiogenic shock (H)      Cardiomyopathy (H)      CHF (congestive heart failure) (H)      CVA (cerebral infarction)     residual right hand numbness     ED (erectile dysfunction)      Hyperlipidemia      Hypertension      Neuropathy      Palpitations      SVT (supraventricular tachycardia) (H)     S/p dual chamber ICD 10/11/12- upgraded to BIV ICD 6/2013     Syncope        PAST SURGICAL HISTORY:  Past Surgical History:   Procedure Laterality Date     BYPASS GRAFT ARTERY CORONARY  10/2/2012    Procedure: BYPASS GRAFT ARTERY CORONARY;  Coronary Artery Bypass Graft x3 (LAD, Diag, OM) with Endovein Mouthcard (On-Pump);  Surgeon: Yeyo Lyman,  MD;  Location: [facility] OR     CARDIOVERSION  3/14/2013     CORONARY ANGIOGRAPHY ADULT ORDER  10/2/12     CORONARY ARTERY BYPASS  10/2/12    LIMA to LAD, SVG to OM1 and OM3     H ABLATION ATRIAL FLUTTER  1/11/2011     HAND SURGERY       HEART CATH, ANGIOPLASTY  10/2/12    PTCA to second diagonal and mid LAD, BMS to mid LAD     HERNIA REPAIR       ORTHOPEDIC SURGERY Right 2006    cut on table saw     RELOCATE GENERATOR ICD/PACEMAKER         FAMILY HISTORY:  Family History   Problem Relation Age of Onset     Alcohol/Drug Father      HEART DISEASE Father 71     Alzheimer Disease Sister      Alcohol/Drug Sister      Alcohol/Drug Sister      GASTROINTESTINAL DISEASE Sister      Obesity Sister      Hypertension Sister      HEART DISEASE Sister      Hypertension Sister      HEART DISEASE Daughter      afib     Arrhythmia Daughter      Multiple Sclerosis Daughter      HEART DISEASE Daughter      afib     Arrhythmia Daughter      CANCER Daughter      lymphoma     Aneurysm Mother        SOCIAL HISTORY:  Social History     Social History     Marital status:      Spouse name: N/A     Number of children: N/A     Years of education: N/A     Social History Main Topics     Smoking status: Former Smoker     Quit date: 7/10/1972     Smokeless tobacco: Never Used     Alcohol use No     Drug use: No     Sexual activity: Yes     Partners: Female     Other Topics Concern     Parent/Sibling W/ Cabg, Mi Or Angioplasty Before 65f 55m? No     Caffeine Concern Yes     4 cups coffee daily     Sleep Concern No     Stress Concern No     Weight Concern Yes     gain 2lbs     Special Diet Yes     low sodium     Exercise Yes     walking, doing sittups, played pickle ball in summer     Seat Belt Yes     Social History Narrative       Review of Systems:  Skin:  Negative       Eyes:  Positive for glasses    ENT:  Negative      Respiratory:  Positive for dyspnea on exertion SOB with some activity- no more than usual   Cardiovascular:     Xochitl Roca, KAMINI        Mobility Equipment Recommended for Discharge: requiring lift equipment      Barriers to Discharge  Identified Barriers to Discharge/Transition Planning: Medical necessity for acute care   "dizziness;Positive for since dec 3rd, 2017, when standing up  Gastroenterology: Negative      Genitourinary:  Negative      Musculoskeletal:  Negative      Neurologic:  Positive for   history of stroke, head injury in last year, right hand has issues with feeling/identification  Psychiatric:  Negative      Heme/Lymph/Imm:  Positive for easy bruising    Endocrine:  Negative        Physical Exam:  Vitals: /80  Pulse 64  Ht 1.854 m (6' 1\")  Wt 85.2 kg (187 lb 12.8 oz)  SpO2 98%  BMI 24.78 kg/m2    Constitutional:  cooperative, alert and oriented, well developed, well nourished, in no acute distress        Skin:  warm and dry to the touch, no apparent skin lesions or masses noted   ICD incision in the left infraclavicular area was well-healed      Head:  normocephalic, no masses or lesions        Eyes:  pupils equal and round, conjunctivae and lids unremarkable, sclera white, no xanthalasma, EOMS intact, no nystagmus        Lymph:      ENT:  no pallor or cyanosis        Neck:  carotid pulses are full and equal bilaterally   JVP 6cm    Respiratory:  clear to auscultation         Cardiac: regular rhythm;no murmurs, gallops or rubs detected                                                         GI:  abdomen soft;BS normoactive        Extremities and Muscular Skeletal:  no deformities, clubbing, cyanosis, erythema observed;no edema              Neurological:  no gross motor deficits;affect appropriate        Psych:  affect appropriate, oriented to time, person and place        CC  Parish Newman MD  [address]  [place] [address]              "

## (undated) DEVICE — CATH TURNPIKE LP 135CM

## (undated) DEVICE — INFL DVC KIT W/10CC NITRO IN4530

## (undated) DEVICE — CATH BALLOON EMERGE 2.0X15MM H7493918915200

## (undated) DEVICE — CATH BALLOON EMERGE 1.5X15MM H7493918915150

## (undated) DEVICE — CATH GUIDELINER 6FR 5571

## (undated) DEVICE — GUIDEWIRE VASC 325CM .014IN RTWR FLPY H80228240022

## (undated) DEVICE — CATH ANGIO JUDKINS R4 6FRX100CM INFINITI 534621T

## (undated) DEVICE — WIRE GUIDE 0.035"X260CM SAFE-T-J EXCHANGE G00517

## (undated) DEVICE — CATH BALLOON NC EUPHORA 2.5X20MM NCEUP2520X

## (undated) DEVICE — CATH ANGIO INFINITI 3DRC 6FRX100CM 534676T

## (undated) DEVICE — GUIDEWIRE TRNSEPT 0.014 X35CM SAFE SE

## (undated) DEVICE — SHEATH AGILIS 8.5FR X 71CM 408310

## (undated) DEVICE — CATHETER BURR ADVANCER DEVICE ROTAPRO 1.25MM X 135CM

## (undated) DEVICE — NDL TRANSSEPTAL BRK 18GA 71CM BRK CVD 407200

## (undated) DEVICE — CATH ANGIO INFINITI IM 4FRX100CM 538460

## (undated) DEVICE — GUIDEWIRE VASC 0.014INX180CM RUNTHROUGH 25-1011

## (undated) DEVICE — CLOSURE ANGIOSEAL 6FR 610130

## (undated) DEVICE — INTRODUCER CATH VASC 5FRX10CM  MPIS-501-NT-U-SST

## (undated) DEVICE — CATH THERMOCOOL SMARTTOUCH SF FJ CURVE

## (undated) DEVICE — GUIDEWIRE VERSACORE 0.035"X300CM  J-TIP 1012068-07

## (undated) DEVICE — PACK EP SRG PROC LF DISP SAN32EPFSR

## (undated) DEVICE — CATH GUIDING  6F MACH 1 GUIDING CLS3.5

## (undated) DEVICE — CATH EP 120CM 6FR RSPN 2-5-2MM 401261

## (undated) DEVICE — INTRO SHEATH 6FRX10CM PINNACLE RSS602

## (undated) DEVICE — MANIFOLD KIT ANGIO AUTOMATED 014613

## (undated) DEVICE — MEDITRACE MULTIFUNTION ADULT RADIOTRANSPARENT ELECTRODE FOR ZOLL

## (undated) DEVICE — CATH RX TAKERU PTCA BALLOON 2.00MM X 15MM

## (undated) DEVICE — VALVE HEMOSTASIS .096" COPILOT MECH 1003331

## (undated) DEVICE — TUBE SET SMARKABLATE IRRIGATION

## (undated) DEVICE — CATH SOUNDSTAR 8FRX90CM 10439011

## (undated) DEVICE — CATH ANGIO JUDKINS JL4 6FRX100CM INFINITI 534620T

## (undated) DEVICE — CATH BALLOON EMERGE 2.0X12MM H7493918912200

## (undated) DEVICE — INTRODUCER SHEATH GREEN 6.5FRX11CM .038IN PSI-6F-11-038ACT

## (undated) DEVICE — INTRODUCER SHEATH FAST-CATH SWARTZ 8.5FRX63CM SL1 CVD 406849

## (undated) DEVICE — KIT HAND CONTROL ANGIOTOUCH ACIST 65CM AT-P65

## (undated) DEVICE — CATH BALLOON EMERGE 2.25X15MMH7493918915220

## (undated) DEVICE — CATH NAV PENTARAY F CURVE

## (undated) DEVICE — CATH LAUNCHER 6FR LA6EBU375

## (undated) DEVICE — CATH EP 6FR 2MM TIP 2-8-2 115C

## (undated) DEVICE — 6FR-8FR TRAPPER EXCHANGE DEVICE, NON-COMPLIANT BALLOON

## (undated) DEVICE — TOTE ANGIO CORP PC15AT SAN32CC83O

## (undated) RX ORDER — NITROGLYCERIN 5 MG/ML
VIAL (ML) INTRAVENOUS
Status: DISPENSED
Start: 2020-01-01

## (undated) RX ORDER — REGADENOSON 0.08 MG/ML
INJECTION, SOLUTION INTRAVENOUS
Status: DISPENSED
Start: 2017-10-19

## (undated) RX ORDER — HEPARIN SODIUM 200 [USP'U]/100ML
INJECTION, SOLUTION INTRAVENOUS
Status: DISPENSED
Start: 2020-01-01

## (undated) RX ORDER — PROTAMINE SULFATE 10 MG/ML
INJECTION, SOLUTION INTRAVENOUS
Status: DISPENSED
Start: 2019-01-02

## (undated) RX ORDER — NITROGLYCERIN 5 MG/ML
VIAL (ML) INTRAVENOUS
Status: DISPENSED
Start: 2018-01-10

## (undated) RX ORDER — FENTANYL CITRATE 50 UG/ML
INJECTION, SOLUTION INTRAMUSCULAR; INTRAVENOUS
Status: DISPENSED
Start: 2020-01-01

## (undated) RX ORDER — KETOROLAC TROMETHAMINE 30 MG/ML
INJECTION, SOLUTION INTRAMUSCULAR; INTRAVENOUS
Status: DISPENSED
Start: 2019-01-02

## (undated) RX ORDER — HEPARIN SODIUM 1000 [USP'U]/ML
INJECTION, SOLUTION INTRAVENOUS; SUBCUTANEOUS
Status: DISPENSED
Start: 2018-01-10

## (undated) RX ORDER — FENTANYL CITRATE 50 UG/ML
INJECTION, SOLUTION INTRAMUSCULAR; INTRAVENOUS
Status: DISPENSED
Start: 2018-01-10

## (undated) RX ORDER — CLOPIDOGREL 300 MG/1
TABLET, FILM COATED ORAL
Status: DISPENSED
Start: 2020-01-01

## (undated) RX ORDER — LIDOCAINE HYDROCHLORIDE 10 MG/ML
INJECTION, SOLUTION EPIDURAL; INFILTRATION; INTRACAUDAL; PERINEURAL
Status: DISPENSED
Start: 2020-01-01

## (undated) RX ORDER — LIDOCAINE HYDROCHLORIDE 10 MG/ML
INJECTION, SOLUTION EPIDURAL; INFILTRATION; INTRACAUDAL; PERINEURAL
Status: DISPENSED
Start: 2019-01-02

## (undated) RX ORDER — FENTANYL CITRATE 50 UG/ML
INJECTION, SOLUTION INTRAMUSCULAR; INTRAVENOUS
Status: DISPENSED
Start: 2019-01-02

## (undated) RX ORDER — HEPARIN SODIUM 1000 [USP'U]/ML
INJECTION, SOLUTION INTRAVENOUS; SUBCUTANEOUS
Status: DISPENSED
Start: 2020-01-01

## (undated) RX ORDER — VERAPAMIL HYDROCHLORIDE 2.5 MG/ML
INJECTION, SOLUTION INTRAVENOUS
Status: DISPENSED
Start: 2018-01-10

## (undated) RX ORDER — ADENOSINE 3 MG/ML
INJECTION, SOLUTION INTRAVENOUS
Status: DISPENSED
Start: 2018-01-10

## (undated) RX ORDER — LIDOCAINE HYDROCHLORIDE 10 MG/ML
INJECTION, SOLUTION EPIDURAL; INFILTRATION; INTRACAUDAL; PERINEURAL
Status: DISPENSED
Start: 2018-01-10

## (undated) RX ORDER — FUROSEMIDE 10 MG/ML
INJECTION INTRAMUSCULAR; INTRAVENOUS
Status: DISPENSED
Start: 2019-01-02